# Patient Record
Sex: FEMALE | Race: BLACK OR AFRICAN AMERICAN | Employment: OTHER | URBAN - METROPOLITAN AREA
[De-identification: names, ages, dates, MRNs, and addresses within clinical notes are randomized per-mention and may not be internally consistent; named-entity substitution may affect disease eponyms.]

---

## 2017-01-14 ENCOUNTER — HOSPITAL ENCOUNTER (OUTPATIENT)
Dept: MAMMOGRAPHY | Age: 82
Discharge: HOME | End: 2017-01-14
Attending: FAMILY MEDICINE
Payer: MEDICARE

## 2017-01-14 DIAGNOSIS — Z12.31 VISIT FOR SCREENING MAMMOGRAM: ICD-10-CM

## 2017-01-14 PROCEDURE — 77067 SCR MAMMO BI INCL CAD: CPT

## 2017-03-02 PROBLEM — I63.9 CEREBROVASCULAR ACCIDENT (CVA) (HCC): Status: ACTIVE | Noted: 2017-03-02

## 2017-03-02 PROBLEM — I10 ESSENTIAL HYPERTENSION WITH GOAL BLOOD PRESSURE LESS THAN 130/85: Status: ACTIVE | Noted: 2017-03-02

## 2017-03-26 ENCOUNTER — APPOINTMENT (OUTPATIENT)
Dept: CT IMAGING | Age: 82
DRG: 064 | End: 2017-03-26
Attending: EMERGENCY MEDICINE
Payer: MEDICARE

## 2017-03-26 ENCOUNTER — HOSPITAL ENCOUNTER (EMERGENCY)
Age: 82
Discharge: ANOTHER HOSPITAL | DRG: 064 | End: 2017-03-26
Attending: EMERGENCY MEDICINE
Payer: MEDICARE

## 2017-03-26 ENCOUNTER — HOSPITAL ENCOUNTER (INPATIENT)
Age: 82
LOS: 2 days | Discharge: HOME HEALTH | DRG: 064 | End: 2017-03-28
Attending: INTERNAL MEDICINE | Admitting: INTERNAL MEDICINE
Payer: MEDICARE

## 2017-03-26 VITALS
HEIGHT: 69 IN | BODY MASS INDEX: 27.55 KG/M2 | HEART RATE: 76 BPM | TEMPERATURE: 98.2 F | DIASTOLIC BLOOD PRESSURE: 71 MMHG | OXYGEN SATURATION: 99 % | RESPIRATION RATE: 18 BRPM | SYSTOLIC BLOOD PRESSURE: 133 MMHG | WEIGHT: 186 LBS

## 2017-03-26 DIAGNOSIS — I63.9 CEREBROVASCULAR ACCIDENT (CVA), UNSPECIFIED MECHANISM (HCC): Primary | ICD-10-CM

## 2017-03-26 PROBLEM — R42 DIZZINESS: Status: ACTIVE | Noted: 2017-03-26

## 2017-03-26 PROBLEM — H53.9 VISION CHANGES: Status: ACTIVE | Noted: 2017-03-26

## 2017-03-26 LAB
ALBUMIN SERPL BCP-MCNC: 2.3 G/DL (ref 3.2–4.6)
ALBUMIN/GLOB SERPL: 0.5 {RATIO} (ref 1.2–3.5)
ALP SERPL-CCNC: 73 U/L (ref 50–136)
ALT SERPL-CCNC: 19 U/L (ref 12–65)
ANION GAP BLD CALC-SCNC: 10 MMOL/L (ref 7–16)
APTT PPP: 25.6 SEC (ref 25.3–32.9)
AST SERPL W P-5'-P-CCNC: 32 U/L (ref 15–37)
BASOPHILS # BLD AUTO: 0 K/UL (ref 0–0.2)
BASOPHILS # BLD: 0 % (ref 0–2)
BILIRUB SERPL-MCNC: 0.4 MG/DL (ref 0.2–1.1)
BUN SERPL-MCNC: 29 MG/DL (ref 8–23)
CALCIUM SERPL-MCNC: 8.8 MG/DL (ref 8.3–10.4)
CHLORIDE SERPL-SCNC: 101 MMOL/L (ref 98–107)
CO2 SERPL-SCNC: 28 MMOL/L (ref 21–32)
CREAT SERPL-MCNC: 10.17 MG/DL (ref 0.6–1)
DIFFERENTIAL METHOD BLD: ABNORMAL
EOSINOPHIL # BLD: 0.2 K/UL (ref 0–0.8)
EOSINOPHIL NFR BLD: 3 % (ref 0.5–7.8)
ERYTHROCYTE [DISTWIDTH] IN BLOOD BY AUTOMATED COUNT: 14.7 % (ref 11.9–14.6)
GLOBULIN SER CALC-MCNC: 4.2 G/DL (ref 2.3–3.5)
GLUCOSE SERPL-MCNC: 109 MG/DL (ref 65–100)
HCT VFR BLD AUTO: 27.1 % (ref 35.8–46.3)
HGB BLD-MCNC: 8.6 G/DL (ref 11.7–15.4)
IMM GRANULOCYTES # BLD: 0 K/UL (ref 0–0.5)
IMM GRANULOCYTES NFR BLD AUTO: 0.4 % (ref 0–5)
INR PPP: 1 (ref 0.9–1.2)
LYMPHOCYTES # BLD AUTO: 18 % (ref 13–44)
LYMPHOCYTES # BLD: 1.4 K/UL (ref 0.5–4.6)
MCH RBC QN AUTO: 30.7 PG (ref 26.1–32.9)
MCHC RBC AUTO-ENTMCNC: 31.7 G/DL (ref 31.4–35)
MCV RBC AUTO: 96.8 FL (ref 79.6–97.8)
MONOCYTES # BLD: 0.5 K/UL (ref 0.1–1.3)
MONOCYTES NFR BLD AUTO: 6 % (ref 4–12)
NEUTS SEG # BLD: 5.9 K/UL (ref 1.7–8.2)
NEUTS SEG NFR BLD AUTO: 73 % (ref 43–78)
PLATELET # BLD AUTO: 222 K/UL (ref 150–450)
PMV BLD AUTO: 11.2 FL (ref 10.8–14.1)
POTASSIUM SERPL-SCNC: 3.5 MMOL/L (ref 3.5–5.1)
PROT SERPL-MCNC: 6.5 G/DL (ref 6.3–8.2)
PROTHROMBIN TIME: 10.5 SEC (ref 9.6–12)
RBC # BLD AUTO: 2.8 M/UL (ref 4.05–5.25)
SODIUM SERPL-SCNC: 139 MMOL/L (ref 136–145)
WBC # BLD AUTO: 8 K/UL (ref 4.3–11.1)

## 2017-03-26 PROCEDURE — 65660000000 HC RM CCU STEPDOWN

## 2017-03-26 PROCEDURE — 74011250636 HC RX REV CODE- 250/636: Performed by: FAMILY MEDICINE

## 2017-03-26 PROCEDURE — 74011250637 HC RX REV CODE- 250/637: Performed by: FAMILY MEDICINE

## 2017-03-26 PROCEDURE — 65270000029 HC RM PRIVATE

## 2017-03-26 RX ORDER — DOCUSATE SODIUM 100 MG/1
100 CAPSULE, LIQUID FILLED ORAL DAILY
Status: DISCONTINUED | OUTPATIENT
Start: 2017-03-27 | End: 2017-03-28 | Stop reason: HOSPADM

## 2017-03-26 RX ORDER — FOLIC ACID 1 MG/1
1 TABLET ORAL 2 TIMES DAILY
Status: DISCONTINUED | OUTPATIENT
Start: 2017-03-27 | End: 2017-03-28 | Stop reason: HOSPADM

## 2017-03-26 RX ORDER — ROSUVASTATIN CALCIUM 20 MG/1
20 TABLET, COATED ORAL
Status: DISCONTINUED | OUTPATIENT
Start: 2017-03-26 | End: 2017-03-28 | Stop reason: HOSPADM

## 2017-03-26 RX ORDER — FOLIC ACID 1 MG/1
1 TABLET ORAL DAILY
Status: DISCONTINUED | OUTPATIENT
Start: 2017-03-27 | End: 2017-03-26 | Stop reason: DRUGHIGH

## 2017-03-26 RX ORDER — SEVELAMER HYDROCHLORIDE 400 MG/1
800 TABLET, FILM COATED ORAL 2 TIMES DAILY WITH MEALS
Status: DISCONTINUED | OUTPATIENT
Start: 2017-03-27 | End: 2017-03-28 | Stop reason: HOSPADM

## 2017-03-26 RX ORDER — CALCITRIOL 0.25 UG/1
0.25 CAPSULE ORAL
Status: DISCONTINUED | OUTPATIENT
Start: 2017-03-27 | End: 2017-03-26

## 2017-03-26 RX ORDER — ASPIRIN 81 MG/1
81 TABLET ORAL DAILY
Status: DISCONTINUED | OUTPATIENT
Start: 2017-03-27 | End: 2017-03-28 | Stop reason: HOSPADM

## 2017-03-26 RX ORDER — SODIUM CHLORIDE 0.9 % (FLUSH) 0.9 %
5-10 SYRINGE (ML) INJECTION EVERY 8 HOURS
Status: DISCONTINUED | OUTPATIENT
Start: 2017-03-26 | End: 2017-03-28 | Stop reason: HOSPADM

## 2017-03-26 RX ORDER — FAMOTIDINE 20 MG/1
20 TABLET, FILM COATED ORAL DAILY
Status: DISCONTINUED | OUTPATIENT
Start: 2017-03-27 | End: 2017-03-28 | Stop reason: HOSPADM

## 2017-03-26 RX ORDER — SODIUM CHLORIDE 0.9 % (FLUSH) 0.9 %
5-10 SYRINGE (ML) INJECTION EVERY 8 HOURS
Status: DISCONTINUED | OUTPATIENT
Start: 2017-03-26 | End: 2017-03-26 | Stop reason: HOSPADM

## 2017-03-26 RX ORDER — METOPROLOL TARTRATE 25 MG/1
12.5 TABLET, FILM COATED ORAL DAILY
Status: DISCONTINUED | OUTPATIENT
Start: 2017-03-27 | End: 2017-03-26

## 2017-03-26 RX ORDER — LANOLIN ALCOHOL/MO/W.PET/CERES
400 CREAM (GRAM) TOPICAL DAILY
Status: DISCONTINUED | OUTPATIENT
Start: 2017-03-27 | End: 2017-03-28 | Stop reason: HOSPADM

## 2017-03-26 RX ORDER — LEVOTHYROXINE SODIUM 150 UG/1
150 TABLET ORAL
Status: DISCONTINUED | OUTPATIENT
Start: 2017-03-27 | End: 2017-03-28 | Stop reason: HOSPADM

## 2017-03-26 RX ORDER — SODIUM CHLORIDE 0.9 % (FLUSH) 0.9 %
5-10 SYRINGE (ML) INJECTION AS NEEDED
Status: DISCONTINUED | OUTPATIENT
Start: 2017-03-26 | End: 2017-03-26 | Stop reason: HOSPADM

## 2017-03-26 RX ORDER — SODIUM CHLORIDE 9 MG/ML
100 INJECTION, SOLUTION INTRAVENOUS CONTINUOUS
Status: DISPENSED | OUTPATIENT
Start: 2017-03-26 | End: 2017-03-27

## 2017-03-26 RX ORDER — ACETAMINOPHEN 325 MG/1
650 TABLET ORAL
Status: DISCONTINUED | OUTPATIENT
Start: 2017-03-26 | End: 2017-03-28 | Stop reason: HOSPADM

## 2017-03-26 RX ORDER — TORSEMIDE 100 MG/1
100 TABLET ORAL DAILY
Status: DISCONTINUED | OUTPATIENT
Start: 2017-03-27 | End: 2017-03-26

## 2017-03-26 RX ORDER — AMLODIPINE BESYLATE 5 MG/1
5 TABLET ORAL DAILY
Status: DISCONTINUED | OUTPATIENT
Start: 2017-03-27 | End: 2017-03-28 | Stop reason: HOSPADM

## 2017-03-26 RX ORDER — HEPARIN SODIUM 5000 [USP'U]/ML
5000 INJECTION, SOLUTION INTRAVENOUS; SUBCUTANEOUS EVERY 8 HOURS
Status: DISCONTINUED | OUTPATIENT
Start: 2017-03-26 | End: 2017-03-28 | Stop reason: HOSPADM

## 2017-03-26 RX ORDER — RANOLAZINE 500 MG/1
500 TABLET, EXTENDED RELEASE ORAL 2 TIMES DAILY
Status: DISCONTINUED | OUTPATIENT
Start: 2017-03-26 | End: 2017-03-28 | Stop reason: HOSPADM

## 2017-03-26 RX ORDER — CYANOCOBALAMIN 1000 UG/ML
1000 INJECTION, SOLUTION INTRAMUSCULAR; SUBCUTANEOUS ONCE
Status: ACTIVE | OUTPATIENT
Start: 2017-03-27 | End: 2017-03-27

## 2017-03-26 RX ORDER — SODIUM CHLORIDE 0.9 % (FLUSH) 0.9 %
5-10 SYRINGE (ML) INJECTION AS NEEDED
Status: DISCONTINUED | OUTPATIENT
Start: 2017-03-26 | End: 2017-03-28 | Stop reason: HOSPADM

## 2017-03-26 RX ORDER — ONDANSETRON 2 MG/ML
4 INJECTION INTRAMUSCULAR; INTRAVENOUS
Status: DISCONTINUED | OUTPATIENT
Start: 2017-03-26 | End: 2017-03-28 | Stop reason: HOSPADM

## 2017-03-26 RX ADMIN — HEPARIN SODIUM 5000 UNITS: 5000 INJECTION, SOLUTION INTRAVENOUS; SUBCUTANEOUS at 23:07

## 2017-03-26 RX ADMIN — ROSUVASTATIN CALCIUM 20 MG: 20 TABLET ORAL at 23:07

## 2017-03-26 RX ADMIN — RANOLAZINE 500 MG: 500 TABLET, FILM COATED, EXTENDED RELEASE ORAL at 23:07

## 2017-03-26 NOTE — ED TRIAGE NOTES
Pt states she woke up this morning and has been having blurry vision since she woke up in both eyes. States she was dizzy this morning but has not felt dizzy in hours. Denies headache  At this time.

## 2017-03-26 NOTE — ED PROVIDER NOTES
HPI Comments: Patient is an 72-year-old female past medical history of dialysis and coronary artery disease currently taking T[place]agral[place] he states she woke up this morning at approximately 0830 with dizziness and diplopia. She states she had severe difficulty ambulating at this time. She went back to bed hoping symptoms would resolve and woke up at 10:30 and still had visual changes. She denies any significant headache. She states her dizziness has improved somewhat but is still present. She mentions she has been diagnosed with a TIA previously. No difficulty speaking. Good motor function throughout. Patient is a 80 y.o. female presenting with blurred vision. The history is provided by the patient. No  was used. Blurred Vision    Associated symptoms include blurred vision and dizziness. Pertinent negatives include no nausea, no vomiting, no fever and no pain.         Past Medical History:   Diagnosis Date    Anemia of chronic renal failure 4/28/2015    Anterior myocardial infarction [facility]) 5/21/2015    CAD (coronary artery disease)     Chest pain     Chronic kidney disease, stage III (moderate) 8/15/2014    on dialysis    CKD (chronic kidney disease) stage 4, GFR 15-29 ml/min ([facility]) 4/28/2015    Debility 5/5/2015    Depression 12/29/2015    Edema 12/29/2015    Endocrine disease     Hypothyroidism    GERD (gastroesophageal reflux disease)     Heart murmur 12/29/2015    HLD (hyperlipidemia) 12/29/2015    Hypertension     Hypothyroidism 4/28/2015    Ischemic cardiomyopathy 12/29/2015    Nausea     STEMI (ST elevation myocardial infarction) ([facility] [address] 4/28/2015    Unspecified sleep apnea     uses cpap machine    Unstable angina ([facility])        Past Surgical History:   Procedure Laterality Date    HX BACK SURGERY  1990    neck surgery cervical disc    HX BACK SURGERY      lower back    HX CATARACT REMOVAL Bilateral     HX CHOLECYSTECTOMY  19702    gall bladder     HX KNEE REPLACEMENT Right 2006    HX PTCA  4/28/2015    2.25 Xience stent to mid LAD for occluded artery, anterior MI, EF 25%. Moderate disease distal LAD and distal OM PCI CX and RCA 2004, then PCI RCA and LAD in 2009. Family History:   Problem Relation Age of Onset    Heart Disease Mother     Hypertension Mother     Cancer Mother      Lung    Stroke Father     Hypertension Father     Breast Cancer Neg Hx        Social History     Social History    Marital status:      Spouse name: N/A    Number of children: N/A    Years of education: N/A     Occupational History    Not on file. Social History Main Topics    Smoking status: Never Smoker    Smokeless tobacco: Not on file    Alcohol use No    Drug use: Not on file    Sexual activity: Not on file     Other Topics Concern    Not on file     Social History Narrative         ALLERGIES: Codeine    Review of Systems   Constitutional: Negative for fatigue and fever. HENT: Negative for sore throat. Eyes: Positive for blurred vision and visual disturbance. Negative for pain. Respiratory: Negative for cough, chest tightness and shortness of breath. Cardiovascular: Negative for leg swelling. Gastrointestinal: Negative for abdominal pain, nausea and vomiting. Genitourinary: Negative for dysuria. Musculoskeletal: Negative for back pain. Neurological: Positive for dizziness. Negative for syncope and headaches. Vitals:    03/26/17 1704   BP: 120/69   Pulse: 82   Resp: 16   Temp: 98 °F (36.7 °C)   SpO2: 98%   Weight: 84.4 kg (186 lb)   Height: 5' 9\" (1.753 m)            Physical Exam   Constitutional: She is oriented to person, place, and time. She appears well-developed and well-nourished. No distress. HENT:   Head: Normocephalic and atraumatic. Eyes: Conjunctivae and EOM are normal. Pupils are equal, round, and reactive to light. In the patient's left eye and right eye she has visual deficit in the left upper quadrant.   She has difficulty perceiving fingers in this quadrant. No pain with extraocular movements. No injection of conjunctiva. Neck: Normal range of motion. Neck supple. Cardiovascular: Normal rate, regular rhythm and normal heart sounds. Pulmonary/Chest: Effort normal and breath sounds normal. No respiratory distress. She has no wheezes. She has no rales. Abdominal: Soft. She exhibits no distension. There is no tenderness. There is no rebound. Musculoskeletal: Normal range of motion. She exhibits no edema or tenderness. Neurological: She is alert and oriented to person, place, and time. No cranial nerve deficit. Speaking clearly. Symmetric smile. Good strength bilateral upper and lower extremities. Slight difficulty with left hand finger to nose. Normal right hand finger to nose. Skin: Skin is warm and dry. No rash noted. She is not diaphoretic. Psychiatric: She has a normal mood and affect. Her behavior is normal.   Vitals reviewed. MDM  Number of Diagnoses or Management Options  Cerebrovascular accident (CVA), unspecified mechanism [facility]): new and requires workup  Diagnosis management comments: Patient with visual field deficits concerning for possible occipital stroke. Also concerning is her reports of dizziness and headache this morning. We'll admit to hospitalist for further evaluation and treatment.        Amount and/or Complexity of Data Reviewed  Clinical lab tests: ordered and reviewed (Results for orders placed or performed during the hospital encounter of 03/26/17  -CBC WITH AUTOMATED DIFF       Result                                            Value                         Ref Range                       WBC                                               8.0                           4.3 - 11.1 K/uL                 RBC                                               2.80 (L)                      4.05 - 5.25 M/uL                HGB                                               8.6 (L) 11.7 - 15.4 g/dL                HCT                                               27.1 (L)                      35.8 - 46.3 %                   MCV                                               96.8                          79.6 - 97.8 FL                  MCH                                               30.7                          26.1 - 32.9 PG                  MCHC                                              31.7                          31.4 - 35.0 g/dL                RDW                                               14.7 (H)                      11.9 - 14.6 %                   PLATELET                                          222                           150 - 450 K/uL                  MPV                                               11.2                          10.8 - 14.1 FL                  DF                                                AUTOMATED                                                     NEUTROPHILS                                       73                            43 - 78 %                       LYMPHOCYTES                                       18                            13 - 44 %                       MONOCYTES                                         6                             4.0 - 12.0 %                    EOSINOPHILS                                       3                             0.5 - 7.8 %                     BASOPHILS                                         0                             0.0 - 2.0 %                     IMMATURE GRANULOCYTES                             0.4                           0.0 - 5.0 %                     ABS. NEUTROPHILS                                  5.9                           1.7 - 8.2 K/UL                  ABS.  LYMPHOCYTES                                  1.4                           0.5 - 4.6 K/UL                  ABS. MONOCYTES                                    0.5                           0.1 - 1.3 K/UL ABS. EOSINOPHILS                                  0.2                           0.0 - 0.8 K/UL                  ABS. BASOPHILS                                    0.0                           0.0 - 0.2 K/UL                  ABS. IMM.  GRANS.                                  0.0                           0.0 - 0.5 K/UL             -METABOLIC PANEL, COMPREHENSIVE       Result                                            Value                         Ref Range                       Sodium                                            139                           136 - 145 mmol/L                Potassium                                         3.5                           3.5 - 5.1 mmol/L                Chloride                                          101                           98 - 107 mmol/L                 CO2                                               28                            21 - 32 mmol/L                  Anion gap                                         10                            7 - 16 mmol/L                   Glucose                                           109 (H)                       65 - 100 mg/dL                  BUN                                               29 (H)                        8 - 23 MG/DL                    Creatinine                                        10.17 (H)                     0.6 - 1.0 MG/DL                 GFR est AA                                        5 (L)                         >60 ml/min/1.73m2               GFR est non-AA                                    4 (L)                         >60 ml/min/1.73m2               Calcium                                           8.8                           8.3 - 10.4 MG/DL                Bilirubin, total                                  0.4                           0.2 - 1.1 MG/DL                 ALT (SGPT)                                        19                            12 - 65 U/L                     AST (SGOT)                                        32                            15 - 37 U/L                     Alk. phosphatase                                  73                            50 - 136 U/L                    Protein, total                                    6.5                           6.3 - 8.2 g/dL                  Albumin                                           2.3 (L)                       3.2 - 4.6 g/dL                  Globulin                                          4.2 (H)                       2.3 - 3.5 g/dL                  A-G Ratio                                         0.5 (L)                       1.2 - 3.5                  -PROTHROMBIN TIME + INR       Result                                            Value                         Ref Range                       Prothrombin time                                  10.5                          9.6 - 12.0 sec                  INR                                               1.0                           0.9 - 1.2                  -PTT       Result                                            Value                         Ref Range                       aPTT                                              25.6                          25.3 - 32.9 SEC            )  Tests in the radiology section of CPT®: ordered and reviewed (Ct Head Wo Cont    Result Date: 3/26/2017  HEAD CT without contrast  3/26/2017 5:58 PM CLINICAL INFORMATION: Dizziness, onset this morning with diplopia. Concern for CVA. COMPARISON: 2/10/2016 and older. PROCEDURE: Emergent noncontrast head CT was performed in the axial plane. Brain, bone, and soft tissue windows reviewed. Radiation dose reduction techniques were used for this study. Our CT scanners used one or all of the following: Automated exposure control, adjustment of the MA and/or kV according to patient size, iterative reconstruction. FINDINGS: No acute intracranial hemorrhage, mass, or mass effect.  No extra-axial fluid collections. No midline shift; the basilar cisterns are patent. Moderate volume loss is unchanged. The ventricles are stable in caliber and symmetric to the subarachnoid spaces. Chronic left parietal CVA is unchanged. Minimal periventricular patchy white matter low attenuation is nonspecific but likely related to chronic small vessel ischemic change. There are no specific signs to suggest acute large vessel territory ischemia. The gray-white differentiation is otherwise preserved. No aggressive calvarial process. Visualized portions of the paranasal sinuses and mastoid air cells are patent. IMPRESSIONS: 1. No convincing acute intracranial abnormality. Please note that MRI is more sensitive for detection of acute ischemia.  2. Little significant change in chronic left parietal CVA.      )  Review and summarize past medical records: yes  Discuss the patient with other providers: yes  Independent visualization of images, tracings, or specimens: yes    Risk of Complications, Morbidity, and/or Mortality  Presenting problems: high  Diagnostic procedures: moderate  Management options: moderate    Patient Progress  Patient progress: stable    ED Course       Procedures

## 2017-03-26 NOTE — IP AVS SNAPSHOT
Jose Cruz Evangelista 
 
 
 [address] [facility] [address] [facility] 
[phone] Patient: Violeta Vasquez MRN: [id number] You are allergic to the following Allergen Reactions Codeine Nausea and Vomiting Recent Documentation OB Status Smoking Status Postmenopausal Never Smoker Emergency Contacts Name Discharge Info Relation Home Work Mobile Chavez Mary  Child [2] [phone] Reather Leap  Child [2] [phone] About your hospitalization You were admitted on:  March 26, 2017 You last received care in the:  [facility] 6 MED SURG You were discharged on:  March 28, 2017 Unit phone number:  [phone] Why you were hospitalized Your primary diagnosis was:  Cva (Cerebral Vascular Accident) (Hcc) Your diagnoses also included:  Hypertension, Anemia Of Chronic Renal Failure, Esrd On Peritoneal Dialysis (Hcc), Vision Changes, Dizziness Providers Seen During Your Hospitalizations Provider Role Specialty Primary office phone Elsy Crockett MD Attending Provider Internal Medicine [phone] Your Primary Care Physician (PCP) Primary Care Physician Office Phone Office Fax [address] [facility], [place] [address] Follow-up Information Follow up With Details Comments Contact Info [address]   safety evaluation in the home  [address] [facility] [address] [facility] [address] 
[phone] Ellie Hernández MD On 4/6/2017 at 3:50 [address] [facility] 2 
[phone] Your Appointments Thursday April 06, 2017  9:00 AM EDT  
CARDIOLITE with GVLLE NUCLEAR STRESS [facility] Cardiology ([address] [facility]) [address] [place]  
[address] [facility] 81  
[phone] 1.  Please DO NOT take any medication the morning of your procedure unless instructed to do so by your physician. PLEASE BRING ALL OF YOUR MEDICINE WITH YOU IN THE ORIGINAL BOTTLES. 2. Please DO NOT EAT OR DRINK ANYTHING PAST MIDNIGHT the night before your test - NOT EVEN A SIP OF WATER!  3. AVOID ANY FOOD AND DRINKS WITH CAFFEINE FOR AT LEAST 24 HOURS BEFORE YOUR TEST, even if it states 'caffeine-free or decaffeinated' - THIS INCLUDES ALL CHOCOLATE! 4. Wear comfortable, warm, loose fitting clothes as well as comfortable shoes. No metal buttons or snaps on the chest area. Please bring a sweater as it is cool in the testing area. 5. You must be able to recline with your arms above your head for 15 minutes for two sets of pictures. If you are not able to do so, please inform our staff prior to your procedure. 6. If you need to cancel your appointment, please call our office at [phone] [place]) or  [phone] Zoraida Norwood) [facility] to your appointment. Should you fail to contact the office, you may be charged for material ordered for your procedure. 7. If you do not have insurance, you should immediately contact our patient  at [phone] in order to make arrangements for the payment of this service. If you are scheduled for a 2-Day Study, please be prepared to stay at least 2 1/2 hours each day. If your study is ordered for 1-Day, please be prepared to stay for up to 5 hours to complete the study. Bring reading material to help pass the time. Please do not bring any valuables. You must remain fasting until you are completely finished with your procedure. **PLEASE ARRIVE 15 MINUTES EARLY FOR YOUR APPOINTMENT.**  
  
    
 Thursday April 13, 2017 11:00 AM EDT Office Visit with Natan Colon MD  
[address] Cardiology ([address] [facility]) [address] [place]  
[address] [place] [address]  
[phone] Current Discharge Medication List  
  
CONTINUE these medications which have NOT CHANGED Dose & Instructions Dispensing Information Comments Morning Noon Evening Bedtime  
 amLODIPine 5 mg tablet Commonly known as:  [employer] Your next dose is:  3/29 Dose:  5 mg Take 5 mg by mouth daily. Refills:  0 Aranesp (Polysorbate) 40 mcg/mL injection Generic drug:  darbepoetin toya in polysorbat Dose:  40 mcg 40 mcg by SubCUTAneous route every fourteen (14) days. Indications: ANEMIA IN CHRONIC KIDNEY DISEASE Refills:  0  
     
   
   
   
  
 aspirin delayed-release 81 mg tablet Your next dose is:  3/29 Dose:  81 mg Take 1 Tab by mouth daily. Refills:  0  
     
   
   
   
  
 CRESTOR 20 mg tablet Generic drug:  rosuvastatin Your next dose is: Today at bedtime Dose:  20 mg Take 20 mg by mouth nightly. Refills:  0  
     
   
   
   
  
  
 docusate sodium 100 mg capsule Commonly known as:  Gemma Batters Your next dose is:  3/29 Dose:  100 mg Take 100 mg by mouth daily. Refills:  0  
     
   
   
   
  
 famotidine 40 mg tablet Commonly known as:  PEPCID Your next dose is:  3/29 Dose:  20 mg Take 20 mg by mouth daily. Refills:  0  
     
   
   
   
  
 folic acid 439 mcg tablet Your next dose is:  3/29 Dose:  800 mcg Take 800 mcg by mouth daily. Refills:  0  
     
   
   
   
  
 gentamicin 0.1 % topical cream  
Commonly known as:  GARAMYCIN Your next dose is:  As directed APPLY TO PD catheter exit site at daily dressing change Quantity:  15 g Refills:  0 KLOR-CON M10 10 mEq tablet Generic drug:  potassium chloride Your next dose is:  3/29 Take  by mouth. Refills:  0  
     
   
   
   
  
 levothyroxine 150 mcg tablet Commonly known as:  SYNTHROID Your next dose is:  3/29 Dose:  150 mcg Take 150 mcg by mouth Daily (before breakfast). Refills:  0 LINZESS 145 mcg Cap capsule Generic drug:  linaclotide Your next dose is:  3/29 Take  by mouth Daily (before breakfast). Refills:  0  
     
   
   
   
  
 magnesium oxide 400 mg tablet Commonly known as:  MAG-OX Your next dose is:  3/29 Dose:  400 mg Take 400 mg by mouth daily. Refills:  0  
     
   
   
   
  
 metoclopramide HCl 5 mg tablet Commonly known as:  REGLAN Your next dose is: Take today before supper Dose:  5 mg Take 5 mg by mouth two (2) times a day. Refills:  0  
     
   
   
  
   
  
 metoprolol tartrate 25 mg tablet Commonly known as:  LOPRESSOR Your next dose is:  3/29 Dose:  12.5 mg Take 12.5 mg by mouth daily. Take PRN for BP <120. Refills:  0  
     
   
   
   
  
 nitroglycerin 400 mcg/spray spray Commonly known as:  Thermon Danpaxton Dose:  1 Spray 1 Spray by SubLINGual route every five (5) minutes as needed for Chest Pain. Refills:  0  
     
   
   
   
  
 ondansetron 4 mg disintegrating tablet Commonly known as:  ZOFRAN ODT Your next dose is:  As needed Dose:  4 mg Take 4 mg by mouth three (3) times daily as needed. Refills:  0 PRENATAL MULTI PO Your next dose is:  3/29 Take  by mouth. Refills:  0  
     
   
   
   
  
 promethazine 25 mg tablet Commonly known as:  PHENERGAN Your next dose is:  As needed Dose:  25 mg Take 25 mg by mouth every eight (8) hours as needed for Nausea. Refills:  0  
     
   
   
   
  
 RANEXA 500 mg SR tablet Generic drug:  ranolazine ER Your next dose is: Take with supper Dose:  500 mg Take 500 mg by mouth two (2) times a day. Refills:  0  
     
   
   
  
   
  
 RENVELA 800 mg Tab tab Generic drug:  sevelamer carbonate Your next dose is: Take today with lunch and supper Dose:  800 mg Take 800 mg by mouth three (3) times daily (with meals). Refills:  0 SENSIPAR 90 mg Tab Generic drug:  cinacalcet Your next dose is:  3/29 Take  by mouth. Refills:  0  
     
   
   
   
  
 ticagrelor 90 mg tablet Commonly known as:  [employer] Your next dose is: Take with supper Dose:  90 mg Take 1 Tab by mouth two (2) times a day. Quantity:  60 Tab Refills:  11 Discharge Instructions DISCHARGE SUMMARY from Nurse The following personal items are in your possession at time of discharge: 
 
Dental Appliances: Lowers, Partials, Uppers Home Medications: None Jewelry: None Clothing: Pants, Shirt, Socks, Undergarments Other Valuables: Cell Phone PATIENT INSTRUCTIONS: 
 
After general anesthesia or intravenous sedation, for 24 hours or while taking prescription Narcotics: · Limit your activities · Do not drive and operate hazardous machinery · Do not make important personal or business decisions · Do  not drink alcoholic beverages · If you have not urinated within 8 hours after discharge, please contact your surgeon on call. Report the following to your surgeon: 
· Excessive pain, swelling, redness or odor of or around the surgical area · Temperature over 100.5 · Nausea and vomiting lasting longer than 4 hours or if unable to take medications · Any signs of decreased circulation or nerve impairment to extremity: change in color, persistent  numbness, tingling, coldness or increase pain · Any questions What to do at Home: 
Recommended activity: Activity as tolerated If you experience any of the following symptoms slurred speech, feeling dizzy, numbness/tingling in arms/legs, facial droop, or changes in mental status, please follow up with ER immediatley. *  Please give a list of your current medications to your Primary Care Provider.  
 
*  Please update this list whenever your medications are discontinued, doses are 
 changed, or new medications (including over-the-counter products) are added. *  Please carry medication information at all times in case of emergency situations. These are general instructions for a healthy lifestyle: No smoking/ No tobacco products/ Avoid exposure to second hand smoke Surgeon General's Warning:  Quitting smoking now greatly reduces serious risk to your health. Obesity, smoking, and sedentary lifestyle greatly increases your risk for illness A healthy diet, regular physical exercise & weight monitoring are important for maintaining a healthy lifestyle You may be retaining fluid if you have a history of heart failure or if you experience any of the following symptoms:  Weight gain of 3 pounds or more overnight or 5 pounds in a week, increased swelling in our hands or feet or shortness of breath while lying flat in bed. Please call your doctor as soon as you notice any of these symptoms; do not wait until your next office visit. Recognize signs and symptoms of STROKE: 
 
F-face looks uneven A-arms unable to move or move unevenly S-speech slurred or non-existent T-time-call 911 as soon as signs and symptoms begin-DO NOT go Back to bed or wait to see if you get better-TIME IS BRAIN. Warning Signs of HEART ATTACK Call 911 if you have these symptoms: 
? Chest discomfort. Most heart attacks involve discomfort in the center of the chest that lasts more than a few minutes, or that goes away and comes back. It can feel like uncomfortable pressure, squeezing, fullness, or pain. ? Discomfort in other areas of the upper body. Symptoms can include pain or discomfort in one or both arms, the back, neck, jaw, or stomach. ? Shortness of breath with or without chest discomfort. ? Other signs may include breaking out in a cold sweat, nausea, or lightheadedness. Don't wait more than five minutes to call [address]!  Fast action can save your life. Calling 911 is almost always the fastest way to get lifesaving treatment. Emergency Medical Services staff can begin treatment when they arrive  up to an hour sooner than if someone gets to the hospital by car. The discharge information has been reviewed with the patient. The patient verbalized understanding. Discharge medications reviewed with the patient and appropriate educational materials and side effects teaching were provided. Stroke: After Your Visit Your Care Instructions You have had a stroke. Risk factors for stroke include being overweight, smoking, and sedentary lifestyle. This means that the blood flow to a part of your brain was blocked for some time, which damages the nerve cells in that part of the brain. The part of your body controlled by that part of your brain may not function properly now. The brain is an amazing organ that can heal itself to some degree. The stroke you had damaged part of your brain, but other parts of your brain may take over in some way for the damaged areas. You have already started this process. Going home may be hard for you and your family. The more you can try to do for yourself, the better. Remember to take each day one at a time. Follow-up care is a key part of your treatment and safety. Be sure to make and go to all appointments, and call your doctor if you are having problems. Its also a good idea to know your test results and keep a list of the medicines you take. How can you care for yourself at home? Enter a stroke rehabilitation (rehab) program, if your doctor recommends it. Physical, speech, and occupational therapies can help you manage bathing, dressing, eating, and other basics of daily living. Eat a heart-healthy diet that is low in cholesterol, saturated fat, and salt. Eat lots of fresh fruits and vegetables and foods high in fiber. Increase your activities slowly. Take short rest breaks when you get tired. Gradually increase the amount you walk. Start out by walking a little more than you did the day before. Do not drive until your doctor says it is okay. It is normal to feel sad or depressed after a stroke. If the blues last, talk to your doctor. If you are having problems with urine leakage, go to the bathroom at regular times, including when you first wake up and at bedtime. Also, limit fluids after dinner. If you are constipated, drink plenty of fluids, enough so that your urine is light yellow or clear like water. If you have kidney, heart, or liver disease and have to limit fluids, talk with your doctor before you increase the amount of fluids you drink. Set up a regular time for using the toilet. If you continue to have constipation, your doctor may suggest using a bulking agent, such as Metamucil, or a stool softener, laxative, or enema. Medicines Take your medicines exactly as prescribed. Call your doctor if you think you are having a problem with your medicine. You may be taking several medicines. ACE (angiotensin-converting enzyme) inhibitors, angiotensin II receptor blockers (ARBs), beta-blockers, diuretics (water pills), and calcium channel blockers control your blood pressure. Statins help lower cholesterol. Your doctor may also prescribe medicines for depression, pain, sleep problems, anxiety, or agitation. If your doctor has given you medicine that prevents blood clots, such as warfarin (Coumadin), aspirin combined with extended-release dipyridamole (Aggrenox), clopidogrel (Plavix), or aspirin to prevent another stroke, you should: 
Tell your dentist, pharmacist, and other health professionals that you take these medicines. Watch for unusual bruising or bleeding, such as blood in your urine, red or black stools, or bleeding from your nose or gums. Get regular blood tests to check your clotting time if you are taking Coumadin. Wear medical alert jewelry that says you take blood thinners. You can buy this at most drugstores. Do not take any over-the-counter medicines or herbal products without talking to your doctor first. 
If you take birth control pills or hormone replacement therapy, talk to your doctor about whether they are right for you. For family members and caregivers Make the home safe. Set up a room so that your loved one does not have to climb stairs. Be sure the bathroom is on the same floor. Move throw rugs and furniture that could cause falls, and make sure that the lighting is good. Put grab bars and seats in tubs and showers. Find out what your loved one can do and what he or she needs help with. Try not to do things for your loved one that your loved one can do on his or her own. Help him or her learn and practice new skills. Visit and talk with your loved one often. Try doing activities together that you both enjoy, such as playing cards or board games. Keep in touch with your loved one's friends as much as you can, and encourage them to visit. Take care of yourself. Do not try to do everything yourself. Ask other family members to help. Eat well, get enough rest, and take time to do things that you enjoy. Keep up with your own doctor visits, and make sure to take your medicines regularly. Get out of the house as much as you can. Join a local support group. Find out if you qualify for home health care visits to help with rehab or for adult day care. When should you call for help? Call 911 anytime you think you may need emergency care. For example, call if: 
You have signs of another stroke. These may include: 
Sudden numbness, paralysis, or weakness in your face, arm, or leg, especially on only one side of your body. New problems with walking or balance. Sudden vision changes. Drooling or slurred speech. New problems speaking or understanding simple statements, or you feel confused. A sudden, severe headache that is different from past headaches. Call 911 even if these symptoms go away in a few minutes. You cough up blood. You vomit blood or what looks like coffee grounds. You pass maroon or very bloody stools. Call your doctor now or seek immediate medical care if: 
You have new bruises or blood spots under your skin. You have a nosebleed. Your gums bleed when you brush your teeth. You have blood in your urine. Your stools are black and tarlike or have streaks of blood. You have vaginal bleeding when you are not having your period, or heavy period bleeding. You have new symptoms that may be related to your stroke, such as falls or trouble swallowing. Watch closely for changes in your health, and be sure to contact your doctor if you have any problems. Where can you learn more? Go to [employer]be Enter Z971  in the search box to learn more about \"Stroke: After Your Visit\". © 3760-2493 Healthwise, Incorporated. Care instructions adapted under license by [employer] (which disclaims liability or warranty for this information). This care instruction is for use with your licensed healthcare professional. If you have questions about a medical condition or this instruction, always ask your healthcare professional. Verla Franklyn any warranty or liability for your use of this information. Discharge Orders None ACO Transitions of Care Introducing [facility] [employer] offers a voluntary care coordination program to provide high quality service and care to [facility] fee-for-service beneficiaries. Melinda Fuentes was designed to help you enhance your health and well-being through the following services: ? Transitions of Care  support for individuals who are transitioning from one care setting to another (example: Hospital to home). ? Chronic and Complex Care Coordination  support for individuals and caregivers of those with serious or chronic illnesses or with more than one chronic (ongoing) condition and those who take a number of different medications. If you meet specific medical criteria, a [facility] [address] may call you directly to coordinate your care with your primary care physician and your other care providers. For questions about the [facility] programs, please, contact your physicians office. For general questions or additional information about Accountable Care Organizations: 
Please visit www.medicare.gov/acos. html or call 1-800-MEDICARE (8-440.362.3880) TTY users should call 0-268.430.6516. [employer] Announcement We are excited to announce that we are making your provider's discharge notes available to you in [employer]. You will see these notes when they are completed and signed by the physician that discharged you from your recent hospital stay. If you have any questions or concerns about any information you see in [employer], please call the Health Information Department where you were seen or reach out to your Primary Care Provider for more information about your plan of care. Introducing hospitals & HEALTH SERVICES! [facility] introduces [employer] patient portal. Now you can access parts of your medical record, email your doctor's office, and request medication refills online. 1. In your internet browser, go to [email or web address] 2. Click on the First Time User? Click Here link in the Sign In box. You will see the New Member Sign Up page. 3. Enter your [employer] Access Code exactly as it appears below. You will not need to use this code after youve completed the sign-up process. If you do not sign up before the expiration date, you must request a new code. · [employer] Access Code: [email or web address] Expires: 5/31/2017  4:59 PM 
 
4. Enter the last four digits of your Social Security Number (xxxx) and Date of Birth (mm/dd/yyyy) as indicated and click Submit. You will be taken to the next sign-up page. 5. Create a [employer] ID. This will be your [employer] login ID and cannot be changed, so think of one that is secure and easy to remember. 6. Create a [employer] password. You can change your password at any time. 7. Enter your Password Reset Question and Answer. This can be used at a later time if you forget your password. 8. Enter your e-mail address. You will receive e-mail notification when new information is available in [address] 9. Click Sign Up. You can now view and download portions of your medical record. 10. Click the Download Summary menu link to download a portable copy of your medical information. If you have questions, please visit the Frequently Asked Questions section of the [employer] website. Remember, [employer] is NOT to be used for urgent needs. For medical emergencies, dial 911. Now available from your iPhone and Android! General Information Please provide this summary of care documentation to your next provider. Patient Signature:  ____________________________________________________________ Date:  ____________________________________________________________  
  
Sorin Bough Provider Signature:  ____________________________________________________________ Date:  ____________________________________________________________

## 2017-03-27 ENCOUNTER — APPOINTMENT (OUTPATIENT)
Dept: MRI IMAGING | Age: 82
DRG: 064 | End: 2017-03-27
Attending: FAMILY MEDICINE
Payer: MEDICARE

## 2017-03-27 ENCOUNTER — PATIENT OUTREACH (OUTPATIENT)
Dept: CASE MANAGEMENT | Age: 82
End: 2017-03-27

## 2017-03-27 LAB
ANION GAP BLD CALC-SCNC: 8 MMOL/L (ref 7–16)
BUN SERPL-MCNC: 35 MG/DL (ref 8–23)
CALCIUM SERPL-MCNC: 8 MG/DL (ref 8.3–10.4)
CHLORIDE SERPL-SCNC: 103 MMOL/L (ref 98–107)
CHOLEST SERPL-MCNC: 174 MG/DL
CO2 SERPL-SCNC: 29 MMOL/L (ref 21–32)
CREAT SERPL-MCNC: 11.9 MG/DL (ref 0.6–1)
ERYTHROCYTE [DISTWIDTH] IN BLOOD BY AUTOMATED COUNT: 14.8 % (ref 11.9–14.6)
EST. AVERAGE GLUCOSE BLD GHB EST-MCNC: ABNORMAL MG/DL
GLUCOSE SERPL-MCNC: 79 MG/DL (ref 65–100)
HBA1C MFR BLD: <3.5 % (ref 4.8–6)
HCT VFR BLD AUTO: 22.3 % (ref 35.8–46.3)
HDLC SERPL-MCNC: 87 MG/DL (ref 40–60)
HDLC SERPL: 2 {RATIO}
HGB BLD-MCNC: 7.1 G/DL (ref 11.7–15.4)
LDLC SERPL CALC-MCNC: 64.6 MG/DL
LIPID PROFILE,FLP: ABNORMAL
MAGNESIUM SERPL-MCNC: 1.7 MG/DL (ref 1.8–2.4)
MCH RBC QN AUTO: 30.5 PG (ref 26.1–32.9)
MCHC RBC AUTO-ENTMCNC: 31.8 G/DL (ref 31.4–35)
MCV RBC AUTO: 95.7 FL (ref 79.6–97.8)
PLATELET # BLD AUTO: 185 K/UL (ref 150–450)
PMV BLD AUTO: 10.2 FL (ref 10.8–14.1)
POTASSIUM SERPL-SCNC: 3.4 MMOL/L (ref 3.5–5.1)
RBC # BLD AUTO: 2.33 M/UL (ref 4.05–5.25)
SODIUM SERPL-SCNC: 140 MMOL/L (ref 136–145)
T4 FREE SERPL-MCNC: 0.9 NG/DL (ref 0.78–1.46)
TRIGL SERPL-MCNC: 112 MG/DL (ref 35–150)
TSH SERPL DL<=0.005 MIU/L-ACNC: 17.4 UIU/ML (ref 0.36–3.74)
VLDLC SERPL CALC-MCNC: 22.4 MG/DL (ref 6–23)
WBC # BLD AUTO: 6.5 K/UL (ref 4.3–11.1)

## 2017-03-27 PROCEDURE — 85027 COMPLETE CBC AUTOMATED: CPT | Performed by: FAMILY MEDICINE

## 2017-03-27 PROCEDURE — C8929 TTE W OR WO FOL WCON,DOPPLER: HCPCS

## 2017-03-27 PROCEDURE — 74011250636 HC RX REV CODE- 250/636: Performed by: INTERNAL MEDICINE

## 2017-03-27 PROCEDURE — 3E1M39Z IRRIGATION OF PERITONEAL CAVITY USING DIALYSATE, PERCUTANEOUS APPROACH: ICD-10-PCS | Performed by: INTERNAL MEDICINE

## 2017-03-27 PROCEDURE — 83036 HEMOGLOBIN GLYCOSYLATED A1C: CPT | Performed by: FAMILY MEDICINE

## 2017-03-27 PROCEDURE — 97162 PT EVAL MOD COMPLEX 30 MIN: CPT

## 2017-03-27 PROCEDURE — 92610 EVALUATE SWALLOWING FUNCTION: CPT

## 2017-03-27 PROCEDURE — 83735 ASSAY OF MAGNESIUM: CPT | Performed by: FAMILY MEDICINE

## 2017-03-27 PROCEDURE — 84443 ASSAY THYROID STIM HORMONE: CPT | Performed by: FAMILY MEDICINE

## 2017-03-27 PROCEDURE — 80048 BASIC METABOLIC PNL TOTAL CA: CPT | Performed by: FAMILY MEDICINE

## 2017-03-27 PROCEDURE — 74011250636 HC RX REV CODE- 250/636: Performed by: FAMILY MEDICINE

## 2017-03-27 PROCEDURE — 74011250637 HC RX REV CODE- 250/637: Performed by: INTERNAL MEDICINE

## 2017-03-27 PROCEDURE — 80061 LIPID PANEL: CPT | Performed by: FAMILY MEDICINE

## 2017-03-27 PROCEDURE — 84439 ASSAY OF FREE THYROXINE: CPT | Performed by: FAMILY MEDICINE

## 2017-03-27 PROCEDURE — 97165 OT EVAL LOW COMPLEX 30 MIN: CPT

## 2017-03-27 PROCEDURE — 65660000000 HC RM CCU STEPDOWN

## 2017-03-27 PROCEDURE — 36415 COLL VENOUS BLD VENIPUNCTURE: CPT | Performed by: FAMILY MEDICINE

## 2017-03-27 PROCEDURE — 84481 FREE ASSAY (FT-3): CPT | Performed by: FAMILY MEDICINE

## 2017-03-27 PROCEDURE — 74011000250 HC RX REV CODE- 250: Performed by: INTERNAL MEDICINE

## 2017-03-27 PROCEDURE — 97535 SELF CARE MNGMENT TRAINING: CPT

## 2017-03-27 PROCEDURE — 90945 DIALYSIS ONE EVALUATION: CPT

## 2017-03-27 PROCEDURE — 74011250637 HC RX REV CODE- 250/637: Performed by: FAMILY MEDICINE

## 2017-03-27 PROCEDURE — 70551 MRI BRAIN STEM W/O DYE: CPT

## 2017-03-27 RX ORDER — GENTAMICIN SULFATE 1 MG/G
CREAM TOPICAL
Status: DISCONTINUED | OUTPATIENT
Start: 2017-03-27 | End: 2017-03-28 | Stop reason: HOSPADM

## 2017-03-27 RX ADMIN — ERYTHROPOIETIN 20000 UNITS: 20000 INJECTION, SOLUTION INTRAVENOUS; SUBCUTANEOUS at 17:23

## 2017-03-27 RX ADMIN — ROSUVASTATIN CALCIUM 20 MG: 20 TABLET ORAL at 21:54

## 2017-03-27 RX ADMIN — FOLIC ACID 1 MG: 1 TABLET ORAL at 17:25

## 2017-03-27 RX ADMIN — DOCUSATE SODIUM 100 MG: 100 CAPSULE, LIQUID FILLED ORAL at 08:22

## 2017-03-27 RX ADMIN — RANOLAZINE 500 MG: 500 TABLET, FILM COATED, EXTENDED RELEASE ORAL at 08:22

## 2017-03-27 RX ADMIN — Medication 10 ML: at 21:54

## 2017-03-27 RX ADMIN — LEVOTHYROXINE SODIUM 150 MCG: 150 TABLET ORAL at 05:25

## 2017-03-27 RX ADMIN — HEPARIN SODIUM 5000 UNITS: 5000 INJECTION, SOLUTION INTRAVENOUS; SUBCUTANEOUS at 05:25

## 2017-03-27 RX ADMIN — HEPARIN SODIUM 5000 UNITS: 5000 INJECTION, SOLUTION INTRAVENOUS; SUBCUTANEOUS at 21:53

## 2017-03-27 RX ADMIN — PERFLUTREN 1 ML: 6.52 INJECTION, SUSPENSION INTRAVENOUS at 13:00

## 2017-03-27 RX ADMIN — RANOLAZINE 500 MG: 500 TABLET, FILM COATED, EXTENDED RELEASE ORAL at 17:25

## 2017-03-27 RX ADMIN — FAMOTIDINE 20 MG: 20 TABLET, FILM COATED ORAL at 08:21

## 2017-03-27 RX ADMIN — RENAGEL 800 MG: 400 TABLET ORAL at 17:25

## 2017-03-27 RX ADMIN — TICAGRELOR 90 MG: 90 TABLET ORAL at 08:21

## 2017-03-27 RX ADMIN — GENTAMICIN SULFATE: 1 CREAM TOPICAL at 13:58

## 2017-03-27 RX ADMIN — HEPARIN SODIUM 5000 UNITS: 5000 INJECTION, SOLUTION INTRAVENOUS; SUBCUTANEOUS at 13:04

## 2017-03-27 RX ADMIN — Medication 10 ML: at 13:04

## 2017-03-27 RX ADMIN — TICAGRELOR 90 MG: 90 TABLET ORAL at 17:25

## 2017-03-27 RX ADMIN — ASPIRIN 81 MG: 81 TABLET, COATED ORAL at 08:22

## 2017-03-27 NOTE — PROGRESS NOTES
SPEECH PATHOLOGY NOTE:    Orders received for bedside swallowing assessment. Attempted x2 this am.  Working with OT then PT. Will re-attempt later today.     Kulwinder Narayanan MS, SLP

## 2017-03-27 NOTE — PROGRESS NOTES
TRANSFER - IN REPORT:    Verbal report received from LARRY das  on [facility]  being received from [facility] ER  for routine progression of care      Report consisted of patients Situation, Background, Assessment and   Recommendations(SBAR). Information from the following report(s) SBAR, Kardex and Recent Results was reviewed with the receiving nurse. Opportunity for questions and clarification was provided. Assessment completed upon patients arrival to unit and care assumed. Will give report to primary RN.

## 2017-03-27 NOTE — PROGRESS NOTES
Patient transferred to hospital admission and is still admitted in hospital.  [place] call not completed. Lisa Qureshi, LPN/ Care Coordinator  [address], [address] / [place], [address]  [email or web address]. [facility] note will not be viewable in [address]

## 2017-03-27 NOTE — H&P
HOSPITALIST H&P/CONSULT  NAME:  Rebeca Gilliam   Age:  80 y.o.  :   10/26/1932   MRN:   [id number]  PCP: Rankin Gitelman, MD  Treatment Team: Attending Provider: Samir Cotter MD    Prior     CC: Reason for admission is: possible CVA     HPI:   Patient case was reviewed with the ER provider prior to seeing the patient. Patient is a 80 y.o. female who presents to the ER due to blurry vision and dizziness. She states the symptoms were there upon awakening this am.  She denies headache, fever/chills, n/v, and no other neuro deficits. She decided to lie back down for awhile. When she got up again, the dizziness had improved but blurry vision remained. She also felt \"off balance\" when walking. Denies actual weakness, just felt clumsy/un-coordinated. The dizziness eventually passed, but not the vision problem and she finally came to ER. ER w/u showed visual field deficits, but no other significant issues. We are asked to admit for possible CVA. She has a h/o CVA and TIA.       ROS:  All systems have been reviewed and are negative except as stated in HPI or elsewhere.       Past Medical History:   Diagnosis Date    Anemia of chronic renal failure 2015    Anterior myocardial infarction [facility]) 2015    CAD (coronary artery disease)     Chest pain     Chronic kidney disease, stage III (moderate) 8/15/2014    on dialysis    CKD (chronic kidney disease) stage 4, GFR 15-29 ml/min ([facility]) 2015    Debility 2015    Depression 2015    Edema 2015    Endocrine disease     Hypothyroidism    GERD (gastroesophageal reflux disease)     Heart murmur 2015    HLD (hyperlipidemia) 2015    Hypertension     Hypothyroidism 2015    Ischemic cardiomyopathy 2015    Nausea     S/P PTCA (percutaneous transluminal coronary angioplasty); LAD PTCA of  ISR 11/27/15 2016    STEMI (ST elevation myocardial infarction) ([facility] [address] 2015    Unspecified sleep apnea     uses cpap machine    Unstable angina (HCC)       Past Surgical History:   Procedure Laterality Date    HX BACK SURGERY      neck surgery cervical disc    HX BACK SURGERY      lower back    HX CATARACT REMOVAL Bilateral     HX CHOLECYSTECTOMY      gall bladder     HX KNEE REPLACEMENT Right 2006    HX PTCA  2015    2.25 Xience stent to mid LAD for occluded artery, anterior MI, EF 25%. Moderate disease distal LAD and distal OM PCI CX and RCA , then PCI RCA and LAD in . Social History   Substance Use Topics    Smoking status: Never Smoker    Smokeless tobacco: Not on file    Alcohol use No      Family History   Problem Relation Age of Onset    Heart Disease Mother     Hypertension Mother     Cancer Mother      Lung    Stroke Father     Hypertension Father     Breast Cancer Neg Hx        FH Reviewed and non-contributory to admitting diagnosis    Allergies   Allergen Reactions    Codeine Nausea and Vomiting      Cannot display prior to admission medications because the patient has not been admitted in this contact. Objective: There were no vitals taken for this visit. Temp (24hrs), Av °F (36.7 °C), Min:98 °F (36.7 °C), Max:98 °F (36.7 °C)        There is no height or weight on file to calculate BMI. Physical Exam:    General:    WD and WN , No apparent distress. Head:   Normocephalic, without obvious abnormality, atraumatic. Eyes:  PERRL; EOMI  ENT:  Hearing is normal.  Oropharynx is clear with tacky mucous membranes   Resp:    Clear to auscultation bilaterally. No Wheezing or Rhonchi. Resp are even and unlabored  Heart[de-identified]  Regular rate and rhythm,  no murmur, rub or gallop. No LE edema  Abdomen:   Soft, non-tender. Not distended. Bowel sounds normal.  hepato-splenomegaly-none  Musc/SK: Muscle strength and tone normal and appropriate for age and condition. No cyanosis.  No clubbing  Skin:     Texture, turgor normal. No significant rashes or lesions. Neurologic: CN III - XII are tested and intact; +upper visual field deficits  Reflexes unobtainable  Psych: Alert and oriented x 4;  Judgement and insight are normal     Data Review:   Recent Results (from the past 24 hour(s))   CBC WITH AUTOMATED DIFF    Collection Time: 03/26/17  5:16 PM   Result Value Ref Range    WBC 8.0 4.3 - 11.1 K/uL    RBC 2.80 (L) 4.05 - 5.25 M/uL    HGB 8.6 (L) 11.7 - 15.4 g/dL    HCT 27.1 (L) 35.8 - 46.3 %    MCV 96.8 79.6 - 97.8 FL    MCH 30.7 26.1 - 32.9 PG    MCHC 31.7 31.4 - 35.0 g/dL    RDW 14.7 (H) 11.9 - 14.6 %    PLATELET 308 644 - 106 K/uL    MPV 11.2 10.8 - 14.1 FL    DF AUTOMATED      NEUTROPHILS 73 43 - 78 %    LYMPHOCYTES 18 13 - 44 %    MONOCYTES 6 4.0 - 12.0 %    EOSINOPHILS 3 0.5 - 7.8 %    BASOPHILS 0 0.0 - 2.0 %    IMMATURE GRANULOCYTES 0.4 0.0 - 5.0 %    ABS. NEUTROPHILS 5.9 1.7 - 8.2 K/UL    ABS. LYMPHOCYTES 1.4 0.5 - 4.6 K/UL    ABS. MONOCYTES 0.5 0.1 - 1.3 K/UL    ABS. EOSINOPHILS 0.2 0.0 - 0.8 K/UL    ABS. BASOPHILS 0.0 0.0 - 0.2 K/UL    ABS. IMM. GRANS. 0.0 0.0 - 0.5 K/UL   METABOLIC PANEL, COMPREHENSIVE    Collection Time: 03/26/17  5:16 PM   Result Value Ref Range    Sodium 139 136 - 145 mmol/L    Potassium 3.5 3.5 - 5.1 mmol/L    Chloride 101 98 - 107 mmol/L    CO2 28 21 - 32 mmol/L    Anion gap 10 7 - 16 mmol/L    Glucose 109 (H) 65 - 100 mg/dL    BUN 29 (H) 8 - 23 MG/DL    Creatinine 10.17 (H) 0.6 - 1.0 MG/DL    GFR est AA 5 (L) >60 ml/min/1.73m2    GFR est non-AA 4 (L) >60 ml/min/1.73m2    Calcium 8.8 8.3 - 10.4 MG/DL    Bilirubin, total 0.4 0.2 - 1.1 MG/DL    ALT (SGPT) 19 12 - 65 U/L    AST (SGOT) 32 15 - 37 U/L    Alk.  phosphatase 73 50 - 136 U/L    Protein, total 6.5 6.3 - 8.2 g/dL    Albumin 2.3 (L) 3.2 - 4.6 g/dL    Globulin 4.2 (H) 2.3 - 3.5 g/dL    A-G Ratio 0.5 (L) 1.2 - 3.5     PROTHROMBIN TIME + INR    Collection Time: 03/26/17  5:16 PM   Result Value Ref Range    Prothrombin time 10.5 9.6 - 12.0 sec    INR 1.0 0.9 - 1.2     PTT    Collection Time: 03/26/17  5:16 PM   Result Value Ref Range    aPTT 25.6 25.3 - 32.9 SEC     CXR Results  (Last 48 hours)    None        CT Results  (Last 48 hours)               03/26/17 1758  CT HEAD WO CONT Final result    Narrative:  HEAD CT without contrast  3/26/2017 5:58 PM        CLINICAL INFORMATION: Dizziness, onset this morning with diplopia. Concern for   CVA. COMPARISON: 2/10/2016 and older. PROCEDURE: Emergent noncontrast head CT was performed in the axial plane. Brain,   bone, and soft tissue windows reviewed. Radiation dose reduction techniques were   used for this study. Our CT scanners used one or all of the following: Automated   exposure control, adjustment of the MA and/or kV according to patient size,   iterative reconstruction. FINDINGS: No acute intracranial hemorrhage, mass, or mass effect. No extra-axial   fluid collections. No midline shift; the basilar cisterns are patent. Moderate   volume loss is unchanged. The ventricles are stable in caliber and symmetric to   the subarachnoid spaces. Chronic left parietal CVA is unchanged. Minimal periventricular patchy white   matter low attenuation is nonspecific but likely related to chronic small vessel   ischemic change. There are no specific signs to suggest acute large vessel   territory ischemia. The gray-white differentiation is otherwise preserved. No aggressive calvarial process. Visualized portions of the paranasal sinuses   and mastoid air cells are patent. IMPRESSIONS:       1. No convincing acute intracranial abnormality. Please note that MRI is more   sensitive for detection of acute ischemia. 2. Little significant change in chronic left parietal CVA. Assessment and Plan:      Active Hospital Problems    Diagnosis Date Noted    Vision changes 03/26/2017    Dizziness 03/26/2017    CVA (cerebral vascular accident) Hx of TIA 02/22/2016    ESRD on peritoneal dialysis ([facility] [address] 02/01/2016    Hypertension 04/28/2015    Anemia of chronic renal failure 04/28/2015         · PLAN   · CVA protocol  · Consult nephrol for PD  · Cont appropriate home meds (see MAR)  · Control symptoms (pain, n/v, fever, etc)  · Monitor appropriate labs   · Plans discussed with patient and/or caregiver; questions answered.     Med records reviewed if applicable; findings:     Critical care time if applicable:      Signed By: Caridad Balderas MD     March 26, 2017

## 2017-03-27 NOTE — PROGRESS NOTES
[facility] [place] - INPATIENT  Face to Face Encounter    Patients Name: Huseyin Friend    YOB: 1932    Ordering Physician: Britni Patel    Primary Diagnosis: cva  cva  CVA (cerebral vascular accident) [facility])    Date of Face to Face:   3/27/2017                                  Face to Face Encounter findings are related to primary reason for home care:   yes. 1. I certify that the patient needs intermittent care as follows: physical therapy: strengthening, stretching/ROM and gait/stair training    2. I certify that this patient is homebound, that is: 1) patient requires the use of a walker device, special transportation, or assistance of another to leave the home; or 2) patient's condition makes leaving the home medically contraindicated; and 3) patient has a normal inability to leave the home and leaving the home requires considerable and taxing effort. Patient may leave the home for infrequent and short duration for medical reasons, and occasional absences for non-medical reasons. Homebound status is due to the following functional limitations: Patient with strength deficits limiting the performance of all ADL's without caregiver assistance or the use of an assistive device. 3. I certify that this patient is under my care and that I, or a nurse practitioner or  [id number], or clinical nurse specialist, or certified nurse midwife, working with me, had a Face-to-Face Encounter that meets the physician Face-to-Face Encounter requirements. The following are the clinical findings from the [address] [place] [address] encounter that support the need for skilled services and is a summary of the encounter:     See discharge summary/ Hospital chart      [facility]  3/27/2017      THE FOLLOWING TO BE COMPLETED BY THE COMMUNITY PHYSICIAN:    I concur with the findings described above from the [facility] encounter that this patient is homebound and in need of a skilled service.     Certifying Physician: _____________________________________      Printed Certifying Physician Name: _____________________________________    Date: _________________

## 2017-03-27 NOTE — DIALYSIS
Peritoneal dialysis initiated as ordered. Peritoneal catheter exit site cleaned with Chlorhexidine scrub and Gentamicin cream applied to PD cath exit site. Dressing changed, site has no s/s of redness, swelling, or exudate. Patient states no complaints at this time. Report given to primary RN.

## 2017-03-27 NOTE — CONSULTS
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 The symptoms were noted upon awakening this am. She denies headache, fever/chills, N/V, and no other neuro deficits. She decided to lie back down for awhile. When she got up again, the dizziness had improved but blurry vision remained. She also felt \"off balance\" when walking. Denies actual weakness, just felt clumsy/un-coordinated. The dizziness eventually passed, but not the vision problem and she finally came to ER. ER w/u showed visual field deficits, but no other significant issues. We are asked to admit for possible CVA. She has a h/o CVA and TIA. No fever or chills, no problems with PD drainage or discoloration of PD fluid. No increased thirst. She reports that the loss of vision on her left side returned since admission.      Past Medical History:   Diagnosis Date    Anemia of chronic renal failure 4/28/2015    Anterior myocardial infarction [facility]) 5/21/2015    CAD (coronary artery disease)     Chest pain     Chronic kidney disease, stage III (moderate) 8/15/2014    on dialysis    CKD (chronic kidney disease) stage 4, GFR 15-29 ml/min ([facility]) 4/28/2015    Debility 5/5/2015    Depression 12/29/2015    Edema 12/29/2015    Endocrine disease     Hypothyroidism    GERD (gastroesophageal reflux disease)     Heart murmur 12/29/2015    HLD (hyperlipidemia) 12/29/2015    Hypertension     Hypothyroidism 4/28/2015    Ischemic cardiomyopathy 12/29/2015    Nausea     S/P PTCA (percutaneous transluminal coronary angioplasty); LAD PTCA of  ISR 11/27/15 5/24/2016    STEMI (ST elevation myocardial infarction) ([facility] [address] 4/28/2015    Unspecified sleep apnea     uses cpap machine    Unstable angina ([facility] [address]       Past Surgical History:   Procedure Laterality Date    Brittany 1850 neck surgery cervical disc    HX BACK SURGERY      lower back    HX CATARACT REMOVAL Bilateral     HX CHOLECYSTECTOMY  19702    gall bladder     HX KNEE REPLACEMENT Right 2006    HX PTCA  4/28/2015    2.25 Xience stent to mid LAD for occluded artery, anterior MI, EF 25%. Moderate disease distal LAD and distal OM PCI CX and RCA 2004, then PCI RCA and LAD in 2009. Prior to Admission medications    Medication Sig Start Date End Date Taking? Authorizing Provider   nitroglycerin (NITROLINGUAL) 400 mcg/spray spray 1 Spray by SubLINGual route every five (5) minutes as needed for Chest Pain. Yes Historical Provider   linaclotide Maral Glimpse) 145 mcg cap capsule Take  by mouth Daily (before breakfast). Yes Historical Provider   cinacalcet (SENSIPAR) 90 mg tab Take  by mouth. Yes Historical Provider   folic acid 451 mcg tablet Take 800 mcg by mouth daily. Yes Historical Provider   magnesium oxide (MAG-OX) 400 mg tablet Take 400 mg by mouth daily. Yes Historical Provider   amLODIPine (NORVASC) 5 mg tablet Take 5 mg by mouth daily. Yes Historical Provider   potassium chloride (KLOR-CON M10) 10 mEq tablet Take  by mouth. Yes Historical Provider   PNV NO.122/IRON/FOLIC ACID (PRENATAL MULTI PO) Take  by mouth. Yes Historical Provider   metoprolol tartrate (LOPRESSOR) 25 mg tablet Take 12.5 mg by mouth daily. Take PRN for BP <120. 6/23/16  Yes Nadeen Acosta III, MD   ondansetron (ZOFRAN ODT) 4 mg disintegrating tablet Take 4 mg by mouth three (3) times daily as needed. Yes Historical Provider   metoclopramide HCl (REGLAN) 5 mg tablet Take 5 mg by mouth two (2) times a day. Yes Historical Provider   famotidine (PEPCID) 40 mg tablet Take 20 mg by mouth daily. Yes Historical Provider   docusate sodium (COLACE) 100 mg capsule Take 100 mg by mouth daily. Yes Historical Provider   ticagrelor (BRILINTA) 90 mg tablet Take 1 Tab by mouth two (2) times a day.  2/4/16  Yes MINDY Givens   promethazine (PHENERGAN) 25 mg tablet Take 25 mg by mouth every eight (8) hours as needed for Nausea. Yes Historical Provider   sevelamer carbonate (RENVELA) 800 mg tab tab Take 800 mg by mouth three (3) times daily (with meals). Yes Historical Provider   gentamicin (GARAMYCIN) 0.1 % topical cream APPLY TO PD catheter exit site at daily dressing change 5/12/15  Yes Ervin Whalen MD   aspirin delayed-release 81 mg tablet Take 1 Tab by mouth daily. 5/5/15  Yes Gisela Hollingsworth PA-C   darbepoetin toya in polysorbat (ARANESP, POLYSORBATE,) 40 mcg/mL injection 40 mcg by SubCUTAneous route every fourteen (14) days. Indications: ANEMIA IN CHRONIC KIDNEY DISEASE   Yes Historical Provider   rosuvastatin (CRESTOR) 20 mg tablet Take 20 mg by mouth nightly. Yes Historical Provider   ranolazine ER (RANEXA) 500 mg SR tablet Take 500 mg by mouth two (2) times a day. Yes Historical Provider   levothyroxine (SYNTHROID) 150 mcg tablet Take 150 mcg by mouth Daily (before breakfast). Yes Historical Provider     Allergies   Allergen Reactions    Codeine Nausea and Vomiting      Social History   Substance Use Topics    Smoking status: Never Smoker    Smokeless tobacco: Not on file    Alcohol use No      Family History   Problem Relation Age of Onset    Heart Disease Mother     Hypertension Mother     Cancer Mother      Lung    Stroke Father     Hypertension Father     Breast Cancer Neg Hx           Review of Systems    Constitutional: no fever,   Eyes: blurred vision, transient loss of left sided vision  Ears, nose, mouth, throat, and face:fair hearing,   Respiratory: no asthma,   Cardiovascular:no chest pain,   Gastrointestinal:no diarrhea,  Genitourinary: no dysuria,  Hematologic/lymphatic: no bleeding tendency,  Neurological: no seizures,  Behvioral/Psych: no psych hospitalization  Endocrine: no goiter,       Objective:     See nurses notes for vitals    General:  Alert, cooperative, no distress, appears stated age.    Head: Normocephalic, without obvious abnormality, atraumatic. Eyes:  Conjunctivae/corneas clear. PERRL, EOMs intact. Ears:  Normal external ear canals both ears. Nose: Nares normal. Septum midline. Mucosa normal. No drainage or sinus tenderness. Throat: Lips, mucosa, and tongue normal. Teeth and gums normal.   Neck: Supple, symmetrical, trachea midline, no adenopathy, thyroid: no enlargement/tenderness/nodules, no JVD. Back:   Symmetric, no curvature. ROM normal. No CVA tenderness. Lungs:   Clear to auscultation bilaterally. Chest wall:  No tenderness or deformity. Heart:  Regular rate and rhythm, S1, S2 normal, no murmur,  rub or gallop. Abdomen:   Soft, non-tender. Bowel sounds normal. No masses,  No organomegaly. No renal bruit. PD catheter exit site is perfect. Extremities: Extremities normal, atraumatic, no cyanosis or edema. Skin: Skin color, texture, turgor normal. No rashes or lesions. Lymph nodes: Cervical and supraclavicular nodes normal.   Neurologic: Grossly intact. Moves all extremities. No asterixis. Data Review:     Recent Results (from the past 24 hour(s))   CBC WITH AUTOMATED DIFF    Collection Time: 03/26/17  5:16 PM   Result Value Ref Range    WBC 8.0 4.3 - 11.1 K/uL    RBC 2.80 (L) 4.05 - 5.25 M/uL    HGB 8.6 (L) 11.7 - 15.4 g/dL    HCT 27.1 (L) 35.8 - 46.3 %    MCV 96.8 79.6 - 97.8 FL    MCH 30.7 26.1 - 32.9 PG    MCHC 31.7 31.4 - 35.0 g/dL    RDW 14.7 (H) 11.9 - 14.6 %    PLATELET 059 861 - 422 K/uL    MPV 11.2 10.8 - 14.1 FL    DF AUTOMATED      NEUTROPHILS 73 43 - 78 %    LYMPHOCYTES 18 13 - 44 %    MONOCYTES 6 4.0 - 12.0 %    EOSINOPHILS 3 0.5 - 7.8 %    BASOPHILS 0 0.0 - 2.0 %    IMMATURE GRANULOCYTES 0.4 0.0 - 5.0 %    ABS. NEUTROPHILS 5.9 1.7 - 8.2 K/UL    ABS. LYMPHOCYTES 1.4 0.5 - 4.6 K/UL    ABS. MONOCYTES 0.5 0.1 - 1.3 K/UL    ABS. EOSINOPHILS 0.2 0.0 - 0.8 K/UL    ABS. BASOPHILS 0.0 0.0 - 0.2 K/UL    ABS. IMM.  GRANS. 0.0 0.0 - 0.5 K/UL   METABOLIC PANEL, COMPREHENSIVE    Collection Time: 03/26/17  5:16 PM   Result Value Ref Range    Sodium 139 136 - 145 mmol/L    Potassium 3.5 3.5 - 5.1 mmol/L    Chloride 101 98 - 107 mmol/L    CO2 28 21 - 32 mmol/L    Anion gap 10 7 - 16 mmol/L    Glucose 109 (H) 65 - 100 mg/dL    BUN 29 (H) 8 - 23 MG/DL    Creatinine 10.17 (H) 0.6 - 1.0 MG/DL    GFR est AA 5 (L) >60 ml/min/1.73m2    GFR est non-AA 4 (L) >60 ml/min/1.73m2    Calcium 8.8 8.3 - 10.4 MG/DL    Bilirubin, total 0.4 0.2 - 1.1 MG/DL    ALT (SGPT) 19 12 - 65 U/L    AST (SGOT) 32 15 - 37 U/L    Alk. phosphatase 73 50 - 136 U/L    Protein, total 6.5 6.3 - 8.2 g/dL    Albumin 2.3 (L) 3.2 - 4.6 g/dL    Globulin 4.2 (H) 2.3 - 3.5 g/dL    A-G Ratio 0.5 (L) 1.2 - 3.5     PROTHROMBIN TIME + INR    Collection Time: 03/26/17  5:16 PM   Result Value Ref Range    Prothrombin time 10.5 9.6 - 12.0 sec    INR 1.0 0.9 - 1.2     PTT    Collection Time: 03/26/17  5:16 PM   Result Value Ref Range    aPTT 25.6 25.3 - 32.9 SEC         Principal Problem:    CVA (cerebral vascular accident) Hx of TIA (2/22/2016)    Active Problems:    Hypertension (4/28/2015)      Anemia of chronic renal failure (4/28/2015)      ESRD on peritoneal dialysis ([facility] [address] (2/1/2016)      Vision changes (3/26/2017)      Dizziness (3/26/2017)        Assessment:     1. Transient loss of vision/Blurred vision/unsteady gait -  - MRI scheduled tomorrow  - concur with current management per hospitalist service    2. Hypokalemia -  - replace KCl    3. Anemia -  - patient refuses transfusion on [employer] belief  - treat with WAYNE aggressively  - add H33 and Folic acid    4. HTN -  - continue current meds    5. ESRD -  - hold PD tonight    6.  Volume status -  - clinically near euvolemic      Plan:     As above    Favio Stephen M.D.

## 2017-03-27 NOTE — PROGRESS NOTES
Problem: Self Care Deficits Care Plan (Adult)  Goal: *Acute Goals and Plan of Care (Insert Text)  1. Patient will complete household distance functional mobility with modified independence and as needed adaptive equipment. MET 3/27/16  2. Patient will complete grooming tasks at sink with modified independence for extra time. MET 3/27/16     Timeframe: 1 visit            Comments:   . OCCUPATIONAL THERAPY: Initial Assessment, Daily Note, Discharge, Treatment Day: 1st and AM 3/27/2017  INPATIENT: Hospital Day: 2  [place] / Product Type: Medicare /      NAME/AGE/GENDER: Paras Frias is a 80 y.o. female             PRIMARY DIAGNOSIS:  cva  cva CVA (cerebral vascular accident) ([facility] CVA (cerebral vascular accident) ([facility]        ICD-10: Treatment Diagnosis:        · Generalized Muscle Weakness (M62.81)   Precautions/Allergies:         Codeine       ASSESSMENT:      Ms. Jessica Aguero presents to hospital with complaints of dizziness and blurred vision secondary to above CVA. Upon arrival of OT, she was seated EOB finishing up with her breakfast. She was agreeable to OT evaluation, AOx4, and reports no pain. Ms. Jessica Aguero reports independence to modified independence at baseline with ADLs and cooking, she reports that she has someone come clean for her. Today, her strength/AROM was [facility] in [address] [place] [address]. She completed bed mobility and functional transfers with modified independence requiring extra time and use of walker. She stood at sink and brushed her teeth, clean her denture implants, washed her face, and washed her hands, requiring extra time. She was able to don/doff B socks and complete sponge bath at EOB without assistance. Ms. Jessica Aguero appears to be functioning at baseline with ADLs based on today's performance and skilled acute OT services aren't indicated at this time. Will d/c pt from log.        This section established at most recent assessment   PROBLEM LIST (Impairments causing functional limitations): 1. none    INTERVENTIONS PLANNED: (Benefits and precautions of occupational therapy have been discussed with the patient.)  1. None      TREATMENT PLAN: Frequency/Duration: N/A  Rehabilitation Potential For Stated Goals:N/A      RECOMMENDED REHABILITATION/EQUIPMENT: (at time of discharge pending progress): N/A.                      OCCUPATIONAL PROFILE AND HISTORY:   History of Present Injury/Illness (Reason for Referral):  Per H&P: \"Patient case was reviewed with the ER provider prior to seeing the patient. Patient is a 80 y.o. female who presents to the ER due to blurry vision and dizziness. She states the symptoms were there upon awakening this am. She denies headache, fever/chills, n/v, and no other neuro deficits. She decided to lie back down for awhile. When she got up again, the dizziness had improved but blurry vision remained. She also felt \"off balance\" when walking. Denies actual weakness, just felt clumsy/un-coordinated. The dizziness eventually passed, but not the vision problem and she finally came to ER. ER w/u showed visual field deficits, but no other significant issues. We are asked to admit for possible CVA. She has a h/o CVA and TIA. \"  Past Medical History/Comorbidities:   [facility]  has a past medical history of Anemia of chronic renal failure (4/28/2015); Anterior myocardial infarction [facility]) (5/21/2015); CAD (coronary artery disease); Chest pain; Chronic kidney disease, stage III (moderate) (8/15/2014); CKD (chronic kidney disease) stage 4, GFR 15-29 ml/min (HCC) (4/28/2015); Debility (5/5/2015); Depression (12/29/2015); Edema (12/29/2015); Endocrine disease; GERD (gastroesophageal reflux disease); Heart murmur (12/29/2015); HLD (hyperlipidemia) (12/29/2015); Hypertension; Hypothyroidism (4/28/2015); Ischemic cardiomyopathy (12/29/2015);  Nausea; S/P PTCA (percutaneous transluminal coronary angioplasty); LAD PTCA of  ISR 11/27/15 (5/24/2016); STEMI (ST elevation myocardial infarction) ([facility] (4/28/2015); Unspecified sleep apnea; and Unstable angina ([facility] Ms. Geneva Villeda  has a past surgical history that includes ptca (4/28/2015); cholecystectomy (58736); knee replacement (Right, 2006); back surgery (1990); cataract removal (Bilateral); and back surgery. Social History/Living Environment:   Home Environment: Private residence  # Steps to Enter: 3  Wheelchair Ramp: Yes  One/Two Story Residence: One story  Living Alone: Yes  Support Systems: Family member(s)  Patient Expects to be Discharged to[de-identified] Private residence  Current DME Used/Available at Home: Oseas Cueto, [address] chair  Tub or Shower Type: Shower  Prior Level of Function/Work/Activity:  Lives alone in [facility]. Reports independence-modified independence at baseline with ADLs. Reports that she doesn't use walker/cane for functional mobility. Personal Factors:          Sex:  female        Age:  80 y.o. Number of Personal Factors/Comorbidities that affect the Plan of Care: Expanded review of therapy/medical records (1-2):  MODERATE COMPLEXITY   ASSESSMENT OF OCCUPATIONAL PERFORMANCE[de-identified]   Activities of Daily Living:           Basic ADLs (From Assessment) Complex ADLs (From Assessment)   Basic ADL  Feeding: Independent  Oral Facial Hygiene/Grooming: Modified Independent, Additional time  Bathing: Modified independent, Additional time, Adaptive equipment  Upper Body Dressing: Modified independent, Additional time  Lower Body Dressing: Modified independent, Additional time, Adaptive equipment  Toileting: Modified independent, Additional time, Adaptive equipment     Grooming/Bathing/Dressing Activities of Daily Living     Cognitive Retraining  Safety/Judgement: Awareness of environment                       Bed/Mat Mobility  Sit to Stand: Modified independent; Adaptive equipment          Most Recent Physical Functioning:   Gross Assessment:  AROM: Within functional limits (BUE)  Strength:  Within functional limits (BUE)  Sensation: Intact Posture:     Balance:  Sitting: Intact  Standing: Intact Bed Mobility:     Wheelchair Mobility:     Transfers:  Sit to Stand: Modified independent; Adaptive equipment  Stand to Sit: Modified independent; Adaptive equipment                    Patient Vitals for the past 6 hrs:       BP SpO2 Pulse   17 0803 131/67 100 % 75        Mental Status  Neurologic State: Alert  Orientation Level: Oriented X4  Cognition: Appropriate decision making, Appropriate for age attention/concentration, Appropriate safety awareness, Follows commands  Perception: Appears intact  Perseveration: No perseveration noted  Safety/Judgement: Awareness of environment                               Physical Skills Involved:  1. N/A Cognitive Skills Affected (resulting in the inability to perform in a timely and safe manner):  1. N/A Psychosocial Skills Affected:  1. N/A   Number of elements that affect the Plan of Care: 1-3:  LOW COMPLEXITY   CLINICAL DECISION MAKIN[address] [place] [id number] AM-PAC 6 Clicks   Basic Mobility Inpatient Short Form  How much help from another person does the patient currently need. .. Total A Lot A Little None   1. Putting on and taking off regular lower body clothing?   [ ] 1   [ ] 2   [ ] 3   [X] 4   2. Bathing (including washing, rinsing, drying)? [ ] 1   [ ] 2   [ ] 3   [X] 4   3. Toileting, which includes using toilet, bedpan or urinal?   [ ] 1   [ ] 2   [ ] 3   [X] 4   4. Putting on and taking off regular upper body clothing?   [ ] 1   [ ] 2   [ ] 3   [X] 4   5. Taking care of personal grooming such as brushing teeth? [ ] 1   [ ] 2   [ ] 3   [X] 4   6. Eating meals? [ ] 1   [ ] 2   [ ] 3   [X] 4   © , Trustees of [address] [place] [address], under license to [employer]. All rights reserved    Score:  Initial: 24 Most Recent: X (Date: -- )     Interpretation of Tool:  Represents activities that are increasingly more difficult (i.e. Bed mobility, Transfers, Gait).        Score 24 23 22-20 19-15 14-10 9-7 6       Modifier CH CI CJ CK CL CM CN         · Self Care:               - CURRENT STATUS:    CH - 0% impaired, limited or restricted               - GOAL STATUS:           CH - 0% impaired, limited or restricted               - D/C STATUS:                       CH - 0% impaired, limited or restricted  [place] / Product Type: Medicare /       Medical Necessity:     · N/A. Reason for Services/Other Comments:  · N/A. Use of outcome tool(s) and clinical judgement create a POC that gives a: LOW COMPLEXITY             TREATMENT:   (In addition to Assessment/Re-Assessment sessions the following treatments were rendered)      Pre-treatment Symptoms/Complaints:    Pain: Initial:   Pain Intensity 1: 0  Post Session:  same      Self Care: (15): Procedure(s) (per grid) utilized to improve and/or restore self-care/home management as related to grooming. Required no visual, verbal, manual and tactile cueing to facilitate activities of daily living skills. Assessment/Reassessment only, no treatment provided today     Braces/Orthotics/Lines/Etc:   ·  Room air  Treatment/Session Assessment:    · Response to Treatment:  Tolerated well. · Interdisciplinary Collaboration:  · Occupational Therapist  · Speech Therapist  · Registered Nurse  · After treatment position/precautions:  · Bed/Chair-wheels locked  · Bed in low position  · Call light within reach  · RN notified  · Compliance with Program/Exercises: compliant all of the time. · Recommendations/Intent for next treatment session: \"Next visit will focus on N/A\".   Total Treatment Duration:  OT Patient Time In/Time Out  Time In: 0910  Time Out: [address]

## 2017-03-27 NOTE — PROGRESS NOTES
Care Management Interventions  PCP Verified by CM: Yes  Transition of Care Consult (CM Consult): Discharge [address]: Yes  Physical Therapy Consult: Yes  Current Support Network: Own Home, Lives Alone  Confirm Follow Up Transport: Family  Plan discussed with Pt/Family/Caregiver: Yes  Freedom of Choice Offered: Yes  Discharge Location  Discharge Placement: Home with home health     Met with patient at the bedside. She is aleert and oriented times four. Lives alone. Is doing well per PT report. Discussed dc plan andpatient agreeable to [facility] services; PT only. She does not feel a need for Nursing services. She is asking for a Rolator and will investigate to see if insurance will cover one for her as she just got a walker through insurance in the past two years. Case management following. DC plan home with Javier0 Sally Mccall

## 2017-03-27 NOTE — PROGRESS NOTES
Problem: Mobility Impaired (Adult and Pediatric)  Goal: *Acute Goals and Plan of Care (Insert Text)  LTG:  (1.)Ms. Medardo Paz will move from supine to sit and sit to supine , scoot up and down and roll side to side in bed with INDEPENDENCE within 7 day(s). (2.)Ms. Medardo Paz will transfer from bed to chair and chair to bed with MODIFIED INDEPENDENCE using the least restrictive device within 7 day(s). (3.)Ms. Medardo Paz will ambulate with MODIFIED INDEPENDENCE for 250+ feet with the least restrictive device within 7 day(s). (4.)Ms. Medardo Paz will ascend and descend 3 stairs using 0 hand rail(s) with SUPERVISION to improve functional mobility and safety within 7 day(s). ________________________________________________________________________________________________       PHYSICAL THERAPY: INITIAL ASSESSMENT, AM 3/27/2017  INPATIENT: Hospital Day: 2  [place] / Product Type: Medicare /      NAME/AGE/GENDER: Tommy Bass is a 80 y.o. female             PRIMARY DIAGNOSIS: cva  cva  CVA (cerebral vascular accident) ([facility] CVA (cerebral vascular accident) ([facility] CVA (cerebral vascular accident) ([facility]        ICD-10: Treatment Diagnosis:       · Generalized Muscle Weakness (M62.81)  · History of falling (Z91.81)   Precaution/Allergies:  Codeine       ASSESSMENT:      Ms. Medardo Paz is a 80 y.o. female admitted to the hospital for the above who was sitting on EOB upon arrival.  Pt reports she is mostly independent with ambulation at baseline but occasionally uses a RW. She also states she is independent with ADLs and lives by herself. Pt still has complaints of L eye blurriness and states she sees \"shapes. \"  Ms. Medardo Paz presents to PT with [facility] AROM and generalized weakness in B hip flexors (4-/5). The pt reports intact sensation to light touch in dermatomes L3-S2 with generally decreased coordination bilaterally.   Ms. Medardo Paz was able to stand and ambulate around room with supervision and observed to have fair (+) dynamic standing balance. She also exhibits decreased gait speed and shortened step length bilaterally. Pt left sitting on EOB with needs within reach. Ms. Yassine Fernandez could benefit from skilled PT x 2-3 to ensure home safety independence with functional mobility. This section established at most recent assessment   PROBLEM LIST (Impairments causing functional limitations):  1. Decreased Strength  2. Decreased Balance  3. Decreased Activity Tolerance    INTERVENTIONS PLANNED: (Benefits and precautions of physical therapy have been discussed with the patient.)  1. Balance Exercise  2. Bed Mobility  3. Family Education  4. Gait Training  5. Neuromuscular Re-education/Strengthening  6. Therapeutic Activites  7. Therapeutic Exercise/Strengthening  8. Transfer Training  9. Group Therapy      TREATMENT PLAN: Frequency/Duration: 3 times a week for 2-3 x's  Rehabilitation Potential For Stated Goals: GOOD      RECOMMENDED REHABILITATION/EQUIPMENT: (at time of discharge pending progress): Discussed with Case Management. HISTORY:   History of Present Injury/Illness (Reason for Referral):  Per H&P:   HPI:   Patient case was reviewed with the ER provider prior to seeing the patient. Patient is a 80 y.o. female who presents to the ER due to blurry vision and dizziness. She states the symptoms were there upon awakening this am. She denies headache, fever/chills, n/v, and no other neuro deficits. She decided to lie back down for awhile. When she got up again, the dizziness had improved but blurry vision remained. She also felt \"off balance\" when walking. Denies actual weakness, just felt clumsy/un-coordinated. The dizziness eventually passed, but not the vision problem and she finally came to ER. ER w/u showed visual field deficits, but no other significant issues. We are asked to admit for possible CVA. She has a h/o CVA and TIA.       Past Medical History/Comorbidities:   Ms. Yassine Fernandez  has a past medical history of Anemia of chronic renal failure (4/28/2015); Anterior myocardial infarction [facility]) (5/21/2015); CAD (coronary artery disease); Chest pain; Chronic kidney disease, stage III (moderate) (8/15/2014); CKD (chronic kidney disease) stage 4, GFR 15-29 ml/min (HCC) (4/28/2015); Debility (5/5/2015); Depression (12/29/2015); Edema (12/29/2015); Endocrine disease; GERD (gastroesophageal reflux disease); Heart murmur (12/29/2015); HLD (hyperlipidemia) (12/29/2015); Hypertension; Hypothyroidism (4/28/2015); Ischemic cardiomyopathy (12/29/2015); Nausea; S/P PTCA (percutaneous transluminal coronary angioplasty); LAD PTCA of  ISR 11/27/15 (5/24/2016); STEMI (ST elevation myocardial infarction) ([facility] (4/28/2015); Unspecified sleep apnea; and Unstable angina ([facility] Ms. Mckayla Malik  has a past surgical history that includes ptca (4/28/2015); cholecystectomy (45354); knee replacement (Right, 2006); back surgery (1990); cataract removal (Bilateral); and back surgery. Social History/Living Environment:   Home Environment: Private residence  # Steps to Enter: 3  Rails to Enter: No  Wheelchair Ramp: Yes  One/Two Story Residence: One story  Living Alone: Yes  Support Systems: Child(faraz)  Patient Expects to be Discharged to[de-identified] Private residence  Current DME Used/Available at Home: Shower chair, Walker, rolling, Other (comment) (scooter)  Tub or Shower Type: Shower  Prior Level of Function/Work/Activity:  Pt reports she lives alone in a one story house with a ramp to enter through garage. Pt reports she is mostly independent with ADLs and ambulation with occasional use of RW. Pt states she plans on moving in with her nephew in [place].       Number of Personal Factors/Comorbidities that affect the Plan of Care:  CAD  CKD  Depression  Unstable agina 3+: HIGH COMPLEXITY   EXAMINATION:   Most Recent Physical Functioning:   Gross Assessment:  AROM: Within functional limits  Strength: Generally decreased, functional (B hip flexion = 4-/5)  Coordination: Generally decreased, functional  Sensation: Intact               Posture:     Balance:  Sitting: Intact  Standing: Impaired  Standing - Static: Good  Standing - Dynamic : Fair ((+)) Bed Mobility:     Wheelchair Mobility:     Transfers:  Sit to Stand: Supervision  Stand to Sit: Supervision  Gait:     Speed/Michelle: Slow  Step Length: Right shortened;Left shortened  Distance (ft): 16 Feet (ft)  Assistive Device:  (none)  Ambulation - Level of Assistance: Supervision       Body Structures Involved:  1. Nerves  2. Eyes and Ears  3. Muscles Body Functions Affected:  1. Sensory/Pain  2. Neuromusculoskeletal  3. Movement Related Activities and Participation Affected:  1. Mobility  2. Domestic Life  3. Community, Social and [place]   Number of elements that affect the Plan of Care: 4+: HIGH COMPLEXITY   CLINICAL PRESENTATION:   Presentation: Evolving clinical presentation with changing clinical characteristics: MODERATE COMPLEXITY   CLINICAL DECISION MAKIN [facility] Inpatient Short Form  How much difficulty does the patient currently have. .. Unable A Lot A Little None   1. Turning over in bed (including adjusting bedclothes, sheets and blankets)? [ ] 1   [ ] 2   [ ] 3   [X] 4   2. Sitting down on and standing up from a chair with arms ( e.g., wheelchair, bedside commode, etc.)   [ ] 1   [ ] 2   [ ] 3   [X] 4   3. Moving from lying on back to sitting on the side of the bed? [ ] 1   [ ] 2   [ ] 3   [X] 4   How much help from another person does the patient currently need. .. Total A Lot A Little None   4. Moving to and from a bed to a chair (including a wheelchair)? [ ] 1   [ ] 2   [ ] 3   [X] 4   5. Need to walk in hospital room? [ ] 1   [ ] 2   [X] 3   [ ] 4   6. Climbing 3-5 steps with a railing? [ ] 1   [ ] 2   [X] 3   [ ] 4   © , Trustees of [address] [place] [address] [address] [address], under license to "[employer].  All rights reserved    Score: Initial: 22 Most Recent: X (Date: -- )     Interpretation of Tool:  Represents activities that are increasingly more difficult (i.e. Bed mobility, Transfers, Gait). Score 24 23 22-20 19-15 14-10 9-7 6       Modifier CH CI CJ CK CL CM CN         · Mobility - Walking and Moving Around:               - CURRENT STATUS:    CJ - 20%-39% impaired, limited or restricted               - GOAL STATUS:           CI - 1%-19% impaired, limited or restricted               - D/C STATUS:                       ---------------To be determined---------------  [place] / Product Type: Medicare /       Medical Necessity:     · Patient demonstrates good rehab potential due to higher previous functional level. Reason for Services/Other Comments:  · Patient continues to require skilled intervention due to decreased balance and activity tolerance. Use of outcome tool(s) and clinical judgement create a POC that gives a: Questionable prediction of patient's progress: MODERATE COMPLEXITY                 TREATMENT:   (In addition to Assessment/Re-Assessment sessions the following treatments were rendered)   Pre-treatment Symptoms/Complaints:  Pt states she sees \"shapes\" in L eye  Pain: Initial:   Pain Intensity 1: 0  Post Session:  0      Assessment/Reassessment only, no treatment provided today     Braces/Orthotics/Lines/Etc:   ·  Room air  Treatment/Session Assessment:    · Response to Treatment:  Pt tolerated evaluation very well with no reports of increased pain or SOB. · Interdisciplinary Collaboration:  · Physical Therapist  · Registered Nurse  ·   · After treatment position/precautions:  · Bed in low position  · Call light within reach  · RN notified  · Sitting EOB  · Compliance with Program/Exercises: Will assess as treatment progresses. · Recommendations/Intent for next treatment session:   \"Next visit will focus on advancements to more challenging activities and reduction in assistance provided\".   Total Treatment Duration:        Jackson Soni, PT, DPT

## 2017-03-27 NOTE — PROGRESS NOTES
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 The symptoms were noted upon awakening this am. She denies headache, fever/chills, N/V, and no other neuro deficits. She decided to lie back down for awhile. When she got up again, the dizziness had improved but blurry vision remained. She also felt \"off balance\" when walking. Denies actual weakness, just felt clumsy/un-coordinated. The dizziness eventually passed, but not the vision problem and she finally came to ER. ER w/u showed visual field deficits, but no other significant issues. We are asked to admit for possible CVA. She has a h/o CVA and TIA. No fever or chills, no problems with PD drainage or discoloration of PD fluid. No increased thirst. She reports that the loss of vision on her left side returned since admission.      s- feels better , no headache , sees pattern through her lt eye in the temporal region     Past Medical History:   Diagnosis Date    Anemia of chronic renal failure 4/28/2015    Anterior myocardial infarction [facility]) 5/21/2015    CAD (coronary artery disease)     Chest pain     Chronic kidney disease, stage III (moderate) 8/15/2014    on dialysis    CKD (chronic kidney disease) stage 4, GFR 15-29 ml/min ([facility]) 4/28/2015    Debility 5/5/2015    Depression 12/29/2015    Edema 12/29/2015    Endocrine disease     Hypothyroidism    GERD (gastroesophageal reflux disease)     Heart murmur 12/29/2015    HLD (hyperlipidemia) 12/29/2015    Hypertension     Hypothyroidism 4/28/2015    Ischemic cardiomyopathy 12/29/2015    Nausea     S/P PTCA (percutaneous transluminal coronary angioplasty); LAD PTCA of  ISR 11/27/15 5/24/2016    STEMI (ST elevation myocardial infarction) ([facility] [address] 4/28/2015    Unspecified sleep apnea     uses cpap machine    Unstable angina [facility])       Past Surgical History:   Procedure Laterality Date    HX BACK SURGERY  1990    neck surgery cervical disc    HX BACK SURGERY      lower back    HX CATARACT REMOVAL Bilateral     HX CHOLECYSTECTOMY  19702    gall bladder     HX KNEE REPLACEMENT Right 2006    HX PTCA  4/28/2015    2.25 Xience stent to mid LAD for occluded artery, anterior MI, EF 25%. Moderate disease distal LAD and distal OM PCI CX and RCA 2004, then PCI RCA and LAD in 2009. Prior to Admission medications    Medication Sig Start Date End Date Taking? Authorizing Provider   nitroglycerin (NITROLINGUAL) 400 mcg/spray spray 1 Spray by SubLINGual route every five (5) minutes as needed for Chest Pain. Yes Historical Provider   linaclotide Lea Amen) 145 mcg cap capsule Take  by mouth Daily (before breakfast). Yes Historical Provider   cinacalcet (SENSIPAR) 90 mg tab Take  by mouth. Yes Historical Provider   folic acid 314 mcg tablet Take 800 mcg by mouth daily. Yes Historical Provider   magnesium oxide (MAG-OX) 400 mg tablet Take 400 mg by mouth daily. Yes Historical Provider   amLODIPine (NORVASC) 5 mg tablet Take 5 mg by mouth daily. Yes Historical Provider   potassium chloride (KLOR-CON M10) 10 mEq tablet Take  by mouth. Yes Historical Provider   PNV NO.122/IRON/FOLIC ACID (PRENATAL MULTI PO) Take  by mouth. Yes Historical Provider   metoprolol tartrate (LOPRESSOR) 25 mg tablet Take 12.5 mg by mouth daily. Take PRN for BP <120. 6/23/16  Yes Pastor Keita III, MD   ondansetron (ZOFRAN ODT) 4 mg disintegrating tablet Take 4 mg by mouth three (3) times daily as needed. Yes Historical Provider   metoclopramide HCl (REGLAN) 5 mg tablet Take 5 mg by mouth two (2) times a day. Yes Historical Provider   famotidine (PEPCID) 40 mg tablet Take 20 mg by mouth daily. Yes Historical Provider   docusate sodium (COLACE) 100 mg capsule Take 100 mg by mouth daily.    Yes Historical Provider   ticagrelor (BRILINTA) 90 mg tablet Take 1 Tab by mouth two (2) times a day. 2/4/16  Yes MINDY Chatman   promethazine (PHENERGAN) 25 mg tablet Take 25 mg by mouth every eight (8) hours as needed for Nausea. Yes Historical Provider   sevelamer carbonate (RENVELA) 800 mg tab tab Take 800 mg by mouth three (3) times daily (with meals). Yes Historical Provider   gentamicin (GARAMYCIN) 0.1 % topical cream APPLY TO PD catheter exit site at daily dressing change 5/12/15  Yes Fabiola Madera MD   aspirin delayed-release 81 mg tablet Take 1 Tab by mouth daily. 5/5/15  Yes Gisela Hollingsworth PA-C   darbepoetin toya in polysorbat (ARANESP, POLYSORBATE,) 40 mcg/mL injection 40 mcg by SubCUTAneous route every fourteen (14) days. Indications: ANEMIA IN CHRONIC KIDNEY DISEASE   Yes Historical Provider   rosuvastatin (CRESTOR) 20 mg tablet Take 20 mg by mouth nightly. Yes Historical Provider   ranolazine ER (RANEXA) 500 mg SR tablet Take 500 mg by mouth two (2) times a day. Yes Historical Provider   levothyroxine (SYNTHROID) 150 mcg tablet Take 150 mcg by mouth Daily (before breakfast).    Yes Historical Provider     Allergies   Allergen Reactions    Codeine Nausea and Vomiting      Social History   Substance Use Topics    Smoking status: Never Smoker    Smokeless tobacco: Not on file    Alcohol use No      Family History   Problem Relation Age of Onset    Heart Disease Mother     Hypertension Mother     Cancer Mother      Lung    Stroke Father     Hypertension Father     Breast Cancer Neg Hx           Review of Systems    Constitutional: no fever,   Eyes: blurred vision, transient loss of left sided vision  Ears, nose, mouth, throat, and face:fair hearing,   Respiratory: no asthma,   Cardiovascular:no chest pain,   Gastrointestinal:no diarrhea,  Genitourinary: no dysuria,  Hematologic/lymphatic: no bleeding tendency,  Neurological: no seizures,  Behvioral/Psych: no psych hospitalization  Endocrine: no goiter,       Objective:     See nurses notes for vitals    General:  Alert, cooperative, no distress, appears stated age. Head:  Normocephalic, without obvious abnormality, atraumatic. Eyes:  Conjunctivae/corneas clear. PERRL, EOMs intact. Ears:  Normal external ear canals both ears. Nose: Nares normal. Septum midline. Mucosa normal. No drainage or sinus tenderness. Throat: Lips, mucosa, and tongue normal. Teeth and gums normal.   Neck: Supple, symmetrical, trachea midline, no adenopathy, thyroid: no enlargement/tenderness/nodules, no JVD. Back:   Symmetric, no curvature. ROM normal. No CVA tenderness. Lungs:   Clear to auscultation bilaterally. Chest wall:  No tenderness or deformity. Heart:  Regular rate and rhythm, S1, S2 normal, no murmur,  rub or gallop. Abdomen:   Soft, non-tender. Bowel sounds normal. No masses,  No organomegaly. No renal bruit. PD catheter exit site is perfect. Extremities: Extremities normal, atraumatic, no cyanosis or edema. Skin: Skin color, texture, turgor normal. No rashes or lesions. Lymph nodes: Cervical and supraclavicular nodes normal.   Neurologic: Grossly intact. Moves all extremities. No asterixis. Data Review:     Recent Results (from the past 24 hour(s))   CBC WITH AUTOMATED DIFF    Collection Time: 03/26/17  5:16 PM   Result Value Ref Range    WBC 8.0 4.3 - 11.1 K/uL    RBC 2.80 (L) 4.05 - 5.25 M/uL    HGB 8.6 (L) 11.7 - 15.4 g/dL    HCT 27.1 (L) 35.8 - 46.3 %    MCV 96.8 79.6 - 97.8 FL    MCH 30.7 26.1 - 32.9 PG    MCHC 31.7 31.4 - 35.0 g/dL    RDW 14.7 (H) 11.9 - 14.6 %    PLATELET 683 094 - 846 K/uL    MPV 11.2 10.8 - 14.1 FL    DF AUTOMATED      NEUTROPHILS 73 43 - 78 %    LYMPHOCYTES 18 13 - 44 %    MONOCYTES 6 4.0 - 12.0 %    EOSINOPHILS 3 0.5 - 7.8 %    BASOPHILS 0 0.0 - 2.0 %    IMMATURE GRANULOCYTES 0.4 0.0 - 5.0 %    ABS. NEUTROPHILS 5.9 1.7 - 8.2 K/UL    ABS. LYMPHOCYTES 1.4 0.5 - 4.6 K/UL    ABS. MONOCYTES 0.5 0.1 - 1.3 K/UL    ABS.  EOSINOPHILS 0.2 0.0 - 0.8 K/UL ABS. BASOPHILS 0.0 0.0 - 0.2 K/UL    ABS. IMM. GRANS. 0.0 0.0 - 0.5 K/UL   METABOLIC PANEL, COMPREHENSIVE    Collection Time: 03/26/17  5:16 PM   Result Value Ref Range    Sodium 139 136 - 145 mmol/L    Potassium 3.5 3.5 - 5.1 mmol/L    Chloride 101 98 - 107 mmol/L    CO2 28 21 - 32 mmol/L    Anion gap 10 7 - 16 mmol/L    Glucose 109 (H) 65 - 100 mg/dL    BUN 29 (H) 8 - 23 MG/DL    Creatinine 10.17 (H) 0.6 - 1.0 MG/DL    GFR est AA 5 (L) >60 ml/min/1.73m2    GFR est non-AA 4 (L) >60 ml/min/1.73m2    Calcium 8.8 8.3 - 10.4 MG/DL    Bilirubin, total 0.4 0.2 - 1.1 MG/DL    ALT (SGPT) 19 12 - 65 U/L    AST (SGOT) 32 15 - 37 U/L    Alk.  phosphatase 73 50 - 136 U/L    Protein, total 6.5 6.3 - 8.2 g/dL    Albumin 2.3 (L) 3.2 - 4.6 g/dL    Globulin 4.2 (H) 2.3 - 3.5 g/dL    A-G Ratio 0.5 (L) 1.2 - 3.5     PROTHROMBIN TIME + INR    Collection Time: 03/26/17  5:16 PM   Result Value Ref Range    Prothrombin time 10.5 9.6 - 12.0 sec    INR 1.0 0.9 - 1.2     PTT    Collection Time: 03/26/17  5:16 PM   Result Value Ref Range    aPTT 25.6 25.3 - 32.9 SEC   CBC W/O DIFF    Collection Time: 03/27/17  6:37 AM   Result Value Ref Range    WBC 6.5 4.3 - 11.1 K/uL    RBC 2.33 (L) 4.05 - 5.25 M/uL    HGB 7.1 (L) 11.7 - 15.4 g/dL    HCT 22.3 (L) 35.8 - 46.3 %    MCV 95.7 79.6 - 97.8 FL    MCH 30.5 26.1 - 32.9 PG    MCHC 31.8 31.4 - 35.0 g/dL    RDW 14.8 (H) 11.9 - 14.6 %    PLATELET 334 540 - 011 K/uL    MPV 10.2 (L) 10.8 - 14.1 FL   LIPID PANEL    Collection Time: 03/27/17  6:37 AM   Result Value Ref Range    LIPID PROFILE          Cholesterol, total 174 <200 MG/DL    Triglyceride 112 35 - 150 MG/DL    HDL Cholesterol 87 (H) 40 - 60 MG/DL    LDL, calculated 64.6 <100 MG/DL    VLDL, calculated 22.4 6.0 - 23.0 MG/DL    CHOL/HDL Ratio 2.0     TSH 3RD GENERATION    Collection Time: 03/27/17  6:37 AM   Result Value Ref Range    TSH 17.400 (H) 0.358 - 3.740 uIU/mL   T4, FREE    Collection Time: 03/27/17  6:37 AM   Result Value Ref Range    T4, Free 0.9 0.78 - 4.02 NG/DL   METABOLIC PANEL, BASIC    Collection Time: 03/27/17  6:37 AM   Result Value Ref Range    Sodium 140 136 - 145 mmol/L    Potassium 3.4 (L) 3.5 - 5.1 mmol/L    Chloride 103 98 - 107 mmol/L    CO2 29 21 - 32 mmol/L    Anion gap 8 7 - 16 mmol/L    Glucose 79 65 - 100 mg/dL    BUN 35 (H) 8 - 23 MG/DL    Creatinine 11.90 (H) 0.6 - 1.0 MG/DL    GFR est AA 4 (L) >60 ml/min/1.73m2    GFR est non-AA 3 (L) >60 ml/min/1.73m2    Calcium 8.0 (L) 8.3 - 10.4 MG/DL   MAGNESIUM    Collection Time: 03/27/17  6:37 AM   Result Value Ref Range    Magnesium 1.7 (L) 1.8 - 2.4 mg/dL         Principal Problem:    CVA (cerebral vascular accident) Hx of TIA (2/22/2016)    Active Problems:    Hypertension (4/28/2015)      Anemia of chronic renal failure (4/28/2015)      ESRD on peritoneal dialysis ([facility] [address] (2/1/2016)      Vision changes (3/26/2017)      Dizziness (3/26/2017)        Assessment:     1. Transient loss of vision/Blurred vision/unsteady gait -  - MRI doen today   - concur with current management per hospitalist service    2. Hypokalemia -  - on replacement  KCl    3. Anemia -  - patient refuses transfusion on [employer] belief  - treat with WAYNE aggressively  - add R48 and Folic acid    4. HTN -  - continue current meds    5. ESRD -  - will do  PD tonight    6.  Volume status -  - clinically near euvolemic      Plan:     As above

## 2017-03-27 NOTE — PROGRESS NOTES
Speech language pathology: bedside swallow note: Initial Assessment and Discharge    NAME/AGE/GENDER: Nataliya Gomes is a 80 y.o. female  DATE: 3/27/2017  PRIMARY DIAGNOSIS: cva  cva       ICD-10: Treatment Diagnosis: dysphagia; unspecified R13.10  INTERDISCIPLINARY COLLABORATION: n/a  PRECAUTIONS/ALLERGIES: Codeine ASSESSMENT:Based on the objective data described below, Ms. Rebecca Martinez presents with a swallow within functional limits. Patient is fully oriented and recalls details related to care from interactions with MD's and other staff. She appears much younger than stated age in appearance and demeanor. Discussing current events she has been watching on the television and reports she is an avid reader. Oral motor exam is normal with no dysarthria. Cognition appears intact. MRI revealing small acute CVA in occipital lobe consistent with patient's complaints regarding visual deficits and seeing \"figures\". She reports increased visual deficits on the left side. Patient tolerated thin liquids via serial swallows, pureed, mixed, and solids with no signs/sx of aspiration. Mastication is within functional limits. Recommend continue current diet. No further ST indicated at this time. ?????? ? ? This section established at most recent assessment??????????  PROBLEM LIST (Impairments causing functional limitations):  1. Visual deficits  REHABILITATION POTENTIAL FOR STATED GOALS: n/a  PLAN OF CARE:   Patient will benefit from skilled intervention to address the following impairments.   RECOMMENDATIONS AND PLANNED INTERVENTIONS (Benefits and precautions of therapy have been discussed with the patient.):  · continue prescribed diet  MEDICATIONS:  · With liquid  COMPENSATORY STRATEGIES/MODIFICATIONS INCLUDING:  · None  OTHER RECOMMENDATIONS (including follow up treatment recommendations):   · n/a  RECOMMENDED DIET MODIFICATIONS DISCUSSED WITH:  · Nursing  · Patient  FREQUENCY/DURATION: Will not continue to follow patient to address above goals. RECOMMENDED REHABILITATION/EQUIPMENT: (at time of discharge pending progress):   None. SUBJECTIVE:   Very pleasant. History of Present Injury/Illness: Ms. Sergio Martinez  has a past medical history of Anemia of chronic renal failure (4/28/2015); Anterior myocardial infarction [facility]) (5/21/2015); CAD (coronary artery disease); Chest pain; Chronic kidney disease, stage III (moderate) (8/15/2014); CKD (chronic kidney disease) stage 4, GFR 15-29 ml/min (HCC) (4/28/2015); Debility (5/5/2015); Depression (12/29/2015); Edema (12/29/2015); Endocrine disease; GERD (gastroesophageal reflux disease); Heart murmur (12/29/2015); HLD (hyperlipidemia) (12/29/2015); Hypertension; Hypothyroidism (4/28/2015); Ischemic cardiomyopathy (12/29/2015); Nausea; S/P PTCA (percutaneous transluminal coronary angioplasty); LAD PTCA of  ISR 11/27/15 (5/24/2016); STEMI (ST elevation myocardial infarction) ([facility] [address] (4/28/2015); Unspecified sleep apnea; and Unstable angina ([facility] [address] She also  has a past surgical history that includes ptca (4/28/2015); cholecystectomy (34473); knee replacement (Right, 2006); back surgery (1990); cataract removal (Bilateral); and back surgery. Present Symptoms: visual deficits  Pain Intensity 1: 0  Current Medications:   Current Facility-Administered Medications on File Prior to Encounter   Medication Dose Route Frequency Provider Last Rate Last Dose    [DISCONTINUED] sodium chloride (NS) flush 5-10 mL  5-10 mL IntraVENous Q8H Magnolia Yarbrough MD       [employer] [DISCONTINUED] sodium chloride (NS) flush 5-10 mL  5-10 mL IntraVENous PRN Magnolia Yarbrough MD         Current Outpatient Prescriptions on File Prior to Encounter   Medication Sig Dispense Refill    nitroglycerin (NITROLINGUAL) 400 mcg/spray spray 1 Spray by SubLINGual route every five (5) minutes as needed for Chest Pain.  linaclotide (LINZESS) 145 mcg cap capsule Take  by mouth Daily (before breakfast).       cinacalcet (SENSIPAR) 90 mg tab Take by mouth.  folic acid 753 mcg tablet Take 800 mcg by mouth daily.  magnesium oxide (MAG-OX) 400 mg tablet Take 400 mg by mouth daily.  amLODIPine (NORVASC) 5 mg tablet Take 5 mg by mouth daily.  potassium chloride (KLOR-CON M10) 10 mEq tablet Take  by mouth.  PNV NO.122/IRON/FOLIC ACID (PRENATAL MULTI PO) Take  by mouth.  metoprolol tartrate (LOPRESSOR) 25 mg tablet Take 12.5 mg by mouth daily. Take PRN for BP <120.  ondansetron (ZOFRAN ODT) 4 mg disintegrating tablet Take 4 mg by mouth three (3) times daily as needed.  metoclopramide HCl (REGLAN) 5 mg tablet Take 5 mg by mouth two (2) times a day.  famotidine (PEPCID) 40 mg tablet Take 20 mg by mouth daily.  docusate sodium (COLACE) 100 mg capsule Take 100 mg by mouth daily.  ticagrelor (BRILINTA) 90 mg tablet Take 1 Tab by mouth two (2) times a day. 60 Tab 11    promethazine (PHENERGAN) 25 mg tablet Take 25 mg by mouth every eight (8) hours as needed for Nausea.  sevelamer carbonate (RENVELA) 800 mg tab tab Take 800 mg by mouth three (3) times daily (with meals).  gentamicin (GARAMYCIN) 0.1 % topical cream APPLY TO PD catheter exit site at daily dressing change 15 g 0    aspirin delayed-release 81 mg tablet Take 1 Tab by mouth daily.  darbepoetin toya in polysorbat (ARANESP, POLYSORBATE,) 40 mcg/mL injection 40 mcg by SubCUTAneous route every fourteen (14) days. Indications: ANEMIA IN CHRONIC KIDNEY DISEASE      rosuvastatin (CRESTOR) 20 mg tablet Take 20 mg by mouth nightly.  ranolazine ER (RANEXA) 500 mg SR tablet Take 500 mg by mouth two (2) times a day.  levothyroxine (SYNTHROID) 150 mcg tablet Take 150 mcg by mouth Daily (before breakfast).        Current Dietary Status:  Cardiac diet       History of reflux:  no  Social History/Home Situation: home alone  Home Environment: Private residence  # Steps to Enter: 3  Wheelchair Ramp: Yes  One/Two Story Residence: One story  Living Alone: Yes  Support Systems: Family member(s)  Patient Expects to be Discharged to[de-identified] Private residence  Current DME Used/Available at Home: Rossy Samuel, Zora[facility] chair  Tub or Shower Type: Shower  OBJECTIVE:   Respiratory Status:        CXR Results: n/a  MRI/CT Results:2 small areas of restricted diffusion in the right occipital lobe  consistent with small areas of ischemic infarction  Oral Motor Structure/Speech:  Oral-Motor Structure/Motor Speech  Labial: No impairment  Dentition: Intact  Oral Hygiene: adequate  Lingual: No impairment    Cognitive and Communication Status:  Neurologic State: Alert  Orientation Level: Oriented X4  Cognition: Appropriate for age attention/concentration; Appropriate decision making  Perception: Appears intact  Perseveration: No perseveration noted  Safety/Judgement: Awareness of environment    BEDSIDE SWALLOW EVALUATION  Oral Assessment:  Oral Assessment  Labial: No impairment  Dentition: Intact  Oral Hygiene: adequate  Lingual: No impairment  P.O. Trials:  Patient Position: upright in bed    The patient was given tsp to serial swallows by cup amounts of the following:   Consistency Presented: Thin liquid;Mixed consistency; Solid  How Presented: Self-fed/presented;Cup/gulp;Cup/sip    ORAL PHASE:  Bolus Acceptance: No impairment  Bolus Formation/Control: No impairment  Propulsion: No impairment     Oral Residue: None    PHARYNGEAL PHASE:  Initiation of Swallow: No impairment  Laryngeal Elevation: Functional  Aspiration Signs/Symptoms: None  Vocal Quality: No impairment           Pharyngeal Phase Characteristics: No impairment, issues, or problems     OTHER OBSERVATIONS:  Rate/bite size: WNL   Endurance:   WNL     Tool Used: Dysphagia Outcome and Severity Scale (MAYURI)    Score Comments   Normal Diet  [x] 7 With no strategies or extra time needed   Functional Swallow  [] 6 May have mild oral or pharyngeal delay       Mild Dysphagia    [] 5 Which may require one diet consistency restricted (those who demonstrate penetration which is entirely cleared on MBS would be included)   Mild-Moderate Dysphagia  [] 4 With 1-2 diet consistencies restricted       Moderate Dysphagia  [] 3 With 2 or more diet consistencies restricted       Moderately Severe Dysphagia  [] 2 With partial PO strategies (trials with ST only)       Severe Dysphagia  [] 1 With inability to tolerate any PO safely          Score:  Initial: 7 Most Recent: X (Date: -- )   Interpretation of Tool: The Dysphagia Outcome and Severity Scale (MAYURI) is a simple, easy-to-use, 7-point scale developed to systematically rate the functional severity of dysphagia based on objective assessment and make recommendations for diet level, independence level, and type of nutrition. Score 7 6 5 4 3 2 1   Modifier CH CI CJ CK CL CM CN   ?  Swallowing:     - CURRENT STATUS: CH - 0% impaired, limited or restricted    - GOAL STATUS:  CH - 0% impaired, limited or restricted    - D/C STATUS:  CH - 0% impaired, limited or restricted  [place] / [place] / Product Type: Medicare /     TREATMENT:    (In addition to Assessment/Re-Assessment sessions the following treatments were rendered)  Assessment/Reassessment only, no treatment provided today    __________________________________________________________________________________________________  Safety:   After treatment position/precautions:  · Up in chair  · RN notified    Total Treatment Duration:  Time In: 1120  Time Out: [address] MS, SLP

## 2017-03-27 NOTE — PROGRESS NOTES
Pt with uneventful shift. Up ad campbell to bathroom, voiced multiple times that she was ready to go home but wanted to know her test results first.  All needs met at this time.

## 2017-03-27 NOTE — PROGRESS NOTES
Care Management Interventions  PCP Verified by CM: Yes  Transition of Care Consult (CM Consult): [address]: Yes  Physical Therapy Consult: Yes  Current Support Network: Lives with Spouse  Plan discussed with Pt/Family/Caregiver: Yes  Freedom of Choice Offered: Yes  Discharge Location  Discharge Placement: Home with home health     Met with patient at the bedside. He is alert and oriented. He is hoping to dc to home tomorrow and is agreeable to [facility] services. Patient asking about a shower chair and will price this outwith a DME company. Patient reporting he is depressed and offerred to set him up with on going counseling and he will think about this. Patient asking about safety bars in the home and wonders if I could speak to his land lord. Will discuss again in the am when spouse is here. Plan: Home with Naomy Magana and PT. Add in SW if needed. Mike Cruz Console

## 2017-03-27 NOTE — PROGRESS NOTES
Progress Note    Patient: Silvana Gilliam MRN: [id number]  SSN: [id number]    YOB: 1932  Age: 80 y.o. Sex: female      Admit Date: 3/26/2017    LOS: 1 day     Subjective:     79 yo female presented with blurry vision and dizziness. She was admitted for CVA workup. Pt states vision still blurry but overall feels well. No CP, dyspnea, palpitations, N/V/D, or abd pain. Review of Systems:  Pertinent per HPI.     Medications:  Current Facility-Administered Medications   Medication Dose Route Frequency    [START ON 3/28/2017] peritoneal dialysis DEXTROSE 2.5% (2.5 mEq/L low calcium) solution 2,000 mL  2,000 mL IntraPERitoneal DAILY    amLODIPine (NORVASC) tablet 5 mg  5 mg Oral DAILY    aspirin delayed-release tablet 81 mg  81 mg Oral DAILY    docusate sodium (COLACE) capsule 100 mg  100 mg Oral DAILY    famotidine (PEPCID) tablet 20 mg  20 mg Oral DAILY    levothyroxine (SYNTHROID) tablet 150 mcg  150 mcg Oral ACB    linaclotide (LINZESS) capsule 145 mcg  145 mcg Oral DAILY    magnesium oxide (MAG-OX) tablet 400 mg  400 mg Oral DAILY    ranolazine ER (RANEXA) tablet 500 mg  500 mg Oral BID    rosuvastatin (CRESTOR) tablet 20 mg  20 mg Oral QHS    sevelamer (RENAGEL) tablet 800 mg  800 mg Oral BID WITH MEALS    ticagrelor (BRILINTA) tablet 90 mg  90 mg Oral BID    0.9% sodium chloride infusion  100 mL/hr IntraVENous CONTINUOUS    sodium chloride (NS) flush 5-10 mL  5-10 mL IntraVENous Q8H    sodium chloride (NS) flush 5-10 mL  5-10 mL IntraVENous PRN    ondansetron (ZOFRAN) injection 4 mg  4 mg IntraVENous Q6H PRN    acetaminophen (TYLENOL) tablet 650 mg  650 mg Oral Q4H PRN    heparin (porcine) injection 5,000 Units  5,000 Units SubCUTAneous L9I    folic acid (FOLVITE) tablet 1 mg  1 mg Oral BID    epoetin toya (EPOGEN;PROCRIT) injection 20,000 Units  20,000 Units SubCUTAneous Q MON, WED & FRI       Objective:     Vitals:    03/27/17 0032 03/27/17 0327 03/27/17 0803 03/27/17 1136   BP: 143/63 125/75 131/67 131/71   Pulse: (!) 59 72 75 71   Resp: 18 18 18 18   Temp: 97.4 °F (36.3 °C) 97.9 °F (36.6 °C) 97.9 °F (36.6 °C) 98.1 °F (36.7 °C)   SpO2: 97% 97% 100% 100%        Physical Exam:   General: awake, alert, no apparent distress  Lungs: Clear to auscultation bilaterally. Heart: Regular rate and rhythm. Abdomen: Soft, nontender, nondistended. Bowel sounds normal.  Extremities:  No LE edema. Neurologic: CN II-XII grossly intact bilaterally. No facial asymmetry. Psych: AOx3. Normal mood and affect. Lab/Data Review:  Recent Results (from the past 24 hour(s))   CBC WITH AUTOMATED DIFF    Collection Time: 03/26/17  5:16 PM   Result Value Ref Range    WBC 8.0 4.3 - 11.1 K/uL    RBC 2.80 (L) 4.05 - 5.25 M/uL    HGB 8.6 (L) 11.7 - 15.4 g/dL    HCT 27.1 (L) 35.8 - 46.3 %    MCV 96.8 79.6 - 97.8 FL    MCH 30.7 26.1 - 32.9 PG    MCHC 31.7 31.4 - 35.0 g/dL    RDW 14.7 (H) 11.9 - 14.6 %    PLATELET 117 232 - 469 K/uL    MPV 11.2 10.8 - 14.1 FL    DF AUTOMATED      NEUTROPHILS 73 43 - 78 %    LYMPHOCYTES 18 13 - 44 %    MONOCYTES 6 4.0 - 12.0 %    EOSINOPHILS 3 0.5 - 7.8 %    BASOPHILS 0 0.0 - 2.0 %    IMMATURE GRANULOCYTES 0.4 0.0 - 5.0 %    ABS. NEUTROPHILS 5.9 1.7 - 8.2 K/UL    ABS. LYMPHOCYTES 1.4 0.5 - 4.6 K/UL    ABS. MONOCYTES 0.5 0.1 - 1.3 K/UL    ABS. EOSINOPHILS 0.2 0.0 - 0.8 K/UL    ABS. BASOPHILS 0.0 0.0 - 0.2 K/UL    ABS. IMM.  GRANS. 0.0 0.0 - 0.5 K/UL   METABOLIC PANEL, COMPREHENSIVE    Collection Time: 03/26/17  5:16 PM   Result Value Ref Range    Sodium 139 136 - 145 mmol/L    Potassium 3.5 3.5 - 5.1 mmol/L    Chloride 101 98 - 107 mmol/L    CO2 28 21 - 32 mmol/L    Anion gap 10 7 - 16 mmol/L    Glucose 109 (H) 65 - 100 mg/dL    BUN 29 (H) 8 - 23 MG/DL    Creatinine 10.17 (H) 0.6 - 1.0 MG/DL    GFR est AA 5 (L) >60 ml/min/1.73m2    GFR est non-AA 4 (L) >60 ml/min/1.73m2    Calcium 8.8 8.3 - 10.4 MG/DL    Bilirubin, total 0.4 0.2 - 1.1 MG/DL    ALT (SGPT) 19 12 - 65 U/L    AST (SGOT) 32 15 - 37 U/L    Alk.  phosphatase 73 50 - 136 U/L    Protein, total 6.5 6.3 - 8.2 g/dL    Albumin 2.3 (L) 3.2 - 4.6 g/dL    Globulin 4.2 (H) 2.3 - 3.5 g/dL    A-G Ratio 0.5 (L) 1.2 - 3.5     PROTHROMBIN TIME + INR    Collection Time: 03/26/17  5:16 PM   Result Value Ref Range    Prothrombin time 10.5 9.6 - 12.0 sec    INR 1.0 0.9 - 1.2     PTT    Collection Time: 03/26/17  5:16 PM   Result Value Ref Range    aPTT 25.6 25.3 - 32.9 SEC   CBC W/O DIFF    Collection Time: 03/27/17  6:37 AM   Result Value Ref Range    WBC 6.5 4.3 - 11.1 K/uL    RBC 2.33 (L) 4.05 - 5.25 M/uL    HGB 7.1 (L) 11.7 - 15.4 g/dL    HCT 22.3 (L) 35.8 - 46.3 %    MCV 95.7 79.6 - 97.8 FL    MCH 30.5 26.1 - 32.9 PG    MCHC 31.8 31.4 - 35.0 g/dL    RDW 14.8 (H) 11.9 - 14.6 %    PLATELET 197 934 - 972 K/uL    MPV 10.2 (L) 10.8 - 14.1 FL   LIPID PANEL    Collection Time: 03/27/17  6:37 AM   Result Value Ref Range    LIPID PROFILE          Cholesterol, total 174 <200 MG/DL    Triglyceride 112 35 - 150 MG/DL    HDL Cholesterol 87 (H) 40 - 60 MG/DL    LDL, calculated 64.6 <100 MG/DL    VLDL, calculated 22.4 6.0 - 23.0 MG/DL    CHOL/HDL Ratio 2.0     TSH 3RD GENERATION    Collection Time: 03/27/17  6:37 AM   Result Value Ref Range    TSH 17.400 (H) 0.358 - 3.740 uIU/mL   T4, FREE    Collection Time: 03/27/17  6:37 AM   Result Value Ref Range    T4, Free 0.9 0.78 - 8.15 NG/DL   METABOLIC PANEL, BASIC    Collection Time: 03/27/17  6:37 AM   Result Value Ref Range    Sodium 140 136 - 145 mmol/L    Potassium 3.4 (L) 3.5 - 5.1 mmol/L    Chloride 103 98 - 107 mmol/L    CO2 29 21 - 32 mmol/L    Anion gap 8 7 - 16 mmol/L    Glucose 79 65 - 100 mg/dL    BUN 35 (H) 8 - 23 MG/DL    Creatinine 11.90 (H) 0.6 - 1.0 MG/DL    GFR est AA 4 (L) >60 ml/min/1.73m2    GFR est non-AA 3 (L) >60 ml/min/1.73m2    Calcium 8.0 (L) 8.3 - 10.4 MG/DL   MAGNESIUM    Collection Time: 03/27/17  6:37 AM   Result Value Ref Range    Magnesium 1.7 (L) 1.8 - 2.4 mg/dL     I have reviewed new clinical data. Assessment:     Principal Problem:    CVA (cerebral vascular accident) Hx of TIA (2/22/2016)    Active Problems:    Hypertension (4/28/2015)      Anemia of chronic renal failure (4/28/2015)      ESRD on peritoneal dialysis ([facility] [address] (2/1/2016)      Vision changes (3/26/2017)      Dizziness (3/26/2017)      Plan:     MRI shows 2 small areas in right occipital lobe c/w cva. Echo pending  Anemia likely from CKD and pt on erythropoietin. On ASA and statin  Continue current care    DVT prophylaxis: sq heparin  Disposition: can likely be discharged home tomorrow. Pt feels well enough to be discharged today, but echo still pending and I would like further monitoring on telemetry during this acute period of cva.     Signed By: Anna Garland DO     March 27, 2017

## 2017-03-27 NOTE — ED NOTES
TRANSFER - OUT REPORT:    Verbal report given to brayan roche(name) on Terry  being transferred to 4(unit) for routine progression of care       Report consisted of patients Situation, Background, Assessment and   Recommendations(SBAR). Information from the following report(s) ED Summary, Procedure Summary and Intake/Output was reviewed with the receiving nurse. Lines:   Peripheral IV 03/26/17 Right Hand (Active)   Site Assessment Clean, dry, & intact 3/26/2017  5:16 PM   Phlebitis Assessment 0 3/26/2017  5:16 PM   Infiltration Assessment 0 3/26/2017  5:16 PM   Dressing Status Clean, dry, & intact 3/26/2017  5:16 PM   Dressing Type Transparent 3/26/2017  5:16 PM        Opportunity for questions and clarification was provided.       Patient transported with:   [facility]

## 2017-03-28 ENCOUNTER — HOME HEALTH ADMISSION (OUTPATIENT)
Dept: HOME HEALTH SERVICES | Facility: HOME HEALTH | Age: 82
End: 2017-03-28
Payer: MEDICARE

## 2017-03-28 VITALS
TEMPERATURE: 97.8 F | RESPIRATION RATE: 18 BRPM | HEART RATE: 75 BPM | DIASTOLIC BLOOD PRESSURE: 86 MMHG | SYSTOLIC BLOOD PRESSURE: 145 MMHG | OXYGEN SATURATION: 98 %

## 2017-03-28 LAB — T3FREE SERPL-MCNC: 1.1 PG/ML (ref 2–4.4)

## 2017-03-28 PROCEDURE — 74011250637 HC RX REV CODE- 250/637: Performed by: INTERNAL MEDICINE

## 2017-03-28 PROCEDURE — 74011250636 HC RX REV CODE- 250/636: Performed by: FAMILY MEDICINE

## 2017-03-28 PROCEDURE — 97530 THERAPEUTIC ACTIVITIES: CPT

## 2017-03-28 PROCEDURE — 74011250637 HC RX REV CODE- 250/637: Performed by: FAMILY MEDICINE

## 2017-03-28 RX ADMIN — RENAGEL 800 MG: 400 TABLET ORAL at 11:08

## 2017-03-28 RX ADMIN — LEVOTHYROXINE SODIUM 150 MCG: 150 TABLET ORAL at 05:37

## 2017-03-28 RX ADMIN — TICAGRELOR 90 MG: 90 TABLET ORAL at 08:36

## 2017-03-28 RX ADMIN — DOCUSATE SODIUM 100 MG: 100 CAPSULE, LIQUID FILLED ORAL at 08:36

## 2017-03-28 RX ADMIN — FAMOTIDINE 20 MG: 20 TABLET, FILM COATED ORAL at 08:37

## 2017-03-28 RX ADMIN — RANOLAZINE 500 MG: 500 TABLET, FILM COATED, EXTENDED RELEASE ORAL at 11:08

## 2017-03-28 RX ADMIN — FOLIC ACID 1 MG: 1 TABLET ORAL at 08:37

## 2017-03-28 RX ADMIN — ASPIRIN 81 MG: 81 TABLET, COATED ORAL at 08:37

## 2017-03-28 RX ADMIN — Medication 400 MG: at 11:08

## 2017-03-28 RX ADMIN — AMLODIPINE BESYLATE 5 MG: 5 TABLET ORAL at 08:37

## 2017-03-28 RX ADMIN — HEPARIN SODIUM 5000 UNITS: 5000 INJECTION, SOLUTION INTRAVENOUS; SUBCUTANEOUS at 05:38

## 2017-03-28 NOTE — DISCHARGE SUMMARY
Hospitalist Discharge Summary     Patient ID:  Heena Patterson  [id number]  67 y.o.  10/26/1932  Admit date: 3/26/2017  8:42 PM  Discharge date and time: 3/28/2017  Attending: Amando Barry MD  PCP:  Charity Pepe MD  Treatment Team: Attending Provider: Amando Barry MD; Consulting Provider: Nasir Stevens MD; Utilization Review: Ronald Moreno; Care Manager: MOIZ Rosario    Principal Diagnosis CVA (cerebral vascular accident) [facility])   Principal Problem:    CVA (cerebral vascular accident) Hx of TIA (2/22/2016)    Active Problems:    Hypertension (4/28/2015)      Anemia of chronic renal failure (4/28/2015)      ESRD on peritoneal dialysis ([facility] [address] (2/1/2016)      Vision changes (3/26/2017)      Dizziness (3/26/2017)             Hospital Course:  Please refer to the admission H&P for details of presentation. In summary, the patient is a 80year old AAF with a PMH of CAD, CVA, and ESRD presented to the ER with blurry vision and dizziness was found to have a right occipital CVA. Her dizziness quickly resolved and her blurry vision improved, but was still present at discharge. No new medications were added due to the patient being on the correct antiplatelet medications. She was discharged home with home PT in stable condition. Significant Diagnostic Studies:       Labs: Results:       Chemistry Recent Labs      03/27/17   0637  03/26/17   1716   GLU  79  109*   NA  140  139   K  3.4*  3.5   CL  103  101   CO2  29  28   BUN  35*  29*   CREA  11.90*  10.17*   CA  8.0*  8.8   AGAP  8  10   AP   --   73   TP   --   6.5   ALB   --   2.3*   GLOB   --   4.2*   AGRAT   --   0.5*      CBC w/Diff Recent Labs      03/27/17   0637  03/26/17   1716   WBC  6.5  8.0   RBC  2.33*  2.80*   HGB  7.1*  8.6*   HCT  22.3*  27.1*   PLT  185  222   GRANS   --   73   LYMPH   --   18   EOS   --   3      Cardiac Enzymes No results for input(s): CPK, CKND1, ANNA in the last 72 hours.     No lab exists for component: CKRMB, TROIP   Coagulation Recent Labs      03/26/17   1716   PTP  10.5   INR  1.0   APTT  25.6       Lipid Panel Lab Results   Component Value Date/Time    Cholesterol, total 174 03/27/2017 06:37 AM    HDL Cholesterol 87 03/27/2017 06:37 AM    LDL, calculated 64.6 03/27/2017 06:37 AM    VLDL, calculated 22.4 03/27/2017 06:37 AM    Triglyceride 112 03/27/2017 06:37 AM    CHOL/HDL Ratio 2.0 03/27/2017 06:37 AM      BNP No results for input(s): BNPP in the last 72 hours. Liver Enzymes Recent Labs      03/26/17   1716   TP  6.5   ALB  2.3*   AP  73   SGOT  32      Thyroid Studies Lab Results   Component Value Date/Time    TSH 17.400 03/27/2017 06:37 AM          Imaging:    MRI Brain  2 small areas of restricted diffusion in the right occipital lobe  consistent with small areas of ischemic infarction    Echocardiogram  -  Left ventricle: Systolic function was normal. Ejection fraction was  estimated in the range of 55 % to 60 %. There were no regional wall motion  abnormalities. There was mild asymmetric hypertrophy of the septum. Doppler  parameters were consistent with mild diastolic dysfunction (grade 1). -  Atrial septum: Contrast injection was performed. There was no   right-to-left shunt, with provocative maneuvers to increase right atrial pressure. -  Aortic valve: There was mild to moderate regurgitation. -  Mitral valve: There was mild annular calcification. There was mild to  moderate regurgitation. -  Tricuspid valve: There was mild regurgitation. Discharge Exam:  Visit Vitals    /84 (BP 1 Location: Right arm, BP Patient Position: At rest)    Pulse 68    Temp 98.1 °F (36.7 °C)    Resp 18    SpO2 99%     General appearance: alert, cooperative, no distress, appears stated age  Lungs: clear to auscultation bilaterally  Heart: regular rate and rhythm, S1, S2 normal, no murmur, click, rub or gallop  Abdomen: soft, non-tender.  Bowel sounds normal. No masses,  no organomegaly  Extremities: no cyanosis or edema  Neurologic: Left sided visual defects, strength normal    [facility]  Discharge Condition: stable  Patient Instructions:   Current Discharge Medication List      CONTINUE these medications which have NOT CHANGED    Details   nitroglycerin (NITROLINGUAL) 400 mcg/spray spray 1 Spray by SubLINGual route every five (5) minutes as needed for Chest Pain.      linaclotide (LINZESS) 145 mcg cap capsule Take  by mouth Daily (before breakfast). cinacalcet (SENSIPAR) 90 mg tab Take  by mouth. folic acid 542 mcg tablet Take 800 mcg by mouth daily. magnesium oxide (MAG-OX) 400 mg tablet Take 400 mg by mouth daily. amLODIPine (NORVASC) 5 mg tablet Take 5 mg by mouth daily. potassium chloride (KLOR-CON M10) 10 mEq tablet Take  by mouth. PNV NO.122/IRON/FOLIC ACID (PRENATAL MULTI PO) Take  by mouth.      metoprolol tartrate (LOPRESSOR) 25 mg tablet Take 12.5 mg by mouth daily. Take PRN for BP <120. ondansetron (ZOFRAN ODT) 4 mg disintegrating tablet Take 4 mg by mouth three (3) times daily as needed. metoclopramide HCl (REGLAN) 5 mg tablet Take 5 mg by mouth two (2) times a day. famotidine (PEPCID) 40 mg tablet Take 20 mg by mouth daily. docusate sodium (COLACE) 100 mg capsule Take 100 mg by mouth daily. ticagrelor (BRILINTA) 90 mg tablet Take 1 Tab by mouth two (2) times a day. Qty: 60 Tab, Refills: 11      promethazine (PHENERGAN) 25 mg tablet Take 25 mg by mouth every eight (8) hours as needed for Nausea. sevelamer carbonate (RENVELA) 800 mg tab tab Take 800 mg by mouth three (3) times daily (with meals). gentamicin (GARAMYCIN) 0.1 % topical cream APPLY TO PD catheter exit site at daily dressing change  Qty: 15 g, Refills: 0      aspirin delayed-release 81 mg tablet Take 1 Tab by mouth daily.       darbepoetin toya in polysorbat (ARANESP, POLYSORBATE,) 40 mcg/mL injection 40 mcg by SubCUTAneous route every fourteen (14) days. Indications: ANEMIA IN CHRONIC KIDNEY DISEASE      rosuvastatin (CRESTOR) 20 mg tablet Take 20 mg by mouth nightly. ranolazine ER (RANEXA) 500 mg SR tablet Take 500 mg by mouth two (2) times a day. levothyroxine (SYNTHROID) 150 mcg tablet Take 150 mcg by mouth Daily (before breakfast).              Activity: PT/OT per Home Health  Diet: Cardiac Diet  Wound Care: None needed    Follow-up  ·   Dr. Alon Ojeda in one week  Time spent to discharge patient 24 minutes  Signed:  Diomedes Dc DO  3/28/2017  10:27 AM

## 2017-03-28 NOTE — PROGRESS NOTES
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 The symptoms were noted upon awakening this am. She denies headache, fever/chills, N/V, and no other neuro deficits. She decided to lie back down for awhile. When she got up again, the dizziness had improved but blurry vision remained. She also felt \"off balance\" when walking. Denies actual weakness, just felt clumsy/un-coordinated. The dizziness eventually passed, but not the vision problem and she finally came to ER. ER w/u showed visual field deficits, but no other significant issues. We are asked to admit for possible CVA. She has a h/o CVA and TIA. No fever or chills, no problems with PD drainage or discoloration of PD fluid. No increased thirst. She reports that the loss of vision on her left side returned since admission.      s- feels better , no headache , sees pattern through her lt eye in the temporal region     Past Medical History:   Diagnosis Date    Anemia of chronic renal failure 4/28/2015    Anterior myocardial infarction [facility]) 5/21/2015    CAD (coronary artery disease)     Chest pain     Chronic kidney disease, stage III (moderate) 8/15/2014    on dialysis    CKD (chronic kidney disease) stage 4, GFR 15-29 ml/min ([facility]) 4/28/2015    Debility 5/5/2015    Depression 12/29/2015    Edema 12/29/2015    Endocrine disease     Hypothyroidism    GERD (gastroesophageal reflux disease)     Heart murmur 12/29/2015    HLD (hyperlipidemia) 12/29/2015    Hypertension     Hypothyroidism 4/28/2015    Ischemic cardiomyopathy 12/29/2015    Nausea     S/P PTCA (percutaneous transluminal coronary angioplasty); LAD PTCA of  ISR 11/27/15 5/24/2016    STEMI (ST elevation myocardial infarction) ([facility] [address] 4/28/2015    Unspecified sleep apnea     uses cpap machine    Unstable angina [facility])       Past Surgical History:   Procedure Laterality Date    HX BACK SURGERY  1990    neck surgery cervical disc    HX BACK SURGERY      lower back    HX CATARACT REMOVAL Bilateral     HX CHOLECYSTECTOMY  19702    gall bladder     HX KNEE REPLACEMENT Right 2006    HX PTCA  4/28/2015    2.25 Xience stent to mid LAD for occluded artery, anterior MI, EF 25%. Moderate disease distal LAD and distal OM PCI CX and RCA 2004, then PCI RCA and LAD in 2009. Prior to Admission medications    Medication Sig Start Date End Date Taking? Authorizing Provider   nitroglycerin (NITROLINGUAL) 400 mcg/spray spray 1 Spray by SubLINGual route every five (5) minutes as needed for Chest Pain. Yes Historical Provider   linaclotide Lonn Reaper) 145 mcg cap capsule Take  by mouth Daily (before breakfast). Yes Historical Provider   cinacalcet (SENSIPAR) 90 mg tab Take  by mouth. Yes Historical Provider   folic acid 942 mcg tablet Take 800 mcg by mouth daily. Yes Historical Provider   magnesium oxide (MAG-OX) 400 mg tablet Take 400 mg by mouth daily. Yes Historical Provider   amLODIPine (NORVASC) 5 mg tablet Take 5 mg by mouth daily. Yes Historical Provider   potassium chloride (KLOR-CON M10) 10 mEq tablet Take  by mouth. Yes Historical Provider   PNV NO.122/IRON/FOLIC ACID (PRENATAL MULTI PO) Take  by mouth. Yes Historical Provider   metoprolol tartrate (LOPRESSOR) 25 mg tablet Take 12.5 mg by mouth daily. Take PRN for BP <120. 6/23/16  Yes Justice Nava III, MD   ondansetron (ZOFRAN ODT) 4 mg disintegrating tablet Take 4 mg by mouth three (3) times daily as needed. Yes Historical Provider   metoclopramide HCl (REGLAN) 5 mg tablet Take 5 mg by mouth two (2) times a day. Yes Historical Provider   famotidine (PEPCID) 40 mg tablet Take 20 mg by mouth daily. Yes Historical Provider   docusate sodium (COLACE) 100 mg capsule Take 100 mg by mouth daily.    Yes Historical Provider   ticagrelor (BRILINTA) 90 mg tablet Take 1 Tab by mouth two (2) times a day. 2/4/16  Yes MINDY Chatman   promethazine (PHENERGAN) 25 mg tablet Take 25 mg by mouth every eight (8) hours as needed for Nausea. Yes Historical Provider   sevelamer carbonate (RENVELA) 800 mg tab tab Take 800 mg by mouth three (3) times daily (with meals). Yes Historical Provider   gentamicin (GARAMYCIN) 0.1 % topical cream APPLY TO PD catheter exit site at daily dressing change 5/12/15  Yes Roshan Brady MD   aspirin delayed-release 81 mg tablet Take 1 Tab by mouth daily. 5/5/15  Yes Gisela Hollingsworth PA-C   darbepoetin toya in polysorbat (ARANESP, POLYSORBATE,) 40 mcg/mL injection 40 mcg by SubCUTAneous route every fourteen (14) days. Indications: ANEMIA IN CHRONIC KIDNEY DISEASE   Yes Historical Provider   rosuvastatin (CRESTOR) 20 mg tablet Take 20 mg by mouth nightly. Yes Historical Provider   ranolazine ER (RANEXA) 500 mg SR tablet Take 500 mg by mouth two (2) times a day. Yes Historical Provider   levothyroxine (SYNTHROID) 150 mcg tablet Take 150 mcg by mouth Daily (before breakfast).    Yes Historical Provider     Allergies   Allergen Reactions    Codeine Nausea and Vomiting      Social History   Substance Use Topics    Smoking status: Never Smoker    Smokeless tobacco: Not on file    Alcohol use No      Family History   Problem Relation Age of Onset    Heart Disease Mother     Hypertension Mother     Cancer Mother      Lung    Stroke Father     Hypertension Father     Breast Cancer Neg Hx           Review of Systems    Constitutional: no fever,   Eyes: blurred vision, transient loss of left sided vision  Ears, nose, mouth, throat, and face:fair hearing,   Respiratory: no asthma,   Cardiovascular:no chest pain,   Gastrointestinal:no diarrhea,  Genitourinary: no dysuria,  Hematologic/lymphatic: no bleeding tendency,  Neurological: no seizures,  Behvioral/Psych: no psych hospitalization  Endocrine: no goiter,       Objective:     See nurses notes for vitals    General:  Alert, cooperative, no distress, appears stated age. Head:  Normocephalic, without obvious abnormality, atraumatic. Eyes:  Conjunctivae/corneas clear. PERRL, EOMs intact. Ears:  Normal external ear canals both ears. Nose: Nares normal. Septum midline. Mucosa normal. No drainage or sinus tenderness. Throat: Lips, mucosa, and tongue normal. Teeth and gums normal.   Neck: Supple, symmetrical, trachea midline, no adenopathy, thyroid: no enlargement/tenderness/nodules, no JVD. Back:   Symmetric, no curvature. ROM normal. No CVA tenderness. Lungs:   Clear to auscultation bilaterally. Chest wall:  No tenderness or deformity. Heart:  Regular rate and rhythm, S1, S2 normal, no murmur,  rub or gallop. Abdomen:   Soft, non-tender. Bowel sounds normal. No masses,  No organomegaly. No renal bruit. PD catheter exit site is perfect. Extremities: Extremities normal, atraumatic, no cyanosis or edema. Skin: Skin color, texture, turgor normal. No rashes or lesions. Lymph nodes: Cervical and supraclavicular nodes normal.   Neurologic: Grossly intact. Moves all extremities. No asterixis. Data Review:     No results found for this or any previous visit (from the past 24 hour(s)). Principal Problem:    CVA (cerebral vascular accident) Hx of TIA (2/22/2016)    Active Problems:    Hypertension (4/28/2015)      Anemia of chronic renal failure (4/28/2015)      ESRD on peritoneal dialysis ([facility] [place] [address] (2/1/2016)      Vision changes (3/26/2017)      Dizziness (3/26/2017)        Assessment:     1. Transient loss of vision/Blurred vision/unsteady gait -  - MRI - Rt small occipital ischemic stroke   - concur with current management per hospitalist service    2. Hypokalemia -  - on replacement  KCl    3. Anemia -  - patient refuses transfusion on Religion belief  - treat with WAYNE aggressively  - add X47 and Folic acid    4. HTN -  - continue current meds    5.  ESRD -  - will do  PD tonight    6.  Volume status -  - clinically near euvolemic      Plan:     As above

## 2017-03-28 NOTE — PROGRESS NOTES
Pt refusing to take all scheduled meds at one time, only wants to take half. Says it makes her feel too nauseated, would like to take later on today.

## 2017-03-28 NOTE — DISCHARGE INSTRUCTIONS
DISCHARGE SUMMARY from Nurse    The following personal items are in your possession at time of discharge:    Dental Appliances: Lowers, Partials, Uppers        Home Medications: None  Jewelry: None  Clothing: Pants, Shirt, Socks, Undergarments  Other Valuables: Cell Phone             PATIENT INSTRUCTIONS:    After general anesthesia or intravenous sedation, for 24 hours or while taking prescription Narcotics:  · Limit your activities  · Do not drive and operate hazardous machinery  · Do not make important personal or business decisions  · Do  not drink alcoholic beverages  · If you have not urinated within 8 hours after discharge, please contact your surgeon on call. Report the following to your surgeon:  · Excessive pain, swelling, redness or odor of or around the surgical area  · Temperature over 100.5  · Nausea and vomiting lasting longer than 4 hours or if unable to take medications  · Any signs of decreased circulation or nerve impairment to extremity: change in color, persistent  numbness, tingling, coldness or increase pain  · Any questions        What to do at Home:  Recommended activity: Activity as tolerated    If you experience any of the following symptoms slurred speech, feeling dizzy, numbness/tingling in arms/legs, facial droop, or changes in mental status, please follow up with ER immediatley. *  Please give a list of your current medications to your Primary Care Provider. *  Please update this list whenever your medications are discontinued, doses are      changed, or new medications (including over-the-counter products) are added. *  Please carry medication information at all times in case of emergency situations. These are general instructions for a healthy lifestyle:    No smoking/ No tobacco products/ Avoid exposure to second hand smoke    Surgeon General's Warning:  Quitting smoking now greatly reduces serious risk to your health.     Obesity, smoking, and sedentary lifestyle greatly increases your risk for illness    A healthy diet, regular physical exercise & weight monitoring are important for maintaining a healthy lifestyle    You may be retaining fluid if you have a history of heart failure or if you experience any of the following symptoms:  Weight gain of 3 pounds or more overnight or 5 pounds in a week, increased swelling in our hands or feet or shortness of breath while lying flat in bed. Please call your doctor as soon as you notice any of these symptoms; do not wait until your next office visit. Recognize signs and symptoms of STROKE:    F-face looks uneven    A-arms unable to move or move unevenly    S-speech slurred or non-existent    T-time-call 911 as soon as signs and symptoms begin-DO NOT go       Back to bed or wait to see if you get better-TIME IS BRAIN. Warning Signs of HEART ATTACK     Call 911 if you have these symptoms:   Chest discomfort. Most heart attacks involve discomfort in the center of the chest that lasts more than a few minutes, or that goes away and comes back. It can feel like uncomfortable pressure, squeezing, fullness, or pain.  Discomfort in other areas of the upper body. Symptoms can include pain or discomfort in one or both arms, the back, neck, jaw, or stomach.  Shortness of breath with or without chest discomfort.  Other signs may include breaking out in a cold sweat, nausea, or lightheadedness. Don't wait more than five minutes to call 911 - MINUTES MATTER! Fast action can save your life. Calling 911 is almost always the fastest way to get lifesaving treatment. Emergency Medical Services staff can begin treatment when they arrive -- up to an hour sooner than if someone gets to the hospital by car. The discharge information has been reviewed with the patient. The patient verbalized understanding.     Discharge medications reviewed with the patient and appropriate educational materials and side effects teaching were provided. Stroke: After Your Visit     Your Care Instructions     You have had a stroke. Risk factors for stroke include being overweight, smoking, and sedentary lifestyle. This means that the blood flow to a part of your brain was blocked for some time, which damages the nerve cells in that part of the brain. The part of your body controlled by that part of your brain may not function properly now. The brain is an amazing organ that can heal itself to some degree. The stroke you had damaged part of your brain, but other parts of your brain may take over in some way for the damaged areas. You have already started this process. Going home may be hard for you and your family. The more you can try to do for yourself, the better. Remember to take each day one at a time. Follow-up care is a key part of your treatment and safety. Be sure to make and go to all appointments, and call your doctor if you are having problems. Its also a good idea to know your test results and keep a list of the medicines you take. How can you care for yourself at home? Enter a stroke rehabilitation (rehab) program, if your doctor recommends it. Physical, speech, and occupational therapies can help you manage bathing, dressing, eating, and other basics of daily living. Eat a heart-healthy diet that is low in cholesterol, saturated fat, and salt. Eat lots of fresh fruits and vegetables and foods high in fiber. Increase your activities slowly. Take short rest breaks when you get tired. Gradually increase the amount you walk. Start out by walking a little more than you did the day before. Do not drive until your doctor says it is okay. It is normal to feel sad or depressed after a stroke. If the blues last, talk to your doctor. If you are having problems with urine leakage, go to the bathroom at regular times, including when you first wake up and at bedtime. Also, limit fluids after dinner.   If you are constipated, drink plenty of fluids, enough so that your urine is light yellow or clear like water. If you have kidney, heart, or liver disease and have to limit fluids, talk with your doctor before you increase the amount of fluids you drink. Set up a regular time for using the toilet. If you continue to have constipation, your doctor may suggest using a bulking agent, such as Metamucil, or a stool softener, laxative, or enema. Medicines  Take your medicines exactly as prescribed. Call your doctor if you think you are having a problem with your medicine. You may be taking several medicines. ACE (angiotensin-converting enzyme) inhibitors, angiotensin II receptor blockers (ARBs), beta-blockers, diuretics (water pills), and calcium channel blockers control your blood pressure. Statins help lower cholesterol. Your doctor may also prescribe medicines for depression, pain, sleep problems, anxiety, or agitation. If your doctor has given you medicine that prevents blood clots, such as warfarin (Coumadin), aspirin combined with extended-release dipyridamole (Aggrenox), clopidogrel (Plavix), or aspirin to prevent another stroke, you should:  Tell your dentist, pharmacist, and other health professionals that you take these medicines. Watch for unusual bruising or bleeding, such as blood in your urine, red or black stools, or bleeding from your nose or gums. Get regular blood tests to check your clotting time if you are taking Coumadin. Wear medical alert jewelry that says you take blood thinners. You can buy this at most drugstores. Do not take any over-the-counter medicines or herbal products without talking to your doctor first.  If you take birth control pills or hormone replacement therapy, talk to your doctor about whether they are right for you. For family members and caregivers  Make the home safe. Set up a room so that your loved one does not have to climb stairs. Be sure the bathroom is on the same floor. Move throw rugs and furniture that could cause falls, and make sure that the lighting is good. Put grab bars and seats in tubs and showers. Find out what your loved one can do and what he or she needs help with. Try not to do things for your loved one that your loved one can do on his or her own. Help him or her learn and practice new skills. Visit and talk with your loved one often. Try doing activities together that you both enjoy, such as playing cards or board games. Keep in touch with your loved one's friends as much as you can, and encourage them to visit. Take care of yourself. Do not try to do everything yourself. Ask other family members to help. Eat well, get enough rest, and take time to do things that you enjoy. Keep up with your own doctor visits, and make sure to take your medicines regularly. Get out of the house as much as you can. Join a local support group. Find out if you qualify for home health care visits to help with rehab or for adult day care. When should you call for help? Call 911 anytime you think you may need emergency care. For example, call if:  You have signs of another stroke. These may include:  Sudden numbness, paralysis, or weakness in your face, arm, or leg, especially on only one side of your body. New problems with walking or balance. Sudden vision changes. Drooling or slurred speech. New problems speaking or understanding simple statements, or you feel confused. A sudden, severe headache that is different from past headaches. Call 911 even if these symptoms go away in a few minutes. You cough up blood. You vomit blood or what looks like coffee grounds. You pass maroon or very bloody stools. Call your doctor now or seek immediate medical care if:  You have new bruises or blood spots under your skin. You have a nosebleed. Your gums bleed when you brush your teeth. You have blood in your urine.   Your stools are black and tarlike or have streaks of blood.  You have vaginal bleeding when you are not having your period, or heavy period bleeding. You have new symptoms that may be related to your stroke, such as falls or trouble swallowing. Watch closely for changes in your health, and be sure to contact your doctor if you have any problems. Where can you learn more? Go to [employer] Information[employer]    Enter C294  in the search box to learn more about \"Stroke: After Your Visit\". © 5883-1534 [employer]wise, Incorporated. Care instructions adapted under license by Adele Bacon (which disclaims liability or warranty for this information). This care instruction is for use with your licensed healthcare professional. If you have questions about a medical condition or this instruction, always ask your healthcare professional. Gal Fitzpatrick any warranty or liability for your use of this information.

## 2017-03-28 NOTE — PROGRESS NOTES
Problem: Mobility Impaired (Adult and Pediatric)  Goal: *Acute Goals and Plan of Care (Insert Text)  LTG:  (1.)Ms. Devine will move from supine to sit and sit to supine , scoot up and down and roll side to side in bed with INDEPENDENCE within 7 day(s). (2.)Ms. Devine will transfer from bed to chair and chair to bed with MODIFIED INDEPENDENCE using the least restrictive device within 7 day(s). (3.)Ms. Devine will ambulate with MODIFIED INDEPENDENCE for 250+ feet with the least restrictive device within 7 day(s). (4.)Ms. Devine will ascend and descend 3 stairs using 0 hand rail(s) with SUPERVISION to improve functional mobility and safety within 7 day(s). ________________________________________________________________________________________________       PHYSICAL THERAPY: Daily Note, Treatment Day: 1st and AM 3/28/2017  INPATIENT: Hospital Day: 3  [place] / [place] / Product Type: Medicare /      NAME/AGE/GENDER: Gil Parra is a 80 y.o. female             PRIMARY DIAGNOSIS: cva  cva  CVA (cerebral vascular accident) ([facility] [address] CVA (cerebral vascular accident) ([facility] [address] CVA (cerebral vascular accident) ([facility] [address]        ICD-10: Treatment Diagnosis:       · Generalized Muscle Weakness (M62.81)  · History of falling (Z91.81)   Precaution/Allergies:  Codeine       ASSESSMENT:      Ms. Devine presents sitting up in chair on contact and very agreeable to therapy. Pt stood and was able to ambulate about 25' with rolling walker and supervision. Pt sat down in chair in preciado to rest.  Pt reports that her legs get tired after a short distance and she has to sit down. Stating this has been going on for a couple of months and she has chairs set out throughout her house that she uses to rest when ambulating at home. Pt rested and then ambulated back to her room. MD entering room as we approached.   Pt returned to chair and was left with MD.  Pt is making slow progress, but LE weakness limits her distance at this time. She exhibits decreased gait speed and shortened step length bilaterally. Will continue with POC. This section established at most recent assessment   PROBLEM LIST (Impairments causing functional limitations):  1. Decreased Strength  2. Decreased Balance  3. Decreased Activity Tolerance    INTERVENTIONS PLANNED: (Benefits and precautions of physical therapy have been discussed with the patient.)  1. Balance Exercise  2. Bed Mobility  3. Family Education  4. Gait Training  5. Neuromuscular Re-education/Strengthening  6. Therapeutic Activites  7. Therapeutic Exercise/Strengthening  8. Transfer Training  9. Group Therapy      TREATMENT PLAN: Frequency/Duration: 3 times a week for 2-3 x's  Rehabilitation Potential For Stated Goals: GOOD      RECOMMENDED REHABILITATION/EQUIPMENT: (at time of discharge pending progress): Discussed with Case Management. HISTORY:   History of Present Injury/Illness (Reason for Referral):  Per H&P:   HPI:   Patient case was reviewed with the ER provider prior to seeing the patient. Patient is a 80 y.o. female who presents to the ER due to blurry vision and dizziness. She states the symptoms were there upon awakening this am. She denies headache, fever/chills, n/v, and no other neuro deficits. She decided to lie back down for awhile. When she got up again, the dizziness had improved but blurry vision remained. She also felt \"off balance\" when walking. Denies actual weakness, just felt clumsy/un-coordinated. The dizziness eventually passed, but not the vision problem and she finally came to ER. ER w/u showed visual field deficits, but no other significant issues. We are asked to admit for possible CVA. She has a h/o CVA and TIA. Past Medical History/Comorbidities:   Ms. Kemp Litten  has a past medical history of Anemia of chronic renal failure (4/28/2015); Anterior myocardial infarction [facility]) (5/21/2015); CAD (coronary artery disease);  Chest pain; Chronic kidney disease, stage III (moderate) (8/15/2014); CKD (chronic kidney disease) stage 4, GFR 15-29 ml/min (HCC) (4/28/2015); Debility (5/5/2015); Depression (12/29/2015); Edema (12/29/2015); Endocrine disease; GERD (gastroesophageal reflux disease); Heart murmur (12/29/2015); HLD (hyperlipidemia) (12/29/2015); Hypertension; Hypothyroidism (4/28/2015); Ischemic cardiomyopathy (12/29/2015); Nausea; S/P PTCA (percutaneous transluminal coronary angioplasty); LAD PTCA of  ISR 11/27/15 (5/24/2016); STEMI (ST elevation myocardial infarction) ([facility] [address] (4/28/2015); Unspecified sleep apnea; and [facility] Ms. Cesar Kumar  has a past surgical history that includes ptca (4/28/2015); cholecystectomy (46297); knee replacement (Right, 2006); back surgery (1990); cataract removal (Bilateral); and back surgery. Social History/Living Environment:   Home Environment: Private residence  # Steps to Enter: 3  Rails to Enter: No  Wheelchair Ramp: Yes  One/Two Story Residence: One story  Living Alone: Yes  Support Systems: Child(faraz)  Patient Expects to be Discharged to[de-identified] Private residence  Current DME Used/Available at Home: Shower chair, Walker, rolling, Other (comment) (scooter)  Tub or Shower Type: Shower  Prior Level of Function/Work/Activity:  Pt reports she lives alone in a one story house with a ramp to enter through garage. Pt reports she is mostly independent with ADLs and ambulation with occasional use of RW. Pt states she plans on moving in with her nephew in [place].       Number of Personal Factors/Comorbidities that affect the Plan of Care:  CAD  CKD  Depression  Unstable agina 3+: HIGH COMPLEXITY   EXAMINATION:   Most Recent Physical Functioning:   Gross Assessment:                  Posture:     Balance:  Sitting: Intact  Standing - Static: Good  Standing - Dynamic : Fair (+) Bed Mobility:     Wheelchair Mobility:     Transfers:  Sit to Stand: Modified independent  Stand to Sit: Modified independent  Gait:     Speed/Michelle: Pace decreased (<100 feet/min)  Step Length: Left shortened;Right shortened  Distance (ft): 25 Feet (ft) (x 2)  Assistive Device: Walker, rolling  Ambulation - Level of Assistance: Supervision       Body Structures Involved:  1. Nerves  2. Eyes and Ears  3. Muscles Body Functions Affected:  1. Sensory/Pain  2. Neuromusculoskeletal  3. Movement Related Activities and Participation Affected:  1. Mobility  2. Domestic Life  3. Community, Social and [place] [facility]   Number of elements that affect the Plan of Care: 4+: HIGH COMPLEXITY   CLINICAL PRESENTATION:   Presentation: Evolving clinical presentation with changing clinical characteristics: MODERATE COMPLEXITY   CLINICAL DECISION MAKIN [facility] Inpatient Short Form  How much difficulty does the patient currently have. .. Unable A Lot A Little None   1. Turning over in bed (including adjusting bedclothes, sheets and blankets)? [ ] 1   [ ] 2   [ ] 3   [X] 4   2. Sitting down on and standing up from a chair with arms ( e.g., wheelchair, bedside commode, etc.)   [ ] 1   [ ] 2   [ ] 3   [X] 4   3. Moving from lying on back to sitting on the side of the bed? [ ] 1   [ ] 2   [ ] 3   [X] 4   How much help from another person does the patient currently need. .. Total A Lot A Little None   4. Moving to and from a bed to a chair (including a wheelchair)? [ ] 1   [ ] 2   [ ] 3   [X] 4   5. Need to walk in hospital room? [ ] 1   [ ] 2   [X] 3   [ ] 4   6. Climbing 3-5 steps with a railing? [ ] 1   [ ] 2   [X] 3   [ ] 4   © , Trustees of [address] [place] [address] [address] [address], under license to [employer]. All rights reserved    Score:  Initial: 22 Most Recent: X (Date: -- )     Interpretation of Tool:  Represents activities that are increasingly more difficult (i.e. Bed mobility, Transfers, Gait).        Score 24 23 22-20 19-15 14-10 9-7 6       Modifier CH CI CJ CK CL CM CN         · Mobility - Walking and Moving Around:               - CURRENT STATUS:    CJ - 20%-39% impaired, limited or restricted               - GOAL STATUS:           CI - 1%-19% impaired, limited or restricted               - D/C STATUS:                       ---------------To be determined---------------  [place] / Product Type: Medicare /       Medical Necessity:     · Patient demonstrates good rehab potential due to higher previous functional level. Reason for Services/Other Comments:  · Patient continues to require skilled intervention due to decreased balance and activity tolerance. Use of outcome tool(s) and clinical judgement create a POC that gives a: Questionable prediction of patient's progress: MODERATE COMPLEXITY                 TREATMENT:      Pre-treatment Symptoms/Complaints:  Pt reports her legs get tired quickly and she has to take seated rest breaks  Pain: Initial:      Post Session:  0      Therapeutic Activity: (    9 minutes): Therapeutic activities including Chair transfers and Ambulation on level ground to improve mobility, strength and balance. Required minimal   to promote dynamic balance in standing. Braces/Orthotics/Lines/Etc:   ·  Room air  Treatment/Session Assessment:    · Response to Treatment:  Pt tolerated treatment very well with no reports of increased pain or SOB. · Interdisciplinary Collaboration:  · Physical Therapy Assistant, Registered Nurse and Physician  · After treatment position/precautions:  · Up in chair, Bed/Chair-wheels locked, Call light within reach, RN notified and MD at bedside  · Compliance with Program/Exercises: Will assess as treatment progresses. · Recommendations/Intent for next treatment session: \"Next visit will focus on advancements to more challenging activities and reduction in assistance provided\".   Total Treatment Duration:  PT Patient Time In/Time Out  Time In: 1010  Time Out: [address] [place], PTA

## 2017-03-29 ENCOUNTER — PATIENT OUTREACH (OUTPATIENT)
Dept: CASE MANAGEMENT | Age: 82
End: 2017-03-29

## 2017-03-30 ENCOUNTER — PATIENT OUTREACH (OUTPATIENT)
Dept: CASE MANAGEMENT | Age: 82
End: 2017-03-30

## 2017-03-30 NOTE — PROGRESS NOTES
Care Coordinator placed called to [facility] to schedule hospital follow up appointment. Care Coordinator spoke with Vishnu Do who confirmed patient appointment on April 6, 2017 @ 3:50PM. Care Coordinator called patient to inform of appointment date and time, Mrs. Zac Murphy states that date and time would not work for her due to [place] [address] [facility] April 6, 2017 @ 9:00AM for [facility] returned called to [facility] and spoke with Vishnu Do once again about changing patient current appointment due to Cardiology appointment on same day. Vishnu Do stated that patient may request a new appointment may result in patient not be scheduled with her assigned PCP due to scheduling. Care Coordinator called and informed Mrs. Zac Murphy that new appointment may not be with scheduled with assigned PCP, patient states that she was disappointed to know she may not see her PCP and states she will call [facility] to schedule appointment due to fact that she was just released from hospital and follow up appointment is needed. Care Coordinator gave patient contact information and advised her to call if she requires any assistance. This note will not be viewable in [address]

## 2017-03-30 NOTE — PROGRESS NOTES
Transition of Care Discharge Follow-Up Questionnaire       Date/Time of Call:   March 29, 2017 1:22PM   What was the patient hospitalized for? Patient hospitalized CVA (cerebral vascular accident Hx of TIA. Does the patient understand his/her diagnosis and/or treatment and what happened during the hospitalization? Patient states understanding of diagnosis and treatment during hospitalization. Did the patient receive discharge instructions? Patient states discharge instructions explained and received before discharge to home. Review any discharge instructions (see notes in [facility]). Ask patient if they understand these. Do they have any questions? Discharge instructions reviewed with  patient per connect [facility], opportunity for questions and clarification provided. Patient states no questions at this time. Were home services ordered (nursing, PT, OT, ST, etc.)? Home Health services ordered (PT)       If so, has the first visit occurred? If not, why? (Assist with coordination of services if necessary.) First visit will occur tomorrow March 30, 2017         Was any DME ordered? No durable medical equipment ordered. If so, has it been received? If not, why?  (Assist with coordination of arranging DME orders if necessary. ) NA         Complete a review of all medications (new, continued and discontinued meds per the D/C instructions and [facility] Care Coordinator reviewed all medications with patient per [facility], current medications continued which have not changed. Were all new prescriptions filled? If not, why?  (Assist with obtainment of medications if necessary. ) NA         Does the patient understand the purpose and dosing instructions for all medications? (If patient has questions, provide explanation and education.) Patient states understanding of medication purposes and dosing instructions.    Does the patient have any problems in performing ADLs? (If patient is unable to perform ADLs - what is the limiting factor(s)? Do they have a support system that can assist? If no support system is present, discuss possible assistance that they may be able to obtain.) Patient states she can perform ADL's independently and use a Walker when ambulating. Does the patient have all follow-up appointments scheduled? Has transportation been arranged? [facility] follow-up should be within 7 days of discharge; all others should have PCP follow-up within 7 days of discharge; follow-ups with other specialists as appropriate or ordered.) Patient states she called to schedule a follow up appointment with Dr. Clint White (PCP) patient states she was told that PCP is booked and have no available appointments at this time. Patient states she informed office staff that she recently was discharged from hospital and was told again that no appointments were available at this time. Care Coordinator will call patient PCP to assist patient with obtaining a follow up appointment. Patient states she has no transportation needs         Any other questions or concerns expressed by the patient? Patient expresses no questions or concerns. Schedule next appointment with ANTHONY Pike or refer to RN Case Manager/  as appropriate. No further needs identified, patient instructed to call Care Coordinator if further questions or concerns arise.    BILL Call Completed By: Derek Villalobos LPN  Care Coordinator   [facility]

## 2017-04-04 ENCOUNTER — HOME CARE VISIT (OUTPATIENT)
Dept: SCHEDULING | Facility: HOME HEALTH | Age: 82
End: 2017-04-04
Payer: MEDICARE

## 2017-04-04 VITALS
SYSTOLIC BLOOD PRESSURE: 132 MMHG | DIASTOLIC BLOOD PRESSURE: 78 MMHG | OXYGEN SATURATION: 99 % | TEMPERATURE: 96.2 F | HEART RATE: 74 BPM | RESPIRATION RATE: 16 BRPM

## 2017-04-04 PROCEDURE — 3331090002 HH PPS REVENUE DEBIT

## 2017-04-04 PROCEDURE — 3331090001 HH PPS REVENUE CREDIT

## 2017-04-04 PROCEDURE — 400013 HH SOC

## 2017-04-04 PROCEDURE — G0151 HHCP-SERV OF PT,EA 15 MIN: HCPCS

## 2017-04-05 PROCEDURE — 3331090002 HH PPS REVENUE DEBIT

## 2017-04-05 PROCEDURE — 3331090001 HH PPS REVENUE CREDIT

## 2017-04-06 PROCEDURE — 3331090002 HH PPS REVENUE DEBIT

## 2017-04-06 PROCEDURE — 3331090001 HH PPS REVENUE CREDIT

## 2017-04-07 ENCOUNTER — HOME CARE VISIT (OUTPATIENT)
Dept: SCHEDULING | Facility: HOME HEALTH | Age: 82
End: 2017-04-07
Payer: MEDICARE

## 2017-04-07 PROCEDURE — G0157 HHC PT ASSISTANT EA 15: HCPCS

## 2017-04-07 PROCEDURE — 3331090002 HH PPS REVENUE DEBIT

## 2017-04-07 PROCEDURE — 3331090001 HH PPS REVENUE CREDIT

## 2017-04-08 PROCEDURE — 3331090001 HH PPS REVENUE CREDIT

## 2017-04-08 PROCEDURE — 3331090002 HH PPS REVENUE DEBIT

## 2017-04-09 VITALS — DIASTOLIC BLOOD PRESSURE: 80 MMHG | SYSTOLIC BLOOD PRESSURE: 128 MMHG | HEART RATE: 80 BPM | RESPIRATION RATE: 18 BRPM

## 2017-04-09 PROCEDURE — 3331090001 HH PPS REVENUE CREDIT

## 2017-04-09 PROCEDURE — 3331090002 HH PPS REVENUE DEBIT

## 2017-04-10 PROCEDURE — 3331090002 HH PPS REVENUE DEBIT

## 2017-04-10 PROCEDURE — 3331090001 HH PPS REVENUE CREDIT

## 2017-04-11 ENCOUNTER — HOME CARE VISIT (OUTPATIENT)
Dept: SCHEDULING | Facility: HOME HEALTH | Age: 82
End: 2017-04-11
Payer: MEDICARE

## 2017-04-11 PROCEDURE — G0157 HHC PT ASSISTANT EA 15: HCPCS

## 2017-04-11 PROCEDURE — 3331090002 HH PPS REVENUE DEBIT

## 2017-04-11 PROCEDURE — 3331090001 HH PPS REVENUE CREDIT

## 2017-04-12 ENCOUNTER — HOME CARE VISIT (OUTPATIENT)
Dept: SCHEDULING | Facility: HOME HEALTH | Age: 82
End: 2017-04-12
Payer: MEDICARE

## 2017-04-12 VITALS — OXYGEN SATURATION: 99 % | SYSTOLIC BLOOD PRESSURE: 120 MMHG | DIASTOLIC BLOOD PRESSURE: 70 MMHG | HEART RATE: 83 BPM

## 2017-04-12 VITALS — DIASTOLIC BLOOD PRESSURE: 88 MMHG | HEART RATE: 68 BPM | RESPIRATION RATE: 17 BRPM | SYSTOLIC BLOOD PRESSURE: 140 MMHG

## 2017-04-12 PROCEDURE — 3331090001 HH PPS REVENUE CREDIT

## 2017-04-12 PROCEDURE — 3331090002 HH PPS REVENUE DEBIT

## 2017-04-12 PROCEDURE — G0152 HHCP-SERV OF OT,EA 15 MIN: HCPCS

## 2017-04-13 ENCOUNTER — HOME CARE VISIT (OUTPATIENT)
Dept: HOME HEALTH SERVICES | Facility: HOME HEALTH | Age: 82
End: 2017-04-13
Payer: MEDICARE

## 2017-04-13 PROBLEM — Z86.73 H/O TIA (TRANSIENT ISCHEMIC ATTACK) AND STROKE: Status: ACTIVE | Noted: 2017-04-13

## 2017-04-13 PROBLEM — E78.2 MIXED HYPERLIPIDEMIA: Status: ACTIVE | Noted: 2017-04-13

## 2017-04-13 PROBLEM — Z98.61 S/P PTCA (PERCUTANEOUS TRANSLUMINAL CORONARY ANGIOPLASTY): Status: ACTIVE | Noted: 2017-04-13

## 2017-04-13 PROCEDURE — 3331090001 HH PPS REVENUE CREDIT

## 2017-04-13 PROCEDURE — 3331090002 HH PPS REVENUE DEBIT

## 2017-04-14 ENCOUNTER — HOME CARE VISIT (OUTPATIENT)
Dept: SCHEDULING | Facility: HOME HEALTH | Age: 82
End: 2017-04-14
Payer: MEDICARE

## 2017-04-14 PROCEDURE — 3331090002 HH PPS REVENUE DEBIT

## 2017-04-14 PROCEDURE — 3331090001 HH PPS REVENUE CREDIT

## 2017-04-15 PROCEDURE — 3331090001 HH PPS REVENUE CREDIT

## 2017-04-15 PROCEDURE — 3331090002 HH PPS REVENUE DEBIT

## 2017-04-16 PROCEDURE — 3331090002 HH PPS REVENUE DEBIT

## 2017-04-16 PROCEDURE — 3331090001 HH PPS REVENUE CREDIT

## 2017-04-17 ENCOUNTER — HOME CARE VISIT (OUTPATIENT)
Dept: HOME HEALTH SERVICES | Facility: HOME HEALTH | Age: 82
End: 2017-04-17
Payer: MEDICARE

## 2017-04-17 ENCOUNTER — HOME CARE VISIT (OUTPATIENT)
Dept: SCHEDULING | Facility: HOME HEALTH | Age: 82
End: 2017-04-17
Payer: MEDICARE

## 2017-04-17 VITALS — HEART RATE: 81 BPM | RESPIRATION RATE: 17 BRPM | DIASTOLIC BLOOD PRESSURE: 82 MMHG | SYSTOLIC BLOOD PRESSURE: 128 MMHG

## 2017-04-17 PROCEDURE — 3331090002 HH PPS REVENUE DEBIT

## 2017-04-17 PROCEDURE — 3331090001 HH PPS REVENUE CREDIT

## 2017-04-17 PROCEDURE — G0157 HHC PT ASSISTANT EA 15: HCPCS

## 2017-04-18 PROCEDURE — 3331090001 HH PPS REVENUE CREDIT

## 2017-04-18 PROCEDURE — 3331090002 HH PPS REVENUE DEBIT

## 2017-04-19 ENCOUNTER — HOME CARE VISIT (OUTPATIENT)
Dept: SCHEDULING | Facility: HOME HEALTH | Age: 82
End: 2017-04-19
Payer: MEDICARE

## 2017-04-19 VITALS
DIASTOLIC BLOOD PRESSURE: 84 MMHG | HEART RATE: 84 BPM | SYSTOLIC BLOOD PRESSURE: 130 MMHG | RESPIRATION RATE: 18 BRPM | TEMPERATURE: 97.6 F

## 2017-04-19 PROCEDURE — 3331090002 HH PPS REVENUE DEBIT

## 2017-04-19 PROCEDURE — G0158 HHC OT ASSISTANT EA 15: HCPCS

## 2017-04-19 PROCEDURE — 3331090001 HH PPS REVENUE CREDIT

## 2017-04-20 ENCOUNTER — HOME CARE VISIT (OUTPATIENT)
Dept: SCHEDULING | Facility: HOME HEALTH | Age: 82
End: 2017-04-20
Payer: MEDICARE

## 2017-04-20 VITALS
HEART RATE: 89 BPM | SYSTOLIC BLOOD PRESSURE: 136 MMHG | DIASTOLIC BLOOD PRESSURE: 82 MMHG | RESPIRATION RATE: 18 BRPM | TEMPERATURE: 96 F

## 2017-04-20 PROCEDURE — 3331090001 HH PPS REVENUE CREDIT

## 2017-04-20 PROCEDURE — G0158 HHC OT ASSISTANT EA 15: HCPCS

## 2017-04-20 PROCEDURE — 3331090002 HH PPS REVENUE DEBIT

## 2017-04-21 ENCOUNTER — HOME CARE VISIT (OUTPATIENT)
Dept: SCHEDULING | Facility: HOME HEALTH | Age: 82
End: 2017-04-21
Payer: MEDICARE

## 2017-04-21 VITALS — HEART RATE: 100 BPM | RESPIRATION RATE: 18 BRPM | SYSTOLIC BLOOD PRESSURE: 159 MMHG | DIASTOLIC BLOOD PRESSURE: 78 MMHG

## 2017-04-21 PROCEDURE — 3331090002 HH PPS REVENUE DEBIT

## 2017-04-21 PROCEDURE — G0157 HHC PT ASSISTANT EA 15: HCPCS

## 2017-04-21 PROCEDURE — 3331090001 HH PPS REVENUE CREDIT

## 2017-04-22 PROCEDURE — 3331090001 HH PPS REVENUE CREDIT

## 2017-04-22 PROCEDURE — 3331090002 HH PPS REVENUE DEBIT

## 2017-04-23 PROCEDURE — 3331090002 HH PPS REVENUE DEBIT

## 2017-04-23 PROCEDURE — 3331090001 HH PPS REVENUE CREDIT

## 2017-04-24 PROCEDURE — 3331090001 HH PPS REVENUE CREDIT

## 2017-04-24 PROCEDURE — 3331090002 HH PPS REVENUE DEBIT

## 2017-04-25 ENCOUNTER — HOME CARE VISIT (OUTPATIENT)
Dept: HOME HEALTH SERVICES | Facility: HOME HEALTH | Age: 82
End: 2017-04-25
Payer: MEDICARE

## 2017-04-25 ENCOUNTER — HOME CARE VISIT (OUTPATIENT)
Dept: SCHEDULING | Facility: HOME HEALTH | Age: 82
End: 2017-04-25
Payer: MEDICARE

## 2017-04-25 VITALS — DIASTOLIC BLOOD PRESSURE: 90 MMHG | OXYGEN SATURATION: 97 % | SYSTOLIC BLOOD PRESSURE: 140 MMHG | HEART RATE: 94 BPM

## 2017-04-25 PROCEDURE — 3331090001 HH PPS REVENUE CREDIT

## 2017-04-25 PROCEDURE — G0152 HHCP-SERV OF OT,EA 15 MIN: HCPCS

## 2017-04-25 PROCEDURE — 3331090002 HH PPS REVENUE DEBIT

## 2017-04-26 PROCEDURE — 3331090002 HH PPS REVENUE DEBIT

## 2017-04-26 PROCEDURE — 3331090001 HH PPS REVENUE CREDIT

## 2017-04-27 ENCOUNTER — HOME CARE VISIT (OUTPATIENT)
Dept: SCHEDULING | Facility: HOME HEALTH | Age: 82
End: 2017-04-27
Payer: MEDICARE

## 2017-04-27 VITALS
HEART RATE: 83 BPM | RESPIRATION RATE: 16 BRPM | DIASTOLIC BLOOD PRESSURE: 83 MMHG | SYSTOLIC BLOOD PRESSURE: 132 MMHG | OXYGEN SATURATION: 98 % | TEMPERATURE: 98 F

## 2017-04-27 PROCEDURE — G0151 HHCP-SERV OF PT,EA 15 MIN: HCPCS

## 2017-04-27 PROCEDURE — 3331090003 HH PPS REVENUE ADJ

## 2017-04-27 PROCEDURE — 3331090002 HH PPS REVENUE DEBIT

## 2017-04-27 PROCEDURE — 3331090001 HH PPS REVENUE CREDIT

## 2017-04-28 PROCEDURE — 3331090002 HH PPS REVENUE DEBIT

## 2017-04-28 PROCEDURE — 3331090001 HH PPS REVENUE CREDIT

## 2017-04-29 PROCEDURE — 3331090001 HH PPS REVENUE CREDIT

## 2017-04-29 PROCEDURE — 3331090002 HH PPS REVENUE DEBIT

## 2017-04-30 PROCEDURE — 3331090002 HH PPS REVENUE DEBIT

## 2017-04-30 PROCEDURE — 3331090001 HH PPS REVENUE CREDIT

## 2017-05-01 PROCEDURE — 3331090001 HH PPS REVENUE CREDIT

## 2017-05-01 PROCEDURE — 3331090002 HH PPS REVENUE DEBIT

## 2017-05-02 PROCEDURE — 3331090002 HH PPS REVENUE DEBIT

## 2017-05-02 PROCEDURE — 3331090001 HH PPS REVENUE CREDIT

## 2017-05-03 PROCEDURE — 3331090001 HH PPS REVENUE CREDIT

## 2017-05-03 PROCEDURE — 3331090002 HH PPS REVENUE DEBIT

## 2017-07-08 ENCOUNTER — HOSPITAL ENCOUNTER (OUTPATIENT)
Dept: INFUSION THERAPY | Age: 82
Discharge: HOME | End: 2017-07-08
Payer: MEDICARE

## 2017-07-08 VITALS
HEIGHT: 69 IN | BODY MASS INDEX: 29.77 KG/M2 | SYSTOLIC BLOOD PRESSURE: 144 MMHG | WEIGHT: 201 LBS | OXYGEN SATURATION: 99 % | DIASTOLIC BLOOD PRESSURE: 78 MMHG | TEMPERATURE: 98 F | RESPIRATION RATE: 18 BRPM | HEART RATE: 79 BPM

## 2017-07-08 PROCEDURE — 96365 THER/PROPH/DIAG IV INF INIT: CPT

## 2017-07-08 PROCEDURE — 74011250636 HC RX REV CODE- 250/636: Performed by: INTERNAL MEDICINE

## 2017-07-08 PROCEDURE — 96366 THER/PROPH/DIAG IV INF ADDON: CPT

## 2017-07-08 RX ORDER — SODIUM CHLORIDE 9 MG/ML
25 INJECTION, SOLUTION INTRAVENOUS ONCE
Status: COMPLETED | OUTPATIENT
Start: 2017-07-08 | End: 2017-07-08

## 2017-07-08 RX ORDER — SODIUM CHLORIDE 0.9 % (FLUSH) 0.9 %
5-10 SYRINGE (ML) INJECTION AS NEEDED
Status: DISCONTINUED | OUTPATIENT
Start: 2017-07-08 | End: 2017-07-12 | Stop reason: HOSPADM

## 2017-07-08 RX ADMIN — Medication 10 ML: at 13:20

## 2017-07-08 RX ADMIN — SODIUM CHLORIDE 25 ML/HR: 900 INJECTION, SOLUTION INTRAVENOUS at 13:20

## 2017-07-08 RX ADMIN — PAMIDRONATE DISODIUM 60 MG: 3 INJECTION, SOLUTION INTRAVENOUS at 13:22

## 2017-07-08 NOTE — PROGRESS NOTES
Problem: Knowledge Deficit  Goal: *Participate in the learning process  Outcome: Progressing Towards Goal  Aredia-review plan of care

## 2017-07-08 NOTE — PROGRESS NOTES
Arrived to the [facility]. Aredia completed. Patient tolerated well. Any issues or concerns during appointment: none. No further appointments  Discharged via wheelchair.

## 2017-09-09 ENCOUNTER — HOSPITAL ENCOUNTER (OUTPATIENT)
Dept: INFUSION THERAPY | Age: 82
Discharge: HOME | DRG: 391 | End: 2017-09-09
Payer: MEDICARE

## 2017-09-09 VITALS
TEMPERATURE: 98.4 F | RESPIRATION RATE: 18 BRPM | WEIGHT: 214 LBS | OXYGEN SATURATION: 100 % | SYSTOLIC BLOOD PRESSURE: 148 MMHG | HEART RATE: 72 BPM | DIASTOLIC BLOOD PRESSURE: 77 MMHG | BODY MASS INDEX: 31.6 KG/M2

## 2017-09-09 RX ORDER — DIPHENHYDRAMINE HCL 25 MG
25 CAPSULE ORAL
Status: DISCONTINUED | OUTPATIENT
Start: 2017-09-09 | End: 2017-09-13 | Stop reason: HOSPADM

## 2017-09-09 RX ORDER — ACETAMINOPHEN 325 MG/1
650 TABLET ORAL ONCE
Status: COMPLETED | OUTPATIENT
Start: 2017-09-09 | End: 2017-09-09

## 2017-09-09 RX ORDER — SODIUM CHLORIDE 9 MG/ML
250 INJECTION, SOLUTION INTRAVENOUS AS NEEDED
Status: DISCONTINUED | OUTPATIENT
Start: 2017-09-09 | End: 2017-09-13 | Stop reason: HOSPADM

## 2017-09-09 RX ADMIN — ACETAMINOPHEN 650 MG: 325 TABLET ORAL at 08:52

## 2017-09-09 RX ADMIN — DIPHENHYDRAMINE HYDROCHLORIDE 25 MG: 25 CAPSULE ORAL at 08:52

## 2017-09-09 RX ADMIN — SODIUM CHLORIDE 250 ML: 900 INJECTION, SOLUTION INTRAVENOUS at 08:54

## 2017-09-09 NOTE — PROGRESS NOTES
Problem: Knowledge Deficit  Goal: *Verbalizes understanding of procedures and medications  Outcome: Progressing Towards Goal  Patient here for blood transfusion today. She states that she has not received blood before. Reviewed the process and procedure with patient and she verbalizes understanding.

## 2017-09-09 NOTE — PROGRESS NOTES
Arrived to the [facility]. Assessment completed. Patient tolerated transfusion of one unit of blood well today. She received a premed of tylenol and benadryl, as ordered. Any issues or concerns during appointment: patient arrived today, stating that she has been having some \"chest pains for past couple of weeks\". She states that Dr. Charisse Grove is aware of this. Patient does not have another infusion appointment at this time. Discharged via [address], with son. Patient instructed to call her doctor's office immediately for any problems or concerns. She and son verbalizes understanding. Gave patient and son discharge instructions re:  Blood transfusion, and a copy of these were given to her. Patient denies any chest pains upon discharge.

## 2017-09-10 LAB
ABO + RH BLD: NORMAL
BLD PROD TYP BPU: NORMAL
BLOOD GROUP ANTIBODIES SERPL: NORMAL
BPU ID: NORMAL
CROSSMATCH RESULT,%XM: NORMAL
SPECIMEN EXP DATE BLD: NORMAL
STATUS OF UNIT,%ST: NORMAL
UNIT DIVISION, %UDIV: 0

## 2017-09-16 ENCOUNTER — HOSPITAL ENCOUNTER (OUTPATIENT)
Dept: INFUSION THERAPY | Age: 82
Discharge: HOME | DRG: 391 | End: 2017-09-16
Payer: MEDICARE

## 2017-09-16 VITALS
SYSTOLIC BLOOD PRESSURE: 130 MMHG | OXYGEN SATURATION: 100 % | DIASTOLIC BLOOD PRESSURE: 81 MMHG | RESPIRATION RATE: 18 BRPM | HEART RATE: 75 BPM | TEMPERATURE: 98.2 F

## 2017-09-16 RX ORDER — SODIUM CHLORIDE 9 MG/ML
250 INJECTION, SOLUTION INTRAVENOUS ONCE
Status: COMPLETED | OUTPATIENT
Start: 2017-09-16 | End: 2017-09-16

## 2017-09-16 RX ORDER — SODIUM CHLORIDE 0.9 % (FLUSH) 0.9 %
10 SYRINGE (ML) INJECTION AS NEEDED
Status: ACTIVE | OUTPATIENT
Start: 2017-09-16 | End: 2017-09-16

## 2017-09-16 RX ORDER — DIPHENHYDRAMINE HCL 25 MG
25 CAPSULE ORAL ONCE
Status: COMPLETED | OUTPATIENT
Start: 2017-09-16 | End: 2017-09-16

## 2017-09-16 RX ORDER — ACETAMINOPHEN 325 MG/1
650 TABLET ORAL ONCE
Status: COMPLETED | OUTPATIENT
Start: 2017-09-16 | End: 2017-09-16

## 2017-09-16 RX ADMIN — SODIUM CHLORIDE 250 ML: 900 INJECTION, SOLUTION INTRAVENOUS at 08:35

## 2017-09-16 RX ADMIN — ACETAMINOPHEN 650 MG: 325 TABLET ORAL at 08:40

## 2017-09-16 RX ADMIN — DIPHENHYDRAMINE HYDROCHLORIDE 25 MG: 25 CAPSULE ORAL at 08:34

## 2017-09-16 RX ADMIN — Medication 10 ML: at 07:45

## 2017-09-16 NOTE — PROGRESS NOTES
Pt arrived via wheelchair today at [phone], to receive one unit of blood. Pt tolerated without difficulty. Patient discharged via wheelchair accompanied by son. Instructed to notify physician of any problems, questions or concerns. Allowed opportunity for patient/family to ask questions. Verbalized understanding. Next appointment is Next week with Dr. Angela Knight.

## 2017-09-16 NOTE — PROGRESS NOTES
Problem: Knowledge Deficit  Goal: *Verbalizes understanding of procedures and medications  Outcome: Progressing Towards Goal  Verbalizes/demonstrates understanding of purpose/procedure/potential side effects of a blood transfusion.

## 2017-09-17 ENCOUNTER — HOSPITAL ENCOUNTER (INPATIENT)
Age: 82
LOS: 5 days | Discharge: HOSPICE | DRG: 391 | End: 2017-09-23
Attending: EMERGENCY MEDICINE | Admitting: HOSPITALIST
Payer: MEDICARE

## 2017-09-17 ENCOUNTER — APPOINTMENT (OUTPATIENT)
Dept: GENERAL RADIOLOGY | Age: 82
DRG: 391 | End: 2017-09-17
Attending: EMERGENCY MEDICINE
Payer: MEDICARE

## 2017-09-17 DIAGNOSIS — K65.2 SBP (SPONTANEOUS BACTERIAL PERITONITIS) (HCC): Primary | ICD-10-CM

## 2017-09-17 LAB
ABO + RH BLD: NORMAL
ALBUMIN SERPL-MCNC: 1.6 G/DL (ref 3.2–4.6)
ALBUMIN/GLOB SERPL: 0.3 {RATIO} (ref 1.2–3.5)
ALP SERPL-CCNC: 72 U/L (ref 50–136)
ALT SERPL-CCNC: 18 U/L (ref 12–65)
ANION GAP SERPL CALC-SCNC: 15 MMOL/L (ref 7–16)
APPEARANCE FLD: NORMAL
AST SERPL-CCNC: 43 U/L (ref 15–37)
BASOPHILS # BLD: 0 K/UL (ref 0–0.2)
BASOPHILS NFR BLD: 0 % (ref 0–2)
BILIRUB SERPL-MCNC: 0.3 MG/DL (ref 0.2–1.1)
BLD PROD TYP BPU: NORMAL
BLOOD GROUP ANTIBODIES SERPL: NORMAL
BPU ID: NORMAL
BUN SERPL-MCNC: 34 MG/DL (ref 8–23)
CALCIUM SERPL-MCNC: 9.1 MG/DL (ref 8.3–10.4)
CHLORIDE SERPL-SCNC: 98 MMOL/L (ref 98–107)
CO2 SERPL-SCNC: 25 MMOL/L (ref 21–32)
COLOR FLD: YELLOW
CREAT SERPL-MCNC: 9.68 MG/DL (ref 0.6–1)
CROSSMATCH RESULT,%XM: NORMAL
DIFFERENTIAL METHOD BLD: ABNORMAL
EOSINOPHIL # BLD: 0.1 K/UL (ref 0–0.8)
EOSINOPHIL NFR BLD: 1 % (ref 0.5–7.8)
ERYTHROCYTE [DISTWIDTH] IN BLOOD BY AUTOMATED COUNT: 16.3 % (ref 11.9–14.6)
GLOBULIN SER CALC-MCNC: 4.7 G/DL (ref 2.3–3.5)
GLUCOSE SERPL-MCNC: 88 MG/DL (ref 65–100)
HCT VFR BLD AUTO: 30.9 % (ref 35.8–46.3)
HGB BLD-MCNC: 10.5 G/DL (ref 11.7–15.4)
IMM GRANULOCYTES # BLD: 0 K/UL (ref 0–0.5)
IMM GRANULOCYTES NFR BLD: 0.3 % (ref 0–5)
LIPASE SERPL-CCNC: 139 U/L (ref 73–393)
LYMPHOCYTES # BLD: 1 K/UL (ref 0.5–4.6)
LYMPHOCYTES NFR BLD: 10 % (ref 13–44)
LYMPHOCYTES NFR FLD: 2 %
MCH RBC QN AUTO: 30.9 PG (ref 26.1–32.9)
MCHC RBC AUTO-ENTMCNC: 34 G/DL (ref 31.4–35)
MCV RBC AUTO: 90.9 FL (ref 79.6–97.8)
MONOCYTES # BLD: 0.5 K/UL (ref 0.1–1.3)
MONOCYTES NFR BLD: 4 % (ref 4–12)
MONOS+MACROS NFR FLD: 6 %
NEUTROPHILS NFR FLD: 92 %
NEUTS SEG # BLD: 9 K/UL (ref 1.7–8.2)
NEUTS SEG NFR BLD: 85 % (ref 43–78)
NUC CELL # FLD: 2647 /CU MM
PLATELET # BLD AUTO: 252 K/UL (ref 150–450)
PMV BLD AUTO: 10.4 FL (ref 10.8–14.1)
POTASSIUM SERPL-SCNC: 3.6 MMOL/L (ref 3.5–5.1)
PROT SERPL-MCNC: 6.3 G/DL (ref 6.3–8.2)
RBC # BLD AUTO: 3.4 M/UL (ref 4.05–5.25)
RBC # FLD: NORMAL /CU MM
SODIUM SERPL-SCNC: 138 MMOL/L (ref 136–145)
SPECIMEN EXP DATE BLD: NORMAL
SPECIMEN SOURCE FLD: NORMAL
STATUS OF UNIT,%ST: NORMAL
UNIT DIVISION, %UDIV: 0
WBC # BLD AUTO: 10.6 K/UL (ref 4.3–11.1)

## 2017-09-17 PROCEDURE — 96365 THER/PROPH/DIAG IV INF INIT: CPT | Performed by: EMERGENCY MEDICINE

## 2017-09-17 PROCEDURE — 85025 COMPLETE CBC W/AUTO DIFF WBC: CPT | Performed by: EMERGENCY MEDICINE

## 2017-09-17 PROCEDURE — 89050 BODY FLUID CELL COUNT: CPT | Performed by: EMERGENCY MEDICINE

## 2017-09-17 PROCEDURE — 99284 EMERGENCY DEPT VISIT MOD MDM: CPT | Performed by: EMERGENCY MEDICINE

## 2017-09-17 PROCEDURE — 74022 RADEX COMPL AQT ABD SERIES: CPT

## 2017-09-17 PROCEDURE — 80053 COMPREHEN METABOLIC PANEL: CPT | Performed by: EMERGENCY MEDICINE

## 2017-09-17 PROCEDURE — 87205 SMEAR GRAM STAIN: CPT | Performed by: EMERGENCY MEDICINE

## 2017-09-17 PROCEDURE — 83690 ASSAY OF LIPASE: CPT | Performed by: EMERGENCY MEDICINE

## 2017-09-17 PROCEDURE — 74011000250 HC RX REV CODE- 250: Performed by: EMERGENCY MEDICINE

## 2017-09-17 PROCEDURE — 74011250637 HC RX REV CODE- 250/637: Performed by: EMERGENCY MEDICINE

## 2017-09-17 RX ORDER — ONDANSETRON 4 MG/1
4 TABLET, ORALLY DISINTEGRATING ORAL
Status: COMPLETED | OUTPATIENT
Start: 2017-09-17 | End: 2017-09-17

## 2017-09-17 RX ORDER — ONDANSETRON 2 MG/ML
4 INJECTION INTRAMUSCULAR; INTRAVENOUS
Status: DISCONTINUED | OUTPATIENT
Start: 2017-09-17 | End: 2017-09-17

## 2017-09-17 RX ORDER — LIDOCAINE HYDROCHLORIDE 20 MG/ML
15 SOLUTION OROPHARYNGEAL
Status: COMPLETED | OUTPATIENT
Start: 2017-09-17 | End: 2017-09-17

## 2017-09-17 RX ADMIN — LIDOCAINE HYDROCHLORIDE 15 ML: 20 SOLUTION ORAL; TOPICAL at 22:04

## 2017-09-17 RX ADMIN — ONDANSETRON 4 MG: 4 TABLET, ORALLY DISINTEGRATING ORAL at 22:04

## 2017-09-17 RX ADMIN — Medication 30 ML: at 22:04

## 2017-09-17 NOTE — IP AVS SNAPSHOT
[facility] 
 
 
 [address] [facility] [address] [facility] 
[phone] Patient: Flory Mcmullen MRN: [id number] DWZ:[birth year] Current Discharge Medication List  
  
START taking these medications Dose & Instructions Dispensing Information Comments Morning Noon Evening Bedtime  
 pantoprazole 40 mg tablet Commonly known as:  PROTONIX Your last dose was: Your next dose is:    
   
   
 Dose:  40 mg Take 1 Tab by mouth Daily (before breakfast). Quantity:  30 Tab Refills:  0  
     
   
   
   
  
 sucralfate 100 mg/mL suspension Commonly known as:  Ted Waller Your last dose was: Your next dose is:    
   
   
 Dose:  1 g Take 10 mL by mouth Before breakfast, lunch, dinner and at bedtime. Indications: PREVENTION OF STRESS ULCER Quantity:  414 mL Refills:  0 CONTINUE these medications which have CHANGED Dose & Instructions Dispensing Information Comments Morning Noon Evening Bedtime  
 amLODIPine 10 mg tablet Commonly known as:  Richie Hoskins What changed:   
- medication strength 
- how much to take Your last dose was: Your next dose is:    
   
   
 Dose:  10 mg Take 1 Tab by mouth daily. Quantity:  30 Tab Refills:  0 CONTINUE these medications which have NOT CHANGED Dose & Instructions Dispensing Information Comments Morning Noon Evening Bedtime Aranesp (Polysorbate) 40 mcg/mL injection Generic drug:  darbepoetin toya in polysorbat Your last dose was: Your next dose is:    
   
   
 Dose:  40 mcg 40 mcg by SubCUTAneous route every fourteen (14) days. Indications: ANEMIA IN CHRONIC KIDNEY DISEASE Refills:  0  
     
   
   
   
  
 aspirin delayed-release 81 mg tablet Your last dose was: Your next dose is:    
   
   
 Dose:  81 mg Take 1 Tab by mouth daily. Refills:  0 calcitRIOL 0.25 mcg capsule Commonly known as:  ROCALTROL Your last dose was: Your next dose is:    
   
   
 Dose:  0.25 mcg Take 0.25 mcg by mouth daily. Refills:  0  
     
   
   
   
  
 CRESTOR 20 mg tablet Generic drug:  rosuvastatin Your last dose was: Your next dose is:    
   
   
 Dose:  20 mg Take 20 mg by mouth nightly. Refills:  0 DEMADEX 100 mg tablet Generic drug:  torsemide Your last dose was: Your next dose is: Take  by mouth daily. Refills:  0  
     
   
   
   
  
 folic acid 712 mcg tablet Your last dose was: Your next dose is:    
   
   
 Dose:  800 mcg Take 800 mcg by mouth daily. Refills:  0  
     
   
   
   
  
 gentamicin 0.1 % topical cream  
Commonly known as:  GARAMYCIN Your last dose was: Your next dose is:    
   
   
 APPLY TO PD catheter exit site at daily dressing change Quantity:  15 g Refills:  0 KLOR-CON M10 10 mEq tablet Generic drug:  potassium chloride Your last dose was: Your next dose is:    
   
   
 Dose:  10 mEq Take 10 mEq by mouth two (2) times a day. Refills:  0  
     
   
   
   
  
 levothyroxine 150 mcg tablet Commonly known as:  SYNTHROID Your last dose was: Your next dose is:    
   
   
 Dose:  150 mcg Take 150 mcg by mouth Daily (before breakfast). Refills:  0 LINZESS 145 mcg Cap capsule Generic drug:  linaclotide Your last dose was: Your next dose is: Take  by mouth Daily (before breakfast). Refills:  0  
     
   
   
   
  
 magnesium oxide 400 mg tablet Commonly known as:  MAG-OX Your last dose was: Your next dose is:    
   
   
 Dose:  400 mg Take 400 mg by mouth daily. Refills:  0  
     
   
   
   
  
 metoclopramide HCl 5 mg tablet Commonly known as:  REGLAN  
   
 Your last dose was: Your next dose is:    
   
   
 Dose:  5 mg Take 5 mg by mouth two (2) times a day. Refills:  0  
     
   
   
   
  
 metoprolol tartrate 25 mg tablet Commonly known as:  LOPRESSOR Your last dose was: Your next dose is:    
   
   
 Dose:  12.5 mg Take 12.5 mg by mouth daily. Take PRN for BP <120. Refills:  0  
     
   
   
   
  
 nitroglycerin 400 mcg/spray spray Commonly known as:  Burnard Plain Your last dose was: Your next dose is:    
   
   
 Dose:  1 Spray 1 Spray by SubLINGual route every five (5) minutes as needed for Chest Pain. Refills:  0  
     
   
   
   
  
 ondansetron 4 mg disintegrating tablet Commonly known as:  ZOFRAN ODT Your last dose was: Your next dose is:    
   
   
 Dose:  4 mg Take 4 mg by mouth three (3) times daily as needed. Refills:  0 PRENATAL MULTI PO Your last dose was: Your next dose is: Take  by mouth. Refills:  0  
     
   
   
   
  
 promethazine 25 mg tablet Commonly known as:  PHENERGAN Your last dose was: Your next dose is:    
   
   
 Dose:  25 mg Take 25 mg by mouth every eight (8) hours as needed for Nausea. Refills:  0  
     
   
   
   
  
 RANEXA 500 mg SR tablet Generic drug:  ranolazine ER Your last dose was: Your next dose is:    
   
   
 Dose:  500 mg Take 500 mg by mouth two (2) times a day. Refills:  0  
     
   
   
   
  
 RENVELA 800 mg Tab tab Generic drug:  sevelamer carbonate Your last dose was: Your next dose is:    
   
   
 Dose:  800 mg Take 800 mg by mouth three (3) times daily (with meals). Refills:  0 SENSIPAR 90 mg Tab Generic drug:  cinacalcet Your last dose was: Your next dose is: Take  by mouth. Refills:  0  
     
   
   
   
  
 ticagrelor 90 mg tablet Commonly known as:  [employer] Your last dose was: Your next dose is:    
   
   
 Dose:  90 mg Take 1 Tab by mouth two (2) times a day. Quantity:  60 Tab Refills:  11 STOP taking these medications   
 clopidogrel 75 mg Tab Commonly known as:  PLAVIX  
   
  
 famotidine 40 mg tablet Commonly known as:  PEPCID Where to Get Your Medications Information on where to get these meds will be given to you by the nurse or doctor. ! Ask your nurse or doctor about these medications  
  amLODIPine 10 mg tablet  
 pantoprazole 40 mg tablet  
 sucralfate 100 mg/mL suspension

## 2017-09-17 NOTE — IP AVS SNAPSHOT
David Big 
 
 
 [address] [facility] [address] [facility] 
[phone] Patient: Dev Gutierrez MRN: [id number] DSB:24/31/2023 You are allergic to the following Allergen Reactions Codeine Nausea and Vomiting Immunizations Administered for This Admission Name Date  
 TB Skin Test (PPD) Intradermal  Deferred () Recent Documentation Height Weight BMI OB Status Smoking Status 1.778 m 86.2 kg 27.26 kg/m2 Postmenopausal Never Smoker Emergency Contacts Name Discharge Info Relation Home Work Mobile [address] [facility] [facility] CAREGIVER [3] Son [22] [phone] Jon Kuhn DISCHARGE CAREGIVER [3] Son [22] [phone] About your hospitalization You were admitted on:  September 18, 2017 You last received care in the:  [facility] 6 MED SURG You were discharged on:  September 23, 2017 Unit phone number:  [phone] Why you were hospitalized Your primary diagnosis was:  Abdominal Pain Your diagnoses also included:  Hld (Hyperlipidemia), Esrd On Peritoneal Dialysis (Hcc), Hypothyroidism, Hypertension Providers Seen During Your Hospitalizations Provider Role Specialty Primary office phone Collin Daugherty MD Attending Provider Emergency Medicine [phone] Vickey Bunch MD Attending Provider Internal Medicine [phone] Your Primary Care Physician (PCP) Primary Care Physician Office Phone Office Fax [address] [facility] [place] [address] Follow-up Information Follow up With Details Comments Contact Info Solange Navarrete, [address] [facility] 
[phone] Current Discharge Medication List  
  
START taking these medications Dose & Instructions Dispensing Information Comments Morning Noon Evening Bedtime  
 pantoprazole 40 mg tablet Commonly known as:  PROTONIX Your last dose was: Your next dose is:    
   
   
 Dose:  40 mg Take 1 Tab by mouth Daily (before breakfast). Quantity:  30 Tab Refills:  0  
     
   
   
   
  
 sucralfate 100 mg/mL suspension Commonly known as:  Tamsen Sharla Your last dose was: Your next dose is:    
   
   
 Dose:  1 g Take 10 mL by mouth Before breakfast, lunch, dinner and at bedtime. Indications: PREVENTION OF STRESS ULCER Quantity:  414 mL Refills:  0 CONTINUE these medications which have CHANGED Dose & Instructions Dispensing Information Comments Morning Noon Evening Bedtime  
 amLODIPine 10 mg tablet Commonly known as:  Cadnie Higgins What changed:   
- medication strength 
- how much to take Your last dose was: Your next dose is:    
   
   
 Dose:  10 mg Take 1 Tab by mouth daily. Quantity:  30 Tab Refills:  0 CONTINUE these medications which have NOT CHANGED Dose & Instructions Dispensing Information Comments Morning Noon Evening Bedtime Aranesp (Polysorbate) 40 mcg/mL injection Generic drug:  darbepoetin toya in polysorbat Your last dose was: Your next dose is:    
   
   
 Dose:  40 mcg 40 mcg by SubCUTAneous route every fourteen (14) days. Indications: ANEMIA IN CHRONIC KIDNEY DISEASE Refills:  0  
     
   
   
   
  
 aspirin delayed-release 81 mg tablet Your last dose was: Your next dose is:    
   
   
 Dose:  81 mg Take 1 Tab by mouth daily. Refills:  0  
     
   
   
   
  
 calcitRIOL 0.25 mcg capsule Commonly known as:  ROCALTROL Your last dose was: Your next dose is:    
   
   
 Dose:  0.25 mcg Take 0.25 mcg by mouth daily. Refills:  0  
     
   
   
   
  
 CRESTOR 20 mg tablet Generic drug:  rosuvastatin Your last dose was: Your next dose is:    
   
   
 Dose:  20 mg Take 20 mg by mouth nightly. Refills:  0 DEMADEX 100 mg tablet Generic drug:  torsemide Your last dose was: Your next dose is: Take  by mouth daily. Refills:  0  
     
   
   
   
  
 folic acid 566 mcg tablet Your last dose was: Your next dose is:    
   
   
 Dose:  800 mcg Take 800 mcg by mouth daily. Refills:  0  
     
   
   
   
  
 gentamicin 0.1 % topical cream  
Commonly known as:  GARAMYCIN Your last dose was: Your next dose is:    
   
   
 APPLY TO PD catheter exit site at daily dressing change Quantity:  15 g Refills:  0 KLOR-CON M10 10 mEq tablet Generic drug:  potassium chloride Your last dose was: Your next dose is:    
   
   
 Dose:  10 mEq Take 10 mEq by mouth two (2) times a day. Refills:  0  
     
   
   
   
  
 levothyroxine 150 mcg tablet Commonly known as:  SYNTHROID Your last dose was: Your next dose is:    
   
   
 Dose:  150 mcg Take 150 mcg by mouth Daily (before breakfast). Refills:  0 LINZESS 145 mcg Cap capsule Generic drug:  linaclotide Your last dose was: Your next dose is: Take  by mouth Daily (before breakfast). Refills:  0  
     
   
   
   
  
 magnesium oxide 400 mg tablet Commonly known as:  MAG-OX Your last dose was: Your next dose is:    
   
   
 Dose:  400 mg Take 400 mg by mouth daily. Refills:  0  
     
   
   
   
  
 metoclopramide HCl 5 mg tablet Commonly known as:  REGLAN Your last dose was: Your next dose is:    
   
   
 Dose:  5 mg Take 5 mg by mouth two (2) times a day. Refills:  0  
     
   
   
   
  
 metoprolol tartrate 25 mg tablet Commonly known as:  LOPRESSOR Your last dose was: Your next dose is:    
   
   
 Dose:  12.5 mg Take 12.5 mg by mouth daily. Take PRN for BP <120. Refills:  0 nitroglycerin 400 mcg/spray spray Commonly known as:  Shani Cordova Your last dose was: Your next dose is:    
   
   
 Dose:  1 Spray 1 Spray by SubLINGual route every five (5) minutes as needed for Chest Pain. Refills:  0  
     
   
   
   
  
 ondansetron 4 mg disintegrating tablet Commonly known as:  ZOFRAN ODT Your last dose was: Your next dose is:    
   
   
 Dose:  4 mg Take 4 mg by mouth three (3) times daily as needed. Refills:  0 PRENATAL MULTI PO Your last dose was: Your next dose is: Take  by mouth. Refills:  0  
     
   
   
   
  
 promethazine 25 mg tablet Commonly known as:  PHENERGAN Your last dose was: Your next dose is:    
   
   
 Dose:  25 mg Take 25 mg by mouth every eight (8) hours as needed for Nausea. Refills:  0  
     
   
   
   
  
 RANEXA 500 mg SR tablet Generic drug:  ranolazine ER Your last dose was: Your next dose is:    
   
   
 Dose:  500 mg Take 500 mg by mouth two (2) times a day. Refills:  0  
     
   
   
   
  
 RENVELA 800 mg Tab tab Generic drug:  sevelamer carbonate Your last dose was: Your next dose is:    
   
   
 Dose:  800 mg Take 800 mg by mouth three (3) times daily (with meals). Refills:  0 SENSIPAR 90 mg Tab Generic drug:  cinacalcet Your last dose was: Your next dose is: Take  by mouth. Refills:  0  
     
   
   
   
  
 ticagrelor 90 mg tablet Commonly known as:  Pradeep-Kimberly [employer] Your last dose was: Your next dose is:    
   
   
 Dose:  90 mg Take 1 Tab by mouth two (2) times a day. Quantity:  60 Tab Refills:  11 STOP taking these medications   
 clopidogrel 75 mg Tab Commonly known as:  PLAVIX  
   
  
 famotidine 40 mg tablet Commonly known as:  PEPCID  
   
  
  
  
 Where to Get Your Medications Information on where to get these meds will be given to you by the nurse or doctor. ! Ask your nurse or doctor about these medications  
  amLODIPine 10 mg tablet  
 pantoprazole 40 mg tablet  
 sucralfate 100 mg/mL suspension Discharge Instructions DISCHARGE SUMMARY from Nurse The following personal items are in your possession at time of discharge: 
 
  
Visual Aid: None Other Valuables: At bedside PATIENT INSTRUCTIONS: 
 
 
F-face looks uneven A-arms unable to move or move unevenly S-speech slurred or non-existent T-time-call 911 as soon as signs and symptoms begin-DO NOT go Back to bed or wait to see if you get better-TIME IS BRAIN. Warning Signs of HEART ATTACK Call 911 if you have these symptoms: 
? Chest discomfort. Most heart attacks involve discomfort in the center of the chest that lasts more than a few minutes, or that goes away and comes back. It can feel like uncomfortable pressure, squeezing, fullness, or pain. ? Discomfort in other areas of the upper body. Symptoms can include pain or discomfort in one or both arms, the back, neck, jaw, or stomach. ? Shortness of breath with or without chest discomfort. ? Other signs may include breaking out in a cold sweat, nausea, or lightheadedness. Don't wait more than five minutes to call [address]! Fast action can save your life. Calling 911 is almost always the fastest way to get lifesaving treatment. Emergency Medical Services staff can begin treatment when they arrive  up to an hour sooner than if someone gets to the hospital by car. The discharge information has been reviewed with the . The patient verbalized understanding.  
 
Discharge medications reviewed with the patient and appropriate educational materials and side effects teaching were provided. Patient is to call nephrology to be seen in follow up for a week after discharge. Also, patient needs to call cardiology for a follow-up appt for 1-2 weeks after discharge. Discharge Orders None Similarity [employer] Announcement We are excited to announce that we are making your provider's discharge notes available to you in [employer]. You will see these notes when they are completed and signed by the physician that discharged you from your recent hospital stay. If you have any questions or concerns about any information you see in [employer], please call the Health Information Department where you were seen or reach out to your Primary Care Provider for more information about your plan of care. Introducing [facility]! Willadean Saint introduces [employer] patient portal. Now you can access parts of your medical record, email your doctor's office, and request medication refills online. 1. In your internet browser, go to [email or web address] 2. Click on the First Time User? Click Here link in the Sign In box. You will see the New Member Sign Up page. 3. Enter your [employer] Access Code exactly as it appears below. You will not need to use this code after youve completed the sign-up process. If you do not sign up before the expiration date, you must request a new code. · [employer] Access Code: [email or web address] Expires: 10/3/2017 10:29 AM 
 
4. Enter the last four digits of your Social Security Number (xxxx) and Date of Birth (mm/dd/yyyy) as indicated and click Submit. You will be taken to the next sign-up page. 5. Create a [employer] ID. This will be your [employer] login ID and cannot be changed, so think of one that is secure and easy to remember. 6. Create a [employer] password. You can change your password at any time. 7. Enter your Password Reset Question and Answer.  This can be used at a later time if you forget your password. 8. Enter your e-mail address. You will receive e-mail notification when new information is available in [address] 9. Click Sign Up. You can now view and download portions of your medical record. 10. Click the Download Summary menu link to download a portable copy of your medical information. If you have questions, please visit the Frequently Asked Questions section of the [employer] website. Remember, [employer] is NOT to be used for urgent needs. For medical emergencies, dial 911. Now available from your iPhone and Android! General Information Please provide this summary of care documentation to your next provider. Patient Signature:  ____________________________________________________________ Date:  ____________________________________________________________  
  
Larri Hazard Provider Signature:  ____________________________________________________________ Date:  ____________________________________________________________

## 2017-09-17 NOTE — ED TRIAGE NOTES
Pt c/o upper abd pain. States started today. States some nausea. Denies fevers, vomiting, diarrhea. States 6 bowel movements today but denies blood in stool. States receives peritoneal dialysis.

## 2017-09-18 PROBLEM — R10.9 ABDOMINAL PAIN: Status: ACTIVE | Noted: 2017-09-18

## 2017-09-18 LAB — TSH SERPL DL<=0.005 MIU/L-ACNC: 39.7 UIU/ML (ref 0.36–3.74)

## 2017-09-18 PROCEDURE — 74011250636 HC RX REV CODE- 250/636: Performed by: INTERNAL MEDICINE

## 2017-09-18 PROCEDURE — 74011000258 HC RX REV CODE- 258: Performed by: HOSPITALIST

## 2017-09-18 PROCEDURE — 74011250637 HC RX REV CODE- 250/637: Performed by: HOSPITALIST

## 2017-09-18 PROCEDURE — 36415 COLL VENOUS BLD VENIPUNCTURE: CPT | Performed by: NURSE PRACTITIONER

## 2017-09-18 PROCEDURE — 3E1M39Z IRRIGATION OF PERITONEAL CAVITY USING DIALYSATE, PERCUTANEOUS APPROACH: ICD-10-PCS | Performed by: INTERNAL MEDICINE

## 2017-09-18 PROCEDURE — 74011250636 HC RX REV CODE- 250/636: Performed by: HOSPITALIST

## 2017-09-18 PROCEDURE — 84443 ASSAY THYROID STIM HORMONE: CPT | Performed by: NURSE PRACTITIONER

## 2017-09-18 PROCEDURE — 74011000258 HC RX REV CODE- 258: Performed by: EMERGENCY MEDICINE

## 2017-09-18 PROCEDURE — 74011250637 HC RX REV CODE- 250/637: Performed by: INTERNAL MEDICINE

## 2017-09-18 PROCEDURE — 65270000029 HC RM PRIVATE

## 2017-09-18 PROCEDURE — 90945 DIALYSIS ONE EVALUATION: CPT

## 2017-09-18 PROCEDURE — 74011250636 HC RX REV CODE- 250/636: Performed by: EMERGENCY MEDICINE

## 2017-09-18 RX ORDER — HEPARIN SODIUM 5000 [USP'U]/ML
5000 INJECTION, SOLUTION INTRAVENOUS; SUBCUTANEOUS EVERY 8 HOURS
Status: DISCONTINUED | OUTPATIENT
Start: 2017-09-18 | End: 2017-09-18

## 2017-09-18 RX ORDER — ROSUVASTATIN CALCIUM 20 MG/1
20 TABLET, COATED ORAL
Status: DISCONTINUED | OUTPATIENT
Start: 2017-09-18 | End: 2017-09-23 | Stop reason: HOSPADM

## 2017-09-18 RX ORDER — HYDROCODONE BITARTRATE AND ACETAMINOPHEN 5; 325 MG/1; MG/1
1 TABLET ORAL
Status: DISCONTINUED | OUTPATIENT
Start: 2017-09-18 | End: 2017-09-23 | Stop reason: HOSPADM

## 2017-09-18 RX ORDER — CINACALCET 90 MG/1
90 TABLET, FILM COATED ORAL DAILY
Status: DISCONTINUED | OUTPATIENT
Start: 2017-09-18 | End: 2017-09-20

## 2017-09-18 RX ORDER — LEVOTHYROXINE SODIUM 150 UG/1
150 TABLET ORAL
Status: DISCONTINUED | OUTPATIENT
Start: 2017-09-18 | End: 2017-09-18

## 2017-09-18 RX ORDER — ACETAMINOPHEN 325 MG/1
650 TABLET ORAL
Status: DISCONTINUED | OUTPATIENT
Start: 2017-09-18 | End: 2017-09-23 | Stop reason: HOSPADM

## 2017-09-18 RX ORDER — CALCITRIOL 0.25 UG/1
0.25 CAPSULE ORAL DAILY
Status: DISCONTINUED | OUTPATIENT
Start: 2017-09-18 | End: 2017-09-23 | Stop reason: HOSPADM

## 2017-09-18 RX ORDER — SEVELAMER HYDROCHLORIDE 400 MG/1
800 TABLET, FILM COATED ORAL
Status: DISCONTINUED | OUTPATIENT
Start: 2017-09-18 | End: 2017-09-23 | Stop reason: HOSPADM

## 2017-09-18 RX ORDER — HEPARIN SODIUM 5000 [USP'U]/ML
5000 INJECTION, SOLUTION INTRAVENOUS; SUBCUTANEOUS EVERY 12 HOURS
Status: DISCONTINUED | OUTPATIENT
Start: 2017-09-18 | End: 2017-09-19

## 2017-09-18 RX ORDER — SODIUM CHLORIDE 0.9 % (FLUSH) 0.9 %
5-10 SYRINGE (ML) INJECTION EVERY 8 HOURS
Status: DISCONTINUED | OUTPATIENT
Start: 2017-09-18 | End: 2017-09-23 | Stop reason: HOSPADM

## 2017-09-18 RX ORDER — AMLODIPINE BESYLATE 5 MG/1
5 TABLET ORAL DAILY
Status: DISCONTINUED | OUTPATIENT
Start: 2017-09-18 | End: 2017-09-20

## 2017-09-18 RX ORDER — METOPROLOL TARTRATE 25 MG/1
12.5 TABLET, FILM COATED ORAL DAILY
Status: DISCONTINUED | OUTPATIENT
Start: 2017-09-18 | End: 2017-09-23 | Stop reason: HOSPADM

## 2017-09-18 RX ORDER — CLOPIDOGREL BISULFATE 75 MG/1
75 TABLET ORAL DAILY
Status: DISCONTINUED | OUTPATIENT
Start: 2017-09-18 | End: 2017-09-19

## 2017-09-18 RX ORDER — RANOLAZINE 500 MG/1
500 TABLET, EXTENDED RELEASE ORAL 2 TIMES DAILY
Status: DISCONTINUED | OUTPATIENT
Start: 2017-09-18 | End: 2017-09-23 | Stop reason: HOSPADM

## 2017-09-18 RX ORDER — MORPHINE SULFATE 2 MG/ML
2 INJECTION, SOLUTION INTRAMUSCULAR; INTRAVENOUS
Status: DISCONTINUED | OUTPATIENT
Start: 2017-09-18 | End: 2017-09-23 | Stop reason: HOSPADM

## 2017-09-18 RX ORDER — NITROGLYCERIN 400 UG/1
1 SPRAY ORAL
Status: DISCONTINUED | OUTPATIENT
Start: 2017-09-18 | End: 2017-09-18 | Stop reason: SDUPTHER

## 2017-09-18 RX ORDER — POTASSIUM CHLORIDE 750 MG/1
10 TABLET, EXTENDED RELEASE ORAL 2 TIMES DAILY
Status: DISCONTINUED | OUTPATIENT
Start: 2017-09-18 | End: 2017-09-18

## 2017-09-18 RX ORDER — NITROGLYCERIN 0.4 MG/1
0.4 TABLET SUBLINGUAL AS NEEDED
Status: DISCONTINUED | OUTPATIENT
Start: 2017-09-18 | End: 2017-09-23 | Stop reason: HOSPADM

## 2017-09-18 RX ORDER — SODIUM CHLORIDE 0.9 % (FLUSH) 0.9 %
5-10 SYRINGE (ML) INJECTION AS NEEDED
Status: DISCONTINUED | OUTPATIENT
Start: 2017-09-18 | End: 2017-09-23 | Stop reason: HOSPADM

## 2017-09-18 RX ORDER — GENTAMICIN SULFATE 1 MG/G
CREAM TOPICAL
Status: DISCONTINUED | OUTPATIENT
Start: 2017-09-18 | End: 2017-09-23 | Stop reason: HOSPADM

## 2017-09-18 RX ORDER — TORSEMIDE 100 MG/1
100 TABLET ORAL DAILY
Status: DISCONTINUED | OUTPATIENT
Start: 2017-09-18 | End: 2017-09-23 | Stop reason: HOSPADM

## 2017-09-18 RX ORDER — VANCOMYCIN HYDROCHLORIDE
1250 ONCE
Status: COMPLETED | OUTPATIENT
Start: 2017-09-18 | End: 2017-09-18

## 2017-09-18 RX ORDER — PROMETHAZINE HYDROCHLORIDE 25 MG/1
25 TABLET ORAL
Status: DISCONTINUED | OUTPATIENT
Start: 2017-09-18 | End: 2017-09-23 | Stop reason: HOSPADM

## 2017-09-18 RX ORDER — ONDANSETRON 4 MG/1
4 TABLET, ORALLY DISINTEGRATING ORAL
Status: DISCONTINUED | OUTPATIENT
Start: 2017-09-18 | End: 2017-09-23 | Stop reason: HOSPADM

## 2017-09-18 RX ORDER — DOCUSATE SODIUM 100 MG/1
100 CAPSULE, LIQUID FILLED ORAL DAILY
Status: DISCONTINUED | OUTPATIENT
Start: 2017-09-18 | End: 2017-09-23 | Stop reason: HOSPADM

## 2017-09-18 RX ORDER — METOCLOPRAMIDE 10 MG/1
5 TABLET ORAL
Status: DISCONTINUED | OUTPATIENT
Start: 2017-09-18 | End: 2017-09-23 | Stop reason: HOSPADM

## 2017-09-18 RX ORDER — POTASSIUM CHLORIDE 20 MEQ/1
20 TABLET, EXTENDED RELEASE ORAL 2 TIMES DAILY
Status: DISCONTINUED | OUTPATIENT
Start: 2017-09-18 | End: 2017-09-23 | Stop reason: HOSPADM

## 2017-09-18 RX ORDER — ASPIRIN 81 MG/1
81 TABLET ORAL DAILY
Status: DISCONTINUED | OUTPATIENT
Start: 2017-09-18 | End: 2017-09-19

## 2017-09-18 RX ORDER — LANOLIN ALCOHOL/MO/W.PET/CERES
400 CREAM (GRAM) TOPICAL DAILY
Status: DISCONTINUED | OUTPATIENT
Start: 2017-09-18 | End: 2017-09-23 | Stop reason: HOSPADM

## 2017-09-18 RX ORDER — VANCOMYCIN HYDROCHLORIDE
1250
Status: DISCONTINUED | OUTPATIENT
Start: 2017-09-18 | End: 2017-09-21

## 2017-09-18 RX ORDER — FAMOTIDINE 20 MG/1
20 TABLET, FILM COATED ORAL DAILY
Status: DISCONTINUED | OUTPATIENT
Start: 2017-09-18 | End: 2017-09-19

## 2017-09-18 RX ADMIN — FAMOTIDINE 20 MG: 20 TABLET ORAL at 09:12

## 2017-09-18 RX ADMIN — HEPARIN SODIUM 5000 UNITS: 5000 INJECTION, SOLUTION INTRAVENOUS; SUBCUTANEOUS at 05:06

## 2017-09-18 RX ADMIN — LEVOTHYROXINE SODIUM 150 MCG: 150 TABLET ORAL at 05:56

## 2017-09-18 RX ADMIN — Medication 400 MG: at 09:14

## 2017-09-18 RX ADMIN — Medication 10 ML: at 21:40

## 2017-09-18 RX ADMIN — ASPIRIN 81 MG: 81 TABLET, COATED ORAL at 09:12

## 2017-09-18 RX ADMIN — VANCOMYCIN HYDROCHLORIDE 1250 MG: 10 INJECTION, POWDER, LYOPHILIZED, FOR SOLUTION INTRAVENOUS at 05:06

## 2017-09-18 RX ADMIN — ERYTHROPOIETIN 10000 UNITS: 10000 INJECTION, SOLUTION INTRAVENOUS; SUBCUTANEOUS at 13:22

## 2017-09-18 RX ADMIN — ROSUVASTATIN CALCIUM 20 MG: 20 TABLET, FILM COATED ORAL at 21:11

## 2017-09-18 RX ADMIN — TORSEMIDE 100 MG: 100 TABLET ORAL at 09:13

## 2017-09-18 RX ADMIN — HEPARIN SODIUM 5000 UNITS: 5000 INJECTION, SOLUTION INTRAVENOUS; SUBCUTANEOUS at 17:37

## 2017-09-18 RX ADMIN — CLOPIDOGREL BISULFATE 75 MG: 75 TABLET ORAL at 09:15

## 2017-09-18 RX ADMIN — TICAGRELOR 90 MG: 90 TABLET ORAL at 09:15

## 2017-09-18 RX ADMIN — CALCITRIOL 0.25 MCG: 0.25 CAPSULE, LIQUID FILLED ORAL at 09:12

## 2017-09-18 RX ADMIN — METOCLOPRAMIDE HYDROCHLORIDE 5 MG: 10 TABLET ORAL at 05:56

## 2017-09-18 RX ADMIN — POTASSIUM CHLORIDE 20 MEQ: 20 TABLET, EXTENDED RELEASE ORAL at 17:37

## 2017-09-18 RX ADMIN — METOCLOPRAMIDE HYDROCHLORIDE 5 MG: 10 TABLET ORAL at 21:12

## 2017-09-18 RX ADMIN — RANOLAZINE 500 MG: 500 TABLET, FILM COATED, EXTENDED RELEASE ORAL at 17:37

## 2017-09-18 RX ADMIN — Medication 10 ML: at 05:14

## 2017-09-18 RX ADMIN — CEFTRIAXONE SODIUM 1 G: 1 INJECTION, POWDER, FOR SOLUTION INTRAMUSCULAR; INTRAVENOUS at 22:50

## 2017-09-18 RX ADMIN — TICAGRELOR 90 MG: 90 TABLET ORAL at 21:13

## 2017-09-18 RX ADMIN — POTASSIUM CHLORIDE 10 MEQ: 750 TABLET, EXTENDED RELEASE ORAL at 09:14

## 2017-09-18 RX ADMIN — ROSUVASTATIN CALCIUM 20 MG: 20 TABLET, FILM COATED ORAL at 05:06

## 2017-09-18 RX ADMIN — CEFTRIAXONE SODIUM 2 G: 2 INJECTION, POWDER, FOR SOLUTION INTRAMUSCULAR; INTRAVENOUS at 01:52

## 2017-09-18 RX ADMIN — RANOLAZINE 500 MG: 500 TABLET, FILM COATED, EXTENDED RELEASE ORAL at 09:12

## 2017-09-18 NOTE — DIALYSIS
Sample drawn from PD catheter using aseptic technique as well as masks for all persons in room. New betadine cap intact. Sample brought to lab with labels and requisition forms. Primary RN notified.

## 2017-09-18 NOTE — PROGRESS NOTES
TRANSFER - IN REPORT:    Verbal report received from ED on [facility]  being received from ED for routine progression of care      Report consisted of patients Situation, Background, Assessment and   Recommendations(SBAR). Information from the following report(s) SBAR, Kardex and ED Summary was reviewed with the receiving nurse. Opportunity for questions and clarification was provided. Assessment completed upon patients arrival to unit and care assumed.

## 2017-09-18 NOTE — PHYSICIAN ADVISORY
Letter of Determination: Inpatient Status Appropriate    This patient was originally hospitalized as Inpatient on 9/18/2017 for peritonitis. This patient is appropriate for Inpatient Admission in accordance with CMS regulation Section 43 .3. Specifically, patient's stay is expected to be more than Two Midnights and was medically necessary. The patient's stay was medically necessary based on medical history of ESRD with a creatinine of 9.68 mg/dl on peritoneal dialysis. The patient had labs significant for a peritoneal fluid cell count with 2647 white blood cells, and < 10,000 red blood cells, and a peritoneal fluid differential with 92% neutrophils. Complete Blood Count was significant for a white blood cell count of 10,600 with a left shift of 85% neutrophils, and an absolute neutrophil count of 9,000 (normal 1,700 to 8,200). The concern of the physician was for acute peritonitis. Consistent with CMS guidelines, patient meets for inpatient status. It is our recommendation that this patient's hospitalization status should be INPATIENT status.      The final decision regarding the patient's hospitalization status depends on the attending physician's judgement.     Milka Moore MD, NAREN,   Physician Brandon Wilder.

## 2017-09-18 NOTE — DIALYSIS
Peritoneal dialysis begun as ordered. PD catheter dressing changed per protocol. The site is clean and dry, without s/s of infection. The patient is alert and denies complaints. Dialysis nursed available as needed.

## 2017-09-18 NOTE — PROGRESS NOTES
Pharmacokinetic Consult to Pharmacist    Obie Rae is a 80 y.o. female being treated for possible peritonitis with vancomycin and rocephin. Height: 5' 10\" (177.8 cm)  Weight: 86.2 kg (190 lb)  Lab Results   Component Value Date/Time    BUN 34 09/17/2017 07:50 PM    Creatinine 9.68 09/17/2017 07:50 PM    WBC 10.6 09/17/2017 07:50 PM      Estimated Creatinine Clearance: 5.2 mL/min (based on Cr of 9.68). CULTURES:  9/17 Peritoneal fluid:  pending    Day 1 of vancomycin. Goal trough is 15-20. Vancomycin dose initiated at 1250mg x1. Will plan to redose vanco around dialysis days and/or random levels when less than 20. Will continue to follow patient.       Thank you,  Claudell Peon, PharmD  Clinical Pharmacist  [phone]

## 2017-09-18 NOTE — CONSULTS
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 She states the pain has since resolved. No fevers or chills. No nausea, vomiting or diarrhea. She states that she is essentially anuric and occasionally makes minimal urine. She denies any other acute complaints She has a h/o CVA and TIA. No fever or chills, no problems with PD drainage or discoloration of PD fluid. No increased thirst. She wishes regular diet and supplement. Past Medical History:   Diagnosis Date    Anemia of chronic renal failure 4/28/2015    Anterior myocardial infarction [facility]) 5/21/2015    CAD (coronary artery disease)     Chest pain     Chronic kidney disease, stage III (moderate) 8/15/2014    on dialysis    CKD (chronic kidney disease) stage 4, GFR 15-29 ml/min ([facility]) 4/28/2015    Debility 5/5/2015    Depression 12/29/2015    Edema 12/29/2015    Endocrine disease     Hypothyroidism    GERD (gastroesophageal reflux disease)     Heart murmur 12/29/2015    HLD (hyperlipidemia) 12/29/2015    Hypertension     Hypothyroidism 4/28/2015    Ischemic cardiomyopathy 12/29/2015    Nausea     S/P PTCA (percutaneous transluminal coronary angioplasty); LAD PTCA of  ISR 11/27/15 5/24/2016    STEMI (ST elevation myocardial infarction) ([facility] [address] 4/28/2015    Unspecified sleep apnea     uses cpap machine    Unstable angina ([facility] [address]       Past Surgical History:   Procedure Laterality Date    HX BACK SURGERY  1990    neck surgery cervical disc    HX BACK SURGERY      lower back    HX CATARACT REMOVAL Bilateral     HX CHOLECYSTECTOMY  19702    gall bladder     HX KNEE REPLACEMENT Right 2006    HX PTCA  4/28/2015    2.25 Xience stent to mid LAD for occluded artery, anterior MI, EF 25%.  Moderate disease distal LAD and distal OM PCI CX and RCA 2004, then PCI RCA and LAD in 2009. Prior to Admission medications    Medication Sig Start Date End Date Taking? Authorizing Provider   calcitRIOL (ROCALTROL) 0.25 mcg capsule Take 0.25 mcg by mouth daily. Yes Historical Provider   clopidogrel (PLAVIX) 75 mg tab Take 1 Tab by mouth daily. 4/13/17  Yes Leny Betancourt MD   nitroglycerin (NITROLINGUAL) 400 mcg/spray spray 1 Spray by SubLINGual route every five (5) minutes as needed for Chest Pain. Yes Historical Provider   linaclotide Zula Jasmina) 145 mcg cap capsule Take  by mouth Daily (before breakfast). Yes Historical Provider   folic acid 670 mcg tablet Take 800 mcg by mouth daily. Yes Historical Provider   magnesium oxide (MAG-OX) 400 mg tablet Take 400 mg by mouth daily. Yes Historical Provider   amLODIPine (NORVASC) 5 mg tablet Take 5 mg by mouth daily. Yes Historical Provider   potassium chloride (KLOR-CON M10) 10 mEq tablet Take 10 mEq by mouth two (2) times a day. Yes Historical Provider   PNV NO.122/IRON/FOLIC ACID (PRENATAL MULTI PO) Take  by mouth. Yes Historical Provider   metoprolol tartrate (LOPRESSOR) 25 mg tablet Take 12.5 mg by mouth daily. Take PRN for BP <120. 6/23/16  Yes Lisette Villanueva III, MD   ondansetron (ZOFRAN ODT) 4 mg disintegrating tablet Take 4 mg by mouth three (3) times daily as needed. Yes Historical Provider   metoclopramide HCl (REGLAN) 5 mg tablet Take 5 mg by mouth two (2) times a day. Yes Historical Provider   famotidine (PEPCID) 40 mg tablet Take 20 mg by mouth daily. Yes Historical Provider   ticagrelor (BRILINTA) 90 mg tablet Take 1 Tab by mouth two (2) times a day. 2/4/16  Yes MINDY Chatman   promethazine (PHENERGAN) 25 mg tablet Take 25 mg by mouth every eight (8) hours as needed for Nausea.    Yes Historical Provider   gentamicin (GARAMYCIN) 0.1 % topical cream APPLY TO PD catheter exit site at daily dressing change 5/12/15  Yes Reginold Hashimoto, MD   aspirin delayed-release 81 mg tablet Take 1 Tab by mouth daily. 5/5/15  Yes Gisela Hollingsworth PA-C   darbepoetin toya in polysorbat (ARANESP, POLYSORBATE,) 40 mcg/mL injection 40 mcg by SubCUTAneous route every fourteen (14) days. Indications: ANEMIA IN CHRONIC KIDNEY DISEASE   Yes Historical Provider   rosuvastatin (CRESTOR) 20 mg tablet Take 20 mg by mouth nightly. Yes Historical Provider   ranolazine ER (RANEXA) 500 mg SR tablet Take 500 mg by mouth two (2) times a day. Yes Historical Provider   levothyroxine (SYNTHROID) 150 mcg tablet Take 150 mcg by mouth Daily (before breakfast). Yes Historical Provider   torsemide (DEMADEX) 100 mg tablet Take  by mouth daily. Historical Provider   cinacalcet (SENSIPAR) 90 mg tab Take  by mouth. Historical Provider   sevelamer carbonate (RENVELA) 800 mg tab tab Take 800 mg by mouth three (3) times daily (with meals).     Historical Provider     Allergies   Allergen Reactions    Codeine Nausea and Vomiting      Social History   Substance Use Topics    Smoking status: Never Smoker    Smokeless tobacco: Never Used    Alcohol use No      Family History   Problem Relation Age of Onset    Heart Disease Mother     Hypertension Mother     Cancer Mother      Lung    Stroke Father     Hypertension Father     Breast Cancer Neg Hx           Review of Systems    Constitutional: no fever,   Eyes: fair vision,   Ears, nose, mouth, throat, and face:fair hearing,    Respiratory: no asthma,    Cardiovascular:no chest pain,   Gastrointestinal:no diarrhea,   Genitourinary: no dysuria    Hematologic/lymphatic: no bleeding tendency,    Neurological: no seizures,    Behvioral/Psych: no psych hospitalization    Endocrine: no goiter,       Objective:       Visit Vitals    /69    Pulse 74    Temp 98.2 °F (36.8 °C)    Resp 19    Ht 5' 10\" (1.778 m)    Wt 86.2 kg (190 lb)    SpO2 98%    BMI 27.26 kg/m2       09/18 0701 - 09/18 1900  In: 120 [P.O.:120]  Out: -          General:  Alert, cooperative, no distress, appears stated age. Head:  Normocephalic, without obvious abnormality, atraumatic. Eyes:  Conjunctivae/corneas clear. EOMs intact. Ears:  Normal external ear canals both ears. Nose: Nares normal. Septum midline. Mucosa normal. No drainage or sinus tenderness. Throat: Lips, mucosa, and tongue normal. Teeth and gums normal.   Neck: Supple, symmetrical, trachea midline, no adenopathy, thyroid: no enlargement/tenderness/nodules, no JVD. Back:   Symmetric, no curvature. ROM normal. No CVA tenderness. Lungs:   Clear to auscultation bilaterally. Chest wall:  No tenderness or deformity. Heart:  Regular rate and rhythm, S1, S2 normal, no murmur,  rub or gallop. Abdomen:   Soft, mild tenderness in epigastrium, no rebound. Bowel sounds normal. No masses,  No organomegaly. No renal bruit. PD catheter exit site is perfect. Extremities: Extremities normal, atraumatic, no cyanosis or edema. Skin: Skin color, texture, turgor normal. No rashes or lesions. Lymph nodes: Cervical and supraclavicular nodes normal.   Neurologic: Grossly intact. Moves all extremities. No asterixis. Data Review:     Recent Results (from the past 24 hour(s))   METABOLIC PANEL, COMPREHENSIVE    Collection Time: 09/17/17  7:50 PM   Result Value Ref Range    Sodium 138 136 - 145 mmol/L    Potassium 3.6 3.5 - 5.1 mmol/L    Chloride 98 98 - 107 mmol/L    CO2 25 21 - 32 mmol/L    Anion gap 15 7 - 16 mmol/L    Glucose 88 65 - 100 mg/dL    BUN 34 (H) 8 - 23 MG/DL    Creatinine 9.68 (H) 0.6 - 1.0 MG/DL    GFR est AA 5 (L) >60 ml/min/1.73m2    GFR est non-AA 4 (L) >60 ml/min/1.73m2    Calcium 9.1 8.3 - 10.4 MG/DL    Bilirubin, total 0.3 0.2 - 1.1 MG/DL    ALT (SGPT) 18 12 - 65 U/L    AST (SGOT) 43 (H) 15 - 37 U/L    Alk.  phosphatase 72 50 - 136 U/L    Protein, total 6.3 6.3 - 8.2 g/dL    Albumin 1.6 (L) 3.2 - 4.6 g/dL    Globulin 4.7 (H) 2.3 - 3.5 g/dL    A-G Ratio 0.3 (L) 1.2 - 3.5     CBC WITH AUTOMATED DIFF Collection Time: 09/17/17  7:50 PM   Result Value Ref Range    WBC 10.6 4.3 - 11.1 K/uL    RBC 3.40 (L) 4.05 - 5.25 M/uL    HGB 10.5 (L) 11.7 - 15.4 g/dL    HCT 30.9 (L) 35.8 - 46.3 %    MCV 90.9 79.6 - 97.8 FL    MCH 30.9 26.1 - 32.9 PG    MCHC 34.0 31.4 - 35.0 g/dL    RDW 16.3 (H) 11.9 - 14.6 %    PLATELET 722 984 - 874 K/uL    MPV 10.4 (L) 10.8 - 14.1 FL    DF AUTOMATED      NEUTROPHILS 85 (H) 43 - 78 %    LYMPHOCYTES 10 (L) 13 - 44 %    MONOCYTES 4 4.0 - 12.0 %    EOSINOPHILS 1 0.5 - 7.8 %    BASOPHILS 0 0.0 - 2.0 %    IMMATURE GRANULOCYTES 0.3 0.0 - 5.0 %    ABS. NEUTROPHILS 9.0 (H) 1.7 - 8.2 K/UL    ABS. LYMPHOCYTES 1.0 0.5 - 4.6 K/UL    ABS. MONOCYTES 0.5 0.1 - 1.3 K/UL    ABS. EOSINOPHILS 0.1 0.0 - 0.8 K/UL    ABS. BASOPHILS 0.0 0.0 - 0.2 K/UL    ABS. IMM. GRANS. 0.0 0.0 - 0.5 K/UL   LIPASE    Collection Time: 09/17/17  7:50 PM   Result Value Ref Range    Lipase 139 73 - 393 U/L   CELL COUNT, BODY FLUID    Collection Time: 09/17/17  9:59 PM   Result Value Ref Range    BODY FLUID TYPE PERITONEAL FLUID      FLUID COLOR YELLOW      FLUID APPEARANCE HAZY      FLUID RBC COUNT <92592 /cu mm    FLUID WBC COUNT 2647 /cu mm    FLD NEUTROPHILS 92 %    FLD LYMPHS 2 %    FLD MONO/MACROPHAGE 6 %   CULTURE, BODY FLUID W GRAM STAIN    Collection Time: 09/17/17  9:59 PM   Result Value Ref Range    Special Requests: NO SPECIAL REQUESTS      GRAM STAIN PENDING     Culture result:        NO GROWTH AFTER SHORT PERIOD OF INCUBATION. FURTHER RESULTS TO FOLLOW AFTER OVERNIGHT INCUBATION. Principal Problem:    Abdominal pain (9/18/2017)    Active Problems:    Hypertension (4/28/2015)      Hypothyroidism (4/28/2015)      HLD (hyperlipidemia) (12/29/2015)      ESRD on peritoneal dialysis ([facility] [address] (2/1/2016)        Assessment:     1.  Abdominal pain  -  - clinically not peritonitis, not clear why she has markedly increased RBC in PD fluid, the WBC count of greater than 2000  WBC may be due to blood in peritoneum, but need to be cautious and treat as if she had peritonitios  - concur with current management     2. Hypokalemia -  - replace KCl     3. Anemia -  - treat with WAYNE aggressively  - add D83 and Folic acid     4. HTN -  - continue current meds     5. ESRD -  - PD ordfered per chronic orders     6. Volume status -  - clinically near euvolemic     7.  Hypothyroidism -  - replace thyroid hormone per chronic home meds      Plan:       Kathrine Kumar M.D.

## 2017-09-18 NOTE — PROGRESS NOTES
Peritoneal Dialysis Rounding Note    Subjective:     Pierre Berman is a 80 y.o.  who presents with Abdominal pain. The patient is dialyzing utilizing the following method:cycler PD  Artificial Kidney     hours     blood flow rate     dialysate rate     ultrafiltration     Heparin is not used during the dialysis treatment. Complaints Does not like the food.      Current Facility-Administered Medications   Medication Dose Route Frequency    amLODIPine (NORVASC) tablet 5 mg  5 mg Oral DAILY    aspirin delayed-release tablet 81 mg  81 mg Oral DAILY    calcitRIOL (ROCALTROL) capsule 0.25 mcg  0.25 mcg Oral DAILY    cinacalcet tab 90 mg  90 mg Oral DAILY    clopidogrel (PLAVIX) tablet 75 mg  75 mg Oral DAILY    docusate sodium (COLACE) capsule 100 mg  100 mg Oral DAILY    famotidine (PEPCID) tablet 20 mg  20 mg Oral DAILY    gentamicin (GARAMYCIN) 0.1 % cream   Topical DIALYSIS PRN    levothyroxine (SYNTHROID) tablet 150 mcg  150 mcg Oral ACB    magnesium oxide (MAG-OX) tablet 400 mg  400 mg Oral DAILY    metoclopramide HCl (REGLAN) tablet 5 mg  5 mg Oral ACB/HS    metoprolol tartrate (LOPRESSOR) tablet 12.5 mg  12.5 mg Oral DAILY    ondansetron (ZOFRAN ODT) tablet 4 mg  4 mg Oral Q6H PRN    promethazine (PHENERGAN) tablet 25 mg  25 mg Oral Q6H PRN    ranolazine ER (RANEXA) tablet 500 mg  500 mg Oral BID    rosuvastatin (CRESTOR) tablet 20 mg  20 mg Oral QHS    sevelamer (RENAGEL) tablet 800 mg  800 mg Oral TID WITH MEALS    torsemide (DEMADEX) tablet 100 mg  100 mg Oral DAILY    ticagrelor (BRILINTA) tablet 90 mg  90 mg Oral Q12H    sodium chloride (NS) flush 5-10 mL  5-10 mL IntraVENous Q8H    sodium chloride (NS) flush 5-10 mL  5-10 mL IntraVENous PRN    acetaminophen (TYLENOL) tablet 650 mg  650 mg Oral Q4H PRN    HYDROcodone-acetaminophen (NORCO) 5-325 mg per tablet 1 Tab  1 Tab Oral Q4H PRN    morphine injection 2 mg  2 mg IntraVENous Q3H PRN    vancomycin (VANCOCIN) 1250 mg in  ml infusion  1,250 mg IntraVENous Rx Dosing/Monitoring    cefTRIAXone (ROCEPHIN) 1 g in 0.9% sodium chloride (MBP/ADV) 50 mL  1 g IntraVENous Q24H    nitroglycerin (NITROSTAT) tablet 0.4 mg  0.4 mg SubLINGual PRN    epoetin toya (EPOGEN;PROCRIT) injection 10,000 Units  10,000 Units SubCUTAneous Q7D    heparin (porcine) injection 5,000 Units  5,000 Units SubCUTAneous Q12H    potassium chloride (K-DUR, KLOR-CON) SR tablet 20 mEq  20 mEq Oral BID    [START ON 9/19/2017] multivitamin w ZN (STRESSTABS W ZINC) tablet  1 Tab Oral DAILY    [START ON 9/19/2017] Vancomycin Random Level Reminder   Other ONCE       09/18 0701 - 09/18 1900  In: 360 [P.O.:360]  Out: -        Recent Results (from the past 24 hour(s))   METABOLIC PANEL, COMPREHENSIVE    Collection Time: 09/17/17  7:50 PM   Result Value Ref Range    Sodium 138 136 - 145 mmol/L    Potassium 3.6 3.5 - 5.1 mmol/L    Chloride 98 98 - 107 mmol/L    CO2 25 21 - 32 mmol/L    Anion gap 15 7 - 16 mmol/L    Glucose 88 65 - 100 mg/dL    BUN 34 (H) 8 - 23 MG/DL    Creatinine 9.68 (H) 0.6 - 1.0 MG/DL    GFR est AA 5 (L) >60 ml/min/1.73m2    GFR est non-AA 4 (L) >60 ml/min/1.73m2    Calcium 9.1 8.3 - 10.4 MG/DL    Bilirubin, total 0.3 0.2 - 1.1 MG/DL    ALT (SGPT) 18 12 - 65 U/L    AST (SGOT) 43 (H) 15 - 37 U/L    Alk.  phosphatase 72 50 - 136 U/L    Protein, total 6.3 6.3 - 8.2 g/dL    Albumin 1.6 (L) 3.2 - 4.6 g/dL    Globulin 4.7 (H) 2.3 - 3.5 g/dL    A-G Ratio 0.3 (L) 1.2 - 3.5     CBC WITH AUTOMATED DIFF    Collection Time: 09/17/17  7:50 PM   Result Value Ref Range    WBC 10.6 4.3 - 11.1 K/uL    RBC 3.40 (L) 4.05 - 5.25 M/uL    HGB 10.5 (L) 11.7 - 15.4 g/dL    HCT 30.9 (L) 35.8 - 46.3 %    MCV 90.9 79.6 - 97.8 FL    MCH 30.9 26.1 - 32.9 PG    MCHC 34.0 31.4 - 35.0 g/dL    RDW 16.3 (H) 11.9 - 14.6 %    PLATELET 857 685 - 385 K/uL    MPV 10.4 (L) 10.8 - 14.1 FL    DF AUTOMATED      NEUTROPHILS 85 (H) 43 - 78 %    LYMPHOCYTES 10 (L) 13 - 44 % MONOCYTES 4 4.0 - 12.0 %    EOSINOPHILS 1 0.5 - 7.8 %    BASOPHILS 0 0.0 - 2.0 %    IMMATURE GRANULOCYTES 0.3 0.0 - 5.0 %    ABS. NEUTROPHILS 9.0 (H) 1.7 - 8.2 K/UL    ABS. LYMPHOCYTES 1.0 0.5 - 4.6 K/UL    ABS. MONOCYTES 0.5 0.1 - 1.3 K/UL    ABS. EOSINOPHILS 0.1 0.0 - 0.8 K/UL    ABS. BASOPHILS 0.0 0.0 - 0.2 K/UL    ABS. IMM. GRANS. 0.0 0.0 - 0.5 K/UL   LIPASE    Collection Time: 17  7:50 PM   Result Value Ref Range    Lipase 139 73 - 393 U/L   CELL COUNT, BODY FLUID    Collection Time: 17  9:59 PM   Result Value Ref Range    BODY FLUID TYPE PERITONEAL FLUID      FLUID COLOR YELLOW      FLUID APPEARANCE HAZY      FLUID RBC COUNT <58505 /cu mm    FLUID WBC COUNT 2647 /cu mm    FLD NEUTROPHILS 92 %    FLD LYMPHS 2 %    FLD MONO/MACROPHAGE 6 %   CULTURE, BODY FLUID W GRAM STAIN    Collection Time: 17  9:59 PM   Result Value Ref Range    Special Requests: NO SPECIAL REQUESTS      GRAM STAIN 5 TO 10 WBCS/OIF     GRAM STAIN NO DEFINITE ORGANISM SEEN      Culture result:        NO GROWTH AFTER SHORT PERIOD OF INCUBATION. FURTHER RESULTS TO FOLLOW AFTER OVERNIGHT INCUBATION. TSH 3RD GENERATION    Collection Time: 17  3:38 PM   Result Value Ref Range    TSH 39.700 (H) 0.358 - 3.740 uIU/mL         Review of Systems  Pertinent items are noted in HPI. .    Objective:     Blood pressure 138/79, pulse 68, temperature 98.2 °F (36.8 °C), resp. rate 19, height 5' 10\" (1.778 m), weight 86.2 kg (190 lb), SpO2 98 %. Temp (24hrs), Av.3 °F (36.8 °C), Min:98 °F (36.7 °C), Max:99.1 °F (37.3 °C)      Physical Exam:   see HPI      Assessment:     End Stage Renal Disease:  Patient is tolerating dialysis treatment well. .  Additionally the patient has experienced normal dialysis treatment during dialysis. Dry weight   same. PD fluid is clear.   Anemia:    Recent Labs      17   1950   HCT  30.9*   HGB  10.5*       Renal Metabolic Bone Disease:    Recent Labs      17   CA  9.1   ALB  1.6* Treatment:  PO4 binders, Cinacaleat, Vitamin D  Hypertension: controlled    Access: adequate PD catheter - exit site is perfect    Principal Problem:    Abdominal pain (9/18/2017)    Active Problems:    Hypertension (4/28/2015)      Hypothyroidism (4/28/2015)      HLD (hyperlipidemia) (12/29/2015)      [facility] (2/1/2016)           Plan:     Continue scheduled dialysis

## 2017-09-18 NOTE — PROGRESS NOTES
Progress Note    2017  Admit Date: 2017  7:52 PM   NAME: Sage Flores   :  10/26/1932   MRN:  [id number]   Attending: Marcelina Jones MD  PCP:  Amina Lantigua MD  Treatment Team: Attending Provider: Marcelina Jones MD; Consulting Provider: Bonnee Romberg, MD; Utilization Review: Evelyn Robin RN    Full Code   SUBJECTIVE:   Pt admitted after MN. Ms. Henry Becerra is a 81 yo female who presented with c/o acute upper abd pain more specifically right upper quadrant, describes pain as \"sore\". Denies n/v/d, constipation, fever, chills. She is on PD at home, last dialysis 17. No leukocytosis. Creatinine 9.68 on admission. Peritoneal fluid hazy, yellow with <708647 RBC, WBC 2647, 92 neutrophils. Peritoneal fluid sent for culture. Nephrology consulted. Pt tolerated clear liquid diet. Started on Vanco and Rocephin on admission. Pt currently denies abd pain, n/v/d. Reports makes minimal urine.       Past Medical History:   Diagnosis Date    Anemia of chronic renal failure 2015    Anterior myocardial infarction [facility]) 2015    CAD (coronary artery disease)     Chest pain     Chronic kidney disease, stage III (moderate) 8/15/2014    on dialysis    CKD (chronic kidney disease) stage 4, GFR 15-29 ml/min ([facility]) 2015    Debility 2015    Depression 2015    Edema 2015    Endocrine disease     Hypothyroidism    GERD (gastroesophageal reflux disease)     Heart murmur 2015    HLD (hyperlipidemia) 2015    Hypertension     Hypothyroidism 2015    Ischemic cardiomyopathy 2015    Nausea     S/P PTCA (percutaneous transluminal coronary angioplasty); LAD PTCA of  ISR 11/27/15 2016    STEMI (ST elevation myocardial infarction) ([facility] [address] 2015    Unspecified sleep apnea     uses cpap machine    Unstable angina ([facility] [address]      Recent Results (from the past 24 hour(s))   METABOLIC PANEL, COMPREHENSIVE    Collection Time: 17  7:50 PM   Result Value Ref Range    Sodium 138 136 - 145 mmol/L    Potassium 3.6 3.5 - 5.1 mmol/L    Chloride 98 98 - 107 mmol/L    CO2 25 21 - 32 mmol/L    Anion gap 15 7 - 16 mmol/L    Glucose 88 65 - 100 mg/dL    BUN 34 (H) 8 - 23 MG/DL    Creatinine 9.68 (H) 0.6 - 1.0 MG/DL    GFR est AA 5 (L) >60 ml/min/1.73m2    GFR est non-AA 4 (L) >60 ml/min/1.73m2    Calcium 9.1 8.3 - 10.4 MG/DL    Bilirubin, total 0.3 0.2 - 1.1 MG/DL    ALT (SGPT) 18 12 - 65 U/L    AST (SGOT) 43 (H) 15 - 37 U/L    Alk. phosphatase 72 50 - 136 U/L    Protein, total 6.3 6.3 - 8.2 g/dL    Albumin 1.6 (L) 3.2 - 4.6 g/dL    Globulin 4.7 (H) 2.3 - 3.5 g/dL    A-G Ratio 0.3 (L) 1.2 - 3.5     CBC WITH AUTOMATED DIFF    Collection Time: 09/17/17  7:50 PM   Result Value Ref Range    WBC 10.6 4.3 - 11.1 K/uL    RBC 3.40 (L) 4.05 - 5.25 M/uL    HGB 10.5 (L) 11.7 - 15.4 g/dL    HCT 30.9 (L) 35.8 - 46.3 %    MCV 90.9 79.6 - 97.8 FL    MCH 30.9 26.1 - 32.9 PG    MCHC 34.0 31.4 - 35.0 g/dL    RDW 16.3 (H) 11.9 - 14.6 %    PLATELET 104 553 - 556 K/uL    MPV 10.4 (L) 10.8 - 14.1 FL    DF AUTOMATED      NEUTROPHILS 85 (H) 43 - 78 %    LYMPHOCYTES 10 (L) 13 - 44 %    MONOCYTES 4 4.0 - 12.0 %    EOSINOPHILS 1 0.5 - 7.8 %    BASOPHILS 0 0.0 - 2.0 %    IMMATURE GRANULOCYTES 0.3 0.0 - 5.0 %    ABS. NEUTROPHILS 9.0 (H) 1.7 - 8.2 K/UL    ABS. LYMPHOCYTES 1.0 0.5 - 4.6 K/UL    ABS. MONOCYTES 0.5 0.1 - 1.3 K/UL    ABS. EOSINOPHILS 0.1 0.0 - 0.8 K/UL    ABS. BASOPHILS 0.0 0.0 - 0.2 K/UL    ABS. IMM.  GRANS. 0.0 0.0 - 0.5 K/UL   LIPASE    Collection Time: 09/17/17  7:50 PM   Result Value Ref Range    Lipase 139 73 - 393 U/L   CELL COUNT, BODY FLUID    Collection Time: 09/17/17  9:59 PM   Result Value Ref Range    BODY FLUID TYPE PERITONEAL FLUID      FLUID COLOR YELLOW      FLUID APPEARANCE HAZY      FLUID RBC COUNT <84377 /cu mm    FLUID WBC COUNT 2647 /cu mm    FLD NEUTROPHILS 92 %    FLD LYMPHS 2 %    FLD MONO/MACROPHAGE 6 %   CULTURE, BODY FLUID W GRAM STAIN Collection Time: 09/17/17  9:59 PM   Result Value Ref Range    Special Requests: NO SPECIAL REQUESTS      GRAM STAIN PENDING     Culture result:        NO GROWTH AFTER SHORT PERIOD OF INCUBATION. FURTHER RESULTS TO FOLLOW AFTER OVERNIGHT INCUBATION.      Allergies   Allergen Reactions    Codeine Nausea and Vomiting     Current Facility-Administered Medications   Medication Dose Route Frequency Provider Last Rate Last Dose    amLODIPine (NORVASC) tablet 5 mg  5 mg Oral DAILY Elgin Klinefelter, MD        aspirin delayed-release tablet 81 mg  81 mg Oral DAILY Elgin Klinefelter, MD   81 mg at 09/18/17 0912    calcitRIOL (ROCALTROL) capsule 0.25 mcg  0.25 mcg Oral DAILY Elgin Klinefelter, MD   0.25 mcg at 09/18/17 0912    cinacalcet tab 90 mg  90 mg Oral DAILY Elgin Klinefelter, MD        clopidogrel (PLAVIX) tablet 75 mg  75 mg Oral DAILY Elgin Klinefelter, MD   75 mg at 09/18/17 0915    docusate sodium (COLACE) capsule 100 mg  100 mg Oral DAILY Elgin Klinefelter, MD        famotidine (PEPCID) tablet 20 mg  20 mg Oral DAILY Elgin Klinefelter, MD   20 mg at 09/18/17 0912    gentamicin (GARAMYCIN) 0.1 % cream   Topical DIALYSIS PRN Elgin Klinefelter, MD        levothyroxine (SYNTHROID) tablet 150 mcg  150 mcg Oral ACB Elgin Klinefelter, MD   150 mcg at 09/18/17 0556    magnesium oxide (MAG-OX) tablet 400 mg  400 mg Oral DAILY Elgin Klinefelter, MD   400 mg at 09/18/17 0914    metoclopramide HCl (REGLAN) tablet 5 mg  5 mg Oral ACB/HS Elgin Klinefelter, MD   5 mg at 09/18/17 0556    metoprolol tartrate (LOPRESSOR) tablet 12.5 mg  12.5 mg Oral DAILY Elgin Klinefelter, MD        ondansetron (ZOFRAN ODT) tablet 4 mg  4 mg Oral Q6H PRN Elgin Klinefelter, MD        potassium chloride (KLOR-CON) tablet 10 mEq  10 mEq Oral BID Elgin Klinefelter, MD   10 mEq at 09/18/17 0914    promethazine (PHENERGAN) tablet 25 mg  25 mg Oral Q6H PRN Elgin Klinefelter, MD        ranolazine ER (RANEXA) tablet 500 mg  500 mg Oral BID Jt MD Sergio   500 mg at 17 0912    rosuvastatin (CRESTOR) tablet 20 mg  20 mg Oral QHS Rosemarie Hewitt MD   20 mg at 17 0506    sevelamer (RENAGEL) tablet 800 mg  800 mg Oral TID WITH MEALS Rosemarie Hewitt MD        torsemide (DEMADEX) tablet 100 mg  100 mg Oral DAILY Rosemarie Hewitt MD   100 mg at 17 0913    ticagrelor (BRILINTA) tablet 90 mg  90 mg Oral Q12H Rosemarie Hewitt MD   90 mg at 17 0915    sodium chloride (NS) flush 5-10 mL  5-10 mL IntraVENous Q8H Rosemarie Hewitt MD   10 mL at 17 0514    sodium chloride (NS) flush 5-10 mL  5-10 mL IntraVENous PRN Rosemarie Hewitt MD        acetaminophen (TYLENOL) tablet 650 mg  650 mg Oral Q4H PRN Rosemarie Hewitt MD        HYDROcodone-acetaminophen (NORCO) 5-325 mg per tablet 1 Tab  1 Tab Oral Q4H PRN Rosemarie Hewitt MD        morphine injection 2 mg  2 mg IntraVENous Q3H PRN Rosemarie Hewitt MD        heparin (porcine) injection 5,000 Units  5,000 Units SubCUTAneous Q8H Rosemarie Hewitt MD   5,000 Units at 17 0506    vancomycin (VANCOCIN) 1250 mg in  ml infusion  1,250 mg IntraVENous Rx Dosing/Monitoring Rosemarie Hewitt MD        cefTRIAXone (ROCEPHIN) 1 g in 0.9% sodium chloride (MBP/ADV) 50 mL  1 g IntraVENous Q24H Rosemarie Hewitt MD        nitroglycerin (NITROSTAT) tablet 0.4 mg  0.4 mg SubLINGual PRN Rosemarie Hewitt MD           Review of Systems negative with exception of pertinent positives noted above  PHYSICAL EXAM     Visit Vitals    /69    Pulse 74    Temp 98.2 °F (36.8 °C)    Resp 19    Ht 5' 10\" (1.778 m)    Wt 86.2 kg (190 lb)    SpO2 98%    BMI 27.26 kg/m2      Temp (24hrs), Av.5 °F (36.9 °C), Min:98.1 °F (36.7 °C), Max:99.1 °F (37.3 °C)    Oxygen Therapy  O2 Sat (%): 98 % (17 0718)  O2 Device: Room air (17)    Intake/Output Summary (Last 24 hours) at 17 1008  Last data filed at 17 0848   Gross per 24 hour   Intake              120 ml   Output                0 ml   Net              120 ml      General: No acute distress    Lungs: CTA bilaterally. Resp even and nonlabored  Heart:  S1S2 present without murmurs rubs gallops. RRR. 1+ bilateral LE edema. Abdomen: Soft, tender on palpation RUQ. Dressing on PD cath dry/intact without surround erythema, drainage. BS present  Extremities: Moves ext spontaneously  Neurologic:  No focal deficits. A/O X4. Results summary of Diagnostic Studies/Procedures copied from within [facility] EMR:      [address] Problems    Diagnosis Date Noted    Abdominal pain 09/18/2017    ESRD on peritoneal dialysis ([facility] [address] 02/01/2016    HLD (hyperlipidemia) 12/29/2015    Hypothyroidism 04/28/2015    Hypertension 04/28/2015     Plan:    ESRD on PD: Consulted Nephrology    HTN: chronic, controlled. Continue current regimen. Monitor    Abd pain: treating as peritonitis. Continue Vanc and Rocephin. Awaiting peritoneal fluid culture.       Hypothyroidism:  Check TSH    Notes, labs, VS, diagnostic testing reviewed  Time spent with pt 30 min    DVT Prophylaxis: heparin sq  Plan of Care Discussed with: Supervising MD Dr. Arleen Almanzar, care team, pt   Francine Lyle, CHATO

## 2017-09-18 NOTE — PROGRESS NOTES
Dual skin assessment with [facility], patients skin is dry and flaky all over, she has a incision on the mid lower abdomen due to peritoneal dialysis. Scar on right knee from previous knee replacement.

## 2017-09-18 NOTE — ED NOTES
Attempted IV access x1 without success patient reports she does not want to be stuck for IV access again--MD is notified and orders for medication route changed per MD order

## 2017-09-18 NOTE — H&P
HOSPITALIST H&P/CONSULT           NAME:  Ellis Gomez   Age:  80 y.o.  :   10/26/1932   MRN:   [id number]  PCP: Cinthya Gunter MD  Consulting MD:  Treatment Team: Primary Nurse: Vielka Griffiths RN  HPI:   3year-old female with a history of hypertension and ESRD on peritoneal dialysis presents to the ER with acute upper abdominal pain that started earlier today, localized to that area, described as just hurting. She states the pain has since resolved. No fevers or chills. No nausea, vomiting or diarrhea. She states that she is essentially anuric and occasionally makes minimal urine.   She denies any other acute complaints    Complete ROS done and is as stated in HPI or otherwise negative    Past Medical History:   Diagnosis Date    Anemia of chronic renal failure 2015    Anterior myocardial infarction [facility]) 2015    CAD (coronary artery disease)     Chest pain     Chronic kidney disease, stage III (moderate) 8/15/2014    on dialysis    CKD (chronic kidney disease) stage 4, GFR 15-29 ml/min ([address] 2015    Debility 2015    Depression 2015    Edema 2015    Endocrine disease     Hypothyroidism    GERD (gastroesophageal reflux disease)     Heart murmur 2015    HLD (hyperlipidemia) 2015    Hypertension     Hypothyroidism 2015    Ischemic cardiomyopathy 2015    Nausea     S/P PTCA (percutaneous transluminal coronary angioplasty); LAD PTCA of  ISR 11/27/15 2016    STEMI (ST elevation myocardial infarction) ([address] 2015    Unspecified sleep apnea     uses cpap machine    Unstable angina ([address]       Past Surgical History:   Procedure Laterality Date    HX BACK SURGERY      neck surgery cervical disc    HX BACK SURGERY lower back    HX CATARACT REMOVAL Bilateral     HX CHOLECYSTECTOMY  [id number]    gall bladder     HX KNEE REPLACEMENT Right 2006    HX PTCA  2015    2.25 Xience stent to mid LAD for occluded artery, anterior MI, EF 25%. Moderate disease distal LAD and distal OM PCI CX and RCA , then PCI RCA and LAD in . Prior to Admission Medications   Prescriptions Last Dose Informant Patient Reported? Taking? PNV NO.122/IRON/FOLIC ACID (PRENATAL MULTI PO)   Yes No   Sig: Take  by mouth. amLODIPine (NORVASC) 5 mg tablet   Yes No   Sig: Take 5 mg by mouth daily. aspirin delayed-release 81 mg tablet   Yes No   Sig: Take 1 Tab by mouth daily. calcitRIOL (ROCALTROL) 0.25 mcg capsule   Yes No   Sig: Take 0.25 mcg by mouth daily. cinacalcet (SENSIPAR) 90 mg tab   Yes No   Sig: Take  by mouth. clopidogrel (PLAVIX) 75 mg tab   No No   Sig: Take 1 Tab by mouth daily. darbepoetin toya in polysorbat (ARANESP, POLYSORBATE,) 40 mcg/mL injection   Yes No   Si mcg by SubCUTAneous route every fourteen (14) days. Indications: ANEMIA IN CHRONIC KIDNEY DISEASE   docusate sodium (COLACE) 100 mg capsule   Yes No   Sig: Take 100 mg by mouth daily. famotidine (PEPCID) 40 mg tablet   Yes No   Sig: Take 20 mg by mouth daily. folic acid 328 mcg tablet   Yes No   Sig: Take 800 mcg by mouth daily. gentamicin (GARAMYCIN) 0.1 % topical cream   No No   Sig: APPLY TO PD catheter exit site at daily dressing change   levothyroxine (SYNTHROID) 150 mcg tablet   Yes No   Sig: Take 150 mcg by mouth Daily (before breakfast). linaclotide (LINZESS) 145 mcg cap capsule   Yes No   Sig: Take  by mouth Daily (before breakfast). magnesium oxide (MAG-OX) 400 mg tablet   Yes No   Sig: Take 400 mg by mouth daily. metoclopramide HCl (REGLAN) 5 mg tablet   Yes No   Sig: Take 5 mg by mouth two (2) times a day. metoprolol tartrate (LOPRESSOR) 25 mg tablet   Yes No   Sig: Take 12.5 mg by mouth daily. Take PRN for BP <120. nitroglycerin (NITROLINGUAL) 400 mcg/spray spray   Yes No   Si Spray by SubLINGual route every five (5) minutes as needed for Chest Pain. ondansetron (ZOFRAN ODT) 4 mg disintegrating tablet   Yes No   Sig: Take 4 mg by mouth three (3) times daily as needed. potassium chloride (KLOR-CON M10) 10 mEq tablet   Yes No   Sig: Take 10 mEq by mouth two (2) times a day. promethazine (PHENERGAN) 25 mg tablet   Yes No   Sig: Take 25 mg by mouth every eight (8) hours as needed for Nausea. ranolazine ER (RANEXA) 500 mg SR tablet   Yes No   Sig: Take 500 mg by mouth two (2) times a day. rosuvastatin (CRESTOR) 20 mg tablet   Yes No   Sig: Take 20 mg by mouth nightly. sevelamer carbonate (RENVELA) 800 mg tab tab   Yes No   Sig: Take 800 mg by mouth three (3) times daily (with meals). ticagrelor (BRILINTA) 90 mg tablet   No No   Sig: Take 1 Tab by mouth two (2) times a day. torsemide (DEMADEX) 100 mg tablet   Yes No   Sig: Take  by mouth daily. Facility-Administered Medications: None     Allergies   Allergen Reactions    Codeine Nausea and Vomiting        Social History   Substance Use Topics    Smoking status: Never Smoker    Smokeless tobacco: Never Used    Alcohol use No        Family History   Problem Relation Age of Onset    Heart Disease Mother     Hypertension Mother     Cancer Mother      Lung    Stroke Father     Hypertension Father     Breast Cancer Neg Hx         Objective:     Visit Vitals    /85 (BP 1 Location: Right arm, BP Patient Position: At rest)    Pulse 84    Temp 98.4 °F (36.9 °C)    Resp 18    Ht 5' 10\" (1.778 m)    Wt 86.2 kg (190 lb)    SpO2 96%    BMI 27.26 kg/m2      Temp (24hrs), Av.4 °F (36.9 °C), Min:98.4 °F (36.9 °C), Max:98.4 °F (36.9 °C)    Oxygen Therapy  O2 Sat (%): 96 % (17)  O2 Device: Room air (17)    Physical Exam:  General:    Alert, cooperative, no distress, appears stated age.      Head:   Normocephalic, without obvious abnormality, atraumatic. Eyes:   Anicteric sclera, clear conjunctiva, EOMI  Mouth:   Moist oral mucosa  Nose:  Nares normal. No drainage or sinus tenderness. Lungs:   Rales at the bases bilaterally. No Wheezing or Rhonchi. Heart:   Regular rate and rhythm,  no murmur, rub or gallop. Abdomen:   Soft, tender in the upper abdomen with voluntary guarding. Round, cleaned a dressing over dialysis catheter  Bowel sounds normal.   Extremities: No cyanosis. No edema. No clubbing  Skin:     Texture, turgor normal. No rashes or lesions. Not Jaundiced  Neurologic: Alert and oriented x 3, no focal deficits   Psych:  Normal mood and affect, coherent    Data Review:   Recent Results (from the past 24 hour(s))   METABOLIC PANEL, COMPREHENSIVE    Collection Time: 09/17/17  7:50 PM   Result Value Ref Range    Sodium 138 136 - 145 mmol/L    Potassium 3.6 3.5 - 5.1 mmol/L    Chloride 98 98 - 107 mmol/L    CO2 25 21 - 32 mmol/L    Anion gap 15 7 - 16 mmol/L    Glucose 88 65 - 100 mg/dL    BUN 34 (H) 8 - 23 MG/DL    Creatinine 9.68 (H) 0.6 - 1.0 MG/DL    GFR est AA 5 (L) >60 ml/min/1.73m2    GFR est non-AA 4 (L) >60 ml/min/1.73m2    Calcium 9.1 8.3 - 10.4 MG/DL    Bilirubin, total 0.3 0.2 - 1.1 MG/DL    ALT (SGPT) 18 12 - 65 U/L    AST (SGOT) 43 (H) 15 - 37 U/L    Alk.  phosphatase 72 50 - 136 U/L    Protein, total 6.3 6.3 - 8.2 g/dL    Albumin 1.6 (L) 3.2 - 4.6 g/dL    Globulin 4.7 (H) 2.3 - 3.5 g/dL    A-G Ratio 0.3 (L) 1.2 - 3.5     CBC WITH AUTOMATED DIFF    Collection Time: 09/17/17  7:50 PM   Result Value Ref Range    WBC 10.6 4.3 - 11.1 K/uL    RBC 3.40 (L) 4.05 - 5.25 M/uL    HGB 10.5 (L) 11.7 - 15.4 g/dL    HCT 30.9 (L) 35.8 - 46.3 %    MCV 90.9 79.6 - 97.8 FL    MCH 30.9 26.1 - 32.9 PG    MCHC 34.0 31.4 - 35.0 g/dL    RDW 16.3 (H) 11.9 - 14.6 %    PLATELET 401 048 - 224 K/uL    MPV 10.4 (L) 10.8 - 14.1 FL    DF AUTOMATED      NEUTROPHILS 85 (H) 43 - 78 %    LYMPHOCYTES 10 (L) 13 - 44 %    MONOCYTES 4 4.0 - 12.0 % EOSINOPHILS 1 0.5 - 7.8 %    BASOPHILS 0 0.0 - 2.0 %    IMMATURE GRANULOCYTES 0.3 0.0 - 5.0 %    ABS. NEUTROPHILS 9.0 (H) 1.7 - 8.2 K/UL    ABS. LYMPHOCYTES 1.0 0.5 - 4.6 K/UL    ABS. MONOCYTES 0.5 0.1 - 1.3 K/UL    ABS. EOSINOPHILS 0.1 0.0 - 0.8 K/UL    ABS. BASOPHILS 0.0 0.0 - 0.2 K/UL    ABS. IMM. GRANS. 0.0 0.0 - 0.5 K/UL   LIPASE    Collection Time: 09/17/17  7:50 PM   Result Value Ref Range    Lipase 139 73 - 393 U/L   CELL COUNT, BODY FLUID    Collection Time: 09/17/17  9:59 PM   Result Value Ref Range    BODY FLUID TYPE PERITONEAL FLUID      FLUID COLOR YELLOW      FLUID APPEARANCE HAZY      FLUID RBC COUNT <73170 /cu mm    FLUID WBC COUNT 2647 /cu mm    FLD NEUTROPHILS 92 %    FLD LYMPHS 2 %    FLD MONO/MACROPHAGE 6 %       Imaging /Procedures /Studies     Xr Abd Acute W 1 V Chest    Result Date: 9/17/2017  IMPRESSION:  1. No bowel obstruction or free air. 2. Bibasilar lung infiltrates. 3. Cardiomegaly. Assessment and Plan:      Active Hospital Problems    Diagnosis Date Noted    Abdominal pain 09/18/2017    ESRD on peritoneal dialysis ([facility] [address] 02/01/2016    HLD (hyperlipidemia) 12/29/2015    Hypothyroidism 04/28/2015    Hypertension 04/28/2015       PLAN  Admit as inpatient given concerns about peritonitis  Continue ceftriaxone and add vancomycin pharmacy to dose   fluid culture already sent  Nephrology consult for continuation of peritoneal dialysis  Continue routine home medications  Fluid culture already sent       Code Status: full code  DVT prophylaxis: heparin  Anticipated discharge: 2 days pending diagnostic evaluation    Signed By: Loretta Maldonado MD     September 18, 2017

## 2017-09-18 NOTE — ED PROVIDER NOTES
HPI Comments: Patient is on peritoneal dialysis nightly. For the past 2 nights is some different formula for increased fluid retention. This has helped earlier today developed some upper abdominal pain which has continued all day. Denies nausea vomiting diarrhea or constipation. No dysuria. No prior similar episodes. Patient is a 80 y.o. female presenting with abdominal pain. The history is provided by the patient. No  was used. Abdominal Pain    This is a new problem. The current episode started 6 to 12 hours ago. The problem occurs constantly. The problem has not changed since onset. The pain is associated with an unknown factor. The pain is located in the LUQ, RUQ and epigastric region. The quality of the pain is aching and sharp. The pain is moderate. Pertinent negatives include no fever, no diarrhea, no melena, no nausea, no vomiting, no constipation, no dysuria, no hematuria, no headaches, no chest pain and no back pain. The pain is worsened by palpation. The pain is relieved by nothing. The patient's surgical history includes cholecystectomy.        Past Medical History:   Diagnosis Date    Anemia of chronic renal failure 4/28/2015    Anterior myocardial infarction [facility]) 5/21/2015    CAD (coronary artery disease)     Chest pain     Chronic kidney disease, stage III (moderate) 8/15/2014    on dialysis    CKD (chronic kidney disease) stage 4, GFR 15-29 ml/min ([facility]) 4/28/2015    Debility 5/5/2015    Depression 12/29/2015    Edema 12/29/2015    Endocrine disease     Hypothyroidism    GERD (gastroesophageal reflux disease)     Heart murmur 12/29/2015    HLD (hyperlipidemia) 12/29/2015    Hypertension     Hypothyroidism 4/28/2015    Ischemic cardiomyopathy 12/29/2015    Nausea     S/P PTCA (percutaneous transluminal coronary angioplasty); LAD PTCA of  ISR 11/27/15 5/24/2016    STEMI (ST elevation myocardial infarction) ([facility] 4/28/2015    Unspecified sleep apnea uses cpap machine    Unstable angina ([facility]        Past Surgical History:   Procedure Laterality Date    HX BACK SURGERY  1990    neck surgery cervical disc    HX BACK SURGERY      lower back    HX CATARACT REMOVAL Bilateral     HX CHOLECYSTECTOMY  19702    gall bladder     HX KNEE REPLACEMENT Right 2006    HX PTCA  4/28/2015    2.25 Xience stent to mid LAD for occluded artery, anterior MI, EF 25%. Moderate disease distal LAD and distal OM PCI CX and RCA 2004, then PCI RCA and LAD in 2009. Family History:   Problem Relation Age of Onset    Heart Disease Mother     Hypertension Mother     Cancer Mother      Lung    Stroke Father     Hypertension Father     Breast Cancer Neg Hx        Social History     Social History    Marital status:      Spouse name: N/A    Number of children: N/A    Years of education: N/A     Occupational History    Not on file. Social History Main Topics    Smoking status: Never Smoker    Smokeless tobacco: Never Used    Alcohol use No    Drug use: Not on file    Sexual activity: Not on file     Other Topics Concern    Not on file     Social History Narrative         ALLERGIES: Codeine    Review of Systems   Constitutional: Negative for chills and fever. HENT: Negative for rhinorrhea and sore throat. Eyes: Negative for pain and redness. Respiratory: Negative for chest tightness, shortness of breath and wheezing. Cardiovascular: Negative for chest pain and leg swelling. Gastrointestinal: Positive for abdominal pain. Negative for constipation, diarrhea, melena, nausea and vomiting. Genitourinary: Negative for dysuria and hematuria. Musculoskeletal: Negative for back pain, gait problem, neck pain and neck stiffness. Skin: Negative for color change and rash. Neurological: Negative for weakness, numbness and headaches.        Vitals:    09/17/17 1942   BP: 150/85   Pulse: 84   Resp: 18   Temp: 98.4 °F (36.9 °C)   SpO2: 96%   Weight: 86.2 kg (190 lb)   Height: 5' 10\" (1.778 m)            Physical Exam   Constitutional: She is oriented to person, place, and time. She appears well-developed and well-nourished. No distress. HENT:   Head: Normocephalic and atraumatic. Neck: Normal range of motion. Neck supple. Cardiovascular: Normal rate and regular rhythm. No murmur heard. Pulmonary/Chest: Effort normal and breath sounds normal. She has no wheezes. Abdominal: Soft. Bowel sounds are normal. There is tenderness (epigastric area). Musculoskeletal: Normal range of motion. She exhibits no edema. Neurological: She is alert and oriented to person, place, and time. Skin: Skin is warm and dry. Nursing note and vitals reviewed. MDM  Number of Diagnoses or Management Options  Diagnosis management comments: SBP will admit to hospital.        Amount and/or Complexity of Data Reviewed  Clinical lab tests: ordered and reviewed  Tests in the radiology section of CPT®: ordered and reviewed  Tests in the medicine section of CPT®: ordered and reviewed    Patient Progress  Patient progress: stable    ED Course       Procedures      Results Include:    Recent Results (from the past 24 hour(s))   METABOLIC PANEL, COMPREHENSIVE    Collection Time: 09/17/17  7:50 PM   Result Value Ref Range    Sodium 138 136 - 145 mmol/L    Potassium 3.6 3.5 - 5.1 mmol/L    Chloride 98 98 - 107 mmol/L    CO2 25 21 - 32 mmol/L    Anion gap 15 7 - 16 mmol/L    Glucose 88 65 - 100 mg/dL    BUN 34 (H) 8 - 23 MG/DL    Creatinine 9.68 (H) 0.6 - 1.0 MG/DL    GFR est AA 5 (L) >60 ml/min/1.73m2    GFR est non-AA 4 (L) >60 ml/min/1.73m2    Calcium 9.1 8.3 - 10.4 MG/DL    Bilirubin, total 0.3 0.2 - 1.1 MG/DL    ALT (SGPT) 18 12 - 65 U/L    AST (SGOT) 43 (H) 15 - 37 U/L    Alk.  phosphatase 72 50 - 136 U/L    Protein, total 6.3 6.3 - 8.2 g/dL    Albumin 1.6 (L) 3.2 - 4.6 g/dL    Globulin 4.7 (H) 2.3 - 3.5 g/dL    A-G Ratio 0.3 (L) 1.2 - 3.5     CBC WITH AUTOMATED DIFF    Collection Time: 09/17/17  7:50 PM   Result Value Ref Range    WBC 10.6 4.3 - 11.1 K/uL    RBC 3.40 (L) 4.05 - 5.25 M/uL    HGB 10.5 (L) 11.7 - 15.4 g/dL    HCT 30.9 (L) 35.8 - 46.3 %    MCV 90.9 79.6 - 97.8 FL    MCH 30.9 26.1 - 32.9 PG    MCHC 34.0 31.4 - 35.0 g/dL    RDW 16.3 (H) 11.9 - 14.6 %    PLATELET 172 053 - 633 K/uL    MPV 10.4 (L) 10.8 - 14.1 FL    DF AUTOMATED      NEUTROPHILS 85 (H) 43 - 78 %    LYMPHOCYTES 10 (L) 13 - 44 %    MONOCYTES 4 4.0 - 12.0 %    EOSINOPHILS 1 0.5 - 7.8 %    BASOPHILS 0 0.0 - 2.0 %    IMMATURE GRANULOCYTES 0.3 0.0 - 5.0 %    ABS. NEUTROPHILS 9.0 (H) 1.7 - 8.2 K/UL    ABS. LYMPHOCYTES 1.0 0.5 - 4.6 K/UL    ABS. MONOCYTES 0.5 0.1 - 1.3 K/UL    ABS. EOSINOPHILS 0.1 0.0 - 0.8 K/UL    ABS. BASOPHILS 0.0 0.0 - 0.2 K/UL    ABS. IMM. GRANS. 0.0 0.0 - 0.5 K/UL   LIPASE    Collection Time: 09/17/17  7:50 PM   Result Value Ref Range    Lipase 139 73 - 393 U/L   CELL COUNT, BODY FLUID    Collection Time: 09/17/17  9:59 PM   Result Value Ref Range    BODY FLUID TYPE PERITONEAL FLUID      FLUID COLOR YELLOW      FLUID APPEARANCE HAZY      FLUID RBC COUNT <19313 /cu mm    FLUID WBC COUNT 2647 /cu mm    FLD NEUTROPHILS 92 %    FLD LYMPHS 2 %    FLD MONO/MACROPHAGE 6 %       XR ABD ACUTE W 1 V CHEST (Final result) Result time: 09/17/17 21:53:13     Final result by Sohan Osullivan MD (09/17/17 21:53:13)     Impression:     IMPRESSION:    1. No bowel obstruction or free air. 2. Bibasilar lung infiltrates. 3. Cardiomegaly.       Narrative:     Abdominal Series    CLINICAL INDICATION:  Acute upper abdominal pain and nausea moderate    COMPARISON: Chest 8/24/2016 radiograph, also left ribs 2/22/2016    TECHNIQUE: A frontal upright view of the chest and supine and upright views of  the abdomen were obtained. FINDINGS: The lungs demonstrates shallow volume leading to crowding. The cardiac  silhouette is enlarged. Mild patchy and linear opacities are noted in both  bases.  No evidence of pneumothorax, pulmonary edema or definite pleural  effusion. Coronary artery stent material again noted. There is tubing projecting over the left mid to lower abdomen that could  represent external artifact or dialysis catheter. Flat and upright views of the abdomen show no evidence of obstruction. No  evidence of free air. Surgical clip in the left midabdomen. Mild multilevel  degenerative changes of the spine.

## 2017-09-19 ENCOUNTER — APPOINTMENT (OUTPATIENT)
Dept: CT IMAGING | Age: 82
DRG: 391 | End: 2017-09-19
Attending: INTERNAL MEDICINE
Payer: MEDICARE

## 2017-09-19 LAB
ANION GAP SERPL CALC-SCNC: 9 MMOL/L (ref 7–16)
BASOPHILS # BLD: 0 K/UL (ref 0–0.2)
BASOPHILS NFR BLD: 0 % (ref 0–2)
BUN SERPL-MCNC: 37 MG/DL (ref 8–23)
CALCIUM SERPL-MCNC: 8.4 MG/DL (ref 8.3–10.4)
CHLORIDE SERPL-SCNC: 102 MMOL/L (ref 98–107)
CO2 SERPL-SCNC: 28 MMOL/L (ref 21–32)
CREAT SERPL-MCNC: 9.77 MG/DL (ref 0.6–1)
DIFFERENTIAL METHOD BLD: ABNORMAL
EOSINOPHIL # BLD: 0.2 K/UL (ref 0–0.8)
EOSINOPHIL NFR BLD: 3 % (ref 0.5–7.8)
ERYTHROCYTE [DISTWIDTH] IN BLOOD BY AUTOMATED COUNT: 15.9 % (ref 11.9–14.6)
GLUCOSE SERPL-MCNC: 80 MG/DL (ref 65–100)
HCT VFR BLD AUTO: 27.8 % (ref 35.8–46.3)
HGB BLD-MCNC: 9.1 G/DL (ref 11.7–15.4)
IMM GRANULOCYTES # BLD: 0 K/UL (ref 0–0.5)
IMM GRANULOCYTES NFR BLD: 0.1 % (ref 0–5)
LYMPHOCYTES # BLD: 0.8 K/UL (ref 0.5–4.6)
LYMPHOCYTES NFR BLD: 12 % (ref 13–44)
MCH RBC QN AUTO: 30.4 PG (ref 26.1–32.9)
MCHC RBC AUTO-ENTMCNC: 32.7 G/DL (ref 31.4–35)
MCV RBC AUTO: 93 FL (ref 79.6–97.8)
MONOCYTES # BLD: 0.4 K/UL (ref 0.1–1.3)
MONOCYTES NFR BLD: 5 % (ref 4–12)
NEUTS SEG # BLD: 5.5 K/UL (ref 1.7–8.2)
NEUTS SEG NFR BLD: 80 % (ref 43–78)
PLATELET # BLD AUTO: 200 K/UL (ref 150–450)
PMV BLD AUTO: 9.7 FL (ref 10.8–14.1)
POTASSIUM SERPL-SCNC: 3.3 MMOL/L (ref 3.5–5.1)
RBC # BLD AUTO: 2.99 M/UL (ref 4.05–5.25)
SODIUM SERPL-SCNC: 139 MMOL/L (ref 136–145)
T4 FREE SERPL-MCNC: 0.6 NG/DL (ref 0.78–1.46)
VANCOMYCIN SERPL-MCNC: 10.8 UG/ML
WBC # BLD AUTO: 6.9 K/UL (ref 4.3–11.1)

## 2017-09-19 PROCEDURE — 36415 COLL VENOUS BLD VENIPUNCTURE: CPT | Performed by: HOSPITALIST

## 2017-09-19 PROCEDURE — 74011000258 HC RX REV CODE- 258: Performed by: HOSPITALIST

## 2017-09-19 PROCEDURE — 85025 COMPLETE CBC W/AUTO DIFF WBC: CPT | Performed by: HOSPITALIST

## 2017-09-19 PROCEDURE — 90945 DIALYSIS ONE EVALUATION: CPT

## 2017-09-19 PROCEDURE — 65270000029 HC RM PRIVATE

## 2017-09-19 PROCEDURE — 74011250637 HC RX REV CODE- 250/637: Performed by: INTERNAL MEDICINE

## 2017-09-19 PROCEDURE — 74011250637 HC RX REV CODE- 250/637: Performed by: HOSPITALIST

## 2017-09-19 PROCEDURE — 80202 ASSAY OF VANCOMYCIN: CPT | Performed by: HOSPITALIST

## 2017-09-19 PROCEDURE — 74177 CT ABD & PELVIS W/CONTRAST: CPT

## 2017-09-19 PROCEDURE — 74011250637 HC RX REV CODE- 250/637: Performed by: NURSE PRACTITIONER

## 2017-09-19 PROCEDURE — 74011250636 HC RX REV CODE- 250/636: Performed by: HOSPITALIST

## 2017-09-19 PROCEDURE — 74011250636 HC RX REV CODE- 250/636: Performed by: INTERNAL MEDICINE

## 2017-09-19 PROCEDURE — 74011636320 HC RX REV CODE- 636/320: Performed by: HOSPITALIST

## 2017-09-19 PROCEDURE — 80048 BASIC METABOLIC PNL TOTAL CA: CPT | Performed by: HOSPITALIST

## 2017-09-19 PROCEDURE — 84439 ASSAY OF FREE THYROXINE: CPT | Performed by: HOSPITALIST

## 2017-09-19 RX ORDER — PANTOPRAZOLE SODIUM 40 MG/1
40 TABLET, DELAYED RELEASE ORAL
Status: DISCONTINUED | OUTPATIENT
Start: 2017-09-20 | End: 2017-09-23 | Stop reason: HOSPADM

## 2017-09-19 RX ORDER — SODIUM CHLORIDE 0.9 % (FLUSH) 0.9 %
10 SYRINGE (ML) INJECTION
Status: COMPLETED | OUTPATIENT
Start: 2017-09-19 | End: 2017-09-19

## 2017-09-19 RX ORDER — VANCOMYCIN HYDROCHLORIDE
1250 ONCE
Status: COMPLETED | OUTPATIENT
Start: 2017-09-19 | End: 2017-09-19

## 2017-09-19 RX ADMIN — Medication 10 ML: at 11:00

## 2017-09-19 RX ADMIN — POTASSIUM CHLORIDE 20 MEQ: 20 TABLET, EXTENDED RELEASE ORAL at 17:39

## 2017-09-19 RX ADMIN — POTASSIUM CHLORIDE 20 MEQ: 20 TABLET, EXTENDED RELEASE ORAL at 09:23

## 2017-09-19 RX ADMIN — LEVOTHYROXINE SODIUM 175 MCG: 125 TABLET ORAL at 05:01

## 2017-09-19 RX ADMIN — Medication 10 ML: at 05:01

## 2017-09-19 RX ADMIN — TICAGRELOR 90 MG: 90 TABLET ORAL at 09:23

## 2017-09-19 RX ADMIN — ASPIRIN 81 MG: 81 TABLET, COATED ORAL at 09:24

## 2017-09-19 RX ADMIN — Medication 10 ML: at 21:50

## 2017-09-19 RX ADMIN — Medication 400 MG: at 09:26

## 2017-09-19 RX ADMIN — ZINC 1 TABLET: TAB ORAL at 09:24

## 2017-09-19 RX ADMIN — METOCLOPRAMIDE HYDROCHLORIDE 5 MG: 10 TABLET ORAL at 06:29

## 2017-09-19 RX ADMIN — ROSUVASTATIN CALCIUM 20 MG: 20 TABLET, FILM COATED ORAL at 21:49

## 2017-09-19 RX ADMIN — RANOLAZINE 500 MG: 500 TABLET, FILM COATED, EXTENDED RELEASE ORAL at 09:22

## 2017-09-19 RX ADMIN — TORSEMIDE 100 MG: 100 TABLET ORAL at 09:22

## 2017-09-19 RX ADMIN — IOPAMIDOL 100 ML: 755 INJECTION, SOLUTION INTRAVENOUS at 11:30

## 2017-09-19 RX ADMIN — RANOLAZINE 500 MG: 500 TABLET, FILM COATED, EXTENDED RELEASE ORAL at 17:39

## 2017-09-19 RX ADMIN — METOCLOPRAMIDE HYDROCHLORIDE 5 MG: 10 TABLET ORAL at 21:49

## 2017-09-19 RX ADMIN — HEPARIN SODIUM 5000 UNITS: 5000 INJECTION, SOLUTION INTRAVENOUS; SUBCUTANEOUS at 05:02

## 2017-09-19 RX ADMIN — SODIUM CHLORIDE 100 ML: 900 INJECTION, SOLUTION INTRAVENOUS at 14:39

## 2017-09-19 RX ADMIN — Medication 10 ML: at 13:00

## 2017-09-19 RX ADMIN — VANCOMYCIN HYDROCHLORIDE 1250 MG: 10 INJECTION, POWDER, LYOPHILIZED, FOR SOLUTION INTRAVENOUS at 11:29

## 2017-09-19 RX ADMIN — FAMOTIDINE 20 MG: 20 TABLET ORAL at 09:24

## 2017-09-19 RX ADMIN — DIATRIZOATE MEGLUMINE AND DIATRIZOATE SODIUM 15 ML: 600; 100 SOLUTION ORAL; RECTAL at 11:32

## 2017-09-19 RX ADMIN — CEFTRIAXONE SODIUM 1 G: 1 INJECTION, POWDER, FOR SOLUTION INTRAMUSCULAR; INTRAVENOUS at 23:53

## 2017-09-19 RX ADMIN — CLOPIDOGREL BISULFATE 75 MG: 75 TABLET ORAL at 09:26

## 2017-09-19 NOTE — DIALYSIS
Patient disconnected from cycler using aseptic technique as well as masks for all persons in room. Betadine cap applied and intact. Total UF of 552 cc. Effluent yellow, clear, fibrin noted.

## 2017-09-19 NOTE — PROGRESS NOTES
Pharmacokinetic Consult to Pharmacist    Lisa Posey is a 80 y.o. female being treated for possible peritonitis with vancomycin and rocephin. Height: 5' 10\" (177.8 cm)  Weight: 86.2 kg (190 lb)  Lab Results   Component Value Date/Time    BUN 37 09/19/2017 05:44 AM    Creatinine 9.77 09/19/2017 05:44 AM    WBC 6.9 09/19/2017 05:44 AM      Estimated Creatinine Clearance: 5.1 mL/min (based on Cr of 9.77). CULTURES:  9/17 Peritoneal fluid:  NGX1D, pending    Lab Results   Component Value Date/Time    Vancomycin, random 10.8 09/19/2017 05:44 AM     Day 2 of vancomycin. Goal trough is 15-20. Plan is to re-dose when random level is < 20.  Vancomycin dose initiated at 1250mg x1. Will plan to redose vanco around dialysis days and/or random levels when less than 20. PD planned again for tonight. Will check a random level in am.  Will continue to follow patient.       Thank you,  Jason Teixeira, PharmD, BCPS  Clinical Pharmacist  [phone]

## 2017-09-19 NOTE — PROGRESS NOTES
Problem: Nutrition Deficit  Goal: *Optimize nutritional status  Nutrition  Reason for assessment: Referral received from nursing admission for EN/TPN prior to admission   Assessment:   Diet order(s): Regular with Ensure High Protein TID  Food/Nutrition Patient History:  Patient presents with one acute nutrition risk factor identified by malnutrition screening tool upon admission for poor appetite. The patient denies any tube feeding or IV nutrition prior to admission. The patient does report use of Boost daily at home and prefers this over Ensure products. The patient is noted to a h/o ESRD on PD, CAD and CVA. The patient currently reports a poor appetite, abdominal distention and early satiety. The patient denies any issues with nausea, vomiting, constipation or diarrhea. The patient reports she dislikes the food and the ensure high protein. Encouraged the patient to choose items from the always available menu for better acceptance of meals. Anthropometrics:Height: 5' 10\" (177.8 cm),  Weight: 86.2 kg (190 lb), Weight Source: Patient stated, Body mass index is 27.26 kg/(m^2). BMI class of normal weight for age >71. Macronutrient needs:  EER:  3982-9937 kcal /day (17-22 kcal/kg pt stated BW)  EPR:   grams protein/day (1.3-1.5 grams/kg IBW) (increased needs for PD)  Intake/Comparative Standards: Average intake for past 2 recorded meal(s): 60%. This potentially meets ~95% of kcal and ~65% of protein needs     Nutrition Diagnosis: Inadequate protein energy intake related to increased needs as evidenced by patient currently on PD and unable to meet estimated protein needs. Inadequate oral intake related to poor appetite and abdominal distention as evidenced by patient with early satiety currently unable to meet estimated needs as noted above. Intervention:  Meals and snacks: Continue current diet. Nutrition Supplement Therapy: Change to Boost TID (vanilla or chocolate)   Nutrition Discharge Plan:  Too soon to be determined at this time. Would encourage patient to continue Boost supplements as she already does at home. Amelia [address], [address] [place] [address],  [phone]

## 2017-09-19 NOTE — PROGRESS NOTES
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 She states the pain has since resolved. No fevers or chills. No nausea, vomiting or diarrhea. She states that she is essentially anuric and occasionally makes minimal urine. She denies any other acute complaints She has a h/o CVA and TIA. No fever or chills, no problems with PD drainage or discoloration of PD fluid. No increased thirst. She wishes regular diet and supplement.      9/19/17 - c/o much abdominal pain and abdominal distention which is different compared to her usual PD associated distention - she is [employer]hova's witness, has marked drop in Hb overnight and is quite cincerned - also had abnormal PD cell count despite clear PD fluid - discussed plans to get CT and GI consult - hold Plavix and Brilinta in the face of dropping Hb and possible need for procedures, not sure why she is on both    Past Medical History:   Diagnosis Date    Anemia of chronic renal failure 4/28/2015    Anterior myocardial infarction ([address] 5/21/2015    CAD (coronary artery disease)     Chest pain     Chronic kidney disease, stage III (moderate) 8/15/2014    on dialysis    CKD (chronic kidney disease) stage 4, GFR 15-29 ml/min ([address] 4/28/2015    Debility 5/5/2015    Depression 12/29/2015    Edema 12/29/2015    Endocrine disease     Hypothyroidism    GERD (gastroesophageal reflux disease)     Heart murmur 12/29/2015    HLD (hyperlipidemia) 12/29/2015    Hypertension     Hypothyroidism 4/28/2015    Ischemic cardiomyopathy 12/29/2015    Nausea     S/P PTCA (percutaneous transluminal coronary angioplasty); LAD PTCA of  ISR 11/27/15 5/24/2016    STEMI (ST elevation myocardial infarction) ([address] 4/28/2015    Unspecified sleep apnea     uses cpap machine    Unstable angina ([facility])       Past Surgical History:   Procedure Laterality Date    HX BACK SURGERY  1990    neck surgery cervical disc    HX BACK SURGERY      lower back    HX CATARACT REMOVAL Bilateral     HX CHOLECYSTECTOMY  19702    gall bladder     HX KNEE REPLACEMENT Right 2006    HX PTCA  4/28/2015    2.25 Xience stent to mid LAD for occluded artery, anterior MI, EF 25%. Moderate disease distal LAD and distal OM PCI CX and RCA 2004, then PCI RCA and LAD in 2009. Prior to Admission medications    Medication Sig Start Date End Date Taking? Authorizing Provider   calcitRIOL (ROCALTROL) 0.25 mcg capsule Take 0.25 mcg by mouth daily. Yes Historical Provider   clopidogrel (PLAVIX) 75 mg tab Take 1 Tab by mouth daily. 4/13/17  Yes Hansel Florez MD   nitroglycerin (NITROLINGUAL) 400 mcg/spray spray 1 Spray by SubLINGual route every five (5) minutes as needed for Chest Pain. Yes Historical Provider   linaclotide Ferol Mackintosh) 145 mcg cap capsule Take  by mouth Daily (before breakfast). Yes Historical Provider   folic acid 588 mcg tablet Take 800 mcg by mouth daily. Yes Historical Provider   magnesium oxide (MAG-OX) 400 mg tablet Take 400 mg by mouth daily. Yes Historical Provider   amLODIPine (NORVASC) 5 mg tablet Take 5 mg by mouth daily. Yes Historical Provider   potassium chloride (KLOR-CON M10) 10 mEq tablet Take 10 mEq by mouth two (2) times a day. Yes Historical Provider   PNV NO.122/IRON/FOLIC ACID (PRENATAL MULTI PO) Take  by mouth. Yes Historical Provider   metoprolol tartrate (LOPRESSOR) 25 mg tablet Take 12.5 mg by mouth daily. Take PRN for BP <120. 6/23/16  Yes Duyen Ellis III, MD   ondansetron (ZOFRAN ODT) 4 mg disintegrating tablet Take 4 mg by mouth three (3) times daily as needed. Yes Historical Provider   metoclopramide HCl (REGLAN) 5 mg tablet Take 5 mg by mouth two (2) times a day. Yes Historical Provider   famotidine (PEPCID) 40 mg tablet Take 20 mg by mouth daily. Yes Historical Provider   ticagrelor (BRILINTA) 90 mg tablet Take 1 Tab by mouth two (2) times a day. 2/4/16  Yes MINDY Chatman   promethazine (PHENERGAN) 25 mg tablet Take 25 mg by mouth every eight (8) hours as needed for Nausea. Yes Historical Provider   gentamicin (GARAMYCIN) 0.1 % topical cream APPLY TO PD catheter exit site at daily dressing change 5/12/15  Yes Gregg Mendosa MD   aspirin delayed-release 81 mg tablet Take 1 Tab by mouth daily. 5/5/15  Yes Gisela Hollingsworth PA-C   darbepoetin toya in polysorbat (ARANESP, POLYSORBATE,) 40 mcg/mL injection 40 mcg by SubCUTAneous route every fourteen (14) days. Indications: ANEMIA IN CHRONIC KIDNEY DISEASE   Yes Historical Provider   rosuvastatin (CRESTOR) 20 mg tablet Take 20 mg by mouth nightly. Yes Historical Provider   ranolazine ER (RANEXA) 500 mg SR tablet Take 500 mg by mouth two (2) times a day. Yes Historical Provider   levothyroxine (SYNTHROID) 150 mcg tablet Take 150 mcg by mouth Daily (before breakfast). Yes Historical Provider   torsemide (DEMADEX) 100 mg tablet Take  by mouth daily. Historical Provider   cinacalcet (SENSIPAR) 90 mg tab Take  by mouth. Historical Provider   sevelamer carbonate (RENVELA) 800 mg tab tab Take 800 mg by mouth three (3) times daily (with meals).     Historical Provider     Allergies   Allergen Reactions    Codeine Nausea and Vomiting      Social History   Substance Use Topics    Smoking status: Never Smoker    Smokeless tobacco: Never Used    Alcohol use No      Family History   Problem Relation Age of Onset    Heart Disease Mother     Hypertension Mother     Cancer Mother      Lung    Stroke Father     Hypertension Father     Breast Cancer Neg Hx           Review of Systems    Constitutional: no fever,   Eyes: fair vision,   Ears, nose, mouth, throat, and face:fair hearing,    Respiratory: no asthma,    Cardiovascular:no chest pain,   Gastrointestinal:no diarrhea,   Genitourinary: no dysuria    Hematologic/lymphatic: no bleeding tendency,    Neurological: no seizures,    Behvioral/Psych: no psych hospitalization    Endocrine: no goiter,       Objective:       Visit Vitals    /84 (BP 1 Location: Right arm, BP Patient Position: At rest)    Pulse 75    Temp 98.2 °F (36.8 °C)    Resp 18    Ht 5' 10\" (1.778 m)    Wt 86.2 kg (190 lb)    SpO2 98%    BMI 27.26 kg/m2       09/19 0701 - 09/19 1900  In: 240 [P.O.:240]  Out: 552   09/17 1901 - 09/19 0700  In: 360 [P.O.:360]  Out: -       General:  Alert, cooperative, no distress, appears stated age. Head:  Normocephalic, without obvious abnormality, atraumatic. Eyes:  Conjunctivae/corneas clear. EOMs intact. Throat: Lips, mucosa, and tongue normal. Teeth and gums normal.   Neck: Supple, symmetrical, trachea midline, no adenopathy,  no JVD. Lungs:   Clear to auscultation bilaterally. Heart:  Regular rate and rhythm, S1, S2 normal, no murmur,  rub or gallop. Abdomen:   Soft, mild tenderness in epigastrium, no rebound. mild abdominal distention is present. No masses,  No organomegaly. No renal bruit. PD catheter exit site is perfect. Extremities: Extremities normal, atraumatic, no cyanosis or edema. Skin: Skin color, texture, turgor normal. No rashes or lesions. Lymph nodes: Cervical and supraclavicular nodes normal.   Neurologic: Grossly intact. Moves all extremities. No asterixis.          Data Review:     Recent Results (from the past 24 hour(s))   TSH 3RD GENERATION    Collection Time: 09/18/17  3:38 PM   Result Value Ref Range    TSH 39.700 (H) 0.358 - 5.061 uIU/mL   METABOLIC PANEL, BASIC    Collection Time: 09/19/17  5:44 AM   Result Value Ref Range    Sodium 139 136 - 145 mmol/L    Potassium 3.3 (L) 3.5 - 5.1 mmol/L    Chloride 102 98 - 107 mmol/L    CO2 28 21 - 32 mmol/L    Anion gap 9 7 - 16 mmol/L    Glucose 80 65 - 100 mg/dL    BUN 37 (H) 8 - 23 MG/DL    Creatinine 9.77 (H) 0.6 - 1.0 MG/DL    GFR est AA 5 (L) >60 ml/min/1.73m2    GFR est non-AA 4 (L) >60 ml/min/1.73m2    Calcium 8.4 8.3 - 10.4 MG/DL   CBC WITH AUTOMATED DIFF    Collection Time: 09/19/17  5:44 AM   Result Value Ref Range    WBC 6.9 4.3 - 11.1 K/uL    RBC 2.99 (L) 4.05 - 5.25 M/uL    HGB 9.1 (L) 11.7 - 15.4 g/dL    HCT 27.8 (L) 35.8 - 46.3 %    MCV 93.0 79.6 - 97.8 FL    MCH 30.4 26.1 - 32.9 PG    MCHC 32.7 31.4 - 35.0 g/dL    RDW 15.9 (H) 11.9 - 14.6 %    PLATELET 951 535 - 098 K/uL    MPV 9.7 (L) 10.8 - 14.1 FL    DF AUTOMATED      NEUTROPHILS 80 (H) 43 - 78 %    LYMPHOCYTES 12 (L) 13 - 44 %    MONOCYTES 5 4.0 - 12.0 %    EOSINOPHILS 3 0.5 - 7.8 %    BASOPHILS 0 0.0 - 2.0 %    IMMATURE GRANULOCYTES 0.1 0.0 - 5.0 %    ABS. NEUTROPHILS 5.5 1.7 - 8.2 K/UL    ABS. LYMPHOCYTES 0.8 0.5 - 4.6 K/UL    ABS. MONOCYTES 0.4 0.1 - 1.3 K/UL    ABS. EOSINOPHILS 0.2 0.0 - 0.8 K/UL    ABS. BASOPHILS 0.0 0.0 - 0.2 K/UL    ABS. IMM. GRANS. 0.0 0.0 - 0.5 K/UL   T4, FREE    Collection Time: 09/19/17  5:44 AM   Result Value Ref Range    T4, Free 0.6 (L) 0.78 - 1.46 NG/DL   VANCOMYCIN, RANDOM    Collection Time: 09/19/17  5:44 AM   Result Value Ref Range    Vancomycin, random 10.8 UG/ML           Principal Problem:    Abdominal pain (9/18/2017)    Active Problems:    Hypertension (4/28/2015)      Hypothyroidism (4/28/2015)      HLD (hyperlipidemia) (12/29/2015)      ES[facility] (2/1/2016)        Assessment:     1. Abdominal pain  -  - clinically not peritonitis, not clear why she has markedly increased RBC in PD fluid, the WBC count of greater than 2000  WBC may be due to blood in peritoneum, but need to be cautious and treat as if she had peritonitios  - obtain CT abdomen and get GI consult     2. Hypokalemia -  - replace KCl     3. Anemia -  - treat with WAYNE aggressively  - add O35 and Folic acid  - marked drop in Hb overnight,  Obtain GI consult     4. HTN -  - continue current meds     5.  ESRD -  - PD ordfered per chronic orders     6. Volume status -  - clinically near euvolemic     7. Hypothyroidism -  - replace thyroid hormone per chronic home meds    8.  Anticoagulation -  - hold Plavix as she has drop in Hb and is on subQ heparin 5000 units q 12 hours   - hold Brilinta  - she may need to undergo invasive procedures    Plan:       Kathrine Kumar M.D.

## 2017-09-19 NOTE — PROGRESS NOTES
Progress Note    2017  Admit Date: 2017  7:52 PM   NAME: Sage Flores   :  10/26/1932   MRN:  [id number]   Attending: Marcelina Jones MD  PCP:  Amina Lantigua MD  Treatment Team: Attending Provider: Marcelina Jones MD; Consulting Provider: Bonnee Romberg, MD; Consulting Provider: Tu Saldana MD    Full Code   SUBJECTIVE:      Ms. Henry Becerra is a 81 yo female who presented with c/o acute upper abd pain more specifically right upper quadrant, describes pain as \"sore\". Denies n/v/d, constipation, fever, chills. She is on PD at home, last dialysis 17. No leukocytosis. Creatinine 9.68 on admission. Peritoneal fluid hazy, yellow with <[id number] RBC, WBC 2647, 92 neutrophils. Peritoneal fluid sent for culture. Nephrology consulted. Started on Vanco and Rocephin on admission. Today pt c/o abd distention. Hgb dropped overnight. Plavix and Brilinta held. GI consulted for normocytic anemia by . CT abd pelvis showing Diffuse gastric wall thickening suggesting gastritis, Mild peritoneal edema and ascites, concerning for peritonitis. Denies n/v/d.         Past Medical History:   Diagnosis Date    Anemia of chronic renal failure 2015    Anterior myocardial infarction [facility]) 2015    CAD (coronary artery disease)     Chest pain     Chronic kidney disease, stage III (moderate) 8/15/2014    on dialysis    CKD (chronic kidney disease) stage 4, GFR 15-29 ml/min ([facility]) 2015    Debility 2015    Depression 2015    Edema 2015    Endocrine disease     Hypothyroidism    GERD (gastroesophageal reflux disease)     Heart murmur 2015    HLD (hyperlipidemia) 2015    Hypertension     Hypothyroidism 2015    Ischemic cardiomyopathy 2015    Nausea     S/P PTCA (percutaneous transluminal coronary angioplasty); LAD PTCA of  ISR 11/27/15 2016    STEMI (ST elevation myocardial infarction) ([facility] [address] 2015    Unspecified sleep apnea uses cpap machine    Unstable angina (HCC)      Recent Results (from the past 24 hour(s))   METABOLIC PANEL, BASIC    Collection Time: 09/19/17  5:44 AM   Result Value Ref Range    Sodium 139 136 - 145 mmol/L    Potassium 3.3 (L) 3.5 - 5.1 mmol/L    Chloride 102 98 - 107 mmol/L    CO2 28 21 - 32 mmol/L    Anion gap 9 7 - 16 mmol/L    Glucose 80 65 - 100 mg/dL    BUN 37 (H) 8 - 23 MG/DL    Creatinine 9.77 (H) 0.6 - 1.0 MG/DL    GFR est AA 5 (L) >60 ml/min/1.73m2    GFR est non-AA 4 (L) >60 ml/min/1.73m2    Calcium 8.4 8.3 - 10.4 MG/DL   CBC WITH AUTOMATED DIFF    Collection Time: 09/19/17  5:44 AM   Result Value Ref Range    WBC 6.9 4.3 - 11.1 K/uL    RBC 2.99 (L) 4.05 - 5.25 M/uL    HGB 9.1 (L) 11.7 - 15.4 g/dL    HCT 27.8 (L) 35.8 - 46.3 %    MCV 93.0 79.6 - 97.8 FL    MCH 30.4 26.1 - 32.9 PG    MCHC 32.7 31.4 - 35.0 g/dL    RDW 15.9 (H) 11.9 - 14.6 %    PLATELET 561 781 - 724 K/uL    MPV 9.7 (L) 10.8 - 14.1 FL    DF AUTOMATED      NEUTROPHILS 80 (H) 43 - 78 %    LYMPHOCYTES 12 (L) 13 - 44 %    MONOCYTES 5 4.0 - 12.0 %    EOSINOPHILS 3 0.5 - 7.8 %    BASOPHILS 0 0.0 - 2.0 %    IMMATURE GRANULOCYTES 0.1 0.0 - 5.0 %    ABS. NEUTROPHILS 5.5 1.7 - 8.2 K/UL    ABS. LYMPHOCYTES 0.8 0.5 - 4.6 K/UL    ABS. MONOCYTES 0.4 0.1 - 1.3 K/UL    ABS. EOSINOPHILS 0.2 0.0 - 0.8 K/UL    ABS. BASOPHILS 0.0 0.0 - 0.2 K/UL    ABS. IMM.  GRANS. 0.0 0.0 - 0.5 K/UL   T4, FREE    Collection Time: 09/19/17  5:44 AM   Result Value Ref Range    T4, Free 0.6 (L) 0.78 - 1.46 NG/DL   VANCOMYCIN, RANDOM    Collection Time: 09/19/17  5:44 AM   Result Value Ref Range    Vancomycin, random 10.8 UG/ML     Allergies   Allergen Reactions    Codeine Nausea and Vomiting     Current Facility-Administered Medications   Medication Dose Route Frequency Provider Last Rate Last Dose    [START ON 9/20/2017] epoetin toya (EPOGEN;PROCRIT) injection 20,000 Units  20,000 Units SubCUTAneous Q MON, WED & FRI Sawyer Huff MD        sodium chloride 0.9 % bolus infusion 100 mL  100 mL IntraVENous ONCE Rosemarie Hewitt MD        amLODIPine (NORVASC) tablet 5 mg  5 mg Oral DAILY Rosemarie Hewitt MD        aspirin delayed-release tablet 81 mg  81 mg Oral DAILY Rosemarie Hewitt MD   81 mg at 09/19/17 8022    calcitRIOL (ROCALTROL) capsule 0.25 mcg  0.25 mcg Oral DAILY Rosemarie Hewitt MD   0.25 mcg at 09/18/17 0912    cinacalcet tab 90 mg  90 mg Oral DAILY Rosemarie Hewitt MD        docusate sodium (COLACE) capsule 100 mg  100 mg Oral DAILY Rosemarie Hewitt MD        famotidine (PEPCID) tablet 20 mg  20 mg Oral DAILY Rosemarie Hewitt MD   20 mg at 09/19/17 0924    gentamicin (GARAMYCIN) 0.1 % cream   Topical DIALYSIS PRN Rosemarie Hewitt MD        magnesium oxide (MAG-OX) tablet 400 mg  400 mg Oral DAILY Rosemarie Hewitt MD   400 mg at 09/19/17 0926    metoclopramide HCl (REGLAN) tablet 5 mg  5 mg Oral ACB/HS Rosemarie Hewitt MD   5 mg at 09/19/17 3967    metoprolol tartrate (LOPRESSOR) tablet 12.5 mg  12.5 mg Oral DAILY Rosemarie Hewitt MD        ondansetron (ZOFRAN ODT) tablet 4 mg  4 mg Oral Q6H PRN Rosemarie Hewitt MD        promethazine (PHENERGAN) tablet 25 mg  25 mg Oral Q6H PRN Rosemarie Hewitt MD        ranolazine ER (RANEXA) tablet 500 mg  500 mg Oral BID Rosemarie Hewitt MD   500 mg at 09/19/17 8638    rosuvastatin (CRESTOR) tablet 20 mg  20 mg Oral QHS Rosemarie Hewitt MD   20 mg at 09/18/17 2111    sevelamer (RENAGEL) tablet 800 mg  800 mg Oral TID WITH MEALS Rosemarie Hewitt MD        torsemide (DEMADEX) tablet 100 mg  100 mg Oral DAILY Rosemarie Hewitt MD   100 mg at 09/19/17 0922    sodium chloride (NS) flush 5-10 mL  5-10 mL IntraVENous Q8H Rosemarie Hewitt MD   10 mL at 09/19/17 1300    sodium chloride (NS) flush 5-10 mL  5-10 mL IntraVENous PRN Rosemarie Hewitt MD        acetaminophen (TYLENOL) tablet 650 mg  650 mg Oral Q4H PRN Rosemarie Hewitt MD        HYDROcodone-acetaminophen (NORCO) 5-325 mg per tablet 1 Tab  1 Tab Oral Q4H PRN Loretta Maldonado MD        morphine injection 2 mg  2 mg IntraVENous Q3H PRN Loretta Maldonado MD        vancomycin (VANCOCIN) 1250 mg in  ml infusion  1,250 mg IntraVENous Rx Dosing/Monitoring Loretta Maldonado MD        cefTRIAXone (ROCEPHIN) 1 g in 0.9% sodium chloride (MBP/ADV) 50 mL  1 g IntraVENous Q24H Loretta Maldonado  mL/hr at 17 2250 1 g at 17 2250    nitroglycerin (NITROSTAT) tablet 0.4 mg  0.4 mg SubLINGual PRN Loretta Maldonado MD        heparin (porcine) injection 5,000 Units  5,000 Units SubCUTAneous Q12H Sarah Morrison MD   5,000 Units at 17 0502    potassium chloride (K-DUR, KLOR-CON) SR tablet 20 mEq  20 mEq Oral BID Sarah Morrison MD   20 mEq at 17 3811    multivitamin w ZN (STRESSTABS W ZINC) tablet  1 Tab Oral DAILY Sarah Morrison MD   1 Tab at 17 2646    levothyroxine (SYNTHROID) tablet 175 mcg  175 mcg Oral ACB Justice Mcfarlane NP   175 mcg at 17 0501       Review of Systems negative with exception of pertinent positives noted above  PHYSICAL EXAM     Visit Vitals    /88 (BP 1 Location: Right arm, BP Patient Position: At rest)    Pulse 74    Temp 98.3 °F (36.8 °C)    Resp 18    Ht 5' 10\" (1.778 m)    Wt 86.2 kg (190 lb)    SpO2 98%    BMI 27.26 kg/m2      Temp (24hrs), Av.1 °F (36.7 °C), Min:97.8 °F (36.6 °C), Max:98.8 °F (37.1 °C)    Oxygen Therapy  O2 Sat (%): 98 % (17 1358)  O2 Device: Room air (17 1942)    Intake/Output Summary (Last 24 hours) at 17 1543  Last data filed at 17 1306   Gross per 24 hour   Intake              240 ml   Output              552 ml   Net             -312 ml        General:                 No acute distress    Lungs:                    CTA bilaterally. Resp even and nonlabored  Heart:                      S1S2 present without murmurs rubs gallops. RRR. 1+ bilateral LE edema. Abdomen:              Soft, tender on palpation RUQ. Dressing on PD cath dry/intact without surround erythema, drainage. BS present  Extremities:           Moves ext spontaneously  Neurologic:            No focal deficits. A/O X4. Results summary of Diagnostic Studies/Procedures copied from within [facility] EMR:    CT of the Abdomen and Pelvis     INDICATION:  Abdominal pain and distention     Multiple axial images were obtained through the abdomen and pelvis after  intravenous injection of 100mL of Isovue 370. Radiation dose reduction  techniques were used for this study: All CT scans performed at this facility  use one or all of the following: Automated exposure control, adjustment of the  mA and/or kVp according to patient's size, iterative reconstruction.     FINDINGS:  Abdomen CT: There is a tiny right pleural effusion. There is cardiomegaly and  extensive coronary artery calcification. There are no lesions in the liver or  spleen. The adrenal glands and pancreas appear normal.  There is normal  enhancement of the kidneys. There is no hydronephrosis. There is no adenopathy.     There is marked diffuse thickening of the wall the stomach suggesting gastritis. There are mild diffuse mesenteric edema and a small amount of free fluid. There is no significant bowel distention. Dialysis catheter is coiled anterior  to the left lobe of the liver.     Pelvis CT: There is a moderate amount of free fluid in the pelvis. There is no  significant adenopathy or mass. There are no bony lesions.     IMPRESSION  IMPRESSION:   1. Diffuse gastric wall thickening suggesting gastritis.   2.  Mild peritoneal edema and ascites, concerning for peritonitis        ASSESSMENT      Active Hospital Problems    Diagnosis Date Noted    Abdominal pain 09/18/2017    ESRD on peritoneal dialysis ([facility] [address] 02/01/2016    HLD (hyperlipidemia) 12/29/2015    Hypothyroidism 04/28/2015    Hypertension 04/28/2015     Plan:    ESRD on PD:  Nephrology following      HTN: chronic, controlled. Continue current regimen. Monitor     Peritonitis: Continue Vanc and Rocephin. Follow peritoneal fluid culture, NGTD.       Hypothyroidism:  increase synthroid    Anemia: GI following, likely of chronic disease. Continue epogen, of note pt is a [employer]. Holding plavix and brilinta        Spoke to Dr. Alexia Purvis myself, continue Vanc and Rocephin, follow peritoneal fluid culture. He is concerned for some type of bleeding with drop in hgb. Follow GI recommendations.        Notes, labs, VS, diagnostic testing reviewed  Time spent with pt 30 min     DVT Prophylaxis: SCDs  Plan of Care Discussed with: Supervising MD Dr. Carlos Jiang, care team, pt   Justa Joiner, CHATO

## 2017-09-19 NOTE — CONSULTS
[facility] Consult Note       Primary GI Physician: Dr Tamra Jain    Referring Physician:  Dr Amie Argueta Date:  9/19/2017    Admit Date:  9/17/2017    Chief Complaint:  Anemia    Subjective:     History of Present Illness:  Patient is a 80 y.o. female with PMH of CKD on PD, CVA, TIA, anemia (chronic disease / NASIR), GERD, HTN, thyroid ds and CAD (s/o STEMI in 2015) admitted with upper abd pain with concerns for peritonitis, who is seen in consultation at the request of Dr. Ana Ngo for normocytic anemia w hgb 9.1 and MCV of 93.0. Both BUN/CR both elevated in this PD patient at 37/9.77 but consistent with prior labs over the past year. She is on Plavix and Brilinta now on hold. She is a [employer]. She had abnormal PD cell count despite clear PD fluid with CT ordered, results pending. On vancomycin and Rocephin. 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 See below. Pt denies any overt or active GI bleeding; no hematemesis, CG emesis, hematochezia, or melena. EGD 10/2/14 by Dr Karina Eubanks with gastritis and multiple gastric polyps. Bx with hyperplastic polyps with intestinal metaplasia. No H pylori organisms seen. Recall egd 1 year  EGD 6/5/14 by Dr Xochilt Casanova with with normal esophagus and polyps in the antrum and arising from the lesser curvature of the stomach. 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 Consider push enteroscopy but yield is low. Recommendation was aggressive Epogen and iron transfusion as needed. Colonoscopy 12/14/11 Dr Yossi Bynum was normal with no additional screening rec d/t age. Her main complaint involves new onset of epigastric pain that occurred 3 days ago. \"Just sore,\" worse during PD resulting in a \"tight\" upper abdomen, pain remains after PD, & alleviated by nothing.  Pain is not associated with meals or BMs. KUB 9/17 negative for obstruction 9/17. A/P CT scan pending. Mildly elevated AST at 43, otherwise LFTs normal on 9/17. Lipase & WBC are normal. Pt denies any N/V. No GERD. EGD with Dr. Alma Blas on 7/31/17 showed esophagitis, gastritis, & HP gastric polyp. Gastric bxs neg for H pylori. She reports increase loose green, grainy stools. No F/C or abdominal pain. A colonoscopy was scheduled with Dr. Alma Blas on Oct 2016, but was cancelled due to stable Hgb, no overt bleeding, & deemed higher risk due to recent cardiac intervention at that time.          PMH:  Past Medical History:   Diagnosis Date    Anemia of chronic renal failure 4/28/2015    Anterior myocardial infarction [facility]) 5/21/2015    CAD (coronary artery disease)     Chest pain     Chronic kidney disease, stage III (moderate) 8/15/2014    on dialysis    CKD (chronic kidney disease) stage 4, GFR 15-29 ml/min ([facility] [address] 4/28/2015    Debility 5/5/2015    Depression 12/29/2015    Edema 12/29/2015    Endocrine disease     Hypothyroidism    GERD (gastroesophageal reflux disease)     Heart murmur 12/29/2015    HLD (hyperlipidemia) 12/29/2015    Hypertension     Hypothyroidism 4/28/2015    Ischemic cardiomyopathy 12/29/2015    Nausea     S/P PTCA (percutaneous transluminal coronary angioplasty); LAD PTCA of  ISR 11/27/15 5/24/2016    STEMI (ST elevation myocardial infarction) ([facility] [address] 4/28/2015    Unspecified sleep apnea     uses cpap machine    Unstable angina (HCC)        PSH:  Past Surgical History:   Procedure Laterality Date    HX BACK SURGERY  1990    neck surgery cervical disc    HX BACK SURGERY      lower back    HX CATARACT REMOVAL Bilateral     HX CHOLECYSTECTOMY  19702    gall bladder     HX KNEE REPLACEMENT Right 2006    HX PTCA  4/28/2015    2.25 Xience stent to mid LAD for occluded artery, anterior MI, EF 25%. Moderate disease distal LAD and distal OM PCI CX and RCA 2004, then PCI RCA and LAD in 2009. Allergies: Allergies   Allergen Reactions    Codeine Nausea and Vomiting       Home Medications:  Prior to Admission medications    Medication Sig Start Date End Date Taking? Authorizing Provider   calcitRIOL (ROCALTROL) 0.25 mcg capsule Take 0.25 mcg by mouth daily. Yes Historical Provider   clopidogrel (PLAVIX) 75 mg tab Take 1 Tab by mouth daily. 4/13/17  Yes Meena Crystal MD   nitroglycerin (NITROLINGUAL) 400 mcg/spray spray 1 Spray by SubLINGual route every five (5) minutes as needed for Chest Pain. Yes Historical Provider   linaclotide Ki Shanks) 145 mcg cap capsule Take  by mouth Daily (before breakfast). Yes Historical Provider   folic acid 137 mcg tablet Take 800 mcg by mouth daily. Yes Historical Provider   magnesium oxide (MAG-OX) 400 mg tablet Take 400 mg by mouth daily. Yes Historical Provider   amLODIPine (NORVASC) 5 mg tablet Take 5 mg by mouth daily. Yes Historical Provider   potassium chloride (KLOR-CON M10) 10 mEq tablet Take 10 mEq by mouth two (2) times a day. Yes Historical Provider   PNV NO.122/IRON/FOLIC ACID (PRENATAL MULTI PO) Take  by mouth. Yes Historical Provider   metoprolol tartrate (LOPRESSOR) 25 mg tablet Take 12.5 mg by mouth daily. Take PRN for BP <120. 6/23/16  Yes Marylou Godoy III, MD   ondansetron (ZOFRAN ODT) 4 mg disintegrating tablet Take 4 mg by mouth three (3) times daily as needed. Yes Historical Provider   metoclopramide HCl (REGLAN) 5 mg tablet Take 5 mg by mouth two (2) times a day. Yes Historical Provider   famotidine (PEPCID) 40 mg tablet Take 20 mg by mouth daily. Yes Historical Provider   ticagrelor (BRILINTA) 90 mg tablet Take 1 Tab by mouth two (2) times a day. 2/4/16  Yes MINDY Chatman   promethazine (PHENERGAN) 25 mg tablet Take 25 mg by mouth every eight (8) hours as needed for Nausea.    Yes Historical Provider   gentamicin (GARAMYCIN) 0.1 % topical cream APPLY TO PD catheter exit site at daily dressing change 5/12/15  Yes Itzel Barragan MD   aspirin delayed-release 81 mg tablet Take 1 Tab by mouth daily. 5/5/15  Yes Gisela Hollingsworth PA-C   darbepoetin toya in polysorbat (ARANESP, POLYSORBATE,) 40 mcg/mL injection 40 mcg by SubCUTAneous route every fourteen (14) days. Indications: ANEMIA IN CHRONIC KIDNEY DISEASE   Yes Historical Provider   rosuvastatin (CRESTOR) 20 mg tablet Take 20 mg by mouth nightly. Yes Historical Provider   ranolazine ER (RANEXA) 500 mg SR tablet Take 500 mg by mouth two (2) times a day. Yes Historical Provider   levothyroxine (SYNTHROID) 150 mcg tablet Take 150 mcg by mouth Daily (before breakfast). Yes Historical Provider   torsemide (DEMADEX) 100 mg tablet Take  by mouth daily. Historical Provider   cinacalcet (SENSIPAR) 90 mg tab Take  by mouth. Historical Provider   sevelamer carbonate (RENVELA) 800 mg tab tab Take 800 mg by mouth three (3) times daily (with meals).     Historical Provider       Hospital Medications:  Current Facility-Administered Medications   Medication Dose Route Frequency    [START ON 9/20/2017] epoetin toya (EPOGEN;PROCRIT) injection 20,000 Units  20,000 Units SubCUTAneous Q MON, WED & FRI    sodium chloride 0.9 % bolus infusion 100 mL  100 mL IntraVENous ONCE    saline peripheral flush soln 10 mL  10 mL InterCATHeter RAD ONCE    diatrizoate meglumine-d.sodium (MD-GASTROVIEW,GASTROGRAFIN) 66-10 % contrast solution 15 mL  15 mL Oral RAD ONCE    iopamidol (ISOVUE-370) 76 % injection 100 mL  100 mL IntraVENous RAD ONCE    amLODIPine (NORVASC) tablet 5 mg  5 mg Oral DAILY    aspirin delayed-release tablet 81 mg  81 mg Oral DAILY    calcitRIOL (ROCALTROL) capsule 0.25 mcg  0.25 mcg Oral DAILY    cinacalcet tab 90 mg  90 mg Oral DAILY    docusate sodium (COLACE) capsule 100 mg  100 mg Oral DAILY    famotidine (PEPCID) tablet 20 mg  20 mg Oral DAILY    gentamicin (GARAMYCIN) 0.1 % cream   Topical DIALYSIS PRN    magnesium oxide (MAG-OX) tablet 400 mg 400 mg Oral DAILY    metoclopramide HCl (REGLAN) tablet 5 mg  5 mg Oral ACB/HS    metoprolol tartrate (LOPRESSOR) tablet 12.5 mg  12.5 mg Oral DAILY    ondansetron (ZOFRAN ODT) tablet 4 mg  4 mg Oral Q6H PRN    promethazine (PHENERGAN) tablet 25 mg  25 mg Oral Q6H PRN    ranolazine ER (RANEXA) tablet 500 mg  500 mg Oral BID    rosuvastatin (CRESTOR) tablet 20 mg  20 mg Oral QHS    sevelamer (RENAGEL) tablet 800 mg  800 mg Oral TID WITH MEALS    torsemide (DEMADEX) tablet 100 mg  100 mg Oral DAILY    sodium chloride (NS) flush 5-10 mL  5-10 mL IntraVENous Q8H    sodium chloride (NS) flush 5-10 mL  5-10 mL IntraVENous PRN    acetaminophen (TYLENOL) tablet 650 mg  650 mg Oral Q4H PRN    HYDROcodone-acetaminophen (NORCO) 5-325 mg per tablet 1 Tab  1 Tab Oral Q4H PRN    morphine injection 2 mg  2 mg IntraVENous Q3H PRN    vancomycin (VANCOCIN) 1250 mg in  ml infusion  1,250 mg IntraVENous Rx Dosing/Monitoring    cefTRIAXone (ROCEPHIN) 1 g in 0.9% sodium chloride (MBP/ADV) 50 mL  1 g IntraVENous Q24H    nitroglycerin (NITROSTAT) tablet 0.4 mg  0.4 mg SubLINGual PRN    heparin (porcine) injection 5,000 Units  5,000 Units SubCUTAneous Q12H    potassium chloride (K-DUR, KLOR-CON) SR tablet 20 mEq  20 mEq Oral BID    multivitamin w ZN (STRESSTABS W ZINC) tablet  1 Tab Oral DAILY    levothyroxine (SYNTHROID) tablet 175 mcg  175 mcg Oral ACB       Social History:  Social History   Substance Use Topics    Smoking status: Never Smoker    Smokeless tobacco: Never Used    Alcohol use No         Family History: Neg for colon CA or polyps. Family History   Problem Relation Age of Onset    Heart Disease Mother     Hypertension Mother     Cancer Mother      Lung    Stroke Father     Hypertension Father     Breast Cancer Neg Hx        Review of Systems:  A detailed 10 system ROS is obtained, with pertinent positives as listed above. All others are negative.     Diet:  regular    Objective: Physical Exam:  Vitals:  Visit Vitals    /84 (BP 1 Location: Right arm, BP Patient Position: At rest)    Pulse 75    Temp 98.2 °F (36.8 °C)    Resp 18    Ht 5' 10\" (1.778 m)    Wt 86.2 kg (190 lb)    SpO2 98%    BMI 27.26 kg/m2     Gen:  Pt is alert, cooperative, no acute distress  Skin:  Extremities and face reveal no rashes. HEENT: Sclerae anicteric. Extra-occular muscles are intact. No oral ulcers. No abnormal pigmentation of the lips. The neck is supple. Cardiovascular: Regular rate and rhythm. GRADE II/VI SYSTOLIC MURMUR NOTED. Respiratory:  Comfortable breathing with no accessory muscle use. Clear breath sounds anteriorly with no wheezes, rales, or rhonchi. GI:  Abdomen MILDLY DISTENDED & TTP EPIGASTRIC with some GUARDING, but no REBOUND TDNERNESS. Normal active bowel sounds. No enlargement of the liver or spleen. No masses palpable. Rectal:  Deferred  Musculoskeletal:  No pitting edema of the lower legs. Neurological:  Gross memory appears intact. Patient is alert and oriented. Psychiatric:  Mood appears appropriate with judgement intact. Lymphatic:  No cervical or supraclavicular adenopathy.     Laboratory:    Recent Labs      09/19/17   0544  09/17/17   1950   WBC  6.9  10.6   HGB  9.1*  10.5*   HCT  27.8*  30.9*   PLT  200  252   MCV  93.0  90.9   NA  139  138   K  3.3*  3.6   CL  102  98   CO2  28  25   BUN  37*  34*   CREA  9.77*  9.68*   CA  8.4  9.1   GLU  80  88   AP   --   72   SGOT   --   43*   ALT   --   18   TBILI   --   0.3   ALB   --   1.6*   TP   --   6.3   LPSE   --   139          Assessment:     Principal Problem:  Abdominal pain (9/18/2017)    Active Problems:  Hypertension (4/28/2015)  Hypothyroidism (4/28/2015)  HLD (hyperlipidemia) (12/29/2015)  ESRD on peritoneal dialysis [facility]) (2/1/2016)    80 y.o. female with PMH of CKD on PD, CVA, TIA, anemia (chronic disease / NASIR), GERD, HTN, thyroid ds and CAD (s/o STEMI in 2015) admitted with upper abd pain with concerns for peritonitis, who is seen in consultation at the request of Dr. Shabbir Augustin for normocytic anemia w hgb 9.1 and MCV of 93.0 -- above baseline (Hgb previously in the 6s). No overt or active bleeding. 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 Underwent recent EGD with Dr. Alma Blas for nausea with findings of esophagitis, HP gastric polyp, & gastritis. Normal colonoscopy in 2011. Anemia likely related to anemia of chronic disease. Her main complaint is upper abdominal pain that is worse during and remains after PD.  abdominal films were negative. A/P CT scan pending. Concern for possible peritonitis. LFTs & lipase were normal.     Plan:     No plans for endoscopy for anemia considering recent EGD by Dr. Alma Blas as above, relatively stable Hgb, & no evidence of active GI bleeding. Monitor HH. (Patient is Jehov[employer] Witness). Agree with EPO. FU A/P CT scan results. Continue abx. Further recs pending pt's clinical course. Gilson Alvarez PA-C    Patient is seen and examined in collaboration with Dr. Ángel Singh. Assessment and plan as per Dr. Sonido Solorzano. ATTENDING NOTE:  I have seen the patient and agree with the above assessment and plan. Suspect anemia of chronic disease. Follow up CT today to evaluate abdominal pain.     Ángel Singh MD

## 2017-09-20 LAB
ALBUMIN SERPL-MCNC: 1.8 G/DL (ref 3.2–4.6)
ALBUMIN/GLOB SERPL: 0.5 {RATIO} (ref 1.2–3.5)
ALP SERPL-CCNC: 88 U/L (ref 50–136)
ALT SERPL-CCNC: 17 U/L (ref 12–65)
ANION GAP SERPL CALC-SCNC: 11 MMOL/L (ref 7–16)
AST SERPL-CCNC: 27 U/L (ref 15–37)
BILIRUB SERPL-MCNC: 0.3 MG/DL (ref 0.2–1.1)
BUN SERPL-MCNC: 33 MG/DL (ref 8–23)
CALCIUM SERPL-MCNC: 8.7 MG/DL (ref 8.3–10.4)
CHLORIDE SERPL-SCNC: 101 MMOL/L (ref 98–107)
CO2 SERPL-SCNC: 26 MMOL/L (ref 21–32)
CREAT SERPL-MCNC: 9.46 MG/DL (ref 0.6–1)
GLOBULIN SER CALC-MCNC: 3.8 G/DL (ref 2.3–3.5)
GLUCOSE SERPL-MCNC: 102 MG/DL (ref 65–100)
POTASSIUM SERPL-SCNC: 3.1 MMOL/L (ref 3.5–5.1)
PROT SERPL-MCNC: 5.6 G/DL (ref 6.3–8.2)
SODIUM SERPL-SCNC: 138 MMOL/L (ref 136–145)
VANCOMYCIN SERPL-MCNC: 20.9 UG/ML

## 2017-09-20 PROCEDURE — 74011250637 HC RX REV CODE- 250/637: Performed by: INTERNAL MEDICINE

## 2017-09-20 PROCEDURE — 65270000029 HC RM PRIVATE

## 2017-09-20 PROCEDURE — 80202 ASSAY OF VANCOMYCIN: CPT | Performed by: HOSPITALIST

## 2017-09-20 PROCEDURE — 90945 DIALYSIS ONE EVALUATION: CPT

## 2017-09-20 PROCEDURE — 74011250637 HC RX REV CODE- 250/637: Performed by: NURSE PRACTITIONER

## 2017-09-20 PROCEDURE — 74011250637 HC RX REV CODE- 250/637: Performed by: HOSPITALIST

## 2017-09-20 PROCEDURE — 74011250636 HC RX REV CODE- 250/636: Performed by: INTERNAL MEDICINE

## 2017-09-20 PROCEDURE — 36415 COLL VENOUS BLD VENIPUNCTURE: CPT | Performed by: HOSPITALIST

## 2017-09-20 PROCEDURE — 80053 COMPREHEN METABOLIC PANEL: CPT | Performed by: HOSPITALIST

## 2017-09-20 PROCEDURE — 74011250637 HC RX REV CODE- 250/637: Performed by: PHYSICIAN ASSISTANT

## 2017-09-20 RX ORDER — SUCRALFATE 1 G/10ML
1 SUSPENSION ORAL
Status: DISCONTINUED | OUTPATIENT
Start: 2017-09-20 | End: 2017-09-23 | Stop reason: HOSPADM

## 2017-09-20 RX ORDER — VANCOMYCIN HYDROCHLORIDE
1250 ONCE
Status: DISCONTINUED | OUTPATIENT
Start: 2017-09-20 | End: 2017-09-20

## 2017-09-20 RX ORDER — AMLODIPINE BESYLATE 10 MG/1
10 TABLET ORAL DAILY
Status: DISCONTINUED | OUTPATIENT
Start: 2017-09-21 | End: 2017-09-23 | Stop reason: HOSPADM

## 2017-09-20 RX ORDER — CYANOCOBALAMIN 1000 UG/ML
1000 INJECTION, SOLUTION INTRAMUSCULAR; SUBCUTANEOUS ONCE
Status: COMPLETED | OUTPATIENT
Start: 2017-09-20 | End: 2017-09-20

## 2017-09-20 RX ORDER — HYDRALAZINE HYDROCHLORIDE 20 MG/ML
20 INJECTION INTRAMUSCULAR; INTRAVENOUS
Status: DISCONTINUED | OUTPATIENT
Start: 2017-09-20 | End: 2017-09-23 | Stop reason: HOSPADM

## 2017-09-20 RX ADMIN — LEVOTHYROXINE SODIUM 175 MCG: 125 TABLET ORAL at 06:06

## 2017-09-20 RX ADMIN — GENTAMICIN SULFATE: 1 CREAM TOPICAL at 15:03

## 2017-09-20 RX ADMIN — SUCRALFATE 1 G: 1 SUSPENSION ORAL at 12:32

## 2017-09-20 RX ADMIN — ZINC 1 TABLET: TAB ORAL at 08:59

## 2017-09-20 RX ADMIN — Medication 10 ML: at 23:27

## 2017-09-20 RX ADMIN — METOPROLOL TARTRATE 12.5 MG: 25 TABLET ORAL at 08:05

## 2017-09-20 RX ADMIN — POTASSIUM CHLORIDE 20 MEQ: 20 TABLET, EXTENDED RELEASE ORAL at 08:59

## 2017-09-20 RX ADMIN — ROSUVASTATIN CALCIUM 20 MG: 20 TABLET, FILM COATED ORAL at 22:22

## 2017-09-20 RX ADMIN — DOCUSATE SODIUM 100 MG: 100 CAPSULE, LIQUID FILLED ORAL at 08:04

## 2017-09-20 RX ADMIN — Medication 10 ML: at 14:00

## 2017-09-20 RX ADMIN — METOCLOPRAMIDE HYDROCHLORIDE 5 MG: 10 TABLET ORAL at 23:27

## 2017-09-20 RX ADMIN — Medication 400 MG: at 08:58

## 2017-09-20 RX ADMIN — HYDRALAZINE HYDROCHLORIDE 20 MG: 20 INJECTION INTRAMUSCULAR; INTRAVENOUS at 15:59

## 2017-09-20 RX ADMIN — TORSEMIDE 100 MG: 100 TABLET ORAL at 08:06

## 2017-09-20 RX ADMIN — AMLODIPINE BESYLATE 5 MG: 5 TABLET ORAL at 08:04

## 2017-09-20 RX ADMIN — CYANOCOBALAMIN 1000 MCG: 1000 INJECTION, SOLUTION INTRAMUSCULAR at 12:32

## 2017-09-20 RX ADMIN — Medication 10 ML: at 06:08

## 2017-09-20 RX ADMIN — RANOLAZINE 500 MG: 500 TABLET, FILM COATED, EXTENDED RELEASE ORAL at 09:54

## 2017-09-20 RX ADMIN — ERYTHROPOIETIN 20000 UNITS: 20000 INJECTION, SOLUTION INTRAVENOUS; SUBCUTANEOUS at 17:34

## 2017-09-20 RX ADMIN — ONDANSETRON 4 MG: 4 TABLET, ORALLY DISINTEGRATING ORAL at 18:00

## 2017-09-20 RX ADMIN — METOCLOPRAMIDE HYDROCHLORIDE 5 MG: 10 TABLET ORAL at 09:54

## 2017-09-20 RX ADMIN — PANTOPRAZOLE SODIUM 40 MG: 40 TABLET, DELAYED RELEASE ORAL at 06:52

## 2017-09-20 NOTE — PROGRESS NOTES
Pt blood pressure was elevated to 165/93 and had been trending high for over 24 hours. Notified attending physician. Administered PRN anti-hypertensive. After approx. 1 hour pt's BP came down to 148/82.

## 2017-09-20 NOTE — PROGRESS NOTES
GI DAILY PROGRESS NOTE    Admit Date:  9/17/2017    Today's Date:  9/20/2017    CC:  Upper abdominal pain    Subjective:     Patient had an awful night with upper abdominal swelling which is better this morning. No N/V. No F/C. Denies any BMs for 3 days.      Medications:   Current Facility-Administered Medications   Medication Dose Route Frequency    epoetin toya (EPOGEN;PROCRIT) injection 20,000 Units  20,000 Units SubCUTAneous Q MON, WED & FRI    pantoprazole (PROTONIX) tablet 40 mg  40 mg Oral ACB    amLODIPine (NORVASC) tablet 5 mg  5 mg Oral DAILY    calcitRIOL (ROCALTROL) capsule 0.25 mcg  0.25 mcg Oral DAILY    cinacalcet tab 90 mg  90 mg Oral DAILY    docusate sodium (COLACE) capsule 100 mg  100 mg Oral DAILY    gentamicin (GARAMYCIN) 0.1 % cream   Topical DIALYSIS PRN    magnesium oxide (MAG-OX) tablet 400 mg  400 mg Oral DAILY    metoclopramide HCl (REGLAN) tablet 5 mg  5 mg Oral ACB/HS    metoprolol tartrate (LOPRESSOR) tablet 12.5 mg  12.5 mg Oral DAILY    ondansetron (ZOFRAN ODT) tablet 4 mg  4 mg Oral Q6H PRN    promethazine (PHENERGAN) tablet 25 mg  25 mg Oral Q6H PRN    ranolazine ER (RANEXA) tablet 500 mg  500 mg Oral BID    rosuvastatin (CRESTOR) tablet 20 mg  20 mg Oral QHS    sevelamer (RENAGEL) tablet 800 mg  800 mg Oral TID WITH MEALS    torsemide (DEMADEX) tablet 100 mg  100 mg Oral DAILY    sodium chloride (NS) flush 5-10 mL  5-10 mL IntraVENous Q8H    sodium chloride (NS) flush 5-10 mL  5-10 mL IntraVENous PRN    acetaminophen (TYLENOL) tablet 650 mg  650 mg Oral Q4H PRN    HYDROcodone-acetaminophen (NORCO) 5-325 mg per tablet 1 Tab  1 Tab Oral Q4H PRN    morphine injection 2 mg  2 mg IntraVENous Q3H PRN    vancomycin (VANCOCIN) 1250 mg in  ml infusion  1,250 mg IntraVENous Rx Dosing/Monitoring    cefTRIAXone (ROCEPHIN) 1 g in 0.9% sodium chloride (MBP/ADV) 50 mL  1 g IntraVENous Q24H    nitroglycerin (NITROSTAT) tablet 0.4 mg  0.4 mg SubLINGual PRN    potassium chloride (K-DUR, KLOR-CON) SR tablet 20 mEq  20 mEq Oral BID    multivitamin w ZN (STRESSTABS W ZINC) tablet  1 Tab Oral DAILY    levothyroxine (SYNTHROID) tablet 175 mcg  175 mcg Oral ACB       Review of Systems:  ROS was obtained, with pertinent positives as listed above. No chest pain or SOB. Diet:  Regulat    Objective:   Vitals:  Visit Vitals    BP (!) 167/95 (BP 1 Location: Right arm, BP Patient Position: At rest)    Pulse 77    Temp 98 °F (36.7 °C)    Resp 18    Ht 5' 10\" (1.778 m)    Wt 86.2 kg (190 lb)    SpO2 97%    BMI 27.26 kg/m2     Intake/Output:     09/18 1901 - 09/20 0700  In: 480 [P.O.:480]  Out: 552   Exam:  Gen:  Pt is alert, cooperative, no acute distress  HEENT: Sclerae anicteric. Extra-occular muscles are intact. No oral ulcers. No abnormal pigmentation of the lips. The neck is supple. Cardiovascular: Regular rate and rhythm. GRADE II/VI SYSTOLIC MURMUR NOTED. Respiratory:   Clear breath sounds anteriorly with no wheezes, rales, or rhonchi. GI:  Abdomen MILDLY DISTENDED & TTP EPIGASTRIC with some GUARDING, but no REBOUND TDNERNESS. Normal active bowel sounds. No enlargement of the liver or spleen. No masses palpable. Musculoskeletal:  No pitting edema of the lower legs. Neurological:  Patient is alert and oriented.       Data Review (Labs):    Recent Labs      09/19/17   0544  09/17/17   1950   WBC  6.9  10.6   HGB  9.1*  10.5*   HCT  27.8*  30.9*   PLT  200  252   MCV  93.0  90.9   NA  139  138   K  3.3*  3.6   CL  102  98   CO2  28  25   BUN  37*  34*   CREA  9.77*  9.68*   CA  8.4  9.1   GLU  80  88   AP   --   72   SGOT   --   43*   ALT   --   18   TBILI   --   0.3   ALB   --   1.6*   TP   --   6.3   LPSE   --   139       Assessment:     Principal Problem:  Abdominal pain (9/18/2017)    Active Problems:  Hypertension (4/28/2015)  Hypothyroidism (4/28/2015)  HLD (hyperlipidemia) (12/29/2015)  ESRD on peritoneal dialysis [facility]) (2/1/2016)    80 y.o. female with PMH of CKD on PD, CVA, TIA, anemia (chronic disease / NASIR), GERD, HTN, thyroid ds and CAD (s/o STEMI in 2015) admitted with upper abd pain with concerns for peritonitis, who is seen in consultation at the request of Dr. Antonia Awad for normocytic anemia w hgb 9.1 and MCV of 93.0 -- above baseline (Hgb previously in the 6s). No overt or active bleeding. 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 Underwent recent EGD 7/31/17 with Dr. Jassi Dent for nausea with findings of esophagitis, HP gastric polyp, & gastritis. Normal colonoscopy in 2011. Anemia likely related to anemia of chronic disease. Her main complaint is upper abdominal pain that is worse during and remains after PD.  abdominal films were negative. LFTs & lipase were normal. A/P CT scan showed diffuse gastric wall thickening suggesting gastritis & mild peritoneal edema and ascites, concerning for peritonitis. Abdominal pain likely r/t the latter +/- gastritis. Plan:     FU CBC today to check Hgb trend. O/w no overt bleeding. No plans for EGD unless pain persists despite PPI/Carafate/Abx. On Vanc & Rocephin for peritonitis. Continue supportive care. Amy Rosales PA-C    ATTENDING NOTE:  I agree with the above assessment and plan. Agree with current strategy of care. Will add Carafate for dyspepsia. Cortes Amaro MD       ADDENDUM: Pt is [address] witness, but has had 2 recent blood transfusions.

## 2017-09-20 NOTE — PROGRESS NOTES
Hourly rounds completed, all needs were met.  Patient refused lab this morning and stated she wanted to talk to the doctor first.

## 2017-09-20 NOTE — PROGRESS NOTES
PT note: Patient declined therapy evaluation this afternoon, stating she had just been hooked up to her peritoneal dialysis machine and has been having difficulty with this. Requests therapist to check back tomorrow. Will re-attempt 9/21/17.     Hugo Smith DPT

## 2017-09-20 NOTE — PROGRESS NOTES
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 She states the pain has since resolved. No fevers or chills. No nausea, vomiting or diarrhea. She states that she is essentially anuric and occasionally makes minimal urine. She denies any other acute complaints She has a h/o CVA and TIA. No fever or chills, no problems with PD drainage or discoloration of PD fluid. No increased thirst. She wishes regular diet and supplement.      9/19/17 - c/o much abdominal pain and abdominal distention which is different compared to her usual PD associated distention - she is [employer] witness, has marked drop in Hb overnight and is quite cincerned - also had abnormal PD cell count despite clear PD fluid - discussed plans to get CT and GI consult - hold Plavix and Brilinta in the face of dropping Hb and possible need for procedures, not sure why she is on both  9/20/17 - CT scan and report of GI consultation reviewed with patient, no endoscopy needed at this time - discussed low free T4 and adjustment in thyroid hormone replacement - she had only [id number] mcg about one week ago - no overt bleeding at this time - continue medical therapy for PUD and discontinue Sensipar with is a GI irritant    Past Medical History:   Diagnosis Date    Anemia of chronic renal failure 4/28/2015    Anterior myocardial infarction ([address] 5/21/2015    CAD (coronary artery disease)     Chest pain     Chronic kidney disease, stage III (moderate) 8/15/2014    on dialysis    CKD (chronic kidney disease) stage 4, GFR 15-29 ml/min ([address] 4/28/2015    Debility 5/5/2015    Depression 12/29/2015    Edema 12/29/2015    Endocrine disease     Hypothyroidism    GERD (gastroesophageal reflux disease)     Heart murmur 12/29/2015    HLD (hyperlipidemia) 12/29/2015    Hypertension     Hypothyroidism 4/28/2015    Ischemic cardiomyopathy 12/29/2015    Nausea     S/P PTCA (percutaneous transluminal coronary angioplasty); LAD PTCA of  ISR 11/27/15 5/24/2016    STEMI (ST elevation myocardial infarction) [address] [address] 4/28/2015    Unspecified sleep apnea     uses cpap machine    Unstable angina ([facility] [address]       Past Surgical History:   Procedure Laterality Date    HX BACK SURGERY  1990    neck surgery cervical disc    HX BACK SURGERY      lower back    HX CATARACT REMOVAL Bilateral     HX CHOLECYSTECTOMY  19702    gall bladder     HX KNEE REPLACEMENT Right 2006    HX PTCA  4/28/2015    2.25 Xience stent to mid LAD for occluded artery, anterior MI, EF 25%. Moderate disease distal LAD and distal OM PCI CX and RCA 2004, then PCI RCA and LAD in 2009. Prior to Admission medications    Medication Sig Start Date End Date Taking? Authorizing Provider   calcitRIOL (ROCALTROL) 0.25 mcg capsule Take 0.25 mcg by mouth daily. Yes Historical Provider   clopidogrel (PLAVIX) 75 mg tab Take 1 Tab by mouth daily. 4/13/17  Yes Mariah Lehman MD   nitroglycerin (NITROLINGUAL) 400 mcg/spray spray 1 Spray by SubLINGual route every five (5) minutes as needed for Chest Pain. Yes Historical Provider   linaclotide Vedia José) 145 mcg cap capsule Take  by mouth Daily (before breakfast). Yes Historical Provider   folic acid 469 mcg tablet Take 800 mcg by mouth daily. Yes Historical Provider   magnesium oxide (MAG-OX) 400 mg tablet Take 400 mg by mouth daily. Yes Historical Provider   amLODIPine (NORVASC) 5 mg tablet Take 5 mg by mouth daily. Yes Historical Provider   potassium chloride (KLOR-CON M10) 10 mEq tablet Take 10 mEq by mouth two (2) times a day. Yes Historical Provider   PNV NO.122/IRON/FOLIC ACID (PRENATAL MULTI PO) Take  by mouth. Yes Historical Provider   metoprolol tartrate (LOPRESSOR) 25 mg tablet Take 12.5 mg by mouth daily.  Take PRN for BP <120. 6/23/16  Yes Samara Serrato III, MD   ondansetron (ZOFRAN ODT) 4 mg disintegrating tablet Take 4 mg by mouth three (3) times daily as needed. Yes Historical Provider   metoclopramide HCl (REGLAN) 5 mg tablet Take 5 mg by mouth two (2) times a day. Yes Historical Provider   famotidine (PEPCID) 40 mg tablet Take 20 mg by mouth daily. Yes Historical Provider   ticagrelor (BRILINTA) 90 mg tablet Take 1 Tab by mouth two (2) times a day. 2/4/16  Yes MINDY Chatman   promethazine (PHENERGAN) 25 mg tablet Take 25 mg by mouth every eight (8) hours as needed for Nausea. Yes Historical Provider   gentamicin (GARAMYCIN) 0.1 % topical cream APPLY TO PD catheter exit site at daily dressing change 5/12/15  Yes Mari Mujica MD   aspirin delayed-release 81 mg tablet Take 1 Tab by mouth daily. 5/5/15  Yes Gisela Hollingsworth PA-C   darbepoetin toya in polysorbat (ARANESP, POLYSORBATE,) 40 mcg/mL injection 40 mcg by SubCUTAneous route every fourteen (14) days. Indications: ANEMIA IN CHRONIC KIDNEY DISEASE   Yes Historical Provider   rosuvastatin (CRESTOR) 20 mg tablet Take 20 mg by mouth nightly. Yes Historical Provider   ranolazine ER (RANEXA) 500 mg SR tablet Take 500 mg by mouth two (2) times a day. Yes Historical Provider   levothyroxine (SYNTHROID) 150 mcg tablet Take 150 mcg by mouth Daily (before breakfast). Yes Historical Provider   torsemide (DEMADEX) 100 mg tablet Take  by mouth daily. Historical Provider   cinacalcet (SENSIPAR) 90 mg tab Take  by mouth. Historical Provider   sevelamer carbonate (RENVELA) 800 mg tab tab Take 800 mg by mouth three (3) times daily (with meals).     Historical Provider     Allergies   Allergen Reactions    Codeine Nausea and Vomiting      Social History   Substance Use Topics    Smoking status: Never Smoker    Smokeless tobacco: Never Used    Alcohol use No      Family History   Problem Relation Age of Onset    Heart Disease Mother     Hypertension Mother     Cancer Mother      Lung    Stroke Father     Hypertension Father     Breast Cancer Neg Hx           Review of Systems    Constitutional: no fever,   Eyes: fair vision,   Ears, nose, mouth, throat, and face:fair hearing,    Respiratory: no asthma,    Cardiovascular:no chest pain,   Gastrointestinal:no diarrhea,   Genitourinary: no dysuria    Hematologic/lymphatic: no bleeding tendency,    Neurological: no seizures,    Behvioral/Psych: no psych hospitalization    Endocrine: no goiter,       Objective:       Visit Vitals    BP (!) 167/95 (BP 1 Location: Right arm, BP Patient Position: At rest)    Pulse 77    Temp 98 °F (36.7 °C)    Resp 18    Ht 5' 10\" (1.778 m)    Wt 86.2 kg (190 lb)    SpO2 97%    BMI 27.26 kg/m2       09/20 0701 - 09/20 1900  In: 240 [P.O.:240]  Out: -   09/18 1901 - 09/20 0700  In: 480 [P.O.:480]  Out: 552       General:  Alert, cooperative, no distress, appears stated age. Head:  Normocephalic, without obvious abnormality, atraumatic. Eyes:  Conjunctivae/corneas clear. EOMs intact. Throat: Lips, mucosa, and tongue normal. Teeth and gums normal.   Neck: Supple, symmetrical, trachea midline, no adenopathy,  no JVD. Lungs:   Clear to auscultation bilaterally. Heart:  Regular rate and rhythm, S1, S2 normal, no murmur,  rub or gallop. Abdomen:   Soft, mild tenderness in epigastrium, no rebound. mild abdominal distention is present. No masses,  No organomegaly. No renal bruit. PD catheter exit site is perfect. Extremities: Extremities normal, atraumatic, no cyanosis or edema. Skin: Skin color, texture, turgor normal. No rashes or lesions. Lymph nodes: Cervical and supraclavicular nodes normal.   Neurologic: Grossly intact. Moves all extremities. No asterixis. Data Review:     CT Abdomen and pelvis -  There is a tiny right pleural effusion. There is cardiomegaly and  extensive coronary artery calcification. There are no lesions in the liver or  spleen. The adrenal glands and pancreas appear normal.  There is normal  enhancement of the kidneys. There is no hydronephrosis. There is no adenopathy.  Mariam Lie is marked diffuse thickening of the wall the stomach suggesting gastritis. There are mild diffuse mesenteric edema and a small amount of free fluid. There is no significant bowel distention. Dialysis catheter is coiled anterior  to the left lobe of the liver.   Pelvis CT: There is a moderate amount of free fluid in the pelvis. There is no  significant adenopathy or mass. There are no bony lesions. IMPRESSION:   1. Diffuse gastric wall thickening suggesting gastritis. 2.  Mild peritoneal edema and ascites, concerning for peritonitis. Principal Problem:    Abdominal pain (9/18/2017)    Active Problems:    Hypertension (4/28/2015)      Hypothyroidism (4/28/2015)      HLD (hyperlipidemia) (12/29/2015)      ESRD on peritoneal dialysis ([facility] [address] (2/1/2016)        Assessment:     1. Abdominal pain  -  - clinically not peritonitis, not clear why she has markedly increased RBC in PD fluid, the WBC count of greater than 2000  WBC may be due to blood in peritoneum, but need to be cautious and treat as if she had peritonitios  - CT abdomen done and concur with recommendations by GI service     2. Hypokalemia -  - replace KCl     3. Anemia -  - treat with WAYNE aggressively  - add N31 and Folic acid  - increasedEPO     4. HTN -  - continue current meds     5. ESRD -  - PD ordered per chronic orders     6. Volume status -  - clinically near euvolemic     7. Hypothyroidism -  - replace thyroid hormone at increased dose of 175 mcg    8.  Anticoagulation -  - hold Plavix as she has drop in Hb and is on subQ heparin 5000 units q 12 hours   - hold Brilinta      Plan:       Yesy Sood M.D.

## 2017-09-20 NOTE — PROGRESS NOTES
Hospitalist Progress Note    2017  Admit Date: 2017  7:52 PM   NAME: [facility]   :  10/26/1932   MRN:  [id number]   Attending: Fareed Lazo MD  PCP:  Gela Collazo MD    SUBJECTIVE:     Ms. Elmer Guzman is a 79 yo female who presented with c/o acute upper abd pain  She is on PD at home, last dialysis 17. Peritoneal fluid hazy, yellow with <289233 RBC, WBC 2647, 92 neutrophils. Peritoneal fluid sent for culture.  Nephrology consulted. Started on Vanco and Rocephin on admission. Plavix and Brilinta held d/t drop in hgb. GI consulted for normocytic anemia by . CT abd pelvis showing Diffuse gastric wall thickening suggesting gastritis, Mild peritoneal edema and ascites, concerning for peritonitis.  - pt reporting worsening abdominal \"swelling\" this AM. No nausea/vomiting. Still with discomfort in upper abd.        Review of Systems negative with exception of pertinent positives noted above  PHYSICAL EXAM     Visit Vitals    BP (!) 176/96 (BP Patient Position: At rest;Sitting)    Pulse 76    Temp 97.9 °F (36.6 °C)    Resp 18    Ht 5' 10\" (1.778 m)    Wt 86.2 kg (190 lb)    SpO2 99%    BMI 27.26 kg/m2      Temp (24hrs), Av °F (36.7 °C), Min:97.7 °F (36.5 °C), Max:98.2 °F (36.8 °C)    Oxygen Therapy  O2 Sat (%): 99 % (17 1520)  O2 Device: Room air (17 1942)    Intake/Output Summary (Last 24 hours) at 17 1658  Last data filed at 17 1349   Gross per 24 hour   Intake              720 ml   Output              717 ml   Net                3 ml      General: No acute distress    Lungs:  CTA Bilaterally.    Heart:  Regular rate and rhythm,  No murmur, rub, or gallop  Abdomen: Soft, Non distended, minimally TTP in epigastrum Positive bowel sounds  Extremities: No cyanosis, clubbing or edema  Neurologic:  No focal deficits    ASSESSMENT      Active Hospital Problems    Diagnosis Date Noted    Abdominal pain 2017    ESRD on peritoneal dialysis ([facility] [address] 02/01/2016    HLD (hyperlipidemia) 12/29/2015    Hypothyroidism 04/28/2015    Hypertension 04/28/2015     A/P  - Abdominal pain- likely multifactorial. Treating empirically for peritonitis based upon appearace of PD fluid - cx ngtd. Also with evidence of gastritis by CT scan. EGD on 7/31 with Dr Lalito Paige notable for esophagitis. GI following. Cont PPI with addition of carafate today  - Anemia - no evidence of active bleeding. EPO dosing increased per nephro. Due to drop in h/h, plavix and brillanta held. Repeat H/h in Am and consider resuming at least one anti-platelet given hx of CAD/PCI's. - ESRD - on PD as per nephrology  - Hypothyroidism - TSH elevated but pt just recently resumed her synthroid. Will need repeat tsh in 4 weeks after discharge  - HTN - suboptimal control. Increase norvasc with prn hydralazine.      DVT Prophylaxis: hep sq     dispo - anticipate home in next 1-2 days pending improvement in abdominal pain and h/h trend    Signed By: Familia Hart DO     September 20, 2017

## 2017-09-20 NOTE — DIALYSIS
Peritoneal dialysis begun as ordered. PD catheter dressing changed per protocol. The site is clean and dry, without s/s of infection. First fill cycle infusing without problems. The patient is alert and denies complaints. Dialysis nurse available as needed.

## 2017-09-20 NOTE — DIALYSIS
Disconnected PD cycler from patient. Betadine cap intact. Patient tolerated well. UF amount -717ml. Effluent: yellow, clear with fibrin.

## 2017-09-20 NOTE — PROGRESS NOTES
All pt rounds have been completed throughout shift. All pt needs are met at this time. Will continue to monitor pt and give report to oncoming night shift nurse.

## 2017-09-21 LAB
ANION GAP SERPL CALC-SCNC: 10 MMOL/L (ref 7–16)
BUN SERPL-MCNC: 29 MG/DL (ref 8–23)
CALCIUM SERPL-MCNC: 9.1 MG/DL (ref 8.3–10.4)
CHLORIDE SERPL-SCNC: 101 MMOL/L (ref 98–107)
CO2 SERPL-SCNC: 27 MMOL/L (ref 21–32)
CREAT SERPL-MCNC: 8.85 MG/DL (ref 0.6–1)
ERYTHROCYTE [DISTWIDTH] IN BLOOD BY AUTOMATED COUNT: 15.8 % (ref 11.9–14.6)
GLUCOSE SERPL-MCNC: 93 MG/DL (ref 65–100)
HCT VFR BLD AUTO: 28.9 % (ref 35.8–46.3)
HGB BLD-MCNC: 9.4 G/DL (ref 11.7–15.4)
MCH RBC QN AUTO: 30 PG (ref 26.1–32.9)
MCHC RBC AUTO-ENTMCNC: 32.5 G/DL (ref 31.4–35)
MCV RBC AUTO: 92.3 FL (ref 79.6–97.8)
PLATELET # BLD AUTO: 222 K/UL (ref 150–450)
PMV BLD AUTO: 9.8 FL (ref 10.8–14.1)
POTASSIUM SERPL-SCNC: 2.9 MMOL/L (ref 3.5–5.1)
RBC # BLD AUTO: 3.13 M/UL (ref 4.05–5.25)
SODIUM SERPL-SCNC: 138 MMOL/L (ref 136–145)
WBC # BLD AUTO: 6.8 K/UL (ref 4.3–11.1)

## 2017-09-21 PROCEDURE — 74011250636 HC RX REV CODE- 250/636: Performed by: INTERNAL MEDICINE

## 2017-09-21 PROCEDURE — 74011250637 HC RX REV CODE- 250/637: Performed by: INTERNAL MEDICINE

## 2017-09-21 PROCEDURE — 74011000258 HC RX REV CODE- 258: Performed by: HOSPITALIST

## 2017-09-21 PROCEDURE — 74011250637 HC RX REV CODE- 250/637: Performed by: HOSPITALIST

## 2017-09-21 PROCEDURE — 74011250636 HC RX REV CODE- 250/636: Performed by: HOSPITALIST

## 2017-09-21 PROCEDURE — 80048 BASIC METABOLIC PNL TOTAL CA: CPT | Performed by: INTERNAL MEDICINE

## 2017-09-21 PROCEDURE — 90945 DIALYSIS ONE EVALUATION: CPT

## 2017-09-21 PROCEDURE — 74011250637 HC RX REV CODE- 250/637: Performed by: PHYSICIAN ASSISTANT

## 2017-09-21 PROCEDURE — 74011250637 HC RX REV CODE- 250/637: Performed by: NURSE PRACTITIONER

## 2017-09-21 PROCEDURE — 85027 COMPLETE CBC AUTOMATED: CPT | Performed by: INTERNAL MEDICINE

## 2017-09-21 PROCEDURE — 65270000029 HC RM PRIVATE

## 2017-09-21 PROCEDURE — 36415 COLL VENOUS BLD VENIPUNCTURE: CPT | Performed by: INTERNAL MEDICINE

## 2017-09-21 RX ORDER — POTASSIUM CHLORIDE 20 MEQ/1
40 TABLET, EXTENDED RELEASE ORAL
Status: COMPLETED | OUTPATIENT
Start: 2017-09-21 | End: 2017-09-21

## 2017-09-21 RX ORDER — POTASSIUM CHLORIDE 14.9 MG/ML
20 INJECTION INTRAVENOUS
Status: COMPLETED | OUTPATIENT
Start: 2017-09-21 | End: 2017-09-21

## 2017-09-21 RX ADMIN — TORSEMIDE 100 MG: 100 TABLET ORAL at 08:51

## 2017-09-21 RX ADMIN — POTASSIUM CHLORIDE 20 MEQ: 14.9 INJECTION, SOLUTION INTRAVENOUS at 11:00

## 2017-09-21 RX ADMIN — METOCLOPRAMIDE HYDROCHLORIDE 5 MG: 10 TABLET ORAL at 06:32

## 2017-09-21 RX ADMIN — Medication 10 ML: at 06:29

## 2017-09-21 RX ADMIN — POTASSIUM CHLORIDE 20 MEQ: 14.9 INJECTION, SOLUTION INTRAVENOUS at 09:41

## 2017-09-21 RX ADMIN — Medication 400 MG: at 08:51

## 2017-09-21 RX ADMIN — SUCRALFATE 1 G: 1 SUSPENSION ORAL at 22:28

## 2017-09-21 RX ADMIN — METOCLOPRAMIDE HYDROCHLORIDE 5 MG: 10 TABLET ORAL at 22:28

## 2017-09-21 RX ADMIN — POTASSIUM CHLORIDE 40 MEQ: 20 TABLET, EXTENDED RELEASE ORAL at 06:27

## 2017-09-21 RX ADMIN — Medication 10 ML: at 14:00

## 2017-09-21 RX ADMIN — POTASSIUM CHLORIDE 20 MEQ: 20 TABLET, EXTENDED RELEASE ORAL at 08:51

## 2017-09-21 RX ADMIN — SUCRALFATE 1 G: 1 SUSPENSION ORAL at 06:33

## 2017-09-21 RX ADMIN — ZINC 1 TABLET: TAB ORAL at 08:51

## 2017-09-21 RX ADMIN — METOPROLOL TARTRATE 12.5 MG: 25 TABLET ORAL at 08:51

## 2017-09-21 RX ADMIN — CEFTRIAXONE SODIUM 1 G: 1 INJECTION, POWDER, FOR SOLUTION INTRAMUSCULAR; INTRAVENOUS at 06:03

## 2017-09-21 RX ADMIN — POTASSIUM CHLORIDE 20 MEQ: 20 TABLET, EXTENDED RELEASE ORAL at 19:32

## 2017-09-21 RX ADMIN — VANCOMYCIN HYDROCHLORIDE: 1 INJECTION, POWDER, LYOPHILIZED, FOR SOLUTION INTRAVENOUS at 16:11

## 2017-09-21 RX ADMIN — AMLODIPINE BESYLATE 10 MG: 10 TABLET ORAL at 08:51

## 2017-09-21 RX ADMIN — Medication 10 ML: at 22:30

## 2017-09-21 RX ADMIN — DOCUSATE SODIUM 100 MG: 100 CAPSULE, LIQUID FILLED ORAL at 08:51

## 2017-09-21 RX ADMIN — ROSUVASTATIN CALCIUM 20 MG: 20 TABLET, FILM COATED ORAL at 22:28

## 2017-09-21 RX ADMIN — LEVOTHYROXINE SODIUM 175 MCG: 125 TABLET ORAL at 06:30

## 2017-09-21 NOTE — PROGRESS NOTES
GI DAILY PROGRESS NOTE    Admit Date:  9/17/2017    Today's Date:  9/21/2017    CC:  Upper abdominal pain    Subjective:     Patient abdominal pain is better, just \"sore. \" Can't tell if Carafate helped. No N/V. No F/C. Tolerates reg diet.      Medications:   Current Facility-Administered Medications   Medication Dose Route Frequency    potassium chloride 20 mEq in 100 ml IVPB  20 mEq IntraVENous Q2H    Vancomycin Random Level Reminder   Other ONCE    sucralfate (CARAFATE) 100 mg/mL oral suspension 1 g  1 g Oral AC&HS    amLODIPine (NORVASC) tablet 10 mg  10 mg Oral DAILY    hydrALAZINE (APRESOLINE) 20 mg/mL injection 20 mg  20 mg IntraVENous Q6H PRN    epoetin toya (EPOGEN;PROCRIT) injection 20,000 Units  20,000 Units SubCUTAneous Q MON, WED & FRI    pantoprazole (PROTONIX) tablet 40 mg  40 mg Oral ACB    calcitRIOL (ROCALTROL) capsule 0.25 mcg  0.25 mcg Oral DAILY    docusate sodium (COLACE) capsule 100 mg  100 mg Oral DAILY    gentamicin (GARAMYCIN) 0.1 % cream   Topical DIALYSIS PRN    magnesium oxide (MAG-OX) tablet 400 mg  400 mg Oral DAILY    metoclopramide HCl (REGLAN) tablet 5 mg  5 mg Oral ACB/HS    metoprolol tartrate (LOPRESSOR) tablet 12.5 mg  12.5 mg Oral DAILY    ondansetron (ZOFRAN ODT) tablet 4 mg  4 mg Oral Q6H PRN    promethazine (PHENERGAN) tablet 25 mg  25 mg Oral Q6H PRN    ranolazine ER (RANEXA) tablet 500 mg  500 mg Oral BID    rosuvastatin (CRESTOR) tablet 20 mg  20 mg Oral QHS    sevelamer (RENAGEL) tablet 800 mg  800 mg Oral TID WITH MEALS    torsemide (DEMADEX) tablet 100 mg  100 mg Oral DAILY    sodium chloride (NS) flush 5-10 mL  5-10 mL IntraVENous Q8H    sodium chloride (NS) flush 5-10 mL  5-10 mL IntraVENous PRN    acetaminophen (TYLENOL) tablet 650 mg  650 mg Oral Q4H PRN    HYDROcodone-acetaminophen (NORCO) 5-325 mg per tablet 1 Tab  1 Tab Oral Q4H PRN    morphine injection 2 mg  2 mg IntraVENous Q3H PRN    vancomycin (VANCOCIN) 1250 mg in  ml infusion  1,250 mg IntraVENous Rx Dosing/Monitoring    cefTRIAXone (ROCEPHIN) 1 g in 0.9% sodium chloride (MBP/ADV) 50 mL  1 g IntraVENous Q24H    nitroglycerin (NITROSTAT) tablet 0.4 mg  0.4 mg SubLINGual PRN    potassium chloride (K-DUR, KLOR-CON) SR tablet 20 mEq  20 mEq Oral BID    multivitamin w ZN (STRESSTABS W ZINC) tablet  1 Tab Oral DAILY    levothyroxine (SYNTHROID) tablet 175 mcg  175 mcg Oral ACB       Review of Systems:  ROS was obtained, with pertinent positives as listed above. No chest pain or SOB. Diet:  Regular    Objective:   Vitals:  Visit Vitals    /67 (BP 1 Location: Left arm, BP Patient Position: Lying right side)    Pulse 78    Temp 97.7 °F (36.5 °C)    Resp 20    Ht 5' 10\" (1.778 m)    Wt 86.2 kg (190 lb)    SpO2 96%    BMI 27.26 kg/m2     Intake/Output:     09/19 1901 - 09/21 0700  In: 600 [P.O.:600]  Out: 717   Exam:  Gen:  Pt is alert, cooperative, no acute distress  HEENT: Sclerae anicteric. Extra-occular muscles are intact. No oral ulcers. No abnormal pigmentation of the lips. The neck is supple. Cardiovascular: Regular rate and rhythm. GRADE II/VI SYSTOLIC MURMUR NOTED. Respiratory:   Clear breath sounds anteriorly with no wheezes, rales, or rhonchi. GI:  Abdomen MILDLY DISTENDED & TTP EPIGASTRIC without G/R. Normal active bowel sounds. No enlargement of the liver or spleen. No masses palpable. Musculoskeletal:  No pitting edema of the lower legs. Neurological:  Patient is alert and oriented.       Data Review (Labs):    Recent Labs      09/21/17   0445  09/20/17   1643  09/19/17   0544   WBC  6.8   --   6.9   HGB  9.4*   --   9.1*   HCT  28.9*   --   27.8*   PLT  222   --   200   MCV  92.3   --   93.0   NA  138  138  139   K  2.9*  3.1*  3.3*   CL  101  101  102   CO2  27  26  28   BUN  29*  33*  37*   CREA  8.85*  9.46*  9.77*   CA  9.1  8.7  8.4   GLU  93  102*  80   AP   --   88   --    SGOT   --   27   --    ALT   --   17   --    TBILI   --   0.3 --    ALB   --   1.8*   --    TP   --   5.6*   --        Assessment:     Principal Problem:  Abdominal pain (9/18/2017)    Active Problems:  Hypertension (4/28/2015)  Hypothyroidism (4/28/2015)  HLD (hyperlipidemia) (12/29/2015)  ESRD on peritoneal dialysis [facility]) (2/1/2016)    80 y.o. female with PMH of CKD on PD, CVA, TIA, anemia (chronic disease / NASIR), GERD, HTN, thyroid ds and CAD (s/o STEMI in 2015) admitted with upper abd pain with concerns for peritonitis, who is seen in consultation at the request of Dr. Anibal Saucedo for normocytic anemia w hgb 9.1 and MCV of 93.0 -- above baseline (Hgb previously in the 6s). No overt or active bleeding. 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 Underwent recent EGD 7/31/17 with Dr. Saima Stewart for nausea with findings of esophagitis, HP gastric polyp, & gastritis. Normal colonoscopy in 2011. Anemia likely related to anemia of chronic disease. Her main complaint is upper abdominal pain that is worse during and remains after PD.  abdominal films were negative. LFTs & lipase were normal. A/P CT scan showed diffuse gastric wall thickening suggesting gastritis & mild peritoneal edema and ascites, concerning for peritonitis. Abdominal pain likely r/t the latter +/- gastritis. Of note, Pt is Oriental orthodox, but has had 2 recent blood transfusions. Plan:     Hgb stable. No overt or active bleeding. Offered EGD for further eval of epig pn, pt declined since she just had a recent upper endoscopy with Dr. Saima Stewart. Agree, likely low yield. She tolerates reg diet without increase in abdominal pain. Furthermore, she denies any N/V. On Vanc & Rocephin for peritonitis. Continue supportive care. Will sign-off & see back in the office in 1-2 mos. Pls call if her clinical status changes or if we can be of further assistance. Ney Dominguez PA-C    ATTENDING NOTE:  I agree with the above assessment and plan.  We will sign off, but do not hesitate to contact us for any additional assistance. We will arrange for a GI follow-up office visit in 1-2 months. Patient will be contacted by our office.     Tu Saldana MD

## 2017-09-21 NOTE — PROGRESS NOTES
Hourly rounds were completed on pt during shift. All pt needs met at this time. Will continue to monitor situation and give report to night shift nurse.

## 2017-09-21 NOTE — PROGRESS NOTES
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 She states the pain has since resolved. No fevers or chills. No nausea, vomiting or diarrhea. She states that she is essentially anuric and occasionally makes minimal urine. She denies any other acute complaints She has a h/o CVA and TIA. No fever or chills, no problems with PD drainage or discoloration of PD fluid. No increased thirst. She wishes regular diet and supplement. 9/19/17 - c/o much abdominal pain and abdominal distention which is different compared to her usual PD associated distention - she is [employer]'s witness, has marked drop in Hb overnight and is quite cincerned - also had abnormal PD cell count despite clear PD fluid - discussed plans to get CT and GI consult - hold Plavix and Brilinta in the face of dropping Hb and possible need for procedures, not sure why she is on both  9/20/17 - CT scan and report of GI consultation reviewed with patient, no endoscopy needed at this time - discussed low free T4 and adjustment in thyroid hormone replacement - she had only [id number] mcg about one week ago - no overt bleeding at this time - continue medical therapy for PUD and discontinue Sensipar with is a GI irritant  9/21 - seen in room , still complains of abdominal pain . No chest pain . Son in room .  Explained POC     Past Medical History:   Diagnosis Date    Anemia of chronic renal failure 4/28/2015    Anterior myocardial infarction [facility]) 5/21/2015    CAD (coronary artery disease)     Chest pain     Chronic kidney disease, stage III (moderate) 8/15/2014    on dialysis    CKD (chronic kidney disease) stage 4, GFR 15-29 ml/min ([facility]) 4/28/2015    Debility 5/5/2015    Depression 12/29/2015    Edema 12/29/2015    Endocrine disease Hypothyroidism    GERD (gastroesophageal reflux disease)     Heart murmur 12/29/2015    HLD (hyperlipidemia) 12/29/2015    Hypertension     Hypothyroidism 4/28/2015    Ischemic cardiomyopathy 12/29/2015    Nausea     S/P PTCA (percutaneous transluminal coronary angioplasty); LAD PTCA of  ISR 11/27/15 5/24/2016    STEMI (ST elevation myocardial infarction) ([facility] [address] 4/28/2015    Unspecified sleep apnea     uses cpap machine    Unstable angina ([facility] [address]       Past Surgical History:   Procedure Laterality Date    HX BACK SURGERY  1990    neck surgery cervical disc    HX BACK SURGERY      lower back    HX CATARACT REMOVAL Bilateral     HX CHOLECYSTECTOMY  19702    gall bladder     HX KNEE REPLACEMENT Right 2006    HX PTCA  4/28/2015    2.25 Xience stent to mid LAD for occluded artery, anterior MI, EF 25%. Moderate disease distal LAD and distal OM PCI CX and RCA 2004, then PCI RCA and LAD in 2009. Prior to Admission medications    Medication Sig Start Date End Date Taking? Authorizing Provider   calcitRIOL (ROCALTROL) 0.25 mcg capsule Take 0.25 mcg by mouth daily. Yes Historical Provider   clopidogrel (PLAVIX) 75 mg tab Take 1 Tab by mouth daily. 4/13/17  Yes Nell Noel MD   nitroglycerin (NITROLINGUAL) 400 mcg/spray spray 1 Spray by SubLINGual route every five (5) minutes as needed for Chest Pain. Yes Historical Provider   linaclotide Rosia Dam) 145 mcg cap capsule Take  by mouth Daily (before breakfast). Yes Historical Provider   folic acid 791 mcg tablet Take 800 mcg by mouth daily. Yes Historical Provider   magnesium oxide (MAG-OX) 400 mg tablet Take 400 mg by mouth daily. Yes Historical Provider   amLODIPine (NORVASC) 5 mg tablet Take 5 mg by mouth daily. Yes Historical Provider   potassium chloride (KLOR-CON M10) 10 mEq tablet Take 10 mEq by mouth two (2) times a day. Yes Historical Provider   PNV NO.122/IRON/FOLIC ACID (PRENATAL MULTI PO) Take  by mouth.    Yes Historical Provider   metoprolol tartrate (LOPRESSOR) 25 mg tablet Take 12.5 mg by mouth daily. Take PRN for BP <120. 6/23/16  Yes Georgina Mcgee III, MD   ondansetron (ZOFRAN ODT) 4 mg disintegrating tablet Take 4 mg by mouth three (3) times daily as needed. Yes Historical Provider   metoclopramide HCl (REGLAN) 5 mg tablet Take 5 mg by mouth two (2) times a day. Yes Historical Provider   famotidine (PEPCID) 40 mg tablet Take 20 mg by mouth daily. Yes Historical Provider   ticagrelor (BRILINTA) 90 mg tablet Take 1 Tab by mouth two (2) times a day. 2/4/16  Yes MINDY Chatman   promethazine (PHENERGAN) 25 mg tablet Take 25 mg by mouth every eight (8) hours as needed for Nausea. Yes Historical Provider   gentamicin (GARAMYCIN) 0.1 % topical cream APPLY TO PD catheter exit site at daily dressing change 5/12/15  Yes Leah Oliver MD   aspirin delayed-release 81 mg tablet Take 1 Tab by mouth daily. 5/5/15  Yes Gisela Hollingsworth PA-C   darbepoetin toya in polysorbat (ARANESP, POLYSORBATE,) 40 mcg/mL injection 40 mcg by SubCUTAneous route every fourteen (14) days. Indications: ANEMIA IN CHRONIC KIDNEY DISEASE   Yes Historical Provider   rosuvastatin (CRESTOR) 20 mg tablet Take 20 mg by mouth nightly. Yes Historical Provider   ranolazine ER (RANEXA) 500 mg SR tablet Take 500 mg by mouth two (2) times a day. Yes Historical Provider   levothyroxine (SYNTHROID) 150 mcg tablet Take 150 mcg by mouth Daily (before breakfast). Yes Historical Provider   torsemide (DEMADEX) 100 mg tablet Take  by mouth daily. Historical Provider   cinacalcet (SENSIPAR) 90 mg tab Take  by mouth. Historical Provider   sevelamer carbonate (RENVELA) 800 mg tab tab Take 800 mg by mouth three (3) times daily (with meals).     Historical Provider     Allergies   Allergen Reactions    Codeine Nausea and Vomiting      Social History   Substance Use Topics    Smoking status: Never Smoker    Smokeless tobacco: Never Used    Alcohol use No      Family History   Problem Relation Age of Onset    Heart Disease Mother     Hypertension Mother     Cancer Mother      Lung    Stroke Father     Hypertension Father     Breast Cancer Neg Hx           Review of Systems    Constitutional: no fever,   Eyes: fair vision,   Ears, nose, mouth, throat, and face:fair hearing,    Respiratory: no asthma,    Cardiovascular:no chest pain,   Gastrointestinal:no diarrhea,   Genitourinary: no dysuria    Hematologic/lymphatic: no bleeding tendency,    Neurological: no seizures,    Behvioral/Psych: no psych hospitalization    Endocrine: no goiter,       Objective:       Visit Vitals    /71    Pulse 72    Temp 97.8 °F (36.6 °C)    Resp 15    Ht 5' 10\" (1.778 m)    Wt 86.2 kg (190 lb)    SpO2 98%    BMI 27.26 kg/m2          09/19 1901 - 09/21 0700  In: 600 [P.O.:600]  Out: 717       General:  Alert, cooperative, no distress, appears stated age. Head:  Normocephalic, without obvious abnormality, atraumatic. Eyes:  Conjunctivae/corneas clear. EOMs intact. Throat: Lips, mucosa, and tongue normal. Teeth and gums normal.   Neck: Supple, symmetrical, trachea midline, no adenopathy,  no JVD. Lungs:   Clear to auscultation bilaterally. Heart:  Regular rate and rhythm, S1, S2 normal, no murmur,  rub or gallop. Abdomen:   Soft, mild tenderness in epigastrium, no rebound. mild abdominal distention is present. No masses,  No organomegaly. No renal bruit. PD catheter exit site is perfect. Extremities: Extremities normal, atraumatic, no cyanosis or edema. Skin: Skin color, texture, turgor normal. No rashes or lesions. Lymph nodes: Cervical and supraclavicular nodes normal.   Neurologic: Grossly intact. Moves all extremities. No asterixis. Data Review:     CT Abdomen and pelvis -  There is a tiny right pleural effusion. There is cardiomegaly and  extensive coronary artery calcification. There are no lesions in the liver or  spleen.   The adrenal glands and pancreas appear normal.  There is normal  enhancement of the kidneys. There is no hydronephrosis. There is no adenopathy.  Vincent Gens is marked diffuse thickening of the wall the stomach suggesting gastritis. There are mild diffuse mesenteric edema and a small amount of free fluid. There is no significant bowel distention. Dialysis catheter is coiled anterior  to the left lobe of the liver.   Pelvis CT: There is a moderate amount of free fluid in the pelvis. There is no  significant adenopathy or mass. There are no bony lesions. IMPRESSION:   1. Diffuse gastric wall thickening suggesting gastritis. 2.  Mild peritoneal edema and ascites, concerning for peritonitis. Principal Problem:    Abdominal pain (9/18/2017)    Active Problems:    Hypertension (4/28/2015)      Hypothyroidism (4/28/2015)      HLD (hyperlipidemia) (12/29/2015)      ESRD on peritoneal dialysis ([facility] [address] (2/1/2016)        Assessment:     1. Abdominal pain  -  - clinically not peritonitis, not clear why she has markedly increased RBC in PD fluid, the WBC count of greater than 2000  WBC may be due to blood in peritoneum, but need to be cautious and treat as if she had peritonitios  - CT abdomen done and concur with recommendations by GI service  - OKAy to do IP vancomycin . Will await final culture results      2. Hypokalemia -  - replace KCl PO      3. Anemia -  - treat with WAYNE aggressively  - add M38 and Folic acid  - increasedEPO     4. HTN -  - continue current meds     5. ESRD -  - PD ordered per chronic orders     6. Volume status -  - clinically near euvolemic     7. Hypothyroidism -  - replace thyroid hormone at increased dose of 175 mcg    8.  Anticoagulation -  - hold Plavix as she has drop in Hb and is on subQ heparin 5000 units q 12 hours   - hold Brilinta      Plan:       Kathrine Kumar M.D.

## 2017-09-21 NOTE — PROGRESS NOTES
Pharmacokinetic Consult to Pharmacist    Eliana Meckel is a 80 y.o. female being treated for possible peritonitis with vancomycin dosed intraperitoneally. Height: 5' 10\" (177.8 cm)  Weight: 86.2 kg (190 lb)  Lab Results   Component Value Date/Time    BUN 29 09/21/2017 04:45 AM    Creatinine 8.85 09/21/2017 04:45 AM    WBC 6.8 09/21/2017 04:45 AM      Estimated Creatinine Clearance: 5.6 mL/min (based on Cr of 8.85). CULTURES:  9/17 Peritoneal fluid  Scant GPC, pending      Lab Results   Component Value Date/Time    Vancomycin, random 20.9 09/20/2017 04:43 PM       Day 4 of vancomycin. Goal trough is 15-20, but dosing per random levels due to peritoneal dialysis. Random level drawn yesterday afternoon resulted just above goal at 20.9, so no vancomycin given yesterday. Switching today to giving vancomycin intra-peritoneally. Will add 2500 mg to the 5 liter 1.5% PD fluid and patient will get 2400 ml fill in the next to last dwell which will contain 1200 mg of vancomycin (roughly 15 mg/kg). Will plan to check a random level tomorrow afternoon to assess serum vancomycin levels. Will continue to follow patient.       Thank you,  Yadiel Michael, PharmD  Clinical Pharmacist  [phone]

## 2017-09-21 NOTE — PROGRESS NOTES
Hospitalist Progress Note    2017  Admit Date: 2017  7:52 PM   NAME: Freida Ortega   :  10/26/1932   MRN:  [id number]   Attending: Georges Khalil MD  PCP:  Pastor Avalos MD    SUBJECTIVE:     Ms. Catracho Harmon is a 79 yo female who presented with c/o acute upper abd pain  She is on PD at home, last dialysis 17. Peritoneal fluid hazy, yellow with <[id number] RBC, WBC 2647, 92 neutrophils. Peritoneal fluid sent for culture.  Nephrology consulted. Started on Vanco and Rocephin on admission. Plavix and Brilinta held d/t drop in hgb. GI consulted for normocytic anemia by . CT abd pelvis showing Diffuse gastric wall thickening suggesting gastritis, Mild peritoneal edema and ascites, concerning for peritonitis.  - reports epigastric pain slightly improved, abdominal distention much improved. Sitting in bedside chair. Upset over difficulty with IV access. Review of Systems negative with exception of pertinent positives noted above  PHYSICAL EXAM     Visit Vitals    /71    Pulse 72    Temp 97.8 °F (36.6 °C)    Resp 15    Ht 5' 10\" (1.778 m)    Wt 86.2 kg (190 lb)    SpO2 98%    BMI 27.26 kg/m2      Temp (24hrs), Av °F (36.7 °C), Min:97.7 °F (36.5 °C), Max:98.4 °F (36.9 °C)    Oxygen Therapy  O2 Sat (%): 98 % (17 1153)  O2 Device: Nasal cannula (17 0200)  O2 Flow Rate (L/min): 2 l/min (17 0200)    Intake/Output Summary (Last 24 hours) at 17 1555  Last data filed at 17 1835   Gross per 24 hour   Intake              120 ml   Output                0 ml   Net              120 ml      General: No acute distress    Lungs:  CTA Bilaterally.    Heart:  Regular rate and rhythm,  No murmur, rub, or gallop  Abdomen: Soft, Non distended, minimally TTP in epigastrum Positive bowel sounds  Extremities: No cyanosis, clubbing or edema  Neurologic:  No focal deficits    ASSESSMENT      Active Hospital Problems    Diagnosis Date Noted    Abdominal pain 09/18/2017    ESRD on peritoneal dialysis ([facility] [address] 02/01/2016    HLD (hyperlipidemia) 12/29/2015    Hypothyroidism 04/28/2015    Hypertension 04/28/2015     A/P  - Abdominal pain- likely multifactorial. Treating empirically for peritonitis based upon appearace of PD fluid - cx GPC. Atbx changed to IP. Also with evidence of gastritis by CT scan. EGD on 7/31 with Dr Kalli Moreno notable for esophagitis. GI following. Cont PPI with addition of carafate   - Anemia - no evidence of active bleeding. EPO dosing increased per nephro. Due to drop in h/h, plavix and brillanta held. Today h/h stable. - ESRD - on PD as per nephrology  - Hypothyroidism - TSH elevated but pt just recently resumed her synthroid. Will need repeat tsh in 4 weeks after discharge  - HTN - suboptimal control. Increase norvasc with prn hydralazine. DVT Prophylaxis: hep sq     dispo -follow PD fluid cx's. Likely home soon.  Needs to work with PT    Signed By: Jay Salomon DO     September 21, 2017

## 2017-09-22 LAB
ANION GAP SERPL CALC-SCNC: 11 MMOL/L (ref 7–16)
BUN SERPL-MCNC: 26 MG/DL (ref 8–23)
CALCIUM SERPL-MCNC: 9 MG/DL (ref 8.3–10.4)
CHLORIDE SERPL-SCNC: 103 MMOL/L (ref 98–107)
CO2 SERPL-SCNC: 24 MMOL/L (ref 21–32)
CREAT SERPL-MCNC: 8.8 MG/DL (ref 0.6–1)
ERYTHROCYTE [DISTWIDTH] IN BLOOD BY AUTOMATED COUNT: 15.8 % (ref 11.9–14.6)
GLUCOSE SERPL-MCNC: 86 MG/DL (ref 65–100)
HCT VFR BLD AUTO: 28.6 % (ref 35.8–46.3)
HGB BLD-MCNC: 9.2 G/DL (ref 11.7–15.4)
MCH RBC QN AUTO: 29.7 PG (ref 26.1–32.9)
MCHC RBC AUTO-ENTMCNC: 32.2 G/DL (ref 31.4–35)
MCV RBC AUTO: 92.3 FL (ref 79.6–97.8)
PLATELET # BLD AUTO: 224 K/UL (ref 150–450)
PMV BLD AUTO: 9.6 FL (ref 10.8–14.1)
POTASSIUM SERPL-SCNC: 3.5 MMOL/L (ref 3.5–5.1)
RBC # BLD AUTO: 3.1 M/UL (ref 4.05–5.25)
SODIUM SERPL-SCNC: 138 MMOL/L (ref 136–145)
WBC # BLD AUTO: 6.8 K/UL (ref 4.3–11.1)

## 2017-09-22 PROCEDURE — 74011250636 HC RX REV CODE- 250/636: Performed by: INTERNAL MEDICINE

## 2017-09-22 PROCEDURE — 90945 DIALYSIS ONE EVALUATION: CPT

## 2017-09-22 PROCEDURE — 80048 BASIC METABOLIC PNL TOTAL CA: CPT | Performed by: INTERNAL MEDICINE

## 2017-09-22 PROCEDURE — 85027 COMPLETE CBC AUTOMATED: CPT | Performed by: INTERNAL MEDICINE

## 2017-09-22 PROCEDURE — 74011250637 HC RX REV CODE- 250/637: Performed by: PHYSICIAN ASSISTANT

## 2017-09-22 PROCEDURE — 36415 COLL VENOUS BLD VENIPUNCTURE: CPT | Performed by: INTERNAL MEDICINE

## 2017-09-22 PROCEDURE — 74011250637 HC RX REV CODE- 250/637: Performed by: NURSE PRACTITIONER

## 2017-09-22 PROCEDURE — 74011250637 HC RX REV CODE- 250/637: Performed by: INTERNAL MEDICINE

## 2017-09-22 PROCEDURE — 97161 PT EVAL LOW COMPLEX 20 MIN: CPT

## 2017-09-22 PROCEDURE — 74011250637 HC RX REV CODE- 250/637: Performed by: HOSPITALIST

## 2017-09-22 PROCEDURE — 65270000029 HC RM PRIVATE

## 2017-09-22 RX ORDER — ASPIRIN 81 MG/1
81 TABLET ORAL DAILY
Status: DISCONTINUED | OUTPATIENT
Start: 2017-09-22 | End: 2017-09-23 | Stop reason: HOSPADM

## 2017-09-22 RX ADMIN — AMLODIPINE BESYLATE 10 MG: 10 TABLET ORAL at 09:15

## 2017-09-22 RX ADMIN — ERYTHROPOIETIN 20000 UNITS: 20000 INJECTION, SOLUTION INTRAVENOUS; SUBCUTANEOUS at 17:13

## 2017-09-22 RX ADMIN — METOCLOPRAMIDE HYDROCHLORIDE 5 MG: 10 TABLET ORAL at 23:22

## 2017-09-22 RX ADMIN — METOCLOPRAMIDE HYDROCHLORIDE 5 MG: 10 TABLET ORAL at 06:22

## 2017-09-22 RX ADMIN — Medication 10 ML: at 23:21

## 2017-09-22 RX ADMIN — TORSEMIDE 100 MG: 100 TABLET ORAL at 09:16

## 2017-09-22 RX ADMIN — POTASSIUM CHLORIDE 20 MEQ: 20 TABLET, EXTENDED RELEASE ORAL at 17:18

## 2017-09-22 RX ADMIN — DOCUSATE SODIUM 100 MG: 100 CAPSULE, LIQUID FILLED ORAL at 09:15

## 2017-09-22 RX ADMIN — Medication 400 MG: at 09:15

## 2017-09-22 RX ADMIN — ROSUVASTATIN CALCIUM 20 MG: 20 TABLET, FILM COATED ORAL at 23:22

## 2017-09-22 RX ADMIN — RANOLAZINE 500 MG: 500 TABLET, FILM COATED, EXTENDED RELEASE ORAL at 17:18

## 2017-09-22 RX ADMIN — SUCRALFATE 1 G: 1 SUSPENSION ORAL at 06:20

## 2017-09-22 RX ADMIN — POTASSIUM CHLORIDE 20 MEQ: 20 TABLET, EXTENDED RELEASE ORAL at 09:15

## 2017-09-22 RX ADMIN — Medication 10 ML: at 06:23

## 2017-09-22 RX ADMIN — ZINC 1 TABLET: TAB ORAL at 09:15

## 2017-09-22 RX ADMIN — LEVOTHYROXINE SODIUM 175 MCG: 125 TABLET ORAL at 06:00

## 2017-09-22 RX ADMIN — GENTAMICIN SULFATE: 1 CREAM TOPICAL at 14:30

## 2017-09-22 RX ADMIN — SUCRALFATE 1 G: 1 SUSPENSION ORAL at 17:10

## 2017-09-22 RX ADMIN — METOPROLOL TARTRATE 12.5 MG: 25 TABLET ORAL at 09:15

## 2017-09-22 RX ADMIN — SUCRALFATE 1 G: 1 SUSPENSION ORAL at 23:22

## 2017-09-22 RX ADMIN — SUCRALFATE 1 G: 1 SUSPENSION ORAL at 12:54

## 2017-09-22 RX ADMIN — ASPIRIN 81 MG: 81 TABLET, COATED ORAL at 12:54

## 2017-09-22 RX ADMIN — RANOLAZINE 500 MG: 500 TABLET, FILM COATED, EXTENDED RELEASE ORAL at 09:15

## 2017-09-22 RX ADMIN — PANTOPRAZOLE SODIUM 40 MG: 40 TABLET, DELAYED RELEASE ORAL at 06:22

## 2017-09-22 NOTE — PROGRESS NOTES
Patient reported a green BM this am.  She had already flushed before reporting. Her HGB is down from 9.4 to 9.2 this am.  Notified oncoming nurse for follow up. Hourly rounds performed throughout the shift. All needs met at this time.

## 2017-09-22 NOTE — PROGRESS NOTES
Problem: Mobility Impaired (Adult and Pediatric)  Goal: *Acute Goals and Plan of Care (Insert Text)  No goals Ms. Jeri Lopez is independent and not interested in any PT.       PHYSICAL THERAPY: INITIAL ASSESSMENT, DISCHARGE 9/22/2017  INPATIENT: Hospital Day: 6  [place] / Product Type: Medicare /      NAME/AGE/GENDER: Toshia Quiroz is a 80 y.o. female             PRIMARY DIAGNOSIS: Abdominal pain Abdominal pain Abdominal pain        ICD-10: Treatment Diagnosis:       · Generalized Muscle Weakness (M62.81)  · Difficulty in walking, Not elsewhere classified (R26.2)   Precaution/Allergies:  Codeine       ASSESSMENT:      Ms. Jeri Lopez presents not feeling that well however she states she is getting around fine. She lives by herself and has a walker if she needs it. Ms. Jeri Lopez states she is going home this afternoon and will be fine. She was able to ambulate unassisted. At this time will discontinue PT services. This section established at most recent assessment   PROBLEM LIST (Impairments causing functional limitations): 1. none    INTERVENTIONS PLANNED: (Benefits and precautions of physical therapy have been discussed with the patient.)  1. none      TREATMENT PLAN: Frequency/Duration: none  Rehabilitation Potential For Stated Goals: none      RECOMMENDED REHABILITATION/EQUIPMENT: (at time of discharge pending progress): Due to the probability of continued deficits (see above) this patient will not likely need continued skilled physical therapy after discharge. 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 She states the pain has since resolved. No fevers or chills. No nausea, vomiting or diarrhea.   She states that she is essentially anuric and occasionally makes minimal urine. She denies any other acute complaints She has a h/o CVA and TIA. No fever or chills, no problems with PD drainage or discoloration of PD fluid. No increased thirst. She wishes regular diet and supplement. 9/19/17 - c/o much abdominal pain and abdominal distention which is different compared to her usual PD associated distention - she is Jehova's witness, has marked drop in Hb overnight and is quite cincerned - also had abnormal PD cell count despite clear PD fluid - discussed plans to get CT and GI consult - hold Plavix and Brilinta in the face of dropping Hb and possible need for procedures, not sure why she is on both  9/20/17 - CT scan and report of GI consultation reviewed with patient, no endoscopy needed at this time - discussed low free T4 and adjustment in thyroid hormone replacement - she had only [id number] mcg about one week ago - no overt bleeding at this time - continue medical therapy for PUD and discontinue Sensipar with is a GI irritant  9/21 - seen in room , still complains of abdominal pain . No chest pain . Son in room . Explained POC      Past Medical History/Comorbidities:   Ms. Al Hicks  has a past medical history of Anemia of chronic renal failure (4/28/2015); Anterior myocardial infarction [facility]) (5/21/2015); CAD (coronary artery disease); Chest pain; Chronic kidney disease, stage III (moderate) (8/15/2014); CKD (chronic kidney disease) stage 4, GFR 15-29 ml/min (HCC) (4/28/2015); Debility (5/5/2015); Depression (12/29/2015); Edema (12/29/2015); Endocrine disease; GERD (gastroesophageal reflux disease); Heart murmur (12/29/2015); HLD (hyperlipidemia) (12/29/2015); Hypertension; Hypothyroidism (4/28/2015); Ischemic cardiomyopathy (12/29/2015); Nausea; S/P PTCA (percutaneous transluminal coronary angioplasty); LAD PTCA of  ISR 11/27/15 (5/24/2016); STEMI (ST elevation myocardial infarction) ([facility] [address] (4/28/2015);  Unspecified sleep apnea; and Unstable angina ([facility] [address] Ms. Chava Singh  has a past surgical history that includes ptca (2015); cholecystectomy (65363); knee replacement (Right, ); back surgery (); cataract removal (Bilateral); and back surgery. Social History/Living Environment:   Home Environment: Private residence  # Steps to Enter: 2  Wheelchair Ramp: Yes  One/Two Story Residence: One story  Living Alone: No  Support Systems: Family member(s), Child(faraz)  Patient Expects to be Discharged to[de-identified] Private residence  Current DME Used/Available at Home: arpita Bryant, [address] [employer] [facility] chair  Tub or Shower Type: Shower  Prior Level of Function/Work/Activity:  Independent lives by herself  age,    Number of Personal Factors/Comorbidities that affect the Plan of Care: 1-2: MODERATE COMPLEXITY   EXAMINATION:   Most Recent Physical Functioning:   Gross Assessment:  AROM: Within functional limits  Strength: Within functional limits               Posture:  Posture (WDL): Within defined limits  Balance:  Sitting: Intact  Standing: Impaired  Standing - Static: Good  Standing - Dynamic : Good Bed Mobility:     Wheelchair Mobility:     Transfers:  Sit to Stand: Independent  Stand to Sit: Independent  Gait:     Base of Support: Widened  Speed/Michelle: Slow  Step Length: Right shortened;Left shortened  Distance (ft): 40 Feet (ft)       Body Structures Involved:  1. None Body Functions Affected:  1. None Activities and Participation Affected:  1. None   Number of elements that affect the Plan of Care: 1-2: LOW COMPLEXITY   CLINICAL PRESENTATION:   Presentation: Stable and uncomplicated: LOW COMPLEXITY   CLINICAL DECISION MAKIN [facility] [address] [id number] AM-PAC 6 Clicks   Basic Mobility Inpatient Short Form  How much difficulty does the patient currently have. .. Unable A Lot A Little None   1. Turning over in bed (including adjusting bedclothes, sheets and blankets)? [ ] 1   [ ] 2   [ ] 3   [X] 4   2.   Sitting down on and standing up from a chair with arms ( e.g., wheelchair, bedside commode, etc.)   [ ] 1   [ ] 2   [ ] 3   [X] 4   3. Moving from lying on back to sitting on the side of the bed? [ ] 1   [ ] 2   [ ] 3   [X] 4   How much help from another person does the patient currently need. .. Total A Lot A Little None   4. Moving to and from a bed to a chair (including a wheelchair)? [ ] 1   [ ] 2   [ ] 3   [X] 4   5. Need to walk in hospital room? [ ] 1   [ ] 2   [X] 3   [ ] 4   6. Climbing 3-5 steps with a railing? [ ] 1   [ ] 2   [X] 3   [ ] 4   © 2007, Trustees of [address] [place] [address] [address] [address], under license to [employer]. All rights reserved    Score:  Initial: 22 Most Recent: X (Date: -- )     Interpretation of Tool:  Represents activities that are increasingly more difficult (i.e. Bed mobility, Transfers, Gait). Score 24 23 22-20 19-15 14-10 9-7 6       Modifier CH CI CJ CK CL CM CN         · Mobility - Walking and Moving Around:               - CURRENT STATUS:    CJ - 20%-39% impaired, limited or restricted               - GOAL STATUS:           CJ - 20%-39% impaired, limited or restricted               - D/C STATUS:                       CJ - 20%-39% impaired, limited or restricted  [place] / [place] / Product Type: Medicare /       Medical Necessity:     · none. Reason for Services/Other Comments:  · NA. Use of outcome tool(s) and clinical judgement create a POC that gives a: Clear prediction of patient's progress: LOW COMPLEXITY                 TREATMENT:   (In addition to Assessment/Re-Assessment sessions the following treatments were rendered)   Pre-treatment Symptoms/Complaints:  Not feeling great but eager to go home.   Pain: Initial:   Pain Intensity 1: 0  Post Session:  same      Assessment/Reassessment only, no treatment provided today     Braces/Orthotics/Lines/Etc:   · O2 Device: Room air  Treatment/Session Assessment:    · Response to Treatment:  good  · Interdisciplinary Collaboration:  · Registered Nurse  · After treatment position/precautions:  · Up in chair  · Call light within reach  · RN notified  · Compliance with Program/Exercises: Will assess as treatment progresses.   · Recommendations/Intent for next treatment session:  None, DC PT  Total Treatment Duration:  PT Patient Time In/Time Out  Time In: 0930  Time Out: [address], PT

## 2017-09-22 NOTE — PROGRESS NOTES
Hospitalist Progress Note    2017  Admit Date: 2017  7:52 PM   NAME: Vincent Bates   :  10/26/1932   MRN:  [id number]   Attending: Beltran Hutchison MD  PCP:  Michael Laguna MD    SUBJECTIVE:     Ms. Colt Abdalla is a 79 yo female who presented with c/o acute upper abd pain  She is on PD at home, last dialysis 17. Peritoneal fluid hazy, yellow with <626350 RBC, WBC 2647, 92 neutrophils. Peritoneal fluid sent for culture.  Nephrology consulted. Started on Vanco and Rocephin on admission. Plavix and Brilinta held d/t drop in hgb. GI consulted for normocytic anemia by . CT abd pelvis showing Diffuse gastric wall thickening suggesting gastritis, Mild peritoneal edema and ascites, concerning for peritonitis.  - reports epigastric pain slightly improved, abdominal distention much improved. Sitting in bedside chair. Upset over difficulty with IV access.  Pain improved. Tolerating regular diet. Afebrile. Review of Systems negative with exception of pertinent positives noted above  PHYSICAL EXAM     Visit Vitals    /84 (BP 1 Location: Right arm, BP Patient Position: Sitting)    Pulse 79    Temp 97.7 °F (36.5 °C)    Resp 20    Ht 5' 10\" (1.778 m)    Wt 86.2 kg (190 lb)    SpO2 97%    BMI 27.26 kg/m2      Temp (24hrs), Av.1 °F (36.7 °C), Min:97.7 °F (36.5 °C), Max:98.5 °F (36.9 °C)    Oxygen Therapy  O2 Sat (%): 97 % (17 1040)  O2 Device: Room air (17 0200)  O2 Flow Rate (L/min): 2 l/min (17 0200)    Intake/Output Summary (Last 24 hours) at 17 1352  Last data filed at 17 0845   Gross per 24 hour   Intake              120 ml   Output             1333 ml   Net            -1213 ml      General: No acute distress    Lungs:  CTA Bilaterally.    Heart:  Regular rate and rhythm,  No murmur, rub, or gallop  Abdomen: Soft, Non distended, minimally TTP in epigastrum Positive bowel sounds  Extremities: No cyanosis, clubbing or edema  Neurologic:  No focal deficits    ASSESSMENT      Active Hospital Problems    Diagnosis Date Noted    Abdominal pain 09/18/2017    ESRD on peritoneal dialysis ([facility] [address] 02/01/2016    HLD (hyperlipidemia) 12/29/2015    Hypothyroidism 04/28/2015    Hypertension 04/28/2015     A/P  - Abdominal pain- likely multifactorial. Treating empirically for peritonitis based upon appearace of PD fluid. Cultures insignificant with scant growth staph. Nephro agreeable to dc atbx and monitor. Also with evidence of gastritis by CT scan. EGD on 7/31 with Dr Kota Velez notable for esophagitis. GI following. Cont PPI with addition of carafate     - Anemia - no evidence of active bleeding. EPO dosing increased per nephro. - CAD -s/p PCIx 2 greater than one year ago. Pharmacy confirmed pt was taking plavix and brilinta (believe asa as well). Will resume asa but hold on DAPT. Will request close cardiology f/u. - ESRD - on PD as per nephrology  - Hypothyroidism - TSH elevated but pt just recently resumed her synthroid. Will need repeat tsh in 4 weeks after discharge  - HTN - fair control. norvasc increased yesterday. Monitor    DVT Prophylaxis: hep sq     dispo -monitor off atbx.  Home tomorrow if afebrile and h/h stable    Signed By: Fide Shaikh DO     September 22, 2017

## 2017-09-22 NOTE — PROGRESS NOTES
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 She states the pain has since resolved. No fevers or chills. No nausea, vomiting or diarrhea. She states that she is essentially anuric and occasionally makes minimal urine. She denies any other acute complaints She has a h/o CVA and TIA. No fever or chills, no problems with PD drainage or discoloration of PD fluid. No increased thirst. She wishes regular diet and supplement. 9/19/17 - c/o much abdominal pain and abdominal distention which is different compared to her usual PD associated distention - she is Jehova's witness, has marked drop in Hb overnight and is quite cincerned - also had abnormal PD cell count despite clear PD fluid - discussed plans to get CT and GI consult - hold Plavix and Brilinta in the face of dropping Hb and possible need for procedures, not sure why she is on both  9/20/17 - CT scan and report of GI consultation reviewed with patient, no endoscopy needed at this time - discussed low free T4 and adjustment in thyroid hormone replacement - she had only [id number] mcg about one week ago - no overt bleeding at this time - continue medical therapy for PUD and discontinue Sensipar with is a GI irritant  9/21 - seen in room , still complains of abdominal pain . No chest pain . Son in room .  Explained POC     Past Medical History:   Diagnosis Date    Anemia of chronic renal failure 4/28/2015    Anterior myocardial infarction [facility]) 5/21/2015    CAD (coronary artery disease)     Chest pain     Chronic kidney disease, stage III (moderate) 8/15/2014    on dialysis    CKD (chronic kidney disease) stage 4, GFR 15-29 ml/min ([facility]) 4/28/2015    Debility 5/5/2015    Depression 12/29/2015    Edema 12/29/2015    Endocrine disease Hypothyroidism    GERD (gastroesophageal reflux disease)     Heart murmur 12/29/2015    HLD (hyperlipidemia) 12/29/2015    Hypertension     Hypothyroidism 4/28/2015    Ischemic cardiomyopathy 12/29/2015    Nausea     S/P PTCA (percutaneous transluminal coronary angioplasty); LAD PTCA of  ISR 11/27/15 5/24/2016    STEMI (ST elevation myocardial infarction) ([facility] [address] 4/28/2015    Unspecified sleep apnea     uses cpap machine    Unstable angina ([facility] [address]       Past Surgical History:   Procedure Laterality Date    HX BACK SURGERY  1990    neck surgery cervical disc    HX BACK SURGERY      lower back    HX CATARACT REMOVAL Bilateral     HX CHOLECYSTECTOMY  19702    gall bladder     HX KNEE REPLACEMENT Right 2006    HX PTCA  4/28/2015    2.25 Xience stent to mid LAD for occluded artery, anterior MI, EF 25%. Moderate disease distal LAD and distal OM PCI CX and RCA 2004, then PCI RCA and LAD in 2009. Prior to Admission medications    Medication Sig Start Date End Date Taking? Authorizing Provider   calcitRIOL (ROCALTROL) 0.25 mcg capsule Take 0.25 mcg by mouth daily. Yes Historical Provider   clopidogrel (PLAVIX) 75 mg tab Take 1 Tab by mouth daily. 4/13/17  Yes Esthela Garcia MD   nitroglycerin (NITROLINGUAL) 400 mcg/spray spray 1 Spray by SubLINGual route every five (5) minutes as needed for Chest Pain. Yes Historical Provider   linaclotide [place]) 145 mcg cap capsule Take  by mouth Daily (before breakfast). Yes Historical Provider   folic acid 588 mcg tablet Take 800 mcg by mouth daily. Yes Historical Provider   magnesium oxide (MAG-OX) 400 mg tablet Take 400 mg by mouth daily. Yes Historical Provider   amLODIPine (NORVASC) 5 mg tablet Take 5 mg by mouth daily. Yes Historical Provider   potassium chloride (KLOR-CON M10) 10 mEq tablet Take 10 mEq by mouth two (2) times a day. Yes Historical Provider   PNV NO.122/IRON/FOLIC ACID (PRENATAL MULTI PO) Take  by mouth.    Yes Historical Provider   metoprolol tartrate (LOPRESSOR) 25 mg tablet Take 12.5 mg by mouth daily. Take PRN for BP <120. 6/23/16  Yes Neetu Martinez III, MD   ondansetron (ZOFRAN ODT) 4 mg disintegrating tablet Take 4 mg by mouth three (3) times daily as needed. Yes Historical Provider   metoclopramide HCl (REGLAN) 5 mg tablet Take 5 mg by mouth two (2) times a day. Yes Historical Provider   famotidine (PEPCID) 40 mg tablet Take 20 mg by mouth daily. Yes Historical Provider   ticagrelor (BRILINTA) 90 mg tablet Take 1 Tab by mouth two (2) times a day. 2/4/16  Yes MINDY Chatman   promethazine (PHENERGAN) 25 mg tablet Take 25 mg by mouth every eight (8) hours as needed for Nausea. Yes Historical Provider   gentamicin (GARAMYCIN) 0.1 % topical cream APPLY TO PD catheter exit site at daily dressing change 5/12/15  Yes Sultana Wallace MD   aspirin delayed-release 81 mg tablet Take 1 Tab by mouth daily. 5/5/15  Yes Gisela Hollingsworth PA-C   darbepoetin toya in polysorbat (ARANESP, POLYSORBATE,) 40 mcg/mL injection 40 mcg by SubCUTAneous route every fourteen (14) days. Indications: ANEMIA IN CHRONIC KIDNEY DISEASE   Yes Historical Provider   rosuvastatin (CRESTOR) 20 mg tablet Take 20 mg by mouth nightly. Yes Historical Provider   ranolazine ER (RANEXA) 500 mg SR tablet Take 500 mg by mouth two (2) times a day. Yes Historical Provider   levothyroxine (SYNTHROID) 150 mcg tablet Take 150 mcg by mouth Daily (before breakfast). Yes Historical Provider   torsemide (DEMADEX) 100 mg tablet Take  by mouth daily. Historical Provider   cinacalcet (SENSIPAR) 90 mg tab Take  by mouth. Historical Provider   sevelamer carbonate (RENVELA) 800 mg tab tab Take 800 mg by mouth three (3) times daily (with meals).     Historical Provider     Allergies   Allergen Reactions    Codeine Nausea and Vomiting      Social History   Substance Use Topics    Smoking status: Never Smoker    Smokeless tobacco: Never Used    Alcohol use No      Family History   Problem Relation Age of Onset    Heart Disease Mother     Hypertension Mother     Cancer Mother      Lung    Stroke Father     Hypertension Father     Breast Cancer Neg Hx           Review of Systems    Constitutional: no fever,   Eyes: fair vision,   Ears, nose, mouth, throat, and face:fair hearing,    Respiratory: no asthma,    Cardiovascular:no chest pain,   Gastrointestinal:no diarrhea,   Genitourinary: no dysuria    Hematologic/lymphatic: no bleeding tendency,    Neurological: no seizures,    Behvioral/Psych: no psych hospitalization    Endocrine: no goiter,       Objective:       Visit Vitals    /86 (BP 1 Location: Right arm, BP Patient Position: At rest)    Pulse 80    Temp 98.1 °F (36.7 °C)    Resp 20    Ht 5' 10\" (1.778 m)    Wt 86.2 kg (190 lb)    SpO2 99%    BMI 27.26 kg/m2       09/22 0701 - 09/22 1900  In: -   Out: 1333   09/20 1901 - 09/22 0700  In: 480 [P.O.:480]  Out: -       General:  Alert, cooperative, no distress, appears stated age. Head:  Normocephalic, without obvious abnormality, atraumatic. Eyes:  Conjunctivae/corneas clear. EOMs intact. Throat: Lips, mucosa, and tongue normal. Teeth and gums normal.   Neck: Supple, symmetrical, trachea midline, no adenopathy,  no JVD. Lungs:   Clear to auscultation bilaterally. Heart:  Regular rate and rhythm, S1, S2 normal, no murmur,  rub or gallop. Abdomen:   Soft, mild tenderness in epigastrium, no rebound. mild abdominal distention is present. No masses,  No organomegaly. No renal bruit. PD catheter exit site is perfect. Extremities: Extremities normal, atraumatic, no cyanosis or edema. Skin: Skin color, texture, turgor normal. No rashes or lesions. Lymph nodes: Cervical and supraclavicular nodes normal.   Neurologic: Grossly intact. Moves all extremities. No asterixis. Data Review:     CT Abdomen and pelvis -  There is a tiny right pleural effusion.   There is cardiomegaly and  extensive coronary artery calcification. There are no lesions in the liver or  spleen. The adrenal glands and pancreas appear normal.  There is normal  enhancement of the kidneys. There is no hydronephrosis. There is no adenopathy.  Haig Molt is marked diffuse thickening of the wall the stomach suggesting gastritis. There are mild diffuse mesenteric edema and a small amount of free fluid. There is no significant bowel distention. Dialysis catheter is coiled anterior  to the left lobe of the liver.   Pelvis CT: There is a moderate amount of free fluid in the pelvis. There is no  significant adenopathy or mass. There are no bony lesions. IMPRESSION:   1. Diffuse gastric wall thickening suggesting gastritis. 2.  Mild peritoneal edema and ascites, concerning for peritonitis. Principal Problem:    Abdominal pain (9/18/2017)    Active Problems:    Hypertension (4/28/2015)      Hypothyroidism (4/28/2015)      HLD (hyperlipidemia) (12/29/2015)      ESRD on peritoneal dialysis ([facility] [address] (2/1/2016)        Assessment:     1. Abdominal pain  -  - clinically not peritonitis, not clear why she has markedly increased RBC in PD fluid, the WBC count of greater than 2000  WBC may be due to blood in peritoneum, but need to be cautious and treat as if she had peritonitios  - CT abdomen done and concur with recommendations by GI service  - no clear indication for Abx , will hold as cultures are so far not significant      2. Hypokalemia -  - replace KCl PO      3. Anemia -  - treat with WAYNE aggressively  - add H34 and Folic acid  - increasedEPO     4. HTN -  - continue current meds     5. ESRD -  - PD ordered per chronic orders     6. Volume status -  - clinically near euvolemic     7. Hypothyroidism -  - replace thyroid hormone at increased dose of 175 mcg    8.  Anticoagulation -  - hold Plavix as she has drop in Hb and is on subQ heparin 5000 units q 12 hours   - okay to restart Brilinta      Plan:

## 2017-09-22 NOTE — DIALYSIS
Peritoneal dialysis begun as ordered. PD catheter dressing changed per protocol. The site is clean and dry, without s/s of infection. The patient is alert and denies complaints. Report given to primary nurse.

## 2017-09-22 NOTE — DIALYSIS
Disconnected PD cycler from patient. Betadine cap intact. Patient tolerated well. UF amount -1333mls. Effluent: yellow, clear.

## 2017-09-22 NOTE — PROGRESS NOTES
Patient reported chronic LUQ pain that intensifies upon exertion. She states that she has had it for several months. Currently the pain is 3/10.

## 2017-09-23 VITALS
BODY MASS INDEX: 27.2 KG/M2 | HEART RATE: 89 BPM | WEIGHT: 190 LBS | HEIGHT: 70 IN | OXYGEN SATURATION: 98 % | DIASTOLIC BLOOD PRESSURE: 84 MMHG | SYSTOLIC BLOOD PRESSURE: 128 MMHG | RESPIRATION RATE: 18 BRPM | TEMPERATURE: 97.9 F

## 2017-09-23 LAB
ANION GAP SERPL CALC-SCNC: 11 MMOL/L (ref 7–16)
BACTERIA SPEC CULT: NORMAL
BUN SERPL-MCNC: 24 MG/DL (ref 8–23)
CALCIUM SERPL-MCNC: 8.9 MG/DL (ref 8.3–10.4)
CHLORIDE SERPL-SCNC: 104 MMOL/L (ref 98–107)
CO2 SERPL-SCNC: 24 MMOL/L (ref 21–32)
CREAT SERPL-MCNC: 8.62 MG/DL (ref 0.6–1)
ERYTHROCYTE [DISTWIDTH] IN BLOOD BY AUTOMATED COUNT: 15.6 % (ref 11.9–14.6)
GLUCOSE SERPL-MCNC: 85 MG/DL (ref 65–100)
GRAM STN SPEC: NORMAL
GRAM STN SPEC: NORMAL
HCT VFR BLD AUTO: 28.9 % (ref 35.8–46.3)
HGB BLD-MCNC: 9.2 G/DL (ref 11.7–15.4)
MCH RBC QN AUTO: 30.2 PG (ref 26.1–32.9)
MCHC RBC AUTO-ENTMCNC: 31.8 G/DL (ref 31.4–35)
MCV RBC AUTO: 94.8 FL (ref 79.6–97.8)
PLATELET # BLD AUTO: 235 K/UL (ref 150–450)
PMV BLD AUTO: 10 FL (ref 10.8–14.1)
POTASSIUM SERPL-SCNC: 3.5 MMOL/L (ref 3.5–5.1)
RBC # BLD AUTO: 3.05 M/UL (ref 4.05–5.25)
SERVICE CMNT-IMP: NORMAL
SODIUM SERPL-SCNC: 139 MMOL/L (ref 136–145)
WBC # BLD AUTO: 7 K/UL (ref 4.3–11.1)

## 2017-09-23 PROCEDURE — 74011250637 HC RX REV CODE- 250/637: Performed by: HOSPITALIST

## 2017-09-23 PROCEDURE — 36415 COLL VENOUS BLD VENIPUNCTURE: CPT | Performed by: INTERNAL MEDICINE

## 2017-09-23 PROCEDURE — 80048 BASIC METABOLIC PNL TOTAL CA: CPT | Performed by: INTERNAL MEDICINE

## 2017-09-23 PROCEDURE — 74011250637 HC RX REV CODE- 250/637: Performed by: NURSE PRACTITIONER

## 2017-09-23 PROCEDURE — 74011250637 HC RX REV CODE- 250/637: Performed by: PHYSICIAN ASSISTANT

## 2017-09-23 PROCEDURE — 74011250637 HC RX REV CODE- 250/637: Performed by: INTERNAL MEDICINE

## 2017-09-23 PROCEDURE — 85027 COMPLETE CBC AUTOMATED: CPT | Performed by: INTERNAL MEDICINE

## 2017-09-23 RX ORDER — SUCRALFATE 1 G/10ML
1 SUSPENSION ORAL
Qty: 414 ML | Refills: 0 | Status: SHIPPED | OUTPATIENT
Start: 2017-09-23

## 2017-09-23 RX ORDER — AMLODIPINE BESYLATE 10 MG/1
10 TABLET ORAL DAILY
Qty: 30 TAB | Refills: 0 | Status: ON HOLD | OUTPATIENT
Start: 2017-09-23 | End: 2017-11-27

## 2017-09-23 RX ORDER — PANTOPRAZOLE SODIUM 40 MG/1
40 TABLET, DELAYED RELEASE ORAL
Qty: 30 TAB | Refills: 0 | Status: ON HOLD | OUTPATIENT
Start: 2017-09-23 | End: 2017-11-27

## 2017-09-23 RX ADMIN — Medication 400 MG: at 08:53

## 2017-09-23 RX ADMIN — ZINC 1 TABLET: TAB ORAL at 09:00

## 2017-09-23 RX ADMIN — RANOLAZINE 500 MG: 500 TABLET, FILM COATED, EXTENDED RELEASE ORAL at 08:59

## 2017-09-23 RX ADMIN — DOCUSATE SODIUM 100 MG: 100 CAPSULE, LIQUID FILLED ORAL at 08:53

## 2017-09-23 RX ADMIN — POTASSIUM CHLORIDE 20 MEQ: 20 TABLET, EXTENDED RELEASE ORAL at 08:52

## 2017-09-23 RX ADMIN — METOCLOPRAMIDE HYDROCHLORIDE 5 MG: 10 TABLET ORAL at 07:13

## 2017-09-23 RX ADMIN — LEVOTHYROXINE SODIUM 175 MCG: 125 TABLET ORAL at 07:12

## 2017-09-23 RX ADMIN — Medication 10 ML: at 07:18

## 2017-09-23 RX ADMIN — ASPIRIN 81 MG: 81 TABLET, COATED ORAL at 08:53

## 2017-09-23 RX ADMIN — PANTOPRAZOLE SODIUM 40 MG: 40 TABLET, DELAYED RELEASE ORAL at 07:30

## 2017-09-23 RX ADMIN — SUCRALFATE 1 G: 1 SUSPENSION ORAL at 07:09

## 2017-09-23 RX ADMIN — AMLODIPINE BESYLATE 10 MG: 10 TABLET ORAL at 08:53

## 2017-09-23 NOTE — DIALYSIS
Disconnected PD cycler from patient. Betadine cap intact. Patient tolerated well. UF amount -1199mls. Effluent: yellow, clear.

## 2017-09-23 NOTE — DISCHARGE SUMMARY
Hospitalist Discharge Summary     Patient ID:  Jarrod Ruiz  [id number]  24 y.o.  10/26/1932  Admit date: 9/17/2017  7:52 PM  Discharge date and time: 9/23/2017  Attending: Beti Hicks MD  PCP:  Christina Chandler MD  Treatment Team: Attending Provider: Beti Hicks MD; Consulting Provider: Yen Sultana MD    Principal Diagnosis Abdominal pain   Principal Problem:    Abdominal pain (9/18/2017)    Active Problems:    Hypertension (4/28/2015)      Hypothyroidism (4/28/2015)      HLD (hyperlipidemia) (12/29/2015)      ESRD on peritoneal dialysis [facility]) (2/1/2016)             Hospital Course:  Please refer to the admission H&P for details of presentation. In summary, the patient is Ms. Matheus Zaman is a 79 yo female who presented with c/o acute upper abd pain  She is on PD at home, last dialysis 9/16/17. Peritoneal fluid hazy, yellow with <319187 RBC, WBC 2647, 92 neutrophils. Peritoneal fluid sent for culture.  Nephrology consulted. Started on Vanco and Rocephin on admission. Asa, Plavix and Brilinta held d/t drop in hgb.  GI consulted for normocytic anemia by . CT abd pelvis showing Diffuse gastric wall thickening suggesting gastritis, Mild peritoneal edema and ascites, concerning for peritonitis. EGD in July 2017 by Dr Chavez Jacinto notable for esophagitis. GI/nephrology following. GI felt this was likely incompletely treated gastritis. Carafate added to PPI. Peritoneal cultures were insignificant and antibiotics discontinued day prior to discharge. She remains afebrile with improvement in abdominal discomfort. Hgb stable. Will resume asa,brilinta but instructed to discontinue plavix. Recommend repeat CBC with next nephrology appt and repeat TSH in next 4-5 weeks. She is independently ambulatory and anxious to discharge home.      Significant Diagnostic Studies:   CT of the Abdomen and Pelvis     INDICATION:  Abdominal pain and distention     Multiple axial images were obtained through the abdomen and pelvis after  intravenous injection of 100mL of Isovue 370. Radiation dose reduction  techniques were used for this study: All CT scans performed at this facility  use one or all of the following: Automated exposure control, adjustment of the  mA and/or kVp according to patient's size, iterative reconstruction.     FINDINGS:  Abdomen CT: There is a tiny right pleural effusion. There is cardiomegaly and  extensive coronary artery calcification. There are no lesions in the liver or  spleen. The adrenal glands and pancreas appear normal.  There is normal  enhancement of the kidneys. There is no hydronephrosis. There is no adenopathy.     There is marked diffuse thickening of the wall the stomach suggesting gastritis. There are mild diffuse mesenteric edema and a small amount of free fluid. There is no significant bowel distention. Dialysis catheter is coiled anterior  to the left lobe of the liver.     Pelvis CT: There is a moderate amount of free fluid in the pelvis. There is no  significant adenopathy or mass. There are no bony lesions.     IMPRESSION  IMPRESSION:   1. Diffuse gastric wall thickening suggesting gastritis. 2.  Mild peritoneal edema and ascites, concerning for peritonitis.     Final result (Exam End: 9/17/2017  9:47 PM) Open        Study Result      Abdominal Series     CLINICAL INDICATION:  Acute upper abdominal pain and nausea moderate     COMPARISON: Chest 8/24/2016 radiograph, also left ribs 2/22/2016     TECHNIQUE: A frontal upright view of the chest and supine and upright views of  the abdomen were obtained.     FINDINGS: The lungs demonstrates shallow volume leading to crowding. The cardiac  silhouette is enlarged. Mild patchy and linear opacities are noted in both  bases. No evidence of pneumothorax, pulmonary edema or definite pleural  effusion.  Coronary artery stent material again noted.      There is tubing projecting over the left mid to lower abdomen that could  represent external artifact or dialysis catheter.     Flat and upright views of the abdomen show no evidence of obstruction. No  evidence of free air. Surgical clip in the left midabdomen. Mild multilevel  degenerative changes of the spine.        IMPRESSION  IMPRESSION:    1. No bowel obstruction or free air. 2. Bibasilar lung infiltrates. 3. Cardiomegaly. 9/23/2017  9:07 AM - [facility]       Component Results      Component Value Flag Ref Range Units Status     Special Requests: NO SPECIAL REQUESTS     Final     GRAM STAIN 5 TO 10 WBCS/OIF    Final     GRAM STAIN      Final     NO DEFINITE ORGANISM SEEN     Culture result:      Final     SCANT   NORMAL SKIN ABHAY ISOLATED          Labs: Results:       Chemistry Recent Labs      09/23/17 0427 09/22/17   0530  09/21/17   0445  09/20/17   1643   GLU  85  86  93  102*   NA  139  138  138  138   K  3.5  3.5  2.9*  3.1*   CL  104  103  101  101   CO2  24  24  27  26   BUN  24*  26*  29*  33*   CREA  8.62*  8.80*  8.85*  9.46*   CA  8.9  9.0  9.1  8.7   AGAP  11  11  10  11   AP   --    --    --   88   TP   --    --    --   5.6*   ALB   --    --    --   1.8*   GLOB   --    --    --   3.8*   AGRAT   --    --    --   0.5*      CBC w/Diff Recent Labs      09/23/17 0427 09/22/17 0530 09/21/17   0445   WBC  7.0  6.8  6.8   RBC  3.05*  3.10*  3.13*   HGB  9.2*  9.2*  9.4*   HCT  28.9*  28.6*  28.9*   PLT  235  224  222      Cardiac Enzymes No results for input(s): CPK, CKND1, ANNA in the last 72 hours. No lab exists for component: CKRMB, TROIP   Coagulation No results for input(s): PTP, INR, APTT in the last 72 hours.     No lab exists for component: INREXT    Lipid Panel Lab Results   Component Value Date/Time    Cholesterol, total 174 03/27/2017 06:37 AM    HDL Cholesterol 87 03/27/2017 06:37 AM    LDL, calculated 64.6 03/27/2017 06:37 AM    VLDL, calculated 22.4 03/27/2017 06:37 AM    Triglyceride 112 03/27/2017 06:37 AM    CHOL/HDL Ratio 2.0 03/27/2017 06:37 AM      BNP No results for input(s): BNPP in the last 72 hours. Liver Enzymes Recent Labs      09/20/17   1643   TP  5.6*   ALB  1.8*   AP  88   SGOT  27      Thyroid Studies Lab Results   Component Value Date/Time    TSH 39.700 09/18/2017 03:38 PM            Discharge Exam:  Visit Vitals    /82    Pulse 86    Temp 98 °F (36.7 °C)    Resp 17    Ht 5' 10\" (1.778 m)    Wt 86.2 kg (190 lb)    SpO2 98%    BMI 27.26 kg/m2     General appearance: alert, cooperative, no distress, appears stated age  Lungs: clear to auscultation bilaterally  Heart: regular rate and rhythm, S1, S2 normal, no murmur, click, rub or gallop  Abdomen: soft, non-tender. Bowel sounds normal. No masses,  no organomegaly  Extremities: no cyanosis or edema  Neurologic: Grossly normal    Disposition:home w/ [facility]  Discharge Condition: stable  Patient Instructions:   Current Discharge Medication List      START taking these medications    Details   pantoprazole (PROTONIX) 40 mg tablet Take 1 Tab by mouth Daily (before breakfast). Qty: 30 Tab, Refills: 0      sucralfate (CARAFATE) 100 mg/mL suspension Take 10 mL by mouth Before breakfast, lunch, dinner and at bedtime. Indications: PREVENTION OF STRESS ULCER  Qty: 414 mL, Refills: 0         CONTINUE these medications which have CHANGED    Details   amLODIPine (NORVASC) 10 mg tablet Take 1 Tab by mouth daily. Qty: 30 Tab, Refills: 0         CONTINUE these medications which have NOT CHANGED    Details   calcitRIOL (ROCALTROL) 0.25 mcg capsule Take 0.25 mcg by mouth daily. nitroglycerin (NITROLINGUAL) 400 mcg/spray spray 1 Spray by SubLINGual route every five (5) minutes as needed for Chest Pain.      linaclotide (LINZESS) 145 mcg cap capsule Take  by mouth Daily (before breakfast). folic acid 808 mcg tablet Take 800 mcg by mouth daily. magnesium oxide (MAG-OX) 400 mg tablet Take 400 mg by mouth daily.       potassium chloride (KLOR-CON M10) 10 mEq tablet Take 10 mEq by mouth two (2) times a day. PNV NO.122/IRON/FOLIC ACID (PRENATAL MULTI PO) Take  by mouth.      metoprolol tartrate (LOPRESSOR) 25 mg tablet Take 12.5 mg by mouth daily. Take PRN for BP <120. ondansetron (ZOFRAN ODT) 4 mg disintegrating tablet Take 4 mg by mouth three (3) times daily as needed. metoclopramide HCl (REGLAN) 5 mg tablet Take 5 mg by mouth two (2) times a day. ticagrelor (BRILINTA) 90 mg tablet Take 1 Tab by mouth two (2) times a day. Qty: 60 Tab, Refills: 11      promethazine (PHENERGAN) 25 mg tablet Take 25 mg by mouth every eight (8) hours as needed for Nausea. gentamicin (GARAMYCIN) 0.1 % topical cream APPLY TO PD catheter exit site at daily dressing change  Qty: 15 g, Refills: 0      aspirin delayed-release 81 mg tablet Take 1 Tab by mouth daily. darbepoetin toya in polysorbat (ARANESP, POLYSORBATE,) 40 mcg/mL injection 40 mcg by SubCUTAneous route every fourteen (14) days. Indications: ANEMIA IN CHRONIC KIDNEY DISEASE      rosuvastatin (CRESTOR) 20 mg tablet Take 20 mg by mouth nightly. ranolazine ER (RANEXA) 500 mg SR tablet Take 500 mg by mouth two (2) times a day. levothyroxine (SYNTHROID) 150 mcg tablet Take 150 mcg by mouth Daily (before breakfast). torsemide (DEMADEX) 100 mg tablet Take  by mouth daily. cinacalcet (SENSIPAR) 90 mg tab Take  by mouth.      sevelamer carbonate (RENVELA) 800 mg tab tab Take 800 mg by mouth three (3) times daily (with meals).          STOP taking these medications       clopidogrel (PLAVIX) 75 mg tab Comments:   Reason for Stopping:         famotidine (PEPCID) 40 mg tablet Comments:   Reason for Stopping:         docusate sodium (COLACE) 100 mg capsule Comments:   Reason for Stopping:               Activity: as tolerated  Diet: cardiac      Follow-up  · 1 week nephrology, 1-2 weeks cardiology    Time spent to discharge patient 35 minutes  Signed:  Ricardo Rivero DO  9/23/2017  10:34 AM

## 2017-09-23 NOTE — PROGRESS NOTES
Discharge instructions reviewed including meds and follow up appts with cardiology and nephrology. 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 She reports same and intended compliance. Adult son at bedside. She was taken to main exit via [facility] accompanied by staff x1, NAD, no complaints.

## 2017-09-23 NOTE — PROGRESS NOTES
Patient had a bowel movement in the evening, but forgot to notify the nurse to assess it. She stated that it was a \"dark\" stool, but not green or bloody looking. A collection hat was placed on the toilet for future use. Otherwise a quiet night. Hourly rounds performed throughout the shift. All needs met at this time.

## 2017-09-24 ENCOUNTER — PATIENT OUTREACH (OUTPATIENT)
Dept: CASE MANAGEMENT | Age: 82
End: 2017-09-24

## 2017-09-24 NOTE — PROGRESS NOTES
Pt was D/C home yesterday. She could not recall name of current [facility] provider and this was not located in the review the chart notes. Follow up today with pt at home. Liliam DURAN is her home health provider. Called/faxed resume care information to [facility]. Received call back from [facility] that pt is actually with [facility]. Per Trev Armstrong on call for [facility] they were aware that pt was here and services were not revoked. Provided her with contact for Lacho Moreno for follow up Monday 9/25 if hospice need additional information from [facility] to support pt care. Care Management Interventions  PCP Verified by CM:  Cale Serrato MD)  Transition of Care Consult (CM Consult): Home Hospice (resume [facility] with call back that pt is actually with [facility].)  [address]: No  [facility]: No  Reason Outside [place]: Patient already serviced by other home care/hospice agency  Physical Therapy Consult: Yes  Current Support Network: Own Home, Lives Alone, [address] [facility] Follow Up Transport: Family  Plan discussed with Pt/Family/Caregiver: Yes  Freedom of Choice Offered: Yes  Discharge Location  Discharge Placement: Home with hospice (HOme with resumed [facility] on Sat 9/23/17.   Recieved call back that pt is actually acitve with [facility] not [facility].)

## 2017-09-24 NOTE — PROGRESS NOTES
Initial outreach attempt to patient was unsuccessful. Second outreach attempt will be made within 24 hours. This note will not be viewable in [address]

## 2017-09-25 NOTE — PROGRESS NOTES
Second outreach attempt to patient was unsuccessful. Third outreach attempt will be made within 5 business days. No PCP on file. This note will not be viewable in [address]

## 2017-09-30 ENCOUNTER — PATIENT OUTREACH (OUTPATIENT)
Dept: CASE MANAGEMENT | Age: 82
End: 2017-09-30

## 2017-11-18 ENCOUNTER — APPOINTMENT (OUTPATIENT)
Dept: GENERAL RADIOLOGY | Age: 82
DRG: 246 | End: 2017-11-18
Attending: STUDENT IN AN ORGANIZED HEALTH CARE EDUCATION/TRAINING PROGRAM
Payer: MEDICARE

## 2017-11-18 ENCOUNTER — HOSPITAL ENCOUNTER (INPATIENT)
Age: 82
LOS: 11 days | Discharge: NURSING, REHAB OR LONG-TERM CARE | DRG: 246 | End: 2017-11-29
Attending: STUDENT IN AN ORGANIZED HEALTH CARE EDUCATION/TRAINING PROGRAM | Admitting: INTERNAL MEDICINE
Payer: MEDICARE

## 2017-11-18 DIAGNOSIS — K21.9 GASTROESOPHAGEAL REFLUX DISEASE WITHOUT ESOPHAGITIS: ICD-10-CM

## 2017-11-18 DIAGNOSIS — E03.2 HYPOTHYROIDISM DUE TO MEDICATION: Chronic | ICD-10-CM

## 2017-11-18 DIAGNOSIS — N18.6 ESRD (END STAGE RENAL DISEASE) (HCC): ICD-10-CM

## 2017-11-18 DIAGNOSIS — E78.2 MIXED HYPERLIPIDEMIA: ICD-10-CM

## 2017-11-18 DIAGNOSIS — I21.4 NSTEMI (NON-ST ELEVATED MYOCARDIAL INFARCTION) (HCC): Primary | ICD-10-CM

## 2017-11-18 DIAGNOSIS — R53.81 DEBILITY: ICD-10-CM

## 2017-11-18 DIAGNOSIS — R10.9 ABDOMINAL PAIN, UNSPECIFIED ABDOMINAL LOCATION: ICD-10-CM

## 2017-11-18 DIAGNOSIS — I25.10 CORONARY ARTERY DISEASE INVOLVING NATIVE CORONARY ARTERY OF NATIVE HEART WITHOUT ANGINA PECTORIS: ICD-10-CM

## 2017-11-18 DIAGNOSIS — I10 ESSENTIAL HYPERTENSION: Chronic | ICD-10-CM

## 2017-11-18 DIAGNOSIS — R42 DIZZINESS: ICD-10-CM

## 2017-11-18 DIAGNOSIS — Z53.1 REFUSAL OF BLOOD TRANSFUSIONS AS PATIENT IS JEHOVAH'S WITNESS: Chronic | ICD-10-CM

## 2017-11-18 DIAGNOSIS — R77.8 ELEVATED TROPONIN: ICD-10-CM

## 2017-11-18 DIAGNOSIS — I20.0 UNSTABLE ANGINA (HCC): ICD-10-CM

## 2017-11-18 PROBLEM — N18.9 CKD (CHRONIC KIDNEY DISEASE): Status: ACTIVE | Noted: 2017-11-18

## 2017-11-18 PROBLEM — R07.9 CHEST PAIN: Status: ACTIVE | Noted: 2017-11-18

## 2017-11-18 PROBLEM — D64.9 CHRONIC ANEMIA: Status: ACTIVE | Noted: 2017-11-18

## 2017-11-18 LAB
ALBUMIN SERPL-MCNC: 2.4 G/DL (ref 3.2–4.6)
ALBUMIN/GLOB SERPL: 0.6 {RATIO} (ref 1.2–3.5)
ALP SERPL-CCNC: 87 U/L (ref 50–136)
ALT SERPL-CCNC: 18 U/L (ref 12–65)
ANION GAP SERPL CALC-SCNC: 9 MMOL/L (ref 7–16)
AST SERPL-CCNC: 29 U/L (ref 15–37)
ATRIAL RATE: 91 BPM
BASOPHILS # BLD: 0 K/UL (ref 0–0.2)
BASOPHILS NFR BLD: 0 % (ref 0–2)
BILIRUB SERPL-MCNC: 0.4 MG/DL (ref 0.2–1.1)
BUN SERPL-MCNC: 29 MG/DL (ref 8–23)
CALCIUM SERPL-MCNC: 10 MG/DL (ref 8.3–10.4)
CALCULATED P AXIS, ECG09: 70 DEGREES
CALCULATED R AXIS, ECG10: -26 DEGREES
CALCULATED T AXIS, ECG11: 84 DEGREES
CHLORIDE SERPL-SCNC: 101 MMOL/L (ref 98–107)
CO2 SERPL-SCNC: 29 MMOL/L (ref 21–32)
CREAT SERPL-MCNC: 9.05 MG/DL (ref 0.6–1)
DIAGNOSIS, 93000: NORMAL
DIFFERENTIAL METHOD BLD: ABNORMAL
EOSINOPHIL # BLD: 0.2 K/UL (ref 0–0.8)
EOSINOPHIL NFR BLD: 2 % (ref 0.5–7.8)
ERYTHROCYTE [DISTWIDTH] IN BLOOD BY AUTOMATED COUNT: 15.4 % (ref 11.9–14.6)
GLOBULIN SER CALC-MCNC: 4.2 G/DL (ref 2.3–3.5)
GLUCOSE SERPL-MCNC: 85 MG/DL (ref 65–100)
HCT VFR BLD AUTO: 28.5 % (ref 35.8–46.3)
HGB BLD-MCNC: 8.9 G/DL (ref 11.7–15.4)
IMM GRANULOCYTES # BLD: 0 K/UL (ref 0–0.5)
IMM GRANULOCYTES NFR BLD AUTO: 1 % (ref 0–5)
LYMPHOCYTES # BLD: 1.8 K/UL (ref 0.5–4.6)
LYMPHOCYTES NFR BLD: 20 % (ref 13–44)
MCH RBC QN AUTO: 30 PG (ref 26.1–32.9)
MCHC RBC AUTO-ENTMCNC: 31.2 G/DL (ref 31.4–35)
MCV RBC AUTO: 96 FL (ref 79.6–97.8)
MONOCYTES # BLD: 0.5 K/UL (ref 0.1–1.3)
MONOCYTES NFR BLD: 6 % (ref 4–12)
NEUTS SEG # BLD: 6.4 K/UL (ref 1.7–8.2)
NEUTS SEG NFR BLD: 71 % (ref 43–78)
P-R INTERVAL, ECG05: 162 MS
PLATELET # BLD AUTO: 212 K/UL (ref 150–450)
PMV BLD AUTO: 10.3 FL (ref 10.8–14.1)
POTASSIUM SERPL-SCNC: 3.6 MMOL/L (ref 3.5–5.1)
PROT SERPL-MCNC: 6.6 G/DL (ref 6.3–8.2)
Q-T INTERVAL, ECG07: 404 MS
QRS DURATION, ECG06: 96 MS
QTC CALCULATION (BEZET), ECG08: 496 MS
RBC # BLD AUTO: 2.97 M/UL (ref 4.05–5.25)
SODIUM SERPL-SCNC: 139 MMOL/L (ref 136–145)
TROPONIN I SERPL-MCNC: 0.68 NG/ML (ref 0.02–0.05)
TROPONIN I SERPL-MCNC: 0.79 NG/ML (ref 0.02–0.05)
VENTRICULAR RATE, ECG03: 91 BPM
WBC # BLD AUTO: 8.9 K/UL (ref 4.3–11.1)

## 2017-11-18 PROCEDURE — 36415 COLL VENOUS BLD VENIPUNCTURE: CPT | Performed by: PHYSICIAN ASSISTANT

## 2017-11-18 PROCEDURE — 71020 XR CHEST PA LAT: CPT

## 2017-11-18 PROCEDURE — 65660000000 HC RM CCU STEPDOWN

## 2017-11-18 PROCEDURE — 3E1M39Z IRRIGATION OF PERITONEAL CAVITY USING DIALYSATE, PERCUTANEOUS APPROACH: ICD-10-PCS | Performed by: INTERNAL MEDICINE

## 2017-11-18 PROCEDURE — 94760 N-INVAS EAR/PLS OXIMETRY 1: CPT

## 2017-11-18 PROCEDURE — 74011250636 HC RX REV CODE- 250/636: Performed by: INTERNAL MEDICINE

## 2017-11-18 PROCEDURE — 84484 ASSAY OF TROPONIN QUANT: CPT | Performed by: STUDENT IN AN ORGANIZED HEALTH CARE EDUCATION/TRAINING PROGRAM

## 2017-11-18 PROCEDURE — 74011250637 HC RX REV CODE- 250/637

## 2017-11-18 PROCEDURE — 74011250636 HC RX REV CODE- 250/636: Performed by: PHYSICIAN ASSISTANT

## 2017-11-18 PROCEDURE — 99285 EMERGENCY DEPT VISIT HI MDM: CPT | Performed by: STUDENT IN AN ORGANIZED HEALTH CARE EDUCATION/TRAINING PROGRAM

## 2017-11-18 PROCEDURE — 90945 DIALYSIS ONE EVALUATION: CPT

## 2017-11-18 PROCEDURE — 74011250637 HC RX REV CODE- 250/637: Performed by: STUDENT IN AN ORGANIZED HEALTH CARE EDUCATION/TRAINING PROGRAM

## 2017-11-18 PROCEDURE — 93005 ELECTROCARDIOGRAM TRACING: CPT | Performed by: INTERNAL MEDICINE

## 2017-11-18 PROCEDURE — 85025 COMPLETE CBC W/AUTO DIFF WBC: CPT | Performed by: STUDENT IN AN ORGANIZED HEALTH CARE EDUCATION/TRAINING PROGRAM

## 2017-11-18 PROCEDURE — 80053 COMPREHEN METABOLIC PANEL: CPT | Performed by: STUDENT IN AN ORGANIZED HEALTH CARE EDUCATION/TRAINING PROGRAM

## 2017-11-18 PROCEDURE — 74011250637 HC RX REV CODE- 250/637: Performed by: PHYSICIAN ASSISTANT

## 2017-11-18 PROCEDURE — 74011250637 HC RX REV CODE- 250/637: Performed by: INTERNAL MEDICINE

## 2017-11-18 PROCEDURE — 93005 ELECTROCARDIOGRAM TRACING: CPT | Performed by: STUDENT IN AN ORGANIZED HEALTH CARE EDUCATION/TRAINING PROGRAM

## 2017-11-18 RX ORDER — PANTOPRAZOLE SODIUM 40 MG/1
40 TABLET, DELAYED RELEASE ORAL
Status: DISCONTINUED | OUTPATIENT
Start: 2017-11-19 | End: 2017-11-18

## 2017-11-18 RX ORDER — RANOLAZINE 500 MG/1
1000 TABLET, EXTENDED RELEASE ORAL 2 TIMES DAILY
Status: DISCONTINUED | OUTPATIENT
Start: 2017-11-18 | End: 2017-11-29 | Stop reason: HOSPADM

## 2017-11-18 RX ORDER — SODIUM CHLORIDE 0.9 % (FLUSH) 0.9 %
5-10 SYRINGE (ML) INJECTION AS NEEDED
Status: DISCONTINUED | OUTPATIENT
Start: 2017-11-18 | End: 2017-11-24 | Stop reason: SDUPTHER

## 2017-11-18 RX ORDER — AMLODIPINE BESYLATE 10 MG/1
10 TABLET ORAL DAILY
Status: DISCONTINUED | OUTPATIENT
Start: 2017-11-19 | End: 2017-11-23

## 2017-11-18 RX ORDER — ONDANSETRON 4 MG/1
4 TABLET, ORALLY DISINTEGRATING ORAL
Status: DISCONTINUED | OUTPATIENT
Start: 2017-11-18 | End: 2017-11-29 | Stop reason: HOSPADM

## 2017-11-18 RX ORDER — ACETAMINOPHEN 325 MG/1
650 TABLET ORAL
Status: DISCONTINUED | OUTPATIENT
Start: 2017-11-18 | End: 2017-11-29 | Stop reason: HOSPADM

## 2017-11-18 RX ORDER — SEVELAMER HYDROCHLORIDE 400 MG/1
800 TABLET, FILM COATED ORAL
Status: DISCONTINUED | OUTPATIENT
Start: 2017-11-18 | End: 2017-11-29 | Stop reason: HOSPADM

## 2017-11-18 RX ORDER — LEVOTHYROXINE SODIUM 150 UG/1
150 TABLET ORAL
Status: DISCONTINUED | OUTPATIENT
Start: 2017-11-19 | End: 2017-11-29 | Stop reason: HOSPADM

## 2017-11-18 RX ORDER — MORPHINE SULFATE 10 MG/ML
2 INJECTION, SOLUTION INTRAMUSCULAR; INTRAVENOUS
Status: DISCONTINUED | OUTPATIENT
Start: 2017-11-18 | End: 2017-11-29 | Stop reason: HOSPADM

## 2017-11-18 RX ORDER — CYANOCOBALAMIN 1000 UG/ML
1000 INJECTION, SOLUTION INTRAMUSCULAR; SUBCUTANEOUS
Status: DISCONTINUED | OUTPATIENT
Start: 2017-11-18 | End: 2017-11-29 | Stop reason: HOSPADM

## 2017-11-18 RX ORDER — GUAIFENESIN 100 MG/5ML
81 LIQUID (ML) ORAL DAILY
Status: DISCONTINUED | OUTPATIENT
Start: 2017-11-19 | End: 2017-11-18 | Stop reason: SDUPTHER

## 2017-11-18 RX ORDER — GUAIFENESIN 100 MG/5ML
324 LIQUID (ML) ORAL
Status: COMPLETED | OUTPATIENT
Start: 2017-11-18 | End: 2017-11-18

## 2017-11-18 RX ORDER — POTASSIUM CHLORIDE 750 MG/1
10 TABLET, EXTENDED RELEASE ORAL 2 TIMES DAILY
Status: DISCONTINUED | OUTPATIENT
Start: 2017-11-18 | End: 2017-11-18

## 2017-11-18 RX ORDER — GENTAMICIN SULFATE 1 MG/G
CREAM TOPICAL DAILY PRN
Status: DISCONTINUED | OUTPATIENT
Start: 2017-11-18 | End: 2017-11-20 | Stop reason: SDUPTHER

## 2017-11-18 RX ORDER — ROSUVASTATIN CALCIUM 20 MG/1
20 TABLET, COATED ORAL
Status: DISCONTINUED | OUTPATIENT
Start: 2017-11-18 | End: 2017-11-29 | Stop reason: HOSPADM

## 2017-11-18 RX ORDER — HYDROCODONE BITARTRATE AND ACETAMINOPHEN 7.5; 325 MG/1; MG/1
1 TABLET ORAL
Status: DISCONTINUED | OUTPATIENT
Start: 2017-11-18 | End: 2017-11-29 | Stop reason: HOSPADM

## 2017-11-18 RX ORDER — HEPARIN SODIUM 5000 [USP'U]/100ML
12-25 INJECTION, SOLUTION INTRAVENOUS
Status: DISCONTINUED | OUTPATIENT
Start: 2017-11-18 | End: 2017-11-21

## 2017-11-18 RX ORDER — PANTOPRAZOLE SODIUM 40 MG/1
40 TABLET, DELAYED RELEASE ORAL
Status: DISCONTINUED | OUTPATIENT
Start: 2017-11-18 | End: 2017-11-29 | Stop reason: HOSPADM

## 2017-11-18 RX ORDER — MEGESTROL ACETATE 40 MG/ML
200 SUSPENSION ORAL DAILY
COMMUNITY
End: 2017-12-28

## 2017-11-18 RX ORDER — POTASSIUM CHLORIDE 20 MEQ/1
20 TABLET, EXTENDED RELEASE ORAL 2 TIMES DAILY
COMMUNITY
End: 2017-12-28

## 2017-11-18 RX ORDER — NITROGLYCERIN 0.4 MG/1
0.4 TABLET SUBLINGUAL AS NEEDED
Status: DISCONTINUED | OUTPATIENT
Start: 2017-11-18 | End: 2017-11-29 | Stop reason: HOSPADM

## 2017-11-18 RX ORDER — CALCITRIOL 0.25 UG/1
0.25 CAPSULE ORAL DAILY
Status: DISCONTINUED | OUTPATIENT
Start: 2017-11-19 | End: 2017-11-29 | Stop reason: HOSPADM

## 2017-11-18 RX ORDER — FOLIC ACID 1 MG/1
1 TABLET ORAL DAILY
COMMUNITY

## 2017-11-18 RX ORDER — TRAZODONE HYDROCHLORIDE 50 MG/1
TABLET ORAL
COMMUNITY
End: 2017-12-28

## 2017-11-18 RX ORDER — CINACALCET 90 MG/1
90 TABLET, FILM COATED ORAL
Status: DISCONTINUED | OUTPATIENT
Start: 2017-11-18 | End: 2017-11-18

## 2017-11-18 RX ORDER — TORSEMIDE 100 MG/1
100 TABLET ORAL DAILY
Status: DISCONTINUED | OUTPATIENT
Start: 2017-11-19 | End: 2017-11-22

## 2017-11-18 RX ORDER — METOPROLOL TARTRATE 25 MG/1
12.5 TABLET, FILM COATED ORAL DAILY
Status: DISCONTINUED | OUTPATIENT
Start: 2017-11-19 | End: 2017-11-29 | Stop reason: HOSPADM

## 2017-11-18 RX ORDER — ASPIRIN 81 MG/1
81 TABLET ORAL DAILY
Status: DISCONTINUED | OUTPATIENT
Start: 2017-11-19 | End: 2017-11-29 | Stop reason: HOSPADM

## 2017-11-18 RX ORDER — HEPARIN SODIUM 5000 [USP'U]/ML
4000 INJECTION, SOLUTION INTRAVENOUS; SUBCUTANEOUS ONCE
Status: COMPLETED | OUTPATIENT
Start: 2017-11-18 | End: 2017-11-18

## 2017-11-18 RX ORDER — SUCRALFATE 1 G/10ML
1 SUSPENSION ORAL
Status: DISCONTINUED | OUTPATIENT
Start: 2017-11-18 | End: 2017-11-29 | Stop reason: HOSPADM

## 2017-11-18 RX ORDER — POTASSIUM CHLORIDE 20 MEQ/1
20 TABLET, EXTENDED RELEASE ORAL 2 TIMES DAILY
Status: DISCONTINUED | OUTPATIENT
Start: 2017-11-18 | End: 2017-11-23

## 2017-11-18 RX ORDER — NITROGLYCERIN 0.4 MG/1
TABLET SUBLINGUAL
Status: COMPLETED
Start: 2017-11-18 | End: 2017-11-18

## 2017-11-18 RX ORDER — METOCLOPRAMIDE 10 MG/1
5 TABLET ORAL 2 TIMES DAILY
Status: DISCONTINUED | OUTPATIENT
Start: 2017-11-18 | End: 2017-11-29 | Stop reason: HOSPADM

## 2017-11-18 RX ORDER — LANOLIN ALCOHOL/MO/W.PET/CERES
400 CREAM (GRAM) TOPICAL DAILY
Status: DISCONTINUED | OUTPATIENT
Start: 2017-11-19 | End: 2017-11-29 | Stop reason: HOSPADM

## 2017-11-18 RX ORDER — PROMETHAZINE HYDROCHLORIDE 25 MG/1
25 TABLET ORAL
Status: DISCONTINUED | OUTPATIENT
Start: 2017-11-18 | End: 2017-11-29 | Stop reason: HOSPADM

## 2017-11-18 RX ADMIN — NITROGLYCERIN 0.4 MG: 0.4 TABLET SUBLINGUAL at 17:11

## 2017-11-18 RX ADMIN — HEPARIN SODIUM 4000 UNITS: 5000 INJECTION, SOLUTION INTRAVENOUS; SUBCUTANEOUS at 18:26

## 2017-11-18 RX ADMIN — NITROGLYCERIN 1 INCH: 20 OINTMENT TOPICAL at 19:05

## 2017-11-18 RX ADMIN — ERYTHROPOIETIN 20000 UNITS: 20000 INJECTION, SOLUTION INTRAVENOUS; SUBCUTANEOUS at 23:03

## 2017-11-18 RX ADMIN — NITROGLYCERIN 0.4 MG: 0.4 TABLET SUBLINGUAL at 15:25

## 2017-11-18 RX ADMIN — TICAGRELOR 90 MG: 90 TABLET ORAL at 23:12

## 2017-11-18 RX ADMIN — NITROGLYCERIN 1 INCH: 20 OINTMENT TOPICAL at 15:26

## 2017-11-18 RX ADMIN — SODIUM CHLORIDE 250 MG: 900 INJECTION, SOLUTION INTRAVENOUS at 23:04

## 2017-11-18 RX ADMIN — RANOLAZINE 1000 MG: 500 TABLET, FILM COATED, EXTENDED RELEASE ORAL at 18:26

## 2017-11-18 RX ADMIN — NITROGLYCERIN 0.4 MG: 0.4 TABLET SUBLINGUAL at 19:04

## 2017-11-18 RX ADMIN — ASPIRIN 81 MG 324 MG: 81 TABLET ORAL at 14:13

## 2017-11-18 RX ADMIN — POTASSIUM CHLORIDE 20 MEQ: 20 TABLET, EXTENDED RELEASE ORAL at 18:25

## 2017-11-18 RX ADMIN — HEPARIN SODIUM AND DEXTROSE 12 UNITS/KG/HR: 5000; 5 INJECTION INTRAVENOUS at 18:25

## 2017-11-18 RX ADMIN — METOCLOPRAMIDE HYDROCHLORIDE 5 MG: 10 TABLET ORAL at 23:03

## 2017-11-18 RX ADMIN — GENTAMICIN SULFATE: 1 CREAM TOPICAL at 18:58

## 2017-11-18 RX ADMIN — PANTOPRAZOLE SODIUM 40 MG: 40 TABLET, DELAYED RELEASE ORAL at 18:25

## 2017-11-18 RX ADMIN — ROSUVASTATIN CALCIUM 20 MG: 20 TABLET, FILM COATED ORAL at 23:12

## 2017-11-18 RX ADMIN — SUCRALFATE 1 G: 1 SUSPENSION ORAL at 23:12

## 2017-11-18 NOTE — DIALYSIS
Peritoneal dialysis initiated as ordered. Consent obtained. Extension set attached for compatibility. Peritoneal catheter exit site cleaned with chlorhexidine scrub and gentamicin cream applied to PD cath exit site. Dressing changed, site has no s/s of redness, swelling, or exudate. Patient states no complaints at this time. Report given to primary RN.

## 2017-11-18 NOTE — CONSULTS
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 She has a history of GI bleeding and had anemia-induced unstable angina in the past. 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 She states the pain has since resolved with NTG paste.  No fevers or chills. No nausea, vomiting or diarrhea.  She states that she is essentially anuric and occasionally makes minimal urine.  She has a h/o CVA and TIA. No fever or chills, no problems with PD drainage or discoloration of PD fluid. No increased thirst. She wishes regular diet and supplement. She denies any other acute complaints  Her edema has improved in the past couple of days with intensified dialysis.     Past Medical History:   Diagnosis Date    Anemia of chronic renal failure 4/28/2015    Anterior myocardial infarction [facility]) 5/21/2015    CAD (coronary artery disease)     Chest pain     Chronic kidney disease, stage III (moderate) 8/15/2014    on dialysis    CKD (chronic kidney disease) stage 4, GFR 15-29 ml/min ([facility]) 4/28/2015    Debility 5/5/2015    Depression 12/29/2015    Edema 12/29/2015    Endocrine disease     Hypothyroidism    GERD (gastroesophageal reflux disease)     Heart murmur 12/29/2015    HLD (hyperlipidemia) 12/29/2015    Hypertension     Hypothyroidism 4/28/2015    Ischemic cardiomyopathy 12/29/2015    Nausea     S/P PTCA (percutaneous transluminal coronary angioplasty); LAD PTCA of  ISR 11/27/15 5/24/2016    STEMI (ST elevation myocardial infarction) ([facility] [address] 4/28/2015    Unspecified sleep apnea     uses cpap machine    Unstable angina ([address]       Past Surgical History:   Procedure Laterality Date    Brittany 1850 neck surgery cervical disc    HX BACK SURGERY      lower back    HX CATARACT REMOVAL Bilateral     HX CHOLECYSTECTOMY  19702    gall bladder     HX KNEE REPLACEMENT Right 2006    HX PTCA  4/28/2015    2.25 Xience stent to mid LAD for occluded artery, anterior MI, EF 25%. Moderate disease distal LAD and distal OM PCI CX and RCA 2004, then PCI RCA and LAD in 2009. Prior to Admission medications    Medication Sig Start Date End Date Taking? Authorizing Provider   amLODIPine (NORVASC) 10 mg tablet Take 1 Tab by mouth daily. 9/23/17   Zena Ryan DO   pantoprazole (PROTONIX) 40 mg tablet Take 1 Tab by mouth Daily (before breakfast). 9/23/17   Zena Ryan DO   sucralfate (CARAFATE) 100 mg/mL suspension Take 10 mL by mouth Before breakfast, lunch, dinner and at bedtime. Indications: PREVENTION OF STRESS ULCER 9/23/17   Zean Ryan DO   torsemide [facility]) 100 mg tablet Take  by mouth daily. Historical Provider   calcitRIOL (ROCALTROL) 0.25 mcg capsule Take 0.25 mcg by mouth daily. Historical Provider   nitroglycerin (NITROLINGUAL) 400 mcg/spray spray 1 Spray by SubLINGual route every five (5) minutes as needed for Chest Pain. Historical Provider   linaclotide [facility]) 145 mcg cap capsule Take  by mouth Daily (before breakfast). Historical Provider   cinacalcet (SENSIPAR) 90 mg tab Take  by mouth. Historical Provider   folic acid 868 mcg tablet Take 800 mcg by mouth daily. Historical Provider   magnesium oxide (MAG-OX) 400 mg tablet Take 400 mg by mouth daily. Historical Provider   potassium chloride (KLOR-CON M10) 10 mEq tablet Take 10 mEq by mouth two (2) times a day. Historical Provider   PNV NO.122/IRON/FOLIC ACID (PRENATAL MULTI PO) Take  by mouth. Historical Provider   metoprolol tartrate (LOPRESSOR) 25 mg tablet Take 12.5 mg by mouth daily.  Take PRN for BP <120. 6/23/16   Ifrah Watters III, MD   ondansetron (ZOFRAN ODT) 4 mg disintegrating tablet Take 4 mg by mouth three (3) times daily as needed. Historical Provider   metoclopramide HCl (REGLAN) 5 mg tablet Take 5 mg by mouth two (2) times a day. Historical Provider   ticagrelor (BRILINTA) 90 mg tablet Take 1 Tab by mouth two (2) times a day. 2/4/16   MINDY José   promethazine (PHENERGAN) 25 mg tablet Take 25 mg by mouth every eight (8) hours as needed for Nausea. Historical Provider   sevelamer carbonate (RENVELA) 800 mg tab tab Take 800 mg by mouth three (3) times daily (with meals). Historical Provider   gentamicin (GARAMYCIN) 0.1 % topical cream APPLY TO PD catheter exit site at daily dressing change 5/12/15   Elton Leach MD   aspirin delayed-release 81 mg tablet Take 1 Tab by mouth daily. 5/5/15   Gisela Hollingsworth PA-C   darbepoetin toya in polysorbat (ARANESP, POLYSORBATE,) 40 mcg/mL injection 40 mcg by SubCUTAneous route every fourteen (14) days. Indications: ANEMIA IN CHRONIC KIDNEY DISEASE    Historical Provider   rosuvastatin (CRESTOR) 20 mg tablet Take 20 mg by mouth nightly. Historical Provider   ranolazine ER (RANEXA) 500 mg SR tablet Take 500 mg by mouth two (2) times a day. Historical Provider   levothyroxine (SYNTHROID) 150 mcg tablet Take 150 mcg by mouth Daily (before breakfast).     Historical Provider     Allergies   Allergen Reactions    Codeine Nausea and Vomiting      Social History   Substance Use Topics    Smoking status: Never Smoker    Smokeless tobacco: Never Used    Alcohol use No      Family History   Problem Relation Age of Onset    Heart Disease Mother     Hypertension Mother     Cancer Mother      Lung    Stroke Father     Hypertension Father     Breast Cancer Neg Hx           Review of Systems    Constitutional: no fever,   Eyes: fair vision,    Ears, nose, mouth, throat, and face:fair hearing,   Respiratory: no asthma,  Chronic home O2 is being used  Cardiovascular:no palpitation, positive for chest pain,   Gastrointestinal:no diarrhea, Genitourinary: no dysuria,   Hematologic/lymphatic: no bleeding tendency,   Neurological: no seizures   Behvioral/Psych: no psych hospitalization   Endocrine: no goiter,       Objective:       Visit Vitals    /81    Pulse 85    Temp 97.5 °F (36.4 °C)    Resp 17    Ht 5' 10\" (1.778 m)    Wt 86.2 kg (190 lb)    SpO2 97%    BMI 27.26 kg/m2       General:  Alert, cooperative, mild distress, appears stated age. Head:  Normocephalic, without obvious abnormality, atraumatic. Eyes:  Conjunctivae/corneas clear. EOMs intact. Ears:  Normal external ear canals both ears. Nose: Nares normal. Septum midline. Mucosa normal. No drainage or sinus tenderness. Throat: Lips, mucosa, and tongue normal. Teeth and gums normal.   Neck: Supple, symmetrical, trachea midline, no adenopathy, thyroid: no enlargement/tenderness/nodules, no JVD. Back:   Symmetric, no curvature. ROM normal. No CVA tenderness. Lungs:   Clear to auscultation bilaterally. Chest wall:  No tenderness or deformity. Heart:  Regular rate and rhythm, S1, S2 normal, 2/6 systolic  murmur, no rub or gallop. Abdomen:   Soft, non-tender. Bowel sounds normal. No masses,  No organomegaly. No renal bruit. PD catheter in place without exudate   Extremities: Extremities normal, atraumatic, no cyanosis. 1-2+ edema. Skin: Skin color, texture, turgor normal. No rashes or lesions. Lymph nodes: Cervical and supraclavicular nodes normal.   Neurologic: Grossly intact. No asterixis.          Data Review:     Recent Results (from the past 24 hour(s))   EKG, 12 LEAD, INITIAL    Collection Time: 11/18/17 12:44 PM   Result Value Ref Range    Ventricular Rate 91 BPM    Atrial Rate 91 BPM    P-R Interval 162 ms    QRS Duration 96 ms    Q-T Interval 404 ms    QTC Calculation (Bezet) 496 ms    Calculated P Axis 70 degrees    Calculated R Axis -26 degrees    Calculated T Axis 84 degrees    Diagnosis       Normal sinus rhythm  Possible Left atrial enlargement  Left ventricular hypertrophy with repolarization abnormality  Possible Lateral infarct , age undetermined  Abnormal ECG  When compared with ECG of 24-AUG-2016 17:39,  Minimal criteria for Septal infarct are no longer Present  No significant change was found     CBC WITH AUTOMATED DIFF    Collection Time: 11/18/17 12:45 PM   Result Value Ref Range    WBC 8.9 4.3 - 11.1 K/uL    RBC 2.97 (L) 4.05 - 5.25 M/uL    HGB 8.9 (L) 11.7 - 15.4 g/dL    HCT 28.5 (L) 35.8 - 46.3 %    MCV 96.0 79.6 - 97.8 FL    MCH 30.0 26.1 - 32.9 PG    MCHC 31.2 (L) 31.4 - 35.0 g/dL    RDW 15.4 (H) 11.9 - 14.6 %    PLATELET 867 774 - 959 K/uL    MPV 10.3 (L) 10.8 - 14.1 FL    DF AUTOMATED      NEUTROPHILS 71 43 - 78 %    LYMPHOCYTES 20 13 - 44 %    MONOCYTES 6 4.0 - 12.0 %    EOSINOPHILS 2 0.5 - 7.8 %    BASOPHILS 0 0.0 - 2.0 %    IMMATURE GRANULOCYTES 1 0.0 - 5.0 %    ABS. NEUTROPHILS 6.4 1.7 - 8.2 K/UL    ABS. LYMPHOCYTES 1.8 0.5 - 4.6 K/UL    ABS. MONOCYTES 0.5 0.1 - 1.3 K/UL    ABS. EOSINOPHILS 0.2 0.0 - 0.8 K/UL    ABS. BASOPHILS 0.0 0.0 - 0.2 K/UL    ABS. IMM. GRANS. 0.0 0.0 - 0.5 K/UL   METABOLIC PANEL, COMPREHENSIVE    Collection Time: 11/18/17 12:45 PM   Result Value Ref Range    Sodium 139 136 - 145 mmol/L    Potassium 3.6 3.5 - 5.1 mmol/L    Chloride 101 98 - 107 mmol/L    CO2 29 21 - 32 mmol/L    Anion gap 9 7 - 16 mmol/L    Glucose 85 65 - 100 mg/dL    BUN 29 (H) 8 - 23 MG/DL    Creatinine 9.05 (H) 0.6 - 1.0 MG/DL    GFR est AA 5 (L) >60 ml/min/1.73m2    GFR est non-AA 4 (L) >60 ml/min/1.73m2    Calcium 10.0 8.3 - 10.4 MG/DL    Bilirubin, total 0.4 0.2 - 1.1 MG/DL    ALT (SGPT) 18 12 - 65 U/L    AST (SGOT) 29 15 - 37 U/L    Alk.  phosphatase 87 50 - 136 U/L    Protein, total 6.6 6.3 - 8.2 g/dL    Albumin 2.4 (L) 3.2 - 4.6 g/dL    Globulin 4.2 (H) 2.3 - 3.5 g/dL    A-G Ratio 0.6 (L) 1.2 - 3.5     TROPONIN I    Collection Time: 11/18/17 12:45 PM   Result Value Ref Range    Troponin-I, Qt. 0.68 (HH) 0.02 - 0.05 NG/ML CXR viewed by me - no major infiltrate or fluid excess, CM with left possible minor effusion is present      Active Problems:    CAD (coronary artery disease) (11/27/2015)      Unstable angina (HCC) (2/1/2016)      Elevated troponin (11/18/2017)      Chronic anemia (11/18/2017)      ESRD (end stage renal disease) ([facility] [address] (11/18/2017)        Assessment:     1. CAD x NSTEMI, Unstable angina -  - d/w Dr. Kacie Hopson, medical management for now as she responded to NTG already followed by elective cardiac cath    2, ESRD -  - continue PD, see orders    3. Fluid excess -  - mild and improving     4. Anemia -  - intensive anemia therapy in [employer], see orders    5.  History of GI bleed -  - monitor clinically and serial Hb      Plan:     As above - discussed with Dr. Kacie Hopson and dialysis nurse on call    Aspen Abraham M.D.

## 2017-11-18 NOTE — PROGRESS NOTES
Skin assessed upon arrival to unit. Sacrum intact. Heels and feet flaky but intact. PD dialysis site to abdomen with dressing intact.

## 2017-11-18 NOTE — ED NOTES
Patient medicated with 1 sublingual nitro at this time per request of cardiology. Patient reports some relief from pain. States 4 on scale of 1-10. Cardiac monitoring and cycling vitals in place. Will continue to monitor. Family at bedside.

## 2017-11-18 NOTE — PROGRESS NOTES
Visit with patient and family prior to transfer to floor. Patient is known to . Her clarisa is Jehovah Witness. Patient was calm and thankful for checking on her.     Zonia To, staff Arabella larios 03, [address] [facility]  /   [email or web address]

## 2017-11-18 NOTE — ED NOTES
TRANSFER - OUT REPORT:    Verbal report given to Chalo Garcia RN (name) on Uyen Ryder  being transferred to [id number] (unit) for routine progression of care       Report consisted of patients Situation, Background, Assessment and   Recommendations(SBAR). Information from the following report(s) SBAR, ED Summary, [facility] and Recent Results was reviewed with the receiving nurse. Lines:   Peripheral IV 11/18/17 Left Hand (Active)   Site Assessment Clean, dry, & intact 11/18/2017  2:44 PM   Phlebitis Assessment 0 11/18/2017  2:44 PM   Infiltration Assessment 0 11/18/2017  2:44 PM   Dressing Status Clean, dry, & intact 11/18/2017  2:44 PM   Dressing Type Transparent 11/18/2017  2:44 PM        Opportunity for questions and clarification was provided.       Patient transported with:   Registered Nurse

## 2017-11-18 NOTE — ED NOTES
Binh, from patient's hospice care, can be reached at [phone]. She will be in touch regarding patient's care throughout admission. Hospice diagnosis is CVA.

## 2017-11-18 NOTE — H&P
[facility] CARDIOLOGY History &Physical                 Primary Cardiologist: Dr Lloyd Baltazar    Primary Care Physician: Dr Mary Ellen Paniagua Physician: Dr Bhavik Ge    Nephrology: Dr Carisa Gudino:     Patient is a 80 y.o. female who presents with chest pain. She has a h/o CAD s/p PCI to LAD and RCA 2-2016 w echo 3-2017 showing EF 55-60% with grade 1 DD, mild-mod AR/MR. Nuclear stress 4-2017 w fixed anterior defect likely breast attenuation. Sh was recently discharged from [address] [place] [address] 9-23 after admission for anemia and possible peritonitis w ascites. She at that time said she was taking asa, plavix and brilinta, the plavix was stopped. She has ESRD on PD, chronic anemia secondary to CKD and h/o AVM and h/o CVA 3-2017. She is a Muslim but has received blood transfusion in the past but today insists she will not use any more transfusions. Her GI is wanting to do another EGD and stopped ASA on Thursday and is to contact us about holding Brilinta/plavix. She presented to the ER with chest pain which had occurred on and off yesterday and continues to come and go this morning. Pain is substernal, squeezing, 8/10, without radiation with occasional mild nausea and SOB, no diaphoresis. Pain is worse with exertion, better with rest and nitro. She again says she is taking ASA, plavix and brilinta (which are all on her med list). No cough, palpitations, dizziness, syncope or LE edema. WBC 8.9, hgb 8.9, , K 3.6, BUN 29, cr 9.05, troponin . 68, EKG NSR w rate 91 w NSST/T wave changes, /87. She continues to have episodic severe SSCP which improves with nitro.      Past Medical History:   Diagnosis Date    Anemia of chronic renal failure 4/28/2015    Anterior myocardial infarction [facility]) 5/21/2015    CAD (coronary artery disease)     Chest pain     Chronic kidney disease, stage III (moderate) 8/15/2014    on dialysis    CKD (chronic kidney disease) stage 4, GFR 15-29 ml/min ([address] 4/28/2015    Debility 5/5/2015    Depression 12/29/2015    Edema 12/29/2015    Endocrine disease     Hypothyroidism    GERD (gastroesophageal reflux disease)     Heart murmur 12/29/2015    HLD (hyperlipidemia) 12/29/2015    Hypertension     Hypothyroidism 4/28/2015    Ischemic cardiomyopathy 12/29/2015    Nausea     S/P PTCA (percutaneous transluminal coronary angioplasty); LAD PTCA of  ISR 11/27/15 5/24/2016    STEMI (ST elevation myocardial infarction) ([facility] [address] 4/28/2015    Unspecified sleep apnea     uses cpap machine    Unstable angina ([facility] [address]       Past Surgical History:   Procedure Laterality Date    HX BACK SURGERY  1990    neck surgery cervical disc    HX BACK SURGERY      lower back    HX CATARACT REMOVAL Bilateral     HX CHOLECYSTECTOMY  19702    gall bladder     HX KNEE REPLACEMENT Right 2006    HX PTCA  4/28/2015    2.25 Xience stent to mid LAD for occluded artery, anterior MI, EF 25%. Moderate disease distal LAD and distal OM PCI CX and RCA 2004, then PCI RCA and LAD in 2009.       Allergies   Allergen Reactions    Codeine Nausea and Vomiting     Social History   Substance Use Topics    Smoking status: Never Smoker    Smokeless tobacco: Never Used    Alcohol use No      FH:   Family History   Problem Relation Age of Onset    Heart Disease Mother     Hypertension Mother     Cancer Mother      Lung    Stroke Father     Hypertension Father     Breast Cancer Neg Hx         Review of Systems  General: no weight change, + weakness, no fever or chills  Skin: no rashes, lumps, or other skin changes  HEENT: no headache, dizziness, lightheadedness, vision changes, hearing changes, tinnitus, vertigo, sinus pressure/pain, bleeding gums, sore throat, or hoarseness  Neck: no swollen glands, goiter, pain or stiffness  Respiratory: no cough, sputum, hemoptysis, + mild dyspnea, no wheezing  Cardiovascular: + as per HPI  Gastrointestinal: + reflux, + constipation,nausea, no diarrhea, liver problems, GI bleeding  Urinary: + ESRD on PD  Peripheral Vascular: no claudication, leg cramps, prior DVTs, swelling of calves, legs, or feet, color change, or swelling with redness or tenderness  Musculoskeletal: no muscle or joint pain/stiffness, joint swelling, erythema of joints, or back pain  Psychiatric: no depression or excessive stress  Neurological: no sensory or motor loss, seizures, syncope, tremors, numbness, tingling, no changes in mood, attention, or speech, no changes in orientation, memory, insight, or judgment. Hematologic: + chronic anemia  Endocrine: + thyroid problems on synthroid, no heat or cold intolerance, excessive sweating, polyuria, polydipsia, no diabetes.         Objective:       Visit Vitals    /87    Pulse 92    Temp 97.5 °F (36.4 °C)    Resp 17    Ht 5' 10\" (1.778 m)    Wt 86.2 kg (190 lb)    SpO2 95%    BMI 27.26 kg/m2               Physical Exam:  General: Well Developed, Well Nourished, No Acute Distress  HEENT: pupils equal and round, no abnormalities noted  Neck: supple, no JVD, no carotid bruits  Heart: S1S2 with RRR with 2/6 murmur   Lungs: Clear throughout auscultation bilaterally without adventitious sounds  Abd: soft, nontender, PD cath in place   Ext: warm, trace edema  Skin: warm and dry  Psychiatric: Normal mood and affect  Neurologic: Alert and oriented X 3      ECG: NSR w rate 91 w NSST/T wave changes    Data Review:      Recent Results (from the past 24 hour(s))   EKG, 12 LEAD, INITIAL    Collection Time: 11/18/17 12:44 PM   Result Value Ref Range    Ventricular Rate 91 BPM    Atrial Rate 91 BPM    P-R Interval 162 ms    QRS Duration 96 ms    Q-T Interval 404 ms    QTC Calculation (Bezet) 496 ms    Calculated P Axis 70 degrees    Calculated R Axis -26 degrees    Calculated T Axis 84 degrees    Diagnosis       Normal sinus rhythm  Possible Left atrial enlargement  Left ventricular hypertrophy with repolarization abnormality  Possible Lateral infarct , age undetermined  Abnormal ECG  When compared with ECG of 24-AUG-2016 17:39,  Minimal criteria for Septal infarct are no longer Present  No significant change was found     CBC WITH AUTOMATED DIFF    Collection Time: 11/18/17 12:45 PM   Result Value Ref Range    WBC 8.9 4.3 - 11.1 K/uL    RBC 2.97 (L) 4.05 - 5.25 M/uL    HGB 8.9 (L) 11.7 - 15.4 g/dL    HCT 28.5 (L) 35.8 - 46.3 %    MCV 96.0 79.6 - 97.8 FL    MCH 30.0 26.1 - 32.9 PG    MCHC 31.2 (L) 31.4 - 35.0 g/dL    RDW 15.4 (H) 11.9 - 14.6 %    PLATELET 364 246 - 642 K/uL    MPV 10.3 (L) 10.8 - 14.1 FL    DF AUTOMATED      NEUTROPHILS 71 43 - 78 %    LYMPHOCYTES 20 13 - 44 %    MONOCYTES 6 4.0 - 12.0 %    EOSINOPHILS 2 0.5 - 7.8 %    BASOPHILS 0 0.0 - 2.0 %    IMMATURE GRANULOCYTES 1 0.0 - 5.0 %    ABS. NEUTROPHILS 6.4 1.7 - 8.2 K/UL    ABS. LYMPHOCYTES 1.8 0.5 - 4.6 K/UL    ABS. MONOCYTES 0.5 0.1 - 1.3 K/UL    ABS. EOSINOPHILS 0.2 0.0 - 0.8 K/UL    ABS. BASOPHILS 0.0 0.0 - 0.2 K/UL    ABS. IMM. GRANS. 0.0 0.0 - 0.5 K/UL   METABOLIC PANEL, COMPREHENSIVE    Collection Time: 11/18/17 12:45 PM   Result Value Ref Range    Sodium 139 136 - 145 mmol/L    Potassium 3.6 3.5 - 5.1 mmol/L    Chloride 101 98 - 107 mmol/L    CO2 29 21 - 32 mmol/L    Anion gap 9 7 - 16 mmol/L    Glucose 85 65 - 100 mg/dL    BUN 29 (H) 8 - 23 MG/DL    Creatinine 9.05 (H) 0.6 - 1.0 MG/DL    GFR est AA 5 (L) >60 ml/min/1.73m2    GFR est non-AA 4 (L) >60 ml/min/1.73m2    Calcium 10.0 8.3 - 10.4 MG/DL    Bilirubin, total 0.4 0.2 - 1.1 MG/DL    ALT (SGPT) 18 12 - 65 U/L    AST (SGOT) 29 15 - 37 U/L    Alk. phosphatase 87 50 - 136 U/L    Protein, total 6.6 6.3 - 8.2 g/dL    Albumin 2.4 (L) 3.2 - 4.6 g/dL    Globulin 4.2 (H) 2.3 - 3.5 g/dL    A-G Ratio 0.6 (L) 1.2 - 3.5     TROPONIN I    Collection Time: 11/18/17 12:45 PM   Result Value Ref Range    Troponin-I, Qt. 0.68 (HH) 0.02 - 0.05 NG/ML       CXR: Mild bibasilar atelectasis/infiltrate.      Assessment/Plan:   Unstable angina- GI recently stopped ASA, still on Brilinta (unclear whether also taking Plavix) but patient refuses blood transfusion bc of Orthodox beliefs (Gwendalyn Dross witness)- does not want to take antiplatelets or anticoagulation. Angina despite ASA, brilinta, statin, BB, ranexa- will increase ranexa, cont ASA, heparin, brilinta, add nitro paste, trend troponin. Hypertension- Norvasc, lopressor,     CAD- s/p PCI to LAD and RCA 2-2016- tx as above. Elevated troponin - As above. ESRD - peritoneal dialysis, demadex- consult nephrology. Chronic anemia (11/18/2017)- Epogen, iron, PPI, pending EGD. H/o AVMs. No signs of acute bleeding. Refuses blood transfusion. Monitor hgb.      Meño Valdez PA-C  11/18/2017  2:53 PM

## 2017-11-18 NOTE — ED TRIAGE NOTES
Pt c/o chest pain and shortness of breath starting yesterday. Pt states no N/V/D. Pt states she took two sprays of nitro on way to ER which she states helped \"a little\". Pt states no ASA today. Pt wears 2L O2 PRN at home which she is currently wearing. Pt is alert and oriented x 4. Respirations are even and unlabored. PT appears in no acute distress at this time.

## 2017-11-18 NOTE — ED PROVIDER NOTES
HPI Comments: A 11year-old female patient presents with reports of substernal chest pain that started last night while at home. Patient states she stood from a seated position and began to ambulate about her home when her symptoms started. She describes droll aching, pressure-like sensation at the substernal region, nonradiating in nature. She reports resolution of pain with one spray of nitroglycerin shortly after onset. She states her pain returns sporadically over the evening hours  And was present again this morning. Patient has since taken 3 nitroglycerin sprays of her own medication with complete resolution of her discomfort. She denies any shortness of breath, nausea, vomiting, diaphoresis. Patient denies any recent fever, chills. She reports a history of hypertension, previous cardiac catheterization with stent placement and current peritoneal dialysis secondary to chronic renal failure. No change in the appearance of her peritoneal fluid. Patient is a 80 y.o. female presenting with chest pain. The history is provided by the patient. Chest Pain    This is a new problem. The current episode started yesterday. The problem has been resolved. The pain is associated with normal activity and walking. The pain is present in the substernal region. The pain is moderate. The quality of the pain is described as pressure-like. The pain does not radiate. Pertinent negatives include no abdominal pain, no back pain, no claudication, no cough, no diaphoresis, no dizziness, no exertional chest pressure, no fever, no headaches, no hemoptysis, no irregular heartbeat, no leg pain, no lower extremity edema, no malaise/fatigue, no nausea, no near-syncope, no numbness, no orthopnea, no palpitations, no PND, no shortness of breath, no sputum production, no vomiting and no weakness. She has tried nitroglycerin for the symptoms. The treatment provided significant relief.  Risk factors include hypertension and cardiac disease. Her past medical history is significant for HTN. Procedural history includes cardiac catheterization and cardiac stents. Past Medical History:   Diagnosis Date    Anemia of chronic renal failure 4/28/2015    Anterior myocardial infarction [facility]) 5/21/2015    CAD (coronary artery disease)     Chest pain     Chronic kidney disease, stage III (moderate) 8/15/2014    on dialysis    CKD (chronic kidney disease) stage 4, GFR 15-29 ml/min ([facility]) 4/28/2015    Debility 5/5/2015    Depression 12/29/2015    Edema 12/29/2015    Endocrine disease     Hypothyroidism    GERD (gastroesophageal reflux disease)     Heart murmur 12/29/2015    HLD (hyperlipidemia) 12/29/2015    Hypertension     Hypothyroidism 4/28/2015    Ischemic cardiomyopathy 12/29/2015    Nausea     S/P PTCA (percutaneous transluminal coronary angioplasty); LAD PTCA of  ISR 11/27/15 5/24/2016    STEMI (ST elevation myocardial infarction) ([facility] [address] 4/28/2015    Unspecified sleep apnea     uses cpap machine    Unstable angina ([facility] [address]        Past Surgical History:   Procedure Laterality Date    HX BACK SURGERY  1990    neck surgery cervical disc    HX BACK SURGERY      lower back    HX CATARACT REMOVAL Bilateral     HX CHOLECYSTECTOMY  19702    gall bladder     HX KNEE REPLACEMENT Right 2006    HX PTCA  4/28/2015    2.25 Xience stent to mid LAD for occluded artery, anterior MI, EF 25%. Moderate disease distal LAD and distal OM PCI CX and RCA 2004, then PCI RCA and LAD in 2009. Family History:   Problem Relation Age of Onset    Heart Disease Mother     Hypertension Mother     Cancer Mother      Lung    Stroke Father     Hypertension Father     Breast Cancer Neg Hx        Social History     Social History    Marital status:      Spouse name: N/A    Number of children: N/A    Years of education: N/A     Occupational History    Not on file.      Social History Main Topics    Smoking status: Never Smoker    Smokeless tobacco: Never Used    Alcohol use No    Drug use: Not on file    Sexual activity: Not on file     Other Topics Concern    Not on file     Social History Narrative         ALLERGIES: Codeine    Review of Systems   Constitutional: Negative for chills, diaphoresis, fever and malaise/fatigue. HENT: Negative for congestion, sneezing and sore throat. Eyes: Negative for visual disturbance. Respiratory: Negative for cough, hemoptysis, sputum production, chest tightness, shortness of breath and wheezing. Cardiovascular: Positive for chest pain. Negative for palpitations, orthopnea, claudication, leg swelling, PND and near-syncope. Gastrointestinal: Negative for abdominal pain, blood in stool, diarrhea, nausea and vomiting. Endocrine: Negative for polyuria. Genitourinary: Negative for difficulty urinating, dysuria, flank pain, hematuria and urgency. Musculoskeletal: Negative for back pain, myalgias, neck pain and neck stiffness. Skin: Negative for color change and rash. Neurological: Negative for dizziness, syncope, speech difficulty, weakness, light-headedness, numbness and headaches. Psychiatric/Behavioral: Negative for behavioral problems. All other systems reviewed and are negative. Vitals:    11/18/17 1245 11/18/17 1354   BP: 180/84 154/77   Pulse: 94 88   Resp: 19    Temp: 97.5 °F (36.4 °C)    SpO2: 99% 99%   Weight: 86.2 kg (190 lb)    Height: 5' 10\" (1.778 m)             Physical Exam   Constitutional: She is oriented to person, place, and time. She appears well-developed and well-nourished. No distress. Alert and oriented to person, place and time. No acute distress. Speaks in clear, fluent sentences. HENT:   Head: Normocephalic and atraumatic. Nose: Nose normal.   Eyes: Conjunctivae and EOM are normal. Pupils are equal, round, and reactive to light. Neck: Normal range of motion. Neck supple. No JVD present. No tracheal deviation present.    Cardiovascular: Normal rate, regular rhythm, S1 normal, S2 normal, normal heart sounds and intact distal pulses. Exam reveals no gallop, no distant heart sounds and no friction rub. No murmur heard. Pulmonary/Chest: Effort normal and breath sounds normal. No accessory muscle usage or stridor. No tachypnea and no bradypnea. No respiratory distress. She has no decreased breath sounds. She has no wheezes. She has no rhonchi. She has no rales. She exhibits no tenderness. clear to auscultation throughout   Abdominal: Soft. Normal appearance. She exhibits no distension and no mass. There is no hepatosplenomegaly, splenomegaly or hepatomegaly. There is no tenderness. There is no rigidity, no rebound, no guarding, no CVA tenderness, no tenderness at McBurney's point and negative Huggins's sign. Soft and flat. There is no reproducible abdominal pain on exam. Peritoneal Catheter is in place and appears normal on exam.   Musculoskeletal: Normal range of motion. She exhibits no edema, tenderness or deformity. No significant edema. Neurological: She is alert and oriented to person, place, and time. No cranial nerve deficit. Skin: Skin is warm and dry. No rash noted. She is not diaphoretic. Psychiatric: She has a normal mood and affect. Her behavior is normal.   Nursing note and vitals reviewed. MDM  Number of Diagnoses or Management Options  NSTEMI (non-ST elevated myocardial infarction) [facility]): new and requires workup  Diagnosis management comments: EKG obtained on arrival shows a normal sinus rhythm without evidence of acute ischemia. Normal sinus rhythm with a rate of 91 beats a minute. Chest x-ray appears grossly unchanged from previous. Slight pulmonary vascular congestion present. Denies shortness of breath, fever or cough. Patient's troponin is elevated at 0.68. Patient is a dialysis patient with stable elevations in BUN/creatinine.   She states she dialyzes at night and completed her treatment last evening. She is pain-free currently. I discussed patient's case with the on-call cardiologist who agrees to evaluate patient at this time.        Amount and/or Complexity of Data Reviewed  Clinical lab tests: ordered and reviewed  Tests in the radiology section of CPT®: ordered and reviewed  Tests in the medicine section of CPT®: ordered and reviewed  Review and summarize past medical records: yes  Discuss the patient with other providers: yes  Independent visualization of images, tracings, or specimens: yes    Risk of Complications, Morbidity, and/or Mortality  Presenting problems: moderate  Diagnostic procedures: low  Management options: moderate    Patient Progress  Patient progress: stable    ED Course       Procedures

## 2017-11-19 LAB
ANION GAP SERPL CALC-SCNC: 10 MMOL/L (ref 7–16)
APTT PPP: 100.7 SEC (ref 23.5–31.7)
APTT PPP: 57.6 SEC (ref 23.5–31.7)
APTT PPP: 74.2 SEC (ref 23.5–31.7)
APTT PPP: >110 SEC (ref 23.5–31.7)
ATRIAL RATE: 89 BPM
BASOPHILS # BLD: 0 K/UL (ref 0–0.2)
BASOPHILS NFR BLD: 0 % (ref 0–2)
BUN SERPL-MCNC: 31 MG/DL (ref 8–23)
CALCIUM SERPL-MCNC: 9.7 MG/DL (ref 8.3–10.4)
CALCULATED P AXIS, ECG09: 34 DEGREES
CALCULATED R AXIS, ECG10: -33 DEGREES
CALCULATED T AXIS, ECG11: 73 DEGREES
CHLORIDE SERPL-SCNC: 102 MMOL/L (ref 98–107)
CO2 SERPL-SCNC: 26 MMOL/L (ref 21–32)
CREAT SERPL-MCNC: 8.86 MG/DL (ref 0.6–1)
DIAGNOSIS, 93000: NORMAL
DIFFERENTIAL METHOD BLD: ABNORMAL
EOSINOPHIL # BLD: 0.2 K/UL (ref 0–0.8)
EOSINOPHIL NFR BLD: 3 % (ref 0.5–7.8)
ERYTHROCYTE [DISTWIDTH] IN BLOOD BY AUTOMATED COUNT: 15.4 % (ref 11.9–14.6)
GLUCOSE SERPL-MCNC: 121 MG/DL (ref 65–100)
HCT VFR BLD AUTO: 26.1 % (ref 35.8–46.3)
HGB BLD-MCNC: 8.5 G/DL (ref 11.7–15.4)
IMM GRANULOCYTES # BLD: 0 K/UL (ref 0–0.5)
IMM GRANULOCYTES NFR BLD AUTO: 0 % (ref 0–5)
LYMPHOCYTES # BLD: 1.3 K/UL (ref 0.5–4.6)
LYMPHOCYTES NFR BLD: 17 % (ref 13–44)
MCH RBC QN AUTO: 30.5 PG (ref 26.1–32.9)
MCHC RBC AUTO-ENTMCNC: 32.6 G/DL (ref 31.4–35)
MCV RBC AUTO: 93.5 FL (ref 79.6–97.8)
MONOCYTES # BLD: 0.5 K/UL (ref 0.1–1.3)
MONOCYTES NFR BLD: 7 % (ref 4–12)
NEUTS SEG # BLD: 5.6 K/UL (ref 1.7–8.2)
NEUTS SEG NFR BLD: 73 % (ref 43–78)
P-R INTERVAL, ECG05: 158 MS
PLATELET # BLD AUTO: 177 K/UL (ref 150–450)
PMV BLD AUTO: 10.1 FL (ref 10.8–14.1)
POTASSIUM SERPL-SCNC: 3.2 MMOL/L (ref 3.5–5.1)
Q-T INTERVAL, ECG07: 424 MS
QRS DURATION, ECG06: 106 MS
QTC CALCULATION (BEZET), ECG08: 515 MS
RBC # BLD AUTO: 2.79 M/UL (ref 4.05–5.25)
SODIUM SERPL-SCNC: 138 MMOL/L (ref 136–145)
TROPONIN I SERPL-MCNC: 3.99 NG/ML (ref 0.02–0.05)
TROPONIN I SERPL-MCNC: 9.18 NG/ML (ref 0.02–0.05)
VENTRICULAR RATE, ECG03: 89 BPM
WBC # BLD AUTO: 7.6 K/UL (ref 4.3–11.1)

## 2017-11-19 PROCEDURE — 85730 THROMBOPLASTIN TIME PARTIAL: CPT | Performed by: INTERNAL MEDICINE

## 2017-11-19 PROCEDURE — 94760 N-INVAS EAR/PLS OXIMETRY 1: CPT

## 2017-11-19 PROCEDURE — 36415 COLL VENOUS BLD VENIPUNCTURE: CPT | Performed by: PHYSICIAN ASSISTANT

## 2017-11-19 PROCEDURE — 84484 ASSAY OF TROPONIN QUANT: CPT | Performed by: PHYSICIAN ASSISTANT

## 2017-11-19 PROCEDURE — 74011250637 HC RX REV CODE- 250/637: Performed by: PHYSICIAN ASSISTANT

## 2017-11-19 PROCEDURE — 74011250637 HC RX REV CODE- 250/637: Performed by: INTERNAL MEDICINE

## 2017-11-19 PROCEDURE — 93005 ELECTROCARDIOGRAM TRACING: CPT | Performed by: INTERNAL MEDICINE

## 2017-11-19 PROCEDURE — 65660000000 HC RM CCU STEPDOWN

## 2017-11-19 PROCEDURE — 85025 COMPLETE CBC W/AUTO DIFF WBC: CPT | Performed by: PHYSICIAN ASSISTANT

## 2017-11-19 PROCEDURE — 80048 BASIC METABOLIC PNL TOTAL CA: CPT | Performed by: PHYSICIAN ASSISTANT

## 2017-11-19 PROCEDURE — 74011250636 HC RX REV CODE- 250/636: Performed by: INTERNAL MEDICINE

## 2017-11-19 PROCEDURE — 74011000258 HC RX REV CODE- 258: Performed by: INTERNAL MEDICINE

## 2017-11-19 RX ORDER — SODIUM CHLORIDE 9 MG/ML
25 INJECTION, SOLUTION INTRAVENOUS CONTINUOUS
Status: DISCONTINUED | OUTPATIENT
Start: 2017-11-20 | End: 2017-11-21

## 2017-11-19 RX ADMIN — TICAGRELOR 90 MG: 90 TABLET ORAL at 09:24

## 2017-11-19 RX ADMIN — CALCITRIOL 0.25 MCG: 0.25 CAPSULE, LIQUID FILLED ORAL at 09:23

## 2017-11-19 RX ADMIN — SUCRALFATE 1 G: 1 SUSPENSION ORAL at 21:12

## 2017-11-19 RX ADMIN — SUCRALFATE 1 G: 1 SUSPENSION ORAL at 17:39

## 2017-11-19 RX ADMIN — ROSUVASTATIN CALCIUM 20 MG: 20 TABLET, FILM COATED ORAL at 21:12

## 2017-11-19 RX ADMIN — LEVOTHYROXINE SODIUM 150 MCG: 150 TABLET ORAL at 09:24

## 2017-11-19 RX ADMIN — TICAGRELOR 90 MG: 90 TABLET ORAL at 17:34

## 2017-11-19 RX ADMIN — PANTOPRAZOLE SODIUM 40 MG: 40 TABLET, DELAYED RELEASE ORAL at 09:22

## 2017-11-19 RX ADMIN — POTASSIUM CHLORIDE 20 MEQ: 20 TABLET, EXTENDED RELEASE ORAL at 09:24

## 2017-11-19 RX ADMIN — AMLODIPINE BESYLATE 10 MG: 10 TABLET ORAL at 09:24

## 2017-11-19 RX ADMIN — RANOLAZINE 1000 MG: 500 TABLET, FILM COATED, EXTENDED RELEASE ORAL at 09:25

## 2017-11-19 RX ADMIN — SUCRALFATE 1 G: 1 SUSPENSION ORAL at 11:49

## 2017-11-19 RX ADMIN — Medication 400 MG: at 09:24

## 2017-11-19 RX ADMIN — METOCLOPRAMIDE HYDROCHLORIDE 5 MG: 10 TABLET ORAL at 17:35

## 2017-11-19 RX ADMIN — PANTOPRAZOLE SODIUM 40 MG: 40 TABLET, DELAYED RELEASE ORAL at 17:34

## 2017-11-19 RX ADMIN — NITROGLYCERIN 0.4 MG: 0.4 TABLET SUBLINGUAL at 23:13

## 2017-11-19 RX ADMIN — TORSEMIDE 100 MG: 100 TABLET ORAL at 09:25

## 2017-11-19 RX ADMIN — SODIUM CHLORIDE 125 MG: 900 INJECTION, SOLUTION INTRAVENOUS at 09:30

## 2017-11-19 RX ADMIN — RANOLAZINE 1000 MG: 500 TABLET, FILM COATED, EXTENDED RELEASE ORAL at 17:34

## 2017-11-19 RX ADMIN — SUCRALFATE 1 G: 1 SUSPENSION ORAL at 09:21

## 2017-11-19 RX ADMIN — ASPIRIN 81 MG: 81 TABLET, COATED ORAL at 09:24

## 2017-11-19 RX ADMIN — METOCLOPRAMIDE HYDROCHLORIDE 5 MG: 10 TABLET ORAL at 09:25

## 2017-11-19 RX ADMIN — NITROGLYCERIN 1 INCH: 20 OINTMENT TOPICAL at 09:26

## 2017-11-19 RX ADMIN — METOPROLOL TARTRATE 12.5 MG: 25 TABLET ORAL at 09:22

## 2017-11-19 RX ADMIN — ZINC 1 TABLET: TAB ORAL at 09:25

## 2017-11-19 RX ADMIN — NITROGLYCERIN 1 INCH: 20 OINTMENT TOPICAL at 17:38

## 2017-11-19 RX ADMIN — POTASSIUM CHLORIDE 20 MEQ: 20 TABLET, EXTENDED RELEASE ORAL at 17:35

## 2017-11-19 NOTE — PROGRESS NOTES
Bedside and Verbal shift change report given to [facility] (oncoming nurse) by Krystian Reese RN (offgoing nurse). Report included the following information SBAR, Kardex, Accordion and Recent Results. Heparin drip verified.

## 2017-11-19 NOTE — PROGRESS NOTES
Problem: Falls - Risk of  Goal: *Absence of Falls  Document Harrison Fall Risk and appropriate interventions in the flowsheet. Outcome: Progressing Towards Goal  Fall Risk Interventions:  Mobility Interventions: Patient to call before getting OOB    Medication Interventions: Patient to call before getting OOB, Teach patient to arise slowly    Elimination Interventions: Call light in reach    Fall precautions in place. Red socks on. Instructed to only get OOB with assistance. Voiced understanding. Call light in reach.

## 2017-11-19 NOTE — PROGRESS NOTES
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 She has a history of GI bleeding and had anemia-induced unstable angina in the past. 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 She states the pain has since resolved with NTG paste.  No fevers or chills. No nausea, vomiting or diarrhea.  She states that she is essentially anuric and occasionally makes minimal urine.  She has a h/o CVA and TIA. No fever or chills, no problems with PD drainage or discoloration of PD fluid. No increased thirst. She wishes regular diet and supplement. She denies any other acute complaints  Her edema has improved in the past couple of days with intensified dialysis.   11/19/17 - she denies chest pain - discussed rise in Troponin and coordination of cardiac cath with dialysis plans - will not do PD tonight as goals of therapy have been accomplished and she can benefit from potassium equilibration prior to cath - she is in good spirits and has good confidence    Past Medical History:   Diagnosis Date    Anemia of chronic renal failure 4/28/2015    Anterior myocardial infarction [facility]) 5/21/2015    CAD (coronary artery disease)     Chest pain     Chronic kidney disease, stage III (moderate) 8/15/2014    on dialysis    CKD (chronic kidney disease) stage 4, GFR 15-29 ml/min ([address] 4/28/2015    Debility 5/5/2015    Depression 12/29/2015    Edema 12/29/2015    Endocrine disease     Hypothyroidism    GERD (gastroesophageal reflux disease)     Heart murmur 12/29/2015    HLD (hyperlipidemia) 12/29/2015    Hypertension     Hypothyroidism 4/28/2015    Ischemic cardiomyopathy 12/29/2015    Nausea     S/P PTCA (percutaneous transluminal coronary angioplasty); LAD PTCA of  ISR 11/27/15 5/24/2016    STEMI (ST elevation myocardial infarction) ([facility] [address] 4/28/2015    Unspecified sleep apnea     uses cpap machine    Unstable angina ([facility] [address]       Past Surgical History:   Procedure Laterality Date    HX BACK SURGERY  1990    neck surgery cervical disc    HX BACK SURGERY      lower back    HX CATARACT REMOVAL Bilateral     HX CHOLECYSTECTOMY  19702    gall bladder     HX KNEE REPLACEMENT Right 2006    HX PTCA  4/28/2015    2.25 Xience stent to mid LAD for occluded artery, anterior MI, EF 25%. Moderate disease distal LAD and distal OM PCI CX and RCA 2004, then PCI RCA and LAD in 2009. Prior to Admission medications    Medication Sig Start Date End Date Taking? Authorizing Provider   folic acid (FOLVITE) 1 mg tablet Take 1 mg by mouth daily. Yes Historical Provider   potassium chloride (K-DUR, KLOR-CON) 20 mEq tablet Take 20 mEq by mouth two (2) times a day. Yes Historical Provider   traZODone (DESYREL) 50 mg tablet Take  by mouth nightly. Yes Historical Provider   megestrol (MEGACE) 400 mg/10 mL (40 mg/mL) suspension Take 200 mg by mouth daily. Yes Historical Provider   amLODIPine (NORVASC) 10 mg tablet Take 1 Tab by mouth daily. 9/23/17   Tyshawn Graces,    pantoprazole (PROTONIX) 40 mg tablet Take 1 Tab by mouth Daily (before breakfast). 9/23/17   Tyshawn Garces, DO   sucralfate (CARAFATE) 100 mg/mL suspension Take 10 mL by mouth Before breakfast, lunch, dinner and at bedtime. Indications: PREVENTION OF STRESS ULCER 9/23/17   Tyshawn Garces DO   torsemide [facility]) 100 mg tablet Take  by mouth daily. Historical Provider   nitroglycerin (NITROLINGUAL) 400 mcg/spray spray 1 Spray by SubLINGual route every five (5) minutes as needed for Chest Pain. Historical Provider   linaclotide Nhi Murphy) 145 mcg cap capsule Take  by mouth Daily (before breakfast).     Historical Provider   magnesium oxide (MAG-OX) 400 mg tablet Take 400 mg by mouth daily. Historical Provider   PNV NO.122/IRON/FOLIC ACID (PRENATAL MULTI PO) Take  by mouth. Historical Provider   metoprolol tartrate (LOPRESSOR) 25 mg tablet Take 12.5 mg by mouth daily. Take PRN for BP <120. 6/23/16   Kaylan Rosa III, MD   ondansetron (ZOFRAN ODT) 4 mg disintegrating tablet Take 4 mg by mouth three (3) times daily as needed. Historical Provider   metoclopramide HCl (REGLAN) 5 mg tablet Take 5 mg by mouth two (2) times a day. Historical Provider   ticagrelor (BRILINTA) 90 mg tablet Take 1 Tab by mouth two (2) times a day. 2/4/16   MINDY Thomas   promethazine (PHENERGAN) 25 mg tablet Take 25 mg by mouth every eight (8) hours as needed for Nausea. Historical Provider   sevelamer carbonate (RENVELA) 800 mg tab tab Take 800 mg by mouth three (3) times daily (with meals). Historical Provider   gentamicin (GARAMYCIN) 0.1 % topical cream APPLY TO PD catheter exit site at daily dressing change 5/12/15   Deana Pino MD   aspirin delayed-release 81 mg tablet Take 1 Tab by mouth daily. 5/5/15   Gisela Hollingsworth PA-C   darbepoetin toya in polysorbat (ARANESP, POLYSORBATE,) 40 mcg/mL injection 40 mcg by SubCUTAneous route every fourteen (14) days. Indications: ANEMIA IN CHRONIC KIDNEY DISEASE    Historical Provider   rosuvastatin (CRESTOR) 20 mg tablet Take 20 mg by mouth nightly. Historical Provider   ranolazine ER (RANEXA) 500 mg SR tablet Take 500 mg by mouth two (2) times a day. Historical Provider   levothyroxine (SYNTHROID) 150 mcg tablet Take 150 mcg by mouth Daily (before breakfast).     Historical Provider     Allergies   Allergen Reactions    Codeine Nausea and Vomiting      Social History   Substance Use Topics    Smoking status: Never Smoker    Smokeless tobacco: Never Used    Alcohol use No      Family History   Problem Relation Age of Onset    Heart Disease Mother     Hypertension Mother     Cancer Mother Lung    Stroke Father     Hypertension Father     Breast Cancer Neg Hx           Review of Systems    Constitutional: no fever,   Eyes: fair vision,    Ears, nose, mouth, throat, and face:fair hearing,   Respiratory: no asthma,  Chronic home O2 is being used  Cardiovascular:no palpitation, positive for chest pain,   Gastrointestinal:no diarrhea,    Genitourinary: no dysuria,   Hematologic/lymphatic: no bleeding tendency,   Neurological: no seizures   Behvioral/Psych: no psych hospitalization   Endocrine: no goiter,       Objective:       Visit Vitals    BP 99/57 (BP 1 Location: Right arm)    Pulse 75    Temp 98.5 °F (36.9 °C)    Resp 18    Ht 5' 10\" (1.778 m)    Wt 85.7 kg (189 lb)    SpO2 97%    BMI 27.12 kg/m2       General:  Alert, cooperative, mild distress, appears stated age. Head:  Normocephalic, without obvious abnormality, atraumatic. Eyes:  Conjunctivae/corneas clear. EOMs intact. Throat: Lips, mucosa, and tongue normal. Teeth and gums normal.   Neck: Supple, symmetrical, trachea midline, no adenopathy,  no JVD. Lungs:   Clear to auscultation bilaterally. Heart:  Regular rate and rhythm, S1, S2 normal, 2/6 systolic  murmur, no rub or gallop. Abdomen:   Soft, non-tender. No masses,  No organomegaly. . PD catheter in place without exudate   Extremities: Extremities normal, atraumatic, no cyanosis. 1+ edema. Skin: Skin color, texture, turgor normal. No rashes or lesions. Lymph nodes: Cervical and supraclavicular nodes normal.   Neurologic: Grossly intact. No asterixis. Data Review:       Results for Alphonso Olson (MRN [id number]) as of 11/19/2017 14:22   Ref.  Range 11/18/2017 17:58 11/18/2017 19:19 11/19/2017 00:06 11/19/2017 06:30   WBC Latest Ref Range: 4.3 - 11.1 K/uL   7.6    RBC Latest Ref Range: 4.05 - 5.25 M/uL   2.79 (L)    HGB Latest Ref Range: 11.7 - 15.4 g/dL   8.5 (L)    HCT Latest Ref Range: 35.8 - 46.3 %   26.1 (L)    MCV Latest Ref Range: 79.6 - 97.8 FL   93.5    MCH Latest Ref Range: 26.1 - 32.9 PG   30.5    MCHC Latest Ref Range: 31.4 - 35.0 g/dL   32.6    RDW Latest Ref Range: 11.9 - 14.6 %   15.4 (H)    PLATELET Latest Ref Range: 150 - 450 K/uL   177    MPV Latest Ref Range: 10.8 - 14.1 FL   10.1 (L)    NEUTROPHILS Latest Ref Range: 43 - 78 %   73    LYMPHOCYTES Latest Ref Range: 13 - 44 %   17    MONOCYTES Latest Ref Range: 4.0 - 12.0 %   7    EOSINOPHILS Latest Ref Range: 0.5 - 7.8 %   3    BASOPHILS Latest Ref Range: 0.0 - 2.0 %   0    IMMATURE GRANULOCYTES Latest Ref Range: 0.0 - 5.0 %   0    DF Latest Units:     AUTOMATED    ABS. NEUTROPHILS Latest Ref Range: 1.7 - 8.2 K/UL   5.6    ABS. IMM. GRANS. Latest Ref Range: 0.0 - 0.5 K/UL   0.0    ABS. LYMPHOCYTES Latest Ref Range: 0.5 - 4.6 K/UL   1.3    ABS. MONOCYTES Latest Ref Range: 0.1 - 1.3 K/UL   0.5    ABS. EOSINOPHILS Latest Ref Range: 0.0 - 0.8 K/UL   0.2    ABS. BASOPHILS Latest Ref Range: 0.0 - 0.2 K/UL   0.0    aPTT Latest Ref Range: 23.5 - 31.7 SEC   100.7 (HH) >110.0 (HH)   Sodium Latest Ref Range: 136 - 145 mmol/L    138   Potassium Latest Ref Range: 3.5 - 5.1 mmol/L    3.2 (L)   Chloride Latest Ref Range: 98 - 107 mmol/L    102   CO2 Latest Ref Range: 21 - 32 mmol/L    26   Anion gap Latest Ref Range: 7 - 16 mmol/L    10   Glucose Latest Ref Range: 65 - 100 mg/dL    121 (H)   BUN Latest Ref Range: 8 - 23 MG/DL    31 (H)   Creatinine Latest Ref Range: 0.6 - 1.0 MG/DL    8.86 (H)   Calcium Latest Ref Range: 8.3 - 10.4 MG/DL    9.7   GFR est non-AA Latest Ref Range: >60 ml/min/1.73m2    5 (L)   GFR est AA Latest Ref Range: >60 ml/min/1.73m2    5 (L)   Troponin-I, Qt.  Latest Ref Range: 0.02 - 0.05 NG/ML 0.79 (HH)  3.99 (HH) 9.18 (HH)   EKG, 12 LEAD, INITIAL Unknown  Rpt       Recent Results (from the past 24 hour(s))   TROPONIN I    Collection Time: 11/18/17  5:58 PM   Result Value Ref Range    Troponin-I, Qt. 0.79 (HH) 0.02 - 0.05 NG/ML   EKG, 12 LEAD, INITIAL    Collection Time: 11/18/17  7:19 PM   Result Value Ref Range    Ventricular Rate 89 BPM    Atrial Rate 89 BPM    P-R Interval 158 ms    QRS Duration 106 ms    Q-T Interval 424 ms    QTC Calculation (Bezet) 515 ms    Calculated P Axis 34 degrees    Calculated R Axis -33 degrees    Calculated T Axis 73 degrees    Diagnosis       Normal sinus rhythm with sinus arrhythmia  Possible Left atrial enlargement  Left anterior fascicular block  Left ventricular hypertrophy  Possible Lateral infarct (cited on or before 28-APR-2015)  Prolonged QT  Abnormal ECG  When compared with ECG of 18-NOV-2017 12:44,  Questionable change in initial forces of Lateral leads  T wave inversion more evident in Anterior leads  Nonspecific T wave abnormality has replaced inverted T waves in Lateral leads  Confirmed by MARICRUZ GARY (), Mariel Fuentes ([id number]) on 11/19/2017 8:28:10 AM     TROPONIN I    Collection Time: 11/19/17 12:06 AM   Result Value Ref Range    Troponin-I, Qt. 3.99 (HH) 0.02 - 0.05 NG/ML   CBC WITH AUTOMATED DIFF    Collection Time: 11/19/17 12:06 AM   Result Value Ref Range    WBC 7.6 4.3 - 11.1 K/uL    RBC 2.79 (L) 4.05 - 5.25 M/uL    HGB 8.5 (L) 11.7 - 15.4 g/dL    HCT 26.1 (L) 35.8 - 46.3 %    MCV 93.5 79.6 - 97.8 FL    MCH 30.5 26.1 - 32.9 PG    MCHC 32.6 31.4 - 35.0 g/dL    RDW 15.4 (H) 11.9 - 14.6 %    PLATELET 279 380 - 480 K/uL    MPV 10.1 (L) 10.8 - 14.1 FL    DF AUTOMATED      NEUTROPHILS 73 43 - 78 %    LYMPHOCYTES 17 13 - 44 %    MONOCYTES 7 4.0 - 12.0 %    EOSINOPHILS 3 0.5 - 7.8 %    BASOPHILS 0 0.0 - 2.0 %    IMMATURE GRANULOCYTES 0 0.0 - 5.0 %    ABS. NEUTROPHILS 5.6 1.7 - 8.2 K/UL    ABS. LYMPHOCYTES 1.3 0.5 - 4.6 K/UL    ABS. MONOCYTES 0.5 0.1 - 1.3 K/UL    ABS. EOSINOPHILS 0.2 0.0 - 0.8 K/UL    ABS. BASOPHILS 0.0 0.0 - 0.2 K/UL    ABS. IMM.  GRANS. 0.0 0.0 - 0.5 K/UL   PTT    Collection Time: 11/19/17 12:06 AM   Result Value Ref Range    aPTT 100.7 (HH) 23.5 - 31.7 SEC   TROPONIN I    Collection Time: 11/19/17  6:30 AM   Result Value Ref Range Troponin-I, Qt. 9.18 (HH) 0.02 - 8.02 NG/ML   METABOLIC PANEL, BASIC    Collection Time: 11/19/17  6:30 AM   Result Value Ref Range    Sodium 138 136 - 145 mmol/L    Potassium 3.2 (L) 3.5 - 5.1 mmol/L    Chloride 102 98 - 107 mmol/L    CO2 26 21 - 32 mmol/L    Anion gap 10 7 - 16 mmol/L    Glucose 121 (H) 65 - 100 mg/dL    BUN 31 (H) 8 - 23 MG/DL    Creatinine 8.86 (H) 0.6 - 1.0 MG/DL    GFR est AA 5 (L) >60 ml/min/1.73m2    GFR est non-AA 5 (L) >60 ml/min/1.73m2    Calcium 9.7 8.3 - 10.4 MG/DL   PTT    Collection Time: 11/19/17  6:30 AM   Result Value Ref Range    aPTT >110.0 (HH) 23.5 - 31.7 SEC       CXR viewed by me - no major infiltrate or fluid excess, CM with left possible minor effusion is present      Active Problems:    CAD (coronary artery disease) (11/27/2015)      Unstable angina (HCC) (2/1/2016)      Elevated troponin (11/18/2017)      Chronic anemia (11/18/2017)      ESRD (end stage renal disease) ([email or web address] (11/18/2017)        Assessment:     1. CAD x NSTEMI, Unstable angina -  - d/w Dr. Stephanie Landers, medical management for now as she responded to NTG already followed by elective cardiac cath    2, ESRD -  - continue PD, see orders    3. Fluid excess -  - mild and improving     4. Anemia -  - intensive anemia therapy in [employer]  - on WAYNE and IV iron and B12    5. History of GI bleed -  - monitor clinically and serial Hb  - she had a drop in Hb to 8.4       6.  Hypokalemia =  = on PO potassium  - plan for addition of KCl when PD restarts tomorrow      Plan:     As above -    Shana Brooks M.D.

## 2017-11-19 NOTE — PROGRESS NOTES
[facility] CARDIOLOGY PROGRESS NOTE      11/19/2017 9:01 AM    Admit Date: 11/18/2017    Admit Diagnosis: Unstable angina (HCC)      Subjective:   No chest pain or dyspnea. no pain overnight   troponin to 9        Objective:      Vitals:    11/18/17 2100 11/19/17 0046 11/19/17 0059 11/19/17 0453   BP:  122/77  108/65   Pulse:  87  75   Resp:  18  18   Temp:  97.5 °F (36.4 °C)  97 °F (36.1 °C)   SpO2: 100% 99% 99% 97%   Weight:    85.7 kg (189 lb)   Height:           Physical Exam:  Neck-   CV- rr+r  Lungs- clear  Ext-  Skin- warm and dry        Data Review:   Recent Labs      11/19/17   0630  11/19/17   0006  11/18/17   1245   NA  138   --   139   K  3.2*   --   3.6   BUN  31*   --   29*   CREA  8.86*   --   9.05*   GLU  121*   --   85   WBC   --   7.6  8.9   HGB   --   8.5*  8.9*   HCT   --   26.1*  28.5*   PLT   --   177  212       Assessment and Plan: Active Problems:    CAD (coronary artery disease) (11/27/2015)      Unstable angina ([facility] [address] (2/1/2016)      Elevated troponin (11/18/2017 to 9    NSTEMI      will need repeat cath   risks and options understood        good radial pulses.          Chronic anemia (11/18/2017)  iron and epo per Dr Cydney Watt       ESRD (end stage renal disease) ([facility] [address] (11/18/2017)  stable on peritoneal dialysis    appreciate Dr Karina Koo MD

## 2017-11-19 NOTE — PROGRESS NOTES
Verbal and bedside report received from [place] [id number] [facility].

## 2017-11-20 LAB
ACT BLD: 389 SECS (ref 70–128)
ANION GAP SERPL CALC-SCNC: 12 MMOL/L (ref 7–16)
APTT PPP: 51.3 SEC (ref 23.5–31.7)
ATRIAL RATE: 68 BPM
ATRIAL RATE: 82 BPM
BUN SERPL-MCNC: 34 MG/DL (ref 8–23)
CALCIUM SERPL-MCNC: 9.1 MG/DL (ref 8.3–10.4)
CALCULATED P AXIS, ECG09: 60 DEGREES
CALCULATED P AXIS, ECG09: 62 DEGREES
CALCULATED R AXIS, ECG10: -25 DEGREES
CALCULATED R AXIS, ECG10: -27 DEGREES
CALCULATED T AXIS, ECG11: 100 DEGREES
CALCULATED T AXIS, ECG11: 25 DEGREES
CHLORIDE SERPL-SCNC: 100 MMOL/L (ref 98–107)
CO2 SERPL-SCNC: 24 MMOL/L (ref 21–32)
CREAT SERPL-MCNC: 9.63 MG/DL (ref 0.6–1)
DIAGNOSIS, 93000: NORMAL
DIAGNOSIS, 93000: NORMAL
GLUCOSE SERPL-MCNC: 91 MG/DL (ref 65–100)
P-R INTERVAL, ECG05: 162 MS
P-R INTERVAL, ECG05: 182 MS
POTASSIUM SERPL-SCNC: 4 MMOL/L (ref 3.5–5.1)
Q-T INTERVAL, ECG07: 428 MS
Q-T INTERVAL, ECG07: 454 MS
QRS DURATION, ECG06: 100 MS
QRS DURATION, ECG06: 84 MS
QTC CALCULATION (BEZET), ECG08: 482 MS
QTC CALCULATION (BEZET), ECG08: 500 MS
SODIUM SERPL-SCNC: 136 MMOL/L (ref 136–145)
VENTRICULAR RATE, ECG03: 68 BPM
VENTRICULAR RATE, ECG03: 82 BPM

## 2017-11-20 PROCEDURE — 74011250636 HC RX REV CODE- 250/636: Performed by: INTERNAL MEDICINE

## 2017-11-20 PROCEDURE — B2111ZZ FLUOROSCOPY OF MULTIPLE CORONARY ARTERIES USING LOW OSMOLAR CONTRAST: ICD-10-PCS | Performed by: INTERNAL MEDICINE

## 2017-11-20 PROCEDURE — 99152 MOD SED SAME PHYS/QHP 5/>YRS: CPT

## 2017-11-20 PROCEDURE — C1874 STENT, COATED/COV W/DEL SYS: HCPCS

## 2017-11-20 PROCEDURE — 93458 L HRT ARTERY/VENTRICLE ANGIO: CPT

## 2017-11-20 PROCEDURE — 74011250636 HC RX REV CODE- 250/636

## 2017-11-20 PROCEDURE — 4A023N7 MEASUREMENT OF CARDIAC SAMPLING AND PRESSURE, LEFT HEART, PERCUTANEOUS APPROACH: ICD-10-PCS | Performed by: INTERNAL MEDICINE

## 2017-11-20 PROCEDURE — C8929 TTE W OR WO FOL WCON,DOPPLER: HCPCS

## 2017-11-20 PROCEDURE — C1725 CATH, TRANSLUMIN NON-LASER: HCPCS

## 2017-11-20 PROCEDURE — C1769 GUIDE WIRE: HCPCS

## 2017-11-20 PROCEDURE — C1894 INTRO/SHEATH, NON-LASER: HCPCS

## 2017-11-20 PROCEDURE — 74011000250 HC RX REV CODE- 250: Performed by: INTERNAL MEDICINE

## 2017-11-20 PROCEDURE — 74011250636 HC RX REV CODE- 250/636: Performed by: PHYSICIAN ASSISTANT

## 2017-11-20 PROCEDURE — 027236Z DILATION OF CORONARY ARTERY, THREE ARTERIES WITH THREE DRUG-ELUTING INTRALUMINAL DEVICES, PERCUTANEOUS APPROACH: ICD-10-PCS | Performed by: INTERNAL MEDICINE

## 2017-11-20 PROCEDURE — 92928 PRQ TCAT PLMT NTRAC ST 1 LES: CPT

## 2017-11-20 PROCEDURE — 77030012468 HC VLV BLEEDBK CNTRL ABBT -B

## 2017-11-20 PROCEDURE — 74011250637 HC RX REV CODE- 250/637: Performed by: INTERNAL MEDICINE

## 2017-11-20 PROCEDURE — B2151ZZ FLUOROSCOPY OF LEFT HEART USING LOW OSMOLAR CONTRAST: ICD-10-PCS | Performed by: INTERNAL MEDICINE

## 2017-11-20 PROCEDURE — 74011000258 HC RX REV CODE- 258: Performed by: INTERNAL MEDICINE

## 2017-11-20 PROCEDURE — 90945 DIALYSIS ONE EVALUATION: CPT

## 2017-11-20 PROCEDURE — 99153 MOD SED SAME PHYS/QHP EA: CPT

## 2017-11-20 PROCEDURE — C1760 CLOSURE DEV, VASC: HCPCS

## 2017-11-20 PROCEDURE — 77030004558 HC CATH ANGI DX SUPR TORQ CARD -A

## 2017-11-20 PROCEDURE — C1887 CATHETER, GUIDING: HCPCS

## 2017-11-20 PROCEDURE — 74011636320 HC RX REV CODE- 636/320: Performed by: INTERNAL MEDICINE

## 2017-11-20 PROCEDURE — 85730 THROMBOPLASTIN TIME PARTIAL: CPT | Performed by: INTERNAL MEDICINE

## 2017-11-20 PROCEDURE — 85347 COAGULATION TIME ACTIVATED: CPT

## 2017-11-20 PROCEDURE — 74011250637 HC RX REV CODE- 250/637: Performed by: PHYSICIAN ASSISTANT

## 2017-11-20 PROCEDURE — 77030004559 HC CATH ANGI DX SUPT CARD -B

## 2017-11-20 PROCEDURE — 77030013687 HC GD NDL BARD -B

## 2017-11-20 PROCEDURE — 36415 COLL VENOUS BLD VENIPUNCTURE: CPT | Performed by: PHYSICIAN ASSISTANT

## 2017-11-20 PROCEDURE — 65660000000 HC RM CCU STEPDOWN

## 2017-11-20 PROCEDURE — 80048 BASIC METABOLIC PNL TOTAL CA: CPT | Performed by: PHYSICIAN ASSISTANT

## 2017-11-20 PROCEDURE — 93005 ELECTROCARDIOGRAM TRACING: CPT | Performed by: INTERNAL MEDICINE

## 2017-11-20 PROCEDURE — 76937 US GUIDE VASCULAR ACCESS: CPT

## 2017-11-20 RX ORDER — LORAZEPAM 1 MG/1
1 TABLET ORAL
Status: DISCONTINUED | OUTPATIENT
Start: 2017-11-20 | End: 2017-11-29 | Stop reason: HOSPADM

## 2017-11-20 RX ORDER — HEPARIN SODIUM 5000 [USP'U]/ML
35 INJECTION, SOLUTION INTRAVENOUS; SUBCUTANEOUS ONCE
Status: COMPLETED | OUTPATIENT
Start: 2017-11-20 | End: 2017-11-20

## 2017-11-20 RX ORDER — ACETAMINOPHEN 325 MG/1
650 TABLET ORAL
Status: DISCONTINUED | OUTPATIENT
Start: 2017-11-20 | End: 2017-11-20 | Stop reason: SDUPTHER

## 2017-11-20 RX ORDER — FENTANYL CITRATE 50 UG/ML
25-50 INJECTION, SOLUTION INTRAMUSCULAR; INTRAVENOUS
Status: DISCONTINUED | OUTPATIENT
Start: 2017-11-20 | End: 2017-11-21 | Stop reason: HOSPADM

## 2017-11-20 RX ORDER — GENTAMICIN SULFATE 1 MG/G
CREAM TOPICAL DAILY PRN
Status: DISCONTINUED | OUTPATIENT
Start: 2017-11-20 | End: 2017-11-29 | Stop reason: HOSPADM

## 2017-11-20 RX ORDER — SODIUM CHLORIDE 0.9 % (FLUSH) 0.9 %
5-10 SYRINGE (ML) INJECTION EVERY 8 HOURS
Status: DISCONTINUED | OUTPATIENT
Start: 2017-11-20 | End: 2017-11-29 | Stop reason: HOSPADM

## 2017-11-20 RX ORDER — SODIUM CHLORIDE 9 MG/ML
75 INJECTION, SOLUTION INTRAVENOUS CONTINUOUS
Status: DISCONTINUED | OUTPATIENT
Start: 2017-11-20 | End: 2017-11-21

## 2017-11-20 RX ORDER — MORPHINE SULFATE 10 MG/ML
2 INJECTION, SOLUTION INTRAMUSCULAR; INTRAVENOUS
Status: DISCONTINUED | OUTPATIENT
Start: 2017-11-20 | End: 2017-11-20 | Stop reason: SDUPTHER

## 2017-11-20 RX ORDER — MIDAZOLAM HYDROCHLORIDE 1 MG/ML
.5-2 INJECTION, SOLUTION INTRAMUSCULAR; INTRAVENOUS
Status: DISCONTINUED | OUTPATIENT
Start: 2017-11-20 | End: 2017-11-21 | Stop reason: HOSPADM

## 2017-11-20 RX ORDER — LIDOCAINE HYDROCHLORIDE 20 MG/ML
60 INJECTION, SOLUTION INFILTRATION; PERINEURAL ONCE
Status: COMPLETED | OUTPATIENT
Start: 2017-11-20 | End: 2017-11-20

## 2017-11-20 RX ORDER — SODIUM CHLORIDE 0.9 % (FLUSH) 0.9 %
5-10 SYRINGE (ML) INJECTION AS NEEDED
Status: DISCONTINUED | OUTPATIENT
Start: 2017-11-20 | End: 2017-11-29 | Stop reason: HOSPADM

## 2017-11-20 RX ORDER — HYDROCODONE BITARTRATE AND ACETAMINOPHEN 5; 325 MG/1; MG/1
1 TABLET ORAL
Status: DISCONTINUED | OUTPATIENT
Start: 2017-11-20 | End: 2017-11-29 | Stop reason: HOSPADM

## 2017-11-20 RX ORDER — HEPARIN SODIUM 200 [USP'U]/100ML
3 INJECTION, SOLUTION INTRAVENOUS CONTINUOUS
Status: DISCONTINUED | OUTPATIENT
Start: 2017-11-20 | End: 2017-11-21 | Stop reason: HOSPADM

## 2017-11-20 RX ADMIN — TICAGRELOR 90 MG: 90 TABLET ORAL at 11:36

## 2017-11-20 RX ADMIN — ZINC 1 TABLET: TAB ORAL at 08:29

## 2017-11-20 RX ADMIN — FENTANYL CITRATE 25 MCG: 50 INJECTION, SOLUTION INTRAMUSCULAR; INTRAVENOUS at 11:17

## 2017-11-20 RX ADMIN — METOCLOPRAMIDE HYDROCHLORIDE 5 MG: 10 TABLET ORAL at 19:06

## 2017-11-20 RX ADMIN — BIVALIRUDIN 0.25 MG/KG/HR: 250 INJECTION, POWDER, LYOPHILIZED, FOR SOLUTION INTRAVENOUS at 11:02

## 2017-11-20 RX ADMIN — HEPARIN SODIUM 3000 UNITS: 5000 INJECTION, SOLUTION INTRAVENOUS; SUBCUTANEOUS at 06:17

## 2017-11-20 RX ADMIN — Medication 400 MG: at 08:30

## 2017-11-20 RX ADMIN — METOCLOPRAMIDE HYDROCHLORIDE 5 MG: 10 TABLET ORAL at 08:21

## 2017-11-20 RX ADMIN — HEPARIN SODIUM AND DEXTROSE 7 UNITS/KG/HR: 5000; 5 INJECTION INTRAVENOUS at 00:42

## 2017-11-20 RX ADMIN — HEPARIN SODIUM 3 ML/HR: 200 INJECTION, SOLUTION INTRAVENOUS at 10:45

## 2017-11-20 RX ADMIN — PANTOPRAZOLE SODIUM 40 MG: 40 TABLET, DELAYED RELEASE ORAL at 19:06

## 2017-11-20 RX ADMIN — TORSEMIDE 100 MG: 100 TABLET ORAL at 08:29

## 2017-11-20 RX ADMIN — POTASSIUM CHLORIDE 20 MEQ: 20 TABLET, EXTENDED RELEASE ORAL at 08:29

## 2017-11-20 RX ADMIN — LEVOTHYROXINE SODIUM 150 MCG: 150 TABLET ORAL at 08:30

## 2017-11-20 RX ADMIN — Medication 10 ML: at 14:56

## 2017-11-20 RX ADMIN — SODIUM CHLORIDE 25 ML/HR: 900 INJECTION, SOLUTION INTRAVENOUS at 05:47

## 2017-11-20 RX ADMIN — SUCRALFATE 1 G: 1 SUSPENSION ORAL at 19:05

## 2017-11-20 RX ADMIN — ONDANSETRON 4 MG: 4 TABLET, ORALLY DISINTEGRATING ORAL at 08:45

## 2017-11-20 RX ADMIN — NITROGLYCERIN 1 INCH: 20 OINTMENT TOPICAL at 08:30

## 2017-11-20 RX ADMIN — AMLODIPINE BESYLATE 10 MG: 10 TABLET ORAL at 08:29

## 2017-11-20 RX ADMIN — MIDAZOLAM HYDROCHLORIDE 1 MG: 1 INJECTION, SOLUTION INTRAMUSCULAR; INTRAVENOUS at 11:16

## 2017-11-20 RX ADMIN — TICAGRELOR 90 MG: 90 TABLET ORAL at 08:30

## 2017-11-20 RX ADMIN — PERFLUTREN 1 ML: 6.52 INJECTION, SUSPENSION INTRAVENOUS at 13:00

## 2017-11-20 RX ADMIN — CALCITRIOL 0.25 MCG: 0.25 CAPSULE, LIQUID FILLED ORAL at 08:30

## 2017-11-20 RX ADMIN — IOPAMIDOL 180 ML: 755 INJECTION, SOLUTION INTRAVENOUS at 11:36

## 2017-11-20 RX ADMIN — ASPIRIN 81 MG: 81 TABLET, COATED ORAL at 08:29

## 2017-11-20 RX ADMIN — METOPROLOL TARTRATE 12.5 MG: 25 TABLET ORAL at 08:29

## 2017-11-20 RX ADMIN — PANTOPRAZOLE SODIUM 40 MG: 40 TABLET, DELAYED RELEASE ORAL at 08:30

## 2017-11-20 RX ADMIN — FENTANYL CITRATE 50 MCG: 50 INJECTION, SOLUTION INTRAMUSCULAR; INTRAVENOUS at 10:45

## 2017-11-20 RX ADMIN — RANOLAZINE 1000 MG: 500 TABLET, FILM COATED, EXTENDED RELEASE ORAL at 19:05

## 2017-11-20 RX ADMIN — MIDAZOLAM HYDROCHLORIDE 2 MG: 1 INJECTION, SOLUTION INTRAMUSCULAR; INTRAVENOUS at 10:45

## 2017-11-20 RX ADMIN — SODIUM CHLORIDE 125 MG: 900 INJECTION, SOLUTION INTRAVENOUS at 14:56

## 2017-11-20 RX ADMIN — LIDOCAINE HYDROCHLORIDE 60 MG: 20 INJECTION, SOLUTION INFILTRATION; PERINEURAL at 10:45

## 2017-11-20 RX ADMIN — SUCRALFATE 1 G: 1 SUSPENSION ORAL at 08:30

## 2017-11-20 RX ADMIN — ERYTHROPOIETIN 20000 UNITS: 20000 INJECTION, SOLUTION INTRAVENOUS; SUBCUTANEOUS at 19:05

## 2017-11-20 RX ADMIN — POTASSIUM CHLORIDE 20 MEQ: 20 TABLET, EXTENDED RELEASE ORAL at 19:06

## 2017-11-20 RX ADMIN — RANOLAZINE 1000 MG: 500 TABLET, FILM COATED, EXTENDED RELEASE ORAL at 08:21

## 2017-11-20 NOTE — PROGRESS NOTES
TRANSFER - IN REPORT:    Verbal report received from [address] RN(name) on [address]  being received from cath lab(unit) for routine progression of care      Report consisted of patients Situation, Background, Assessment and   Recommendations(SBAR). Information from the following report(s) Procedure Summary was reviewed with the receiving nurse. Opportunity for questions and clarification was provided. Assessment completed upon patients arrival to unit and care assumed.

## 2017-11-20 NOTE — PROGRESS NOTES
Bedside and Verbal shift change report given to self (oncoming nurse) by Stalin Sun RN (offgoing nurse). Report included the following information SBAR, Kardex, MAR and Recent Results.

## 2017-11-20 NOTE — PROGRESS NOTES
TRANSFER - OUT REPORT:    Verbal report given to bhavin(cpru) and nina(3rd)(name) on Guido Dugan  being transferred to cpru then 322(unit) for routine progression of care       Report consisted of patients Situation, Background, Assessment and   Recommendations(SBAR). Information from the following report(s) SBAR was reviewed with the receiving nurse. Opportunity for questions and clarification was provided. Procedure: [facility]   Finding Summary: stent x 4 (2LAD) (2RCA)(cath/pci/pacer settings)  Location: Rgroin    Closure Device: Mynx closure device(yes/no/description)  Post Site Assessment: No bleeding/no hematoma/dry and intact       Intra Procedure Meds:    Versed: 3mg  Fentanyl: 75mcg  Angiomax: 15ml bolus, 5ml/hr infusion  Angiomax Stop Time: 1133    Antiplatelet: 05BC Brilinta             Peripheral IV 11/18/17 Left Hand (Active)   Site Assessment Clean, dry, & intact 11/20/2017  7:37 AM   Phlebitis Assessment 0 11/20/2017  7:37 AM   Infiltration Assessment 0 11/20/2017  7:37 AM   Dressing Status Clean, dry, & intact 11/20/2017  7:37 AM   Dressing Type Tape;Transparent 11/20/2017  4:47 AM   Hub Color/Line Status Infusing 11/20/2017  7:37 AM   Alcohol Cap Used No 11/20/2017  4:47 AM       Peripheral IV 11/18/17 Right Arm (Active)   Site Assessment Clean, dry, & intact 11/20/2017  7:37 AM   Phlebitis Assessment 0 11/20/2017  7:37 AM   Infiltration Assessment 0 11/20/2017  7:37 AM   Dressing Status Clean, dry, & intact 11/20/2017  7:37 AM   Dressing Type Tape;Transparent 11/20/2017  4:47 AM   Hub Color/Line Status Infusing 11/20/2017  7:37 AM   Alcohol Cap Used No 11/20/2017  4:47 AM        Post-Procedure Site Assessment (1)  Wound Type: Catheter entry/exit  Location: Groin  Orientation : Right  Hemostasis : Manual compresssion  Closure Device: Mynx  Site Assessment: No bleeding, No hematoma, Dry/intact                       is allergic to codeine.     Past Medical History:   Diagnosis Date    Anemia of chronic renal failure 4/28/2015    Anterior myocardial infarction [facility]) 5/21/2015    CAD (coronary artery disease)     Chest pain     Chronic kidney disease, stage III (moderate) 8/15/2014    on dialysis    CKD (chronic kidney disease) stage 4, GFR 15-29 ml/min ([facility]) 4/28/2015    Debility 5/5/2015    Depression 12/29/2015    Edema 12/29/2015    Endocrine disease     Hypothyroidism    GERD (gastroesophageal reflux disease)     Heart murmur 12/29/2015    HLD (hyperlipidemia) 12/29/2015    Hypertension     Hypothyroidism 4/28/2015    Ischemic cardiomyopathy 12/29/2015    Nausea     S/P PTCA (percutaneous transluminal coronary angioplasty); LAD PTCA of  ISR 11/27/15 5/24/2016    STEMI (ST elevation myocardial infarction) ([facility] [address] 4/28/2015    Unspecified sleep apnea     uses cpap machine    Unstable angina ([facility])      Visit Vitals    /68 (BP 1 Location: Left arm, BP Patient Position: Supine)    Pulse 68    Temp 97.8 °F (36.6 °C)    Resp 16    Ht 5' 10\" (1.778 m)    Wt 98.1 kg (216 lb 3.2 oz)    SpO2 100%    BMI 31.02 kg/m2

## 2017-11-20 NOTE — PROGRESS NOTES
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 She has a history of GI bleeding and had anemia-induced unstable angina in the past. 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 She states the pain has since resolved with NTG paste.  No fevers or chills. No nausea, vomiting or diarrhea.  She states that she is essentially anuric and occasionally makes minimal urine.  She has a h/o CVA and TIA. No fever or chills, no problems with PD drainage or discoloration of PD fluid. No increased thirst. She wishes regular diet and supplement. She denies any other acute complaints  Her edema has improved in the past couple of days with intensified dialysis.     11/20/17 - awaiting cardiac cath     Past Medical History:   Diagnosis Date    Anemia of chronic renal failure 4/28/2015    Anterior myocardial infarction [facility]) 5/21/2015    CAD (coronary artery disease)     Chest pain     Chronic kidney disease, stage III (moderate) 8/15/2014    on dialysis    CKD (chronic kidney disease) stage 4, GFR 15-29 ml/min ([facility]) 4/28/2015    Debility 5/5/2015    Depression 12/29/2015    Edema 12/29/2015    Endocrine disease     Hypothyroidism    GERD (gastroesophageal reflux disease)     Heart murmur 12/29/2015    HLD (hyperlipidemia) 12/29/2015    Hypertension     Hypothyroidism 4/28/2015    Ischemic cardiomyopathy 12/29/2015    Nausea     S/P PTCA (percutaneous transluminal coronary angioplasty); LAD PTCA of  ISR 11/27/15 5/24/2016    STEMI (ST elevation myocardial infarction) ([facility] [place] [address] 4/28/2015    Unspecified sleep apnea     uses cpap machine    Unstable angina [facility])       Past Surgical History:   Procedure Laterality Date    HX BACK SURGERY  1990    neck surgery cervical disc    HX BACK SURGERY      lower back    HX CATARACT REMOVAL Bilateral     HX CHOLECYSTECTOMY  19702    gall bladder     HX KNEE REPLACEMENT Right 2006    HX PTCA  4/28/2015    2.25 Xience stent to mid LAD for occluded artery, anterior MI, EF 25%. Moderate disease distal LAD and distal OM PCI CX and RCA 2004, then PCI RCA and LAD in 2009. Prior to Admission medications    Medication Sig Start Date End Date Taking? Authorizing Provider   folic acid (FOLVITE) 1 mg tablet Take 1 mg by mouth daily. Yes Historical Provider   potassium chloride (K-DUR, KLOR-CON) 20 mEq tablet Take 20 mEq by mouth two (2) times a day. Yes Historical Provider   traZODone (DESYREL) 50 mg tablet Take  by mouth nightly. Yes Historical Provider   megestrol (MEGACE) 400 mg/10 mL (40 mg/mL) suspension Take 200 mg by mouth daily. Yes Historical Provider   amLODIPine (NORVASC) 10 mg tablet Take 1 Tab by mouth daily. 9/23/17   Hattie Rodriguez DO   pantoprazole (PROTONIX) 40 mg tablet Take 1 Tab by mouth Daily (before breakfast). 9/23/17   Hattie Rodriguez DO   sucralfate (CARAFATE) 100 mg/mL suspension Take 10 mL by mouth Before breakfast, lunch, dinner and at bedtime. Indications: PREVENTION OF STRESS ULCER 9/23/17   Hattie Rodriguez DO   torsemide [facility]) 100 mg tablet Take  by mouth daily. Historical Provider   nitroglycerin (NITROLINGUAL) 400 mcg/spray spray 1 Spray by SubLINGual route every five (5) minutes as needed for Chest Pain. Historical Provider   linaclotide Larance Carwin) 145 mcg cap capsule Take  by mouth Daily (before breakfast). Historical Provider   magnesium oxide (MAG-OX) 400 mg tablet Take 400 mg by mouth daily. Historical Provider   PNV NO.122/IRON/FOLIC ACID (PRENATAL MULTI PO) Take  by mouth. Historical Provider   metoprolol tartrate (LOPRESSOR) 25 mg tablet Take 12.5 mg by mouth daily.  Take PRN for BP <120. 6/23/16 Valdo Black MD   ondansetron (ZOFRAN ODT) 4 mg disintegrating tablet Take 4 mg by mouth three (3) times daily as needed. Historical Provider   metoclopramide HCl (REGLAN) 5 mg tablet Take 5 mg by mouth two (2) times a day. Historical Provider   ticagrelor (BRILINTA) 90 mg tablet Take 1 Tab by mouth two (2) times a day. 2/4/16   MINDY Carrillo   promethazine (PHENERGAN) 25 mg tablet Take 25 mg by mouth every eight (8) hours as needed for Nausea. Historical Provider   sevelamer carbonate (RENVELA) 800 mg tab tab Take 800 mg by mouth three (3) times daily (with meals). Historical Provider   gentamicin (GARAMYCIN) 0.1 % topical cream APPLY TO PD catheter exit site at daily dressing change 5/12/15   Jules Anthony MD   aspirin delayed-release 81 mg tablet Take 1 Tab by mouth daily. 5/5/15   Gisela Hollingsworth PA-C   darbepoetin toya in polysorbat (ARANESP, POLYSORBATE,) 40 mcg/mL injection 40 mcg by SubCUTAneous route every fourteen (14) days. Indications: ANEMIA IN CHRONIC KIDNEY DISEASE    Historical Provider   rosuvastatin (CRESTOR) 20 mg tablet Take 20 mg by mouth nightly. Historical Provider   ranolazine ER (RANEXA) 500 mg SR tablet Take 500 mg by mouth two (2) times a day. Historical Provider   levothyroxine (SYNTHROID) 150 mcg tablet Take 150 mcg by mouth Daily (before breakfast).     Historical Provider     Allergies   Allergen Reactions    Codeine Nausea and Vomiting      Social History   Substance Use Topics    Smoking status: Never Smoker    Smokeless tobacco: Never Used    Alcohol use No      Family History   Problem Relation Age of Onset    Heart Disease Mother     Hypertension Mother     Cancer Mother      Lung    Stroke Father     Hypertension Father     Breast Cancer Neg Hx           Review of Systems    Constitutional: no fever,   Eyes: fair vision,    Ears, nose, mouth, throat, and face:fair hearing,   Respiratory: no asthma,  Chronic home O2 is being used  Cardiovascular:no palpitation, positive for chest pain,   Gastrointestinal:no diarrhea,    Genitourinary: no dysuria,   Hematologic/lymphatic: no bleeding tendency,   Neurological: no seizures   Behvioral/Psych: no psych hospitalization   Endocrine: no goiter,       Objective:       Visit Vitals    /76    Pulse 80    Temp 97.8 °F (36.6 °C)    Resp 21    Ht 5' 10\" (1.778 m)    Wt 98.1 kg (216 lb 3.2 oz)  Comment: pt did not yet receive dialysis; RN Melo Memory aware    SpO2 98%    BMI 31.02 kg/m2       General:  Alert, cooperative, mild distress, appears stated age. Head:  Normocephalic, without obvious abnormality, atraumatic. Eyes:  Conjunctivae/corneas clear. EOMs intact. Throat: Lips, mucosa, and tongue normal. Teeth and gums normal.   Neck: Supple, symmetrical, trachea midline, no adenopathy,  no JVD. Lungs:   Clear to auscultation bilaterally. Heart:  Regular rate and rhythm, S1, S2 normal, 2/6 systolic  murmur, no rub or gallop. Abdomen:   Soft, non-tender. No masses,  No organomegaly. . PD catheter in place without exudate   Extremities: Extremities normal, atraumatic, no cyanosis. 1+ edema. Skin: Skin color, texture, turgor normal. No rashes or lesions. Lymph nodes: Cervical and supraclavicular nodes normal.   Neurologic: Grossly intact. No asterixis. Data Review:       Results for Delia Kaye (MRN [id number]) as of 11/19/2017 14:22   Ref.  Range 11/18/2017 17:58 11/18/2017 19:19 11/19/2017 00:06 11/19/2017 06:30   WBC Latest Ref Range: 4.3 - 11.1 K/uL   7.6    RBC Latest Ref Range: 4.05 - 5.25 M/uL   2.79 (L)    HGB Latest Ref Range: 11.7 - 15.4 g/dL   8.5 (L)    HCT Latest Ref Range: 35.8 - 46.3 %   26.1 (L)    MCV Latest Ref Range: 79.6 - 97.8 FL   93.5    MCH Latest Ref Range: 26.1 - 32.9 PG   30.5    MCHC Latest Ref Range: 31.4 - 35.0 g/dL   32.6    RDW Latest Ref Range: 11.9 - 14.6 %   15.4 (H)    PLATELET Latest Ref Range: 150 - 450 K/uL   177    MPV Latest Ref Range: 10.8 - 14.1 FL   10.1 (L)    NEUTROPHILS Latest Ref Range: 43 - 78 %   73    LYMPHOCYTES Latest Ref Range: 13 - 44 %   17    MONOCYTES Latest Ref Range: 4.0 - 12.0 %   7    EOSINOPHILS Latest Ref Range: 0.5 - 7.8 %   3    BASOPHILS Latest Ref Range: 0.0 - 2.0 %   0    IMMATURE GRANULOCYTES Latest Ref Range: 0.0 - 5.0 %   0    DF Latest Units:     AUTOMATED    ABS. NEUTROPHILS Latest Ref Range: 1.7 - 8.2 K/UL   5.6    ABS. IMM. GRANS. Latest Ref Range: 0.0 - 0.5 K/UL   0.0    ABS. LYMPHOCYTES Latest Ref Range: 0.5 - 4.6 K/UL   1.3    ABS. MONOCYTES Latest Ref Range: 0.1 - 1.3 K/UL   0.5    ABS. EOSINOPHILS Latest Ref Range: 0.0 - 0.8 K/UL   0.2    ABS. BASOPHILS Latest Ref Range: 0.0 - 0.2 K/UL   0.0    aPTT Latest Ref Range: 23.5 - 31.7 SEC   100.7 (HH) >110.0 (HH)   Sodium Latest Ref Range: 136 - 145 mmol/L    138   Potassium Latest Ref Range: 3.5 - 5.1 mmol/L    3.2 (L)   Chloride Latest Ref Range: 98 - 107 mmol/L    102   CO2 Latest Ref Range: 21 - 32 mmol/L    26   Anion gap Latest Ref Range: 7 - 16 mmol/L    10   Glucose Latest Ref Range: 65 - 100 mg/dL    121 (H)   BUN Latest Ref Range: 8 - 23 MG/DL    31 (H)   Creatinine Latest Ref Range: 0.6 - 1.0 MG/DL    8.86 (H)   Calcium Latest Ref Range: 8.3 - 10.4 MG/DL    9.7   GFR est non-AA Latest Ref Range: >60 ml/min/1.73m2    5 (L)   GFR est AA Latest Ref Range: >60 ml/min/1.73m2    5 (L)   Troponin-I, Qt.  Latest Ref Range: 0.02 - 0.05 NG/ML 0.79 (HH)  3.99 (HH) 9.18 (HH)   EKG, 12 LEAD, INITIAL Unknown  Rpt       Recent Results (from the past 24 hour(s))   PTT    Collection Time: 11/19/17  3:30 PM   Result Value Ref Range    aPTT 74.2 (H) 23.5 - 31.7 SEC   PTT    Collection Time: 11/19/17 10:52 PM   Result Value Ref Range    aPTT 57.6 (H) 23.5 - 31.7 SEC   EKG, 12 LEAD, SUBSEQUENT    Collection Time: 11/19/17 11:26 PM   Result Value Ref Range    Ventricular Rate 82 BPM    Atrial Rate 82 BPM    P-R Interval 162 ms    QRS Duration 100 ms    Q-T Interval 428 ms    QTC Calculation (Bezet) 500 ms    Calculated P Axis 62 degrees    Calculated R Axis -25 degrees    Calculated T Axis 100 degrees    Diagnosis       Normal sinus rhythm  Possible Lateral infarct (cited on or before 28-APR-2015)  ST & T wave abnormality, consider anterior ischemia  Abnormal ECG  When compared with ECG of 18-NOV-2017 19:19,  Questionable change in initial forces of Lateral leads  T wave inversion more evident in Anterior leads  Confirmed by CARLOS GARY (), SIOMARA BROTHERS ([id number]) on 11/20/2017 4:53:51 AM     METABOLIC PANEL, BASIC    Collection Time: 11/20/17  5:01 AM   Result Value Ref Range    Sodium 136 136 - 145 mmol/L    Potassium 4.0 3.5 - 5.1 mmol/L    Chloride 100 98 - 107 mmol/L    CO2 24 21 - 32 mmol/L    Anion gap 12 7 - 16 mmol/L    Glucose 91 65 - 100 mg/dL    BUN 34 (H) 8 - 23 MG/DL    Creatinine 9.63 (H) 0.6 - 1.0 MG/DL    GFR est AA 5 (L) >60 ml/min/1.73m2    GFR est non-AA 4 (L) >60 ml/min/1.73m2    Calcium 9.1 8.3 - 10.4 MG/DL   PTT    Collection Time: 11/20/17  5:01 AM   Result Value Ref Range    aPTT 51.3 (H) 23.5 - 31.7 SEC       CXR viewed by me - no major infiltrate or fluid excess, CM with left possible minor effusion is present      Active Problems:    CAD (coronary artery disease) (11/27/2015)      Unstable angina (HCC) (2/1/2016)      Elevated troponin (11/18/2017)      Chronic anemia (11/18/2017)      ESRD (end stage renal disease) ([facility] [address] (11/18/2017)        Assessment:     1. CAD x NSTEMI, Unstable angina -  - d/w Dr. Marie Rahman, medical management for now as she responded to NTG already followed by elective cardiac cath    2, ESRD -  - continue PD, see orders    3. Fluid excess -  - mild and improving     4. Anemia -  - intensive anemia therapy in [employer]  - on WAYNE and IV iron and B12    5. History of GI bleed -  - monitor clinically and serial Hb  - she had a drop in Hb to 8.4       6.  Hypokalemia   - on PO potassium  - plan for addition of KCl when PD restarts tomorrow      Plan:     As above -

## 2017-11-20 NOTE — PROGRESS NOTES
TRANSFER - IN REPORT:    Verbal report received from Rupertφόροsunshine ALONSOοσειhoodώkylieοsunshine Pineda RN on [facility] being received from [facility] for routine progression of care      Report consisted of patients Situation, Background, Assessment and Recommendations(SBAR). Information from the following report(s) Kardex and Procedure Summary was reviewed with the receiving nurse. Opportunity for questions and clarification was provided. Assessment completed upon patients arrival to unit and care assumed.

## 2017-11-20 NOTE — PROCEDURES
Brief Cardiac Procedure Note    Patient: Marisela Cotter MRN: [id number]  SSN: [id number]    YOB: 1932  Age: 80 y.o. Sex: female      Date of Procedure: 11/20/2017     Pre-procedure Diagnosis: NSTEMI    Post-procedure Diagnosis: Coronary Artery Disease    Procedure: Left Heart Catheterization with Percutaneous Coronary Intervention    Brief Description of Procedure: See note    Performed By: Ilda Calix MD     Assistants: None    Anesthesia: Moderate Sedation    Estimated Blood Loss: Less than 10 mL      Specimens: None    Implants: None    Findings:   LV:  EDP 16mmHg  LM:  NML  LAD:  99% prox and mid  LCx:  40-50% mid, 60% OM1, patent stent in OM2  RCA:  80% prox, 90% mid - both at stent edge    PCI mLAD 99-0%   2.25x20 NC Euphora   2.25x22 Resolute   2.5x20 NC Euphora   3.5x15 NC Euphora (mid ISR focal)    PCI pLAD 99-0%   2.25x20 NC Euphora   3.0x22 Resolute   3.0x15 NC Euphora    PCI mRCA 90-0%   3.0x18 Resolute   3.25x15 NC Euphora    PCI pRCA 80-0%   3.5x15 Resolute   3.5x15 BC Euphora    Complications: None    Recommendations: Continue medical therapy.     Signed By: Ilda Calix MD     November 20, 2017

## 2017-11-20 NOTE — PROGRESS NOTES
Bedside and verbal report received from Norbert Andre RN   Heparin gtt verified at bedside.  Pt states no needs at this time

## 2017-11-20 NOTE — PROGRESS NOTES
Problem: Falls - Risk of  Goal: *Absence of Falls  Document Harrison Fall Risk and appropriate interventions in the flowsheet. Outcome: Progressing Towards Goal  Fall Risk Interventions:  Pt progressing towards goal. No falls since admission. Bed low and locked. Call light within reach. Side rails x 2. Gripper socks applied. Personal belongings within reach. Pt verbalizes understanding to call for assistance.        Mobility Interventions: Patient to call before getting OOB         Medication Interventions: Bed/chair exit alarm    Elimination Interventions: Call light in reach

## 2017-11-20 NOTE — PROGRESS NOTES
[facility] CARDIOLOGY PROGRESS NOTE           11/20/2017 12:45 PM    Admit Date: 11/18/2017      Subjective:   NSTEMI and recurrent CP. Troponin is > 9. ROS:  Cardiovascular:  As noted above    Objective:      Vitals:    11/20/17 0924 11/20/17 1137 11/20/17 1152 11/20/17 1206   BP: 132/76 116/68 100/57 105/58   Pulse: 80 68 70 70   Resp: 21 16 16    Temp: 97.8 °F (36.6 °C)      SpO2: 98% 100% 92% 94%   Weight:       Height:           Physical Exam:  General-No Acute Distress  Neck- supple, no JVD  CV- regular rate and rhythm no MRG  Lung- clear bilaterally  Abd- soft, nontender, nondistended  Ext- no edema bilaterally. Skin- warm and dry    Data Review:   Recent Labs      11/20/17   0501  11/19/17   0630  11/19/17   0006   11/18/17   1245   NA  136  138   --    --   139   K  4.0  3.2*   --    --   3.6   BUN  34*  31*   --    --   29*   CREA  9.63*  8.86*   --    --   9.05*   GLU  91  121*   --    --   85   WBC   --    --   7.6   --   8.9   HGB   --    --   8.5*   --   8.9*   HCT   --    --   26.1*   --   28.5*   PLT   --    --   177   --   212   TROIQ   --   9.18*  3.99*   < >  0.68*    < > = values in this interval not displayed. Assessment/Plan:     Active Problems:    CAD (coronary artery disease) (11/27/2015)    Urgent LHC due to recurrent CP and elevated troponin. LHC with complex CAD and acute thrombotic lesion in pLAD and subtotal occlusion in mLAD. The RCA has severe proximal and mid RCA disease. She has additional stenting of LAD with Resolute in mid/distal and proximal vessel. These all touched the previously stented mid vessel. Recommend life-long DAPT      Chronic anemia (11/18/2017)    Religion and chronically anemic.        ESRD (end stage renal disease) ([facility] [address] (11/18/2017)    On PD      HTN - This is controlled and monitor on therapy      Dyslipidemia - On crestor          Gabriela Kalata, MD  11/20/2017 12:45 PM

## 2017-11-20 NOTE — DIALYSIS
Peritoneal dialysis initiated as ordered. Peritoneal catheter site cleaned with Chlorhexidine scrub and applied Gentamicin cream to PD catheter exit site. Dressing changed, site has no s/s of redness, swelling, or exudate. Patient states no complaints at this time. Report given to primary RN.

## 2017-11-20 NOTE — PROGRESS NOTES
Bedside and Verbal shift change report given to Milagro Haskins RN (oncoming nurse) by self (offgoing nurse). Report included the following information SBAR, Kardex, MAR and Recent Results.

## 2017-11-20 NOTE — PROGRESS NOTES
Bedside and Verbal shift change report given to self (oncoming nurse) by All Trejo RN (offgoing nurse). Report included the following information SBAR, Kardex, MAR and Recent Results.

## 2017-11-20 NOTE — PROCEDURES
[address]       Name:  Nina Kayser   MR#:  [id number]   :  10/26/1932   Account #:  [de-identified]   Date of Adm:  2017       DATE OF PROCEDURE: 2017    PRIMARY CARDIOLOGIST: Yadiel Bolton MD    PRIMARY CARE PHYSICIAN: Nfaisa Juarez MD    BRIEF HISTORY: The patient is a very pleasant 80-year-old female   on peritoneal dialysis. She has a complex history of extensive   atherosclerotic coronary disease, extensive PCI and stenting and   repeat stenting of the LAD and right coronary artery due to   progressive coronary disease and in-stent restenosis. She   presents now with a non-ST elevation myocardial infarction for   cardiac catheterization. PROCEDURE: After informed consent, she was prepped and draped in   the usual sterile fashion. The right groin was injected with   lidocaine. The right femoral artery was accessed under   ultrasound. A 6-Jordanian sheath was advanced without   complications. A 6-Jordanian JL4 was utilized for left coronary   injection. A 6-Jordanian AL1 was utilized for right coronary   injection. A 6-Jordanian angled pigtail for left ventricular   pressure measurements. Isovue contrast utilized. CONSCIOUS SEDATION: Start time 10:35 a.m. End time 11:41 a.m. MEDICATIONS: Versed 3 mg, 75 mcg of fentanyl. MONITORING REGISTERED NURSE: Angel Croft    FINDINGS:   1. Left ventricle: Left ventriculogram not obtained. Left end-  diastolic pressure is measured at 19 mmHg. There is no aortic   valve gradient. 2. Left Main: Left main is heavily calcified, bifurcates in the   LAD and circumflex systems and appears angiographically normal.   3. Left anterior descending coronary: This is a heavily   calcified and stented vessel, there is a ruptured plaque with   thrombus present in the proximal LAD. There is 95% in-stent   restenosis within the mid LAD and 99% stenosis distal to the   stents in the mid to distal transition.  The distal LAD is   heavily calcified. It courses to the apex with JOSÉ LUIS 2 flow. 4. Left circumflex coronary: It is a moderate-sized vessel that   is heavily calcified, 30% to 40% proximal stenosis, 50-60% small   first obtuse marginal stenosis. There is a stent in the second   obtuse marginal which remains widely patent. 5. Right coronary: This is a heavily calcified vessel. There is an   80% napkin ring at the proximal stent edge proximally and a 90%   napkin ring at the distal stent edge. There is a diffuse 20% to   30% PDA stenosis and posterolateral small remains patent. There   are collaterals to the septals in the LAD. PERCUTANEOUS CORONARY INTERVENTION:   LESION #1: Mid LAD. Prestenosis 99%, poststenosis 0%. PreTIMI   flow 2, post-JOSÉ LUIS flow 3. DETAILS: The patient was anticoagulated with Angiomax. A 6-  [employer] XB 3.5 guide was utilized. A Whisper wire was advanced   distally. The distal lesion was predilated with a 2.25 x 20 NC   Euphora balloon. This was dilated on 2 separate occasions due to   the extensive calcification and difficulty delivering the stent. Eventually, a GuideLiner was necessary. A 2.25 x 22 Resolute   drug-eluting stent was positioned in an overlapping fashion   distally and deployed at 14 atmospheres and postdilated with a   2.25, 2.5 and 3.0 noncompliant balloon in the mid and proximal   portions. They showed an excellent angiographic result and we   proceeded to the secondary mid vessel lesion. Approximately 20 mm   proximally within the mid, there is a focal recurrent in-stent   restenosis at the area of previous rotational atherectomy and   balloon angioplasty. This was treated with a 3.0 and a 3.5   noncompliant Euphora balloon with excellent angiographic result   and the procedure was then focused on the proximal LAD. LESION #2: Proximal LAD. Prestenosis 99%, poststenosis 0%. PreTIMI flow 2, post-JOSÉ LUIS flow 3. DETAILS: The patient anticoagulated with Angiomax.  An XB 3.0 guide with the Whisper wire was utilized. This was predilated   with 2.25 x 20 NC Euphora balloon and subsequently stented with   a 3.0 x 22 Resolute drug-eluting stent extending from the very   proximal LAD in an overlapping fashion with the previously   placed stents. This was postdilated to high pressures with a 3.0   and a 3.5 noncompliant balloon. These show an excellent   angiographic result. The wire was removed and orthogonal views   were obtained. LESION #3: Mid right coronary artery. Prestenosis 90%,   poststenosis 0%. DETAILS: The patient anticoagulated with Angiomax. A 6-Frisian   AL1 guide was utilized. A [place] wire was advanced distally. The lesion was predilated 3.25 x 15 NC Euphora balloon and   subsequently stented with a 3.0 x 18 Resolute drug-eluting   stent, postdilated to high pressures with a 3.25 noncompliant   balloon. This yielded an excellent angiographic result. We   focused on the proximal lesion. The proximal right had moderate diffuse in-stent restenosis and   then a focal proximal edge de Heraclio stenosis on the order of 80%. This was directly stented with a 3.5 x 15 Resolute drug-eluting   stent, postdilated to a high pressure of 3.5 x 15 NC Euphora   balloon. This yielded an excellent angiographic result. The wire   was removed and orthogonal views obtained. The patient was reloaded with Brilinta in the lab. CONCLUSION:   1. Successful percutaneous coronary intervention and stenting of   acutely thrombotic mid and proximal left anterior descending   with Resolute drug-eluting stents as described above. Successful   balloon angioplasty for focal in-stent restenosis in the mid   left anterior descending as described above. 2. Successful percutaneous coronary intervention and stenting of   the proximal mid right coronary artery with Resolute drug-  eluting stent as described above.     NOTE: Given the patient's complex coronary artery disease and   extensive stenting, I would repeat recommend lifelong dual   antiplatelet therapy to avoid recurrent thrombotic event. Thank you for allowing us to participate in the care of this   patient. If questions or concerns, please feel free to contact   me.         Sofie Jean MD      MAG / CD   D:  11/20/2017   11:53   T:  11/20/2017   12:33   Job #:  [id number]

## 2017-11-21 ENCOUNTER — APPOINTMENT (OUTPATIENT)
Dept: CT IMAGING | Age: 82
DRG: 246 | End: 2017-11-21
Attending: INTERNAL MEDICINE
Payer: MEDICARE

## 2017-11-21 ENCOUNTER — APPOINTMENT (OUTPATIENT)
Dept: GENERAL RADIOLOGY | Age: 82
DRG: 246 | End: 2017-11-21
Attending: INTERNAL MEDICINE
Payer: MEDICARE

## 2017-11-21 LAB
ANION GAP SERPL CALC-SCNC: 11 MMOL/L (ref 7–16)
ATRIAL RATE: 73 BPM
BASOPHILS # BLD: 0 K/UL (ref 0–0.2)
BASOPHILS NFR BLD: 0 % (ref 0–2)
BUN SERPL-MCNC: 39 MG/DL (ref 8–23)
CALCIUM SERPL-MCNC: 8.5 MG/DL (ref 8.3–10.4)
CALCULATED P AXIS, ECG09: 54 DEGREES
CALCULATED R AXIS, ECG10: -24 DEGREES
CALCULATED T AXIS, ECG11: 88 DEGREES
CHLORIDE SERPL-SCNC: 99 MMOL/L (ref 98–107)
CO2 SERPL-SCNC: 24 MMOL/L (ref 21–32)
CREAT SERPL-MCNC: 9.84 MG/DL (ref 0.6–1)
DIAGNOSIS, 93000: NORMAL
DIFFERENTIAL METHOD BLD: ABNORMAL
EOSINOPHIL # BLD: 0.1 K/UL (ref 0–0.8)
EOSINOPHIL NFR BLD: 1 % (ref 0.5–7.8)
ERYTHROCYTE [DISTWIDTH] IN BLOOD BY AUTOMATED COUNT: 15.6 % (ref 11.9–14.6)
FERRITIN SERPL-MCNC: 2293 NG/ML (ref 8–388)
FOLATE SERPL-MCNC: 17.4 NG/ML (ref 3.1–17.5)
GLUCOSE SERPL-MCNC: 119 MG/DL (ref 65–100)
HCT VFR BLD AUTO: 23 % (ref 35.8–46.3)
HGB BLD-MCNC: 7.4 G/DL (ref 11.7–15.4)
IMM GRANULOCYTES # BLD: 0.1 K/UL (ref 0–0.5)
IMM GRANULOCYTES NFR BLD AUTO: 1 % (ref 0–5)
IRON SATN MFR SERPL: 108 %
IRON SERPL-MCNC: 131 UG/DL (ref 35–150)
LYMPHOCYTES # BLD: 1.2 K/UL (ref 0.5–4.6)
LYMPHOCYTES NFR BLD: 13 % (ref 13–44)
MCH RBC QN AUTO: 30.2 PG (ref 26.1–32.9)
MCHC RBC AUTO-ENTMCNC: 32.2 G/DL (ref 31.4–35)
MCV RBC AUTO: 93.9 FL (ref 79.6–97.8)
MONOCYTES # BLD: 0.8 K/UL (ref 0.1–1.3)
MONOCYTES NFR BLD: 8 % (ref 4–12)
NEUTS SEG # BLD: 7.1 K/UL (ref 1.7–8.2)
NEUTS SEG NFR BLD: 77 % (ref 43–78)
P-R INTERVAL, ECG05: 158 MS
PLATELET # BLD AUTO: 160 K/UL (ref 150–450)
PMV BLD AUTO: 10.1 FL (ref 10.8–14.1)
POTASSIUM SERPL-SCNC: 4.3 MMOL/L (ref 3.5–5.1)
Q-T INTERVAL, ECG07: 420 MS
QRS DURATION, ECG06: 98 MS
QTC CALCULATION (BEZET), ECG08: 462 MS
RBC # BLD AUTO: 2.45 M/UL (ref 4.05–5.25)
SODIUM SERPL-SCNC: 134 MMOL/L (ref 136–145)
TIBC SERPL-MCNC: 121 UG/DL (ref 250–450)
VENTRICULAR RATE, ECG03: 73 BPM
VIT B12 SERPL-MCNC: 1650 PG/ML (ref 193–986)
WBC # BLD AUTO: 9.3 K/UL (ref 4.3–11.1)

## 2017-11-21 PROCEDURE — 74011250637 HC RX REV CODE- 250/637: Performed by: INTERNAL MEDICINE

## 2017-11-21 PROCEDURE — 36415 COLL VENOUS BLD VENIPUNCTURE: CPT | Performed by: PHYSICIAN ASSISTANT

## 2017-11-21 PROCEDURE — 82607 VITAMIN B-12: CPT | Performed by: NURSE PRACTITIONER

## 2017-11-21 PROCEDURE — A4722 DIALYS SOL FLD VOL > 1999CC: HCPCS | Performed by: INTERNAL MEDICINE

## 2017-11-21 PROCEDURE — 94760 N-INVAS EAR/PLS OXIMETRY 1: CPT

## 2017-11-21 PROCEDURE — 93005 ELECTROCARDIOGRAM TRACING: CPT | Performed by: INTERNAL MEDICINE

## 2017-11-21 PROCEDURE — 74011250637 HC RX REV CODE- 250/637: Performed by: PHYSICIAN ASSISTANT

## 2017-11-21 PROCEDURE — 65660000000 HC RM CCU STEPDOWN

## 2017-11-21 PROCEDURE — 74011250636 HC RX REV CODE- 250/636: Performed by: INTERNAL MEDICINE

## 2017-11-21 PROCEDURE — 85025 COMPLETE CBC W/AUTO DIFF WBC: CPT | Performed by: PHYSICIAN ASSISTANT

## 2017-11-21 PROCEDURE — 82728 ASSAY OF FERRITIN: CPT | Performed by: NURSE PRACTITIONER

## 2017-11-21 PROCEDURE — 74176 CT ABD & PELVIS W/O CONTRAST: CPT

## 2017-11-21 PROCEDURE — 83540 ASSAY OF IRON: CPT | Performed by: NURSE PRACTITIONER

## 2017-11-21 PROCEDURE — 80048 BASIC METABOLIC PNL TOTAL CA: CPT | Performed by: INTERNAL MEDICINE

## 2017-11-21 PROCEDURE — 90945 DIALYSIS ONE EVALUATION: CPT

## 2017-11-21 PROCEDURE — 82746 ASSAY OF FOLIC ACID SERUM: CPT | Performed by: NURSE PRACTITIONER

## 2017-11-21 PROCEDURE — 77010033678 HC OXYGEN DAILY

## 2017-11-21 PROCEDURE — 74011000258 HC RX REV CODE- 258: Performed by: INTERNAL MEDICINE

## 2017-11-21 RX ORDER — POLYETHYLENE GLYCOL 3350 17 G/17G
17 POWDER, FOR SOLUTION ORAL DAILY
Status: DISCONTINUED | OUTPATIENT
Start: 2017-11-21 | End: 2017-11-24

## 2017-11-21 RX ADMIN — PANTOPRAZOLE SODIUM 40 MG: 40 TABLET, DELAYED RELEASE ORAL at 08:20

## 2017-11-21 RX ADMIN — SODIUM CHLORIDE 125 MG: 900 INJECTION, SOLUTION INTRAVENOUS at 10:31

## 2017-11-21 RX ADMIN — PANTOPRAZOLE SODIUM 40 MG: 40 TABLET, DELAYED RELEASE ORAL at 17:12

## 2017-11-21 RX ADMIN — TICAGRELOR 90 MG: 90 TABLET ORAL at 10:37

## 2017-11-21 RX ADMIN — METOCLOPRAMIDE HYDROCHLORIDE 5 MG: 10 TABLET ORAL at 08:20

## 2017-11-21 RX ADMIN — GENTAMICIN SULFATE: 1 CREAM TOPICAL at 17:56

## 2017-11-21 RX ADMIN — RANOLAZINE 1000 MG: 500 TABLET, FILM COATED, EXTENDED RELEASE ORAL at 08:21

## 2017-11-21 RX ADMIN — Medication 5 ML: at 22:24

## 2017-11-21 RX ADMIN — TORSEMIDE 100 MG: 100 TABLET ORAL at 08:20

## 2017-11-21 RX ADMIN — SUCRALFATE 1 G: 1 SUSPENSION ORAL at 17:11

## 2017-11-21 RX ADMIN — Medication 10 ML: at 00:22

## 2017-11-21 RX ADMIN — DEXTROSE MONOHYDRATE, SODIUM CHLORIDE, SODIUM LACTATE, CALCIUM CHLORIDE, MAGNESIUM CHLORIDE: 1.5; 538; 448; 18.4; 5.08 SOLUTION INTRAPERITONEAL at 04:11

## 2017-11-21 RX ADMIN — LEVOTHYROXINE SODIUM 150 MCG: 150 TABLET ORAL at 08:21

## 2017-11-21 RX ADMIN — SUCRALFATE 1 G: 1 SUSPENSION ORAL at 10:37

## 2017-11-21 RX ADMIN — GENTAMICIN SULFATE: 1 CREAM TOPICAL at 04:11

## 2017-11-21 RX ADMIN — Medication 400 MG: at 08:21

## 2017-11-21 RX ADMIN — SUCRALFATE 1 G: 1 SUSPENSION ORAL at 08:22

## 2017-11-21 RX ADMIN — TICAGRELOR 90 MG: 90 TABLET ORAL at 22:23

## 2017-11-21 RX ADMIN — ZINC 1 TABLET: TAB ORAL at 08:20

## 2017-11-21 RX ADMIN — CALCITRIOL 0.25 MCG: 0.25 CAPSULE, LIQUID FILLED ORAL at 08:21

## 2017-11-21 RX ADMIN — METOPROLOL TARTRATE 12.5 MG: 25 TABLET ORAL at 08:22

## 2017-11-21 RX ADMIN — SUCRALFATE 1 G: 1 SUSPENSION ORAL at 00:18

## 2017-11-21 RX ADMIN — SUCRALFATE 1 G: 1 SUSPENSION ORAL at 22:23

## 2017-11-21 RX ADMIN — ROSUVASTATIN CALCIUM 20 MG: 20 TABLET, FILM COATED ORAL at 00:18

## 2017-11-21 RX ADMIN — Medication 5 ML: at 06:00

## 2017-11-21 RX ADMIN — ROSUVASTATIN CALCIUM 20 MG: 20 TABLET, FILM COATED ORAL at 22:23

## 2017-11-21 RX ADMIN — RANOLAZINE 1000 MG: 500 TABLET, FILM COATED, EXTENDED RELEASE ORAL at 17:12

## 2017-11-21 RX ADMIN — ASPIRIN 81 MG: 81 TABLET, COATED ORAL at 08:21

## 2017-11-21 RX ADMIN — METOCLOPRAMIDE HYDROCHLORIDE 5 MG: 10 TABLET ORAL at 17:12

## 2017-11-21 RX ADMIN — TICAGRELOR 90 MG: 90 TABLET ORAL at 00:18

## 2017-11-21 NOTE — DIALYSIS
Disconnected PD cycler from patient. Betadine cap intact. Patient tolerated well. UF amount -31mls. Effluent: yellow and clear.

## 2017-11-21 NOTE — DIALYSIS
Responding to call from primary RN about cycler not being able to progress from drain 2 out of 7. Clamps checked and patient repositioned without effect. Catheter aspirated and flushed with 60 cc syringe and was very sluggish on aspiration. Stayed at bedside watching cycler through entire 3rd cycle and encountered an alarm during drain again. Report given to primary RN. Monitoring and troubleshooting cycler. [id number]  Still encountering alarm in drain phase. Called Dr. Moni Menchaca and received orders: 5000 units heparin manual fill PD. KUB ,miralax 17 gm am, and lactulose 30 ml PO now.

## 2017-11-21 NOTE — PROGRESS NOTES
Cardiac Rehab:  Spoke with patient regarding referral to cardiac rehab. Patient meets admission criteria based on PCIx4(date11/20/17). Discussed lifestyle modifications to promote cardiac wellness. Patient indicated that she does not think she could participate in CR at this point citing dyspnea and need for dialysis.   Encouraged patient to discuss activity progression with her cardiologist.

## 2017-11-21 NOTE — PROGRESS NOTES
Bedside and Verbal shift change report given to self (oncoming nurse) by Jovany Uribe RN (offgoing nurse). Report included the following information SBAR, Kardex, MAR and Recent Results.

## 2017-11-21 NOTE — PROGRESS NOTES
Care Management Interventions  PCP Verified by CM: Yes (October 2017)  Transition of Care Consult (CM Consult): Discharge Planning  Current Support Network: Lives Alone  Confirm Follow Up Transport: Family  Plan discussed with Pt/Family/Caregiver: Yes  Freedom of Choice Offered: Yes  Met with patient for d/c planning. Patient alert and oriented x 3, independent of ADL's and lives alone. She has walker and O2 at home(unsure of provider). She has 2 sons that lives nearby that can assist as needed. Patient was on [facility] service prior to admission and this was revoked on Monday. Will need to resume [facility] at discharge fax [phone]. Need order for resuming hospice, H/P, D/C summary and prescriptions. Current d/c plan is home with hospice. Addendum Patient is Peritoneal Dialysis and states she has machine at home to do her PD.

## 2017-11-21 NOTE — PROGRESS NOTES
Pt's CT abd results with right abdominal hematoma. Spoke with Dr. Alex Barrett and received orders to place hot pack on site for pain. No other orders received. Will continue to monitor pt.

## 2017-11-21 NOTE — PROGRESS NOTES
11/21/2017 8:55 AM    Admit Date: 11/18/2017    Admit Diagnosis: Unstable angina (HCC)      Subjective:   No cp or sob- weak      Objective:      Visit Vitals    BP 94/52    Pulse 73    Temp 97.3 °F (36.3 °C)    Resp 17    Ht 5' 10\" (1.778 m)    Wt 106.8 kg (235 lb 6.4 oz)    SpO2 97%    BMI 33.78 kg/m2       Physical Exam:  Inessa Jackelyn, Well Nourished, No Acute Distress, Alert & Oriented x 3, appropriate mood. Neck- supple, no JVD  CV- regular rate and rhythm no MRG  Lung- clear bilaterally  Abd- soft, nontender, nondistended  Ext- no edema bilaterally. Skin- warm and dry        Data Review:   Recent Labs      11/21/17   0708  11/21/17   0038   NA  134*   --    K  4.3   --    BUN  39*   --    CREA  9.84*   --    WBC   --   9.3   HGB   --   7.4*   HCT   --   23.0*   PLT   --   160       Assessment/Plan:     Active Problems:    CAD (coronary artery disease) (11/27/2015)Improved with current therapy.  Will continue medications        Unstable angina ([facility] [address] (2/1/2016)      Elevated troponin (11/18/2017)      Chronic anemia (11/18/2017)- worse- Worship and does not want transfusion- will ask heme to see regarding iron infusion or options      ESRD (end stage renal disease) ([facility] [address] (11/18/2017)- pd per renal

## 2017-11-21 NOTE — PROGRESS NOTES
Peritoneal dialysis initiated as ordered. Peritoneal catheter exit site cleaned with Chlorhexidine scrub and Gentamicin cream applied. Dressing changed, site has no s/s of redness, swelling, or exudate. Patient states no complaints at this time. Report given to primary RN.

## 2017-11-21 NOTE — DIALYSIS
PD treatment resumed post manual flush and drain. Patient positioned on left side during manual drain and this helped the efficacy of the drain. Patient states she will try her best to stay on left side for the rest of tx. Tx resumed at fill 5/7. Report given to primary RN.

## 2017-11-21 NOTE — PROGRESS NOTES
Problem: Falls - Risk of  Goal: *Absence of Falls  Document Harrison Fall Risk and appropriate interventions in the flowsheet. Outcome: Progressing Towards Goal  Fall Risk Interventions:    Pt progressing towards goal. No falls since admission. Bed low and locked. Call light within reach. Side rails x 2. Gripper socks applied. Personal belongings within reach. Pt verbalizes understanding to call for assistance.      Mobility Interventions: Bed/chair exit alarm         Medication Interventions: Bed/chair exit alarm    Elimination Interventions: Call light in reach

## 2017-11-21 NOTE — DIALYSIS
PD treatment paused and manual flush and drain with 1.5% PD solution with 5000 units of Heparin per MD. Heparin will go into peritoneal cavity and not absorb into blood and should not affect already low hgb, per MD.

## 2017-11-21 NOTE — CONSULTS
[facility]        Inpatient Hematology / Oncology Consult Note    Reason for Consult:  Unstable angina [facility])  Referring Physician:  Cleio Painter MD    History of Present Illness:  Ms. Geneva Hollis is a 80 y.o. female admitted on 11/18/2017. We have been asked to see her for recommendations regarding anemia in this [facility] patient. She has a pmh CAD, CKD on peritoneal dialysis, anemia secondary to CKD. He was admitted on 11/18 with complaints of chest pain. Found to have NSTEMI and underwent a PCI with stent placement by cardiology. Her hgb on admission was close to her baseline of 9-10 at 8.9. However, was down to 7.4 on 11/21. She had no obvious signs of bleeding and was sent for a CT A/P which showed right abdominal wall hematoma. She is currently on aspirin and brilinta as dual antiplatelet therapy given her recent stent placement. She was started on ferrlecit 125mg daily on Sun 11/19 - no baseline iron studies were done prior to starting. She also has been placed on weekly vitamin b12 and continues procrit 20,000 units MWF. Review of Systems:  Constitutional Denies fever, chills, weight loss, appetite changes, fatigue, night sweats. HEENT Denies trauma, blurry vision, hearing loss, ear pain, nosebleeds, sore throat, neck pain    Skin Denies lesions or rashes. Lungs Denies dyspnea, cough, sputum production or hemoptysis. Cardiovascular Denies chest pain, palpitations, or lower extremity edema. Gastrointestinal Denies nausea, vomiting, changes in bowel habits, bloody or black stools, abdominal pain.  Denies dysuria, frequency or hesitancy of urination. Neuro Denies headaches, visual changes or ataxia. Denies dizziness, tingling, tremors, sensory change, speech change, focal weakness      Hematology Denies easy bruising or bleeding, denies gingival bleeding or epistaxis. Endo Denies heat/cold intolerance, denies diabetes or thyroid abnormalities.    MSK Denies back pain, arthralgias, myalgias or frequent falls. Psychiatric/Behavioral Denies depression and substance abuse. The patient is not nervous/anxious. Allergies   Allergen Reactions    Codeine Nausea and Vomiting     Past Medical History:   Diagnosis Date    Anemia of chronic renal failure 4/28/2015    Anterior myocardial infarction [facility]) 5/21/2015    CAD (coronary artery disease)     Chest pain     Chronic kidney disease, stage III (moderate) 8/15/2014    on dialysis    CKD (chronic kidney disease) stage 4, GFR 15-29 ml/min ([facility] [address] 4/28/2015    Debility 5/5/2015    Depression 12/29/2015    Edema 12/29/2015    Endocrine disease     Hypothyroidism    GERD (gastroesophageal reflux disease)     Heart murmur 12/29/2015    HLD (hyperlipidemia) 12/29/2015    Hypertension     Hypothyroidism 4/28/2015    Ischemic cardiomyopathy 12/29/2015    Nausea     S/P PTCA (percutaneous transluminal coronary angioplasty); LAD PTCA of  ISR 11/27/15 5/24/2016    STEMI (ST elevation myocardial infarction) ([facility] [address] 4/28/2015    Unspecified sleep apnea     uses cpap machine    Unstable angina ([facility] [address]      Past Surgical History:   Procedure Laterality Date    HX BACK SURGERY  1990    neck surgery cervical disc    HX BACK SURGERY      lower back    HX CATARACT REMOVAL Bilateral     HX CHOLECYSTECTOMY  19702    gall bladder     HX KNEE REPLACEMENT Right 2006    HX PTCA  4/28/2015    2.25 Xience stent to mid LAD for occluded artery, anterior MI, EF 25%. Moderate disease distal LAD and distal OM PCI CX and RCA 2004, then PCI RCA and LAD in 2009.      Family History   Problem Relation Age of Onset    Heart Disease Mother     Hypertension Mother     Cancer Mother      Lung    Stroke Father     Hypertension Father     Breast Cancer Neg Hx      Social History     Social History    Marital status:      Spouse name: N/A    Number of children: N/A    Years of education: N/A     Occupational History    Not on file.      Social History Main Topics    Smoking status: Never Smoker    Smokeless tobacco: Never Used    Alcohol use No    Drug use: Not on file    Sexual activity: Not on file     Other Topics Concern    Not on file     Social History Narrative     Current Facility-Administered Medications   Medication Dose Route Frequency Provider Last Rate Last Dose    polyethylene glycol (MIRALAX) packet 17 g  17 g Oral DAILY Henna Velez MD        lactulose (CHRONULAC) solution 30 g  30 g Oral ONCE Henna Velez MD        sodium chloride (NS) flush 5-10 mL  5-10 mL IntraVENous Q8H Manuel Morales MD   5 mL at 11/21/17 0600    sodium chloride (NS) flush 5-10 mL  5-10 mL IntraVENous PRN Manuel Morales MD        LORazepam (ATIVAN) tablet 1 mg  1 mg Oral Q6H PRN Manuel Morales MD        HYDROcodone-acetaminophen (NORCO) 5-325 mg per tablet 1 Tab  1 Tab Oral Q4H PRN Manuel Morales MD        potassium chloride 10 mEq in peritoneal dialysis DEXTROSE 2.5% (2.5 mEq/L low calcium) 5,000 mL   IntraPERitoneal DIALYSIS PRN Cherrie Chavarria MD        gentamicin (GARAMYCIN) 0.1 % cream   Topical DAILY PRN MINDY Downing        nitroglycerin (NITROSTAT) tablet 0.4 mg  0.4 mg SubLINGual PRN MINDY Downing   0.4 mg at 11/19/17 2313    amLODIPine (NORVASC) tablet 10 mg  10 mg Oral DAILY MINDY Downing   10 mg at 11/20/17 2825    aspirin delayed-release tablet 81 mg  81 mg Oral DAILY MINDY Downing   81 mg at 11/21/17 4688    calcitRIOL (ROCALTROL) capsule 0.25 mcg  0.25 mcg Oral DAILY MINDY Downing   0.25 mcg at 11/21/17 0325    levothyroxine (SYNTHROID) tablet 150 mcg  150 mcg Oral ACB MINDY Downing   150 mcg at 11/21/17 3703    linaclotide (LINZESS) capsule 145 mcg (Patient Supplied)  145 mcg Oral DAILY MINDY Downing   Stopped at 11/21/17 0900    magnesium oxide (MAG-OX) tablet 400 mg  400 mg Oral DAILY MINDY Downing   400 mg at 11/21/17 1075  metoclopramide HCl (REGLAN) tablet 5 mg  5 mg Oral BID MINDY Sy   5 mg at 11/21/17 0820    metoprolol tartrate (LOPRESSOR) tablet 12.5 mg  12.5 mg Oral DAILY MINDY Sy   12.5 mg at 11/21/17 8768    ondansetron (ZOFRAN ODT) tablet 4 mg  4 mg Oral TID PRN MINDY Sy   4 mg at 11/20/17 0845    promethazine (PHENERGAN) tablet 25 mg  25 mg Oral Q6H PRN MINDY Sy        ranolazine ER (RANEXA) tablet 1,000 mg  1,000 mg Oral BID MINDY Sy   1,000 mg at 11/21/17 1171    rosuvastatin (CRESTOR) tablet 20 mg  20 mg Oral QHS MINDY Sy   20 mg at 11/21/17 0018    sevelamer (RENAGEL) tablet 800 mg  800 mg Oral TID WITH MEALS MINDY Sy        sucralfate (CARAFATE) 100 mg/mL oral suspension 1 g  1 g Oral AC&HS MINDY Sy   1 g at 11/21/17 1037    torsemide (DEMADEX) tablet 100 mg  100 mg Oral DAILY MINDY Sy   100 mg at 11/21/17 0820    sodium chloride (NS) flush 5-10 mL  5-10 mL IntraVENous PRN MINDY Sy        morphine injection 2 mg  2 mg IntraVENous Q4H PRN MINDY Sy        acetaminophen (TYLENOL) tablet 650 mg  650 mg Oral Q4H PRN MINDY Sy        HYDROcodone-acetaminophen (NORCO) 7.5-325 mg per tablet 1 Tab  1 Tab Oral Q4H PRN MINDY Sy        ticagrelor [facility]) tablet 90 mg  90 mg Oral BID MINDY Sy   90 mg at 11/21/17 1037    epoetin toya (EPOGEN;PROCRIT) injection 20,000 Units  20,000 Units SubCUTAneous Q MON, WED & FRI Meredith Cooley MD   20,000 Units at 11/20/17 1905    cyanocobalamin (VITAMIN B12) injection 1,000 mcg  1,000 mcg IntraMUSCular Q7D Meredith Cooley MD        ferric gluconate (FERRLECIT) 125 mg in 0.9% sodium chloride 100 mL ivpb  125 mg IntraVENous DAILY Meredith Cooley MD   125 mg at 11/21/17 1031    pantoprazole (PROTONIX) tablet 40 mg  40 mg Oral ACB&D Meredith Cooley MD   40 mg at 11/21/17 0820    multivitamin w ZN ([address]) tablet  1 Tab Oral DAILY Henna Velez MD   1 Tab at 17 0820    potassium chloride (K-DUR, KLOR-CON) SR tablet 20 mEq  20 mEq Oral BID Henna Velez MD   Stopped at 17 0900       OBJECTIVE:  Patient Vitals for the past 8 hrs:   BP Temp Pulse Resp SpO2   17 1310 95/63 98 °F (36.7 °C) 66 18 100 %   17 1031 94/58 - - - -   17 1011 - - - - 100 %   17 0909 108/68 97.8 °F (36.6 °C) (!) 55 20 100 %     Temp (24hrs), Av.7 °F (36.5 °C), Min:97.3 °F (36.3 °C), Max:98 °F (36.7 °C)     0701 -  1900  In: 250 [P.O.:250]  Out: -     Physical Exam:  Constitutional: Elderly female in no acute distress, sitting comfortably in the hospital bed. HEENT: Normocephalic and atraumatic. Oropharynx is clear, mucous membranes are moist.  Neck supple   Lymph node   Deferred   Skin Warm and dry. No bruising and no rash noted. No erythema. No pallor. Respiratory Lungs are clear to auscultation bilaterally without wheezes, rales or rhonchi, normal air exchange without accessory muscle use. CVS Normal rate, regular rhythm and normal S1 and S2. No murmurs, gallops, or rubs. Abdomen Soft, nontender and nondistended, normoactive bowel sounds. No palpable mass. No hepatosplenomegaly. Neuro Grossly nonfocal with no obvious sensory or motor deficits. MSK Normal range of motion in general.  No edema and no tenderness. Psych Appropriate mood and affect.         Labs:    Recent Results (from the past 24 hour(s))   CBC WITH AUTOMATED DIFF    Collection Time: 17 12:38 AM   Result Value Ref Range    WBC 9.3 4.3 - 11.1 K/uL    RBC 2.45 (L) 4.05 - 5.25 M/uL    HGB 7.4 (L) 11.7 - 15.4 g/dL    HCT 23.0 (L) 35.8 - 46.3 %    MCV 93.9 79.6 - 97.8 FL    MCH 30.2 26.1 - 32.9 PG    MCHC 32.2 31.4 - 35.0 g/dL    RDW 15.6 (H) 11.9 - 14.6 %    PLATELET 214 850 - 729 K/uL    MPV 10.1 (L) 10.8 - 14.1 FL    DF AUTOMATED      NEUTROPHILS 77 43 - 78 %    LYMPHOCYTES 13 13 - 44 %    MONOCYTES 8 4.0 - 12.0 %    EOSINOPHILS 1 0.5 - 7.8 %    BASOPHILS 0 0.0 - 2.0 %    IMMATURE GRANULOCYTES 1 0.0 - 5.0 %    ABS. NEUTROPHILS 7.1 1.7 - 8.2 K/UL    ABS. LYMPHOCYTES 1.2 0.5 - 4.6 K/UL    ABS. MONOCYTES 0.8 0.1 - 1.3 K/UL    ABS. EOSINOPHILS 0.1 0.0 - 0.8 K/UL    ABS. BASOPHILS 0.0 0.0 - 0.2 K/UL    ABS. IMM.  GRANS. 0.1 0.0 - 0.5 K/UL   EKG, 12 LEAD, INITIAL    Collection Time: 11/21/17  6:33 AM   Result Value Ref Range    Ventricular Rate 73 BPM    Atrial Rate 73 BPM    P-R Interval 158 ms    QRS Duration 98 ms    Q-T Interval 420 ms    QTC Calculation (Bezet) 462 ms    Calculated P Axis 54 degrees    Calculated R Axis -24 degrees    Calculated T Axis 88 degrees    Diagnosis       Normal sinus rhythm Leftward axis  Minimal voltage criteria for LVH, may be normal variant  Cannot rule out Anterior infarct , age undetermined  Abnormal ECG  When compared with ECG of 20-NOV-2017 12:38,  Minimal criteria for Anterior infarct are now Present  Criteria for Inferior infarct are no longer Present  Confirmed by MARICRUZ GARY (), Effie Cunningham ([id number]) on 11/21/2017 5:08:52 AM     METABOLIC PANEL, BASIC    Collection Time: 11/21/17  7:08 AM   Result Value Ref Range    Sodium 134 (L) 136 - 145 mmol/L    Potassium 4.3 3.5 - 5.1 mmol/L    Chloride 99 98 - 107 mmol/L    CO2 24 21 - 32 mmol/L    Anion gap 11 7 - 16 mmol/L    Glucose 119 (H) 65 - 100 mg/dL    BUN 39 (H) 8 - 23 MG/DL    Creatinine 9.84 (H) 0.6 - 1.0 MG/DL    GFR est AA 5 (L) >60 ml/min/1.73m2    GFR est non-AA 4 (L) >60 ml/min/1.73m2    Calcium 8.5 8.3 - 10.4 MG/DL   TRANSFERRIN SATURATION    Collection Time: 11/21/17  7:08 AM   Result Value Ref Range    Iron 131 35 - 150 ug/dL    TIBC 121 (L) 250 - 450 ug/dL    Transferrin Saturation 108 >20 %   FERRITIN    Collection Time: 11/21/17  7:08 AM   Result Value Ref Range    Ferritin 2293 (H) 8 - 388 NG/ML   VITAMIN B12    Collection Time: 11/21/17  7:08 AM   Result Value Ref Range    Vitamin B12 1650 (H) 193 - 986 pg/mL   FOLATE Collection Time: 11/21/17  7:08 AM   Result Value Ref Range    Folate 17.4 3.1 - 17.5 ng/mL       Imaging:  CT ABD PELV WO CONT [[id number]] Collected: 11/21/17 1306      Order Status: Completed Updated: 11/21/17 1319     Narrative:       CT  ABDOMEN AND PELVIS WITHOUT CONTRAST 11/21/2017    HISTORY:  Dropping hemoglobin. Abdominal pain and tenderness. Rule out  retroperitoneal trauma. Patient on peritoneal dialysis. TECHNIQUE:  Helical scans through the abdomen and pelvis without contrast.   Radiation dose reduction techniques were used for this study:  All CT scans  performed at this facility use one or all of the following: Automated exposure  control, adjustment of the mA and/or kVp according to patient's size, iterative  reconstruction. COMPARISON: 9/19/2017      CT ABDOMEN:  Peritoneal fluid in the perihepatic space, mild paracolic gutters,  extending down into the pelvis. Peritoneal dialysis catheter noted. There is not  any evidence of retroperitoneal hematoma identified. Strandy fluid density does  extend into the extraperitoneal space in the lower abdomen and pelvis. There is  radiodensity within the renal collecting systems, possibly previously  administered IV contrast. There is peripancreatic fluid and stranding, cannot  exclude acute pancreatitis. No biliary dilatation. Spleen is not enlarged. Small  bowel normal caliber. CT PELVIS:   Moderate to large volume pelvic fluid. There is diffuse asymmetric enlargement of the right rectus and oblique  abdominal muscles. There are are of higher attenuation the contralateral side  and findings are consistent with an abdominal wall hematoma.       Impression:       IMPRESSION:    1. Evidence of right abdominal wall hematoma. 2. No CT evidence to suggest retroperitoneal hematoma.   3. Extensive fluid in the abdomen and pelvis, presumed related to peritoneal  dialysis.             CT ABD PELV WO CONT [[id number]]      Order Status: Canceled      XR ABD (KUB) [[id number]]      Order Status: No result      XR CHEST PA LAT [[id number]] Collected: 11/18/17 1434     Order Status: Completed Updated: 11/18/17 1437     Narrative:       TWO-VIEW CHEST:    CLINICAL HISTORY: CHEST pain today. COMPARISON:  September 17, 2017. FINDINGS: PA and lateral chest images demonstrate no confluent pneumonic  infiltrate or significant pleural fluid collection.  Mild bibasilar  atelectasis/infiltrate.  The heart is mildly enlarged without evidence of  congestive heart failure or pneumothorax.  The bony thorax appears intact on  these views.       Impression:       IMPRESSION:  MILD BIBASILAR ATELECTASIS/INFILTRATE.          ASSESSMENT:  Problem List  Date Reviewed: 3/2/2017          Codes Class Noted    Elevated troponin ICD-10-CM: R74.8  ICD-9-CM: 790.6  11/18/2017        Chest pain ICD-10-CM: R07.9  ICD-9-CM: 786.50  11/18/2017        CKD (chronic kidney disease) ICD-10-CM: N18.9  ICD-9-CM: 585.9  11/18/2017        Chronic anemia ICD-10-CM: D64.9  ICD-9-CM: 285.9  11/18/2017        [facility] disease) [facility] ICD-10-CM: N18.6  ICD-9-CM: 585.6  11/18/2017        Abdominal pain ICD-10-CM: R10.9  ICD-9-CM: 789.00  9/18/2017        H/O TIA (transient ischemic attack) and stroke ICD-10-CM: Z86.73  ICD-9-CM: V12.54  4/13/2017        S/P PTCA (percutaneous transluminal coronary angioplasty) ICD-10-CM: Z98.61  ICD-9-CM: V45.82  4/13/2017        Mixed hyperlipidemia ICD-10-CM: E78.2  ICD-9-CM: 272.2  4/13/2017        Vision changes ICD-10-CM: H53.9  ICD-9-CM: 368.9  3/26/2017        Dizziness ICD-10-CM: R42  ICD-9-CM: 780.4  3/26/2017        Refusal of blood transfusions as patient is [employer] (Chronic) ICD-10-CM: Z53.1  ICD-9-CM: V62.6  8/25/2016        GERD (gastroesophageal reflux disease) ICD-10-CM: K21.9  ICD-9-CM: 530.81  8/8/2016        Unspecified sleep apnea ICD-10-CM: G47.30  ICD-9-CM: 780.57  8/8/2016    Overview Signed 8/8/2016 11:09 AM by Sharyle Maidens Ann     uses cpap machine             CVA (cerebral vascular accident) Hx of TIA ICD-10-CM: I63.9  ICD-9-CM: 434.91  2/22/2016        Unstable angina (HCC) ICD-10-CM: I20.0  ICD-9-CM: 411.1  2/1/2016        ESRD on peritoneal dialysis [facility]) (Chronic) ICD-10-CM: N18.6, Z99.2  ICD-9-CM: 585.6, V45.11  2/1/2016        Ischemic cardiomyopathy ICD-10-CM: I25.5  ICD-9-CM: 414.8  12/29/2015        HLD (hyperlipidemia) ICD-10-CM: E78.5  ICD-9-CM: 272.4  12/29/2015        Depression ICD-10-CM: F32.9  ICD-9-CM: 796  12/29/2015        CAD (coronary artery disease) ICD-10-CM: I25.10  ICD-9-CM: 414.00  11/27/2015        Debility ICD-10-CM: R53.81  ICD-9-CM: 799.3  5/5/2015        Hypertension (Chronic) ICD-10-CM: I10  ICD-9-CM: 401.9  4/28/2015        Anemia of chronic renal failure (Chronic) ICD-10-CM: N18.9, D63.1  ICD-9-CM: 285.21, 585.9  4/28/2015        Hypothyroidism (Chronic) ICD-10-CM: E03.9  ICD-9-CM: 244.9  4/28/2015                RECOMMENDATIONS:  Anemia of chronic disease, renal insufficency  - Vara Query witness and will not accept blood transfusions. Hgb improved today at 7.8. Agree with continued procrit. No need for further iron replacement as iron stores WNL. Ok to continue B12. Suspect abdominal wall hematoma should resolve spontaneously. NSTEMI  - S/p PCI and stent placement. Management per cardiology    CKD  - Peritoneal dialysis. Nephrology following    Lab studies and imaging studies (CT abd/pelvis) were personally reviewed. Thank you for allowing us to participate in the care of Ms. Eric Pickard. We will sign off and arrange follow up in our office in about 2 weeks after discharge with Dr. Mohini Morgan.            Gabbi Zaman NP   [facility]  [address] [address]  Office : [phone]  Fax : [phone]     Attending Addendum:  Briefly, Mrs Eric Pickard is a pleasant 81yo woman admitted on 11/18 with chest pain/NSTEMI s/p PCI with stent placement by cardiology. Her PMHx is significant for CAD, CKD on peritoneal dialysis, and anemia due to CKD. We were asked for recommendations regarding anemia in [address]. Admission Hb was 8.9 (baseline 9-10). Hb was noted to be down to 7.4 on 11/21. No bleeding was found clinically and CT AP revealed right abdominal wall hematoma. Due to recent stent placement, she's on ASA and ticagrelol. Of note, she was started on sodium ferric gluconate at 125mg daily on 11/19. She's also on vitamin B12 and procrit 20,000 units MWF. We do not have baseline iron studies. She has anemia of chronic disease exacerbated by acute blood loss (hematoma). Hgb at 7.8 today. Agree with Procrit. She will follow up with hematology after discharge.                    [facility]  [address] [place] [address]  Office : [phone]  Fax : [phone]

## 2017-11-21 NOTE — PROGRESS NOTES
Bedside and Verbal shift change report given to Agusto Summers RN (oncoming nurse) by self Dell Jacobs ). Report included the following information SBAR, Kardex, MAR and Recent Results.

## 2017-11-21 NOTE — PROGRESS NOTES
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 She has a history of GI bleeding and had anemia-induced unstable angina in the past. 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 She states the pain has since resolved with NTG paste.  No fevers or chills. No nausea, vomiting or diarrhea.  She states that she is essentially anuric and occasionally makes minimal urine.  She has a h/o CVA and TIA. No fever or chills, no problems with PD drainage or discoloration of PD fluid. No increased thirst. She wishes regular diet and supplement. She denies any other acute complaints  Her edema has improved in the past couple of days with intensified dialysis.    s-s/p cardiac cath .  Had trouble with PD last night - improved with heparin flush, complains of abdominal pain and tenderness      Past Medical History:   Diagnosis Date    Anemia of chronic renal failure 4/28/2015    Anterior myocardial infarction [facility]) 5/21/2015    CAD (coronary artery disease)     Chest pain     Chronic kidney disease, stage III (moderate) 8/15/2014    on dialysis    CKD (chronic kidney disease) stage 4, GFR 15-29 ml/min ([facility] [address] 4/28/2015    Debility 5/5/2015    Depression 12/29/2015    Edema 12/29/2015    Endocrine disease     Hypothyroidism    GERD (gastroesophageal reflux disease)     Heart murmur 12/29/2015    HLD (hyperlipidemia) 12/29/2015    Hypertension     Hypothyroidism 4/28/2015    Ischemic cardiomyopathy 12/29/2015    Nausea     S/P PTCA (percutaneous transluminal coronary angioplasty); LAD PTCA of  ISR 11/27/15 5/24/2016    STEMI (ST elevation myocardial infarction) ([facility] [address] 4/28/2015    Unspecified sleep apnea     uses cpap machine    Unstable angina (HCC)       Past Surgical History:   Procedure Laterality Date    HX BACK SURGERY  1990    neck surgery cervical disc    HX BACK SURGERY      lower back    HX CATARACT REMOVAL Bilateral     HX CHOLECYSTECTOMY  22320    gall bladder     HX KNEE REPLACEMENT Right 2006    HX PTCA  4/28/2015    2.25 Xience stent to mid LAD for occluded artery, anterior MI, EF 25%. Moderate disease distal LAD and distal OM PCI CX and RCA 2004, then PCI RCA and LAD in 2009. Prior to Admission medications    Medication Sig Start Date End Date Taking? Authorizing Provider   folic acid (FOLVITE) 1 mg tablet Take 1 mg by mouth daily. Yes Historical Provider   potassium chloride (K-DUR, KLOR-CON) 20 mEq tablet Take 20 mEq by mouth two (2) times a day. Yes Historical Provider   traZODone (DESYREL) 50 mg tablet Take  by mouth nightly. Yes Historical Provider   megestrol (MEGACE) 400 mg/10 mL (40 mg/mL) suspension Take 200 mg by mouth daily. Yes Historical Provider   amLODIPine (NORVASC) 10 mg tablet Take 1 Tab by mouth daily. 9/23/17   Deri Bumps, DO   pantoprazole (PROTONIX) 40 mg tablet Take 1 Tab by mouth Daily (before breakfast). 9/23/17   Deri Bumps, DO   sucralfate (CARAFATE) 100 mg/mL suspension Take 10 mL by mouth Before breakfast, lunch, dinner and at bedtime. Indications: PREVENTION OF STRESS ULCER 9/23/17   Deri Bumps, DO   torsemide [facility]) 100 mg tablet Take  by mouth daily. Historical Provider   nitroglycerin (NITROLINGUAL) 400 mcg/spray spray 1 Spray by SubLINGual route every five (5) minutes as needed for Chest Pain. Historical Provider   linaclotide Brooksie Lies) 145 mcg cap capsule Take  by mouth Daily (before breakfast). Historical Provider   magnesium oxide (MAG-OX) 400 mg tablet Take 400 mg by mouth daily. Historical Provider   PNV NO.122/IRON/FOLIC ACID (PRENATAL MULTI PO) Take  by mouth.     Historical Provider   metoprolol tartrate (LOPRESSOR) 25 mg tablet Take 12.5 mg by mouth daily. Take PRN for BP <120. 6/23/16   Ted Ho III, MD   ondansetron (ZOFRAN ODT) 4 mg disintegrating tablet Take 4 mg by mouth three (3) times daily as needed. Historical Provider   metoclopramide HCl (REGLAN) 5 mg tablet Take 5 mg by mouth two (2) times a day. Historical Provider   ticagrelor (BRILINTA) 90 mg tablet Take 1 Tab by mouth two (2) times a day. 2/4/16   MINDY Goins   promethazine (PHENERGAN) 25 mg tablet Take 25 mg by mouth every eight (8) hours as needed for Nausea. Historical Provider   sevelamer carbonate (RENVELA) 800 mg tab tab Take 800 mg by mouth three (3) times daily (with meals). Historical Provider   gentamicin (GARAMYCIN) 0.1 % topical cream APPLY TO PD catheter exit site at daily dressing change 5/12/15   Earlene Bundy MD   aspirin delayed-release 81 mg tablet Take 1 Tab by mouth daily. 5/5/15   Gisela Hollingsworth PA-C   darbepoetin toya in polysorbat (ARANESP, POLYSORBATE,) 40 mcg/mL injection 40 mcg by SubCUTAneous route every fourteen (14) days. Indications: ANEMIA IN CHRONIC KIDNEY DISEASE    Historical Provider   rosuvastatin (CRESTOR) 20 mg tablet Take 20 mg by mouth nightly. Historical Provider   ranolazine ER (RANEXA) 500 mg SR tablet Take 500 mg by mouth two (2) times a day. Historical Provider   levothyroxine (SYNTHROID) 150 mcg tablet Take 150 mcg by mouth Daily (before breakfast).     Historical Provider     Allergies   Allergen Reactions    Codeine Nausea and Vomiting      Social History   Substance Use Topics    Smoking status: Never Smoker    Smokeless tobacco: Never Used    Alcohol use No      Family History   Problem Relation Age of Onset    Heart Disease Mother     Hypertension Mother     Cancer Mother      Lung    Stroke Father     Hypertension Father     Breast Cancer Neg Hx           Review of Systems    Constitutional: no fever,   Eyes: fair vision,    Ears, nose, mouth, throat, and face:fair hearing,   Respiratory: no asthma,  Chronic home O2 is being used  Cardiovascular:no palpitation, positive for chest pain,   Gastrointestinal:no diarrhea,    Genitourinary: no dysuria,   Hematologic/lymphatic: no bleeding tendency,   Neurological: no seizures   Behvioral/Psych: no psych hospitalization   Endocrine: no goiter,       Objective:       Visit Vitals    BP 94/58    Pulse (!) 55    Temp 97.8 °F (36.6 °C)    Resp 20    Ht 5' 10\" (1.778 m)    Wt 106.8 kg (235 lb 6.4 oz)    SpO2 100%    BMI 33.78 kg/m2       General:  Alert, cooperative, mild distress, appears stated age. Head:  Normocephalic, without obvious abnormality, atraumatic. Eyes:  Conjunctivae/corneas clear. EOMs intact. Throat: Lips, mucosa, and tongue normal. Teeth and gums normal.   Neck: Supple, symmetrical, trachea midline, no adenopathy,  no JVD. Lungs:   Clear to auscultation bilaterally. Heart:  Regular rate and rhythm, S1, S2 normal, 2/6 systolic  murmur, no rub or gallop. Abdomen:   Soft, non-tender. No masses,  No organomegaly. . PD catheter in place without exudate   Extremities: Extremities normal, atraumatic, no cyanosis. 1+ edema. Skin: Skin color, texture, turgor normal. No rashes or lesions. Lymph nodes: Cervical and supraclavicular nodes normal.   Neurologic: Grossly intact. No asterixis. Data Review:       Results for Rowan Tariq (MRN [id number]) as of 11/19/2017 14:22   Ref.  Range 11/18/2017 17:58 11/18/2017 19:19 11/19/2017 00:06 11/19/2017 06:30   WBC Latest Ref Range: 4.3 - 11.1 K/uL   7.6    RBC Latest Ref Range: 4.05 - 5.25 M/uL   2.79 (L)    HGB Latest Ref Range: 11.7 - 15.4 g/dL   8.5 (L)    HCT Latest Ref Range: 35.8 - 46.3 %   26.1 (L)    MCV Latest Ref Range: 79.6 - 97.8 FL   93.5    MCH Latest Ref Range: 26.1 - 32.9 PG   30.5    MCHC Latest Ref Range: 31.4 - 35.0 g/dL   32.6    RDW Latest Ref Range: 11.9 - 14.6 %   15.4 (H)    PLATELET Latest Ref Range: 150 - 450 K/uL   177    MPV Latest Ref Range: 10.8 - 14.1 FL   10.1 (L)    NEUTROPHILS Latest Ref Range: 43 - 78 %   73    LYMPHOCYTES Latest Ref Range: 13 - 44 %   17    MONOCYTES Latest Ref Range: 4.0 - 12.0 %   7    EOSINOPHILS Latest Ref Range: 0.5 - 7.8 %   3    BASOPHILS Latest Ref Range: 0.0 - 2.0 %   0    IMMATURE GRANULOCYTES Latest Ref Range: 0.0 - 5.0 %   0    DF Latest Units:     AUTOMATED    ABS. NEUTROPHILS Latest Ref Range: 1.7 - 8.2 K/UL   5.6    ABS. IMM. GRANS. Latest Ref Range: 0.0 - 0.5 K/UL   0.0    ABS. LYMPHOCYTES Latest Ref Range: 0.5 - 4.6 K/UL   1.3    ABS. MONOCYTES Latest Ref Range: 0.1 - 1.3 K/UL   0.5    ABS. EOSINOPHILS Latest Ref Range: 0.0 - 0.8 K/UL   0.2    ABS. BASOPHILS Latest Ref Range: 0.0 - 0.2 K/UL   0.0    aPTT Latest Ref Range: 23.5 - 31.7 SEC   100.7 (HH) >110.0 (HH)   Sodium Latest Ref Range: 136 - 145 mmol/L    138   Potassium Latest Ref Range: 3.5 - 5.1 mmol/L    3.2 (L)   Chloride Latest Ref Range: 98 - 107 mmol/L    102   CO2 Latest Ref Range: 21 - 32 mmol/L    26   Anion gap Latest Ref Range: 7 - 16 mmol/L    10   Glucose Latest Ref Range: 65 - 100 mg/dL    121 (H)   BUN Latest Ref Range: 8 - 23 MG/DL    31 (H)   Creatinine Latest Ref Range: 0.6 - 1.0 MG/DL    8.86 (H)   Calcium Latest Ref Range: 8.3 - 10.4 MG/DL    9.7   GFR est non-AA Latest Ref Range: >60 ml/min/1.73m2    5 (L)   GFR est AA Latest Ref Range: >60 ml/min/1.73m2    5 (L)   Troponin-I, Qt.  Latest Ref Range: 0.02 - 0.05 NG/ML 0.79 (HH)  3.99 (HH) 9.18 (HH)   EKG, 12 LEAD, INITIAL Unknown  Rpt       Recent Results (from the past 24 hour(s))   EKG, 12 LEAD, INITIAL    Collection Time: 11/20/17 12:38 PM   Result Value Ref Range    Ventricular Rate 68 BPM    Atrial Rate 68 BPM    P-R Interval 182 ms    QRS Duration 84 ms    Q-T Interval 454 ms    QTC Calculation (Bezet) 482 ms    Calculated P Axis 60 degrees    Calculated R Axis -27 degrees    Calculated T Axis 25 degrees    Diagnosis       Normal sinus rhythm  T wave abnormality, consider anterior ischemia  Abnormal ECG  No previous ECGs available  Confirmed by ST BILLIE HERNANDES MD (), ANTONELLA NORRIS ([id number]) on 11/20/2017 8:35:49 PM     CBC WITH AUTOMATED DIFF    Collection Time: 11/21/17 12:38 AM   Result Value Ref Range    WBC 9.3 4.3 - 11.1 K/uL    RBC 2.45 (L) 4.05 - 5.25 M/uL    HGB 7.4 (L) 11.7 - 15.4 g/dL    HCT 23.0 (L) 35.8 - 46.3 %    MCV 93.9 79.6 - 97.8 FL    MCH 30.2 26.1 - 32.9 PG    MCHC 32.2 31.4 - 35.0 g/dL    RDW 15.6 (H) 11.9 - 14.6 %    PLATELET 861 775 - 875 K/uL    MPV 10.1 (L) 10.8 - 14.1 FL    DF AUTOMATED      NEUTROPHILS 77 43 - 78 %    LYMPHOCYTES 13 13 - 44 %    MONOCYTES 8 4.0 - 12.0 %    EOSINOPHILS 1 0.5 - 7.8 %    BASOPHILS 0 0.0 - 2.0 %    IMMATURE GRANULOCYTES 1 0.0 - 5.0 %    ABS. NEUTROPHILS 7.1 1.7 - 8.2 K/UL    ABS. LYMPHOCYTES 1.2 0.5 - 4.6 K/UL    ABS. MONOCYTES 0.8 0.1 - 1.3 K/UL    ABS. EOSINOPHILS 0.1 0.0 - 0.8 K/UL    ABS. BASOPHILS 0.0 0.0 - 0.2 K/UL    ABS. IMM.  GRANS. 0.1 0.0 - 0.5 K/UL   EKG, 12 LEAD, INITIAL    Collection Time: 11/21/17  6:33 AM   Result Value Ref Range    Ventricular Rate 73 BPM    Atrial Rate 73 BPM    P-R Interval 158 ms    QRS Duration 98 ms    Q-T Interval 420 ms    QTC Calculation (Bezet) 462 ms    Calculated P Axis 54 degrees    Calculated R Axis -24 degrees    Calculated T Axis 88 degrees    Diagnosis       Normal sinus rhythm Leftward axis  Minimal voltage criteria for LVH, may be normal variant  Cannot rule out Anterior infarct , age undetermined  Abnormal ECG  When compared with ECG of 20-NOV-2017 12:38,  Minimal criteria for Anterior infarct are now Present  Criteria for Inferior infarct are no longer Present  Confirmed by MARICRUZ GARY (), Stepan Do ([id number]) on 11/21/2017 7:72:17 AM     METABOLIC PANEL, BASIC    Collection Time: 11/21/17  7:08 AM   Result Value Ref Range    Sodium 134 (L) 136 - 145 mmol/L    Potassium 4.3 3.5 - 5.1 mmol/L    Chloride 99 98 - 107 mmol/L    CO2 24 21 - 32 mmol/L Anion gap 11 7 - 16 mmol/L    Glucose 119 (H) 65 - 100 mg/dL    BUN 39 (H) 8 - 23 MG/DL    Creatinine 9.84 (H) 0.6 - 1.0 MG/DL    GFR est AA 5 (L) >60 ml/min/1.73m2    GFR est non-AA 4 (L) >60 ml/min/1.73m2    Calcium 8.5 8.3 - 10.4 MG/DL   TRANSFERRIN SATURATION    Collection Time: 11/21/17  7:08 AM   Result Value Ref Range    Iron 131 35 - 150 ug/dL    TIBC 121 (L) 250 - 450 ug/dL    Transferrin Saturation 108 >20 %   FERRITIN    Collection Time: 11/21/17  7:08 AM   Result Value Ref Range    Ferritin 2293 (H) 8 - 388 NG/ML   VITAMIN B12    Collection Time: 11/21/17  7:08 AM   Result Value Ref Range    Vitamin B12 1650 (H) 193 - 986 pg/mL   FOLATE    Collection Time: 11/21/17  7:08 AM   Result Value Ref Range    Folate 17.4 3.1 - 17.5 ng/mL       CXR viewed by me - no major infiltrate or fluid excess, CM with left possible minor effusion is present      Active Problems:    CAD (coronary artery disease) (11/27/2015)      Unstable angina (HCC) (2/1/2016)      Elevated troponin (11/18/2017)      Chronic anemia (11/18/2017)      ESRD (end stage renal disease) ([facility] [address] (11/18/2017)        Assessment:     1. CAD x NSTEMI, Unstable angina -  - d/w Dr. Jose Case, medical management for now as she responded to NTG already followed by elective cardiac cath  - [facility] with complex CAD and acute thrombotic lesion in pLAD and subtotal occlusion in mLAD. The RCA has severe proximal and mid RCA disease. She has additional stenting of LAD with Resolute in mid/distal and proximal vessel. These all touched the previously stented mid vessel. Recommend life-long DAPT    2, ESRD -  - continue PD, see orders  - Had problems with PD last night , resolved with heparin flushes     3. Fluid excess -  - mild and improving     4. Anemia -  - intensive anemia therapy in [employer]  - on WAYNE and IV iron and B12    5. History of GI bleed -  - monitor clinically and serial Hb  - she had a drop in Hb to 8.4       6. Hypokalemia   - resolved     7. Abdominal pain and tenderness with drop in hb , on DAPT   Will do ct abd and pelvis without contrast to r/o retroperitoneal hematoma       Plan:     As above -

## 2017-11-22 LAB
ANION GAP SERPL CALC-SCNC: 14 MMOL/L (ref 7–16)
ATRIAL RATE: 78 BPM
BASOPHILS # BLD: 0 K/UL (ref 0–0.2)
BASOPHILS NFR BLD: 0 % (ref 0–2)
BUN SERPL-MCNC: 36 MG/DL (ref 8–23)
CALCIUM SERPL-MCNC: 9.6 MG/DL (ref 8.3–10.4)
CALCULATED P AXIS, ECG09: 51 DEGREES
CALCULATED R AXIS, ECG10: -26 DEGREES
CALCULATED T AXIS, ECG11: 81 DEGREES
CHLORIDE SERPL-SCNC: 98 MMOL/L (ref 98–107)
CO2 SERPL-SCNC: 23 MMOL/L (ref 21–32)
CREAT SERPL-MCNC: 9.54 MG/DL (ref 0.6–1)
DIAGNOSIS, 93000: NORMAL
DIFFERENTIAL METHOD BLD: ABNORMAL
EOSINOPHIL # BLD: 0.1 K/UL (ref 0–0.8)
EOSINOPHIL NFR BLD: 1 % (ref 0.5–7.8)
ERYTHROCYTE [DISTWIDTH] IN BLOOD BY AUTOMATED COUNT: 16 % (ref 11.9–14.6)
GLUCOSE SERPL-MCNC: 129 MG/DL (ref 65–100)
HCT VFR BLD AUTO: 24.6 % (ref 35.8–46.3)
HGB BLD-MCNC: 7.8 G/DL (ref 11.7–15.4)
IMM GRANULOCYTES # BLD: 0.1 K/UL (ref 0–0.5)
IMM GRANULOCYTES NFR BLD AUTO: 1 % (ref 0–5)
LYMPHOCYTES # BLD: 1.4 K/UL (ref 0.5–4.6)
LYMPHOCYTES NFR BLD: 10 % (ref 13–44)
MCH RBC QN AUTO: 30.7 PG (ref 26.1–32.9)
MCHC RBC AUTO-ENTMCNC: 31.7 G/DL (ref 31.4–35)
MCV RBC AUTO: 96.9 FL (ref 79.6–97.8)
MONOCYTES # BLD: 1.3 K/UL (ref 0.1–1.3)
MONOCYTES NFR BLD: 10 % (ref 4–12)
NEUTS SEG # BLD: 11 K/UL (ref 1.7–8.2)
NEUTS SEG NFR BLD: 78 % (ref 43–78)
P-R INTERVAL, ECG05: 172 MS
PLATELET # BLD AUTO: 186 K/UL (ref 150–450)
PMV BLD AUTO: 10.4 FL (ref 10.8–14.1)
POTASSIUM SERPL-SCNC: 4.5 MMOL/L (ref 3.5–5.1)
Q-T INTERVAL, ECG07: 422 MS
QRS DURATION, ECG06: 104 MS
QTC CALCULATION (BEZET), ECG08: 481 MS
RBC # BLD AUTO: 2.54 M/UL (ref 4.05–5.25)
SODIUM SERPL-SCNC: 135 MMOL/L (ref 136–145)
VENTRICULAR RATE, ECG03: 78 BPM
WBC # BLD AUTO: 13.9 K/UL (ref 4.3–11.1)

## 2017-11-22 PROCEDURE — 90945 DIALYSIS ONE EVALUATION: CPT

## 2017-11-22 PROCEDURE — 93005 ELECTROCARDIOGRAM TRACING: CPT | Performed by: INTERNAL MEDICINE

## 2017-11-22 PROCEDURE — 74011250637 HC RX REV CODE- 250/637: Performed by: PHYSICIAN ASSISTANT

## 2017-11-22 PROCEDURE — 65660000000 HC RM CCU STEPDOWN

## 2017-11-22 PROCEDURE — 74011000258 HC RX REV CODE- 258: Performed by: INTERNAL MEDICINE

## 2017-11-22 PROCEDURE — 97161 PT EVAL LOW COMPLEX 20 MIN: CPT

## 2017-11-22 PROCEDURE — 74011250637 HC RX REV CODE- 250/637: Performed by: INTERNAL MEDICINE

## 2017-11-22 PROCEDURE — 85025 COMPLETE CBC W/AUTO DIFF WBC: CPT | Performed by: NURSE PRACTITIONER

## 2017-11-22 PROCEDURE — 74011000302 HC RX REV CODE- 302: Performed by: INTERNAL MEDICINE

## 2017-11-22 PROCEDURE — 80048 BASIC METABOLIC PNL TOTAL CA: CPT | Performed by: INTERNAL MEDICINE

## 2017-11-22 PROCEDURE — 36415 COLL VENOUS BLD VENIPUNCTURE: CPT | Performed by: INTERNAL MEDICINE

## 2017-11-22 PROCEDURE — 99221 1ST HOSP IP/OBS SF/LOW 40: CPT | Performed by: INTERNAL MEDICINE

## 2017-11-22 PROCEDURE — 86580 TB INTRADERMAL TEST: CPT | Performed by: INTERNAL MEDICINE

## 2017-11-22 PROCEDURE — 74011250636 HC RX REV CODE- 250/636: Performed by: INTERNAL MEDICINE

## 2017-11-22 RX ORDER — TORSEMIDE 100 MG/1
100 TABLET ORAL 2 TIMES DAILY
Status: DISCONTINUED | OUTPATIENT
Start: 2017-11-23 | End: 2017-11-29 | Stop reason: HOSPADM

## 2017-11-22 RX ORDER — SODIUM CHLORIDE 9 MG/ML
250 INJECTION, SOLUTION INTRAVENOUS AS NEEDED
Status: DISCONTINUED | OUTPATIENT
Start: 2017-11-22 | End: 2017-11-24 | Stop reason: SDUPTHER

## 2017-11-22 RX ORDER — AMOXICILLIN 250 MG
1 CAPSULE ORAL DAILY
Status: DISCONTINUED | OUTPATIENT
Start: 2017-11-23 | End: 2017-11-29 | Stop reason: HOSPADM

## 2017-11-22 RX ADMIN — ERYTHROPOIETIN 20000 UNITS: 20000 INJECTION, SOLUTION INTRAVENOUS; SUBCUTANEOUS at 16:49

## 2017-11-22 RX ADMIN — AMLODIPINE BESYLATE 10 MG: 10 TABLET ORAL at 08:41

## 2017-11-22 RX ADMIN — Medication 10 ML: at 22:30

## 2017-11-22 RX ADMIN — RANOLAZINE 1000 MG: 500 TABLET, FILM COATED, EXTENDED RELEASE ORAL at 08:40

## 2017-11-22 RX ADMIN — PANTOPRAZOLE SODIUM 40 MG: 40 TABLET, DELAYED RELEASE ORAL at 16:50

## 2017-11-22 RX ADMIN — SUCRALFATE 1 G: 1 SUSPENSION ORAL at 21:44

## 2017-11-22 RX ADMIN — METOPROLOL TARTRATE 12.5 MG: 25 TABLET ORAL at 08:41

## 2017-11-22 RX ADMIN — ROSUVASTATIN CALCIUM 20 MG: 20 TABLET, FILM COATED ORAL at 21:45

## 2017-11-22 RX ADMIN — METOCLOPRAMIDE HYDROCHLORIDE 5 MG: 10 TABLET ORAL at 08:40

## 2017-11-22 RX ADMIN — GENTAMICIN SULFATE: 1 CREAM TOPICAL at 18:32

## 2017-11-22 RX ADMIN — PROMETHAZINE HYDROCHLORIDE 25 MG: 25 TABLET ORAL at 09:25

## 2017-11-22 RX ADMIN — SODIUM CHLORIDE 200 MG: 900 INJECTION, SOLUTION INTRAVENOUS at 21:45

## 2017-11-22 RX ADMIN — TORSEMIDE 100 MG: 100 TABLET ORAL at 08:40

## 2017-11-22 RX ADMIN — POTASSIUM CHLORIDE 20 MEQ: 20 TABLET, EXTENDED RELEASE ORAL at 08:41

## 2017-11-22 RX ADMIN — TICAGRELOR 90 MG: 90 TABLET ORAL at 22:30

## 2017-11-22 RX ADMIN — ZINC 1 TABLET: TAB ORAL at 08:40

## 2017-11-22 RX ADMIN — SUCRALFATE 1 G: 1 SUSPENSION ORAL at 16:50

## 2017-11-22 RX ADMIN — ASPIRIN 81 MG: 81 TABLET, COATED ORAL at 08:41

## 2017-11-22 RX ADMIN — PANTOPRAZOLE SODIUM 40 MG: 40 TABLET, DELAYED RELEASE ORAL at 08:41

## 2017-11-22 RX ADMIN — CALCITRIOL 0.25 MCG: 0.25 CAPSULE, LIQUID FILLED ORAL at 08:41

## 2017-11-22 RX ADMIN — Medication 10 ML: at 05:23

## 2017-11-22 RX ADMIN — LORAZEPAM 1 MG: 1 TABLET ORAL at 01:38

## 2017-11-22 RX ADMIN — TICAGRELOR 90 MG: 90 TABLET ORAL at 11:04

## 2017-11-22 RX ADMIN — SUCRALFATE 1 G: 1 SUSPENSION ORAL at 08:41

## 2017-11-22 RX ADMIN — LEVOTHYROXINE SODIUM 150 MCG: 150 TABLET ORAL at 08:41

## 2017-11-22 RX ADMIN — Medication 10 ML: at 11:05

## 2017-11-22 RX ADMIN — POLYETHYLENE GLYCOL 3350 17 G: 17 POWDER, FOR SOLUTION ORAL at 08:40

## 2017-11-22 RX ADMIN — Medication 400 MG: at 08:41

## 2017-11-22 RX ADMIN — SODIUM CHLORIDE 20 MG/HR: 900 INJECTION, SOLUTION INTRAVENOUS at 22:30

## 2017-11-22 NOTE — PROGRESS NOTES
Problem: Cath Lab Procedures: Post-Cath Day 1  Goal: Activity/Safety  Outcome: Resolved/Met Date Met: 11/21/17  Bed low and locked, gripper socks on, bed alarm set, call light in reach    Problem: Cath Lab Procedures: Discharge Outcomes  Goal: *Stable cardiac rhythm  Outcome: Resolved/Met Date Met: 11/21/17  NSR  Goal: *Hemodynamically stable  Outcome: Resolved/Met Date Met: 11/21/17  VSS

## 2017-11-22 NOTE — PROGRESS NOTES
Peritoneal dialysis initiated as ordered. Peritoneal catheter axit site cleaned with Chlorhexidine scrub and Gentamycin cream applied to PD catheter exit site. Dressing changed, site has no s/s of redness, swelling, or exudate. Patient states no complaints at this time. Report given to primary RN.

## 2017-11-22 NOTE — PROGRESS NOTES
Pt refusing larger pills due to nausea which is improved but not gone after po phenergan    Refused PPD due to \"going home tomorrow with hospice\"    Sitting up in chair eating dinner with call bell in lap

## 2017-11-22 NOTE — PROGRESS NOTES
Problem: Mobility Impaired (Adult and Pediatric)  Goal: *Acute Goals and Plan of Care (Insert Text)  LTG:  (1.)Ms. Luanne Rivera will move from supine to sit and sit to supine, scoot up and down and roll side to side INDEPENDENTLY with bed flat within 7 day(s). (2.)Ms. Luanne Rivera will transfer from bed to chair and chair to bed with MODIFIED INDEPENDENCE using the least restrictive device within 7 day(s). (3.)Ms. Luanne Rivera will ambulate with SUPERVISION for 100+ feet with the least restrictive device within 7 day(s). (4.)Ms. Luanne Rivera will perform seated exercises per HEP to improve strength and mobility within 7 days. ________________________________________________________________________________________________    PHYSICAL THERAPY: Initial Assessment, PM 11/22/2017  INPATIENT: Hospital Day: 5  [place] / [place] / Product Type: Medicare /      NAME/AGE/GENDER: Kayleen Wheeler is a 80 y.o. female   PRIMARY DIAGNOSIS: Unstable angina (HCC) <principal problem not specified> <principal problem not specified>        ICD-10: Treatment Diagnosis:   · Generalized Muscle Weakness (M62.81)  · Difficulty in walking, Not elsewhere classified (R26.2)  · Other abnormalities of gait and mobility (R26.89)   Precaution/Allergies:  Codeine      ASSESSMENT:     Ms. Luanne Rivera is an 80year old female admitted from home with NSTEMI. She lives at home and reports mod I at baseline with household ambulation/ADLs using rolling walker. Son has been staying with her recently. Presents sitting up in bed and initially appears drowsy but is more alert with mobility. Pt needs min-mod assist to complete transfer to sitting which seems due to weakness and R groin pain/soreness. LE assessment shows AROM grossly WFL with generalized weakness noted. Sensation intact and equal to B LEs. Seated balance fair+ to good. Pt requires min-mod assist with sit-stand transfers and taking steps from bed to chair.  Has posterior trunk lean and needs constant assist. Able to scoot and reposition in sitting. O2 sats 95% on room air after activity. Positioned with LEs elevated, needs in reach. Paz Cunningham appears to be functioning below baseline with strength and mobility. She is needing assistance with mobility and transfers which is new for her. Will benefit from continued therapy during acute care stay to work on strengthening, transfers, gait, and activity tolerance and improve safety/independence with mobility. Son will be staying with her at discharge, however pt may need rehab at discharge pending progress with therapy. This section established at most recent assessment   PROBLEM LIST (Impairments causing functional limitations):  1. Decreased Strength  2. Decreased ADL/Functional Activities  3. Decreased Transfer Abilities  4. Decreased Ambulation Ability/Technique  5. Decreased Balance  6. Increased Pain  7. Decreased Activity Tolerance   INTERVENTIONS PLANNED: (Benefits and precautions of physical therapy have been discussed with the patient.)  1. Balance Exercise  2. Bed Mobility  3. Gait Training  4. Home Exercise Program (HEP)  5. Therapeutic Activites  6. Therapeutic Exercise/Strengthening  7. Transfer Training  8. Group Therapy     TREATMENT PLAN: Frequency/Duration: 3 times a week for duration of hospital stay  Rehabilitation Potential For Stated Goals: Good     RECOMMENDED REHABILITATION/EQUIPMENT: (at time of discharge pending progress): Due to the probability of continued deficits (see above) this patient will likely need continued skilled physical therapy after discharge. Equipment:    to be determined pending progress              HISTORY:   History of Present Injury/Illness (Reason for Referral):  Per H&P, \"Patient is a 80 y.o. female who presents with chest pain. She has a h/o CAD s/p PCI to LAD and RCA 2-2016 w echo 3-2017 showing EF 55-60% with grade 1 DD, mild-mod AR/MR.   Nuclear stress 4-2017 w fixed anterior defect likely breast attenuation. Sh was recently discharged from [address] [place] [address] 9-23 after admission for anemia and possible peritonitis w ascites. She at that time said she was taking asa, plavix and brilinta, the plavix was stopped. She has ESRD on PD, chronic anemia secondary to CKD and h/o AVM and h/o CVA 3-2017. She is a [employer] but has received blood transfusion in the past but today insists she will not use any more transfusions. Her GI is wanting to do another EGD and stopped ASA on Thursday and is to contact us about holding Brilinta/plavix. She presented to the ER with chest pain which had occurred on and off yesterday and continues to come and go this morning. Pain is substernal, squeezing, 8/10, without radiation with occasional mild nausea and SOB, no diaphoresis. Pain is worse with exertion, better with rest and nitro. She again says she is taking ASA, plavix and brilinta (which are all on her med list). No cough, palpitations, dizziness, syncope or LE edema. WBC 8.9, hgb 8.9, , K 3.6, BUN 29, cr 9.05, troponin . 68, EKG NSR w rate 91 w NSST/T wave changes, /87. She continues to have episodic severe SSCP which improves with nitro\"    Past Medical History/Comorbidities:   Ms. Judd Crocker  has a past medical history of Anemia of chronic renal failure (4/28/2015); Anterior myocardial infarction [facility]) (5/21/2015); CAD (coronary artery disease); Chest pain; Chronic kidney disease, stage III (moderate) (8/15/2014); CKD (chronic kidney disease) stage 4, GFR 15-29 ml/min (HCC) (4/28/2015); Debility (5/5/2015); Depression (12/29/2015); Edema (12/29/2015); Endocrine disease; GERD (gastroesophageal reflux disease); Heart murmur (12/29/2015); HLD (hyperlipidemia) (12/29/2015); Hypertension; Hypothyroidism (4/28/2015); Ischemic cardiomyopathy (12/29/2015);  Nausea; S/P PTCA (percutaneous transluminal coronary angioplasty); LAD PTCA of  ISR 11/27/15 (5/24/2016); STEMI (ST elevation myocardial infarction) ([facility] (4/28/2015); Unspecified sleep apnea; and Unstable angina ([facility] Ms. Flower Morrell  has a past surgical history that includes ptca (4/28/2015); cholecystectomy (50619); knee replacement (Right, 2006); back surgery (1990); cataract removal (Bilateral); and back surgery. Social History/Living Environment:   Home Environment: Private residence  # Steps to Enter: 0  Wheelchair Ramp: Yes  One/Two Story Residence: One story  Living Alone: Yes  Support Systems: Child(faraz), Family member(s)  Patient Expects to be Discharged to[de-identified] Unknown  Current DME Used/Available at Home: Walker, rolling, Shower chair  Tub or Shower Type: Shower  Prior Level of Function/Work/Activity:  Lives alone in single story home with ramp to enter. Pt is typically mod I, ambulatory around house with rolling walker. Reports at baseline she is independent with ADLs, bathroom/toileting, and fixing simple meals. Pt reports son has been staying with her recently and will be with her until she is doing better. Number of Personal Factors/Comorbidities that affect the Plan of Care: 1-2: MODERATE COMPLEXITY   EXAMINATION:   Most Recent Physical Functioning:   Gross Assessment:  AROM: Within functional limits  Strength: Generally decreased, functional  Coordination: Generally decreased, functional  Sensation: Intact               Posture:  Posture (WDL): Exceptions to WDL  Posture Assessment: Forward head, Rounded shoulders  Balance:  Sitting: Impaired  Sitting - Static: Good (unsupported)  Sitting - Dynamic: Fair (occasional)  Standing: Impaired  Standing - Static: Fair  Standing - Dynamic : Fair Bed Mobility:  Rolling: Contact guard assistance  Supine to Sit: Minimum assistance  Scooting: Contact guard assistance  Wheelchair Mobility:     Transfers:  Sit to Stand: Minimum assistance  Stand to Sit: Minimum assistance  Bed to Chair: Minimum assistance; Moderate assistance  Gait:     Base of Support: Widened;Center of gravity altered  Speed/Michelle: Pace decreased (<100 feet/min)  Step Length: Left shortened;Right shortened  Gait Abnormalities: Trunk sway increased  Distance (ft): 4 Feet (ft)  Assistive Device: Other (comment) (handheld assist)  Ambulation - Level of Assistance: Minimal assistance; Moderate assistance  Interventions: Verbal cues; Visual/Demos; Safety awareness training; Tactile cues      Body Structures Involved:  1. Heart  2. Muscles Body Functions Affected:  1. Sensory/Pain  2. Cardio  3. Movement Related Activities and Participation Affected:  1. General Tasks and Demands  2. Mobility  3. Self Care  4. Domestic Life  5. Community, Social and [place]   Number of elements that affect the Plan of Care: 4+: HIGH COMPLEXITY   CLINICAL PRESENTATION:   Presentation: Stable and uncomplicated: LOW COMPLEXITY   CLINICAL DECISION MAKIN [facility] Inpatient Short Form  How much difficulty does the patient currently have. .. Unable A Lot A Little None   1. Turning over in bed (including adjusting bedclothes, sheets and blankets)? [] 1   [] 2   [x] 3   [] 4   2. Sitting down on and standing up from a chair with arms ( e.g., wheelchair, bedside commode, etc.)   [] 1   [] 2   [x] 3   [] 4   3. Moving from lying on back to sitting on the side of the bed? [] 1   [] 2   [x] 3   [] 4   How much help from another person does the patient currently need. .. Total A Lot A Little None   4. Moving to and from a bed to a chair (including a wheelchair)? [] 1   [] 2   [x] 3   [] 4   5. Need to walk in hospital room? [] 1   [x] 2   [] 3   [] 4   6. Climbing 3-5 steps with a railing? [x] 1   [] 2   [] 3   [] 4   © , Trustees of [address] [place] [address] [address] [id number], under license to [employer]. All rights reserved      Score:  Initial: 15 Most Recent: X (Date: -- )    Interpretation of Tool:  Represents activities that are increasingly more difficult (i.e. Bed mobility, Transfers, Gait).    Score 24 23 22-20 19-15 14-10 9-7 6     Modifier CH CI CJ CK CL CM CN      ? Mobility - Walking and Moving Around:     - CURRENT STATUS: CK - 40%-59% impaired, limited or restricted    - GOAL STATUS: CI - 1%-19% impaired, limited or restricted    - D/C STATUS:  ---------------To be determined---------------  [place] / [place] / Product Type: Medicare /      Medical Necessity:     · Patient demonstrates fair rehab potential due to higher previous functional level. Reason for Services/Other Comments:  · Patient continues to demonstrate capacity to improve strength, mobility, balance, transfers, activity tolerance which will increase independence, decrease amount of assistance required from caregiver and increase safety. Use of outcome tool(s) and clinical judgement create a POC that gives a: Clear prediction of patient's progress: LOW COMPLEXITY            TREATMENT:   (In addition to Assessment/Re-Assessment sessions the following treatments were rendered)   Pre-treatment Symptoms/Complaints:  \"thank you\"  Pain: Initial:   Pain Intensity 1: 3  Pain Location 1: Groin  Pain Orientation 1: Right  Post Session:  0/10     Assessment/Reassessment only, no treatment provided today    Braces/Orthotics/Lines/Etc:   · O2 Device: Room air  Treatment/Session Assessment:    · Response to Treatment:  Pt performs transfer to chair with min-mod assist. Weak. Reports soreness in right groin with mobility. · Interdisciplinary Collaboration:   o Physical Therapist  o Registered Nurse  · After treatment position/precautions:   o Up in chair  o Bed/Chair-wheels locked  o Bed in low position  o Call light within reach  o RN notified   · Compliance with Program/Exercises: Will assess as treatment progresses. · Recommendations/Intent for next treatment session: \"Next visit will focus on advancements to more challenging activities and reduction in assistance provided\".   Total Treatment Duration:  PT Patient Time In/Time Out  Time In: 1511  Time Out: Timothy 1827, DPT

## 2017-11-22 NOTE — DIALYSIS
PD ended with multiple alarms and extended drain times for a total of 19 hrs, 25 mins of dialysis time. The patient was encouraged to reposition herself during her treatment to facilitate better drainage. She stated there was less pain  in her abdomen. UF amount was -526. PD catheter disconnected and sterile cap attached. Dialysis nurse available as needed.

## 2017-11-22 NOTE — PROGRESS NOTES
Verbal and bedside shift change report received from [facility], [facility]

## 2017-11-22 NOTE — PROGRESS NOTES
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 She has a history of GI bleeding and had anemia-induced unstable angina in the past. 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 She states the pain has since resolved with NTG paste.  No fevers or chills. No nausea, vomiting or diarrhea.  She states that she is essentially anuric and occasionally makes minimal urine.  She has a h/o CVA and TIA. No fever or chills, no problems with PD drainage or discoloration of PD fluid. No increased thirst. She wishes regular diet and supplement. She denies any other acute complaints  Her edema has improved in the past couple of days with intensified dialysis.    s-s/p cardiac cath .  Had trouble with PD last night - improved with heparin addition to PD fluid - did not have a BM today - plans to go home tomorrow -     Past Medical History:   Diagnosis Date    Anemia of chronic renal failure 4/28/2015    Anterior myocardial infarction [facility]) 5/21/2015    CAD (coronary artery disease)     Chest pain     Chronic kidney disease, stage III (moderate) 8/15/2014    on dialysis    CKD (chronic kidney disease) stage 4, GFR 15-29 ml/min ([address] 4/28/2015    Debility 5/5/2015    Depression 12/29/2015    Edema 12/29/2015    Endocrine disease     Hypothyroidism    GERD (gastroesophageal reflux disease)     Heart murmur 12/29/2015    HLD (hyperlipidemia) 12/29/2015    Hypertension     Hypothyroidism 4/28/2015    Ischemic cardiomyopathy 12/29/2015    Nausea     S/P PTCA (percutaneous transluminal coronary angioplasty); LAD PTCA of  ISR 11/27/15 5/24/2016    STEMI (ST elevation myocardial infarction) ([facility] [address] 4/28/2015  Unspecified sleep apnea     uses cpap machine    Unstable angina (HCC)       Past Surgical History:   Procedure Laterality Date    HX BACK SURGERY  1990    neck surgery cervical disc    HX BACK SURGERY      lower back    HX CATARACT REMOVAL Bilateral     HX CHOLECYSTECTOMY  19702    gall bladder     HX KNEE REPLACEMENT Right 2006    HX PTCA  4/28/2015    2.25 Xience stent to mid LAD for occluded artery, anterior MI, EF 25%. Moderate disease distal LAD and distal OM PCI CX and RCA 2004, then PCI RCA and LAD in 2009. Prior to Admission medications    Medication Sig Start Date End Date Taking? Authorizing Provider   folic acid (FOLVITE) 1 mg tablet Take 1 mg by mouth daily. Yes Historical Provider   potassium chloride (K-DUR, KLOR-CON) 20 mEq tablet Take 20 mEq by mouth two (2) times a day. Yes Historical Provider   traZODone (DESYREL) 50 mg tablet Take  by mouth nightly. Yes Historical Provider   megestrol (MEGACE) 400 mg/10 mL (40 mg/mL) suspension Take 200 mg by mouth daily. Yes Historical Provider   amLODIPine (NORVASC) 10 mg tablet Take 1 Tab by mouth daily. 9/23/17   Zena Ryan DO   pantoprazole (PROTONIX) 40 mg tablet Take 1 Tab by mouth Daily (before breakfast). 9/23/17   Zena Ryan DO   sucralfate (CARAFATE) 100 mg/mL suspension Take 10 mL by mouth Before breakfast, lunch, dinner and at bedtime. Indications: PREVENTION OF STRESS ULCER 9/23/17   Zena Ryan DO   torsemide [facility]) 100 mg tablet Take  by mouth daily. Historical Provider   nitroglycerin (NITROLINGUAL) 400 mcg/spray spray 1 Spray by SubLINGual route every five (5) minutes as needed for Chest Pain. Historical Provider   linaclotide [facility]) 145 mcg cap capsule Take  by mouth Daily (before breakfast). Historical Provider   magnesium oxide (MAG-OX) 400 mg tablet Take 400 mg by mouth daily. Historical Provider   PNV NO.122/IRON/FOLIC ACID (PRENATAL MULTI PO) Take  by mouth.     Historical Provider   metoprolol tartrate (LOPRESSOR) 25 mg tablet Take 12.5 mg by mouth daily. Take PRN for BP <120. 6/23/16   Pooja Kelley III, MD   ondansetron (ZOFRAN ODT) 4 mg disintegrating tablet Take 4 mg by mouth three (3) times daily as needed. Historical Provider   metoclopramide HCl (REGLAN) 5 mg tablet Take 5 mg by mouth two (2) times a day. Historical Provider   ticagrelor (BRILINTA) 90 mg tablet Take 1 Tab by mouth two (2) times a day. 2/4/16   MINDY Adams   promethazine (PHENERGAN) 25 mg tablet Take 25 mg by mouth every eight (8) hours as needed for Nausea. Historical Provider   sevelamer carbonate (RENVELA) 800 mg tab tab Take 800 mg by mouth three (3) times daily (with meals). Historical Provider   gentamicin (GARAMYCIN) 0.1 % topical cream APPLY TO PD catheter exit site at daily dressing change 5/12/15   Opal Jefferson MD   aspirin delayed-release 81 mg tablet Take 1 Tab by mouth daily. 5/5/15   Gisela Hollingsworth PA-C   darbepoetin toya in polysorbat (ARANESP, POLYSORBATE,) 40 mcg/mL injection 40 mcg by SubCUTAneous route every fourteen (14) days. Indications: ANEMIA IN CHRONIC KIDNEY DISEASE    Historical Provider   rosuvastatin (CRESTOR) 20 mg tablet Take 20 mg by mouth nightly. Historical Provider   ranolazine ER (RANEXA) 500 mg SR tablet Take 500 mg by mouth two (2) times a day. Historical Provider   levothyroxine (SYNTHROID) 150 mcg tablet Take 150 mcg by mouth Daily (before breakfast).     Historical Provider     Allergies   Allergen Reactions    Codeine Nausea and Vomiting      Social History   Substance Use Topics    Smoking status: Never Smoker    Smokeless tobacco: Never Used    Alcohol use No      Family History   Problem Relation Age of Onset    Heart Disease Mother     Hypertension Mother     Cancer Mother      Lung    Stroke Father     Hypertension Father     Breast Cancer Neg Hx           Review of Systems    Constitutional: no fever,   Eyes: fair vision,    Ears, nose, mouth, throat, and face:fair hearing,   Respiratory: no asthma,  Chronic home O2 is being used  Cardiovascular:no palpitation, positive for chest pain,   Gastrointestinal:no diarrhea,    Genitourinary: no dysuria,   Hematologic/lymphatic: no bleeding tendency,   Neurological: no seizures   Behvioral/Psych: no psych hospitalization   Endocrine: no goiter,       Objective:       Visit Vitals    /66 (BP 1 Location: Left arm, BP Patient Position: At rest)    Pulse 83    Temp 96.5 °F (35.8 °C)    Resp 18    Ht 5' 10\" (1.778 m)    Wt 87.3 kg (192 lb 8 oz)    SpO2 95%    BMI 27.62 kg/m2       General:  Alert, cooperative, mild distress, appears stated age. Head:  Normocephalic, without obvious abnormality, atraumatic. Eyes:  Conjunctivae/corneas clear. EOMs intact. Throat: Lips, mucosa, and tongue normal. Teeth and gums normal.   Neck: Supple, symmetrical, trachea midline, no adenopathy,  no JVD. Lungs:   Clear to auscultation bilaterally. Heart:  Regular rate and rhythm, S1, S2 normal, 2/6 systolic  murmur, no rub or gallop. Abdomen:   Soft, non-tender. No masses,  No organomegaly. . PD catheter in place without exudate   Extremities: Extremities normal, atraumatic, no cyanosis. trace edema. Skin: Skin color, texture, turgor normal. No rashes or lesions. Lymph nodes: Cervical and supraclavicular nodes normal.   Neurologic: Grossly intact. No asterixis. Data Review:     Results for Radha Shaw (MRN [id number]) as of 11/22/2017 16:39   Ref.  Range 9/23/2017 04:27 11/18/2017 12:45 11/19/2017 00:06 11/21/2017 00:38 11/22/2017 05:36   WBC Latest Ref Range: 4.3 - 11.1 K/uL 7.0 8.9 7.6 9.3 13.9 (H)   RBC Latest Ref Range: 4.05 - 5.25 M/uL 3.05 (L) 2.97 (L) 2.79 (L) 2.45 (L) 2.54 (L)   HGB Latest Ref Range: 11.7 - 15.4 g/dL 9.2 (L) 8.9 (L) 8.5 (L) 7.4 (L) 7.8 (L)   HCT Latest Ref Range: 35.8 - 46.3 % 28.9 (L) 28.5 (L) 26.1 (L) 23.0 (L) 24.6 (L)   MCV Latest Ref Range: 79.6 - 97.8 FL 94.8 96.0 93.5 93.9 96.9   MCH Latest Ref Range: 26.1 - 32.9 PG 30.2 30.0 30.5 30.2 30.7   MCHC Latest Ref Range: 31.4 - 35.0 g/dL 31.8 31.2 (L) 32.6 32.2 31.7   RDW Latest Ref Range: 11.9 - 14.6 % 15.6 (H) 15.4 (H) 15.4 (H) 15.6 (H) 16.0 (H)   PLATELET Latest Ref Range: 150 - 450 K/uL 235 212 177 160 186   MPV Latest Ref Range: 10.8 - 14.1 FL 10.0 (L) 10.3 (L) 10.1 (L) 10.1 (L) 10.4 (L)     Results for Alyx Urias (MRN [id number]) as of 11/22/2017 16:39   Ref.  Range 11/19/2017 06:30 11/20/2017 05:01 11/21/2017 07:08 11/22/2017 05:36   Sodium Latest Ref Range: 136 - 145 mmol/L 138 136 134 (L) 135 (L)   Potassium Latest Ref Range: 3.5 - 5.1 mmol/L 3.2 (L) 4.0 4.3 4.5   Chloride Latest Ref Range: 98 - 107 mmol/L 102 100 99 98   CO2 Latest Ref Range: 21 - 32 mmol/L 26 24 24 23   Anion gap Latest Ref Range: 7 - 16 mmol/L 10 12 11 14   Glucose Latest Ref Range: 65 - 100 mg/dL 121 (H) 91 119 (H) 129 (H)   BUN Latest Ref Range: 8 - 23 MG/DL 31 (H) 34 (H) 39 (H) 36 (H)   Creatinine Latest Ref Range: 0.6 - 1.0 MG/DL 8.86 (H) 9.63 (H) 9.84 (H) 9.54 (H)   Calcium Latest Ref Range: 8.3 - 10.4 MG/DL 9.7 9.1 8.5 9.6   GFR est non-AA Latest Ref Range: >60 ml/min/1.73m2 5 (L) 4 (L) 4 (L) 4 (L)   GFR est AA Latest Ref Range: >60 ml/min/1.73m2 5 (L) 5 (L) 5 (L) 5 (L)       Recent Results (from the past 24 hour(s))   METABOLIC PANEL, BASIC    Collection Time: 11/22/17  5:36 AM   Result Value Ref Range    Sodium 135 (L) 136 - 145 mmol/L    Potassium 4.5 3.5 - 5.1 mmol/L    Chloride 98 98 - 107 mmol/L    CO2 23 21 - 32 mmol/L    Anion gap 14 7 - 16 mmol/L    Glucose 129 (H) 65 - 100 mg/dL    BUN 36 (H) 8 - 23 MG/DL    Creatinine 9.54 (H) 0.6 - 1.0 MG/DL    GFR est AA 5 (L) >60 ml/min/1.73m2    GFR est non-AA 4 (L) >60 ml/min/1.73m2    Calcium 9.6 8.3 - 10.4 MG/DL   CBC WITH AUTOMATED DIFF    Collection Time: 11/22/17  5:36 AM   Result Value Ref Range    WBC 13.9 (H) 4.3 - 11.1 K/uL    RBC 2.54 (L) 4.05 - 5.25 M/uL    HGB 7.8 (L) 11.7 - 15.4 g/dL    HCT 24.6 (L) 35.8 - 46.3 %    MCV 96.9 79.6 - 97.8 FL    MCH 30.7 26.1 - 32.9 PG    MCHC 31.7 31.4 - 35.0 g/dL    RDW 16.0 (H) 11.9 - 14.6 %    PLATELET 884 083 - 350 K/uL    MPV 10.4 (L) 10.8 - 14.1 FL    DF AUTOMATED      NEUTROPHILS 78 43 - 78 %    LYMPHOCYTES 10 (L) 13 - 44 %    MONOCYTES 10 4.0 - 12.0 %    EOSINOPHILS 1 0.5 - 7.8 %    BASOPHILS 0 0.0 - 2.0 %    IMMATURE GRANULOCYTES 1 0.0 - 5.0 %    ABS. NEUTROPHILS 11.0 (H) 1.7 - 8.2 K/UL    ABS. LYMPHOCYTES 1.4 0.5 - 4.6 K/UL    ABS. MONOCYTES 1.3 0.1 - 1.3 K/UL    ABS. EOSINOPHILS 0.1 0.0 - 0.8 K/UL    ABS. BASOPHILS 0.0 0.0 - 0.2 K/UL    ABS. IMM. GRANS. 0.1 0.0 - 0.5 K/UL   EKG, 12 LEAD, INITIAL    Collection Time: 11/22/17  6:02 AM   Result Value Ref Range    Ventricular Rate 78 BPM    Atrial Rate 78 BPM    P-R Interval 172 ms    QRS Duration 104 ms    Q-T Interval 422 ms    QTC Calculation (Bezet) 481 ms    Calculated P Axis 51 degrees    Calculated R Axis -26 degrees    Calculated T Axis 81 degrees    Diagnosis       Normal sinus rhythm  Moderate voltage criteria for LVH, may be normal variant  Nonspecific T wave abnormality  Prolonged QT  Abnormal ECG  When compared with ECG of 21-NOV-2017 06:33,  No significant change was found  Confirmed by Yoandy Menard MD (), RUCHI BIRD ([id number]) on 11/22/2017 6:56:02 AM         CXR viewed by me - no major infiltrate or fluid excess, CM with left possible minor effusion is present        CT abdomen/pelvis  CT ABDOMEN:  Peritoneal fluid in the perihepatic space, mild paracolic gutters,  extending down into the pelvis. Peritoneal dialysis catheter noted. There is not  any evidence of retroperitoneal hematoma identified. Strandy fluid density does  extend into the extraperitoneal space in the lower abdomen and pelvis.  There is  radiodensity within the renal collecting systems, possibly previously  administered IV contrast. There is peripancreatic fluid and stranding, cannot  exclude acute pancreatitis. No biliary dilatation. Spleen is not enlarged. Small  bowel normal caliber.   CT PELVIS:   Moderate to large volume pelvic fluid.   There is diffuse asymmetric enlargement of the right rectus and oblique  abdominal muscles. There are are of higher attenuation the contralateral side  and findings are consistent with an abdominal wall hematoma. IMPRESSION:    1. Evidence of right abdominal wall hematoma. 2. No CT evidence to suggest retroperitoneal hematoma. 3. Extensive fluid in the abdomen and pelvis, presumed related to peritoneal  dialysis. Active Problems:    CAD (coronary artery disease) (11/27/2015)      Unstable angina ([address] (2/1/2016)      Elevated troponin (11/18/2017)      Chronic anemia (11/18/2017)      ESRD (end stage renal disease) ([facility] [address] (11/18/2017)        Assessment:     1. CAD x NSTEMI, Unstable angina -  - [facility] with complex CAD and acute thrombotic lesion in pLAD and subtotal occlusion in mLAD. The RCA has severe proximal and mid RCA disease. She has additional stenting of LAD with Resolute in mid/distal and proximal vessel. These all touched the previously stented mid vessel. Recommend life-long DAPT    2, ESRD -  - continue PD, see orders  - Had problems with drainage of PD fluid    - responded to IP heparin     3. Fluid excess -  - mild and improving     4. Anemia -  - intensive anemia therapy in [employer]  - on WAYNE and IV iron and B12    5. History of GI bleed -  - monitor clinically and serial Hb  - she had a drop in Hb to 7 range     6. Hypokalemia   - resolved     7.  Abdominal pain and tenderness with drop in hb , on DAPT   No evidence of retroperitoneal hematoma       Plan:     As above -

## 2017-11-22 NOTE — DIALYSIS
PD dwell cycle stopped and put in stat drain d/t patient's complaints of abdominal pain and distention. Stat drain started and noted to be draining slowly. Dialysis nurse will check on patient when drain has completed.

## 2017-11-22 NOTE — PROGRESS NOTES
Pt reports continued nausea and would like dwell time to stop and drain as abd is tight and distended and causing more nausea.  Dialysis RN paged and will come to bedside

## 2017-11-22 NOTE — PROGRESS NOTES
Bedside and Verbal shift change report given to Milka Rivas RN (oncoming nurse) by self Aniya Romero nurse). Report included the following information SBAR, Kardex, MAR and Recent Results.

## 2017-11-22 NOTE — PROGRESS NOTES
[facility] CARDIOLOGY PROGRESS NOTE           11/22/2017 8:34 AM    Admit Date: 11/18/2017      Subjective:   No CP and feels markedly better post PCI. Some fatigue and worsening anemia. CT with no RP bleed but has abdominal wall hematoma. Labs pending this AM.  Hematology to assist with anemia in [employer] patient    ROS:  Cardiovascular:  As noted above    Objective:      Vitals:    11/21/17 2056 11/21/17 2118 11/22/17 0035 11/22/17 0441   BP:  134/74 138/82 143/83   Pulse:  84 (!) 54 80   Resp:  17 17 22   Temp:  98 °F (36.7 °C) 97.6 °F (36.4 °C) 97.6 °F (36.4 °C)   SpO2: 100% 100% 97% 99%   Weight:   87.3 kg (192 lb 8 oz) 87.3 kg (192 lb 8 oz)   Height:           Physical Exam:  General-No Acute Distress  Neck- supple, no JVD  CV- regular rate and rhythm no MRG  Lung- clear bilaterally  Abd- soft, nontender, nondistended  Ext- no edema bilaterally. Skin- warm and dry    Data Review:   Recent Labs      11/22/17   0536  11/21/17   0708  11/21/17   0038   NA  135*  134*   --    K  4.5  4.3   --    BUN  36*  39*   --    CREA  9.54*  9.84*   --    GLU  129*  119*   --    WBC   --    --   9.3   HGB   --    --   7.4*   HCT   --    --   23.0*   PLT   --    --   160       Assessment/Plan:     Active Problems:    CAD (coronary artery disease) (11/27/2015)    S/P complex PCI and stable on ASA and Brilinta      Unstable angina (HCC) (2/1/2016)    As above      Elevated troponin (11/18/2017)    As above      Chronic anemia (11/18/2017)    This is worse and awaiting labs and hematology assistance.         ESRD (end stage renal disease) ([facility] [address] (11/18/2017)    On PD          Carlton Roland MD  11/22/2017 8:34 AM

## 2017-11-23 LAB
ANION GAP SERPL CALC-SCNC: 10 MMOL/L (ref 7–16)
APTT PPP: 41.1 SEC (ref 23.5–31.7)
BASOPHILS # BLD: 0 K/UL (ref 0–0.2)
BASOPHILS NFR BLD: 0 % (ref 0–2)
BUN SERPL-MCNC: 37 MG/DL (ref 8–23)
CALCIUM SERPL-MCNC: 8.6 MG/DL (ref 8.3–10.4)
CHLORIDE SERPL-SCNC: 99 MMOL/L (ref 98–107)
CO2 SERPL-SCNC: 26 MMOL/L (ref 21–32)
CREAT SERPL-MCNC: 9.29 MG/DL (ref 0.6–1)
DIFFERENTIAL METHOD BLD: ABNORMAL
EOSINOPHIL # BLD: 0.3 K/UL (ref 0–0.8)
EOSINOPHIL NFR BLD: 3 % (ref 0.5–7.8)
ERYTHROCYTE [DISTWIDTH] IN BLOOD BY AUTOMATED COUNT: 16.4 % (ref 11.9–14.6)
GLUCOSE SERPL-MCNC: 120 MG/DL (ref 65–100)
HCT VFR BLD AUTO: 20.7 % (ref 35.8–46.3)
HGB BLD-MCNC: 6.6 G/DL (ref 11.7–15.4)
IMM GRANULOCYTES # BLD: 0.1 K/UL (ref 0–0.5)
IMM GRANULOCYTES NFR BLD AUTO: 1 % (ref 0–5)
LYMPHOCYTES # BLD: 1.1 K/UL (ref 0.5–4.6)
LYMPHOCYTES NFR BLD: 11 % (ref 13–44)
MCH RBC QN AUTO: 31 PG (ref 26.1–32.9)
MCHC RBC AUTO-ENTMCNC: 31.9 G/DL (ref 31.4–35)
MCV RBC AUTO: 97.2 FL (ref 79.6–97.8)
MM INDURATION POC: NORMAL MM (ref 0–5)
MONOCYTES # BLD: 0.6 K/UL (ref 0.1–1.3)
MONOCYTES NFR BLD: 6 % (ref 4–12)
NEUTS SEG # BLD: 8.4 K/UL (ref 1.7–8.2)
NEUTS SEG NFR BLD: 79 % (ref 43–78)
PLATELET # BLD AUTO: 179 K/UL (ref 150–450)
PMV BLD AUTO: 9.8 FL (ref 10.8–14.1)
POTASSIUM SERPL-SCNC: 4.4 MMOL/L (ref 3.5–5.1)
PPD POC: NEGATIVE NEGATIVE
RBC # BLD AUTO: 2.13 M/UL (ref 4.05–5.25)
SODIUM SERPL-SCNC: 135 MMOL/L (ref 136–145)
WBC # BLD AUTO: 10.5 K/UL (ref 4.3–11.1)

## 2017-11-23 PROCEDURE — 86923 COMPATIBILITY TEST ELECTRIC: CPT | Performed by: INTERNAL MEDICINE

## 2017-11-23 PROCEDURE — 74011000258 HC RX REV CODE- 258: Performed by: INTERNAL MEDICINE

## 2017-11-23 PROCEDURE — P9016 RBC LEUKOCYTES REDUCED: HCPCS | Performed by: INTERNAL MEDICINE

## 2017-11-23 PROCEDURE — 93005 ELECTROCARDIOGRAM TRACING: CPT | Performed by: INTERNAL MEDICINE

## 2017-11-23 PROCEDURE — 77030019605

## 2017-11-23 PROCEDURE — 36592 COLLECT BLOOD FROM PICC: CPT

## 2017-11-23 PROCEDURE — 85025 COMPLETE CBC W/AUTO DIFF WBC: CPT | Performed by: INTERNAL MEDICINE

## 2017-11-23 PROCEDURE — 90945 DIALYSIS ONE EVALUATION: CPT

## 2017-11-23 PROCEDURE — C1752 CATH,HEMODIALYSIS,SHORT-TERM: HCPCS

## 2017-11-23 PROCEDURE — C1894 INTRO/SHEATH, NON-LASER: HCPCS

## 2017-11-23 PROCEDURE — 74011250636 HC RX REV CODE- 250/636: Performed by: INTERNAL MEDICINE

## 2017-11-23 PROCEDURE — 02H633Z INSERTION OF INFUSION DEVICE INTO RIGHT ATRIUM, PERCUTANEOUS APPROACH: ICD-10-PCS | Performed by: INTERNAL MEDICINE

## 2017-11-23 PROCEDURE — 36415 COLL VENOUS BLD VENIPUNCTURE: CPT | Performed by: INTERNAL MEDICINE

## 2017-11-23 PROCEDURE — 36556 INSERT NON-TUNNEL CV CATH: CPT

## 2017-11-23 PROCEDURE — 85730 THROMBOPLASTIN TIME PARTIAL: CPT | Performed by: INTERNAL MEDICINE

## 2017-11-23 PROCEDURE — 65620000000 HC RM CCU GENERAL

## 2017-11-23 PROCEDURE — 77030018719 HC DRSG PTCH ANTIMIC J&J -A

## 2017-11-23 PROCEDURE — 74011250637 HC RX REV CODE- 250/637: Performed by: INTERNAL MEDICINE

## 2017-11-23 PROCEDURE — 74011250637 HC RX REV CODE- 250/637: Performed by: PHYSICIAN ASSISTANT

## 2017-11-23 PROCEDURE — 77030013131 HC IV BLD ST ICUM -A

## 2017-11-23 PROCEDURE — 86900 BLOOD TYPING SEROLOGIC ABO: CPT | Performed by: INTERNAL MEDICINE

## 2017-11-23 PROCEDURE — 80048 BASIC METABOLIC PNL TOTAL CA: CPT | Performed by: INTERNAL MEDICINE

## 2017-11-23 PROCEDURE — 30233N1 TRANSFUSION OF NONAUTOLOGOUS RED BLOOD CELLS INTO PERIPHERAL VEIN, PERCUTANEOUS APPROACH: ICD-10-PCS | Performed by: INTERNAL MEDICINE

## 2017-11-23 PROCEDURE — 36430 TRANSFUSION BLD/BLD COMPNT: CPT

## 2017-11-23 RX ORDER — AMLODIPINE BESYLATE 5 MG/1
5 TABLET ORAL DAILY
Status: DISCONTINUED | OUTPATIENT
Start: 2017-11-24 | End: 2017-11-29 | Stop reason: HOSPADM

## 2017-11-23 RX ORDER — HEPARIN SODIUM 1000 [USP'U]/ML
5000 INJECTION, SOLUTION INTRAVENOUS; SUBCUTANEOUS
Status: DISCONTINUED | OUTPATIENT
Start: 2017-11-23 | End: 2017-11-24 | Stop reason: ALTCHOICE

## 2017-11-23 RX ADMIN — STANDARDIZED SENNA CONCENTRATE AND DOCUSATE SODIUM 1 TABLET: 8.6; 5 TABLET, FILM COATED ORAL at 08:45

## 2017-11-23 RX ADMIN — NITROGLYCERIN 0.4 MG: 0.4 TABLET SUBLINGUAL at 13:24

## 2017-11-23 RX ADMIN — Medication 10 ML: at 05:46

## 2017-11-23 RX ADMIN — LEVOTHYROXINE SODIUM 150 MCG: 150 TABLET ORAL at 08:45

## 2017-11-23 RX ADMIN — SUCRALFATE 1 G: 1 SUSPENSION ORAL at 12:01

## 2017-11-23 RX ADMIN — ZINC 1 TABLET: TAB ORAL at 08:45

## 2017-11-23 RX ADMIN — METOCLOPRAMIDE HYDROCHLORIDE 5 MG: 10 TABLET ORAL at 08:47

## 2017-11-23 RX ADMIN — TICAGRELOR 90 MG: 90 TABLET ORAL at 23:37

## 2017-11-23 RX ADMIN — ROSUVASTATIN CALCIUM 20 MG: 20 TABLET, FILM COATED ORAL at 23:37

## 2017-11-23 RX ADMIN — TORSEMIDE 100 MG: 100 TABLET ORAL at 18:15

## 2017-11-23 RX ADMIN — SUCRALFATE 1 G: 1 SUSPENSION ORAL at 23:37

## 2017-11-23 RX ADMIN — METOCLOPRAMIDE HYDROCHLORIDE 5 MG: 10 TABLET ORAL at 18:15

## 2017-11-23 RX ADMIN — PANTOPRAZOLE SODIUM 40 MG: 40 TABLET, DELAYED RELEASE ORAL at 16:32

## 2017-11-23 RX ADMIN — RANOLAZINE 1000 MG: 500 TABLET, FILM COATED, EXTENDED RELEASE ORAL at 08:45

## 2017-11-23 RX ADMIN — TICAGRELOR 90 MG: 90 TABLET ORAL at 12:01

## 2017-11-23 RX ADMIN — SUCRALFATE 1 G: 1 SUSPENSION ORAL at 08:44

## 2017-11-23 RX ADMIN — Medication 400 MG: at 08:46

## 2017-11-23 RX ADMIN — TORSEMIDE 100 MG: 100 TABLET ORAL at 08:46

## 2017-11-23 RX ADMIN — HEPARIN SODIUM 2000 UNITS: 1000 INJECTION, SOLUTION INTRAVENOUS; SUBCUTANEOUS at 20:46

## 2017-11-23 RX ADMIN — SUCRALFATE 1 G: 1 SUSPENSION ORAL at 16:32

## 2017-11-23 RX ADMIN — CALCITRIOL 0.25 MCG: 0.25 CAPSULE, LIQUID FILLED ORAL at 08:46

## 2017-11-23 RX ADMIN — PANTOPRAZOLE SODIUM 40 MG: 40 TABLET, DELAYED RELEASE ORAL at 08:47

## 2017-11-23 RX ADMIN — RANOLAZINE 1000 MG: 500 TABLET, FILM COATED, EXTENDED RELEASE ORAL at 18:15

## 2017-11-23 RX ADMIN — RENAGEL 800 MG: 400 TABLET ORAL at 16:32

## 2017-11-23 RX ADMIN — ASPIRIN 81 MG: 81 TABLET, COATED ORAL at 08:48

## 2017-11-23 RX ADMIN — Medication 5 ML: at 13:26

## 2017-11-23 RX ADMIN — Medication 10 ML: at 23:42

## 2017-11-23 RX ADMIN — SODIUM CHLORIDE 20 MG/HR: 900 INJECTION, SOLUTION INTRAVENOUS at 04:31

## 2017-11-23 RX ADMIN — POTASSIUM CHLORIDE 20 MEQ: 20 TABLET, EXTENDED RELEASE ORAL at 08:45

## 2017-11-23 NOTE — PROGRESS NOTES
Bedside and Verbal shift change report given to Rito Arevalo [id number] (oncoming nurse) by Mani Medel (offgoing nurse). Report included the following information SBAR, Kardex, ED Summary, Procedure Summary, Intake/Output, MAR, Recent Results and Cardiac Rhythm NSR.     Blood verified and rate increased

## 2017-11-23 NOTE — PROGRESS NOTES
Patient arrived to ICU, transferred to bed, and placed on monitor. VSS. NAD. Dual skin assessment: Peritoneal dialysis catheter to L abdomen, dry flaky skin bilat feet, sacrum intact and allevyn placed.

## 2017-11-23 NOTE — PROGRESS NOTES
TRANSFER - OUT REPORT:    Verbal report given to LARRY Fay on Terry  being transferred to CCU for routine progression of care       Report consisted of patients Situation, Background, Assessment and Recommendations(SBAR). Information from the following report(s) SBAR, Kardex, [facility] and Recent Results was reviewed with the receiving nurse. Opportunity for questions and clarification was provided.       Dr. Salina Bonilla notified of transfer to CCU per Dr. Cydney Watt orders

## 2017-11-23 NOTE — PROGRESS NOTES
TRANSFER - IN REPORT:    Verbal report received from [facility] (name) on Ronan Divers  being received from  [id number] (unit) for routine progression of care      Report consisted of patients Situation, Background, Assessment and   Recommendations(SBAR). Information from the following report(s) SBAR, Kardex, ED Summary, Procedure Summary, Intake/Output, MAR, Recent Results and Cardiac Rhythm NSR was reviewed with the receiving nurse. Opportunity for questions and clarification was provided. Assessment completed upon patients arrival to unit and care assumed.

## 2017-11-23 NOTE — PROGRESS NOTES
LEAPFROG PROTOCOL NOTE    Gene Chase  11/23/2017    The patient is currently in the critical care setting managed by Dr. Nathanael Monsivais  The patient's chart is reviewed and the patient is discussed with the staff. Patient is currently hemodynamically stable. Patient has no needs identified for Intensivist management in the critical care setting at this time. Please notify us if can be of assistance. No charge billed to the patient. Thank you.     Berta Lan MD

## 2017-11-23 NOTE — DIALYSIS
On CRRT/SED at this time using left femoral catheter. 4K+, 3.0 Ca++ bath used for this treatment. Heparin bolus of 1000 units and mini-dose of 500 units/hr given. UF set at 200 ml/hr, , and . Vital signs prior to treatment as follows: /77, HR 87, pt alert and oriented. Report given to Chapincito Lopez, primary nurse. Dialysis nurse to remain on call if needed.     Load 1000 units. Mini-infusion 500 units/hr initial     Check PTT every 3 hours from start of CRRT/SLED     For PTT  < 50: Bolus with 2000 units and increase rate by 200 units/hr  50-59:  Increase rate by 100 units/hr  60-80: No change  81-95: Decrease rate by 100 units/hr  >95: Hold for 1 hour then decrease by 200 units/hr

## 2017-11-23 NOTE — PROGRESS NOTES
Dr. Leonidas Wu at bedside for Hemodialysis Catheter placement. Education provided and consent signed.

## 2017-11-23 NOTE — PROCEDURES
Dialysis Catheter Placement Note  11/23/2017      Indication: Needed for access for dialysis    Procedure Summary: Temporary dialysis was inserted  without complications. Location: left femoral vein    Procedure Details: After applying local anesthesia the vessel was cannulated under sonographic guidance with a micropuncture kit without difficulty. Images were stored in the chart. Then the 24 cm x 15.5F three lumen Medcomp dialysis catheter was inserted over the wire using Seldinger technique. The catheter ports flushed well with saline and the catheter was sutured in place using 2-0 silk. Sterile caps were placed and a sterile pressure dressing was applied.     Complications: None    Estimated blood loss: less than 10 mL    Lefty Evangelista M.D.

## 2017-11-23 NOTE — PROGRESS NOTES
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 She has a history of GI bleeding and had anemia-induced unstable angina in the past. 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 She states the pain has since resolved with NTG paste.  No fevers or chills. No nausea, vomiting or diarrhea.  She states that she is essentially anuric and occasionally makes minimal urine.  She has a h/o CVA and TIA. No fever or chills, no problems with PD drainage or discoloration of PD fluid. No increased thirst. She wishes regular diet and supplement. She denies any other acute complaints  Her edema has improved in the past couple of days with intensified dialysis.     s- patient with worsening dyspnea and abdominal distention - PD stopped and placed in drain mode this am resolted in impropvemen in symptoms after draining iover 2 liters of fluid - she has worsening dyspnea and reluctantly agreed to transiently switch to CRRT for volume and biochemical control and transfusion - s/p cardiac cath on anti-platelet therapy with dropping Hb -   - did not have a BM today -     Past Medical History:   Diagnosis Date    Anemia of chronic renal failure 4/28/2015    Anterior myocardial infarction [facility]) 5/21/2015    CAD (coronary artery disease)     Chest pain     Chronic kidney disease, stage III (moderate) 8/15/2014    on dialysis    CKD (chronic kidney disease) stage 4, GFR 15-29 ml/min ([address] 4/28/2015    Debility 5/5/2015    Depression 12/29/2015    Edema 12/29/2015    Endocrine disease     Hypothyroidism    GERD (gastroesophageal reflux disease)     Heart murmur 12/29/2015    HLD (hyperlipidemia) 12/29/2015    Hypertension     Hypothyroidism 4/28/2015    Ischemic cardiomyopathy 12/29/2015    Nausea     S/P PTCA (percutaneous transluminal coronary angioplasty); LAD PTCA of  ISR 11/27/15 5/24/2016    STEMI (ST elevation myocardial infarction) ([facility] [address] 4/28/2015    Unspecified sleep apnea     uses cpap machine    Unstable angina ([facility] [address]       Past Surgical History:   Procedure Laterality Date    HX BACK SURGERY  1990    neck surgery cervical disc    HX BACK SURGERY      lower back    HX CATARACT REMOVAL Bilateral     HX CHOLECYSTECTOMY  19702    gall bladder     HX KNEE REPLACEMENT Right 2006    HX PTCA  4/28/2015    2.25 Xience stent to mid LAD for occluded artery, anterior MI, EF 25%. Moderate disease distal LAD and distal OM PCI CX and RCA 2004, then PCI RCA and LAD in 2009. Prior to Admission medications    Medication Sig Start Date End Date Taking? Authorizing Provider   folic acid (FOLVITE) 1 mg tablet Take 1 mg by mouth daily. Yes Historical Provider   potassium chloride (K-DUR, KLOR-CON) 20 mEq tablet Take 20 mEq by mouth two (2) times a day. Yes Historical Provider   traZODone (DESYREL) 50 mg tablet Take  by mouth nightly. Yes Historical Provider   megestrol (MEGACE) 400 mg/10 mL (40 mg/mL) suspension Take 200 mg by mouth daily. Yes Historical Provider   amLODIPine (NORVASC) 10 mg tablet Take 1 Tab by mouth daily. 9/23/17   Deri Bumps, DO   pantoprazole (PROTONIX) 40 mg tablet Take 1 Tab by mouth Daily (before breakfast). 9/23/17   Deri Bumps, DO   sucralfate (CARAFATE) 100 mg/mL suspension Take 10 mL by mouth Before breakfast, lunch, dinner and at bedtime. Indications: PREVENTION OF STRESS ULCER 9/23/17   Deri Bumps, DO   torsemide [facility]) 100 mg tablet Take  by mouth daily. Historical Provider   nitroglycerin (NITROLINGUAL) 400 mcg/spray spray 1 Spray by SubLINGual route every five (5) minutes as needed for Chest Pain.     Historical Provider   linaclotide Ashley Velasco) 145 mcg cap capsule Take  by mouth Daily (before breakfast). Historical Provider   magnesium oxide (MAG-OX) 400 mg tablet Take 400 mg by mouth daily. Historical Provider   PNV NO.122/IRON/FOLIC ACID (PRENATAL MULTI PO) Take  by mouth. Historical Provider   metoprolol tartrate (LOPRESSOR) 25 mg tablet Take 12.5 mg by mouth daily. Take PRN for BP <120. 6/23/16   Arline Garland III, MD   ondansetron (ZOFRAN ODT) 4 mg disintegrating tablet Take 4 mg by mouth three (3) times daily as needed. Historical Provider   metoclopramide HCl (REGLAN) 5 mg tablet Take 5 mg by mouth two (2) times a day. Historical Provider   ticagrelor (BRILINTA) 90 mg tablet Take 1 Tab by mouth two (2) times a day. 2/4/16   MINDY Davalos   promethazine (PHENERGAN) 25 mg tablet Take 25 mg by mouth every eight (8) hours as needed for Nausea. Historical Provider   sevelamer carbonate (RENVELA) 800 mg tab tab Take 800 mg by mouth three (3) times daily (with meals). Historical Provider   gentamicin (GARAMYCIN) 0.1 % topical cream APPLY TO PD catheter exit site at daily dressing change 5/12/15   Ruben Pratt MD   aspirin delayed-release 81 mg tablet Take 1 Tab by mouth daily. 5/5/15   Gisela Hollingsworth PA-C   darbepoetin toya in polysorbat (ARANESP, POLYSORBATE,) 40 mcg/mL injection 40 mcg by SubCUTAneous route every fourteen (14) days. Indications: ANEMIA IN CHRONIC KIDNEY DISEASE    Historical Provider   rosuvastatin (CRESTOR) 20 mg tablet Take 20 mg by mouth nightly. Historical Provider   ranolazine ER (RANEXA) 500 mg SR tablet Take 500 mg by mouth two (2) times a day. Historical Provider   levothyroxine (SYNTHROID) 150 mcg tablet Take 150 mcg by mouth Daily (before breakfast).     Historical Provider     Allergies   Allergen Reactions    Codeine Nausea and Vomiting      Social History   Substance Use Topics    Smoking status: Never Smoker    Smokeless tobacco: Never Used    Alcohol use No      Family History Problem Relation Age of Onset    Heart Disease Mother     Hypertension Mother     Cancer Mother      Lung    Stroke Father     Hypertension Father     Breast Cancer Neg Hx           Review of Systems    Constitutional: no fever, weak  Eyes: fair vision,    Ears, nose, mouth, throat, and face:fair hearing,   Respiratory: no asthma,  Chronic home O2 is being used - worsening dyspnea  Cardiovascular:no palpitation, no  chest pain,   Gastrointestinal:no diarrhea,    Genitourinary: no dysuria,   Hematologic/lymphatic: no bleeding tendency,   Neurological: no seizures   Behvioral/Psych: no psych hospitalization   Endocrine: no goiter,       Objective:       Visit Vitals    /67    Pulse 80    Temp 97.6 °F (36.4 °C)    Resp 28    Ht 5' 10\" (1.778 m)    Wt 105.5 kg (232 lb 9.6 oz)  Comment: on dialysis    SpO2 100%    BMI 33.37 kg/m2       General:  Alert, cooperative, mild distress, appears stated age. Head:  Normocephalic, without obvious abnormality, atraumatic. Eyes:  Conjunctivae/corneas clear. EOMs intact. Throat: Lips, mucosa, and tongue normal. Teeth and gums normal.   Neck: Supple, symmetrical, trachea midline, no adenopathy,  no JVD. Lungs:   Clear to auscultation bilaterally. Heart:  Regular rate and rhythm, S1, S2 normal, 2/6 systolic  murmur, no rub or gallop. Abdomen:   Soft, non-tender. No masses,  No organomegaly. . PD catheter in place without exudate   Extremities: Extremities normal, atraumatic, no cyanosis. 2-3+edema. Skin: Skin color, texture, turgor normal. No rashes or lesions. Lymph nodes: Cervical and supraclavicular nodes normal.   Neurologic: Grossly intact. No asterixis. Data Review:     Results for Geoff Whiteside (MRN [id number]) as of 11/22/2017 16:39   Ref.  Range 9/23/2017 04:27 11/18/2017 12:45 11/19/2017 00:06 11/21/2017 00:38 11/22/2017 05:36   WBC Latest Ref Range: 4.3 - 11.1 K/uL 7.0 8.9 7.6 9.3 13.9 (H)   RBC Latest Ref Range: 4.05 - 5.25 M/uL 3.05 (L) 2.97 (L) 2.79 (L) 2.45 (L) 2.54 (L)   HGB Latest Ref Range: 11.7 - 15.4 g/dL 9.2 (L) 8.9 (L) 8.5 (L) 7.4 (L) 7.8 (L)   HCT Latest Ref Range: 35.8 - 46.3 % 28.9 (L) 28.5 (L) 26.1 (L) 23.0 (L) 24.6 (L)   MCV Latest Ref Range: 79.6 - 97.8 FL 94.8 96.0 93.5 93.9 96.9   MCH Latest Ref Range: 26.1 - 32.9 PG 30.2 30.0 30.5 30.2 30.7   MCHC Latest Ref Range: 31.4 - 35.0 g/dL 31.8 31.2 (L) 32.6 32.2 31.7   RDW Latest Ref Range: 11.9 - 14.6 % 15.6 (H) 15.4 (H) 15.4 (H) 15.6 (H) 16.0 (H)   PLATELET Latest Ref Range: 150 - 450 K/uL 235 212 177 160 186   MPV Latest Ref Range: 10.8 - 14.1 FL 10.0 (L) 10.3 (L) 10.1 (L) 10.1 (L) 10.4 (L)     Results for Jared Bundy (MRN [id number]) as of 11/22/2017 16:39   Ref.  Range 11/19/2017 06:30 11/20/2017 05:01 11/21/2017 07:08 11/22/2017 05:36   Sodium Latest Ref Range: 136 - 145 mmol/L 138 136 134 (L) 135 (L)   Potassium Latest Ref Range: 3.5 - 5.1 mmol/L 3.2 (L) 4.0 4.3 4.5   Chloride Latest Ref Range: 98 - 107 mmol/L 102 100 99 98   CO2 Latest Ref Range: 21 - 32 mmol/L 26 24 24 23   Anion gap Latest Ref Range: 7 - 16 mmol/L 10 12 11 14   Glucose Latest Ref Range: 65 - 100 mg/dL 121 (H) 91 119 (H) 129 (H)   BUN Latest Ref Range: 8 - 23 MG/DL 31 (H) 34 (H) 39 (H) 36 (H)   Creatinine Latest Ref Range: 0.6 - 1.0 MG/DL 8.86 (H) 9.63 (H) 9.84 (H) 9.54 (H)   Calcium Latest Ref Range: 8.3 - 10.4 MG/DL 9.7 9.1 8.5 9.6   GFR est non-AA Latest Ref Range: >60 ml/min/1.73m2 5 (L) 4 (L) 4 (L) 4 (L)   GFR est AA Latest Ref Range: >60 ml/min/1.73m2 5 (L) 5 (L) 5 (L) 5 (L)       Recent Results (from the past 24 hour(s))   TYPE + CROSSMATCH    Collection Time: 11/23/17  4:41 AM   Result Value Ref Range    Crossmatch Expiration 11/26/2017     ABO/Rh(D) A POSITIVE     Antibody screen NEG     Unit number [id number]     Blood component type RC LR AS5     Unit division 00     Status of unit ALLOCATED     Crossmatch result Compatible     Unit number [id number]     Blood component type RC LR AS5     Unit division 00     Status of unit ALLOCATED     Crossmatch result Compatible    CBC WITH AUTOMATED DIFF    Collection Time: 11/23/17  4:41 AM   Result Value Ref Range    WBC 10.5 4.3 - 11.1 K/uL    RBC 2.13 (L) 4.05 - 5.25 M/uL    HGB 6.6 (LL) 11.7 - 15.4 g/dL    HCT 20.7 (LL) 35.8 - 46.3 %    MCV 97.2 79.6 - 97.8 FL    MCH 31.0 26.1 - 32.9 PG    MCHC 31.9 31.4 - 35.0 g/dL    RDW 16.4 (H) 11.9 - 14.6 %    PLATELET 119 605 - 921 K/uL    MPV 9.8 (L) 10.8 - 14.1 FL    DF AUTOMATED      NEUTROPHILS 79 (H) 43 - 78 %    LYMPHOCYTES 11 (L) 13 - 44 %    MONOCYTES 6 4.0 - 12.0 %    EOSINOPHILS 3 0.5 - 7.8 %    BASOPHILS 0 0.0 - 2.0 %    IMMATURE GRANULOCYTES 1 0.0 - 5.0 %    ABS. NEUTROPHILS 8.4 (H) 1.7 - 8.2 K/UL    ABS. LYMPHOCYTES 1.1 0.5 - 4.6 K/UL    ABS. MONOCYTES 0.6 0.1 - 1.3 K/UL    ABS. EOSINOPHILS 0.3 0.0 - 0.8 K/UL    ABS. BASOPHILS 0.0 0.0 - 0.2 K/UL    ABS. IMM.  GRANS. 0.1 0.0 - 0.5 K/UL   METABOLIC PANEL, BASIC    Collection Time: 11/23/17  4:41 AM   Result Value Ref Range    Sodium 135 (L) 136 - 145 mmol/L    Potassium 4.4 3.5 - 5.1 mmol/L    Chloride 99 98 - 107 mmol/L    CO2 26 21 - 32 mmol/L    Anion gap 10 7 - 16 mmol/L    Glucose 120 (H) 65 - 100 mg/dL    BUN 37 (H) 8 - 23 MG/DL    Creatinine 9.29 (H) 0.6 - 1.0 MG/DL    GFR est AA 5 (L) >60 ml/min/1.73m2    GFR est non-AA 4 (L) >60 ml/min/1.73m2    Calcium 8.6 8.3 - 10.4 MG/DL   EKG, 12 LEAD, INITIAL    Collection Time: 11/23/17  6:49 AM   Result Value Ref Range    Ventricular Rate 78 BPM    Atrial Rate 78 BPM    P-R Interval 174 ms    QRS Duration 106 ms    Q-T Interval 418 ms    QTC Calculation (Bezet) 476 ms    Calculated P Axis 53 degrees    Calculated R Axis -25 degrees    Calculated T Axis 95 degrees    Diagnosis       Normal sinus rhythm  Septal infarct , age undetermined  Possible Lateral infarct , age undetermined  Abnormal ECG  When compared with ECG of 22-NOV-2017 06:02,  No significant change was found         CXR viewed by me - no major infiltrate or fluid excess, CM with left possible minor effusion is present        CT abdomen/pelvis  CT ABDOMEN:  Peritoneal fluid in the perihepatic space, mild paracolic gutters,  extending down into the pelvis. Peritoneal dialysis catheter noted. There is not  any evidence of retroperitoneal hematoma identified. Strandy fluid density does  extend into the extraperitoneal space in the lower abdomen and pelvis. There is  radiodensity within the renal collecting systems, possibly previously  administered IV contrast. There is peripancreatic fluid and stranding, cannot  exclude acute pancreatitis. No biliary dilatation. Spleen is not enlarged. Small  bowel normal caliber.   CT PELVIS:   Moderate to large volume pelvic fluid.   There is diffuse asymmetric enlargement of the right rectus and oblique  abdominal muscles. There are are of higher attenuation the contralateral side  and findings are consistent with an abdominal wall hematoma. IMPRESSION:    1. Evidence of right abdominal wall hematoma. 2. No CT evidence to suggest retroperitoneal hematoma. 3. Extensive fluid in the abdomen and pelvis, presumed related to peritoneal  dialysis. Active Problems:    CAD (coronary artery disease) (11/27/2015)      Unstable angina ([address] (2/1/2016)      Elevated troponin (11/18/2017)      Chronic anemia (11/18/2017)      ESRD (end stage renal disease) ([facility] [address] (11/18/2017)        Assessment:     1. CAD x NSTEMI, Unstable angina -  - [facility] with complex CAD and acute thrombotic lesion in pLAD and subtotal occlusion in mLAD. The RCA has severe proximal and mid RCA disease. She has additional stenting of LAD with Resolute in mid/distal and proximal vessel. These all touched the previously stented mid vessel. Recommend life-long DAPT    2, ESRD -  - hold PD due to problems with drainage of PD fluid    - transferred to CCU for CRRT    3. Fluid excess -  - worsening     4.  Anemia -  - intensive anemia therapy in [employer]  - on WAYNE and IV iron and B12  - for transfusion on CRRT    5. History of GI bleed -  - monitor clinically and serial Hb  - she had a drop in Hb to 7 range   - plan GI consult for in am    6. Hypokalemia   - resolved     7.  Abdominal pain and tenderness with drop in hb , on DAPT   No evidence of retroperitoneal hematoma       Plan:     As above - critically ill - greater than 60 minutes including discussion with family, but not including procedures - all questions answered

## 2017-11-23 NOTE — DIALYSIS
Per Dr. Benjamin Smiley PD treatment discontinued. Pt disconnected from cycler and manual drain performed: 2000ml drained. Betadine cap placed.

## 2017-11-23 NOTE — PROGRESS NOTES
Patient requesting to be made Do Not Intubate. Documentation provided on ICU consent, Dr. Jo Ann Patel updated.

## 2017-11-23 NOTE — PROGRESS NOTES
Bedside and Verbal shift change report received from [facility] (offgoing nurse). Report included the following information SBAR, Kardex, MAR and Recent Results. Lasix gtt rate verified per MAR.

## 2017-11-23 NOTE — PROGRESS NOTES
Assessment complete. Chart reviewed. Patient A&Ox4. Lung sounds with crackles upper and lower bilat on 2L NC. NSR. BP stable. Peripheral vascular WDL. Bowel sounds active, abdomen distended, tender RLQ. Oliguric, baseline. Moves all extremities with some weakness. Peritoneal dialysis catheter to L abdomen, dressing c/d/i. Sacrum intact and allevyn in place. VSS. NAD. Will continue to monitor.

## 2017-11-23 NOTE — PROGRESS NOTES
[facility] CARDIOLOGY PROGRESS NOTE           11/23/2017 8:34 AM    Admit Date: 11/18/2017      Subjective:   No CP and feels markedly better post PCI. Some fatigue and worsening anemia. CT with no RP bleed but has abdominal wall hematoma. Labs pending this AM.  Hematology to assist with anemia in [employer] patient    ROS:  Cardiovascular:  As noted above    Objective:      Vitals:    11/22/17 2118 11/23/17 0113 11/23/17 0532 11/23/17 0551   BP: 124/66 110/61 129/69    Pulse: 79 82 78    Resp: 18 16 17    Temp: 97.2 °F (36.2 °C) 97.9 °F (36.6 °C) 98.7 °F (37.1 °C)    SpO2: 96% 100% 94%    Weight:    105.5 kg (232 lb 9.6 oz)   Height:           Physical Exam:  General-No Acute Distress  Neck- supple, no JVD  CV- regular rate and rhythm no MRG  Lung- clear bilaterally  Abd- soft, nontender, nondistended  Ext- no edema bilaterally. Skin- warm and dry    Data Review:   Recent Labs      11/23/17   0441  11/22/17   0536   NA  135*  135*   K  4.4  4.5   BUN  37*  36*   CREA  9.29*  9.54*   GLU  120*  129*   WBC  10.5  13.9*   HGB  6.6*  7.8*   HCT  20.7*  24.6*   PLT  179  186       Assessment/Plan:     Active Problems:    CAD (coronary artery disease) (11/27/2015)    S/P complex PCI and stable on ASA and Brilinta      Unstable angina (HCC) (2/1/2016)    As above      Elevated troponin (11/18/2017)    As above      Chronic anemia (11/18/2017)    This is worse and awaiting labs and hematology assistance.         ESRD (end stage renal disease) ([facility] [address] (11/18/2017)    On PD          Ruba Shafer MD  11/23/2017 8:34 AM

## 2017-11-23 NOTE — PROGRESS NOTES
Critical H/H received from Teodora Romero in the lab at [phone]. Hemoglobin 6.6, Hematocrit 20.7. Will notify MD for further orders.

## 2017-11-24 LAB
ANION GAP SERPL CALC-SCNC: 7 MMOL/L (ref 7–16)
APTT PPP: 48.2 SEC (ref 23.5–31.7)
APTT PPP: 58.8 SEC (ref 23.5–31.7)
APTT PPP: 71.4 SEC (ref 23.5–31.7)
ATRIAL RATE: 78 BPM
ATRIAL RATE: 82 BPM
BUN SERPL-MCNC: 7 MG/DL (ref 8–23)
CALCIUM SERPL-MCNC: 8.7 MG/DL (ref 8.3–10.4)
CALCULATED P AXIS, ECG09: 53 DEGREES
CALCULATED P AXIS, ECG09: 61 DEGREES
CALCULATED R AXIS, ECG10: -23 DEGREES
CALCULATED R AXIS, ECG10: -25 DEGREES
CALCULATED T AXIS, ECG11: 86 DEGREES
CALCULATED T AXIS, ECG11: 95 DEGREES
CHLORIDE SERPL-SCNC: 98 MMOL/L (ref 98–107)
CO2 SERPL-SCNC: 32 MMOL/L (ref 21–32)
CREAT SERPL-MCNC: 2.5 MG/DL (ref 0.6–1)
DIAGNOSIS, 93000: NORMAL
DIAGNOSIS, 93000: NORMAL
ERYTHROCYTE [DISTWIDTH] IN BLOOD BY AUTOMATED COUNT: 17.5 % (ref 11.9–14.6)
GLUCOSE SERPL-MCNC: 78 MG/DL (ref 65–100)
HCT VFR BLD AUTO: 25.4 % (ref 35.8–46.3)
HGB BLD-MCNC: 8.3 G/DL (ref 11.7–15.4)
MCH RBC QN AUTO: 30.2 PG (ref 26.1–32.9)
MCHC RBC AUTO-ENTMCNC: 32.7 G/DL (ref 31.4–35)
MCV RBC AUTO: 92.4 FL (ref 79.6–97.8)
P-R INTERVAL, ECG05: 168 MS
P-R INTERVAL, ECG05: 174 MS
PLATELET # BLD AUTO: 164 K/UL (ref 150–450)
PMV BLD AUTO: 9.6 FL (ref 10.8–14.1)
POTASSIUM SERPL-SCNC: 4.4 MMOL/L (ref 3.5–5.1)
Q-T INTERVAL, ECG07: 418 MS
Q-T INTERVAL, ECG07: 434 MS
QRS DURATION, ECG06: 102 MS
QRS DURATION, ECG06: 106 MS
QTC CALCULATION (BEZET), ECG08: 476 MS
QTC CALCULATION (BEZET), ECG08: 507 MS
RBC # BLD AUTO: 2.75 M/UL (ref 4.05–5.25)
SODIUM SERPL-SCNC: 137 MMOL/L (ref 136–145)
VENTRICULAR RATE, ECG03: 78 BPM
VENTRICULAR RATE, ECG03: 82 BPM
WBC # BLD AUTO: 11.9 K/UL (ref 4.3–11.1)

## 2017-11-24 PROCEDURE — 77030013131 HC IV BLD ST ICUM -A

## 2017-11-24 PROCEDURE — 80048 BASIC METABOLIC PNL TOTAL CA: CPT | Performed by: INTERNAL MEDICINE

## 2017-11-24 PROCEDURE — 74011250636 HC RX REV CODE- 250/636: Performed by: INTERNAL MEDICINE

## 2017-11-24 PROCEDURE — 74011250637 HC RX REV CODE- 250/637: Performed by: PHYSICIAN ASSISTANT

## 2017-11-24 PROCEDURE — 90945 DIALYSIS ONE EVALUATION: CPT

## 2017-11-24 PROCEDURE — 74011250637 HC RX REV CODE- 250/637: Performed by: INTERNAL MEDICINE

## 2017-11-24 PROCEDURE — 36592 COLLECT BLOOD FROM PICC: CPT

## 2017-11-24 PROCEDURE — 85027 COMPLETE CBC AUTOMATED: CPT | Performed by: INTERNAL MEDICINE

## 2017-11-24 PROCEDURE — 85730 THROMBOPLASTIN TIME PARTIAL: CPT | Performed by: INTERNAL MEDICINE

## 2017-11-24 PROCEDURE — P9016 RBC LEUKOCYTES REDUCED: HCPCS | Performed by: INTERNAL MEDICINE

## 2017-11-24 PROCEDURE — 65660000000 HC RM CCU STEPDOWN

## 2017-11-24 PROCEDURE — 93005 ELECTROCARDIOGRAM TRACING: CPT | Performed by: INTERNAL MEDICINE

## 2017-11-24 PROCEDURE — 36430 TRANSFUSION BLD/BLD COMPNT: CPT

## 2017-11-24 RX ORDER — POLYETHYLENE GLYCOL 3350 17 G/17G
17 POWDER, FOR SOLUTION ORAL
Status: DISCONTINUED | OUTPATIENT
Start: 2017-11-24 | End: 2017-11-29 | Stop reason: HOSPADM

## 2017-11-24 RX ORDER — SODIUM CHLORIDE 9 MG/ML
250 INJECTION, SOLUTION INTRAVENOUS AS NEEDED
Status: DISCONTINUED | OUTPATIENT
Start: 2017-11-24 | End: 2017-11-24 | Stop reason: ALTCHOICE

## 2017-11-24 RX ADMIN — Medication 10 ML: at 06:40

## 2017-11-24 RX ADMIN — LEVOTHYROXINE SODIUM 150 MCG: 150 TABLET ORAL at 07:48

## 2017-11-24 RX ADMIN — RANOLAZINE 1000 MG: 500 TABLET, FILM COATED, EXTENDED RELEASE ORAL at 10:35

## 2017-11-24 RX ADMIN — RENAGEL 800 MG: 400 TABLET ORAL at 17:32

## 2017-11-24 RX ADMIN — METOCLOPRAMIDE HYDROCHLORIDE 5 MG: 10 TABLET ORAL at 10:35

## 2017-11-24 RX ADMIN — TORSEMIDE 100 MG: 100 TABLET ORAL at 17:32

## 2017-11-24 RX ADMIN — METOCLOPRAMIDE HYDROCHLORIDE 5 MG: 10 TABLET ORAL at 17:33

## 2017-11-24 RX ADMIN — SUCRALFATE 1 G: 1 SUSPENSION ORAL at 12:26

## 2017-11-24 RX ADMIN — PANTOPRAZOLE SODIUM 40 MG: 40 TABLET, DELAYED RELEASE ORAL at 17:33

## 2017-11-24 RX ADMIN — Medication 400 MG: at 10:35

## 2017-11-24 RX ADMIN — RENAGEL 800 MG: 400 TABLET ORAL at 07:48

## 2017-11-24 RX ADMIN — SUCRALFATE 1 G: 1 SUSPENSION ORAL at 17:33

## 2017-11-24 RX ADMIN — CALCITRIOL 0.25 MCG: 0.25 CAPSULE, LIQUID FILLED ORAL at 10:35

## 2017-11-24 RX ADMIN — ERYTHROPOIETIN 20000 UNITS: 20000 INJECTION, SOLUTION INTRAVENOUS; SUBCUTANEOUS at 17:38

## 2017-11-24 RX ADMIN — GENTAMICIN SULFATE: 1 CREAM TOPICAL at 18:54

## 2017-11-24 RX ADMIN — METOPROLOL TARTRATE 12.5 MG: 25 TABLET ORAL at 10:35

## 2017-11-24 RX ADMIN — ASPIRIN 81 MG: 81 TABLET, COATED ORAL at 10:35

## 2017-11-24 RX ADMIN — Medication 10 ML: at 15:00

## 2017-11-24 RX ADMIN — AMLODIPINE BESYLATE 5 MG: 5 TABLET ORAL at 10:36

## 2017-11-24 RX ADMIN — RENAGEL 800 MG: 400 TABLET ORAL at 12:27

## 2017-11-24 RX ADMIN — SUCRALFATE 1 G: 1 SUSPENSION ORAL at 07:48

## 2017-11-24 RX ADMIN — TICAGRELOR 90 MG: 90 TABLET ORAL at 10:35

## 2017-11-24 RX ADMIN — RANOLAZINE 1000 MG: 500 TABLET, FILM COATED, EXTENDED RELEASE ORAL at 17:32

## 2017-11-24 RX ADMIN — ZINC 1 TABLET: TAB ORAL at 10:35

## 2017-11-24 RX ADMIN — PANTOPRAZOLE SODIUM 40 MG: 40 TABLET, DELAYED RELEASE ORAL at 07:48

## 2017-11-24 RX ADMIN — TORSEMIDE 100 MG: 100 TABLET ORAL at 10:35

## 2017-11-24 NOTE — PROGRESS NOTES
Therapist is discontinuing physical therapy at this time due to decline in medical status/transfer to CCU. Ms. Lexie Quant physical therapy goals were not met. Please reorder PT when our services are again appropriate. Thank you.   Karen Ely DPT  11/24/2017

## 2017-11-24 NOTE — PROGRESS NOTES
Water circuit lock out alarm going off and dialyzer machine with \"no water\" alarm. Muting alarm and [employer] notified of no water in unit circuit. Engineering paged.

## 2017-11-24 NOTE — PROGRESS NOTES
Report received from [facility]. Care assumed. Pt currently on CRRT w/UF at 200 ml/hr, heparin infusion at 900 units/hr (titrating per PTT protocol)--dual verification w/ offgoing RN. Pt A&Ox4. Follows commands. Lung sounds w/expiratory wheezes. O2 Sat 98% on RA-- wears oxygen prn at home. NSR on monitor. BP stable. Denies any complaints at this time. Call light in reach.

## 2017-11-24 NOTE — PROGRESS NOTES
[facility] CARDIOLOGY PROGRESS NOTE           11/24/2017 8:34 AM    Admit Date: 11/18/2017      Subjective:   No CP and feels markedly better post PCI. Some fatigue and worsening anemia. CT with no RP bleed but has abdominal wall hematoma. Got CRRT initiated and transfused 2 units PRBC. Doing better  ROS:  Cardiovascular:  As noted above    Objective:      Vitals:    11/24/17 0531 11/24/17 0546 11/24/17 0639 11/24/17 0641   BP: 118/66 121/65  124/65   Pulse: 80 79  80   Resp: 20 19     Temp:       SpO2: 98% 99%     Weight:   110.3 kg (243 lb 2.7 oz)    Height:           Physical Exam:  General-No Acute Distress  Neck- supple, no JVD  CV- regular rate and rhythm no MRG  Lung- clear bilaterally  Abd- soft, nontender, nondistended  Ext- no edema bilaterally. Skin- warm and dry    Data Review:   Recent Labs      11/24/17   0317  11/23/17   0441   NA  137  135*   K  4.4  4.4   BUN  7*  37*   CREA  2.50*  9.29*   GLU  78  120*   WBC  11.9*  10.5   HGB  8.3*  6.6*   HCT  25.4*  20.7*   PLT  164  179       Assessment/Plan:     Active Problems:    CAD (coronary artery disease) (11/27/2015)    S/P complex PCI and stable on ASA and Brilinta      Unstable angina (HCC) (2/1/2016)    As above      Elevated troponin (11/18/2017)    As above      Chronic anemia (11/18/2017)    This is worse and awaiting labs and hematology assistance.         ESRD (end stage renal disease) ([facility] [address] (11/18/2017)    On PD          Darlene Nichols MD  11/24/2017 8:34 AM

## 2017-11-24 NOTE — PROGRESS NOTES
TRANSFER - OUT REPORT:    Verbal report given to Gisela NORRIS RN on Terry  being transferred to [facility] for routine progression of care       Report consisted of patients Situation, Background, Assessment and   Recommendations(SBAR). Information from the following report(s) SBAR, Kardex, ED Summary, Procedure Summary, Intake/Output, MAR, Recent Results and Cardiac Rhythm NSR was reviewed with the receiving nurse. Lines:   Peripheral IV 11/24/17 Right Antecubital (Active)   Site Assessment Clean, dry, & intact 11/24/2017  4:40 PM   Phlebitis Assessment 0 11/24/2017  4:40 PM   Infiltration Assessment 0 11/24/2017  4:40 PM   Dressing Status Clean, dry, & intact 11/24/2017  4:40 PM   Dressing Type Tape;Transparent 11/24/2017  4:40 PM   Hub Color/Line Status Blue;Capped;Flushed;Patent 11/24/2017  4:40 PM   Alcohol Cap Used No 11/24/2017  4:40 PM        Opportunity for questions and clarification was provided. Patient transported with:   Monitor  O2 @ 2 liters     Revisited conversation about pt's wishes not to be intubated and this is still the case. Paged and discussed w/Dr. Magdalena Bruno again (MD had this conversation with pt earlier regarding DNI status). Orders received. Pt changed to PARTIAL CODE/DNI status.

## 2017-11-24 NOTE — PROGRESS NOTES
Pt doing well this afternoon. Feeling better and \"not as weak. \"  A&Ox4. Strength and movement in BUE appears equal.  Lung sounds clear. O2 Sat 99% on 2L NC.  HR/BP stable. Pt wanting to get up oob since no longer on CRRT. Pt assisted by RN x1 up to recliner. Weak but steady on feet. Tolerated well. Positioned w/legs elevated. Did become a little dyspneic w/exertion but recovered well at rest.  States \"this feels so much better. \"  Denies complaints or pain. Call light in reach. Continuing to monitor.

## 2017-11-24 NOTE — PROGRESS NOTES
Pt c/o Right arm weakness and numbness. New sensation per pt. Pt currently running CRRT w/ ml/hr, Heparin infusion at 900 units/hr. Called and updated Dr. Remigio Lo. Per MD, will stop heparin infusion at this time, monitor neuro status q1h x6 hrs and give additional 1 un PRBCs (for Hgb 8.6 this am). Pt updated on plan of care and in agreement.

## 2017-11-24 NOTE — CONSULTS
[facility] Consult Note       Primary GI Physician: Dr Magalys Vasquez    Referring Physician:  Dr Taiwo Bright Date:  11/24/2017    Admit Date:  11/18/2017    Chief Complaint:  Anemia    Subjective:     History of Present Illness:  Patient is a 80 y.o. female with PMH including but not limited to CKD on PD at home, CAD and GERD, who is seen in consultation at the request of Dr. Noah Good for anemia. The patient sees Dr Magalys Vasquez and workup for anemia has included colonoscopy in the past year. Patient is not sure of results. She was scheduled to have EGD but was admitted to the hospital for NSTEMI and underwent PCI on 11/20. She has chronic constipation for which she takes Linzess. She reports some off and on discomfort in her right abd but denies abd pain. She is on Brilinta and ASA s/p PCI. She denies recent BRRB or tarry stools but did have black stools a few weeks ago. Has abd wall hematoma.       PMH:  Past Medical History:   Diagnosis Date    Anemia of chronic renal failure 4/28/2015    Anterior myocardial infarction [facility]) 5/21/2015    CAD (coronary artery disease)     Chest pain     Chronic kidney disease, stage III (moderate) 8/15/2014    on dialysis    CKD (chronic kidney disease) stage 4, GFR 15-29 ml/min ([facility]) 4/28/2015    Debility 5/5/2015    Depression 12/29/2015    Edema 12/29/2015    Endocrine disease     Hypothyroidism    GERD (gastroesophageal reflux disease)     Heart murmur 12/29/2015    HLD (hyperlipidemia) 12/29/2015    Hypertension     Hypothyroidism 4/28/2015    Ischemic cardiomyopathy 12/29/2015    Nausea     S/P PTCA (percutaneous transluminal coronary angioplasty); LAD PTCA of  ISR 11/27/15 5/24/2016    STEMI (ST elevation myocardial infarction) ([facility] [address] 4/28/2015    Unspecified sleep apnea     uses cpap machine    Unstable angina (HCC)        PSH:  Past Surgical History:   Procedure Laterality Date    HX BACK SURGERY  1990    neck surgery cervical disc    HX BACK SURGERY      lower back    HX CATARACT REMOVAL Bilateral     HX CHOLECYSTECTOMY  [id number]    gall bladder     HX KNEE REPLACEMENT Right 2006    HX PTCA  4/28/2015    2.25 Xience stent to mid LAD for occluded artery, anterior MI, EF 25%. Moderate disease distal LAD and distal OM PCI CX and RCA 2004, then PCI RCA and LAD in 2009. Allergies: Allergies   Allergen Reactions    Codeine Nausea and Vomiting       Home Medications:  Prior to Admission medications    Medication Sig Start Date End Date Taking? Authorizing Provider   folic acid (FOLVITE) 1 mg tablet Take 1 mg by mouth daily. Yes Historical Provider   potassium chloride (K-DUR, KLOR-CON) 20 mEq tablet Take 20 mEq by mouth two (2) times a day. Yes Historical Provider   traZODone (DESYREL) 50 mg tablet Take  by mouth nightly. Yes Historical Provider   megestrol (MEGACE) 400 mg/10 mL (40 mg/mL) suspension Take 200 mg by mouth daily. Yes Historical Provider   amLODIPine (NORVASC) 10 mg tablet Take 1 Tab by mouth daily. 9/23/17   Sonia Jama, DO   pantoprazole (PROTONIX) 40 mg tablet Take 1 Tab by mouth Daily (before breakfast). 9/23/17   Sonia Jama, DO   sucralfate (CARAFATE) 100 mg/mL suspension Take 10 mL by mouth Before breakfast, lunch, dinner and at bedtime. Indications: PREVENTION OF STRESS ULCER 9/23/17   Sonia Jama, DO   torsemide [facility]) 100 mg tablet Take  by mouth daily. Historical Provider   nitroglycerin (NITROLINGUAL) 400 mcg/spray spray 1 Spray by SubLINGual route every five (5) minutes as needed for Chest Pain. Historical Provider   linaclotide [facility]) 145 mcg cap capsule Take  by mouth Daily (before breakfast). Historical Provider   magnesium oxide (MAG-OX) 400 mg tablet Take 400 mg by mouth daily. Historical Provider   PNV NO.122/IRON/FOLIC ACID (PRENATAL MULTI PO) Take  by mouth. Historical Provider   metoprolol tartrate (LOPRESSOR) 25 mg tablet Take 12.5 mg by mouth daily.  Take PRN for BP <120. 6/23/16   Renetta Velásquez III, MD   ondansetron (ZOFRAN ODT) 4 mg disintegrating tablet Take 4 mg by mouth three (3) times daily as needed. Historical Provider   metoclopramide HCl (REGLAN) 5 mg tablet Take 5 mg by mouth two (2) times a day. Historical Provider   ticagrelor (BRILINTA) 90 mg tablet Take 1 Tab by mouth two (2) times a day. 2/4/16   MINDY Jeffrey   promethazine (PHENERGAN) 25 mg tablet Take 25 mg by mouth every eight (8) hours as needed for Nausea. Historical Provider   sevelamer carbonate (RENVELA) 800 mg tab tab Take 800 mg by mouth three (3) times daily (with meals). Historical Provider   gentamicin (GARAMYCIN) 0.1 % topical cream APPLY TO PD catheter exit site at daily dressing change 5/12/15   Wilder Carreon MD   aspirin delayed-release 81 mg tablet Take 1 Tab by mouth daily. 5/5/15   Gisela Hollingsworth PA-C   darbepoetin toya in polysorbat (ARANESP, POLYSORBATE,) 40 mcg/mL injection 40 mcg by SubCUTAneous route every fourteen (14) days. Indications: ANEMIA IN CHRONIC KIDNEY DISEASE    Historical Provider   rosuvastatin (CRESTOR) 20 mg tablet Take 20 mg by mouth nightly. Historical Provider   ranolazine ER (RANEXA) 500 mg SR tablet Take 500 mg by mouth two (2) times a day. Historical Provider   levothyroxine (SYNTHROID) 150 mcg tablet Take 150 mcg by mouth Daily (before breakfast).     Historical Provider       Hospital Medications:  Current Facility-Administered Medications   Medication Dose Route Frequency    0.9% sodium chloride infusion 250 mL  250 mL IntraVENous PRN    amLODIPine (NORVASC) tablet 5 mg  5 mg Oral DAILY    heparin (porcine) 1,000 unit/mL injection 5,000 Units  5,000 Units Hemodialysis DIALYSIS PRN    senna-docusate (PERICOLACE) 8.6-50 mg per tablet 1 Tab  1 Tab Oral DAILY    torsemide (DEMADEX) tablet 100 mg  100 mg Oral BID    polyethylene glycol (MIRALAX) packet 17 g  17 g Oral DAILY    sodium chloride (NS) flush 5-10 mL  5-10 mL IntraVENous Q8H    sodium chloride (NS) flush 5-10 mL  5-10 mL IntraVENous PRN    LORazepam (ATIVAN) tablet 1 mg  1 mg Oral Q6H PRN    HYDROcodone-acetaminophen (NORCO) 5-325 mg per tablet 1 Tab  1 Tab Oral Q4H PRN    potassium chloride 10 mEq in peritoneal dialysis DEXTROSE 2.5% (2.5 mEq/L low calcium) 5,000 mL   IntraPERitoneal DIALYSIS PRN    gentamicin (GARAMYCIN) 0.1 % cream   Topical DAILY PRN    nitroglycerin (NITROSTAT) tablet 0.4 mg  0.4 mg SubLINGual PRN    aspirin delayed-release tablet 81 mg  81 mg Oral DAILY    calcitRIOL (ROCALTROL) capsule 0.25 mcg  0.25 mcg Oral DAILY    levothyroxine (SYNTHROID) tablet 150 mcg  150 mcg Oral ACB    linaclotide (LINZESS) capsule 145 mcg (Patient Supplied)  145 mcg Oral DAILY    magnesium oxide (MAG-OX) tablet 400 mg  400 mg Oral DAILY    metoclopramide HCl (REGLAN) tablet 5 mg  5 mg Oral BID    metoprolol tartrate (LOPRESSOR) tablet 12.5 mg  12.5 mg Oral DAILY    ondansetron (ZOFRAN ODT) tablet 4 mg  4 mg Oral TID PRN    promethazine (PHENERGAN) tablet 25 mg  25 mg Oral Q6H PRN    ranolazine ER (RANEXA) tablet 1,000 mg  1,000 mg Oral BID    rosuvastatin (CRESTOR) tablet 20 mg  20 mg Oral QHS    sevelamer (RENAGEL) tablet 800 mg  800 mg Oral TID WITH MEALS    sucralfate (CARAFATE) 100 mg/mL oral suspension 1 g  1 g Oral AC&HS    morphine injection 2 mg  2 mg IntraVENous Q4H PRN    acetaminophen (TYLENOL) tablet 650 mg  650 mg Oral Q4H PRN    HYDROcodone-acetaminophen (NORCO) 7.5-325 mg per tablet 1 Tab  1 Tab Oral Q4H PRN    ticagrelor (BRILINTA) tablet 90 mg  90 mg Oral BID    epoetin toya (EPOGEN;PROCRIT) injection 20,000 Units  20,000 Units SubCUTAneous Q MON, WED & FRI    cyanocobalamin (VITAMIN B12) injection 1,000 mcg  1,000 mcg IntraMUSCular Q7D    pantoprazole (PROTONIX) tablet 40 mg  40 mg Oral ACB&D    multivitamin w ZN (STRESSTABS W ZINC) tablet  1 Tab Oral DAILY       Social History:  Social History   Substance Use Topics    Smoking status: Never Smoker    Smokeless tobacco: Never Used    Alcohol use No       Pt denies any history of drug use, blood transfusions, or tattoos. Family History:  Family History   Problem Relation Age of Onset    Heart Disease Mother     Hypertension Mother     Cancer Mother      Lung    Stroke Father     Hypertension Father     Breast Cancer Neg Hx        Review of Systems:  A detailed 10 system ROS is obtained, with pertinent positives as listed above. All others are negative. Diet:      Objective:     Physical Exam:  Vitals:  Visit Vitals    /69    Pulse 83    Temp 98.4 °F (36.9 °C)    Resp 18    Ht 5' 10\" (1.778 m)    Wt 110.3 kg (243 lb 2.7 oz)    SpO2 99%    BMI 34.89 kg/m2     Gen:  Pt is alert, cooperative, no acute distress  Skin:  Extremities and face reveal no rashes. HEENT: Sclerae anicteric. Extra-occular muscles are intact. No oral ulcers. No abnormal pigmentation of the lips. The neck is supple. Cardiovascular: Regular rate and rhythm. No murmurs, gallops, or rubs. Respiratory:  Comfortable breathing with no accessory muscle use. Clear breath sounds anteriorly with no wheezes, rales, or rhonchi. GI:  Abdomen nondistended, soft, and nontender. Normal active bowel sounds. No enlargement of the liver or spleen. No masses palpable. Rectal:  Deferred  Musculoskeletal:  No pitting edema of the lower legs. Neurological:  Gross memory appears intact. Patient is alert and oriented. Psychiatric:  Mood appears appropriate with judgement intact. Lymphatic:  No cervical or supraclavicular adenopathy.     Laboratory:    Recent Labs      11/24/17   0317  11/23/17 2007 11/23/17   0441  11/22/17   0536   WBC  11.9*   --   10.5  13.9*   HGB  8.3*   --   6.6*  7.8*   HCT  25.4*   --   20.7*  24.6*   PLT  164   --   179  186   MCV  92.4   --   97.2  96.9   NA  137   --   135*  135*   K  4.4   --   4.4  4.5   CL  98   --   99  98   CO2  32   --   26  23   BUN  7*   -- 37*  36*   CREA  2.50*   --   9.29*  9.54*   CA  8.7   --   8.6  9.6   GLU  78   --   120*  129*   APTT   --   41.1*   --    --           Assessment:     Active Problems:    CAD (coronary artery disease) (11/27/2015)      Unstable angina (HCC) (2/1/2016)      Elevated troponin (11/18/2017)      Chronic anemia (11/18/2017)      [facility] (11/18/2017)      80 y.o. female with PMH including but not limited to CKD on PD at home, CAD (NSTEMI and PCI 11/20 on Brilinta/ASA) and GERD seen in cs for anemia was hgb down to 6.6. Improved today after 2 UPRBC. She sees Dr Zoë Biggs and workup for anemia has included colonoscopy in the past year. She was scheduled to have EGD but was admitted with NSTEMI. She has chronic constipation for which she takes Linzess. She reports some off and on discomfort in her right abd but denies abd pain. She is on Brilinta and ASA s/p PCI. She denies recent BRRB or tarry stools but did have black stools a few weeks ago. Has abd wall hematoma. Plan:     1) Continue PPI  2) Monitor H&H   3) EGD when stable or if signs of active GIB    Susan Huggins NP  Patient is seen and examined in collaboration with Dr. Alejandra Rosales. Assessment and plan as per Dr. Hannah Garcia.

## 2017-11-24 NOTE — PROGRESS NOTES
PTT=48.8.  2000 unit bolus administered via dialyzer and infusion rate increased to 0.9 ml/hr or 900 units/hr per CRRT protocol.

## 2017-11-24 NOTE — PROGRESS NOTES
Bedside, Verbal and Written shift change report given to Destiney Alvarez RN (oncoming nurse) by Jamarcus Bach (offgoing nurse). Report included the following information SBAR, Kardex, MAR, Recent Results and Cardiac Rhythm Sinus Rhythm.

## 2017-11-24 NOTE — PROGRESS NOTES
PTT=41.4.  2000unit heparin bolus administered and heparin rate increased by 200units/hr via dialyzer.

## 2017-11-24 NOTE — PROGRESS NOTES
Dr. Brian Chow at bedside to see pt. Right arm weakness improved but still w/some numbness. Per MD, pt c/o pinched feeling in Right shoulder. Will continue to monitor. Discussed CRRT and plan. Will restart heparin infusion at 500 un/hr-- DO NOT TITRATE-- pt is on Brilinta/ASA therapy as well. 1un PRBCs infusing and pt tolerating. VSS. Call light in reach.

## 2017-11-24 NOTE — PROGRESS NOTES
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 She has a history of GI bleeding and had anemia-induced unstable angina in the past. 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 She states the pain has since resolved with NTG paste.  No fevers or chills. No nausea, vomiting or diarrhea.  She states that she is essentially anuric and occasionally makes minimal urine.  She has a h/o CVA and TIA. No fever or chills, no problems with PD drainage or discoloration of PD fluid. No increased thirst. She wishes regular diet and supplement. She denies any other acute complaints  Her edema has improved in the past couple of days with intensified dialysis.     s-  dyspnea improving and abdominal distention is better off PD - she had a BM with no apparent bleeding noted - blood loss may have come from hematoma rather than recurrent GI bleeding - - she had numbness of the right arm and c/o pinched feeling in right shoulder, no motor deficit - CRRT for volume and biochemical control and transfusion is tolerated well - s/p cardiac cath on anti-platelet therapy with dropping Hb -  plan for another units of PRBC    Past Medical History:   Diagnosis Date    Anemia of chronic renal failure 4/28/2015    Anterior myocardial infarction ([address] 5/21/2015    CAD (coronary artery disease)     Chest pain     Chronic kidney disease, stage III (moderate) 8/15/2014    on dialysis    CKD (chronic kidney disease) stage 4, GFR 15-29 ml/min ([address] 4/28/2015    Debility 5/5/2015    Depression 12/29/2015    Edema 12/29/2015    Endocrine disease     Hypothyroidism    GERD (gastroesophageal reflux disease)     Heart murmur 12/29/2015    HLD (hyperlipidemia) 12/29/2015    Hypertension     Hypothyroidism 4/28/2015    Ischemic cardiomyopathy 12/29/2015    Nausea     S/P PTCA (percutaneous transluminal coronary angioplasty); LAD PTCA of  ISR 11/27/15 5/24/2016    STEMI (ST elevation myocardial infarction) ([facility] 4/28/2015    Unspecified sleep apnea     uses cpap machine    Unstable angina ([facility] [address]       Past Surgical History:   Procedure Laterality Date    HX BACK SURGERY  1990    neck surgery cervical disc    HX BACK SURGERY      lower back    HX CATARACT REMOVAL Bilateral     HX CHOLECYSTECTOMY  19702    gall bladder     HX KNEE REPLACEMENT Right 2006    HX PTCA  4/28/2015    2.25 Xience stent to mid LAD for occluded artery, anterior MI, EF 25%. Moderate disease distal LAD and distal OM PCI CX and RCA 2004, then PCI RCA and LAD in 2009. Prior to Admission medications    Medication Sig Start Date End Date Taking? Authorizing Provider   folic acid (FOLVITE) 1 mg tablet Take 1 mg by mouth daily. Yes Historical Provider   potassium chloride (K-DUR, KLOR-CON) 20 mEq tablet Take 20 mEq by mouth two (2) times a day. Yes Historical Provider   traZODone (DESYREL) 50 mg tablet Take  by mouth nightly. Yes Historical Provider   megestrol (MEGACE) 400 mg/10 mL (40 mg/mL) suspension Take 200 mg by mouth daily. Yes Historical Provider   amLODIPine (NORVASC) 10 mg tablet Take 1 Tab by mouth daily. 9/23/17   [facility] DO   pantoprazole (PROTONIX) 40 mg tablet Take 1 Tab by mouth Daily (before breakfast). 9/23/17   [facility]   sucralfate (CARAFATE) 100 mg/mL suspension Take 10 mL by mouth Before breakfast, lunch, dinner and at bedtime. Indications: PREVENTION OF STRESS ULCER 9/23/17   [facility], DO   torsemide [facility]) 100 mg tablet Take  by mouth daily. Historical Provider   nitroglycerin (NITROLINGUAL) 400 mcg/spray spray 1 Spray by SubLINGual route every five (5) minutes as needed for Chest Pain. Historical Provider   linaclotide Larance Carwin) 145 mcg cap capsule Take  by mouth Daily (before breakfast). Historical Provider   magnesium oxide (MAG-OX) 400 mg tablet Take 400 mg by mouth daily. Historical Provider   PNV NO.122/IRON/FOLIC ACID (PRENATAL MULTI PO) Take  by mouth. Historical Provider   metoprolol tartrate (LOPRESSOR) 25 mg tablet Take 12.5 mg by mouth daily. Take PRN for BP <120. 6/23/16   Zuri Ignacio III, MD   ondansetron (ZOFRAN ODT) 4 mg disintegrating tablet Take 4 mg by mouth three (3) times daily as needed. Historical Provider   metoclopramide HCl (REGLAN) 5 mg tablet Take 5 mg by mouth two (2) times a day. Historical Provider   ticagrelor (BRILINTA) 90 mg tablet Take 1 Tab by mouth two (2) times a day. 2/4/16   MINDY Garcia   promethazine (PHENERGAN) 25 mg tablet Take 25 mg by mouth every eight (8) hours as needed for Nausea. Historical Provider   sevelamer carbonate (RENVELA) 800 mg tab tab Take 800 mg by mouth three (3) times daily (with meals). Historical Provider   gentamicin (GARAMYCIN) 0.1 % topical cream APPLY TO PD catheter exit site at daily dressing change 5/12/15   Thu Riley MD   aspirin delayed-release 81 mg tablet Take 1 Tab by mouth daily. 5/5/15   Gisela Hollingsworth PA-C   darbepoetin toya in polysorbat (ARANESP, POLYSORBATE,) 40 mcg/mL injection 40 mcg by SubCUTAneous route every fourteen (14) days. Indications: ANEMIA IN CHRONIC KIDNEY DISEASE    Historical Provider   rosuvastatin (CRESTOR) 20 mg tablet Take 20 mg by mouth nightly. Historical Provider   ranolazine ER (RANEXA) 500 mg SR tablet Take 500 mg by mouth two (2) times a day. Historical Provider   levothyroxine (SYNTHROID) 150 mcg tablet Take 150 mcg by mouth Daily (before breakfast).     Historical Provider     Allergies   Allergen Reactions    Codeine Nausea and Vomiting      Social History   Substance Use Topics    Smoking status: Never Smoker    Smokeless tobacco: Never Used    Alcohol use No      Family History   Problem Relation Age of Onset    Heart Disease Mother     Hypertension Mother     Cancer Mother      Lung    Stroke Father     Hypertension Father     Breast Cancer Neg Hx           Review of Systems    Constitutional: no fever, weak  Eyes: fair vision,    Ears, nose, mouth, throat, and face:fair hearing,   Respiratory: no asthma,  Chronic home O2 is being used - improved dyspnea  Cardiovascular:no palpitation, no  chest pain,   Gastrointestinal:no diarrhea,  Had BM today  Genitourinary: no dysuria,   Hematologic/lymphatic: no bleeding tendency,   Neurological: no seizures   Behvioral/Psych: no psych hospitalization   Endocrine: no goiter,       Objective:       Visit Vitals    /69    Pulse 83    Temp 98.4 °F (36.9 °C)    Resp 18    Ht 5' 10\" (1.778 m)    Wt 110.3 kg (243 lb 2.7 oz)    SpO2 99%    BMI 34.89 kg/m2       General:  Alert, cooperative, no distress, appears stated age. Head:  Normocephalic, without obvious abnormality, atraumatic. Eyes:  Conjunctivae/corneas clear. EOMs intact. Throat: Lips, mucosa, and tongue normal. Teeth and gums normal.   Neck: Supple, symmetrical, trachea midline, no adenopathy,  no JVD. Lungs:   Clear to auscultation bilaterally. Heart:  Regular rate and rhythm, S1, S2 normal, 2/6 systolic  murmur, no rub or gallop. Abdomen:   Soft, non-tender. No masses,  No organomegaly. . PD catheter in place without exudate   Extremities: Extremities normal, atraumatic, no cyanosis. 1-2+edema. Skin: Skin color, texture, turgor normal. No rashes or lesions. Lymph nodes: Cervical and supraclavicular nodes normal.   Neurologic: Grossly intact. No asterixis. Data Review:     Results for Jermaine Nation (MRN [id number]) as of 11/22/2017 16:39   Ref.  Range 9/23/2017 04:27 11/18/2017 12:45 11/19/2017 00:06 11/21/2017 00:38 11/22/2017 05:36   WBC Latest Ref Range: 4.3 - 11.1 K/uL 7.0 8.9 7.6 9.3 13.9 (H)   RBC Latest Ref Range: 4.05 - 5.25 M/uL 3.05 (L) 2.97 (L) 2.79 (L) 2.45 (L) 2.54 (L)   HGB Latest Ref Range: 11.7 - 15.4 g/dL 9.2 (L) 8.9 (L) 8.5 (L) 7.4 (L) 7.8 (L)   HCT Latest Ref Range: 35.8 - 46.3 % 28.9 (L) 28.5 (L) 26.1 (L) 23.0 (L) 24.6 (L)   MCV Latest Ref Range: 79.6 - 97.8 FL 94.8 96.0 93.5 93.9 96.9   MCH Latest Ref Range: 26.1 - 32.9 PG 30.2 30.0 30.5 30.2 30.7   MCHC Latest Ref Range: 31.4 - 35.0 g/dL 31.8 31.2 (L) 32.6 32.2 31.7   RDW Latest Ref Range: 11.9 - 14.6 % 15.6 (H) 15.4 (H) 15.4 (H) 15.6 (H) 16.0 (H)   PLATELET Latest Ref Range: 150 - 450 K/uL 235 212 177 160 186   MPV Latest Ref Range: 10.8 - 14.1 FL 10.0 (L) 10.3 (L) 10.1 (L) 10.1 (L) 10.4 (L)     Results for Jose Mishra (MRN [id number]) as of 11/22/2017 16:39   Ref.  Range 11/19/2017 06:30 11/20/2017 05:01 11/21/2017 07:08 11/22/2017 05:36   Sodium Latest Ref Range: 136 - 145 mmol/L 138 136 134 (L) 135 (L)   Potassium Latest Ref Range: 3.5 - 5.1 mmol/L 3.2 (L) 4.0 4.3 4.5   Chloride Latest Ref Range: 98 - 107 mmol/L 102 100 99 98   CO2 Latest Ref Range: 21 - 32 mmol/L 26 24 24 23   Anion gap Latest Ref Range: 7 - 16 mmol/L 10 12 11 14   Glucose Latest Ref Range: 65 - 100 mg/dL 121 (H) 91 119 (H) 129 (H)   BUN Latest Ref Range: 8 - 23 MG/DL 31 (H) 34 (H) 39 (H) 36 (H)   Creatinine Latest Ref Range: 0.6 - 1.0 MG/DL 8.86 (H) 9.63 (H) 9.84 (H) 9.54 (H)   Calcium Latest Ref Range: 8.3 - 10.4 MG/DL 9.7 9.1 8.5 9.6   GFR est non-AA Latest Ref Range: >60 ml/min/1.73m2 5 (L) 4 (L) 4 (L) 4 (L)   GFR est AA Latest Ref Range: >60 ml/min/1.73m2 5 (L) 5 (L) 5 (L) 5 (L)       Recent Results (from the past 24 hour(s))   PLEASE READ & DOCUMENT PPD TEST IN 24 HRS    Collection Time: 11/23/17  2:45 PM   Result Value Ref Range    PPD negative Negative    mm Induration 0 mm mm   PTT    Collection Time: 11/23/17  8:07 PM   Result Value Ref Range    aPTT 41.1 (H) 23.5 - 31.7 SEC   PTT, CRRT PROTOCOL (PTT DRIP)    Collection Time: 11/23/17 11:25 PM Result Value Ref Range    PTT, CRRT PROTOCOL 58.8 (H) 23.5 - 31.7 SEC   CBC W/O DIFF    Collection Time: 11/24/17  3:17 AM   Result Value Ref Range    WBC 11.9 (H) 4.3 - 11.1 K/uL    RBC 2.75 (L) 4.05 - 5.25 M/uL    HGB 8.3 (L) 11.7 - 15.4 g/dL    HCT 25.4 (L) 35.8 - 46.3 %    MCV 92.4 79.6 - 97.8 FL    MCH 30.2 26.1 - 32.9 PG    MCHC 32.7 31.4 - 35.0 g/dL    RDW 17.5 (H) 11.9 - 14.6 %    PLATELET 082 793 - 412 K/uL    MPV 9.6 (L) 10.8 - 33.7 FL   METABOLIC PANEL, BASIC    Collection Time: 11/24/17  3:17 AM   Result Value Ref Range    Sodium 137 136 - 145 mmol/L    Potassium 4.4 3.5 - 5.1 mmol/L    Chloride 98 98 - 107 mmol/L    CO2 32 21 - 32 mmol/L    Anion gap 7 7 - 16 mmol/L    Glucose 78 65 - 100 mg/dL    BUN 7 (L) 8 - 23 MG/DL    Creatinine 2.50 (H) 0.6 - 1.0 MG/DL    GFR est AA 24 (L) >60 ml/min/1.73m2    GFR est non-AA 19 (L) >60 ml/min/1.73m2    Calcium 8.7 8.3 - 10.4 MG/DL   PTT, CRRT PROTOCOL (PTT DRIP)    Collection Time: 11/24/17  3:17 AM   Result Value Ref Range    PTT, CRRT PROTOCOL 48.2 (H) 23.5 - 31.7 SEC   EKG, 12 LEAD, INITIAL    Collection Time: 11/24/17  6:36 AM   Result Value Ref Range    Ventricular Rate 82 BPM    Atrial Rate 82 BPM    P-R Interval 168 ms    QRS Duration 102 ms    Q-T Interval 434 ms    QTC Calculation (Bezet) 507 ms    Calculated P Axis 61 degrees    Calculated R Axis -23 degrees    Calculated T Axis 86 degrees    Diagnosis       Sinus rhythm with Fusion complexes  Cannot rule out Anterior infarct (cited on or before 23-NOV-2017)  Abnormal ECG  When compared with ECG of 23-NOV-2017 06:49,  Fusion complexes are now Present  Serial changes of Anterior infarct Present  Confirmed by CARLOS GARY (), Cristina Wilson ([id number]) on 11/24/2017 7:25:19 AM     PTT, CRRT PROTOCOL (PTT DRIP)    Collection Time: 11/24/17  6:38 AM   Result Value Ref Range    PTT, CRRT PROTOCOL 71.4 (H) 23.5 - 31.7 SEC       CXR viewed by me - no major infiltrate or fluid excess, CM with left possible minor effusion is present        CT abdomen/pelvis  CT ABDOMEN:  Peritoneal fluid in the perihepatic space, mild paracolic gutters,  extending down into the pelvis. Peritoneal dialysis catheter noted. There is not  any evidence of retroperitoneal hematoma identified. Strandy fluid density does  extend into the extraperitoneal space in the lower abdomen and pelvis. There is  radiodensity within the renal collecting systems, possibly previously  administered IV contrast. There is peripancreatic fluid and stranding, cannot  exclude acute pancreatitis. No biliary dilatation. Spleen is not enlarged. Small  bowel normal caliber.   CT PELVIS:   Moderate to large volume pelvic fluid.   There is diffuse asymmetric enlargement of the right rectus and oblique  abdominal muscles. There are are of higher attenuation the contralateral side  and findings are consistent with an abdominal wall hematoma. IMPRESSION:    1. Evidence of right abdominal wall hematoma. 2. No CT evidence to suggest retroperitoneal hematoma. 3. Extensive fluid in the abdomen and pelvis, presumed related to peritoneal  dialysis. Active Problems:    CAD (coronary artery disease) (11/27/2015)      Unstable angina ([facility] [address] (2/1/2016)      Elevated troponin (11/18/2017)      Chronic anemia (11/18/2017)      ESRD (end stage renal disease) ([facility] [address] (11/18/2017)        Assessment:     1. CAD x NSTEMI, Unstable angina -  - C with complex CAD and acute thrombotic lesion in pLAD and subtotal occlusion in mLAD. The RCA has severe proximal and mid RCA disease. She has additional stenting of LAD with Resolute in mid/distal and proximal vessel. These all touched the previously stented mid vessel. Recommend life-long DAPT    2, ESRD -  - hold PD due to problems with drainage of PD fluid    - in CCU on CRRT    3. Fluid excess -  - improving with CRRT     4.  Anemia -  - intensive anemia therapy in [place]  - on WAYNE and IV iron and B12  - S/P two units PRBC  - for transfusion again on CRRT    5. History of GI bleed -  - monitor clinically and serial Hb  - she had a drop in Hb to 7 range   - plan GI consult for in am    6. Hypokalemia   - resolved     7.  Abdominal pain and tenderness with drop in hb , on DAPT   No evidence of retroperitoneal hematoma       Plan:     As above - 35

## 2017-11-24 NOTE — PROGRESS NOTES
Dialysis machine alarming high venous and arterial line pressures. Machine clotted off. Dialysis RN at bedside. Unable to return blood in venous line to pt--approx 200 ml. Both ports flushed w/20 ml NS and capped. Called and updated Dr. Massimo Feldman. Per MD, will not restart CRRT. MD to discuss dialysis options with pt tmrw.   F/u labs in am.

## 2017-11-25 LAB
ANION GAP SERPL CALC-SCNC: 6 MMOL/L (ref 7–16)
ATRIAL RATE: 75 BPM
BUN SERPL-MCNC: 8 MG/DL (ref 8–23)
CALCIUM SERPL-MCNC: 9.6 MG/DL (ref 8.3–10.4)
CALCULATED P AXIS, ECG09: 46 DEGREES
CALCULATED R AXIS, ECG10: -28 DEGREES
CALCULATED T AXIS, ECG11: 69 DEGREES
CHLORIDE SERPL-SCNC: 98 MMOL/L (ref 98–107)
CO2 SERPL-SCNC: 33 MMOL/L (ref 21–32)
CREAT SERPL-MCNC: 2.24 MG/DL (ref 0.6–1)
DIAGNOSIS, 93000: NORMAL
ERYTHROCYTE [DISTWIDTH] IN BLOOD BY AUTOMATED COUNT: 18.3 % (ref 11.9–14.6)
GLUCOSE SERPL-MCNC: 105 MG/DL (ref 65–100)
HCT VFR BLD AUTO: 30.9 % (ref 35.8–46.3)
HGB BLD-MCNC: 9.7 G/DL (ref 11.7–15.4)
MCH RBC QN AUTO: 29.5 PG (ref 26.1–32.9)
MCHC RBC AUTO-ENTMCNC: 31.4 G/DL (ref 31.4–35)
MCV RBC AUTO: 93.9 FL (ref 79.6–97.8)
P-R INTERVAL, ECG05: 182 MS
PLATELET # BLD AUTO: 165 K/UL (ref 150–450)
PMV BLD AUTO: 9.9 FL (ref 10.8–14.1)
POTASSIUM SERPL-SCNC: 3.7 MMOL/L (ref 3.5–5.1)
Q-T INTERVAL, ECG07: 422 MS
QRS DURATION, ECG06: 104 MS
QTC CALCULATION (BEZET), ECG08: 471 MS
RBC # BLD AUTO: 3.29 M/UL (ref 4.05–5.25)
SODIUM SERPL-SCNC: 137 MMOL/L (ref 136–145)
VENTRICULAR RATE, ECG03: 75 BPM
WBC # BLD AUTO: 9.1 K/UL (ref 4.3–11.1)

## 2017-11-25 PROCEDURE — 90945 DIALYSIS ONE EVALUATION: CPT

## 2017-11-25 PROCEDURE — 74011250636 HC RX REV CODE- 250/636: Performed by: INTERNAL MEDICINE

## 2017-11-25 PROCEDURE — 85027 COMPLETE CBC AUTOMATED: CPT | Performed by: INTERNAL MEDICINE

## 2017-11-25 PROCEDURE — 36415 COLL VENOUS BLD VENIPUNCTURE: CPT | Performed by: INTERNAL MEDICINE

## 2017-11-25 PROCEDURE — 65660000000 HC RM CCU STEPDOWN

## 2017-11-25 PROCEDURE — 74011250637 HC RX REV CODE- 250/637: Performed by: INTERNAL MEDICINE

## 2017-11-25 PROCEDURE — 74011250637 HC RX REV CODE- 250/637: Performed by: PHYSICIAN ASSISTANT

## 2017-11-25 PROCEDURE — 93005 ELECTROCARDIOGRAM TRACING: CPT | Performed by: INTERNAL MEDICINE

## 2017-11-25 PROCEDURE — 80048 BASIC METABOLIC PNL TOTAL CA: CPT | Performed by: INTERNAL MEDICINE

## 2017-11-25 RX ADMIN — ZINC 1 TABLET: TAB ORAL at 08:20

## 2017-11-25 RX ADMIN — SUCRALFATE 1 G: 1 SUSPENSION ORAL at 00:23

## 2017-11-25 RX ADMIN — TICAGRELOR 90 MG: 90 TABLET ORAL at 00:21

## 2017-11-25 RX ADMIN — PANTOPRAZOLE SODIUM 40 MG: 40 TABLET, DELAYED RELEASE ORAL at 17:27

## 2017-11-25 RX ADMIN — TICAGRELOR 90 MG: 90 TABLET ORAL at 10:45

## 2017-11-25 RX ADMIN — Medication 5 ML: at 00:21

## 2017-11-25 RX ADMIN — METOPROLOL TARTRATE 12.5 MG: 25 TABLET ORAL at 08:20

## 2017-11-25 RX ADMIN — RANOLAZINE 1000 MG: 500 TABLET, FILM COATED, EXTENDED RELEASE ORAL at 08:21

## 2017-11-25 RX ADMIN — ROSUVASTATIN CALCIUM 20 MG: 20 TABLET, FILM COATED ORAL at 00:21

## 2017-11-25 RX ADMIN — Medication 10 ML: at 23:42

## 2017-11-25 RX ADMIN — METOCLOPRAMIDE HYDROCHLORIDE 5 MG: 10 TABLET ORAL at 08:22

## 2017-11-25 RX ADMIN — AMLODIPINE BESYLATE 5 MG: 5 TABLET ORAL at 08:22

## 2017-11-25 RX ADMIN — STANDARDIZED SENNA CONCENTRATE AND DOCUSATE SODIUM 1 TABLET: 8.6; 5 TABLET, FILM COATED ORAL at 08:20

## 2017-11-25 RX ADMIN — PANTOPRAZOLE SODIUM 40 MG: 40 TABLET, DELAYED RELEASE ORAL at 08:20

## 2017-11-25 RX ADMIN — SUCRALFATE 1 G: 1 SUSPENSION ORAL at 10:45

## 2017-11-25 RX ADMIN — GENTAMICIN SULFATE: 1 CREAM TOPICAL at 18:26

## 2017-11-25 RX ADMIN — Medication 400 MG: at 08:22

## 2017-11-25 RX ADMIN — SUCRALFATE 1 G: 1 SUSPENSION ORAL at 08:22

## 2017-11-25 RX ADMIN — TORSEMIDE 100 MG: 100 TABLET ORAL at 08:22

## 2017-11-25 RX ADMIN — TICAGRELOR 90 MG: 90 TABLET ORAL at 23:40

## 2017-11-25 RX ADMIN — METOCLOPRAMIDE HYDROCHLORIDE 5 MG: 10 TABLET ORAL at 17:27

## 2017-11-25 RX ADMIN — CYANOCOBALAMIN 1000 MCG: 1000 INJECTION, SOLUTION INTRAMUSCULAR at 17:29

## 2017-11-25 RX ADMIN — RANOLAZINE 1000 MG: 500 TABLET, FILM COATED, EXTENDED RELEASE ORAL at 17:27

## 2017-11-25 RX ADMIN — ASPIRIN 81 MG: 81 TABLET, COATED ORAL at 08:22

## 2017-11-25 RX ADMIN — RENAGEL 800 MG: 400 TABLET ORAL at 08:21

## 2017-11-25 RX ADMIN — SUCRALFATE 1 G: 1 SUSPENSION ORAL at 17:27

## 2017-11-25 RX ADMIN — CALCITRIOL 0.25 MCG: 0.25 CAPSULE, LIQUID FILLED ORAL at 08:21

## 2017-11-25 RX ADMIN — LEVOTHYROXINE SODIUM 150 MCG: 150 TABLET ORAL at 08:22

## 2017-11-25 RX ADMIN — TORSEMIDE 100 MG: 100 TABLET ORAL at 17:27

## 2017-11-25 NOTE — PROGRESS NOTES
Problem: Nutrition Deficit  Goal: *Optimize nutritional status  Nutrition LOS Note: day 7  Assessment  Diet order(s): cardiac with nepro and ensure high protein ordered with each meal daily   Food,Nutrition, and Pertinent History: Patient seen in company of family member at bedside. Patient reports that she is not eating well d/t dislike of the hospital foods. Also, she has been receiving ensure and nepro with each meal and will not drink any supplementation except for boost.  Per patient, she was eating well PTA and drinking boost to supplement. History notable for ESRD with PD, CAD and CVA. Acknowledge note of intermittent abdominal pain/tenderness, however, patient does not report this as a barrier to intake. She is receiving Renagel with each meal, MVI w/ Zn daily, Reglan, and demadex. Anthropometrics: Height: 5' 10\" (177.8 cm), Weight Source: Standing scale (comment), Weight: 106 kg (233 lb 11.2 oz), Body mass index is 33.53 kg/(m^2). BMI class of overweight for age. Current weight is not a dry weight. Edema: 2+ pitting to BLEs  Macronutrient Needs:  · EER:  0394-8476 kcal /day (20-25 kcal/kg I BW)-pt remains edematous   · EPR:  82-95 grams protein/day (1.2-1.4 grams/kg IBW)(GFR 27)-ESRD  Intake/Comparative Standards: Average intake for past 7 day(s)/10 recorded meal(s): 42%. This potentially meets ~61% of kcal and ~45% of protein needs    Nutrition Diagnosis: Inadequate oral intake r/t decreased ability to consume adequate oral intake, as evidenced by patient report of dislike of food served, dislike of current supplementation ordered, and meeting above noted needs. Intervention:   Meals and snacks: Add renal diet restriction to current diet. Supplementation: Discontinue ensure high protein TID and Nepro TID   Add boost TID. Discharge nutrition plan: No discharge needs identified. Could continue boost as needed.     Amelia Garza [address], [address] [place] [address], [address] [place] [address], [phone]

## 2017-11-25 NOTE — PROGRESS NOTES
Upon second neuro assessment, pt now unable to raise left arm above shoulder level and is able to raise right arm fully.

## 2017-11-25 NOTE — PROGRESS NOTES
[facility] CARDIOLOGY PROGRESS NOTE           11/25/2017 8:34 AM    Admit Date: 11/18/2017      Subjective:   No CP and feels markedly better post PCI. Some fatigue and worsening anemia. CT with no RP bleed but has abdominal wall hematoma. Got CRRT initiated and transfused 2 units PRBC. Doing better. Labs stable. Was complaining of right Upper ext weakness and tingling. No objective findings of weakness. Pt moves right arm      ROS:  Cardiovascular:  As noted above    Objective:      Vitals:    11/24/17 1800 11/24/17 2103 11/25/17 0122 11/25/17 0516   BP:  118/55 120/61 138/65   Pulse: 80 75 73 75   Resp: 21 18 20 22   Temp:  97.3 °F (36.3 °C) 98.1 °F (36.7 °C) 97.6 °F (36.4 °C)   SpO2:  100% 99% 92%   Weight:    106 kg (233 lb 11.2 oz)   Height:           Physical Exam:  General-No Acute Distress  Neck- supple, no JVD  CV- regular rate and rhythm no MRG  Lung- clear bilaterally  Abd- soft, nontender, nondistended  Ext- no edema bilaterally. Skin- warm and dry    Data Review:   Recent Labs      11/25/17   0501  11/24/17   0317   NA  137  137   K  3.7  4.4   BUN  8  7*   CREA  2.24*  2.50*   GLU  105*  78   WBC  9.1  11.9*   HGB  9.7*  8.3*   HCT  30.9*  25.4*   PLT  165  164       Assessment/Plan:     Active Problems:    CAD (coronary artery disease) (11/27/2015)    S/P complex PCI and stable on ASA and Brilinta      Unstable angina (HCC) (2/1/2016)    As above      Elevated troponin (11/18/2017)    As above      Chronic anemia (11/18/2017)has been transfused twice    This is worse and awaiting labs and hematology assistance. ESRD (end stage renal disease) ([facility] [address] [facility] (11/18/2017)    On PD.  Numbers looking good    Will need disposition assistance          Ajit Rooney MD  11/25/2017 8:34 AM

## 2017-11-25 NOTE — PROGRESS NOTES
Upon third assessment, pt able to fully raise both arms above head, still with no neuro deficits upon exam.  Pt states that neither of her arms are now hurting and are not \"weak feeling\". Pt is able to walk with minimal assist to bedside commode then walks approx 6 feet into recliner.

## 2017-11-25 NOTE — PROGRESS NOTES
Bedside and Verbal shift change report given to [facility], RN (oncoming nurse) by self (offgoing nurse). Report included the following information SBAR, Kardex, MAR and Recent Results.

## 2017-11-25 NOTE — PROGRESS NOTES
Bedside and Verbal shift change report given to self (oncoming nurse) by Savannah Lemus Rn (offgoing nurse). Report included the following information SBAR, Kardex, MAR and Recent Results.

## 2017-11-25 NOTE — PROGRESS NOTES
Bedside and Verbal shift change report given to self (oncoming nurse) by Kae Wilson RN (offgoing nurse). Report included the following information Kardex, ED Summary, MAR and Recent Results.

## 2017-11-25 NOTE — PROGRESS NOTES
Problem: Falls - Risk of  Goal: *Absence of Falls  Document Harrison Fall Risk and appropriate interventions in the flowsheet.    Outcome: Progressing Towards Goal  Fall Risk Interventions:  Mobility Interventions: Assess mobility with egress test, Bed/chair exit alarm, Communicate number of staff needed for ambulation/transfer, Mechanical lift, OT consult for ADLs, Patient to call before getting OOB, PT Consult for mobility concerns, PT Consult for assist device competence, Strengthening exercises (ROM-active/passive), Utilize walker, cane, or other assitive device, Utilize gait belt for transfers/ambulation         Medication Interventions: Assess postural VS orthostatic hypotension, Patient to call before getting OOB, Evaluate medications/consider consulting pharmacy, Bed/chair exit alarm, Teach patient to arise slowly, Utilize gait belt for transfers/ambulation    Elimination Interventions: Bed/chair exit alarm, Call light in reach, Elevated toilet seat, Patient to call for help with toileting needs, Toilet paper/wipes in reach, Toileting schedule/hourly rounds, Urinal in reach

## 2017-11-25 NOTE — PROGRESS NOTES
Bedside and Verbal shift change report given to Vu Mosqueda RN (oncoming nurse) by self Jana whitaker). Report included the following information SBAR, Kardex, MAR and Recent Results.

## 2017-11-25 NOTE — PROGRESS NOTES
Pt complaining of right arm pain & weakness, full neuro assessment completed with no deficits detected. Charge RN Lukas Winter notified and completed assessment as well with no deficits. Pt complaining of more of a muscle pain in right arm and is unable to raise it above shoulder level. Dr. Kevan Kidd mentions in prior note that he is aware of arm pain and has assessed patient. Pt also complains of weakness in legs when walking. Will continue to monitor.

## 2017-11-25 NOTE — PROGRESS NOTES
Report received from Adal Chavarria [facility]. Patient transferred to floor. Placed on monitor. Report given to night shift nurse, Daria Dominguez RN. Patient resting in bed.

## 2017-11-25 NOTE — PROGRESS NOTES
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 She has a history of GI bleeding and had anemia-induced unstable angina in the past. 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 She states the pain has since resolved with NTG paste.  No fevers or chills. No nausea, vomiting or diarrhea.  She states that she is essentially anuric and occasionally makes minimal urine.  She has a h/o CVA and TIA. No fever or chills, no problems with PD drainage or discoloration of PD fluid. No increased thirst. She wishes regular diet and supplement. She denies any other acute complaints  Her edema has improved in the past couple of days with intensified dialysis.     s-  Transferred out of ICU after stopping CRRT and resumed PD - dyspnea improving and abdominal distention is better off PD - PD draining satisfactorily since she had a BM with no apparent bleeding noted - blood loss may have come from hematoma rather than recurrent GI bleeding -  numbness of the right arm and c/o pinched feeling in right shoulder and leg weakness with inability to ambulate are at issue - for eval for 9th floor rehab - discussed with Dr. Cruzito Kendrick -     Past Medical History:   Diagnosis Date    Anemia of chronic renal failure 4/28/2015    Anterior myocardial infarction [facility]) 5/21/2015    CAD (coronary artery disease)     Chest pain     Chronic kidney disease, stage III (moderate) 8/15/2014    on dialysis    CKD (chronic kidney disease) stage 4, GFR 15-29 ml/min ([address] 4/28/2015    Debility 5/5/2015    Depression 12/29/2015    Edema 12/29/2015    Endocrine disease     Hypothyroidism    GERD (gastroesophageal reflux disease)     Heart murmur 12/29/2015    HLD (hyperlipidemia) 12/29/2015    Hypertension     Hypothyroidism 4/28/2015    Ischemic cardiomyopathy 12/29/2015    Nausea     S/P PTCA (percutaneous transluminal coronary angioplasty); LAD PTCA of  ISR 11/27/15 5/24/2016    STEMI (ST elevation myocardial infarction) ([facility] [address] 4/28/2015    Unspecified sleep apnea     uses cpap machine    Unstable angina ([facility] [address]       Past Surgical History:   Procedure Laterality Date    HX BACK SURGERY  1990    neck surgery cervical disc    HX BACK SURGERY      lower back    HX CATARACT REMOVAL Bilateral     HX CHOLECYSTECTOMY  19702    gall bladder     HX KNEE REPLACEMENT Right 2006    HX PTCA  4/28/2015    2.25 Xience stent to mid LAD for occluded artery, anterior MI, EF 25%. Moderate disease distal LAD and distal OM PCI CX and RCA 2004, then PCI RCA and LAD in 2009. Prior to Admission medications    Medication Sig Start Date End Date Taking? Authorizing Provider   folic acid (FOLVITE) 1 mg tablet Take 1 mg by mouth daily. Yes Historical Provider   potassium chloride (K-DUR, KLOR-CON) 20 mEq tablet Take 20 mEq by mouth two (2) times a day. Yes Historical Provider   traZODone (DESYREL) 50 mg tablet Take  by mouth nightly. Yes Historical Provider   megestrol (MEGACE) 400 mg/10 mL (40 mg/mL) suspension Take 200 mg by mouth daily. Yes Historical Provider   amLODIPine (NORVASC) 10 mg tablet Take 1 Tab by mouth daily. 9/23/17   Josse Lee,    pantoprazole (PROTONIX) 40 mg tablet Take 1 Tab by mouth Daily (before breakfast). 9/23/17   Josse Lee,    sucralfate (CARAFATE) 100 mg/mL suspension Take 10 mL by mouth Before breakfast, lunch, dinner and at bedtime. Indications: PREVENTION OF STRESS ULCER 9/23/17   Josse Lee DO   torsemide [facility]) 100 mg tablet Take  by mouth daily.     Historical Provider   nitroglycerin (NITROLINGUAL) 400 mcg/spray spray 1 Spray by SubLINGual route every five (5) minutes as needed for Chest Pain. Historical Provider   linaclotide Esthela Bryon) 145 mcg cap capsule Take  by mouth Daily (before breakfast). Historical Provider   magnesium oxide (MAG-OX) 400 mg tablet Take 400 mg by mouth daily. Historical Provider   PNV NO.122/IRON/FOLIC ACID (PRENATAL MULTI PO) Take  by mouth. Historical Provider   metoprolol tartrate (LOPRESSOR) 25 mg tablet Take 12.5 mg by mouth daily. Take PRN for BP <120. 6/23/16   Dougie Falcon III, MD   ondansetron (ZOFRAN ODT) 4 mg disintegrating tablet Take 4 mg by mouth three (3) times daily as needed. Historical Provider   metoclopramide HCl (REGLAN) 5 mg tablet Take 5 mg by mouth two (2) times a day. Historical Provider   ticagrelor (BRILINTA) 90 mg tablet Take 1 Tab by mouth two (2) times a day. 2/4/16   MINDY Wilson   promethazine (PHENERGAN) 25 mg tablet Take 25 mg by mouth every eight (8) hours as needed for Nausea. Historical Provider   sevelamer carbonate (RENVELA) 800 mg tab tab Take 800 mg by mouth three (3) times daily (with meals). Historical Provider   gentamicin (GARAMYCIN) 0.1 % topical cream APPLY TO PD catheter exit site at daily dressing change 5/12/15   Lilibeth Gibbs MD   aspirin delayed-release 81 mg tablet Take 1 Tab by mouth daily. 5/5/15   Gisela Hollingsworth PA-C   darbepoetin toya in polysorbat (ARANESP, POLYSORBATE,) 40 mcg/mL injection 40 mcg by SubCUTAneous route every fourteen (14) days. Indications: ANEMIA IN CHRONIC KIDNEY DISEASE    Historical Provider   rosuvastatin (CRESTOR) 20 mg tablet Take 20 mg by mouth nightly. Historical Provider   ranolazine ER (RANEXA) 500 mg SR tablet Take 500 mg by mouth two (2) times a day. Historical Provider   levothyroxine (SYNTHROID) 150 mcg tablet Take 150 mcg by mouth Daily (before breakfast).     Historical Provider     Allergies   Allergen Reactions    Codeine Nausea and Vomiting      Social History   Substance Use Topics    Smoking status: Never Smoker    Smokeless tobacco: Never Used    Alcohol use No      Family History   Problem Relation Age of Onset    Heart Disease Mother     Hypertension Mother     Cancer Mother      Lung    Stroke Father     Hypertension Father     Breast Cancer Neg Hx           Review of Systems    Constitutional: no fever, weak  Eyes: fair vision,    Ears, nose, mouth, throat, and face:fair hearing,   Respiratory: no asthma,  Chronic home O2 is being used - improved dyspnea  Cardiovascular:no palpitation, no  chest pain,   Gastrointestinal:no diarrhea,  Had BM today  Genitourinary: no dysuria,   Hematologic/lymphatic: no bleeding tendency,   Neurological: no seizures   Behvioral/Psych: no psych hospitalization   Endocrine: no goiter,       Objective:       Visit Vitals    /67 (BP 1 Location: Right arm, BP Patient Position: At rest)    Pulse 81    Temp 99 °F (37.2 °C)    Resp 20    Ht 5' 10\" (1.778 m)    Wt 106 kg (233 lb 11.2 oz)    SpO2 100%    BMI 33.53 kg/m2       General:  Alert, cooperative, no distress, appears stated age. Head:  Normocephalic, without obvious abnormality, atraumatic. Eyes:  Conjunctivae/corneas clear. EOMs intact. Throat: Lips, mucosa, and tongue normal. Teeth and gums normal.   Neck: Supple, symmetrical, trachea midline, no adenopathy,  no JVD. Lungs:   Clear to auscultation bilaterally. Heart:  Regular rate and rhythm, S1, S2 normal, 2/6 systolic  murmur, no rub or gallop. Abdomen:   Soft, non-tender. No masses,  No organomegaly. . PD catheter in place without exudate   Extremities: Extremities normal, atraumatic, no cyanosis. 1-2+edema. Skin: Skin color, texture, turgor normal. No rashes or lesions. Lymph nodes: Cervical and supraclavicular nodes normal.   Neurologic: Grossly intact. No asterixis. Data Review:     Results for Vivienne Kuhn (MRN [id number]) as of 11/22/2017 16:39   Ref.  Range 9/23/2017 04:27 11/18/2017 12:45 11/19/2017 00:06 11/21/2017 00:38 11/22/2017 05:36   WBC Latest Ref Range: 4.3 - 11.1 K/uL 7.0 8.9 7.6 9.3 13.9 (H)   RBC Latest Ref Range: 4.05 - 5.25 M/uL 3.05 (L) 2.97 (L) 2.79 (L) 2.45 (L) 2.54 (L)   HGB Latest Ref Range: 11.7 - 15.4 g/dL 9.2 (L) 8.9 (L) 8.5 (L) 7.4 (L) 7.8 (L)   HCT Latest Ref Range: 35.8 - 46.3 % 28.9 (L) 28.5 (L) 26.1 (L) 23.0 (L) 24.6 (L)   MCV Latest Ref Range: 79.6 - 97.8 FL 94.8 96.0 93.5 93.9 96.9   MCH Latest Ref Range: 26.1 - 32.9 PG 30.2 30.0 30.5 30.2 30.7   MCHC Latest Ref Range: 31.4 - 35.0 g/dL 31.8 31.2 (L) 32.6 32.2 31.7   RDW Latest Ref Range: 11.9 - 14.6 % 15.6 (H) 15.4 (H) 15.4 (H) 15.6 (H) 16.0 (H)   PLATELET Latest Ref Range: 150 - 450 K/uL 235 212 177 160 186   MPV Latest Ref Range: 10.8 - 14.1 FL 10.0 (L) 10.3 (L) 10.1 (L) 10.1 (L) 10.4 (L)     Results for Bradford Peña (MRN [id number]) as of 11/22/2017 16:39   Ref.  Range 11/19/2017 06:30 11/20/2017 05:01 11/21/2017 07:08 11/22/2017 05:36   Sodium Latest Ref Range: 136 - 145 mmol/L 138 136 134 (L) 135 (L)   Potassium Latest Ref Range: 3.5 - 5.1 mmol/L 3.2 (L) 4.0 4.3 4.5   Chloride Latest Ref Range: 98 - 107 mmol/L 102 100 99 98   CO2 Latest Ref Range: 21 - 32 mmol/L 26 24 24 23   Anion gap Latest Ref Range: 7 - 16 mmol/L 10 12 11 14   Glucose Latest Ref Range: 65 - 100 mg/dL 121 (H) 91 119 (H) 129 (H)   BUN Latest Ref Range: 8 - 23 MG/DL 31 (H) 34 (H) 39 (H) 36 (H)   Creatinine Latest Ref Range: 0.6 - 1.0 MG/DL 8.86 (H) 9.63 (H) 9.84 (H) 9.54 (H)   Calcium Latest Ref Range: 8.3 - 10.4 MG/DL 9.7 9.1 8.5 9.6   GFR est non-AA Latest Ref Range: >60 ml/min/1.73m2 5 (L) 4 (L) 4 (L) 4 (L)   GFR est AA Latest Ref Range: >60 ml/min/1.73m2 5 (L) 5 (L) 5 (L) 5 (L)       Recent Results (from the past 24 hour(s))   CBC W/O DIFF    Collection Time: 11/25/17  5:01 AM   Result Value Ref Range    WBC 9.1 4.3 - 11.1 K/uL    RBC 3.29 (L) 4.05 - 5.25 M/uL    HGB 9.7 (L) 11.7 - 15.4 g/dL    HCT 30.9 (L) 35.8 - 46.3 %    MCV 93.9 79.6 - 97.8 FL    MCH 29.5 26.1 - 32.9 PG MCHC 31.4 31.4 - 35.0 g/dL    RDW 18.3 (H) 11.9 - 14.6 %    PLATELET 220 897 - 023 K/uL    MPV 9.9 (L) 10.8 - 70.6 FL   METABOLIC PANEL, BASIC    Collection Time: 11/25/17  5:01 AM   Result Value Ref Range    Sodium 137 136 - 145 mmol/L    Potassium 3.7 3.5 - 5.1 mmol/L    Chloride 98 98 - 107 mmol/L    CO2 33 (H) 21 - 32 mmol/L    Anion gap 6 (L) 7 - 16 mmol/L    Glucose 105 (H) 65 - 100 mg/dL    BUN 8 8 - 23 MG/DL    Creatinine 2.24 (H) 0.6 - 1.0 MG/DL    GFR est AA 27 (L) >60 ml/min/1.73m2    GFR est non-AA 22 (L) >60 ml/min/1.73m2    Calcium 9.6 8.3 - 10.4 MG/DL   EKG, 12 LEAD, INITIAL    Collection Time: 11/25/17  7:20 AM   Result Value Ref Range    Ventricular Rate 75 BPM    Atrial Rate 75 BPM    P-R Interval 182 ms    QRS Duration 104 ms    Q-T Interval 422 ms    QTC Calculation (Bezet) 471 ms    Calculated P Axis 46 degrees    Calculated R Axis -28 degrees    Calculated T Axis 69 degrees    Diagnosis       Normal sinus rhythm  Moderate voltage criteria for LVH, may be normal variant  T wave abnormality, consider anterolateral ischemia  Prolonged QT  Abnormal ECG  When compared with ECG of 24-NOV-2017 06:36,  Fusion complexes are no longer Present  Minimal criteria for Anterior infarct are no longer Present  Confirmed by Diana Guidry MD (), MAINOR BLAIR ([id number]) on 11/25/2017 10:10:40 AM         CXR viewed by me - no major infiltrate or fluid excess, CM with left possible minor effusion is present        CT abdomen/pelvis  CT ABDOMEN:  Peritoneal fluid in the perihepatic space, mild paracolic gutters,  extending down into the pelvis. Peritoneal dialysis catheter noted. There is not  any evidence of retroperitoneal hematoma identified. Strandy fluid density does  extend into the extraperitoneal space in the lower abdomen and pelvis.  There is  radiodensity within the renal collecting systems, possibly previously  administered IV contrast. There is peripancreatic fluid and stranding, cannot  exclude acute pancreatitis. No biliary dilatation. Spleen is not enlarged. Small  bowel normal caliber.   CT PELVIS:   Moderate to large volume pelvic fluid.   There is diffuse asymmetric enlargement of the right rectus and oblique  abdominal muscles. There are are of higher attenuation the contralateral side  and findings are consistent with an abdominal wall hematoma. IMPRESSION:    1. Evidence of right abdominal wall hematoma. 2. No CT evidence to suggest retroperitoneal hematoma. 3. Extensive fluid in the abdomen and pelvis, presumed related to peritoneal  dialysis. Active Problems:    CAD (coronary artery disease) (11/27/2015)      Unstable angina ([address] [address] (2/1/2016)      Elevated troponin (11/18/2017)      Chronic anemia (11/18/2017)      ESRD (end stage renal disease) ([address] (11/18/2017)        Assessment:     1. CAD x NSTEMI, Unstable angina -  - C with complex CAD and acute thrombotic lesion in pLAD and subtotal occlusion in mLAD. The RCA has severe proximal and mid RCA disease. She has additional stenting of LAD with Resolute in mid/distal and proximal vessel. These all touched the previously stented mid vessel. Recommend life-long DAPT    2, ESRD -  - resumed cycler PD successfully    3. Fluid excess -  - improved  with CRRT     4. Anemia -  - intensive anemia therapy in [employer]  - on WAYNE and IV iron and B12  - improved S/P  transfusion of PRBC    5. History of GI bleed -  - monitor clinically and serial Hb  - she had a drop in Hb to 7 range and improved with BT    6. Hypokalemia   - resolved     7.  Abdominal pain and tenderness with drop in hb , on DAPT   No evidence of retroperitoneal hematoma       Plan:     As above - 25

## 2017-11-26 LAB
ANION GAP SERPL CALC-SCNC: 7 MMOL/L (ref 7–16)
ATRIAL RATE: 80 BPM
BUN SERPL-MCNC: 13 MG/DL (ref 8–23)
CALCIUM SERPL-MCNC: 9.7 MG/DL (ref 8.3–10.4)
CALCULATED P AXIS, ECG09: 49 DEGREES
CALCULATED R AXIS, ECG10: -27 DEGREES
CALCULATED T AXIS, ECG11: 74 DEGREES
CHLORIDE SERPL-SCNC: 100 MMOL/L (ref 98–107)
CO2 SERPL-SCNC: 31 MMOL/L (ref 21–32)
CREAT SERPL-MCNC: 3.67 MG/DL (ref 0.6–1)
DIAGNOSIS, 93000: NORMAL
ERYTHROCYTE [DISTWIDTH] IN BLOOD BY AUTOMATED COUNT: 18.7 % (ref 11.9–14.6)
GLUCOSE SERPL-MCNC: 84 MG/DL (ref 65–100)
HCT VFR BLD AUTO: 29.4 % (ref 35.8–46.3)
HGB BLD-MCNC: 9.1 G/DL (ref 11.7–15.4)
MCH RBC QN AUTO: 29.5 PG (ref 26.1–32.9)
MCHC RBC AUTO-ENTMCNC: 31 G/DL (ref 31.4–35)
MCV RBC AUTO: 95.5 FL (ref 79.6–97.8)
P-R INTERVAL, ECG05: 172 MS
PLATELET # BLD AUTO: 166 K/UL (ref 150–450)
PMV BLD AUTO: 9.7 FL (ref 10.8–14.1)
POTASSIUM SERPL-SCNC: 3.6 MMOL/L (ref 3.5–5.1)
Q-T INTERVAL, ECG07: 434 MS
QRS DURATION, ECG06: 98 MS
QTC CALCULATION (BEZET), ECG08: 500 MS
RBC # BLD AUTO: 3.08 M/UL (ref 4.05–5.25)
SODIUM SERPL-SCNC: 138 MMOL/L (ref 136–145)
VENTRICULAR RATE, ECG03: 80 BPM
WBC # BLD AUTO: 10.4 K/UL (ref 4.3–11.1)

## 2017-11-26 PROCEDURE — 85027 COMPLETE CBC AUTOMATED: CPT | Performed by: INTERNAL MEDICINE

## 2017-11-26 PROCEDURE — 36415 COLL VENOUS BLD VENIPUNCTURE: CPT | Performed by: INTERNAL MEDICINE

## 2017-11-26 PROCEDURE — 80048 BASIC METABOLIC PNL TOTAL CA: CPT | Performed by: INTERNAL MEDICINE

## 2017-11-26 PROCEDURE — 74011250637 HC RX REV CODE- 250/637: Performed by: PHYSICIAN ASSISTANT

## 2017-11-26 PROCEDURE — 74011250637 HC RX REV CODE- 250/637: Performed by: INTERNAL MEDICINE

## 2017-11-26 PROCEDURE — 93005 ELECTROCARDIOGRAM TRACING: CPT | Performed by: INTERNAL MEDICINE

## 2017-11-26 PROCEDURE — 65660000000 HC RM CCU STEPDOWN

## 2017-11-26 RX ADMIN — CALCITRIOL 0.25 MCG: 0.25 CAPSULE, LIQUID FILLED ORAL at 10:59

## 2017-11-26 RX ADMIN — Medication 5 ML: at 22:07

## 2017-11-26 RX ADMIN — METOCLOPRAMIDE HYDROCHLORIDE 5 MG: 10 TABLET ORAL at 17:16

## 2017-11-26 RX ADMIN — TICAGRELOR 90 MG: 90 TABLET ORAL at 22:06

## 2017-11-26 RX ADMIN — ROSUVASTATIN CALCIUM 20 MG: 20 TABLET, FILM COATED ORAL at 22:06

## 2017-11-26 RX ADMIN — RANOLAZINE 1000 MG: 500 TABLET, FILM COATED, EXTENDED RELEASE ORAL at 17:13

## 2017-11-26 RX ADMIN — TORSEMIDE 100 MG: 100 TABLET ORAL at 17:13

## 2017-11-26 RX ADMIN — Medication 400 MG: at 11:03

## 2017-11-26 RX ADMIN — METOPROLOL TARTRATE 12.5 MG: 25 TABLET ORAL at 11:10

## 2017-11-26 RX ADMIN — ZINC 1 TABLET: TAB ORAL at 11:02

## 2017-11-26 RX ADMIN — PANTOPRAZOLE SODIUM 40 MG: 40 TABLET, DELAYED RELEASE ORAL at 11:02

## 2017-11-26 RX ADMIN — PANTOPRAZOLE SODIUM 40 MG: 40 TABLET, DELAYED RELEASE ORAL at 17:13

## 2017-11-26 RX ADMIN — STANDARDIZED SENNA CONCENTRATE AND DOCUSATE SODIUM 1 TABLET: 8.6; 5 TABLET, FILM COATED ORAL at 11:01

## 2017-11-26 RX ADMIN — METOCLOPRAMIDE HYDROCHLORIDE 5 MG: 10 TABLET ORAL at 11:01

## 2017-11-26 RX ADMIN — AMLODIPINE BESYLATE 5 MG: 5 TABLET ORAL at 11:09

## 2017-11-26 RX ADMIN — TORSEMIDE 100 MG: 100 TABLET ORAL at 13:23

## 2017-11-26 RX ADMIN — Medication 5 ML: at 14:00

## 2017-11-26 RX ADMIN — ASPIRIN 81 MG: 81 TABLET, COATED ORAL at 10:59

## 2017-11-26 RX ADMIN — RANOLAZINE 1000 MG: 500 TABLET, FILM COATED, EXTENDED RELEASE ORAL at 10:59

## 2017-11-26 RX ADMIN — TICAGRELOR 90 MG: 90 TABLET ORAL at 11:10

## 2017-11-26 RX ADMIN — SUCRALFATE 1 G: 1 SUSPENSION ORAL at 17:13

## 2017-11-26 RX ADMIN — Medication 10 ML: at 06:12

## 2017-11-26 RX ADMIN — SUCRALFATE 1 G: 1 SUSPENSION ORAL at 10:57

## 2017-11-26 RX ADMIN — LEVOTHYROXINE SODIUM 150 MCG: 150 TABLET ORAL at 11:03

## 2017-11-26 NOTE — PROGRESS NOTES
Verbal bedside report received from [facility]. Assumed care of patient.

## 2017-11-26 NOTE — PROGRESS NOTES
[facility] CARDIOLOGY PROGRESS NOTE           11/26/2017 8:34 AM    Admit Date: 11/18/2017      Subjective:   No CP and feels markedly better post PCI. Some fatigue and worsening anemia. CT with no RP bleed but has abdominal wall hematoma. Got CRRT initiated and transfused 2 units PRBC. Doing better. Labs stable. Was complaining of right Upper ext weakness and tingling. No objective findings of weakness. Pt moves right arm      ROS:  Cardiovascular:  As noted above    Objective:      Vitals:    11/25/17 1630 11/25/17 2030 11/26/17 0052 11/26/17 0506   BP: 124/62 119/57 127/59 137/71   Pulse: 84 85 89 85   Resp: 20 18 18 16   Temp: 98.1 °F (36.7 °C) 98.6 °F (37 °C) 98.4 °F (36.9 °C) 98.6 °F (37 °C)   SpO2: 100% 93% 96% 96%   Weight:    107.7 kg (237 lb 7 oz)   Height:           Physical Exam:  General-No Acute Distress  Neck- supple, no JVD  CV- regular rate and rhythm no MRG  Lung- clear bilaterally  Abd- soft, nontender, nondistended  Ext- no edema bilaterally. Skin- warm and dry    Data Review:   Recent Labs      11/26/17   0534  11/25/17   0501   NA  138  137   K  3.6  3.7   BUN  13  8   CREA  3.67*  2.24*   GLU  84  105*   WBC  10.4  9.1   HGB  9.1*  9.7*   HCT  29.4*  30.9*   PLT  166  165       Assessment/Plan:     Active Problems:    CAD (coronary artery disease) (11/27/2015)    S/P complex PCI and stable on ASA and Brilinta      Unstable angina (H[facility]) (2/1/2016)    As above      Elevated troponin (11/18/2017)    As above      Chronic anemia (11/18/2017)has been transfused twice    This is worse and awaiting labs and hematology assistance. ESRD (end stage renal disease) ([facility] [address] (11/18/2017)    On PD.  Numbers looking good    Will need disposition assistance    Agree with rehab Olesya Perez MD  11/26/2017 8:34 AM

## 2017-11-26 NOTE — PROGRESS NOTES
Bedside and Verbal shift change report given to [facility], [facility], RN (oncoming nurse) by self (offgoing nurse). Report included the following information SBAR, Kardex, Intake/Output and Recent Results.

## 2017-11-26 NOTE — PROGRESS NOTES
Problem: Falls - Risk of  Goal: *Absence of Falls  Document Harrison Fall Risk and appropriate interventions in the flowsheet.    Outcome: Progressing Towards Goal  Fall Risk Interventions:  Mobility Interventions: Bed/chair exit alarm, Communicate number of staff needed for ambulation/transfer, Patient to call before getting OOB         Medication Interventions: Patient to call before getting OOB, Teach patient to arise slowly, Bed/chair exit alarm    Elimination Interventions: Call light in reach, Bed/chair exit alarm, Patient to call for help with toileting needs, Toileting schedule/hourly rounds

## 2017-11-26 NOTE — PROGRESS NOTES
[facility] CRITICAL CARE OUTREACH NURSE PROGRESS REPORT      SUBJECTIVE: Called to assess patient secondary to recent transfer from CCU. MEWS Score: 1 (11/25/17 2030)  Vitals:    11/25/17 0950 11/25/17 1230 11/25/17 1630 11/25/17 2030   BP: 127/65 140/67 124/62 119/57   Pulse: 77 81 84 85   Resp: 20 20 20 18   Temp: 97.8 °F (36.6 °C) 99 °F (37.2 °C) 98.1 °F (36.7 °C) 98.6 °F (37 °C)   SpO2: 100% 100% 100% 93%   Weight:       Height:          EKG: normal EKG, normal sinus rhythm, unchanged from previous tracings. LAB DATA:    Recent Labs      11/25/17   0501  11/24/17 0317 11/23/17   0441   NA  137  137  135*   K  3.7  4.4  4.4   CL  98  98  99   CO2  33*  32  26   AGAP  6*  7  10   GLU  105*  78  120*   BUN  8  7*  37*   CREA  2.24*  2.50*  9.29*   GFRAA  27*  24*  5*   GFRNA  22*  19*  4*   CA  9.6  8.7  8.6        Recent Labs      11/25/17   0501  11/24/17 0317 11/23/17   0441   WBC  9.1  11.9*  10.5   HGB  9.7*  8.3*  6.6*   HCT  30.9*  25.4*  20.7*   PLT  165  164  179          OBJECTIVE: On arrival to room, I found patient to be awake, resting in bed. Pain Assessment  Pain Intensity 1: 0 (11/25/17 2340)  Pain Location 1: Chest  Pain Intervention(s) 1: Medication (see MAR)  Patient Stated Pain Goal: 0    ASSESSMENT:  PD halted due to patient stating feeling, \"too full,\" and not draining properly. Patient awake, respirations even and unlabored on RA. NAD noted. PLAN:  Will continue to monitor per outreach protocol.

## 2017-11-26 NOTE — PROGRESS NOTES
Bedside and Verbal shift change report given to self (oncoming nurse) by Aubrey Johnston (offgoing nurse). Report included the following information SBAR, Kardex, MAR and Recent Results.

## 2017-11-26 NOTE — PROGRESS NOTES
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 She has a history of GI bleeding and had anemia-induced unstable angina in the past. 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 She states the pain has since resolved with NTG paste.  No fevers or chills. No nausea, vomiting or diarrhea.  She states that she is essentially anuric and occasionally makes minimal urine.  She has a h/o CVA and TIA. No fever or chills, no problems with PD drainage or discoloration of PD fluid. No increased thirst. She wishes regular diet and supplement. She denies any other acute complaints  Her edema has improved in the past couple of days with intensified dialysis.     s-  Transferred out of ICU after stopping CRRT and resumed PD, however, PD needed to be stopped last night due to recurrent retention of fluid - discussed plans for HD with patient for the next two days and she agrees -  Worsening dyspnea again and recurrent abdominal distention noted -  blood loss may have come from hematoma rather than recurrent GI bleeding -  numbness of the right arm and c/o pinched feeling in right shoulder and leg weakness with inability to ambulate are at issue - for eval for 9th floor rehab - discussed with Dr. Dagoberto Wick -     Past Medical History:   Diagnosis Date    Anemia of chronic renal failure 4/28/2015    Anterior myocardial infarction [facility]) 5/21/2015    CAD (coronary artery disease)     Chest pain     Chronic kidney disease, stage III (moderate) 8/15/2014    on dialysis    CKD (chronic kidney disease) stage 4, GFR 15-29 ml/min ([facility] [address] 4/28/2015    Debility 5/5/2015    Depression 12/29/2015    Edema 12/29/2015    Endocrine disease     Hypothyroidism    GERD (gastroesophageal reflux disease)     Heart murmur 12/29/2015    HLD (hyperlipidemia) 12/29/2015    Hypertension     Hypothyroidism 4/28/2015    Ischemic cardiomyopathy 12/29/2015    Nausea     S/P PTCA (percutaneous transluminal coronary angioplasty); LAD PTCA of  ISR 11/27/15 5/24/2016    STEMI (ST elevation myocardial infarction) ([facility] [address] 4/28/2015    Unspecified sleep apnea     uses cpap machine    Unstable angina ([facility] [address]       Past Surgical History:   Procedure Laterality Date    HX BACK SURGERY  1990    neck surgery cervical disc    HX BACK SURGERY      lower back    HX CATARACT REMOVAL Bilateral     HX CHOLECYSTECTOMY  19702    gall bladder     HX KNEE REPLACEMENT Right 2006    HX PTCA  4/28/2015    2.25 Xience stent to mid LAD for occluded artery, anterior MI, EF 25%. Moderate disease distal LAD and distal OM PCI CX and RCA 2004, then PCI RCA and LAD in 2009. Prior to Admission medications    Medication Sig Start Date End Date Taking? Authorizing Provider   folic acid (FOLVITE) 1 mg tablet Take 1 mg by mouth daily. Yes Historical Provider   potassium chloride (K-DUR, KLOR-CON) 20 mEq tablet Take 20 mEq by mouth two (2) times a day. Yes Historical Provider   traZODone (DESYREL) 50 mg tablet Take  by mouth nightly. Yes Historical Provider   megestrol (MEGACE) 400 mg/10 mL (40 mg/mL) suspension Take 200 mg by mouth daily. Yes Historical Provider   amLODIPine (NORVASC) 10 mg tablet Take 1 Tab by mouth daily. 9/23/17   Dieudonne Castillo DO   pantoprazole (PROTONIX) 40 mg tablet Take 1 Tab by mouth Daily (before breakfast). 9/23/17   Dieudonne Castillo DO   sucralfate (CARAFATE) 100 mg/mL suspension Take 10 mL by mouth Before breakfast, lunch, dinner and at bedtime. Indications: PREVENTION OF STRESS ULCER 9/23/17   Dieudonen Castillo DO   torsemide [facility]) 100 mg tablet Take  by mouth daily.     Historical Provider   nitroglycerin (NITROLINGUAL) 400 mcg/spray spray 1 Spray by SubLINGual route every five (5) minutes as needed for Chest Pain. Historical Provider   linaclotide Jinx Pink) 145 mcg cap capsule Take  by mouth Daily (before breakfast). Historical Provider   magnesium oxide (MAG-OX) 400 mg tablet Take 400 mg by mouth daily. Historical Provider   PNV NO.122/IRON/FOLIC ACID (PRENATAL MULTI PO) Take  by mouth. Historical Provider   metoprolol tartrate (LOPRESSOR) 25 mg tablet Take 12.5 mg by mouth daily. Take PRN for BP <120. 6/23/16   Jill Kumar III, MD   ondansetron (ZOFRAN ODT) 4 mg disintegrating tablet Take 4 mg by mouth three (3) times daily as needed. Historical Provider   metoclopramide HCl (REGLAN) 5 mg tablet Take 5 mg by mouth two (2) times a day. Historical Provider   ticagrelor (BRILINTA) 90 mg tablet Take 1 Tab by mouth two (2) times a day. 2/4/16   Merritt Gosselin [place], PA   promethazine (PHENERGAN) 25 mg tablet Take 25 mg by mouth every eight (8) hours as needed for Nausea. Historical Provider   sevelamer carbonate (RENVELA) 800 mg tab tab Take 800 mg by mouth three (3) times daily (with meals). Historical Provider   gentamicin (GARAMYCIN) 0.1 % topical cream APPLY TO PD catheter exit site at daily dressing change 5/12/15   oNé Rodriguez MD   aspirin delayed-release 81 mg tablet Take 1 Tab by mouth daily. 5/5/15   Gisela Hollingsworth PA-C   darbepoetin toya in polysorbat (ARANESP, POLYSORBATE,) 40 mcg/mL injection 40 mcg by SubCUTAneous route every fourteen (14) days. Indications: ANEMIA IN CHRONIC KIDNEY DISEASE    Historical Provider   rosuvastatin (CRESTOR) 20 mg tablet Take 20 mg by mouth nightly. Historical Provider   ranolazine ER (RANEXA) 500 mg SR tablet Take 500 mg by mouth two (2) times a day. Historical Provider   levothyroxine (SYNTHROID) 150 mcg tablet Take 150 mcg by mouth Daily (before breakfast).     Historical Provider     Allergies   Allergen Reactions    Codeine Nausea and Vomiting Social History   Substance Use Topics    Smoking status: Never Smoker    Smokeless tobacco: Never Used    Alcohol use No      Family History   Problem Relation Age of Onset    Heart Disease Mother     Hypertension Mother     Cancer Mother      Lung    Stroke Father     Hypertension Father     Breast Cancer Neg Hx           Review of Systems    Constitutional: no fever, weak  Eyes: fair vision,    Ears, nose, mouth, throat, and face:fair hearing,   Respiratory: no asthma,  Chronic home O2 is being used - improved dyspnea  Cardiovascular:no palpitation, no  chest pain,   Gastrointestinal:no diarrhea,  Had BM today  Genitourinary: no dysuria,   Hematologic/lymphatic: no bleeding tendency,   Neurological: no seizures   Behvioral/Psych: no psych hospitalization   Endocrine: no goiter,       Objective:       Visit Vitals    /67    Pulse 88    Temp 98 °F (36.7 °C)    Resp 19    Ht 5' 10\" (1.778 m)    Wt 107.7 kg (237 lb 7 oz)    SpO2 96%    BMI 34.07 kg/m2       General:  Alert, cooperative, no distress, appears stated age. Head:  Normocephalic, without obvious abnormality, atraumatic. Eyes:  Conjunctivae/corneas clear. EOMs intact. Throat: Lips, mucosa, and tongue normal. Teeth and gums normal.   Neck: Supple, symmetrical, trachea midline, no adenopathy,  no JVD. Lungs:   Clear to auscultation bilaterally. Heart:  Regular rate and rhythm, S1, S2 normal, 2/6 systolic  murmur, no rub or gallop. Abdomen:   Soft, non-tender. Distended . No masses,  No organomegaly. PD catheter in place without exudate   Extremities: Extremities normal, atraumatic, no cyanosis. 3+edema. Skin: Skin color, texture, turgor normal. No rashes or lesions. Lymph nodes: Cervical and supraclavicular nodes normal.   Neurologic: Grossly intact. No asterixis. Data Review:     Results for Yahaira Eye (MRN [id number]) as of 11/22/2017 16:39   Ref.  Range 9/23/2017 04:27 11/18/2017 12:45 11/19/2017 00:06 11/21/2017 00:38 11/22/2017 05:36   WBC Latest Ref Range: 4.3 - 11.1 K/uL 7.0 8.9 7.6 9.3 13.9 (H)   RBC Latest Ref Range: 4.05 - 5.25 M/uL 3.05 (L) 2.97 (L) 2.79 (L) 2.45 (L) 2.54 (L)   HGB Latest Ref Range: 11.7 - 15.4 g/dL 9.2 (L) 8.9 (L) 8.5 (L) 7.4 (L) 7.8 (L)   HCT Latest Ref Range: 35.8 - 46.3 % 28.9 (L) 28.5 (L) 26.1 (L) 23.0 (L) 24.6 (L)   MCV Latest Ref Range: 79.6 - 97.8 FL 94.8 96.0 93.5 93.9 96.9   MCH Latest Ref Range: 26.1 - 32.9 PG 30.2 30.0 30.5 30.2 30.7   MCHC Latest Ref Range: 31.4 - 35.0 g/dL 31.8 31.2 (L) 32.6 32.2 31.7   RDW Latest Ref Range: 11.9 - 14.6 % 15.6 (H) 15.4 (H) 15.4 (H) 15.6 (H) 16.0 (H)   PLATELET Latest Ref Range: 150 - 450 K/uL 235 212 177 160 186   MPV Latest Ref Range: 10.8 - 14.1 FL 10.0 (L) 10.3 (L) 10.1 (L) 10.1 (L) 10.4 (L)     Results for Viridiana Cruz (MRN [id number]) as of 11/22/2017 16:39   Ref.  Range 11/19/2017 06:30 11/20/2017 05:01 11/21/2017 07:08 11/22/2017 05:36   Sodium Latest Ref Range: 136 - 145 mmol/L 138 136 134 (L) 135 (L)   Potassium Latest Ref Range: 3.5 - 5.1 mmol/L 3.2 (L) 4.0 4.3 4.5   Chloride Latest Ref Range: 98 - 107 mmol/L 102 100 99 98   CO2 Latest Ref Range: 21 - 32 mmol/L 26 24 24 23   Anion gap Latest Ref Range: 7 - 16 mmol/L 10 12 11 14   Glucose Latest Ref Range: 65 - 100 mg/dL 121 (H) 91 119 (H) 129 (H)   BUN Latest Ref Range: 8 - 23 MG/DL 31 (H) 34 (H) 39 (H) 36 (H)   Creatinine Latest Ref Range: 0.6 - 1.0 MG/DL 8.86 (H) 9.63 (H) 9.84 (H) 9.54 (H)   Calcium Latest Ref Range: 8.3 - 10.4 MG/DL 9.7 9.1 8.5 9.6   GFR est non-AA Latest Ref Range: >60 ml/min/1.73m2 5 (L) 4 (L) 4 (L) 4 (L)   GFR est AA Latest Ref Range: >60 ml/min/1.73m2 5 (L) 5 (L) 5 (L) 5 (L)       Recent Results (from the past 24 hour(s))   CBC W/O DIFF    Collection Time: 11/26/17  5:34 AM   Result Value Ref Range    WBC 10.4 4.3 - 11.1 K/uL    RBC 3.08 (L) 4.05 - 5.25 M/uL    HGB 9.1 (L) 11.7 - 15.4 g/dL    HCT 29.4 (L) 35.8 - 46.3 %    MCV 95.5 79.6 - 97.8 FL MCH 29.5 26.1 - 32.9 PG    MCHC 31.0 (L) 31.4 - 35.0 g/dL    RDW 18.7 (H) 11.9 - 14.6 %    PLATELET 038 450 - 691 K/uL    MPV 9.7 (L) 10.8 - 20.7 FL   METABOLIC PANEL, BASIC    Collection Time: 11/26/17  5:34 AM   Result Value Ref Range    Sodium 138 136 - 145 mmol/L    Potassium 3.6 3.5 - 5.1 mmol/L    Chloride 100 98 - 107 mmol/L    CO2 31 21 - 32 mmol/L    Anion gap 7 7 - 16 mmol/L    Glucose 84 65 - 100 mg/dL    BUN 13 8 - 23 MG/DL    Creatinine 3.67 (H) 0.6 - 1.0 MG/DL    GFR est AA 15 (L) >60 ml/min/1.73m2    GFR est non-AA 12 (L) >60 ml/min/1.73m2    Calcium 9.7 8.3 - 10.4 MG/DL   EKG, 12 LEAD, INITIAL    Collection Time: 11/26/17  7:20 AM   Result Value Ref Range    Ventricular Rate 80 BPM    Atrial Rate 80 BPM    P-R Interval 172 ms    QRS Duration 98 ms    Q-T Interval 434 ms    QTC Calculation (Bezet) 500 ms    Calculated P Axis 49 degrees    Calculated R Axis -27 degrees    Calculated T Axis 74 degrees    Diagnosis       Normal sinus rhythm  Moderate voltage criteria for LVH, may be normal variant  T wave abnormality, consider anterolateral ischemia  Prolonged QT  Abnormal ECG  When compared with ECG of 25-NOV-2017 07:20,  No significant change was found         CXR viewed by me - no major infiltrate or fluid excess, CM with left possible minor effusion is present        CT abdomen/pelvis  CT ABDOMEN:  Peritoneal fluid in the perihepatic space, mild paracolic gutters,  extending down into the pelvis. Peritoneal dialysis catheter noted. There is not  any evidence of retroperitoneal hematoma identified. Strandy fluid density does  extend into the extraperitoneal space in the lower abdomen and pelvis. There is  radiodensity within the renal collecting systems, possibly previously  administered IV contrast. There is peripancreatic fluid and stranding, cannot  exclude acute pancreatitis. No biliary dilatation. Spleen is not enlarged.  Small  bowel normal caliber.   CT PELVIS:   Moderate to large volume pelvic fluid.  Monsalve Dawson is diffuse asymmetric enlargement of the right rectus and oblique  abdominal muscles. There are are of higher attenuation the contralateral side  and findings are consistent with an abdominal wall hematoma. IMPRESSION:    1. Evidence of right abdominal wall hematoma. 2. No CT evidence to suggest retroperitoneal hematoma. 3. Extensive fluid in the abdomen and pelvis, presumed related to peritoneal  dialysis. Active Problems:    CAD (coronary artery disease) (11/27/2015)      Unstable angina ([facility] [address] (2/1/2016)      Elevated troponin (11/18/2017)      Chronic anemia (11/18/2017)      ESRD (end stage renal disease) ([facility] [address] (11/18/2017)        Assessment:     1. CAD x NSTEMI, Unstable angina -  - LHC with complex CAD and acute thrombotic lesion in pLAD and subtotal occlusion in mLAD. The RCA has severe proximal and mid RCA disease. She has additional stenting of LAD with Resolute in mid/distal and proximal vessel. These all touched the previously stented mid vessel. Recommend life-long DAPT    2, ESRD -  - again with poor drainage on PD   - plan HD tomorrow and next day    3. Fluid excess -  - recurrent due to poor PD drainage    4. Anemia -  - intensive anemia therapy in [place]  - on WAYNE and IV iron and B12  - improved S/P  transfusion of PRBC    5. History of GI bleed -  - monitor clinically and serial Hb  - she had a drop in Hb to 7 range and improved with BT    6. Hypokalemia   - resolved     7.  Abdominal pain and tenderness with drop in hb , on DAPT   No evidence of retroperitoneal hematoma       Plan:     As above - 25

## 2017-11-26 NOTE — DIALYSIS
Patient complained of her abdomen hurting and being \"too tight\". Stat drain started at 2235 per pt request. The patient wanted to continue with her PD but later during the next dwell cycle she continued to complain of pain due to abdominal distention and another stat drain was started. Dr. Noah Good was called and order received to complete drain and stop treatment. Drain completed and patient disconnected from machine. She stated she feels better now.

## 2017-11-26 NOTE — PROGRESS NOTES
Verbal and bedside report received from [facility]. Taking over for previous RN and assuming care of patient.

## 2017-11-26 NOTE — PROGRESS NOTES
GI DAILY PROGRESS NOTE    Admit Date:  11/18/2017    Today's Date:  11/26/2017        Subjective:     Patient complains of weakness today. Planning to transition to rehab tomorrow. No abdominal pain other than hematoma site. No n/v, or signs of GI bleeding.      Medications:   Current Facility-Administered Medications   Medication Dose Route Frequency    polyethylene glycol (MIRALAX) packet 17 g  17 g Oral DAILY PRN    amLODIPine (NORVASC) tablet 5 mg  5 mg Oral DAILY    senna-docusate (PERICOLACE) 8.6-50 mg per tablet 1 Tab  1 Tab Oral DAILY    torsemide (DEMADEX) tablet 100 mg  100 mg Oral BID    sodium chloride (NS) flush 5-10 mL  5-10 mL IntraVENous Q8H    sodium chloride (NS) flush 5-10 mL  5-10 mL IntraVENous PRN    LORazepam (ATIVAN) tablet 1 mg  1 mg Oral Q6H PRN    HYDROcodone-acetaminophen (NORCO) 5-325 mg per tablet 1 Tab  1 Tab Oral Q4H PRN    potassium chloride 10 mEq in peritoneal dialysis DEXTROSE 2.5% (2.5 mEq/L low calcium) 5,000 mL   IntraPERitoneal DIALYSIS PRN    gentamicin (GARAMYCIN) 0.1 % cream   Topical DAILY PRN    nitroglycerin (NITROSTAT) tablet 0.4 mg  0.4 mg SubLINGual PRN    aspirin delayed-release tablet 81 mg  81 mg Oral DAILY    calcitRIOL (ROCALTROL) capsule 0.25 mcg  0.25 mcg Oral DAILY    levothyroxine (SYNTHROID) tablet 150 mcg  150 mcg Oral ACB    linaclotide (LINZESS) capsule 145 mcg (Patient Supplied)  145 mcg Oral DAILY    magnesium oxide (MAG-OX) tablet 400 mg  400 mg Oral DAILY    metoclopramide HCl (REGLAN) tablet 5 mg  5 mg Oral BID    metoprolol tartrate (LOPRESSOR) tablet 12.5 mg  12.5 mg Oral DAILY    ondansetron (ZOFRAN ODT) tablet 4 mg  4 mg Oral TID PRN    promethazine (PHENERGAN) tablet 25 mg  25 mg Oral Q6H PRN    ranolazine ER (RANEXA) tablet 1,000 mg  1,000 mg Oral BID    rosuvastatin (CRESTOR) tablet 20 mg  20 mg Oral QHS    sevelamer (RENAGEL) tablet 800 mg  800 mg Oral TID WITH MEALS    sucralfate (CARAFATE) 100 mg/mL oral suspension 1 g  1 g Oral AC&HS    morphine injection 2 mg  2 mg IntraVENous Q4H PRN    acetaminophen (TYLENOL) tablet 650 mg  650 mg Oral Q4H PRN    HYDROcodone-acetaminophen (NORCO) 7.5-325 mg per tablet 1 Tab  1 Tab Oral Q4H PRN    ticagrelor (BRILINTA) tablet 90 mg  90 mg Oral BID    epoetin toya (EPOGEN;PROCRIT) injection 20,000 Units  20,000 Units SubCUTAneous Q MON, WED & FRI    cyanocobalamin (VITAMIN B12) injection 1,000 mcg  1,000 mcg IntraMUSCular Q7D    pantoprazole (PROTONIX) tablet 40 mg  40 mg Oral ACB&D    multivitamin w ZN (STRESSTABS W ZINC) tablet  1 Tab Oral DAILY       Review of Systems:  ROS was obtained, with pertinent positives as listed above. No chest pain or SOB. Diet:      Objective:   Vitals:  Visit Vitals    /67    Pulse 84    Temp 97.6 °F (36.4 °C)    Resp 18    Ht 5' 10\" (1.778 m)    Wt 107.7 kg (237 lb 7 oz)    SpO2 100%    BMI 34.07 kg/m2     Intake/Output:     11/24 1901 - 11/26 0700  In: 36 [P.O.:925]  Out: -808   Exam:  General appearance: alert, cooperative, no distress  Lungs: clear to auscultation bilaterally anteriorly  Heart: regular rate and rhythm  Abdomen: soft, tender on right side, hematoma.  Bowel sounds normal. No masses, no organomegaly  Extremities: extremities normal, atraumatic, no cyanosis or edema  Neuro:  alert and oriented    Data Review (Labs):    Recent Labs      11/26/17   0534  11/25/17   0501  11/24/17   0317  11/23/17 2007   WBC  10.4  9.1  11.9*   --    HGB  9.1*  9.7*  8.3*   --    HCT  29.4*  30.9*  25.4*   --    PLT  166  165  164   --    MCV  95.5  93.9  92.4   --    NA  138  137  137   --    K  3.6  3.7  4.4   --    CL  100  98  98   --    CO2  31  33*  32   --    BUN  13  8  7*   --    CREA  3.67*  2.24*  2.50*   --    CA  9.7  9.6  8.7   --    GLU  84  105*  78   --    APTT   --    --    --   41.1*       Assessment:     Active Problems:    CAD (coronary artery disease) (11/27/2015)      Unstable angina (HCC) (2/1/2016)      Elevated troponin (11/18/2017)      Chronic anemia (11/18/2017)      ESRD (end stage renal disease) ([facility] [address] (11/18/2017)        Plan:     Anemia: was likely from hematoma after PCI for NSTEMI. No signs of active GI bleeding. Has chronic anemia and ESRD. She sees Dr Norman Pizano and workup for anemia has included colonoscopy in the past year. She was scheduled to have EGD with him. No urgent need for inpatient EGD. She will follow up with Dr. Norman Pizano as an outpatient for continued management. Please call GI with any questions or concerns, or signs of GI bleeding.  Will sign off for now

## 2017-11-26 NOTE — PROGRESS NOTES
Bedside and Verbal shift change report given to Lorin VO (oncoming nurse) by self (offgoing nurse). Report included the following information SBAR, Kardex, MAR and Recent Results.

## 2017-11-26 NOTE — PROGRESS NOTES
Verbal bedside report given to [facility], oncoming RN. Patient's situation, background, assessment and recommendations provided. Opportunity for questions provided. Oncoming RN assumed care of patient.

## 2017-11-26 NOTE — PROGRESS NOTES
On-call dialysis nurse, Claudette Vidales, called and notified of patient's c/o of her abdomen filling up too much and requested a manual drain. Claudette Vidales, On-call nurse stated she will come and see patient.

## 2017-11-26 NOTE — PROGRESS NOTES
[facility] CRITICAL CARE OUTREACH NURSE PROGRESS REPORT    SUBJECTIVE: Assessed patient secondary to transfer out of CCU on 11/24. MEWS Score: 1 (11/26/17 0506)  Vitals:    11/25/17 2030 11/26/17 0052 11/26/17 0506 11/26/17 0837   BP: 119/57 127/59 137/71 140/66   Pulse: 85 89 85 80   Resp: 18 18 16 18   Temp: 98.6 °F (37 °C) 98.4 °F (36.9 °C) 98.6 °F (37 °C) 97.6 °F (36.4 °C)   SpO2: 93% 96% 96% 100%   Weight:   107.7 kg (237 lb 7 oz)    Height:          EKG: normal EKG, normal sinus rhythm, unchanged from previous tracings. LAB DATA:    Recent Labs      11/26/17 0534 11/25/17 0501 11/24/17 0317   NA  138  137  137   K  3.6  3.7  4.4   CL  100  98  98   CO2  31  33*  32   AGAP  7  6*  7   GLU  84  105*  78   BUN  13  8  7*   CREA  3.67*  2.24*  2.50*   GFRAA  15*  27*  24*   GFRNA  12*  22*  19*   CA  9.7  9.6  8.7        Recent Labs      11/26/17 0534 11/25/17 0501 11/24/17 0317   WBC  10.4  9.1  11.9*   HGB  9.1*  9.7*  8.3*   HCT  29.4*  30.9*  25.4*   PLT  166  165  164          OBJECTIVE: On arrival to room, I found patient to be sitting up in chair. Pain Assessment  Pain Intensity 1: 0 (11/26/17 0744)  Pain Location 1: Chest  Pain Intervention(s) 1: Medication (see MAR)  Patient Stated Pain Goal: 0    ASSESSMENT:  Patient is alert and oriented x3, able to verbalize needs. Respirations even and unlabored, O2 sat 98% on room air. Patient on remote telemetry, NSR with HR 85. Patient has PD cath to LLQ abdomen, HD cath to left thigh. Abdomen . Patient denies needs. PLAN:  Will continue to follow per outreach protocol.

## 2017-11-27 ENCOUNTER — APPOINTMENT (OUTPATIENT)
Dept: INTERVENTIONAL RADIOLOGY/VASCULAR | Age: 82
DRG: 246 | End: 2017-11-27
Attending: INTERNAL MEDICINE
Payer: MEDICARE

## 2017-11-27 LAB
ABO + RH BLD: NORMAL
ANION GAP SERPL CALC-SCNC: 8 MMOL/L (ref 7–16)
ATRIAL RATE: 82 BPM
BLD PROD TYP BPU: NORMAL
BLOOD GROUP ANTIBODIES SERPL: NORMAL
BPU ID: NORMAL
BUN SERPL-MCNC: 25 MG/DL (ref 8–23)
CALCIUM SERPL-MCNC: 9.5 MG/DL (ref 8.3–10.4)
CALCULATED P AXIS, ECG09: 63 DEGREES
CALCULATED R AXIS, ECG10: -8 DEGREES
CALCULATED T AXIS, ECG11: 89 DEGREES
CHLORIDE SERPL-SCNC: 99 MMOL/L (ref 98–107)
CO2 SERPL-SCNC: 31 MMOL/L (ref 21–32)
CREAT SERPL-MCNC: 5.24 MG/DL (ref 0.6–1)
CROSSMATCH RESULT,%XM: NORMAL
DIAGNOSIS, 93000: NORMAL
ERYTHROCYTE [DISTWIDTH] IN BLOOD BY AUTOMATED COUNT: 19.2 % (ref 11.9–14.6)
GLUCOSE SERPL-MCNC: 83 MG/DL (ref 65–100)
HCT VFR BLD AUTO: 28.9 % (ref 35.8–46.3)
HGB BLD-MCNC: 9.1 G/DL (ref 11.7–15.4)
MCH RBC QN AUTO: 29.6 PG (ref 26.1–32.9)
MCHC RBC AUTO-ENTMCNC: 31.5 G/DL (ref 31.4–35)
MCV RBC AUTO: 94.1 FL (ref 79.6–97.8)
P-R INTERVAL, ECG05: 172 MS
PLATELET # BLD AUTO: 167 K/UL (ref 150–450)
PMV BLD AUTO: 9.3 FL (ref 10.8–14.1)
POTASSIUM SERPL-SCNC: 4.1 MMOL/L (ref 3.5–5.1)
Q-T INTERVAL, ECG07: 414 MS
QRS DURATION, ECG06: 102 MS
QTC CALCULATION (BEZET), ECG08: 483 MS
RBC # BLD AUTO: 3.07 M/UL (ref 4.05–5.25)
SODIUM SERPL-SCNC: 138 MMOL/L (ref 136–145)
SPECIMEN EXP DATE BLD: NORMAL
STATUS OF UNIT,%ST: NORMAL
UNIT DIVISION, %UDIV: 0
VENTRICULAR RATE, ECG03: 82 BPM
WBC # BLD AUTO: 9.6 K/UL (ref 4.3–11.1)

## 2017-11-27 PROCEDURE — C1769 GUIDE WIRE: HCPCS

## 2017-11-27 PROCEDURE — 74640000003 HC CRRT SET UP OR EXCHANGE

## 2017-11-27 PROCEDURE — 02PYX3Z REMOVAL OF INFUSION DEVICE FROM GREAT VESSEL, EXTERNAL APPROACH: ICD-10-PCS | Performed by: RADIOLOGY

## 2017-11-27 PROCEDURE — 36580 REPLACE CVAD CATH: CPT

## 2017-11-27 PROCEDURE — 74011250636 HC RX REV CODE- 250/636: Performed by: RADIOLOGY

## 2017-11-27 PROCEDURE — 74011000250 HC RX REV CODE- 250: Performed by: RADIOLOGY

## 2017-11-27 PROCEDURE — 5A1D70Z PERFORMANCE OF URINARY FILTRATION, INTERMITTENT, LESS THAN 6 HOURS PER DAY: ICD-10-PCS | Performed by: INTERNAL MEDICINE

## 2017-11-27 PROCEDURE — 74011250637 HC RX REV CODE- 250/637: Performed by: PHYSICIAN ASSISTANT

## 2017-11-27 PROCEDURE — 77030002986 HC SUT PROL J&J -A

## 2017-11-27 PROCEDURE — 90935 HEMODIALYSIS ONE EVALUATION: CPT

## 2017-11-27 PROCEDURE — 36415 COLL VENOUS BLD VENIPUNCTURE: CPT | Performed by: INTERNAL MEDICINE

## 2017-11-27 PROCEDURE — 85027 COMPLETE CBC AUTOMATED: CPT | Performed by: INTERNAL MEDICINE

## 2017-11-27 PROCEDURE — 74011250637 HC RX REV CODE- 250/637: Performed by: INTERNAL MEDICINE

## 2017-11-27 PROCEDURE — 74011250636 HC RX REV CODE- 250/636: Performed by: INTERNAL MEDICINE

## 2017-11-27 PROCEDURE — 02HV33Z INSERTION OF INFUSION DEVICE INTO SUPERIOR VENA CAVA, PERCUTANEOUS APPROACH: ICD-10-PCS | Performed by: RADIOLOGY

## 2017-11-27 PROCEDURE — C1752 CATH,HEMODIALYSIS,SHORT-TERM: HCPCS

## 2017-11-27 PROCEDURE — 80048 BASIC METABOLIC PNL TOTAL CA: CPT | Performed by: INTERNAL MEDICINE

## 2017-11-27 PROCEDURE — 77030018719 HC DRSG PTCH ANTIMIC J&J -A

## 2017-11-27 PROCEDURE — 74011000250 HC RX REV CODE- 250: Performed by: INTERNAL MEDICINE

## 2017-11-27 PROCEDURE — 65660000000 HC RM CCU STEPDOWN

## 2017-11-27 PROCEDURE — 93005 ELECTROCARDIOGRAM TRACING: CPT | Performed by: INTERNAL MEDICINE

## 2017-11-27 RX ORDER — HEPARIN SODIUM 200 [USP'U]/100ML
1000 INJECTION, SOLUTION INTRAVENOUS ONCE
Status: COMPLETED | OUTPATIENT
Start: 2017-11-27 | End: 2017-11-27

## 2017-11-27 RX ORDER — CYANOCOBALAMIN 1000 UG/ML
1000 INJECTION, SOLUTION INTRAMUSCULAR; SUBCUTANEOUS
Qty: 1 VIAL | Refills: 0 | Status: ON HOLD
Start: 2017-12-02 | End: 2017-12-28

## 2017-11-27 RX ORDER — TORSEMIDE 100 MG/1
100 TABLET ORAL 2 TIMES DAILY
Qty: 60 TAB | Refills: 3 | Status: SHIPPED
Start: 2017-11-27

## 2017-11-27 RX ORDER — PANTOPRAZOLE SODIUM 40 MG/1
40 TABLET, DELAYED RELEASE ORAL
Qty: 30 TAB | Refills: 0 | Status: SHIPPED
Start: 2017-11-27

## 2017-11-27 RX ORDER — LIDOCAINE HYDROCHLORIDE 20 MG/ML
20-200 INJECTION, SOLUTION INFILTRATION; PERINEURAL ONCE
Status: COMPLETED | OUTPATIENT
Start: 2017-11-27 | End: 2017-11-27

## 2017-11-27 RX ORDER — METOPROLOL TARTRATE 25 MG/1
12.5 TABLET, FILM COATED ORAL DAILY
Qty: 30 TAB | Refills: 3 | Status: SHIPPED
Start: 2017-11-27

## 2017-11-27 RX ORDER — AMLODIPINE BESYLATE 5 MG/1
5 TABLET ORAL DAILY
Qty: 30 TAB | Refills: 6 | Status: SHIPPED
Start: 2017-11-27

## 2017-11-27 RX ORDER — HEPARIN SODIUM 1000 [USP'U]/ML
1000-5000 INJECTION, SOLUTION INTRAVENOUS; SUBCUTANEOUS ONCE
Status: ACTIVE | OUTPATIENT
Start: 2017-11-27 | End: 2017-11-28

## 2017-11-27 RX ORDER — HEPARIN SODIUM 1000 [USP'U]/ML
5000 INJECTION, SOLUTION INTRAVENOUS; SUBCUTANEOUS ONCE
Status: DISCONTINUED | OUTPATIENT
Start: 2017-11-27 | End: 2017-11-27 | Stop reason: SDUPTHER

## 2017-11-27 RX ADMIN — AMLODIPINE BESYLATE 5 MG: 5 TABLET ORAL at 17:35

## 2017-11-27 RX ADMIN — RANOLAZINE 1000 MG: 500 TABLET, FILM COATED, EXTENDED RELEASE ORAL at 17:37

## 2017-11-27 RX ADMIN — Medication 5 ML: at 21:42

## 2017-11-27 RX ADMIN — TICAGRELOR 90 MG: 90 TABLET ORAL at 10:46

## 2017-11-27 RX ADMIN — PANTOPRAZOLE SODIUM 40 MG: 40 TABLET, DELAYED RELEASE ORAL at 17:33

## 2017-11-27 RX ADMIN — SUCRALFATE 1 G: 1 SUSPENSION ORAL at 17:33

## 2017-11-27 RX ADMIN — Medication 10 ML: at 04:59

## 2017-11-27 RX ADMIN — LIDOCAINE HYDROCHLORIDE 200 MG: 20 INJECTION, SOLUTION INFILTRATION; PERINEURAL at 13:10

## 2017-11-27 RX ADMIN — ASPIRIN 81 MG: 81 TABLET, COATED ORAL at 10:46

## 2017-11-27 RX ADMIN — ALTEPLASE 4 MG: 2.2 INJECTION, POWDER, LYOPHILIZED, FOR SOLUTION INTRAVENOUS at 11:32

## 2017-11-27 RX ADMIN — METOCLOPRAMIDE HYDROCHLORIDE 5 MG: 10 TABLET ORAL at 17:38

## 2017-11-27 RX ADMIN — PANTOPRAZOLE SODIUM 40 MG: 40 TABLET, DELAYED RELEASE ORAL at 07:37

## 2017-11-27 RX ADMIN — ALTEPLASE 2 MG: 2.2 INJECTION, POWDER, LYOPHILIZED, FOR SOLUTION INTRAVENOUS at 08:36

## 2017-11-27 RX ADMIN — ERYTHROPOIETIN 20000 UNITS: 20000 INJECTION, SOLUTION INTRAVENOUS; SUBCUTANEOUS at 19:50

## 2017-11-27 RX ADMIN — LEVOTHYROXINE SODIUM 150 MCG: 150 TABLET ORAL at 07:37

## 2017-11-27 RX ADMIN — ROSUVASTATIN CALCIUM 20 MG: 20 TABLET, FILM COATED ORAL at 21:41

## 2017-11-27 RX ADMIN — Medication 400 MG: at 17:33

## 2017-11-27 RX ADMIN — HEPARIN SODIUM 2000 UNITS: 200 INJECTION, SOLUTION INTRAVENOUS at 13:09

## 2017-11-27 RX ADMIN — CALCITRIOL 0.25 MCG: 0.25 CAPSULE, LIQUID FILLED ORAL at 17:33

## 2017-11-27 RX ADMIN — TICAGRELOR 90 MG: 90 TABLET ORAL at 23:59

## 2017-11-27 RX ADMIN — METOPROLOL TARTRATE 12.5 MG: 25 TABLET ORAL at 17:38

## 2017-11-27 RX ADMIN — SUCRALFATE 1 G: 1 SUSPENSION ORAL at 07:37

## 2017-11-27 RX ADMIN — TORSEMIDE 100 MG: 100 TABLET ORAL at 17:36

## 2017-11-27 NOTE — PROGRESS NOTES
Verbal and bedside shift report received from [facility], [facility], [facility]. Assumed care of patient.

## 2017-11-27 NOTE — DIALYSIS
Unable to initiate dialysis at this time d/t catheter complications. Pt alert and stable. Dr. Brian Chow notified. Activase ordered at this time.

## 2017-11-27 NOTE — PROGRESS NOTES
PT Note:  PT orders received/reviewed and evaluation attempted. Patient was off the floor to IR. Evaluation will be re-attempted as schedule and patient's status allow.   Thanks,  Kimberly Zarate, PT, DPT

## 2017-11-27 NOTE — PROCEDURES
Date of Procedure: 11/27/2017    Pre-Procedure Diagnosis: Renal failure requiring hemodialysis access. Failed left femoral hemodialysis access. Post-Procedure Diagnosis: SAME    Procedure(s): Fluoroscopic guided revision of the left femoral hemodialysis access    Brief Description of Procedure: Temporary left femoral dialysis catheter placement    Performed By: Mauro Littlejohn MD     Assistants: None    Anesthesia: Local    Estimated Blood Loss: Minimal    Specimens: None    Implants: Left femoral temporary hemodialysis access catheter    Findings:  The existing catheter was found to be too short    Complications: None    Recommendations: None    Follow Up: As needed

## 2017-11-27 NOTE — DIALYSIS
HD treatment initiated via left femoral catheter after activase aspirated. Venous aspirated but arterial still not aspirating. Both lumens flushed and patent. CVC dressing changed per protocol, clean, dry, and intact, tego caps changed and intact, bilateral lumen aspirated and flushed with 9cc NS. VS stable, will continue to monitor during tx. No Heparin this tx. Machine tests passed and alarms intact. NAD.

## 2017-11-27 NOTE — PROGRESS NOTES
IR Nurse Pre-Procedure Checklist Part 2      Patient in Ir suite for procedure 7541  Consent signed: Yes    H&P complete:  No    Antibiotics: No    Airway/Mallampati Done: No    Shaved:  No    Pregnancy Form:No    Patient Position: Yes    MD Side: Yes     Biopsy Worksheet: No    Specimen Medium: Not applicable

## 2017-11-27 NOTE — PROGRESS NOTES
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 She has a history of GI bleeding and had anemia-induced unstable angina in the past. 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 She states the pain has since resolved with NTG paste.  No fevers or chills. No nausea, vomiting or diarrhea.  She states that she is essentially anuric and occasionally makes minimal urine.  She has a h/o CVA and TIA. No fever or chills, no problems with PD drainage or discoloration of PD fluid. No increased thirst. She wishes regular diet and supplement. She denies any other acute complaints  Her edema has improved in the past couple of days with intensified dialysis.     s-  Alert and with some dyspnea and much edema - PD was stopped due to recurrent retention of fluid -  for HD today - recurrent abdominal distention noted -  blood loss may have come from hematoma rather than recurrent GI bleeding -  numbness of the right arm and c/o pinched feeling in right shoulder and leg weakness with inability to ambulate are at issue - for eval for 9th floor rehab -      Past Medical History:   Diagnosis Date    Anemia of chronic renal failure 4/28/2015    Anterior myocardial infarction ([facility] [address] 5/21/2015    CAD (coronary artery disease)     Chest pain     Chronic kidney disease, stage III (moderate) 8/15/2014    on dialysis    CKD (chronic kidney disease) stage 4, GFR 15-29 ml/min ([facility]) 4/28/2015    Debility 5/5/2015    Depression 12/29/2015    Edema 12/29/2015    Endocrine disease     Hypothyroidism    GERD (gastroesophageal reflux disease)     Heart murmur 12/29/2015    HLD (hyperlipidemia) 12/29/2015    Hypertension  Hypothyroidism 4/28/2015    Ischemic cardiomyopathy 12/29/2015    Nausea     S/P PTCA (percutaneous transluminal coronary angioplasty); LAD PTCA of  ISR 11/27/15 5/24/2016    STEMI (ST elevation myocardial infarction) ([facility] [address] 4/28/2015    Unspecified sleep apnea     uses cpap machine    Unstable angina ([facility] [address]       Past Surgical History:   Procedure Laterality Date    HX BACK SURGERY  1990    neck surgery cervical disc    HX BACK SURGERY      lower back    HX CATARACT REMOVAL Bilateral     HX CHOLECYSTECTOMY  19702    gall bladder     HX KNEE REPLACEMENT Right 2006    HX PTCA  4/28/2015    2.25 Xience stent to mid LAD for occluded artery, anterior MI, EF 25%. Moderate disease distal LAD and distal OM PCI CX and RCA 2004, then PCI RCA and LAD in 2009. Prior to Admission medications    Medication Sig Start Date End Date Taking? Authorizing Provider   folic acid (FOLVITE) 1 mg tablet Take 1 mg by mouth daily. Yes Historical Provider   potassium chloride (K-DUR, KLOR-CON) 20 mEq tablet Take 20 mEq by mouth two (2) times a day. Yes Historical Provider   traZODone (DESYREL) 50 mg tablet Take  by mouth nightly. Yes Historical Provider   megestrol (MEGACE) 400 mg/10 mL (40 mg/mL) suspension Take 200 mg by mouth daily. Yes Historical Provider   amLODIPine (NORVASC) 10 mg tablet Take 1 Tab by mouth daily. 9/23/17   Lindsay Quintanilla DO   pantoprazole (PROTONIX) 40 mg tablet Take 1 Tab by mouth Daily (before breakfast). 9/23/17   Lindsay Quintanilla DO   sucralfate (CARAFATE) 100 mg/mL suspension Take 10 mL by mouth Before breakfast, lunch, dinner and at bedtime. Indications: PREVENTION OF STRESS ULCER 9/23/17   Lindsay Quintanilla DO   torsemide [facility]) 100 mg tablet Take  by mouth daily. Historical Provider   nitroglycerin (NITROLINGUAL) 400 mcg/spray spray 1 Spray by SubLINGual route every five (5) minutes as needed for Chest Pain.     Historical Provider   linaclotide Marylou Grier) 145 mcg cap capsule Take  by mouth Daily (before breakfast). Historical Provider   magnesium oxide (MAG-OX) 400 mg tablet Take 400 mg by mouth daily. Historical Provider   PNV NO.122/IRON/FOLIC ACID (PRENATAL MULTI PO) Take  by mouth. Historical Provider   metoprolol tartrate (LOPRESSOR) 25 mg tablet Take 12.5 mg by mouth daily. Take PRN for BP <120. 6/23/16   Alaina Warren III, MD   ondansetron (ZOFRAN ODT) 4 mg disintegrating tablet Take 4 mg by mouth three (3) times daily as needed. Historical Provider   metoclopramide HCl (REGLAN) 5 mg tablet Take 5 mg by mouth two (2) times a day. Historical Provider   ticagrelor (BRILINTA) 90 mg tablet Take 1 Tab by mouth two (2) times a day. 2/4/16   MINDY Torres   promethazine (PHENERGAN) 25 mg tablet Take 25 mg by mouth every eight (8) hours as needed for Nausea. Historical Provider   sevelamer carbonate (RENVELA) 800 mg tab tab Take 800 mg by mouth three (3) times daily (with meals). Historical Provider   gentamicin (GARAMYCIN) 0.1 % topical cream APPLY TO PD catheter exit site at daily dressing change 5/12/15   Andrzej Anglin MD   aspirin delayed-release 81 mg tablet Take 1 Tab by mouth daily. 5/5/15   Gisela Hollingsworth PA-C   darbepoetin toya in polysorbat (ARANESP, POLYSORBATE,) 40 mcg/mL injection 40 mcg by SubCUTAneous route every fourteen (14) days. Indications: ANEMIA IN CHRONIC KIDNEY DISEASE    Historical Provider   rosuvastatin (CRESTOR) 20 mg tablet Take 20 mg by mouth nightly. Historical Provider   ranolazine ER (RANEXA) 500 mg SR tablet Take 500 mg by mouth two (2) times a day. Historical Provider   levothyroxine (SYNTHROID) 150 mcg tablet Take 150 mcg by mouth Daily (before breakfast).     Historical Provider     Allergies   Allergen Reactions    Codeine Nausea and Vomiting      Social History   Substance Use Topics    Smoking status: Never Smoker    Smokeless tobacco: Never Used    Alcohol use No      Family History   Problem Relation Age of Onset    Heart Disease Mother     Hypertension Mother     Cancer Mother      Lung    Stroke Father     Hypertension Father     Breast Cancer Neg Hx           Review of Systems    Constitutional: no fever, weak  Eyes: fair vision,    Ears, nose, mouth, throat, and face:fair hearing,   Respiratory: no asthma,  Chronic home O2 is being used - improved dyspnea  Cardiovascular:no palpitation, no  chest pain,   Gastrointestinal:no diarrhea,  Had BM today  Genitourinary: no dysuria,   Hematologic/lymphatic: no bleeding tendency,   Neurological: no seizures   Behvioral/Psych: no psych hospitalization   Endocrine: no goiter,       Objective:       Visit Vitals    /56 (BP 1 Location: Left arm, BP Patient Position: At rest)    Pulse 80    Temp 98.2 °F (36.8 °C)    Resp 18    Ht 5' 10\" (1.778 m)    Wt 105.7 kg (233 lb 1.6 oz)    SpO2 97%    BMI 33.45 kg/m2       General:  Alert, cooperative, no distress, appears stated age. Head:  Normocephalic, without obvious abnormality, atraumatic. Eyes:  Conjunctivae/corneas clear. EOMs intact. Throat: Lips, mucosa, and tongue normal. Teeth and gums normal.   Neck: Supple, symmetrical, trachea midline, no adenopathy,  no JVD. Lungs:   Clear to auscultation bilaterally. Heart:  Regular rate and rhythm, S1, S2 normal, 2/6 systolic  murmur, no rub or gallop. Abdomen:   Soft, non-tender. Distended . No masses,  No organomegaly. PD catheter in place without exudate   Extremities: Extremities normal, atraumatic, no cyanosis. 3+edema. Skin: Skin color, texture, turgor normal. No rashes or lesions. Lymph nodes: Cervical and supraclavicular nodes normal.   Neurologic: Grossly intact. No asterixis.          Data Review:       Recent Results (from the past 24 hour(s))   EKG, 12 LEAD, INITIAL    Collection Time: 11/26/17  7:20 AM   Result Value Ref Range    Ventricular Rate 80 BPM    Atrial Rate 80 BPM    P-R Interval 172 ms    QRS Duration 98 ms    Q-T Interval 434 ms    QTC Calculation (Bezet) 500 ms    Calculated P Axis 49 degrees    Calculated R Axis -27 degrees    Calculated T Axis 74 degrees    Diagnosis       Normal sinus rhythm  Moderate voltage criteria for LVH, may be normal variant  T wave abnormality, consider anterolateral ischemia  Prolonged QT  Abnormal ECG  When compared with ECG of 25-NOV-2017 07:20,  No significant change was found  Confirmed by Elsa Cross ([id number]) on 11/26/2017 6:55:38 PM     CBC W/O DIFF    Collection Time: 11/27/17  4:50 AM   Result Value Ref Range    WBC 9.6 4.3 - 11.1 K/uL    RBC 3.07 (L) 4.05 - 5.25 M/uL    HGB 9.1 (L) 11.7 - 15.4 g/dL    HCT 28.9 (L) 35.8 - 46.3 %    MCV 94.1 79.6 - 97.8 FL    MCH 29.6 26.1 - 32.9 PG    MCHC 31.5 31.4 - 35.0 g/dL    RDW 19.2 (H) 11.9 - 14.6 %    PLATELET 031 607 - 454 K/uL    MPV 9.3 (L) 10.8 - 64.0 FL   METABOLIC PANEL, BASIC    Collection Time: 11/27/17  4:50 AM   Result Value Ref Range    Sodium 138 136 - 145 mmol/L    Potassium 4.1 3.5 - 5.1 mmol/L    Chloride 99 98 - 107 mmol/L    CO2 31 21 - 32 mmol/L    Anion gap 8 7 - 16 mmol/L    Glucose 83 65 - 100 mg/dL    BUN 25 (H) 8 - 23 MG/DL    Creatinine 5.24 (H) 0.6 - 1.0 MG/DL    GFR est AA 10 (L) >60 ml/min/1.73m2    GFR est non-AA 8 (L) >60 ml/min/1.73m2    Calcium 9.5 8.3 - 10.4 MG/DL   EKG, 12 LEAD, INITIAL    Collection Time: 11/27/17  6:31 AM   Result Value Ref Range    Ventricular Rate 82 BPM    Atrial Rate 82 BPM    P-R Interval 172 ms    QRS Duration 102 ms    Q-T Interval 414 ms    QTC Calculation (Bezet) 483 ms    Calculated P Axis 63 degrees    Calculated R Axis -8 degrees    Calculated T Axis 89 degrees    Diagnosis       Normal sinus rhythm  Cannot rule out Anterior infarct , age undetermined  Abnormal ECG  When compared with ECG of 26-NOV-2017 07:20,  Minimal criteria for Anterior infarct are now Present  Confirmed by Raheem Yanez MD (), RUCHI BIRD ([id number]) on 11/27/2017 6:50:56 AM         CXR viewed by me - no major infiltrate or fluid excess, CM with left possible minor effusion is present        CT abdomen/pelvis  CT ABDOMEN:  Peritoneal fluid in the perihepatic space, mild paracolic gutters,  extending down into the pelvis. Peritoneal dialysis catheter noted. There is not  any evidence of retroperitoneal hematoma identified. Strandy fluid density does  extend into the extraperitoneal space in the lower abdomen and pelvis. There is  radiodensity within the renal collecting systems, possibly previously  administered IV contrast. There is peripancreatic fluid and stranding, cannot  exclude acute pancreatitis. No biliary dilatation. Spleen is not enlarged. Small  bowel normal caliber.   CT PELVIS:   Moderate to large volume pelvic fluid.   There is diffuse asymmetric enlargement of the right rectus and oblique  abdominal muscles. There are are of higher attenuation the contralateral side  and findings are consistent with an abdominal wall hematoma. IMPRESSION:    1. Evidence of right abdominal wall hematoma. 2. No CT evidence to suggest retroperitoneal hematoma. 3. Extensive fluid in the abdomen and pelvis, presumed related to peritoneal  dialysis. Active Problems:    CAD (coronary artery disease) (11/27/2015)      Unstable angina ([address] (2/1/2016)      Elevated troponin (11/18/2017)      Chronic anemia (11/18/2017)      ESRD (end stage renal disease) ([address] (11/18/2017)        Assessment:     1. CAD x NSTEMI, Unstable angina -  - [facility] with complex CAD and acute thrombotic lesion in pLAD and subtotal occlusion in mLAD. The RCA has severe proximal and mid RCA disease. She has additional stenting of LAD with Resolute in mid/distal and proximal vessel. These all touched the previously stented mid vessel. Recommend life-long DAPT    2, ESRD -  - poor drainage on PD, hoping for improvement with getting OOB and ambulation  - plan HD today    3. Fluid excess -  - recurrent due to poor PD drainage    4.  Anemia -  - intensive anemia therapy in [employer]  - on WAYNE and IV iron and B12  - improved S/P  transfusion of PRBC    5. History of GI bleed -  - monitor clinically and serial Hb  - she had a drop in Hb to 7 range and improved with BT    6. Hypokalemia   - resolved     7.  Abdominal pain and tenderness with drop in hb , on DAPT   No evidence of retroperitoneal hematoma       Plan:     As above - 25

## 2017-11-27 NOTE — ROUTINE PROCESS
TRANSFER - OUT REPORT:    Verbal report given to Lorin Mccallum on [place]  being transferred to [address] post HD on 2nd floor  for routine post - op       Report consisted of patients Situation, Background, Assessment and   Recommendations(SBAR). Information from the following report(s) SBAR, Procedure Summary and MAR was reviewed with the receiving nurse. Opportunity for questions and clarification was provided. Patient transported with: TRANSFER - OUT REPORT:    Verbal report given to [address] on [place]  being transferred to IR prep then 2nd floor HD for routine post - op       Report consisted of patients Situation, Background, Assessment and   Recommendations(SBAR). Information from the following report(s) SBAR, Procedure Summary and MAR was reviewed with the receiving nurse. Opportunity for questions and clarification was provided.       Patient transported with:   [facility]       Registered Nurse

## 2017-11-27 NOTE — PROGRESS NOTES
Verbal bedside report given to Lorin oncoming RN. Patient's situation, background, assessment and recommendations provided. Opportunity for questions provided. Oncoming RN assumed care of patient.

## 2017-11-27 NOTE — PROGRESS NOTES
[facility] CARDIOLOGY PROGRESS NOTE           11/27/2017 8:34 AM    Admit Date: 11/18/2017      Subjective:   No CP and feels markedly better post PCI. Some fatigue and worsening anemia. CT with no RP bleed but has abdominal wall hematoma. Got CRRT initiated and transfused 2 units PRBC. Doing better. Labs stable. Was complaining of right Upper ext weakness and tingling. No objective findings of weakness. Pt moves right arm      ROS:  Cardiovascular:  As noted above    Objective:      Vitals:    11/26/17 1737 11/26/17 2032 11/27/17 0045 11/27/17 0502   BP: 133/67 128/64 122/61 116/56   Pulse: 88 77 79 80   Resp: 19 18 18 18   Temp: 98 °F (36.7 °C) 97.6 °F (36.4 °C) 98.4 °F (36.9 °C) 98.2 °F (36.8 °C)   SpO2: 96% 100% 98% 97%   Weight:    105.7 kg (233 lb 1.6 oz)   Height:           Physical Exam:  General-No Acute Distress  Neck- supple, no JVD  CV- regular rate and rhythm no MRG  Lung- clear bilaterally  Abd- soft, nontender, nondistended  Ext- no edema bilaterally. Skin- warm and dry    Data Review:   Recent Labs      11/27/17   0450  11/26/17   0534   NA  138  138   K  4.1  3.6   BUN  25*  13   CREA  5.24*  3.67*   GLU  83  84   WBC  9.6  10.4   HGB  9.1*  9.1*   HCT  28.9*  29.4*   PLT  167  166       Assessment/Plan:     Active Problems:    CAD (coronary artery disease) (11/27/2015)    S/P complex PCI and stable on ASA and Brilinta      Unstable angina ([facility]) (2/1/2016)    As above      Elevated troponin (11/18/2017)    As above      Chronic anemia (11/18/2017)has been transfused twice    This is worse and awaiting labs and hematology assistance. ESRD (end stage renal disease) ([facility] [address] (11/18/2017)    On PD.  Numbers looking good    Will need disposition assistance    Agree with rehab Jenn Gibson MD  11/27/2017 8:34 AM

## 2017-11-27 NOTE — DIALYSIS
PD catheter manually aspirated using a 60cc syringe. Not able to aspirate over 10cc, and syringe clogging with fibrin.  Dr. Yen Sarah notified and activase ordered for use intracath for PD cath

## 2017-11-27 NOTE — DISCHARGE SUMMARY
[facility] Cardiology Discharge Summary     Patient ID:  Uyen Ryder  [id number]  33 y.o.  10/26/1932    Admit date: 11/18/2017    Discharge date:  11/29/2017    Admitting Physician: Juliet Pa MD     Discharge Physician: MINDY Samaniego/Dr. Dagoberto Wick    Admission Diagnoses: Unstable angina [facility])    Discharge Diagnoses:   Patient Active Problem List    Diagnosis Date Noted    Elevated troponin 11/18/2017    Chest pain 11/18/2017    CKD (chronic kidney disease) 11/18/2017    Chronic anemia 11/18/2017    ESRD (end stage renal disease) ([facility] 11/18/2017    Abdominal pain 09/18/2017    H/O TIA (transient ischemic attack) and stroke 04/13/2017    S/P PTCA (percutaneous transluminal coronary angioplasty) 04/13/2017    Mixed hyperlipidemia 04/13/2017    Vision changes 03/26/2017    Dizziness 03/26/2017    Refusal of blood transfusions as patient is [employer] 08/25/2016    GERD (gastroesophageal reflux disease) 08/08/2016    Unspecified sleep apnea 08/08/2016    CVA (cerebral vascular accident) Hx of TIA 02/22/2016    Unstable angina ([facility] 02/01/2016    ESRD on peritoneal dialysis ([facility] 02/01/2016    Ischemic cardiomyopathy 12/29/2015    HLD (hyperlipidemia) 12/29/2015    Depression 12/29/2015    CAD (coronary artery disease) 11/27/2015    Debility 05/05/2015    Hypertension 04/28/2015    Anemia of chronic renal failure 04/28/2015    Hypothyroidism 04/28/2015       Cardiology Procedures this admission:  Left heart catheterization with PCI  EchoCardiogram  Consults: GI, Nephrology and Hematology/Oncology    Hospital Course: Patient presented to the emergency department of [facility] with complaints of chest pain described as a squeezing, substernal chest pain. Pain was rated at a 8/10. She had associated nausea and dyspnea. Her troponin was elevated. She has longstanding history of anemia as well.  She was on hospice at home but wished to proceed with treatment and procedures. She was admitted and started on IV Heparin. Her troponin radha to 9. 18. LHC was planned. She underwent cardiac catheterization by Dr. Cyril Agarwal. The proximal LAD had a 99% stenosis that was stented with a 3.0 x 22mm Resolute BRENNAN with 0% residual stenosis. Patient was found to have a 99% stenosis of the mid LAD that was stented with a 2.25 x 22mm Resolute BRENNAN with 0% residual stenosis. The proximal RCA had a 80% stenosis that was stented with a 3.5 x 15mm Resolute BRENNAN with 0% residual stenosis. The mid RCA had a 90% stenosis that was stented with a 3.0 x 18mm Resolute BRENNAN with 0% residual stenosis. Patient tolerated the procedure well and returned to the telemetry floor for recovery. An echocardiogram was performed with report as follows:   -  Left ventricle: Systolic function was normal. Ejection fraction was estimated in the range of 45 % to 50 %. There was mild concentric hypertrophy. -  Left atrium: The atrium was mildly dilated. -  Aortic valve: The valve was trileaflet. Leaflets exhibited mild to moderate sclerosis. There was trivial regurgitation. -  Mitral valve: There was mild annular calcification. There was mild regurgitation. -  Tricuspid valve: There was mild regurgitation. Her Hgb was trending down. She complained of abdominal tenderness. A CT of the abdomen/pelvis showed a right abdominal wall hematoma. She continued to complain of abdominal distension and tenderness. She became more short of breath. Nephrology followed patient. On 11/23, her PD was stopped and placed in drain mode which resulted in draining over 2 liters of fluid. She agreed to transiently switch to CRRT for volume and biochemical control and transfusion. She was transferred to the ICU and started on CRRT. She was transfused with PRBCs as well. Hematology and GI followed patient as well. She was transfused another unit on 11/24. She felt better with less dyspnea. She was transferred back to telemetry.  Her Hgb remained stable. She continued HD through her temporary dialysis catheter. Her left femoral HD access failed. She was taken to IR and underwent revision of the left femoral HD access. She was evaluated for rehab on the 9th floor and accepted. The morning of 11/29/2017 patient was up feeling well without any complaints of chest pain or shortness of breath. Patient's labs were stable. Patient was seen and examined by Dr. Stan Jackson and determined stable and ready for discharge to inpatient rehab. Patient was instructed on the importance of medication compliance including taking aspirin and Brilinta everyday without missing a dose. After receiving drug eluting stents, the patient will need to be on dual anti-platelet therapy for at least 1 year. For maximized medical therapy of CAD, patient will continue use of BB and statin as well. The patient will follow up with [facility]iology Dr. Ariana Lopez in 2 weeks after discharge from inpatient rehab. She will follow up with hematology and nephrology as well. DISPOSITION: The patient is being discharged to inpatient rehab in stable condition on a low saturated fat, low cholesterol and low salt diet. The patient is instructed to advance activities as tolerated to the limit of fatigue or shortness of breath. The patient is instructed to call the office or return to the ER for immediate evaluation for any shortness of breath or chest pain not relieved by NTG. Discharge Exam:   Visit Vitals    /56 (BP 1 Location: Left arm, BP Patient Position: At rest)    Pulse 80    Temp 98.2 °F (36.8 °C)    Resp 18    Ht 5' 10\" (1.778 m)    Wt 105.7 kg (233 lb 1.6 oz)    SpO2 97%    BMI 33.45 kg/m2     Patient has been seen by Dr. Stan Jackson: see his progress note for exam details.     Recent Results (from the past 24 hour(s))   CBC W/O DIFF    Collection Time: 11/27/17  4:50 AM   Result Value Ref Range    WBC 9.6 4.3 - 11.1 K/uL    RBC 3.07 (L) 4.05 - 5.25 M/uL    HGB 9.1 (L) 11.7 - 15.4 g/dL    HCT 28.9 (L) 35.8 - 46.3 %    MCV 94.1 79.6 - 97.8 FL    MCH 29.6 26.1 - 32.9 PG    MCHC 31.5 31.4 - 35.0 g/dL    RDW 19.2 (H) 11.9 - 14.6 %    PLATELET 958 762 - 301 K/uL    MPV 9.3 (L) 10.8 - 81.7 FL   METABOLIC PANEL, BASIC    Collection Time: 11/27/17  4:50 AM   Result Value Ref Range    Sodium 138 136 - 145 mmol/L    Potassium 4.1 3.5 - 5.1 mmol/L    Chloride 99 98 - 107 mmol/L    CO2 31 21 - 32 mmol/L    Anion gap 8 7 - 16 mmol/L    Glucose 83 65 - 100 mg/dL    BUN 25 (H) 8 - 23 MG/DL    Creatinine 5.24 (H) 0.6 - 1.0 MG/DL    GFR est AA 10 (L) >60 ml/min/1.73m2    GFR est non-AA 8 (L) >60 ml/min/1.73m2    Calcium 9.5 8.3 - 10.4 MG/DL   EKG, 12 LEAD, INITIAL    Collection Time: 11/27/17  6:31 AM   Result Value Ref Range    Ventricular Rate 82 BPM    Atrial Rate 82 BPM    P-R Interval 172 ms    QRS Duration 102 ms    Q-T Interval 414 ms    QTC Calculation (Bezet) 483 ms    Calculated P Axis 63 degrees    Calculated R Axis -8 degrees    Calculated T Axis 89 degrees    Diagnosis       Normal sinus rhythm  Cannot rule out Anterior infarct , age undetermined  Abnormal ECG  When compared with ECG of 26-NOV-2017 07:20,  Minimal criteria for Anterior infarct are now Present  Confirmed by Jason Shetty MD (), RUCHI BIRD ([id number]) on 11/27/2017 6:50:56 AM           Patient Instructions:   Current Discharge Medication List      START taking these medications    Details   cyanocobalamin (VITAMIN B12) 1,000 mcg/mL injection 1 mL by IntraMUSCular route every seven (7) days. Indications: Vitamin B12 Deficiency  Qty: 1 Vial, Refills: 0         CONTINUE these medications which have CHANGED    Details   metoprolol tartrate (LOPRESSOR) 25 mg tablet Take 0.5 Tabs by mouth daily. Qty: 30 Tab, Refills: 3      amLODIPine (NORVASC) 5 mg tablet Take 1 Tab by mouth daily. Qty: 30 Tab, Refills: 6      torsemide (DEMADEX) 100 mg tablet Take 1 Tab by mouth two (2) times a day.   Qty: 60 Tab, Refills: 3      pantoprazole (PROTONIX) 40 mg tablet Take 1 Tab by mouth Before breakfast and dinner. Qty: 30 Tab, Refills: 0         CONTINUE these medications which have NOT CHANGED    Details   folic acid (FOLVITE) 1 mg tablet Take 1 mg by mouth daily. potassium chloride (K-DUR, KLOR-CON) 20 mEq tablet Take 20 mEq by mouth two (2) times a day. traZODone (DESYREL) 50 mg tablet Take  by mouth nightly. megestrol (MEGACE) 400 mg/10 mL (40 mg/mL) suspension Take 200 mg by mouth daily. sucralfate (CARAFATE) 100 mg/mL suspension Take 10 mL by mouth Before breakfast, lunch, dinner and at bedtime. Indications: PREVENTION OF STRESS ULCER  Qty: 414 mL, Refills: 0      nitroglycerin (NITROLINGUAL) 400 mcg/spray spray 1 Spray by SubLINGual route every five (5) minutes as needed for Chest Pain.      linaclotide (LINZESS) 145 mcg cap capsule Take  by mouth Daily (before breakfast). magnesium oxide (MAG-OX) 400 mg tablet Take 400 mg by mouth daily. PNV NO.122/IRON/FOLIC ACID (PRENATAL MULTI PO) Take  by mouth. ondansetron (ZOFRAN ODT) 4 mg disintegrating tablet Take 4 mg by mouth three (3) times daily as needed. metoclopramide HCl (REGLAN) 5 mg tablet Take 5 mg by mouth two (2) times a day. ticagrelor (BRILINTA) 90 mg tablet Take 1 Tab by mouth two (2) times a day. Qty: 60 Tab, Refills: 11      sevelamer carbonate (RENVELA) 800 mg tab tab Take 800 mg by mouth three (3) times daily (with meals). gentamicin (GARAMYCIN) 0.1 % topical cream APPLY TO PD catheter exit site at daily dressing change  Qty: 15 g, Refills: 0      aspirin delayed-release 81 mg tablet Take 1 Tab by mouth daily. darbepoetin toya in polysorbat (ARANESP, POLYSORBATE,) 40 mcg/mL injection 40 mcg by SubCUTAneous route every fourteen (14) days. Indications: ANEMIA IN CHRONIC KIDNEY DISEASE      rosuvastatin (CRESTOR) 20 mg tablet Take 20 mg by mouth nightly.       ranolazine ER (RANEXA) 500 mg SR tablet Take 500 mg by mouth two (2) times a day.      levothyroxine (SYNTHROID) 150 mcg tablet Take 150 mcg by mouth Daily (before breakfast).          STOP taking these medications       calcitRIOL (ROCALTROL) 0.25 mcg capsule Comments:   Reason for Stopping:         promethazine (PHENERGAN) 25 mg tablet Comments:   Reason for Stopping:                 Signed:  Angela Ramirez PA-C  11/29/2017

## 2017-11-27 NOTE — PROGRESS NOTES
Bedside and Verbal shift change report given to self (oncoming nurse) by David Mcdonough RN (offgoing nurse).  Report included the following information SBAR, Kardex, ED Summary, Procedure Summary, MAR, Recent Results and Cardiac Rhythm SR.

## 2017-11-27 NOTE — CONSULTS
MD Claire   Medical Director  [address] [place] [address], [address] [facility]  Tel: [phone]     Physical Medicine & Rehabilitation Note-consult    Patient: Raphael Savage MRN: [id number]  SSN: [id number]    YOB: 1932  Age: 80 y.o. Sex: female      Admit Date: 11/18/2017  Admitting Physician: Sheela Liz MD    Medical Decision Making/Plan/Recommend:  Debility/ weakness. Recommend  acute rehab at [facility] . Discussed rehabilitation and further care program options with the patient. Admission to inpatient rehab program is reasonable and necessary due to ongoing medical conditions described below and functional deficits. She will benefit from daily multi disciplinary  inpatient rehabilitation program and the availability of all the needed medical and nursing care. Will discuss with the admissions coordinator of [facility] program for approval and hopefully this patient will meet the admission criteria and then the patient will be transferred there, when approved by the primary physician, with approval of the patient/family. Chief Complaint : Gait dysfunction secondary to below. Admit Diagnosis: Unstable angina (HCC)  CAD (coronary artery disease) (11/27/2015)  S/P complex PCI and stable on ASA and Brilinta  Unstable angina ([address] (2/1/2016)  Acute/ Chronic anemia (11/18/2017)  ESRD (end stage renal disease) On PD/ CRRT  Pain  DVT risk  Acute Rehab Dx:  Debility    Weakness  Gait impairment  deconditioning  Mobility and ambulation deficits  Self Care/ADL deficits    Subjective:     Date of Evaluation:  November 27, 2017    HPI: Raphael Savage is a 80 y.o. female patient at [facility] who was admitted on 11/18/2017  by Sheela Liz MD with below mentioned medical history ,is being seen for Physical Medicine and Rehabilitation.     Raphael Savage who has history ofh/o CAD s/p PCI to LAD and RCA 2-2016, and ESRD on PD presented to the ER on 11/18/2017 with chest pain, admitted with diagnoss of unstable angina. She underwent a successful percutaneous coronary intervention and stentings on 11/20/2017. Post procedure course was complicated by drop in hgb, down to 7.4 on 11/21. She had no obvious signs of bleeding but CT  showed right abdominal wall hematoma. transfusion for anemia was not allowed due to pt being a Jehovah Witness patient. Patient continued to need dialysis, however due to due to problems with drainage of PD fluid, PD was held and she was transferred to CCU for CRRT. Patient with signis of volume overload, improving with CRRT. She was noted to eventually be transfused PRBC due to her hgb being critically low. GI was consulted for possible GI bleed. We are consulted to assist with rehab needs and placement. Patient noted to have significant decline of her function below her baseline due to prolonged immobility and illness. Patient starting to stand, taking few steps, however shows significant functional deficits. She requires minimum to moderate assist for mobility and ambulation. Josse Hess is seen and examined today. Medical Records reviewed. She is normally independent at her baseline.      Medical  Dx:  Past Medical History:   Diagnosis Date    Anemia of chronic renal failure 4/28/2015    Anterior myocardial infarction [facility]) 5/21/2015    CAD (coronary artery disease)     Chest pain     Chronic kidney disease, stage III (moderate) 8/15/2014    on dialysis    CKD (chronic kidney disease) stage 4, GFR 15-29 ml/min ([facility]) 4/28/2015    Debility 5/5/2015    Depression 12/29/2015    Edema 12/29/2015    Endocrine disease     Hypothyroidism    GERD (gastroesophageal reflux disease)     Heart murmur 12/29/2015    HLD (hyperlipidemia) 12/29/2015    Hypertension     Hypothyroidism 4/28/2015    Ischemic cardiomyopathy 12/29/2015    Nausea     S/P PTCA (percutaneous transluminal coronary angioplasty); LAD PTCA of  ISR 11/27/15 5/24/2016    STEMI (ST elevation myocardial infarction) ([facility] 4/28/2015    Unspecified sleep apnea     uses cpap machine    Unstable angina (HCC)         Current Level of Function: bed mobility - min/ mod  A, transfers - min/ mod A, decreased balance , ambulation  - 4' with using a RW, moderate assist.    Prior Level of Function/Work/Activity:  Lives alone in single story home with ramp to enter. Pt is typically mod I, ambulatory around house with rolling walker. Reports at baseline she is independent with ADLs, bathroom/toileting, and fixing simple meals. Pt reports son has been staying with her recently and will be with her until she is doing better. Family History   Problem Relation Age of Onset    Heart Disease Mother     Hypertension Mother     Cancer Mother      Lung    Stroke Father     Hypertension Father     Breast Cancer Neg Hx       Social History   Substance Use Topics    Smoking status: Never Smoker    Smokeless tobacco: Never Used    Alcohol use No     Past Surgical History:   Procedure Laterality Date    HX BACK SURGERY  1990    neck surgery cervical disc    HX BACK SURGERY      lower back    HX CATARACT REMOVAL Bilateral     HX CHOLECYSTECTOMY  [id number]    gall bladder     HX KNEE REPLACEMENT Right 2006    HX PTCA  4/28/2015    2.25 Xience stent to mid LAD for occluded artery, anterior MI, EF 25%. Moderate disease distal LAD and distal OM PCI CX and RCA 2004, then PCI RCA and LAD in 2009. Prior to Admission medications    Medication Sig Start Date End Date Taking? Authorizing Provider   metoprolol tartrate (LOPRESSOR) 25 mg tablet Take 0.5 Tabs by mouth daily. 11/27/17  Yes MINDY Chatman   amLODIPine (NORVASC) 5 mg tablet Take 1 Tab by mouth daily. 11/27/17  Yes MINDY Chatman   torsemide (DEMADEX) 100 mg tablet Take 1 Tab by mouth two (2) times a day.  11/27/17  Yes MINDY Chatman   pantoprazole (PROTONIX) 40 mg tablet Take 1 Tab by mouth Before breakfast and dinner. 11/27/17  Yes MINDY Chatman   cyanocobalamin (VITAMIN B12) 1,000 mcg/mL injection 1 mL by IntraMUSCular route every seven (7) days. Indications: Vitamin B12 Deficiency 12/2/17  Yes MINDY Chatman   folic acid (FOLVITE) 1 mg tablet Take 1 mg by mouth daily. Yes Historical Provider   potassium chloride (K-DUR, KLOR-CON) 20 mEq tablet Take 20 mEq by mouth two (2) times a day. Yes Historical Provider   traZODone (DESYREL) 50 mg tablet Take  by mouth nightly. Yes Historical Provider   megestrol (MEGACE) 400 mg/10 mL (40 mg/mL) suspension Take 200 mg by mouth daily. Yes Historical Provider   sucralfate (CARAFATE) 100 mg/mL suspension Take 10 mL by mouth Before breakfast, lunch, dinner and at bedtime. Indications: PREVENTION OF STRESS ULCER 9/23/17   Nathaniel Chau,    nitroglycerin (NITROLINGUAL) 400 mcg/spray spray 1 Spray by SubLINGual route every five (5) minutes as needed for Chest Pain. Historical Provider   linaclotide Amara Pacheco) 145 mcg cap capsule Take  by mouth Daily (before breakfast). Historical Provider   magnesium oxide (MAG-OX) 400 mg tablet Take 400 mg by mouth daily. Historical Provider   PNV NO.122/IRON/FOLIC ACID (PRENATAL MULTI PO) Take  by mouth. Historical Provider   ondansetron (ZOFRAN ODT) 4 mg disintegrating tablet Take 4 mg by mouth three (3) times daily as needed. Historical Provider   metoclopramide HCl (REGLAN) 5 mg tablet Take 5 mg by mouth two (2) times a day. Historical Provider   ticagrelor (BRILINTA) 90 mg tablet Take 1 Tab by mouth two (2) times a day. 2/4/16   MINDY Beatty   promethazine (PHENERGAN) 25 mg tablet Take 25 mg by mouth every eight (8) hours as needed for Nausea. Historical Provider   sevelamer carbonate (RENVELA) 800 mg tab tab Take 800 mg by mouth three (3) times daily (with meals).     Historical Provider   gentamicin (GARAMYCIN) 0.1 % topical cream APPLY TO PD catheter exit site at daily dressing change 5/12/15   Joan Duarte MD   aspirin delayed-release 81 mg tablet Take 1 Tab by mouth daily. 5/5/15   Gisela Hollingsworth PA-C   darbepoetin toya in polysorbat (ARANESP, POLYSORBATE,) 40 mcg/mL injection 40 mcg by SubCUTAneous route every fourteen (14) days. Indications: ANEMIA IN CHRONIC KIDNEY DISEASE    Historical Provider   rosuvastatin (CRESTOR) 20 mg tablet Take 20 mg by mouth nightly. Historical Provider   ranolazine ER (RANEXA) 500 mg SR tablet Take 500 mg by mouth two (2) times a day. Historical Provider   levothyroxine (SYNTHROID) 150 mcg tablet Take 150 mcg by mouth Daily (before breakfast). Historical Provider     Allergies   Allergen Reactions    Codeine Nausea and Vomiting        Review of Systems: Denies chest pain, shortness of breath, cough, headache, visual problems, abdominal pain, dysurea, calf pain. Pertinent positives are as noted in the medical records and unremarkable otherwise. Objective:     Vitals:  Blood pressure 144/55, pulse 90, temperature 98.2 °F (36.8 °C), resp. rate 18, height 5' 10\" (1.778 m), weight 233 lb 1.6 oz (105.7 kg), SpO2 96 %. Temp (24hrs), Av.1 °F (36.7 °C), Min:97.6 °F (36.4 °C), Max:98.6 °F (37 °C)    BMI (calculated): 33.4 (17 0502)   Intake and Output:   1901 -  0700  In: 18 [P.O.:480]  Out: 0     Physical Exam:   General: Alert and age appropriately oriented. No acute cardio respiratory distress. HEENT: Normocephalic,no scleral icterus  Oral mucosa moist without cyanosis   Lungs: Clear to auscultation  bilaterally. Respiration even and unlabored   Heart: Regular rate and rhythm, S1, S2   No  murmurs, clicks, rub or gallops   Abdomen: Soft, non-tender, nondistended. Bowel sounds present. No organomegaly. Genitourinary: defered   Neuromuscular:      NANCI, EOMI  + mild generalized weakness 4+ to 5-/5. BUE, BLE, more proximal.   Sensation grossly intact to soft touch. Skin/extremity: No rashes, no erythema.1-2+edema. Wound covered.                                                                                         Labs/Studies:  Recent Results (from the past 72 hour(s))   CBC W/O DIFF    Collection Time: 11/25/17  5:01 AM   Result Value Ref Range    WBC 9.1 4.3 - 11.1 K/uL    RBC 3.29 (L) 4.05 - 5.25 M/uL    HGB 9.7 (L) 11.7 - 15.4 g/dL    HCT 30.9 (L) 35.8 - 46.3 %    MCV 93.9 79.6 - 97.8 FL    MCH 29.5 26.1 - 32.9 PG    MCHC 31.4 31.4 - 35.0 g/dL    RDW 18.3 (H) 11.9 - 14.6 %    PLATELET 599 624 - 215 K/uL    MPV 9.9 (L) 10.8 - 77.6 FL   METABOLIC PANEL, BASIC    Collection Time: 11/25/17  5:01 AM   Result Value Ref Range    Sodium 137 136 - 145 mmol/L    Potassium 3.7 3.5 - 5.1 mmol/L    Chloride 98 98 - 107 mmol/L    CO2 33 (H) 21 - 32 mmol/L    Anion gap 6 (L) 7 - 16 mmol/L    Glucose 105 (H) 65 - 100 mg/dL    BUN 8 8 - 23 MG/DL    Creatinine 2.24 (H) 0.6 - 1.0 MG/DL    GFR est AA 27 (L) >60 ml/min/1.73m2    GFR est non-AA 22 (L) >60 ml/min/1.73m2    Calcium 9.6 8.3 - 10.4 MG/DL   EKG, 12 LEAD, INITIAL    Collection Time: 11/25/17  7:20 AM   Result Value Ref Range    Ventricular Rate 75 BPM    Atrial Rate 75 BPM    P-R Interval 182 ms    QRS Duration 104 ms    Q-T Interval 422 ms    QTC Calculation (Bezet) 471 ms    Calculated P Axis 46 degrees    Calculated R Axis -28 degrees    Calculated T Axis 69 degrees    Diagnosis       Normal sinus rhythm  Moderate voltage criteria for LVH, may be normal variant  T wave abnormality, consider anterolateral ischemia  Prolonged QT  Abnormal ECG  When compared with ECG of 24-NOV-2017 06:36,  Fusion complexes are no longer Present  Minimal criteria for Anterior infarct are no longer Present  Confirmed by BRANDEN GARY ()MAINOR ([id number]) on 11/25/2017 10:10:40 AM     CBC W/O DIFF    Collection Time: 11/26/17  5:34 AM   Result Value Ref Range    WBC 10.4 4.3 - 11.1 K/uL    RBC 3.08 (L) 4.05 - 5.25 M/uL    HGB 9.1 (L) 11.7 - 15.4 g/dL    HCT 29.4 (L) 35.8 - 46.3 %    MCV 95.5 79.6 - 97.8 FL    MCH 29.5 26.1 - 32.9 PG    MCHC 31.0 (L) 31.4 - 35.0 g/dL    RDW 18.7 (H) 11.9 - 14.6 %    PLATELET 396 221 - 710 K/uL    MPV 9.7 (L) 10.8 - 73.4 FL   METABOLIC PANEL, BASIC    Collection Time: 11/26/17  5:34 AM   Result Value Ref Range    Sodium 138 136 - 145 mmol/L    Potassium 3.6 3.5 - 5.1 mmol/L    Chloride 100 98 - 107 mmol/L    CO2 31 21 - 32 mmol/L    Anion gap 7 7 - 16 mmol/L    Glucose 84 65 - 100 mg/dL    BUN 13 8 - 23 MG/DL    Creatinine 3.67 (H) 0.6 - 1.0 MG/DL    GFR est AA 15 (L) >60 ml/min/1.73m2    GFR est non-AA 12 (L) >60 ml/min/1.73m2    Calcium 9.7 8.3 - 10.4 MG/DL   EKG, 12 LEAD, INITIAL    Collection Time: 11/26/17  7:20 AM   Result Value Ref Range    Ventricular Rate 80 BPM    Atrial Rate 80 BPM    P-R Interval 172 ms    QRS Duration 98 ms    Q-T Interval 434 ms    QTC Calculation (Bezet) 500 ms    Calculated P Axis 49 degrees    Calculated R Axis -27 degrees    Calculated T Axis 74 degrees    Diagnosis       Normal sinus rhythm  Moderate voltage criteria for LVH, may be normal variant  T wave abnormality, consider anterolateral ischemia  Prolonged QT  Abnormal ECG  When compared with ECG of 25-NOV-2017 07:20,  No significant change was found  Confirmed by Karen Schuler ([id number]) on 11/26/2017 6:55:38 PM     CBC W/O DIFF    Collection Time: 11/27/17  4:50 AM   Result Value Ref Range    WBC 9.6 4.3 - 11.1 K/uL    RBC 3.07 (L) 4.05 - 5.25 M/uL    HGB 9.1 (L) 11.7 - 15.4 g/dL    HCT 28.9 (L) 35.8 - 46.3 %    MCV 94.1 79.6 - 97.8 FL    MCH 29.6 26.1 - 32.9 PG    MCHC 31.5 31.4 - 35.0 g/dL    RDW 19.2 (H) 11.9 - 14.6 %    PLATELET 818 841 - 880 K/uL    MPV 9.3 (L) 10.8 - 85.4 FL   METABOLIC PANEL, BASIC    Collection Time: 11/27/17  4:50 AM   Result Value Ref Range    Sodium 138 136 - 145 mmol/L    Potassium 4.1 3.5 - 5.1 mmol/L    Chloride 99 98 - 107 mmol/L    CO2 31 21 - 32 mmol/L    Anion gap 8 7 - 16 mmol/L    Glucose 83 65 - 100 mg/dL    BUN 25 (H) 8 - 23 MG/DL    Creatinine 5.24 (H) 0.6 - 1.0 MG/DL    GFR est AA 10 (L) >60 ml/min/1.73m2    GFR est non-AA 8 (L) >60 ml/min/1.73m2    Calcium 9.5 8.3 - 10.4 MG/DL   EKG, 12 LEAD, INITIAL    Collection Time: 11/27/17  6:31 AM   Result Value Ref Range    Ventricular Rate 82 BPM    Atrial Rate 82 BPM    P-R Interval 172 ms    QRS Duration 102 ms    Q-T Interval 414 ms    QTC Calculation (Bezet) 483 ms    Calculated P Axis 63 degrees    Calculated R Axis -8 degrees    Calculated T Axis 89 degrees    Diagnosis       Normal sinus rhythm  Cannot rule out Anterior infarct , age undetermined  Abnormal ECG  When compared with ECG of 26-NOV-2017 07:20,  Minimal criteria for Anterior infarct are now Present  Confirmed by Liz Kim MD (), RUCHI BIRD ([id number]) on 11/27/2017 6:50:56 AM         Functional Assessment:  Reviewed participation and progress in therapies  Gross Assessment  AROM: Within functional limits (11/22/17 1530)  Strength: Generally decreased, functional (11/22/17 1530)  Coordination: Generally decreased, functional (11/22/17 1530)  Sensation: Intact (11/22/17 1530)   Bed Mobility  Rolling: Contact guard assistance (11/22/17 1530)  Supine to Sit: Minimum assistance (11/22/17 1530)  Scooting: Contact guard assistance (11/22/17 1530)   Balance  Sitting: Impaired (11/22/17 1530)  Sitting - Static: Good (unsupported) (11/22/17 1530)  Sitting - Dynamic: Fair (occasional) (11/22/17 1530)  Standing: Impaired (11/22/17 1530)  Standing - Static: Fair (11/22/17 1530)  Standing - Dynamic : Fair (11/22/17 1530)               Bed/Mat Mobility  Rolling: Contact guard assistance (11/22/17 1530)  Supine to Sit: Minimum assistance (11/22/17 1530)  Sit to Stand: Minimum assistance (11/22/17 1530)  Bed to Chair: Minimum assistance; Moderate assistance (11/22/17 1530)  Scooting: Contact guard assistance (11/22/17 1530)     Ambulation:  Gait  Base of Support: Widened;Center of gravity altered (11/22/17 )  Speed/Michelle: Pace decreased (<100 feet/min) (11/22/17 1530)  Step Length: Left shortened;Right shortened (11/22/17 1530)  Gait Abnormalities: Trunk sway increased (11/22/17 1530)  Ambulation - Level of Assistance: Minimal assistance; Moderate assistance (11/22/17 1530)  Distance (ft): 4 Feet (ft) (11/22/17 1530)  Assistive Device: Other (comment) (handheld assist) (11/22/17 1530)  Interventions: Verbal cues; Visual/Demos; Safety awareness training; Tactile cues (11/22/17 1530)    Impression/Plan:     Active Problems:    CAD (coronary artery disease) (11/27/2015)      Unstable angina ([facility] (2/1/2016)      Elevated troponin (11/18/2017)      Chronic anemia (11/18/2017)      ESRD (end stage renal disease) ([facility] (11/18/2017)        Current Facility-Administered Medications   Medication Dose Route Frequency Provider Last Rate Last Dose    heparin (porcine) 1,000 unit/mL injection 1,000-5,000 Units  1,000-5,000 Units InterCATHeter ONCE Arya Childs MD        polyethylene glycol (MIRALAX) packet 17 g  17 g Oral DAILY PRN Wallace Apple MD        amLODIPine (NORVASC) tablet 5 mg  5 mg Oral DAILY Wallace Apple MD   5 mg at 11/26/17 1109    senna-docusate (Toñito Jubilee) 8.6-50 mg per tablet 1 Tab  1 Tab Oral DAILY Wallace Apple MD   1 Tab at 11/26/17 1101    torsemide (DEMADEX) tablet 100 mg  100 mg Oral BID Wallace Apple MD   100 mg at 11/26/17 1713    sodium chloride (NS) flush 5-10 mL  5-10 mL IntraVENous Kilo Fontenot MD   10 mL at 11/27/17 0459    sodium chloride (NS) flush 5-10 mL  5-10 mL IntraVENous PRN Sonido Braxton MD   10 mL at 11/22/17 1105    LORazepam (ATIVAN) tablet 1 mg  1 mg Oral Q6H PRN Sonido Braxton MD   1 mg at 11/22/17 0138    HYDROcodone-acetaminophen (NORCO) 5-325 mg per tablet 1 Tab  1 Tab Oral Q4H PRN Sonido Braxton MD        potassium chloride 10 mEq in peritoneal dialysis DEXTROSE 2.5% (2.5 mEq/L low calcium) 5,000 mL   IntraPERitoneal DIALYSIS PRN Misael Rosario MD        gentamicin (GARAMYCIN) 0.1 % cream   Topical DAILY PRN MINDY Kowalski        nitroglycerin (NITROSTAT) tablet 0.4 mg  0.4 mg SubLINGual PRN MINDY Kowalski   0.4 mg at 11/23/17 1324    aspirin delayed-release tablet 81 mg  81 mg Oral DAILY MINDY Kowalski   81 mg at 11/27/17 1046    calcitRIOL (ROCALTROL) capsule 0.25 mcg  0.25 mcg Oral DAILY MINDY Kowalski   0.25 mcg at 11/26/17 1059    levothyroxine (SYNTHROID) tablet 150 mcg  150 mcg Oral ACB MINDY Kowalski   150 mcg at 11/27/17 2399    linaclotide (LINZESS) capsule 145 mcg (Patient Supplied)  145 mcg Oral DAILY MINDY Kowalski   Stopped at 11/21/17 0900    magnesium oxide (MAG-OX) tablet 400 mg  400 mg Oral DAILY MINDY Kowalski   400 mg at 11/26/17 1103    metoclopramide HCl (REGLAN) tablet 5 mg  5 mg Oral BID MINDY Kowalski   5 mg at 11/26/17 1716    metoprolol tartrate (LOPRESSOR) tablet 12.5 mg  12.5 mg Oral DAILY MINDY Kowalski   12.5 mg at 11/26/17 1110    ondansetron (ZOFRAN ODT) tablet 4 mg  4 mg Oral TID PRN MINDY Kowalski   4 mg at 11/20/17 0845    promethazine (PHENERGAN) tablet 25 mg  25 mg Oral Q6H PRN MINDY Kowalski   25 mg at 11/22/17 7295    ranolazine ER (RANEXA) tablet 1,000 mg  1,000 mg Oral BID MINDY Kowalski   1,000 mg at 11/26/17 1713    rosuvastatin (CRESTOR) tablet 20 mg  20 mg Oral QHS MINDY Kowalski   20 mg at 11/26/17 2206    sevelamer (RENAGEL) tablet 800 mg  800 mg Oral TID WITH MEALS MINDY Kowalski   800 mg at 11/25/17 2881    sucralfate (CARAFATE) 100 mg/mL oral suspension 1 g  1 g Oral AC&HS MINDY Kowalski   1 g at 11/27/17 0737    morphine injection 2 mg  2 mg IntraVENous Q4H PRN MINDY Kowalski        acetaminophen (TYLENOL) tablet 650 mg  650 mg Oral Q4H PRN MINDY Kowalski        HYDROcodone-acetaminophen (NORCO) 7.5-325 mg per tablet 1 Tab  1 Tab Oral Q4H PRN MINDY Kowalski       [facility] ticagrelor (BRILINTA) tablet 90 mg  90 mg Oral BID MINDY Avelar   90 mg at 11/27/17 1046    epoetin toya (EPOGEN;PROCRIT) injection 20,000 Units  20,000 Units SubCUTAneous Q MON, WED & FRI Oly Flores MD   20,000 Units at 11/24/17 1738    cyanocobalamin (VITAMIN B12) injection 1,000 mcg  1,000 mcg IntraMUSCular Q7D Oly Flores MD   1,000 mcg at 11/25/17 1729    pantoprazole (PROTONIX) tablet 40 mg  40 mg Oral ACB&D Oly Flores MD   40 mg at 11/27/17 7721    multivitamin w ZN ([address]) tablet  1 Tab Oral DAILY Oly Flores MD   1 Tab at 11/26/17 1102      Recommendations: Recommend sub acute rehab at [address] [place] [address].  Coordination of rehab/medical care. Will follow with SW/ /Admissions Coordinators regarding insurance approvals and acceptance. Rehabilitation Management/ Medical Management: 1. Devices:Walkers, Type: Rolling Walker  2. Consult:Rehab team including PT, OT,  and . 3. Disposition Rehab-discussed with patient. 4. Plexipulse when in bed  5. Thigh-high or knee-high DESIREE's when out of bed  6. DVT Prophylaxis per primary - on DAPT  7. Incentive spirometer Q1H while awake  8. Post op hemorrhagic anemia-monitor. 9. Activity: WBAT BLE, fall precautions, cardiac precaution. 10. Planned Labs: CBC,BMP  11. Diet - renal   12. ESRD. - per nephrology        Thank you for the opportunity to participate in the care of this patient.   Signed By: Susan Galindo MD     November 27, 2017

## 2017-11-27 NOTE — DIALYSIS
HD treatment stopped at this time d/t catheter complications. Catheter unable to aspirate from both ports. Dr. Massimo Feldman aware. Consult for IR for femoral catheter replacement per Dr. Massimo Feldman.

## 2017-11-27 NOTE — PROGRESS NOTES
Consult received from Nephrology for 9th floor rehab consult. Notified Charly Charter on 9th floor of referral and order placed for consult. Will await determination of 9th floor. CM following.      Dr. Milan Garcia reviewed chart states possibly a candidate for 9th  and would evaluate and review PT notes in am.

## 2017-11-27 NOTE — DIALYSIS
Treatment initiated using Left femoral CVC by Kirk Alston. Both ports aspirated and flushed without problems. Machine set per MD orders. Pt VS stable. Will continue to monitor.

## 2017-11-27 NOTE — PROGRESS NOTES
Problem: Falls - Risk of  Goal: *Absence of Falls  Document [place] Fall Risk and appropriate interventions in the flowsheet.    Outcome: Progressing Towards Goal  Fall Risk Interventions:  Mobility Interventions: Bed/chair exit alarm, Communicate number of staff needed for ambulation/transfer, OT consult for ADLs, Patient to call before getting OOB, PT Consult for mobility concerns         Medication Interventions: Bed/chair exit alarm, Patient to call before getting OOB, Teach patient to arise slowly    Elimination Interventions: Bed/chair exit alarm, Call light in reach, Patient to call for help with toileting needs, Toileting schedule/hourly rounds

## 2017-11-27 NOTE — DIALYSIS
HD treatment completed without complication. Total of 2.5 kgs removed. Flushed both ports with 10 mL of NS.  VS stable, NAD. CVC dressing clean, dry, and intact, tego caps intact, bilateral lumens wrapped with 4x4 gauze. Transport contacted for return to room.

## 2017-11-28 LAB
ANION GAP SERPL CALC-SCNC: 9 MMOL/L (ref 7–16)
BUN SERPL-MCNC: 26 MG/DL (ref 8–23)
CALCIUM SERPL-MCNC: 9.3 MG/DL (ref 8.3–10.4)
CHLORIDE SERPL-SCNC: 100 MMOL/L (ref 98–107)
CO2 SERPL-SCNC: 28 MMOL/L (ref 21–32)
CREAT SERPL-MCNC: 5.06 MG/DL (ref 0.6–1)
ERYTHROCYTE [DISTWIDTH] IN BLOOD BY AUTOMATED COUNT: 19.5 % (ref 11.9–14.6)
GLUCOSE SERPL-MCNC: 87 MG/DL (ref 65–100)
HCT VFR BLD AUTO: 29.1 % (ref 35.8–46.3)
HGB BLD-MCNC: 9.3 G/DL (ref 11.7–15.4)
MCH RBC QN AUTO: 30.2 PG (ref 26.1–32.9)
MCHC RBC AUTO-ENTMCNC: 32 G/DL (ref 31.4–35)
MCV RBC AUTO: 94.5 FL (ref 79.6–97.8)
PLATELET # BLD AUTO: 178 K/UL (ref 150–450)
PMV BLD AUTO: 9.7 FL (ref 10.8–14.1)
POTASSIUM SERPL-SCNC: 4 MMOL/L (ref 3.5–5.1)
RBC # BLD AUTO: 3.08 M/UL (ref 4.05–5.25)
SODIUM SERPL-SCNC: 137 MMOL/L (ref 136–145)
WBC # BLD AUTO: 9.4 K/UL (ref 4.3–11.1)

## 2017-11-28 PROCEDURE — 85027 COMPLETE CBC AUTOMATED: CPT | Performed by: INTERNAL MEDICINE

## 2017-11-28 PROCEDURE — 97164 PT RE-EVAL EST PLAN CARE: CPT

## 2017-11-28 PROCEDURE — 74011250637 HC RX REV CODE- 250/637: Performed by: PHYSICIAN ASSISTANT

## 2017-11-28 PROCEDURE — 90935 HEMODIALYSIS ONE EVALUATION: CPT

## 2017-11-28 PROCEDURE — 36415 COLL VENOUS BLD VENIPUNCTURE: CPT | Performed by: INTERNAL MEDICINE

## 2017-11-28 PROCEDURE — 80048 BASIC METABOLIC PNL TOTAL CA: CPT | Performed by: INTERNAL MEDICINE

## 2017-11-28 PROCEDURE — 74640000003 HC CRRT SET UP OR EXCHANGE

## 2017-11-28 PROCEDURE — 74011250637 HC RX REV CODE- 250/637: Performed by: INTERNAL MEDICINE

## 2017-11-28 PROCEDURE — 65660000000 HC RM CCU STEPDOWN

## 2017-11-28 RX ADMIN — ASPIRIN 81 MG: 81 TABLET, COATED ORAL at 13:00

## 2017-11-28 RX ADMIN — LEVOTHYROXINE SODIUM 150 MCG: 150 TABLET ORAL at 06:30

## 2017-11-28 RX ADMIN — STANDARDIZED SENNA CONCENTRATE AND DOCUSATE SODIUM 1 TABLET: 8.6; 5 TABLET, FILM COATED ORAL at 13:00

## 2017-11-28 RX ADMIN — Medication 10 ML: at 15:43

## 2017-11-28 RX ADMIN — METOCLOPRAMIDE HYDROCHLORIDE 5 MG: 10 TABLET ORAL at 13:07

## 2017-11-28 RX ADMIN — TICAGRELOR 90 MG: 90 TABLET ORAL at 21:47

## 2017-11-28 RX ADMIN — TICAGRELOR 90 MG: 90 TABLET ORAL at 13:00

## 2017-11-28 RX ADMIN — TORSEMIDE 100 MG: 100 TABLET ORAL at 17:26

## 2017-11-28 RX ADMIN — METOCLOPRAMIDE HYDROCHLORIDE 5 MG: 10 TABLET ORAL at 17:25

## 2017-11-28 RX ADMIN — METOPROLOL TARTRATE 12.5 MG: 25 TABLET ORAL at 13:00

## 2017-11-28 RX ADMIN — Medication 10 ML: at 21:47

## 2017-11-28 RX ADMIN — SUCRALFATE 1 G: 1 SUSPENSION ORAL at 17:26

## 2017-11-28 RX ADMIN — SUCRALFATE 1 G: 1 SUSPENSION ORAL at 21:47

## 2017-11-28 RX ADMIN — TORSEMIDE 100 MG: 100 TABLET ORAL at 13:00

## 2017-11-28 RX ADMIN — Medication 10 ML: at 06:30

## 2017-11-28 RX ADMIN — RANOLAZINE 1000 MG: 500 TABLET, FILM COATED, EXTENDED RELEASE ORAL at 17:25

## 2017-11-28 RX ADMIN — ROSUVASTATIN CALCIUM 20 MG: 20 TABLET, FILM COATED ORAL at 21:47

## 2017-11-28 RX ADMIN — PANTOPRAZOLE SODIUM 40 MG: 40 TABLET, DELAYED RELEASE ORAL at 17:25

## 2017-11-28 RX ADMIN — SUCRALFATE 1 G: 1 SUSPENSION ORAL at 06:30

## 2017-11-28 RX ADMIN — PANTOPRAZOLE SODIUM 40 MG: 40 TABLET, DELAYED RELEASE ORAL at 06:30

## 2017-11-28 NOTE — PROGRESS NOTES
Dr. Yelitza Gibbons stated patient can go to 9th floor Wednesday 11/29 if medically stable for transfer. Updated patient on this information and she is in agreement.

## 2017-11-28 NOTE — PROGRESS NOTES
Hemodialysis Rounding Note    Subjective:     Rufus Puga is a 80 y.o.  who presents with Unstable angina ([address] The patient is dialyzing utilizing the following method:Intermittent Hemodialysis  Artificial Kidney Dialyzer/Set Up Inspection: Revaclear Max   hours Duration of Treatment (hours): 4 hours   blood flow rate Blood Flow Rate (ml/min): 250 ml/min   dialysate rate     ultrafiltration Goal/Amount of Fluid to Remove (mL): 3600 mL   Heparin is used during the dialysis treatment. Complaints none.      Current Facility-Administered Medications   Medication Dose Route Frequency    polyethylene glycol (MIRALAX) packet 17 g  17 g Oral DAILY PRN    amLODIPine (NORVASC) tablet 5 mg  5 mg Oral DAILY    senna-docusate (PERICOLACE) 8.6-50 mg per tablet 1 Tab  1 Tab Oral DAILY    torsemide (DEMADEX) tablet 100 mg  100 mg Oral BID    sodium chloride (NS) flush 5-10 mL  5-10 mL IntraVENous Q8H    sodium chloride (NS) flush 5-10 mL  5-10 mL IntraVENous PRN    LORazepam (ATIVAN) tablet 1 mg  1 mg Oral Q6H PRN    HYDROcodone-acetaminophen (NORCO) 5-325 mg per tablet 1 Tab  1 Tab Oral Q4H PRN    potassium chloride 10 mEq in peritoneal dialysis DEXTROSE 2.5% (2.5 mEq/L low calcium) 5,000 mL   IntraPERitoneal DIALYSIS PRN    gentamicin (GARAMYCIN) 0.1 % cream   Topical DAILY PRN    nitroglycerin (NITROSTAT) tablet 0.4 mg  0.4 mg SubLINGual PRN    aspirin delayed-release tablet 81 mg  81 mg Oral DAILY    calcitRIOL (ROCALTROL) capsule 0.25 mcg  0.25 mcg Oral DAILY    levothyroxine (SYNTHROID) tablet 150 mcg  150 mcg Oral ACB    linaclotide (LINZESS) capsule 145 mcg (Patient Supplied)  145 mcg Oral DAILY    magnesium oxide (MAG-OX) tablet 400 mg  400 mg Oral DAILY    metoclopramide HCl (REGLAN) tablet 5 mg  5 mg Oral BID    metoprolol tartrate (LOPRESSOR) tablet 12.5 mg  12.5 mg Oral DAILY    ondansetron (ZOFRAN ODT) tablet 4 mg  4 mg Oral TID PRN    promethazine (PHENERGAN) tablet 25 mg 25 mg Oral Q6H PRN    ranolazine ER (RANEXA) tablet 1,000 mg  1,000 mg Oral BID    rosuvastatin (CRESTOR) tablet 20 mg  20 mg Oral QHS    sevelamer (RENAGEL) tablet 800 mg  800 mg Oral TID WITH MEALS    sucralfate (CARAFATE) 100 mg/mL oral suspension 1 g  1 g Oral AC&HS    morphine injection 2 mg  2 mg IntraVENous Q4H PRN    acetaminophen (TYLENOL) tablet 650 mg  650 mg Oral Q4H PRN    HYDROcodone-acetaminophen (NORCO) 7.5-325 mg per tablet 1 Tab  1 Tab Oral Q4H PRN    ticagrelor (BRILINTA) tablet 90 mg  90 mg Oral BID    epoetin toya (EPOGEN;PROCRIT) injection 20,000 Units  20,000 Units SubCUTAneous Q MON, WED & FRI    cyanocobalamin (VITAMIN B12) injection 1,000 mcg  1,000 mcg IntraMUSCular Q7D    pantoprazole (PROTONIX) tablet 40 mg  40 mg Oral ACB&D    multivitamin w ZN (STRESSTABS W ZINC) tablet  1 Tab Oral DAILY       11/28 0701 - 11/28 1900  In: -   Out: 2000 11/26 1901 - 11/28 0700  In: 810 [P.O.:810]  Out: 2500     Recent Results (from the past 24 hour(s))   CBC W/O DIFF    Collection Time: 11/28/17  5:28 AM   Result Value Ref Range    WBC 9.4 4.3 - 11.1 K/uL    RBC 3.08 (L) 4.05 - 5.25 M/uL    HGB 9.3 (L) 11.7 - 15.4 g/dL    HCT 29.1 (L) 35.8 - 46.3 %    MCV 94.5 79.6 - 97.8 FL    MCH 30.2 26.1 - 32.9 PG    MCHC 32.0 31.4 - 35.0 g/dL    RDW 19.5 (H) 11.9 - 14.6 %    PLATELET 797 195 - 980 K/uL    MPV 9.7 (L) 10.8 - 82.9 FL   METABOLIC PANEL, BASIC    Collection Time: 11/28/17  5:28 AM   Result Value Ref Range    Sodium 137 136 - 145 mmol/L    Potassium 4.0 3.5 - 5.1 mmol/L    Chloride 100 98 - 107 mmol/L    CO2 28 21 - 32 mmol/L    Anion gap 9 7 - 16 mmol/L    Glucose 87 65 - 100 mg/dL    BUN 26 (H) 8 - 23 MG/DL    Creatinine 5.06 (H) 0.6 - 1.0 MG/DL    GFR est AA 10 (L) >60 ml/min/1.73m2    GFR est non-AA 9 (L) >60 ml/min/1.73m2    Calcium 9.3 8.3 - 10.4 MG/DL         Review of Systems  A comprehensive review of systems was negative except for that written in the HPI. .    Objective: Blood pressure 121/62, pulse 76, temperature 97.5 °F (36.4 °C), resp. rate 18, height 5' 10\" (1.778 m), weight 106.1 kg (233 lb 14.5 oz), SpO2 97 %. Temp (24hrs), Av.6 °F (36.4 °C), Min:97.1 °F (36.2 °C), Max:98 °F (36.7 °C)      Physical Exam:   Neuro: alert, cooperative, no distress, appears stated age, Skin: Skin color, texture, turgor normal. No rashes or lesions, Neck: supple, symmetrical, trachea midline, no adenopathy, thyroid: not enlarged, symmetric, no tenderness/mass/nodules, no carotid bruit and no JVD, Lungs: clear to auscultation bilaterally, Cardiac: regular rate and rhythm, S1, S2 normal, no murmur, click, rub or gallop, Abdomen: soft, non-tender. Bowel sounds normal. No masses,  no organomegaly and Extremities: extremities normal, atraumatic, no cyanosis or edema      Assessment:     End Stage Renal Disease:  Patient is tolerating dialysis treatment well. .  Additionally the patient has experienced normal dialysis treatment during dialysis. Dry weight   same.     Anemia:    Recent Labs      17   0528   HCT  29.1*   HGB  9.3*       Renal Metabolic Bone Disease:    Recent Labs      17   0528   CA  9.3                        Treatment:  PO4 binders, Cinacaleat, Vitamin D  Hypertension: controlled    Access: adequate monitoring/no changes    Active Problems:    CAD (coronary artery disease) (2015)      Unstable angina (HCC) (2016)      Elevated troponin (2017)      Chronic anemia (2017)      ESRD (end stage renal disease) ([facility] (2017)           Plan:     Continue scheduled dialysis

## 2017-11-28 NOTE — PROGRESS NOTES
Bedside and Verbal shift change report given to Lu Hagen RN (oncoming nurse) by self (offgoing nurse). Report included the following information SBAR, KArdex, procedure summary.

## 2017-11-28 NOTE — PROGRESS NOTES
HD treatment completed without complication. Total of 2.0 kgs removed. Flushed both ports with 10 mL of NS.  VS stable, NAD. CVC dressing clean, dry, and intact, tego caps intact, bilateral lumens wrapped with 4x4 gauze. Transport contacted for return to room.

## 2017-11-28 NOTE — PROGRESS NOTES
Bedside and Verbal shift change report given to Michael Danielson RN (oncoming nurse) by self Howard Ping nurse). Report included the following information SBAR, Kardex, ED Summary, Procedure Summary, Intake/Output, MAR, Recent Results and Cardiac Rhythm SR. Patient is being taken by transport to dialysis.

## 2017-11-28 NOTE — PROGRESS NOTES
Problem: Falls - Risk of  Goal: *Absence of Falls  Document Harrison Fall Risk and appropriate interventions in the flowsheet. Outcome: Progressing Towards Goal  Fall Risk Interventions:  Mobility Interventions: Bed/chair exit alarm, Patient to call before getting OOB, PT Consult for mobility concerns, OT consult for ADLs         Medication Interventions: Bed/chair exit alarm, Patient to call before getting OOB    Elimination Interventions: Bed/chair exit alarm, Call light in reach, Patient to call for help with toileting needs       Patient progressing towards goal with no falls on current admission. Patient without confusion or agitation. Patient has weak and unsteady gait on ambulation, requiring one person assistance. Personal belongings are within reach. Bed is in the low and locked position with side rails up x2 and bed alarm activated. Yellow gripper socks to bilateral feet. Call light within reach and patient verbalizes understanding of use.

## 2017-11-28 NOTE — DIALYSIS
Pd extension set connected. Manual flush performed with 500ml pd fluid. PD catheter drained properly. Pt denies pain.

## 2017-11-28 NOTE — PROGRESS NOTES
Verbal report received from [facility], RN. Patient already in dialysis. Assumed care of patient.

## 2017-11-28 NOTE — PROGRESS NOTES
[facility] CARDIOLOGY PROGRESS NOTE           11/28/2017 8:34 AM    Admit Date: 11/18/2017      Subjective:   No CP and feels markedly better post PCI. Some fatigue and worsening anemia. CT with no RP bleed but has abdominal wall hematoma. Got CRRT initiated and transfused 2 units PRBC. Doing better. Labs stable. Seems to be at baseline await rehab      ROS:  Cardiovascular:  As noted above    Objective:      Vitals:    11/27/17 1828 11/27/17 2107 11/28/17 0039 11/28/17 0445   BP: 137/61 113/62 119/57 125/60   Pulse: 83 74 77 78   Resp: 20 18 17 18   Temp: 98 °F (36.7 °C) 97.1 °F (36.2 °C) 97.6 °F (36.4 °C) 97.5 °F (36.4 °C)   SpO2: 94% 97% 96% 97%   Weight:    106.1 kg (233 lb 14.5 oz)   Height:           Physical Exam:  General-No Acute Distress  Neck- supple, no JVD  CV- regular rate and rhythm no MRG  Lung- clear bilaterally  Abd- soft, nontender, nondistended  Ext- no edema bilaterally. Skin- warm and dry    Data Review:   Recent Labs      11/28/17   0528  11/27/17   0450  11/26/17   0534   NA   --   138  138   K   --   4.1  3.6   BUN   --   25*  13   CREA   --   5.24*  3.67*   GLU   --   83  84   WBC  9.4  9.6  10.4   HGB  9.3*  9.1*  9.1*   HCT  29.1*  28.9*  29.4*   PLT  178  167  166       Assessment/Plan:     Active Problems:    CAD (coronary artery disease) (11/27/2015)    S/P complex PCI and stable on ASA and Brilinta      Unstable angina (HCC) (2/1/2016)    As above      Elevated troponin (11/18/2017)    As above      Chronic anemia (11/18/2017)has been transfused twice    This is worse and awaiting labs and hematology assistance. ESRD (end stage renal disease) ([facility] [address] (11/18/2017)    On PD.  Numbers looking good    Will need disposition assistance    Agree with rehab           Marcel De La Cruz MD  11/28/2017 8:34 AM

## 2017-11-28 NOTE — DIALYSIS
Dialysis lines changed due to clotting in venous chamber. Minimal blood loss, restarted without problems.

## 2017-11-28 NOTE — PROGRESS NOTES
OT NOTE:    OT order received, chart reviewed. Pt receiving HD. OT will attempt evaluation at a later time/date as schedule permits.   Thank you,    Alda Montoya MS, OTR/L  11/28/2017

## 2017-11-28 NOTE — DIALYSIS
HD treatment initiated via left femoral  without difficulty. CVC dressing clean, dry, and intact, tego caps intact, bilateral lumen aspirated and flushed with 9cc NS. VS stable, will continue to monitor during tx. No Heparin this tx. Machine tests passed and alarms intact. NAD.

## 2017-11-28 NOTE — PROGRESS NOTES
Problem: Mobility Impaired (Adult and Pediatric)  Goal: *Acute Goals and Plan of Care (Insert Text)  LTG:  Goals updated 11/28/2017   (1.)Ms. Roque Rosales will move from supine to sit and sit to supine, scoot up and down and roll side to side Iwith supervision with bed flat within 7 day(s). (2.)Ms. Roque Rosales will transfer from bed to chair and chair to bed with CGA using the least restrictive device within 7 day(s). (3.)Ms. Roque Rosales will ambulate with CGA for 100+ feet with the least restrictive device within 7 day(s). (4.)Ms. Roque Rosales will perform seated exercises per HEP to improve strength and mobility within 7 days. ________________________________________________________________________________________________    PHYSICAL THERAPY: Re-evaluation, PM 11/28/2017  INPATIENT: Hospital Day: 11  [place] / [place] / Product Type: Medicare /      NAME/AGE/GENDER: Steve Schulz is a 80 y.o. female   PRIMARY DIAGNOSIS: Unstable angina (HCC) <principal problem not specified> <principal problem not specified>        ICD-10: Treatment Diagnosis:   · Generalized Muscle Weakness (M62.81)  · Difficulty in walking, Not elsewhere classified (R26.2)  · Other abnormalities of gait and mobility (R26.89)   Precaution/Allergies:  Codeine      ASSESSMENT:     Ms. Roque Rosales is an 80year old female admitted from home with NSTEMI. She lives at home alone and reports  I at baseline with household ambulation/ADLs. Son has been staying with her recently, but his health is poor per staff. Presents sitting in recliner with a family member at bedside. Patient was evaluated ~ a week ago, but PT was discontinued after patient was transferred to ICU. Patient states that since Saturday she has had weakness of her R UE which is her dominant hand. LE assessment shows AROM grossly WFL with generalized weakness noted. Sensation intact and equal to B LEs. UE testing deferred to OT. Seated balance fair+ to good.  Pt requires mod assist with sit-stand transfers and ambulated 15' around bed with RW and min assist.   Rested then returned to recliner. Positioned with LEs elevated, needs in reach. Robert Medina  Has spent a prolonged time in acute care and has declined in functional mobility. She is needing assistance with mobility and transfers which is new for her. Will benefit from continued therapy during acute care stay to work on strengthening, transfers, gait, and activity tolerance and improve safety/independence with mobility. Pt would benefit from rehab at discharge and she is very motivated to improve since she lives alone. This section established at most recent assessment   PROBLEM LIST (Impairments causing functional limitations):  1. Decreased Strength  2. Decreased ADL/Functional Activities  3. Decreased Transfer Abilities  4. Decreased Ambulation Ability/Technique  5. Decreased Balance  6. Increased Pain  7. Decreased Activity Tolerance   INTERVENTIONS PLANNED: (Benefits and precautions of physical therapy have been discussed with the patient.)  1. Balance Exercise  2. Bed Mobility  3. Gait Training  4. Home Exercise Program (HEP)  5. Therapeutic Activites  6. Therapeutic Exercise/Strengthening  7. Transfer Training  8. Group Therapy     TREATMENT PLAN: Frequency/Duration: 3 times a week for duration of hospital stay  Rehabilitation Potential For Stated Goals: Good     RECOMMENDED REHABILITATION/EQUIPMENT: (at time of discharge pending progress): Due to the probability of continued deficits (see above) this patient will likely need continued skilled physical therapy after discharge. Equipment:    to be determined pending progress              HISTORY:   History of Present Injury/Illness (Reason for Referral):  Per H&P, \"Patient is a 80 y.o. female who presents with chest pain. She has a h/o CAD s/p PCI to LAD and RCA 2-2016 w echo 3-2017 showing EF 55-60% with grade 1 DD, mild-mod AR/MR.   Nuclear stress 4-2017 w fixed anterior defect likely breast attenuation. Sh was recently discharged from [facility] 9[facility]23 after admission for anemia and possible peritonitis w ascites. She at that time said she was taking asa, plavix and brilinta, the plavix was stopped. She has ESRD on PD, chronic anemia secondary to CKD and h/o AVM and h/o CVA 3-2017. She is a Bahai but has received blood transfusion in the past but today insists she will not use any more transfusions. Her GI is wanting to do another EGD and stopped ASA on Thursday and is to contact us about holding Brilinta/plavix. She presented to the ER with chest pain which had occurred on and off yesterday and continues to come and go this morning. Pain is substernal, squeezing, 8/10, without radiation with occasional mild nausea and SOB, no diaphoresis. Pain is worse with exertion, better with rest and nitro. She again says she is taking ASA, plavix and brilinta (which are all on her med list). No cough, palpitations, dizziness, syncope or LE edema. WBC 8.9, hgb 8.9, , K 3.6, BUN 29, cr 9.05, troponin . 68, EKG NSR w rate 91 w NSST/T wave changes, /87. She continues to have episodic severe SSCP which improves with nitro\"    Past Medical History/Comorbidities:   Ms. Roque Rosales  has a past medical history of Anemia of chronic renal failure (4/28/2015); Anterior myocardial infarction [facility]) (5/21/2015); CAD (coronary artery disease); Chest pain; Chronic kidney disease, stage III (moderate) (8/15/2014); CKD (chronic kidney disease) stage 4, GFR 15-29 ml/min (HCC) (4/28/2015); Debility (5/5/2015); Depression (12/29/2015); Edema (12/29/2015); Endocrine disease; GERD (gastroesophageal reflux disease); Heart murmur (12/29/2015); HLD (hyperlipidemia) (12/29/2015); Hypertension; Hypothyroidism (4/28/2015); Ischemic cardiomyopathy (12/29/2015);  Nausea; S/P PTCA (percutaneous transluminal coronary angioplasty); LAD PTCA of  ISR 11/27/15 (5/24/2016); STEMI (ST elevation myocardial infarction) ([facility] (4/28/2015); Unspecified sleep apnea; and Unstable angina ([facility] Ms. Song Woods  has a past surgical history that includes ptca (4/28/2015); cholecystectomy (47312); knee replacement (Right, 2006); back surgery (1990); cataract removal (Bilateral); and back surgery. Social History/Living Environment:   Home Environment: Private residence  # Steps to Enter: 0  Wheelchair Ramp: Yes  One/Two Story Residence: One story  Living Alone: Yes  Support Systems: Child(faraz), Family member(s)  Patient Expects to be Discharged to[de-identified] Unknown  Current DME Used/Available at Home: Walker, rolling, Shower chair  Tub or Shower Type: Shower  Prior Level of Function/Work/Activity:  Lives alone in single story home with ramp to enter. Pt is typically mod I, ambulatory around house with rolling walker. Reports at baseline she is independent with ADLs, bathroom/toileting, and fixing simple meals. Pt reports son has been staying with her recently and will be with her until she is doing better. Number of Personal Factors/Comorbidities that affect the Plan of Care: 1-2: MODERATE COMPLEXITY   EXAMINATION:   Most Recent Physical Functioning:   Gross Assessment:  AROM: Generally decreased, functional  Strength: Generally decreased, functional  Coordination: Generally decreased, functional  Sensation: Intact               Posture:  Posture (WDL): Exceptions to WDL  Posture Assessment: Forward head, Rounded shoulders  Balance:  Sitting: Impaired  Sitting - Static: Good (unsupported)  Sitting - Dynamic: Fair (occasional)  Standing: Impaired  Standing - Static: Constant support  Standing - Dynamic : Poor Bed Mobility:     Wheelchair Mobility:     Transfers:  Sit to Stand: Moderate assistance  Stand to Sit: Minimum assistance  Gait:     Base of Support: Center of gravity altered; Widened  Speed/Michelle: Slow  Step Length: Right shortened;Left shortened  Distance (ft): 15 Feet (ft) (x2)  Assistive Device: Braden Canavan, rolling;Gait belt  Ambulation - Level of Assistance: Minimal assistance  Interventions: Verbal cues      Body Structures Involved:  1. Heart  2. Muscles Body Functions Affected:  1. Sensory/Pain  2. Cardio  3. Movement Related Activities and Participation Affected:  1. General Tasks and Demands  2. Mobility  3. Self Care  4. Domestic Life  5. Community, Social and [place]   Number of elements that affect the Plan of Care: 4+: HIGH COMPLEXITY   CLINICAL PRESENTATION:   Presentation: Stable and uncomplicated: LOW COMPLEXITY   CLINICAL DECISION MAKIN [facility] Inpatient Short Form  How much difficulty does the patient currently have. .. Unable A Lot A Little None   1. Turning over in bed (including adjusting bedclothes, sheets and blankets)? [] 1   [] 2   [x] 3   [] 4   2. Sitting down on and standing up from a chair with arms ( e.g., wheelchair, bedside commode, etc.)   [] 1   [] 2   [x] 3   [] 4   3. Moving from lying on back to sitting on the side of the bed? [] 1   [] 2   [x] 3   [] 4   How much help from another person does the patient currently need. .. Total A Lot A Little None   4. Moving to and from a bed to a chair (including a wheelchair)? [] 1   [] 2   [x] 3   [] 4   5. Need to walk in hospital room? [] 1   [x] 2   [] 3   [] 4   6. Climbing 3-5 steps with a railing? [x] 1   [] 2   [] 3   [] 4   © , Trustees of [address] [place] [id number], under license to [employer]. All rights reserved      Score:  Initial: 15 Most Recent: X (Date: -- )    Interpretation of Tool:  Represents activities that are increasingly more difficult (i.e. Bed mobility, Transfers, Gait). Score 24 23 22-20 19-15 14-10 9-7 6     Modifier CH CI CJ CK CL CM CN      ?  Mobility - Walking and Moving Around:     - CURRENT STATUS: CK - 40%-59% impaired, limited or restricted    - GOAL STATUS: CI - 1%-19% impaired, limited or restricted    - D/C STATUS:  ---------------To be determined---------------  [place] / [place] / Product Type: Medicare /      Medical Necessity:     · Patient demonstrates fair rehab potential due to higher previous functional level. Reason for Services/Other Comments:  · Patient continues to demonstrate capacity to improve strength, mobility, balance, transfers, activity tolerance which will increase independence, decrease amount of assistance required from caregiver and increase safety. Use of outcome tool(s) and clinical judgement create a POC that gives a: Clear prediction of patient's progress: LOW COMPLEXITY            TREATMENT:   (In addition to Assessment/Re-Assessment sessions the following treatments were rendered)   Pre-treatment Symptoms/Complaints:  \"thank you\"  Pain: Initial:   Pain Intensity 1: 0  Post Session:  0/10     Assessment/Reassessment only, no treatment provided today    Braces/Orthotics/Lines/Etc:   · O2 Device: Room air  Treatment/Session Assessment:    · Response to Treatment: Motivated and cooperative. · Interdisciplinary Collaboration:   o Physical Therapist  o Certified Nursing Assistant/Patient Care Technician  · After treatment position/precautions:   o Up in chair  o Bed/Chair-wheels locked  o Bed in low position  o Call light within reach  o RN notified   · Compliance with Program/Exercises: Will assess as treatment progresses. · Recommendations/Intent for next treatment session: \"Next visit will focus on advancements to more challenging activities and reduction in assistance provided\".   Total Treatment Duration:  PT Patient Time In/Time Out  Time In: 1445  Time Out: [address], PT, DPT

## 2017-11-29 ENCOUNTER — HOSPITAL ENCOUNTER (INPATIENT)
Age: 82
LOS: 29 days | Discharge: HOSPICE | DRG: 947 | End: 2017-12-28
Attending: PHYSICAL MEDICINE & REHABILITATION | Admitting: PHYSICAL MEDICINE & REHABILITATION
Payer: MEDICARE

## 2017-11-29 VITALS
TEMPERATURE: 97.9 F | BODY MASS INDEX: 32.57 KG/M2 | OXYGEN SATURATION: 99 % | WEIGHT: 227.51 LBS | HEART RATE: 76 BPM | SYSTOLIC BLOOD PRESSURE: 124 MMHG | DIASTOLIC BLOOD PRESSURE: 83 MMHG | HEIGHT: 70 IN | RESPIRATION RATE: 18 BRPM

## 2017-11-29 DIAGNOSIS — N18.6 ESRD (END STAGE RENAL DISEASE) (HCC): ICD-10-CM

## 2017-11-29 DIAGNOSIS — Z98.61 S/P PTCA (PERCUTANEOUS TRANSLUMINAL CORONARY ANGIOPLASTY): ICD-10-CM

## 2017-11-29 DIAGNOSIS — R77.8 ELEVATED TROPONIN: ICD-10-CM

## 2017-11-29 DIAGNOSIS — Z99.2 ACUTE RENAL FAILURE SUPERIMPOSED ON CHRONIC KIDNEY DISEASE, ON CHRONIC DIALYSIS, UNSPECIFIED ACUTE RENAL FAILURE TYPE (HCC): ICD-10-CM

## 2017-11-29 DIAGNOSIS — N18.9 ACUTE RENAL FAILURE SUPERIMPOSED ON CHRONIC KIDNEY DISEASE, ON CHRONIC DIALYSIS, UNSPECIFIED ACUTE RENAL FAILURE TYPE (HCC): ICD-10-CM

## 2017-11-29 DIAGNOSIS — E03.2 HYPOTHYROIDISM DUE TO MEDICATION: Chronic | ICD-10-CM

## 2017-11-29 DIAGNOSIS — I25.10 CORONARY ARTERY DISEASE INVOLVING NATIVE CORONARY ARTERY OF NATIVE HEART WITHOUT ANGINA PECTORIS: ICD-10-CM

## 2017-11-29 DIAGNOSIS — I20.0 UNSTABLE ANGINA (HCC): ICD-10-CM

## 2017-11-29 DIAGNOSIS — I10 ESSENTIAL HYPERTENSION: Chronic | ICD-10-CM

## 2017-11-29 DIAGNOSIS — N17.9 ACUTE RENAL FAILURE SUPERIMPOSED ON CHRONIC KIDNEY DISEASE, ON CHRONIC DIALYSIS, UNSPECIFIED ACUTE RENAL FAILURE TYPE (HCC): ICD-10-CM

## 2017-11-29 DIAGNOSIS — E78.2 MIXED HYPERLIPIDEMIA: ICD-10-CM

## 2017-11-29 DIAGNOSIS — K21.9 GASTROESOPHAGEAL REFLUX DISEASE WITHOUT ESOPHAGITIS: ICD-10-CM

## 2017-11-29 DIAGNOSIS — R29.898 WEAKNESS OF BOTH LEGS: Primary | ICD-10-CM

## 2017-11-29 DIAGNOSIS — R10.9 ABDOMINAL PAIN, UNSPECIFIED ABDOMINAL LOCATION: ICD-10-CM

## 2017-11-29 DIAGNOSIS — H61.21 IMPACTED CERUMEN OF RIGHT EAR: ICD-10-CM

## 2017-11-29 LAB
ANION GAP SERPL CALC-SCNC: 8 MMOL/L (ref 7–16)
BUN SERPL-MCNC: 25 MG/DL (ref 8–23)
CALCIUM SERPL-MCNC: 9.1 MG/DL (ref 8.3–10.4)
CHLORIDE SERPL-SCNC: 97 MMOL/L (ref 98–107)
CO2 SERPL-SCNC: 31 MMOL/L (ref 21–32)
CREAT SERPL-MCNC: 5 MG/DL (ref 0.6–1)
ERYTHROCYTE [DISTWIDTH] IN BLOOD BY AUTOMATED COUNT: 19.3 % (ref 11.9–14.6)
GLUCOSE BLD STRIP.AUTO-MCNC: 81 MG/DL (ref 65–100)
GLUCOSE SERPL-MCNC: 84 MG/DL (ref 65–100)
HCT VFR BLD AUTO: 32.7 % (ref 35.8–46.3)
HGB BLD-MCNC: 10.4 G/DL (ref 11.7–15.4)
MAGNESIUM SERPL-MCNC: 2.1 MG/DL (ref 1.8–2.4)
MCH RBC QN AUTO: 30 PG (ref 26.1–32.9)
MCHC RBC AUTO-ENTMCNC: 31.8 G/DL (ref 31.4–35)
MCV RBC AUTO: 94.2 FL (ref 79.6–97.8)
PLATELET # BLD AUTO: 190 K/UL (ref 150–450)
PMV BLD AUTO: 9.2 FL (ref 10.8–14.1)
POTASSIUM SERPL-SCNC: 3.8 MMOL/L (ref 3.5–5.1)
RBC # BLD AUTO: 3.47 M/UL (ref 4.05–5.25)
SODIUM SERPL-SCNC: 136 MMOL/L (ref 136–145)
WBC # BLD AUTO: 8.9 K/UL (ref 4.3–11.1)

## 2017-11-29 PROCEDURE — 36415 COLL VENOUS BLD VENIPUNCTURE: CPT | Performed by: INTERNAL MEDICINE

## 2017-11-29 PROCEDURE — 80048 BASIC METABOLIC PNL TOTAL CA: CPT | Performed by: INTERNAL MEDICINE

## 2017-11-29 PROCEDURE — 90945 DIALYSIS ONE EVALUATION: CPT

## 2017-11-29 PROCEDURE — 99223 1ST HOSP IP/OBS HIGH 75: CPT | Performed by: PHYSICAL MEDICINE & REHABILITATION

## 2017-11-29 PROCEDURE — 74011250637 HC RX REV CODE- 250/637: Performed by: INTERNAL MEDICINE

## 2017-11-29 PROCEDURE — 74011250636 HC RX REV CODE- 250/636: Performed by: PHYSICAL MEDICINE & REHABILITATION

## 2017-11-29 PROCEDURE — 85027 COMPLETE CBC AUTOMATED: CPT | Performed by: INTERNAL MEDICINE

## 2017-11-29 PROCEDURE — 83735 ASSAY OF MAGNESIUM: CPT | Performed by: INTERNAL MEDICINE

## 2017-11-29 PROCEDURE — 65310000000 HC RM PRIVATE REHAB

## 2017-11-29 PROCEDURE — 82962 GLUCOSE BLOOD TEST: CPT

## 2017-11-29 PROCEDURE — 3E1M39Z IRRIGATION OF PERITONEAL CAVITY USING DIALYSATE, PERCUTANEOUS APPROACH: ICD-10-PCS | Performed by: INTERNAL MEDICINE

## 2017-11-29 PROCEDURE — 74011250637 HC RX REV CODE- 250/637: Performed by: PHYSICIAN ASSISTANT

## 2017-11-29 PROCEDURE — 74011250637 HC RX REV CODE- 250/637: Performed by: PHYSICAL MEDICINE & REHABILITATION

## 2017-11-29 RX ORDER — SUCRALFATE 1 G/10ML
1 SUSPENSION ORAL
Status: CANCELLED | OUTPATIENT
Start: 2017-11-29

## 2017-11-29 RX ORDER — HYDROCODONE BITARTRATE AND ACETAMINOPHEN 5; 325 MG/1; MG/1
1 TABLET ORAL
Status: CANCELLED | OUTPATIENT
Start: 2017-11-29

## 2017-11-29 RX ORDER — GENTAMICIN SULFATE 1 MG/G
CREAM TOPICAL DAILY PRN
Status: DISCONTINUED | OUTPATIENT
Start: 2017-11-29 | End: 2017-12-18 | Stop reason: HOSPADM

## 2017-11-29 RX ORDER — NITROGLYCERIN 0.4 MG/1
0.4 TABLET SUBLINGUAL AS NEEDED
Status: DISCONTINUED | OUTPATIENT
Start: 2017-11-29 | End: 2017-12-18 | Stop reason: HOSPADM

## 2017-11-29 RX ORDER — PROMETHAZINE HYDROCHLORIDE 25 MG/1
25 TABLET ORAL
Status: CANCELLED | OUTPATIENT
Start: 2017-11-29

## 2017-11-29 RX ORDER — RANOLAZINE 500 MG/1
1000 TABLET, EXTENDED RELEASE ORAL 2 TIMES DAILY
Status: DISCONTINUED | OUTPATIENT
Start: 2017-11-29 | End: 2017-12-18 | Stop reason: HOSPADM

## 2017-11-29 RX ORDER — ASPIRIN 81 MG/1
81 TABLET ORAL DAILY
Status: DISCONTINUED | OUTPATIENT
Start: 2017-11-30 | End: 2017-12-18 | Stop reason: HOSPADM

## 2017-11-29 RX ORDER — AMLODIPINE BESYLATE 5 MG/1
5 TABLET ORAL DAILY
Status: CANCELLED | OUTPATIENT
Start: 2017-11-30

## 2017-11-29 RX ORDER — METOCLOPRAMIDE 10 MG/1
5 TABLET ORAL 2 TIMES DAILY
Status: DISCONTINUED | OUTPATIENT
Start: 2017-11-29 | End: 2017-12-18 | Stop reason: HOSPADM

## 2017-11-29 RX ORDER — CYANOCOBALAMIN 1000 UG/ML
1000 INJECTION, SOLUTION INTRAMUSCULAR; SUBCUTANEOUS
Status: CANCELLED | OUTPATIENT
Start: 2017-12-02

## 2017-11-29 RX ORDER — LANOLIN ALCOHOL/MO/W.PET/CERES
400 CREAM (GRAM) TOPICAL DAILY
Status: CANCELLED | OUTPATIENT
Start: 2017-11-30

## 2017-11-29 RX ORDER — METOPROLOL TARTRATE 25 MG/1
12.5 TABLET, FILM COATED ORAL DAILY
Status: DISCONTINUED | OUTPATIENT
Start: 2017-11-30 | End: 2017-12-18 | Stop reason: HOSPADM

## 2017-11-29 RX ORDER — AMOXICILLIN 250 MG
1 CAPSULE ORAL DAILY
Status: CANCELLED | OUTPATIENT
Start: 2017-11-30

## 2017-11-29 RX ORDER — CALCITRIOL 0.25 UG/1
0.25 CAPSULE ORAL DAILY
Status: CANCELLED | OUTPATIENT
Start: 2017-11-30

## 2017-11-29 RX ORDER — NITROGLYCERIN 0.4 MG/1
0.4 TABLET SUBLINGUAL AS NEEDED
Status: CANCELLED | OUTPATIENT
Start: 2017-11-29

## 2017-11-29 RX ORDER — POLYETHYLENE GLYCOL 3350 17 G/17G
17 POWDER, FOR SOLUTION ORAL
Status: CANCELLED | OUTPATIENT
Start: 2017-11-29

## 2017-11-29 RX ORDER — POLYETHYLENE GLYCOL 3350 17 G/17G
17 POWDER, FOR SOLUTION ORAL
Status: DISCONTINUED | OUTPATIENT
Start: 2017-11-29 | End: 2017-12-18 | Stop reason: HOSPADM

## 2017-11-29 RX ORDER — AMOXICILLIN 250 MG
1 CAPSULE ORAL DAILY
Status: DISCONTINUED | OUTPATIENT
Start: 2017-11-30 | End: 2017-12-18 | Stop reason: HOSPADM

## 2017-11-29 RX ORDER — AMLODIPINE BESYLATE 5 MG/1
5 TABLET ORAL DAILY
Status: DISCONTINUED | OUTPATIENT
Start: 2017-11-30 | End: 2017-12-18 | Stop reason: HOSPADM

## 2017-11-29 RX ORDER — SODIUM CHLORIDE 0.9 % (FLUSH) 0.9 %
5-10 SYRINGE (ML) INJECTION AS NEEDED
Status: CANCELLED | OUTPATIENT
Start: 2017-11-29

## 2017-11-29 RX ORDER — METOPROLOL TARTRATE 25 MG/1
12.5 TABLET, FILM COATED ORAL DAILY
Status: CANCELLED | OUTPATIENT
Start: 2017-11-30

## 2017-11-29 RX ORDER — CALCITRIOL 0.25 UG/1
0.25 CAPSULE ORAL DAILY
Status: DISCONTINUED | OUTPATIENT
Start: 2017-11-30 | End: 2017-12-18 | Stop reason: HOSPADM

## 2017-11-29 RX ORDER — LORAZEPAM 1 MG/1
1 TABLET ORAL
Status: CANCELLED | OUTPATIENT
Start: 2017-11-29

## 2017-11-29 RX ORDER — ONDANSETRON 4 MG/1
4 TABLET, ORALLY DISINTEGRATING ORAL
Status: DISCONTINUED | OUTPATIENT
Start: 2017-11-29 | End: 2017-12-18 | Stop reason: HOSPADM

## 2017-11-29 RX ORDER — SEVELAMER HYDROCHLORIDE 400 MG/1
800 TABLET, FILM COATED ORAL
Status: DISCONTINUED | OUTPATIENT
Start: 2017-11-29 | End: 2017-12-18 | Stop reason: HOSPADM

## 2017-11-29 RX ORDER — PANTOPRAZOLE SODIUM 40 MG/1
40 TABLET, DELAYED RELEASE ORAL
Status: CANCELLED | OUTPATIENT
Start: 2017-11-29

## 2017-11-29 RX ORDER — ACETAMINOPHEN 325 MG/1
650 TABLET ORAL
Status: DISCONTINUED | OUTPATIENT
Start: 2017-11-29 | End: 2017-12-18 | Stop reason: HOSPADM

## 2017-11-29 RX ORDER — METOCLOPRAMIDE 10 MG/1
5 TABLET ORAL 2 TIMES DAILY
Status: CANCELLED | OUTPATIENT
Start: 2017-11-29

## 2017-11-29 RX ORDER — ONDANSETRON 4 MG/1
4 TABLET, ORALLY DISINTEGRATING ORAL
Status: CANCELLED | OUTPATIENT
Start: 2017-11-29

## 2017-11-29 RX ORDER — TORSEMIDE 100 MG/1
100 TABLET ORAL 2 TIMES DAILY
Status: CANCELLED | OUTPATIENT
Start: 2017-11-29

## 2017-11-29 RX ORDER — HYDROCODONE BITARTRATE AND ACETAMINOPHEN 5; 325 MG/1; MG/1
1 TABLET ORAL
Status: DISCONTINUED | OUTPATIENT
Start: 2017-11-29 | End: 2017-12-18 | Stop reason: HOSPADM

## 2017-11-29 RX ORDER — SODIUM CHLORIDE 0.9 % (FLUSH) 0.9 %
5-10 SYRINGE (ML) INJECTION EVERY 8 HOURS
Status: CANCELLED | OUTPATIENT
Start: 2017-11-29

## 2017-11-29 RX ORDER — HYDROCODONE BITARTRATE AND ACETAMINOPHEN 7.5; 325 MG/1; MG/1
1 TABLET ORAL
Status: DISCONTINUED | OUTPATIENT
Start: 2017-11-29 | End: 2017-12-13

## 2017-11-29 RX ORDER — HYDROCODONE BITARTRATE AND ACETAMINOPHEN 7.5; 325 MG/1; MG/1
1 TABLET ORAL
Status: CANCELLED | OUTPATIENT
Start: 2017-11-29

## 2017-11-29 RX ORDER — ROSUVASTATIN CALCIUM 20 MG/1
20 TABLET, COATED ORAL
Status: CANCELLED | OUTPATIENT
Start: 2017-11-29

## 2017-11-29 RX ORDER — SUCRALFATE 1 G/10ML
1 SUSPENSION ORAL
Status: DISCONTINUED | OUTPATIENT
Start: 2017-11-29 | End: 2017-12-04 | Stop reason: SDUPTHER

## 2017-11-29 RX ORDER — ASPIRIN 81 MG/1
81 TABLET ORAL DAILY
Status: CANCELLED | OUTPATIENT
Start: 2017-11-30

## 2017-11-29 RX ORDER — SODIUM CHLORIDE 0.9 % (FLUSH) 0.9 %
5-10 SYRINGE (ML) INJECTION EVERY 8 HOURS
Status: DISCONTINUED | OUTPATIENT
Start: 2017-11-29 | End: 2017-12-01

## 2017-11-29 RX ORDER — ROSUVASTATIN CALCIUM 20 MG/1
20 TABLET, COATED ORAL
Status: DISCONTINUED | OUTPATIENT
Start: 2017-11-29 | End: 2017-12-04 | Stop reason: SDUPTHER

## 2017-11-29 RX ORDER — LORAZEPAM 1 MG/1
1 TABLET ORAL
Status: DISCONTINUED | OUTPATIENT
Start: 2017-11-29 | End: 2017-12-18 | Stop reason: HOSPADM

## 2017-11-29 RX ORDER — PANTOPRAZOLE SODIUM 40 MG/1
40 TABLET, DELAYED RELEASE ORAL
Status: DISCONTINUED | OUTPATIENT
Start: 2017-11-29 | End: 2017-12-18 | Stop reason: HOSPADM

## 2017-11-29 RX ORDER — LANOLIN ALCOHOL/MO/W.PET/CERES
400 CREAM (GRAM) TOPICAL DAILY
Status: DISCONTINUED | OUTPATIENT
Start: 2017-11-30 | End: 2017-12-18 | Stop reason: HOSPADM

## 2017-11-29 RX ORDER — TORSEMIDE 100 MG/1
100 TABLET ORAL 2 TIMES DAILY
Status: DISCONTINUED | OUTPATIENT
Start: 2017-11-29 | End: 2017-12-18 | Stop reason: HOSPADM

## 2017-11-29 RX ORDER — SODIUM CHLORIDE 0.9 % (FLUSH) 0.9 %
5-10 SYRINGE (ML) INJECTION AS NEEDED
Status: DISCONTINUED | OUTPATIENT
Start: 2017-11-29 | End: 2017-12-18 | Stop reason: HOSPADM

## 2017-11-29 RX ORDER — CYANOCOBALAMIN 1000 UG/ML
1000 INJECTION, SOLUTION INTRAMUSCULAR; SUBCUTANEOUS
Status: DISCONTINUED | OUTPATIENT
Start: 2017-12-02 | End: 2017-12-18 | Stop reason: HOSPADM

## 2017-11-29 RX ORDER — SEVELAMER HYDROCHLORIDE 400 MG/1
800 TABLET, FILM COATED ORAL
Status: CANCELLED | OUTPATIENT
Start: 2017-11-29

## 2017-11-29 RX ORDER — LEVOTHYROXINE SODIUM 150 UG/1
150 TABLET ORAL
Status: CANCELLED | OUTPATIENT
Start: 2017-11-30

## 2017-11-29 RX ORDER — GENTAMICIN SULFATE 1 MG/G
CREAM TOPICAL DAILY PRN
Status: CANCELLED | OUTPATIENT
Start: 2017-11-29

## 2017-11-29 RX ORDER — LEVOTHYROXINE SODIUM 50 UG/1
150 TABLET ORAL
Status: DISCONTINUED | OUTPATIENT
Start: 2017-11-30 | End: 2017-12-18 | Stop reason: HOSPADM

## 2017-11-29 RX ORDER — RANOLAZINE 500 MG/1
1000 TABLET, EXTENDED RELEASE ORAL 2 TIMES DAILY
Status: CANCELLED | OUTPATIENT
Start: 2017-11-29

## 2017-11-29 RX ORDER — ACETAMINOPHEN 325 MG/1
650 TABLET ORAL
Status: CANCELLED | OUTPATIENT
Start: 2017-11-29

## 2017-11-29 RX ORDER — PROMETHAZINE HYDROCHLORIDE 25 MG/1
25 TABLET ORAL
Status: DISCONTINUED | OUTPATIENT
Start: 2017-11-29 | End: 2017-12-18 | Stop reason: HOSPADM

## 2017-11-29 RX ADMIN — SUCRALFATE 1 G: 1 SUSPENSION ORAL at 21:57

## 2017-11-29 RX ADMIN — RANOLAZINE 1000 MG: 500 TABLET, FILM COATED, EXTENDED RELEASE ORAL at 08:32

## 2017-11-29 RX ADMIN — TORSEMIDE 100 MG: 100 TABLET ORAL at 19:26

## 2017-11-29 RX ADMIN — SUCRALFATE 1 G: 1 SUSPENSION ORAL at 06:26

## 2017-11-29 RX ADMIN — METOPROLOL TARTRATE 12.5 MG: 25 TABLET ORAL at 08:34

## 2017-11-29 RX ADMIN — AMLODIPINE BESYLATE 5 MG: 5 TABLET ORAL at 08:35

## 2017-11-29 RX ADMIN — PANTOPRAZOLE SODIUM 40 MG: 40 TABLET, DELAYED RELEASE ORAL at 16:46

## 2017-11-29 RX ADMIN — CALCITRIOL 0.25 MCG: 0.25 CAPSULE, LIQUID FILLED ORAL at 08:33

## 2017-11-29 RX ADMIN — Medication 400 MG: at 08:36

## 2017-11-29 RX ADMIN — RANOLAZINE 1000 MG: 500 TABLET, FILM COATED, EXTENDED RELEASE ORAL at 18:02

## 2017-11-29 RX ADMIN — TICAGRELOR 90 MG: 90 TABLET ORAL at 22:00

## 2017-11-29 RX ADMIN — ERYTHROPOIETIN 20000 UNITS: 20000 INJECTION, SOLUTION INTRAVENOUS; SUBCUTANEOUS at 18:04

## 2017-11-29 RX ADMIN — METOCLOPRAMIDE HYDROCHLORIDE 5 MG: 10 TABLET ORAL at 18:03

## 2017-11-29 RX ADMIN — LEVOTHYROXINE SODIUM 150 MCG: 150 TABLET ORAL at 06:28

## 2017-11-29 RX ADMIN — TICAGRELOR 90 MG: 90 TABLET ORAL at 12:11

## 2017-11-29 RX ADMIN — ASPIRIN 81 MG: 81 TABLET, COATED ORAL at 08:32

## 2017-11-29 RX ADMIN — SUCRALFATE 1 G: 1 SUSPENSION ORAL at 12:11

## 2017-11-29 RX ADMIN — ROSUVASTATIN CALCIUM 20 MG: 20 TABLET, FILM COATED ORAL at 21:57

## 2017-11-29 RX ADMIN — METOCLOPRAMIDE HYDROCHLORIDE 5 MG: 10 TABLET ORAL at 08:32

## 2017-11-29 RX ADMIN — PANTOPRAZOLE SODIUM 40 MG: 40 TABLET, DELAYED RELEASE ORAL at 06:27

## 2017-11-29 RX ADMIN — SUCRALFATE 1 G: 1 SUSPENSION ORAL at 18:04

## 2017-11-29 RX ADMIN — TORSEMIDE 100 MG: 100 TABLET ORAL at 08:33

## 2017-11-29 NOTE — PROGRESS NOTES
Pt arrived to 9th floor via wheel chair. Pt oriented to room and given call bell. Educated pt about safety and calling staff to help whenever getting out of bed. Admission assessment completed. Pt alert and oriented x 4. Dual skin check performed with RN. There is no evidence of skin break down. Assessed and inspected peritoneal dialysis on left lower abdomen and hemodialysis catheter in left femoral. Pt sitting in recliner resting quietly. Pt voices understanding about how to use call bell. Will continue to monitor.

## 2017-11-29 NOTE — IP AVS SNAPSHOT
Ramirez Bello 
 
 
 [address] [facility] [address] [facility] 
[phone] Patient: Ericka Steen MRN: [id number] SBQ:[id number] About your hospitalization You were admitted on:  November 29, 2017 You last received care in the:  [facility] 9 INPATIENT REHAB UNIT You were discharged on:  December 28, 2017 Why you were hospitalized Your primary diagnosis was:  Not on File Your diagnoses also included:  Weakness Of Both Legs, Renal Failure (Arf), Acute On Chronic (Hcc), S/P Ptca (Percutaneous Transluminal Coronary Angioplasty), Hypothyroidism, Esrd (End Stage Renal Disease) (Hcc) Things You Need To Do (next 8 weeks) Follow up with   
please call and schedule appointment with PD clinic as instructed by Dr. Kristy Olmstead Follow up with Leobardo Zhou MD  
  
Phone:  [phone] Where:  [address] [place] [id number] Wednesday Taz 10, 2018 HOSPITAL FOLLOW-UP with Damaris Gillespie MD at  9:45 AM  
Where:  [address] [facility] ([address] [place] [address]) Discharge Orders None A check cony indicates which time of day the medication should be taken. My Medications STOP taking these medications   
 magnesium oxide 400 mg tablet Commonly known as:  MAG-OX  
   
  
 MEGACE 400 mg/10 mL (40 mg/mL) suspension Generic drug:  megestrol  
   
  
 metoclopramide HCl 5 mg tablet Commonly known as:  REGLAN  
   
  
 ondansetron 4 mg disintegrating tablet Commonly known as:  ZOFRAN ODT  
   
  
 potassium chloride 20 mEq tablet Commonly known as:  K-DUR, KLOR-CON PRENATAL MULTI PO  
   
  
 traZODone 50 mg tablet Commonly known as:  DESYREL  
   
  
  
TAKE these medications as instructed Instructions Each Dose to Equal  
 Morning Noon Evening Bedtime * amLODIPine 5 mg tablet Commonly known as:  Wendy Sun Your last dose was: Your next dose is: Take 1 Tab by mouth daily. 5 mg * amLODIPine 5 mg tablet Commonly known as:  Jillyn Boast Start taking on:  12/29/2017 Your last dose was: Your next dose is: Take 1 Tab by mouth daily. 5 mg Aranesp (Polysorbate) 40 mcg/mL injection Generic drug:  darbepoetin toya in polysorbat Your last dose was: Your next dose is:    
   
   
 40 mcg by SubCUTAneous route every fourteen (14) days. Indications: ANEMIA IN CHRONIC KIDNEY DISEASE  
 40 mcg  
    
   
   
   
  
 aspirin delayed-release 81 mg tablet Your last dose was: Your next dose is: Take 1 Tab by mouth daily. 81 mg  
    
   
   
   
  
 calcitRIOL 0.25 mcg capsule Commonly known as:  ROCALTROL Start taking on:  12/29/2017 Your last dose was: Your next dose is: Take 1 Cap by mouth daily. 0.25 mcg CRESTOR 20 mg tablet Generic drug:  rosuvastatin Your last dose was: Your next dose is: Take 20 mg by mouth nightly. 20 mg  
    
   
   
   
  
 cyanocobalamin 1,000 mcg/mL injection Commonly known as:  VITAMIN B12 Your last dose was: Your next dose is:    
   
   
 1 mL by IntraMUSCular route every seven (7) days. Indications: Vitamin B12 Deficiency 1000 mcg  
    
   
   
   
  
 folic acid 1 mg tablet Commonly known as:  [employer] Your last dose was: Your next dose is: Take 1 mg by mouth daily. 1 mg  
    
   
   
   
  
 gentamicin 0.1 % topical cream  
Commonly known as:  GARAMYCIN Your last dose was: Your next dose is:    
   
   
 APPLY TO PD catheter exit site at daily dressing change HYDROcodone-acetaminophen 5-325 mg per tablet Commonly known as:  Mary Laurent Your last dose was: Your next dose is: Take 1 Tab by mouth every four (4) hours as needed. Max Daily Amount: 6 Tabs. 1 Tab  
    
   
   
   
  
 levothyroxine 150 mcg tablet Commonly known as:  SYNTHROID Your last dose was: Your next dose is: Take 150 mcg by mouth Daily (before breakfast). 150 mcg LINZESS 145 mcg Cap capsule Generic drug:  linaclotide Your last dose was: Your next dose is: Take  by mouth Daily (before breakfast). LORazepam 1 mg tablet Commonly known as:  ATIVAN Your last dose was: Your next dose is: Take 0.5 Tabs by mouth every six (6) hours as needed. Max Daily Amount: 2 mg. 0.5 mg  
    
   
   
   
  
 * metoprolol tartrate 25 mg tablet Commonly known as:  LOPRESSOR Your last dose was: Your next dose is: Take 0.5 Tabs by mouth daily. 12.5 mg  
    
   
   
   
  
 * metoprolol tartrate 25 mg tablet Commonly known as:  LOPRESSOR Start taking on:  12/29/2017 Your last dose was: Your next dose is: Take 0.5 Tabs by mouth daily. Indications: Acute Coronary Syndrome 12.5 mg  
    
   
   
   
  
 multivits,Stress Formula-Zinc tablet Start taking on:  12/29/2017 Your last dose was: Your next dose is: Take 1 Tab by mouth daily. 1 Tab  
    
   
   
   
  
 nitroglycerin 400 mcg/spray spray Commonly known as:  Anshu Kim Your last dose was: Your next dose is:    
   
   
 1 Spray by SubLINGual route every five (5) minutes as needed for Chest Pain. 1 Spray  
    
   
   
   
  
 pantoprazole 40 mg tablet Commonly known as:  PROTONIX Your last dose was: Your next dose is: Take 1 Tab by mouth Before breakfast and dinner. 40 mg  
    
   
   
   
  
 * RANEXA 500 mg SR tablet Generic drug:  ranolazine ER Your last dose was: Your next dose is: Take 500 mg by mouth two (2) times a day. 500 mg  
    
   
   
   
  
 * ranolazine  mg SR tablet Commonly known as:  RANEXA Your last dose was: Your next dose is: Take 2 Tabs by mouth two (2) times a day. 1000 mg RENVELA 800 mg Tab tab Generic drug:  sevelamer carbonate Your last dose was: Your next dose is: Take 800 mg by mouth three (3) times daily (with meals). 800 mg  
    
   
   
   
  
 sucralfate 100 mg/mL suspension Commonly known as:  Elnor Noni Your last dose was: Your next dose is: Take 10 mL by mouth Before breakfast, lunch, dinner and at bedtime. Indications: PREVENTION OF STRESS ULCER  
 1 g  
    
   
   
   
  
 ticagrelor 90 mg tablet Commonly known as:  [employer] Your last dose was: Your next dose is: Take 1 Tab by mouth two (2) times a day. 90 mg  
    
   
   
   
  
 torsemide 100 mg tablet Commonly known as:  DEMADEX Your last dose was: Your next dose is: Take 1 Tab by mouth two (2) times a day. 100 mg * Notice: This list has 6 medication(s) that are the same as other medications prescribed for you. Read the directions carefully, and ask your doctor or other care provider to review them with you. Where to Get Your Medications Information on where to get these meds will be given to you by the nurse or doctor. ! Ask your nurse or doctor about these medications  
  amLODIPine 5 mg tablet  
 calcitRIOL 0.25 mcg capsule  
 cyanocobalamin 1,000 mcg/mL injection HYDROcodone-acetaminophen 5-325 mg per tablet LORazepam 1 mg tablet  
 metoprolol tartrate 25 mg tablet  
 multivits,Stress Formula-Zinc tablet  
 ranolazine  mg SR tablet Discharge Instructions DISCHARGE SUMMARY from Nurse PATIENT INSTRUCTIONS: 
 
 
F-face looks uneven A-arms unable to move or move unevenly S-speech slurred or non-existent T-time-call 911 as soon as signs and symptoms begin-DO NOT go Back to bed or wait to see if you get better-TIME IS BRAIN. Warning Signs of HEART ATTACK Call 911 if you have these symptoms: 
? Chest discomfort. Most heart attacks involve discomfort in the center of the chest that lasts more than a few minutes, or that goes away and comes back. It can feel like uncomfortable pressure, squeezing, fullness, or pain. ? Discomfort in other areas of the upper body. Symptoms can include pain or discomfort in one or both arms, the back, neck, jaw, or stomach. ? Shortness of breath with or without chest discomfort. ? Other signs may include breaking out in a cold sweat, nausea, or lightheadedness. Don't wait more than five minutes to call [address]! Fast action can save your life. Calling 911 is almost always the fastest way to get lifesaving treatment. Emergency Medical Services staff can begin treatment when they arrive  up to an hour sooner than if someone gets to the hospital by car. The discharge information has been reviewed with the patient. The patient verbalized understanding. Discharge medications reviewed with the patient and appropriate educational materials and side effects teaching were provided. ___________________________________________________________________________________________________________________________________ MyChart Announcement We are excited to announce that we are making your provider's discharge notes available to you in [email or web address].   You will see these notes when they are completed and signed by the physician that discharged you from your recent hospital stay. If you have any questions or concerns about any information you see in [employer]ap, please call the Health Information Department where you were seen or reach out to your Primary Care Provider for more information about your plan of care. Introducing [facility]! [employer] introduces [employer]ap patient portal. Now you can access parts of your medical record, email your doctor's office, and request medication refills online. 1. In your internet browser, go to [email or web address] 2. Click on the First Time User? Click Here link in the Sign In box. You will see the New Member Sign Up page. 3. Enter your [employer]ap Access Code exactly as it appears below. You will not need to use this code after youve completed the sign-up process. If you do not sign up before the expiration date, you must request a new code. · Cotap Access Code: [email or web address] Expires: 2/16/2018 12:42 PM 
 
4. Enter the last four digits of your Social Security Number (xxxx) and Date of Birth (mm/dd/yyyy) as indicated and click Submit. You will be taken to the next sign-up page. 5. Create a Cotap ID. This will be your Cotap login ID and cannot be changed, so think of one that is secure and easy to remember. 6. Create a [employer]ap password. You can change your password at any time. 7. Enter your Password Reset Question and Answer. This can be used at a later time if you forget your password. 8. Enter your e-mail address. You will receive e-mail notification when new information is available in [address] 9. Click Sign Up. You can now view and download portions of your medical record. 10. Click the Download Summary menu link to download a portable copy of your medical information. If you have questions, please visit the Frequently Asked Questions section of the [employer]ap website. Remember, [employer]ap is NOT to be used for urgent needs. For medical emergencies, dial 911. Now available from your iPhone and Android! Providers Seen During Your Hospitalization Provider Specialty Primary office phone Jacinda Ho MD Physical Medicine and Rehab [phone] Your Primary Care Physician (PCP) Primary Care Physician Office Phone Office Fax [address], [place] [address] You are allergic to the following Allergen Reactions Codeine Nausea and Vomiting Recent Documentation Weight BMI OB Status Smoking Status 96.6 kg 31 kg/m2 Postmenopausal Never Smoker Emergency Contacts Name Discharge Info Relation Home Work Mobile [address] [facility] CAREGIVER [3] Son [22] [phone] Jon Kuhn DISCHARGE CAREGIVER [3] Son [22] [phone] Patient Belongings The following personal items are in your possession at time of discharge: 
  Dental Appliances: Lowers, Uppers  Visual Aid: Glasses      Home Medications: None   Jewelry: None  Clothing: At bedside    Other Valuables: Cell Phone Please provide this summary of care documentation to your next provider. Signatures-by signing, you are acknowledging that this After Visit Summary has been reviewed with you and you have received a copy. Patient Signature:  ____________________________________________________________ Date:  ____________________________________________________________  
  
Aaron Scott Provider Signature:  ____________________________________________________________ Date:  ____________________________________________________________

## 2017-11-29 NOTE — DIALYSIS
Peritoneal dialysis initiated as ordered. Patient states no complaints at this time. Report given to primary RN.

## 2017-11-29 NOTE — DISCHARGE INSTRUCTIONS
Percutaneous Coronary Intervention: What to Expect at [facility]    Percutaneous coronary intervention (PCI) is the name for procedures that are used to open a narrowed or blocked coronary artery. The two most common PCI procedures are coronary angioplasty and coronary stent placement. Your groin or arm may have a bruise and feel sore for a day or two after a percutaneous coronary intervention (PCI). You can do light activities around the house, but nothing strenuous for several days. This care sheet gives you a general idea about how long it will take for you to recover. But each person recovers at a different pace. Follow the steps below to get better as quickly as possible. How can you care for yourself at home? Activity  ? · If the doctor gave you a sedative:  ¨ For 24 hours, don't do anything that requires attention to detail. It takes time for the medicine's effects to completely wear off. ¨ For your safety, do not drive or operate any machinery that could be dangerous. Wait until the medicine wears off and you can think clearly and react easily. ? · Do not do strenuous exercise and do not lift, pull, or push anything heavy until your doctor says it is okay. This may be for a day or two. You can walk around the house and do light activity, such as cooking. ? · If the catheter was placed in your groin, try not to walk up stairs for the first couple of days. ? · If the catheter was placed in your arm near your wrist, do not bend your wrist deeply for the first couple of days. Be careful using your hand to get into and out of a chair or bed. ? · Carry your stent identification card with you at all times. ? · If your doctor recommends it, get more exercise. Walking is a good choice. Bit by bit, increase the amount you walk every day. Try for at least 30 minutes on most days of the week. Diet  ? · Drink plenty of fluids to help your body flush out the dye.  If you have kidney, heart, or liver disease and have to limit fluids, talk with your doctor before you increase the amount of fluids you drink. ? · Keep eating a heart-healthy diet that has lots of fruits, vegetables, and whole grains. If you have not been eating this way, talk to your doctor. You also may want to talk to a dietitian. This expert can help you to learn about healthy foods and plan meals. Medicines  ? · Your doctor will tell you if and when you can restart your medicines. He or she will also give you instructions about taking any new medicines. ? · If you take blood thinners, such as warfarin (Coumadin), clopidogrel (Plavix), or aspirin, be sure to talk to your doctor. He or she will tell you if and when to start taking those medicines again. Make sure that you understand exactly what your doctor wants you to do.   ? · Your doctor will prescribe blood-thinning medicines. You will likely take aspirin plus another antiplatelet, such as clopidogrel (Plavix). It is very important that you take these medicines exactly as directed. These medicines help keep the coronary artery open and reduce your risk of a heart attack. ? · Call your doctor if you think you are having a problem with your medicine. ?Care of the catheter site  ? · For 1 or 2 days, keep a bandage over the spot where the catheter was inserted. The bandage probably will fall off in this time. ? · Put ice or a cold pack on the area for 10 to 20 minutes at a time to help with soreness or swelling. Put a thin cloth between the ice and your skin. ? · You may shower 24 to 48 hours after the procedure, if your doctor okays it. Pat the incision dry. ? · Do not soak the catheter site until it is healed. Don't take a bath for 1 week, or until your doctor tells you it isokay. Follow-up care is a key part of your treatment and safety. Be sure to make and go to all appointments, and call your doctor if you are having problems.  It's also a good idea to know your test results and keep a list of the medicines you take. When should you call for help? Call 911 anytime you think you may need emergency care. For example, call if:  ? · You passed out (lost consciousness). ? · You have severe trouble breathing. ? · You have sudden chest pain and shortness of breath, or you cough up blood. ? · You have symptoms of a heart attack, such as:  ¨ Chest pain or pressure. ¨ Sweating. ¨ Shortness of breath. ¨ Nausea or vomiting. ¨ Pain that spreads from the chest to the neck, jaw, or one or both shoulders or arms. ¨ Dizziness or lightheadedness. ¨ A fast or uneven pulse. After calling 911, chew 1 adult-strength aspirin. Wait for an ambulance. Do not try to drive yourself. ? · You have been diagnosed with angina, and you have angina symptoms that do not go away with rest or are not getting better within 5 minutes after you take one dose of nitroglycerin. ?Call your doctor now or seek immediate medical care if:  ? · You are bleeding from the area where the catheter was put in your artery. ? · You have a fast-growing, painful lump at the catheter site. ? · You have signs of infection, such as:  ¨ Increased pain, swelling, warmth, or redness. ¨ Red streaks leading from the catheter site. ¨ Pus draining from the catheter site. ¨ A fever. ? · Your leg or arm looks blue or feels cold, numb, or tingly. ? Watch closely for changes in your health, and be sure to contact your doctor if you have any problems. Where can you learn more? Go to http://estuardo-bryant.info/. Enter R825 in the search box to learn more about \"Percutaneous Coronary Intervention: What to Expect at Home. \"  Current as of: September 21, 2016  Content Version: 11.4  © 9430-9784 [employer]. Care instructions adapted under license by [employer] (which disclaims liability or warranty for this information).  If you have questions about a medical condition or this instruction, always ask your healthcare professional. [address] any warranty or liability for your use of this information. Reducing Heart Attack Risk With Daily Medicine: Care Instructions  Your Care Instructions    Heart disease is the number one cause of death. If you are at risk for heart disease, there are many medicines that can reduce your risk. These include:  · ACE inhibitors. These are a type of blood pressure medicine. They can reduce the risk of heart attacks and strokes if you are at high risk. · Statin medicines. These lower cholesterol. They can also reduce the risk of heart disease and strokes. · Aspirin. It can help certain people lower their risk of a heart attack or stroke. · Beta-blocker medicines. These are a type of blood pressure and heart medicine. They can reduce the chance of early death if you have had a heart attack. All medicines can cause side effects. So it is important to understand the pros and cons of any medicine you take. It is also important to take your medicines exactly as your doctor tells you to. Follow-up care is a key part of your treatment and safety. Be sure to make and go to all appointments, and call your doctor if you are having problems. It's also a good idea to know your test results and keep a list of the medicines you take. ACE inhibitors  ACE (angiotensin-converting enzyme) inhibitors are used for three main reasons. They lower blood pressure, protect the kidneys, and prevent heart attacks and strokes. Examples include benazepril (Lotensin), lisinopril (Prinivil, Zestril), and ramipril (Altace). Before you start taking an ACE inhibitor, make sure your doctor knows if:  · You are taking a water pill (diuretic). · You are taking potassium pills or using salt substitutes. · You are pregnant or breastfeeding. · You have had a kidney transplant or other kidney problems. ACE inhibitors can cause side effects.  Call your doctor right away if you have:  · Trouble breathing. · Swelling in your face, head, neck, or tongue. · Dizziness or lightheadedness. · A dry cough. Statins  Statins lower cholesterol. Examples include atorvastatin (Lipitor), lovastatin (Mevacor), pravastatin (Pravachol), and simvastatin (Zocor). Before you start taking a statin, make sure your doctor knows if:  · You have had a kidney transplant or other kidney problems. · You have liver disease. · You take any other prescription medicine, over-the-counter medicine, vitamins, supplements, or herbal remedies. · You are pregnant or breastfeeding. Statins can cause side effects. Call your doctor right away if you have:  · New, severe muscle aches. · Brown urine. Aspirin  Taking an aspirin every day can lower your risk for a heart attack. A heart attack occurs when a blood vessel in the heart gets blocked. When this happens, oxygen can't get to the heart muscle, and part of the heart dies. Aspirin can help prevent blood clots that can block the blood vessels. Talk to your doctor before you start taking aspirin every day. He or she may recommend that you take one low-dose aspirin (81 mg) tablet each day, with a meal and a full glass of water. Taking aspirin isn't right for everyone. This is because it can cause serious bleeding. And you may not be able to use aspirin if you:  · Have asthma. · Have an ulcer or other stomach problem. · Take some other medicine (called a blood thinner) that prevents blood clots. · Are allergic to aspirin. Before having a surgery or procedure, tell your doctor or dentist that you take aspirin. He or she will tell you if you should stop taking aspirin beforehand. Make sure that you understand exactly what your doctor wants you to do. Aspirin can cause side effects. Call your doctor right away if you have:  · Unusual bleeding or bruising. · Nausea, vomiting, or heartburn. · Black or bloody stools.   Beta-blockers  Beta-blockers are used for three main reasons. They lower blood pressure, relieve angina symptoms (such as chest pain or pressure), and reduce the chances of a second heart attack. They include atenolol (Tenormin), carvedilol (Coreg), and metoprolol (Lopressor). Before you start taking a beta-blocker, make sure your doctor knows if you have:  · Severe asthma or frequent asthma attacks. · A very slow pulse (less than 55 beats a minute). Beta-blockers can cause side effects. Call your doctor right away if you have:  · Wheezing or trouble breathing. · Dizziness or lightheadedness. · Asthma that gets worse. When should you call for help? Watch closely for changes in your health, and be sure to contact your doctor if you have any problems. Where can you learn more? Go to http://estuardo-bryant.info/. Enter R428 in the search box to learn more about \"Reducing Heart Attack Risk With Daily Medicine: Care Instructions. \"  Current as of: September 21, 2016  Content Version: 11.4  © 8564-2704 [employer]. Care instructions adapted under license by [employer] (which disclaims liability or warranty for this information). If you have questions about a medical condition or this instruction, always ask your healthcare professional. [address] any warranty or liability for your use of this information. Heart-Healthy Diet: Care Instructions  Your Care Instructions    A heart-healthy diet has lots of vegetables, fruits, nuts, beans, and whole grains, and is low in salt. It limits foods that are high in saturated fat, such as meats, cheeses, and fried foods. It may be hard to change your diet, but even small changes can lower your risk of heart attack and heart disease. Follow-up care is a key part of your treatment and safety. Be sure to make and go to all appointments, and call your doctor if you are having problems.  It's also a good idea to know your test results and keep a list of the medicines you take. How can you care for yourself at home? Watch your portions  · Learn what a serving is. A \"serving\" and a \"portion\" are not always the same thing. Make sure that you are not eating larger portions than are recommended. For example, a serving of pasta is ½ cup. A serving size of meat is 2 to 3 ounces. A 3-ounce serving is about the size of a deck of cards. Measure serving sizes until you are good at [employer]" them. Keep in mind that restaurants often serve portions that are 2 or 3 times the size of one serving. · To keep your energy level up and keep you from feeling hungry, eat often but in smaller portions. · Eat only the number of calories you need to stay at a healthy weight. If you need to lose weight, eat fewer calories than your body burns (through exercise and other physical activity). Eat more fruits and vegetables  · Eat a variety of fruit and vegetables every day. Dark green, deep orange, red, or yellow fruits and vegetables are especially good for you. Examples include spinach, carrots, peaches, and berries. · Keep carrots, celery, and other veggies handy for snacks. Buy fruit that is in season and store it where you can see it so that you will be tempted to eat it. · Cook dishes that have a lot of veggies in them, such as stir-fries and soups. Limit saturated and trans fat  · Read food labels, and try to avoid saturated and trans fats. They increase your risk of heart disease. Trans fat is found in many processed foods such as cookies and crackers. · Use olive or canola oil when you cook. Try cholesterol-lowering spreads, such as Benecol or Take Control. · Bake, broil, grill, or steam foods instead of frying them. · Choose lean meats instead of high-fat meats such as hot dogs and sausages. Cut off all visible fat when you prepare meat. · Eat fish, skinless poultry, and meat alternatives such as soy products instead of high-fat meats.  Soy products, such as tofu, may be especially good for your heart. · Choose low-fat or fat-free milk and dairy products. Eat fish  · Eat at least two servings of fish a week. Certain fish, such as salmon and tuna, contain omega-3 fatty acids, which may help reduce your risk of heart attack. Eat foods high in fiber  · Eat a variety of grain products every day. Include whole-grain foods that have lots of fiber and nutrients. Examples of whole-grain foods include oats, whole wheat bread, and brown rice. · Buy whole-grain breads and cereals, instead of white bread or pastries. Limit salt and sodium  · Limit how much salt and sodium you eat to help lower your blood pressure. · Taste food before you salt it. Add only a little salt when you think you need it. With time, your taste buds will adjust to less salt. · Eat fewer snack items, fast foods, and other high-salt, processed foods. Check food labels for the amount of sodium in packaged foods. · Choose low-sodium versions of canned goods (such as soups, vegetables, and beans). Limit sugar  · Limit drinks and foods with added sugar. These include candy, desserts, and soda pop. Limit alcohol  · Limit alcohol to no more than 2 drinks a day for men and 1 drink a day for women. Too much alcohol can cause health problems. When should you call for help? Watch closely for changes in your health, and be sure to contact your doctor if:  ? · You would like help planning heart-healthy meals. Where can you learn more? Go to http://estuardo-bryant.info/. Enter V137 in the search box to learn more about \"Heart-Healthy Diet: Care Instructions. \"  Current as of: September 21, 2016  Content Version: 11.4  © 7764-6529 [employer]. Care instructions adapted under license by [employer] (which disclaims liability or warranty for this information).  If you have questions about a medical condition or this instruction, always ask your healthcare professional. [employer] disclaims any warranty or liability for your use of this information. DISCHARGE SUMMARY from Nurse    PATIENT INSTRUCTIONS:    After general anesthesia or intravenous sedation, for 24 hours or while taking prescription Narcotics:  · Limit your activities  · Do not drive and operate hazardous machinery  · Do not make important personal or business decisions  · Do  not drink alcoholic beverages  · If you have not urinated within 8 hours after discharge, please contact your surgeon on call. Report the following to your surgeon:  · Excessive pain, swelling, redness or odor of or around the surgical area  · Temperature over 100.5  · Nausea and vomiting lasting longer than 4 hours or if unable to take medications  · Any signs of decreased circulation or nerve impairment to extremity: change in color, persistent  numbness, tingling, coldness or increase pain  · Any questions    What to do at Home:  Recommended activity: {discharge activity:80238}, ***    If you experience any of the following symptoms ***, please follow up with ***. *  Please give a list of your current medications to your Primary Care Provider. *  Please update this list whenever your medications are discontinued, doses are      changed, or new medications (including over-the-counter products) are added. *  Please carry medication information at all times in case of emergency situations. These are general instructions for a healthy lifestyle:    No smoking/ No tobacco products/ Avoid exposure to second hand smoke  Surgeon General's Warning:  Quitting smoking now greatly reduces serious risk to your health.     Obesity, smoking, and sedentary lifestyle greatly increases your risk for illness    A healthy diet, regular physical exercise & weight monitoring are important for maintaining a healthy lifestyle    You may be retaining fluid if you have a history of heart failure or if you experience any of the following symptoms:  Weight gain of 3 pounds or more overnight or 5 pounds in a week, increased swelling in our hands or feet or shortness of breath while lying flat in bed. Please call your doctor as soon as you notice any of these symptoms; do not wait until your next office visit. Recognize signs and symptoms of STROKE:    F-face looks uneven    A-arms unable to move or move unevenly    S-speech slurred or non-existent    T-time-call 911 as soon as signs and symptoms begin-DO NOT go       Back to bed or wait to see if you get better-TIME IS BRAIN. Warning Signs of HEART ATTACK     Call 911 if you have these symptoms:   Chest discomfort. Most heart attacks involve discomfort in the center of the chest that lasts more than a few minutes, or that goes away and comes back. It can feel like uncomfortable pressure, squeezing, fullness, or pain.  Discomfort in other areas of the upper body. Symptoms can include pain or discomfort in one or both arms, the back, neck, jaw, or stomach.  Shortness of breath with or without chest discomfort.  Other signs may include breaking out in a cold sweat, nausea, or lightheadedness. Don't wait more than five minutes to call 911 - MINUTES MATTER! Fast action can save your life. Calling 911 is almost always the fastest way to get lifesaving treatment. Emergency Medical Services staff can begin treatment when they arrive -- up to an hour sooner than if someone gets to the hospital by car. The discharge information has been reviewed with the {PATIENT PARENT GUARDIAN:48662}. The {PATIENT PARENT GUARDIAN:27786} verbalized understanding. Discharge medications reviewed with the {Dishcarge meds reviewed YETO:12232} and appropriate educational materials and side effects teaching were provided.   ___________________________________________________________________________________________________________________________________

## 2017-11-29 NOTE — PROGRESS NOTES
TRANSFER - OUT REPORT:    Verbal report given to Danielle Childress RN on Terry being transferred to [facility] for routine progression of care       Report consisted of patients Situation, Background, Assessment and Recommendations (SBAR). Information from the following report(s) SBAR and Recent Results was reviewed with the receiving nurse. Opportunity for questions and clarification was provided.

## 2017-11-29 NOTE — PROGRESS NOTES
[facility] CARDIOLOGY PROGRESS NOTE           11/29/2017 8:34 AM    Admit Date: 11/18/2017      Subjective:   No CP and feels markedly better post PCI. [place] Doing better. Labs stable. Seems to be at baseline await rehab      ROS:  Cardiovascular:  As noted above    Objective:      Vitals:    11/28/17 1100 11/28/17 2039 11/29/17 0105 11/29/17 0514   BP: 121/62 104/67 131/76 131/75   Pulse: 76 86 80 82   Resp:  16 17 16   Temp:  98.3 °F (36.8 °C) 98.4 °F (36.9 °C) 98.3 °F (36.8 °C)   SpO2:  99% (!) 80% 98%   Weight:    103.2 kg (227 lb 8.2 oz)   Height:           Physical Exam:  General-No Acute Distress  Neck- supple, no JVD  CV- regular rate and rhythm no MRG  Lung- clear bilaterally  Abd- soft, nontender, nondistended  Ext- no edema bilaterally. Skin- warm and dry    Data Review:   Recent Labs      11/29/17   0602  11/28/17   0528   NA  136  137   K  3.8  4.0   MG  2.1   --    BUN  25*  26*   CREA  5.00*  5.06*   GLU  84  87   WBC  8.9  9.4   HGB  10.4*  9.3*   HCT  32.7*  29.1*   PLT  190  178       Assessment/Plan:     Active Problems:    CAD (coronary artery disease) (11/27/2015)    S/P complex PCI and stable on ASA and Brilinta      Unstable angina (HCC) (2/1/2016)    As above      Elevated troponin (11/18/2017)    As above      Chronic anemia (11/18/2017)has been transfused twice    This is worse and awaiting labs and hematology assistance. ESRD (end stage renal disease) ([facility] [address] (11/18/2017)    On PD.  Numbers looking good    Will need disposition assistance    Agree with rehab           Shawnee Mojica MD  11/29/2017 8:34 AM

## 2017-11-29 NOTE — PROGRESS NOTES
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 She has a history of GI bleeding and had anemia-induced unstable angina in the past. 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 She states the pain has since resolved with NTG paste.  No fevers or chills. No nausea, vomiting or diarrhea.  She states that she is essentially anuric and occasionally makes minimal urine.  She has a h/o CVA and TIA. No fever or chills, no problems with PD drainage or discoloration of PD fluid. No increased thirst. She wishes regular diet and supplement. She denies any other acute complaints  Her edema has improved in the past couple of days with intensified dialysis. s- no complaints . Transferring to rehab today .  Will try to do PD couple of cycles in the morning     Past Medical History:   Diagnosis Date    Anemia of chronic renal failure 4/28/2015    Anterior myocardial infarction [facility]) 5/21/2015    CAD (coronary artery disease)     Chest pain     Chronic kidney disease, stage III (moderate) 8/15/2014    on dialysis    CKD (chronic kidney disease) stage 4, GFR 15-29 ml/min ([facility] [address] 4/28/2015    Debility 5/5/2015    Depression 12/29/2015    Edema 12/29/2015    Endocrine disease     Hypothyroidism    GERD (gastroesophageal reflux disease)     Heart murmur 12/29/2015    HLD (hyperlipidemia) 12/29/2015    Hypertension     Hypothyroidism 4/28/2015    Ischemic cardiomyopathy 12/29/2015    Nausea     S/P PTCA (percutaneous transluminal coronary angioplasty); LAD PTCA of  ISR 11/27/15 5/24/2016    STEMI (ST elevation myocardial infarction) ([facility] [address] 4/28/2015    Unspecified sleep apnea     uses cpap machine    Unstable angina (HCC)       Past Surgical History:   Procedure Laterality Date    HX BACK SURGERY  1990    neck surgery cervical disc    HX BACK SURGERY      lower back    HX CATARACT REMOVAL Bilateral     HX CHOLECYSTECTOMY  19702    gall bladder     HX KNEE REPLACEMENT Right 2006    HX PTCA  4/28/2015    2.25 Xience stent to mid LAD for occluded artery, anterior MI, EF 25%. Moderate disease distal LAD and distal OM PCI CX and RCA 2004, then PCI RCA and LAD in 2009. Prior to Admission medications    Medication Sig Start Date End Date Taking? Authorizing Provider   metoprolol tartrate (LOPRESSOR) 25 mg tablet Take 0.5 Tabs by mouth daily. 11/27/17  Yes MINDY Chatman   amLODIPine (NORVASC) 5 mg tablet Take 1 Tab by mouth daily. 11/27/17  Yes MINDY Chatman   torsemide (DEMADEX) 100 mg tablet Take 1 Tab by mouth two (2) times a day. 11/27/17  Yes MINDY Chatman   pantoprazole (PROTONIX) 40 mg tablet Take 1 Tab by mouth Before breakfast and dinner. 11/27/17  Yes MINDY Chatman   cyanocobalamin (VITAMIN B12) 1,000 mcg/mL injection 1 mL by IntraMUSCular route every seven (7) days. Indications: Vitamin B12 Deficiency 12/2/17  Yes MINDY Chatman   folic acid (FOLVITE) 1 mg tablet Take 1 mg by mouth daily. Yes Historical Provider   potassium chloride (K-DUR, KLOR-CON) 20 mEq tablet Take 20 mEq by mouth two (2) times a day. Yes Historical Provider   traZODone (DESYREL) 50 mg tablet Take  by mouth nightly. Yes Historical Provider   megestrol (MEGACE) 400 mg/10 mL (40 mg/mL) suspension Take 200 mg by mouth daily. Yes Historical Provider   sucralfate (CARAFATE) 100 mg/mL suspension Take 10 mL by mouth Before breakfast, lunch, dinner and at bedtime. Indications: PREVENTION OF STRESS ULCER 9/23/17   Krystal Mclean, DO   nitroglycerin (NITROLINGUAL) 400 mcg/spray spray 1 Spray by SubLINGual route every five (5) minutes as needed for Chest Pain.     Historical Provider   linaclotide Stanley Mariajose) 145 mcg cap capsule Take  by mouth Daily (before breakfast). Historical Provider   magnesium oxide (MAG-OX) 400 mg tablet Take 400 mg by mouth daily. Historical Provider   PNV NO.122/IRON/FOLIC ACID (PRENATAL MULTI PO) Take  by mouth. Historical Provider   ondansetron (ZOFRAN ODT) 4 mg disintegrating tablet Take 4 mg by mouth three (3) times daily as needed. Historical Provider   metoclopramide HCl (REGLAN) 5 mg tablet Take 5 mg by mouth two (2) times a day. Historical Provider   ticagrelor (BRILINTA) 90 mg tablet Take 1 Tab by mouth two (2) times a day. 2/4/16   MINDY Sterling   promethazine (PHENERGAN) 25 mg tablet Take 25 mg by mouth every eight (8) hours as needed for Nausea. Historical Provider   sevelamer carbonate (RENVELA) 800 mg tab tab Take 800 mg by mouth three (3) times daily (with meals). Historical Provider   gentamicin (GARAMYCIN) 0.1 % topical cream APPLY TO PD catheter exit site at daily dressing change 5/12/15   Raghu Banks MD   aspirin delayed-release 81 mg tablet Take 1 Tab by mouth daily. 5/5/15   Gisela Hollingsworth PA-C   darbepoetin toya in polysorbat (ARANESP, POLYSORBATE,) 40 mcg/mL injection 40 mcg by SubCUTAneous route every fourteen (14) days. Indications: ANEMIA IN CHRONIC KIDNEY DISEASE    Historical Provider   rosuvastatin (CRESTOR) 20 mg tablet Take 20 mg by mouth nightly. Historical Provider   ranolazine ER (RANEXA) 500 mg SR tablet Take 500 mg by mouth two (2) times a day. Historical Provider   levothyroxine (SYNTHROID) 150 mcg tablet Take 150 mcg by mouth Daily (before breakfast).     Historical Provider     Allergies   Allergen Reactions    Codeine Nausea and Vomiting      Social History   Substance Use Topics    Smoking status: Never Smoker    Smokeless tobacco: Never Used    Alcohol use No      Family History   Problem Relation Age of Onset    Heart Disease Mother     Hypertension Mother     Cancer Mother      Lung    Stroke Father     Hypertension Father     Breast Cancer Neg Hx           Review of Systems    Constitutional: no fever, weak  Eyes: fair vision,    Ears, nose, mouth, throat, and face:fair hearing,   Respiratory: no asthma,  Chronic home O2 is being used - improved dyspnea  Cardiovascular:no palpitation, no  chest pain,   Gastrointestinal:no diarrhea,  Had BM today  Genitourinary: no dysuria,   Hematologic/lymphatic: no bleeding tendency,   Neurological: no seizures   Behvioral/Psych: no psych hospitalization   Endocrine: no goiter,       Objective:       Visit Vitals    /83    Pulse 76    Temp 97.9 °F (36.6 °C)    Resp 18    Ht 5' 10\" (1.778 m)    Wt 103.2 kg (227 lb 8.2 oz)    SpO2 99%    BMI 32.65 kg/m2       General:  Alert, cooperative, no distress, appears stated age. Head:  Normocephalic, without obvious abnormality, atraumatic. Eyes:  Conjunctivae/corneas clear. EOMs intact. Throat: Lips, mucosa, and tongue normal. Teeth and gums normal.   Neck: Supple, symmetrical, trachea midline, no adenopathy,  no JVD. Lungs:   Clear to auscultation bilaterally. Heart:  Regular rate and rhythm, S1, S2 normal, 2/6 systolic  murmur, no rub or gallop. Abdomen:   Soft, non-tender. Distended . No masses,  No organomegaly. PD catheter in place without exudate   Extremities: Extremities normal, atraumatic, no cyanosis. 3+edema. Skin: Skin color, texture, turgor normal. No rashes or lesions. Lymph nodes: Cervical and supraclavicular nodes normal.   Neurologic: Grossly intact. No asterixis.          Data Review:       Recent Results (from the past 24 hour(s))   CBC W/O DIFF    Collection Time: 11/29/17  6:02 AM   Result Value Ref Range    WBC 8.9 4.3 - 11.1 K/uL    RBC 3.47 (L) 4.05 - 5.25 M/uL    HGB 10.4 (L) 11.7 - 15.4 g/dL    HCT 32.7 (L) 35.8 - 46.3 %    MCV 94.2 79.6 - 97.8 FL    MCH 30.0 26.1 - 32.9 PG    MCHC 31.8 31.4 - 35.0 g/dL    RDW 19.3 (H) 11.9 - 14.6 % PLATELET 286 814 - 757 K/uL    MPV 9.2 (L) 10.8 - 64.7 FL   METABOLIC PANEL, BASIC    Collection Time: 11/29/17  6:02 AM   Result Value Ref Range    Sodium 136 136 - 145 mmol/L    Potassium 3.8 3.5 - 5.1 mmol/L    Chloride 97 (L) 98 - 107 mmol/L    CO2 31 21 - 32 mmol/L    Anion gap 8 7 - 16 mmol/L    Glucose 84 65 - 100 mg/dL    BUN 25 (H) 8 - 23 MG/DL    Creatinine 5.00 (H) 0.6 - 1.0 MG/DL    GFR est AA 11 (L) >60 ml/min/1.73m2    GFR est non-AA 9 (L) >60 ml/min/1.73m2    Calcium 9.1 8.3 - 10.4 MG/DL   MAGNESIUM    Collection Time: 11/29/17  6:02 AM   Result Value Ref Range    Magnesium 2.1 1.8 - 2.4 mg/dL       CXR viewed by me - no major infiltrate or fluid excess, CM with left possible minor effusion is present        CT abdomen/pelvis  CT ABDOMEN:  Peritoneal fluid in the perihepatic space, mild paracolic gutters,  extending down into the pelvis. Peritoneal dialysis catheter noted. There is not  any evidence of retroperitoneal hematoma identified. Strandy fluid density does  extend into the extraperitoneal space in the lower abdomen and pelvis. There is  radiodensity within the renal collecting systems, possibly previously  administered IV contrast. There is peripancreatic fluid and stranding, cannot  exclude acute pancreatitis. No biliary dilatation. Spleen is not enlarged. Small  bowel normal caliber.   CT PELVIS:   Moderate to large volume pelvic fluid.   There is diffuse asymmetric enlargement of the right rectus and oblique  abdominal muscles. There are are of higher attenuation the contralateral side  and findings are consistent with an abdominal wall hematoma. IMPRESSION:    1. Evidence of right abdominal wall hematoma. 2. No CT evidence to suggest retroperitoneal hematoma. 3. Extensive fluid in the abdomen and pelvis, presumed related to peritoneal  dialysis.     Active Problems:    CAD (coronary artery disease) (11/27/2015)      Unstable angina (HCC) (2/1/2016)      Elevated troponin (11/18/2017)      Chronic anemia (11/18/2017)      ESRD (end stage renal disease) ([facility] [address] (11/18/2017)        Assessment:     1. CAD x NSTEMI, Unstable angina -  - [facility] with complex CAD and acute thrombotic lesion in pLAD and subtotal occlusion in mLAD. The RCA has severe proximal and mid RCA disease. She has additional stenting of LAD with Resolute in mid/distal and proximal vessel. These all touched the previously stented mid vessel. Recommend life-long DAPT    2, ESRD -  - poor drainage on PD, hoping for improvement with getting OOB and ambulation  - Transferring to rehab today . Will try to do PD couple of cycles in the morning     3. Fluid excess -  - recurrent due to poor PD drainage    4. Anemia -  - intensive anemia therapy in [employer]  - on WAYNE and IV iron and B12  - improved S/P  transfusion of PRBC    5. History of GI bleed -  - monitor clinically and serial Hb  - she had a drop in Hb to 7 range and improved with BT    6. Hypokalemia   - resolved     7.  Abdominal pain and tenderness with drop in hb , on DAPT   No evidence of retroperitoneal hematoma       Plan:     As above - 25

## 2017-11-29 NOTE — PROGRESS NOTES
End Of Shift Functional Summary, Nursing      TOILETING:  Does patient need assist with clothing management and/or pericare? No    TOILET TRANSFER:  Pt requires minimal assistance. Pt uses walker. BLADDER:  Pt does not have a castillo catheter that staff manages. Pt does not take medication. Pt is continent. of bladder and voids in toilet. BOWEL:  Pt does not take medication. Pt is continent of bowel and uses toilet. Pt has had 0 bowel accidents during this shift. BED/CHAIR TRANSFER  Pt requires minimal assistance. Patient requires the assistance of 2 staff member(s). Pt uses walker    EATING  Pt requires no assistance. Pt wears dentures. TUBE FEEDINGS:  Pt does not  receive nutrition through tube feedings. Documentation reviewed and plan of care discussed/reviewed with   patient, therapists, oncoming nurse and patient assistant during the shift.

## 2017-11-29 NOTE — PROGRESS NOTES
Care Management Interventions  PCP Verified by CM: Yes (October 2017)  Transition of Care Consult (CM Consult): Other (9th floor [facility])  Physical Therapy Consult: Yes  Occupational Therapy Consult: Yes  Current Support Network: Lives Alone  Confirm Follow Up Transport: Family  Plan discussed with Pt/Family/Caregiver: Yes  Freedom of Choice Offered: Yes  Discharge Location  Discharge Placement: Rehab hospital/unit acute  Patient to be d/c today to [address] on 9th floor. Notified Lobo Elmore coordinator of 9th floor of d/c orders.

## 2017-11-29 NOTE — PROGRESS NOTES
Patient refused IV restart at this time, stated \"I'm going to Peritoneal Dialysis in the morning, then Transferring to 9th Floor, so I don't need another IV stick. After much encouragement and informing the need to follow Telemetry Protocol while on Unit; and Hospital protocol, patient still declines IV placement.

## 2017-11-30 LAB
ANION GAP SERPL CALC-SCNC: 10 MMOL/L (ref 7–16)
BASOPHILS # BLD: 0 K/UL (ref 0–0.2)
BASOPHILS NFR BLD: 0 % (ref 0–2)
BUN SERPL-MCNC: 28 MG/DL (ref 8–23)
CALCIUM SERPL-MCNC: 9 MG/DL (ref 8.3–10.4)
CHLORIDE SERPL-SCNC: 100 MMOL/L (ref 98–107)
CO2 SERPL-SCNC: 28 MMOL/L (ref 21–32)
CREAT SERPL-MCNC: 5.77 MG/DL (ref 0.6–1)
DIFFERENTIAL METHOD BLD: ABNORMAL
EOSINOPHIL # BLD: 0.2 K/UL (ref 0–0.8)
EOSINOPHIL NFR BLD: 3 % (ref 0.5–7.8)
ERYTHROCYTE [DISTWIDTH] IN BLOOD BY AUTOMATED COUNT: 18.8 % (ref 11.9–14.6)
GLUCOSE SERPL-MCNC: 112 MG/DL (ref 65–100)
HCT VFR BLD AUTO: 29.7 % (ref 35.8–46.3)
HGB BLD-MCNC: 9.3 G/DL (ref 11.7–15.4)
IMM GRANULOCYTES # BLD: 0 K/UL (ref 0–0.5)
IMM GRANULOCYTES NFR BLD AUTO: 0 % (ref 0–5)
LYMPHOCYTES # BLD: 0.8 K/UL (ref 0.5–4.6)
LYMPHOCYTES NFR BLD: 10 % (ref 13–44)
MCH RBC QN AUTO: 29.4 PG (ref 26.1–32.9)
MCHC RBC AUTO-ENTMCNC: 31.3 G/DL (ref 31.4–35)
MCV RBC AUTO: 94 FL (ref 79.6–97.8)
MONOCYTES # BLD: 0.8 K/UL (ref 0.1–1.3)
MONOCYTES NFR BLD: 10 % (ref 4–12)
NEUTS SEG # BLD: 5.9 K/UL (ref 1.7–8.2)
NEUTS SEG NFR BLD: 77 % (ref 43–78)
PLATELET # BLD AUTO: 198 K/UL (ref 150–450)
PMV BLD AUTO: 9.2 FL (ref 10.8–14.1)
POTASSIUM SERPL-SCNC: 3.4 MMOL/L (ref 3.5–5.1)
RBC # BLD AUTO: 3.16 M/UL (ref 4.05–5.25)
SODIUM SERPL-SCNC: 138 MMOL/L (ref 136–145)
WBC # BLD AUTO: 7.8 K/UL (ref 4.3–11.1)

## 2017-11-30 PROCEDURE — 97530 THERAPEUTIC ACTIVITIES: CPT

## 2017-11-30 PROCEDURE — 74011250637 HC RX REV CODE- 250/637: Performed by: PHYSICAL MEDICINE & REHABILITATION

## 2017-11-30 PROCEDURE — 65310000000 HC RM PRIVATE REHAB

## 2017-11-30 PROCEDURE — 99232 SBSQ HOSP IP/OBS MODERATE 35: CPT | Performed by: PHYSICAL MEDICINE & REHABILITATION

## 2017-11-30 PROCEDURE — 85025 COMPLETE CBC W/AUTO DIFF WBC: CPT | Performed by: PHYSICAL MEDICINE & REHABILITATION

## 2017-11-30 PROCEDURE — 97166 OT EVAL MOD COMPLEX 45 MIN: CPT

## 2017-11-30 PROCEDURE — 97163 PT EVAL HIGH COMPLEX 45 MIN: CPT

## 2017-11-30 PROCEDURE — 36415 COLL VENOUS BLD VENIPUNCTURE: CPT | Performed by: PHYSICAL MEDICINE & REHABILITATION

## 2017-11-30 PROCEDURE — 97110 THERAPEUTIC EXERCISES: CPT

## 2017-11-30 PROCEDURE — 80048 BASIC METABOLIC PNL TOTAL CA: CPT | Performed by: PHYSICAL MEDICINE & REHABILITATION

## 2017-11-30 RX ORDER — POLYETHYLENE GLYCOL 3350 17 G/17G
17 POWDER, FOR SOLUTION ORAL DAILY
Status: DISCONTINUED | OUTPATIENT
Start: 2017-11-30 | End: 2017-12-18 | Stop reason: HOSPADM

## 2017-11-30 RX ADMIN — RENAGEL 800 MG: 400 TABLET ORAL at 16:26

## 2017-11-30 RX ADMIN — STANDARDIZED SENNA CONCENTRATE AND DOCUSATE SODIUM 1 TABLET: 8.6; 5 TABLET, FILM COATED ORAL at 08:03

## 2017-11-30 RX ADMIN — LEVOTHYROXINE SODIUM 150 MCG: 50 TABLET ORAL at 06:33

## 2017-11-30 RX ADMIN — Medication 400 MG: at 08:02

## 2017-11-30 RX ADMIN — PANTOPRAZOLE SODIUM 40 MG: 40 TABLET, DELAYED RELEASE ORAL at 16:26

## 2017-11-30 RX ADMIN — TICAGRELOR 90 MG: 90 TABLET ORAL at 23:25

## 2017-11-30 RX ADMIN — PANTOPRAZOLE SODIUM 40 MG: 40 TABLET, DELAYED RELEASE ORAL at 06:33

## 2017-11-30 RX ADMIN — SUCRALFATE 1 G: 1 SUSPENSION ORAL at 16:25

## 2017-11-30 RX ADMIN — RENAGEL 800 MG: 400 TABLET ORAL at 08:02

## 2017-11-30 RX ADMIN — ROSUVASTATIN CALCIUM 20 MG: 20 TABLET, FILM COATED ORAL at 21:39

## 2017-11-30 RX ADMIN — METOCLOPRAMIDE HYDROCHLORIDE 5 MG: 10 TABLET ORAL at 17:19

## 2017-11-30 RX ADMIN — CALCITRIOL 0.25 MCG: 0.25 CAPSULE, LIQUID FILLED ORAL at 08:02

## 2017-11-30 RX ADMIN — SUCRALFATE 1 G: 1 SUSPENSION ORAL at 21:39

## 2017-11-30 RX ADMIN — RANOLAZINE 1000 MG: 500 TABLET, FILM COATED, EXTENDED RELEASE ORAL at 08:02

## 2017-11-30 RX ADMIN — TORSEMIDE 100 MG: 100 TABLET ORAL at 17:19

## 2017-11-30 RX ADMIN — TICAGRELOR 90 MG: 90 TABLET ORAL at 11:38

## 2017-11-30 RX ADMIN — Medication 10 ML: at 14:56

## 2017-11-30 RX ADMIN — ASPIRIN 81 MG: 81 TABLET, COATED ORAL at 08:02

## 2017-11-30 RX ADMIN — ZINC 1 TABLET: TAB ORAL at 08:03

## 2017-11-30 RX ADMIN — SUCRALFATE 1 G: 1 SUSPENSION ORAL at 06:33

## 2017-11-30 RX ADMIN — RANOLAZINE 1000 MG: 500 TABLET, FILM COATED, EXTENDED RELEASE ORAL at 17:18

## 2017-11-30 RX ADMIN — RENAGEL 800 MG: 400 TABLET ORAL at 11:38

## 2017-11-30 RX ADMIN — METOCLOPRAMIDE HYDROCHLORIDE 5 MG: 10 TABLET ORAL at 08:03

## 2017-11-30 RX ADMIN — AMLODIPINE BESYLATE 5 MG: 5 TABLET ORAL at 08:03

## 2017-11-30 RX ADMIN — ACETAMINOPHEN 650 MG: 325 TABLET ORAL at 10:27

## 2017-11-30 RX ADMIN — TORSEMIDE 100 MG: 100 TABLET ORAL at 08:02

## 2017-11-30 RX ADMIN — SUCRALFATE 1 G: 1 SUSPENSION ORAL at 11:38

## 2017-11-30 RX ADMIN — LORAZEPAM 1 MG: 1 TABLET ORAL at 10:31

## 2017-11-30 RX ADMIN — METOPROLOL TARTRATE 12.5 MG: 25 TABLET ORAL at 08:03

## 2017-11-30 NOTE — DIALYSIS
PD Catheter disconnected from Cycler using aseptic technique. Patient c/o fullness and tightness in abdomen. Cycler read UF as -663 cc. Connected patient to manual drain bag using aseptic technique and patient drained ~700 cc. Patient stated some relief in abdomen and abdomen soft to palpation. Betadine cap applied to PD catheter and report given to Karri Aguirre, primary RN. Dr. Rosemarie Denney aware of continued ineffective PD draining and ineffective PD treatment.

## 2017-11-30 NOTE — H&P
[facility], [address]  Tel: [phone]  Fax: [phone]      HISTORY AND PHYSICAL  IRC       Admit Date: 11/29/2017    Primary Care Physician: Gunjan Norman MD  Specialty Group: nephrolgy, GI    Chief Complaint : Gait dysfunction secondary to below. Admit Diagnosis: Unstable angina (HCC)  CAD (coronary artery disease) (11/27/2015)  S/P complex PCI and stable on ASA and Brilinta  Unstable angina (HCC) (2/1/2016)  Acute/ Chronic anemia (11/18/2017)  ESRD (end stage renal disease) On PD/ CRRT  Pain  DVT risk  Acute Rehab Dx:  Debility    Weakness  Gait impairment  deconditioning  Mobility and ambulation deficits  Self Care/ADL deficits    Medical Dx:  Past Medical History:   Diagnosis Date    Anemia of chronic renal failure 4/28/2015    Anterior myocardial infarction ([facility] [address] 5/21/2015    CAD (coronary artery disease)     Chest pain     Chronic kidney disease, stage III (moderate) 8/15/2014    on dialysis    CKD (chronic kidney disease) stage 4, GFR 15-29 ml/min ([facility] [address] 4/28/2015    Debility 5/5/2015    Depression 12/29/2015    Edema 12/29/2015    Endocrine disease     Hypothyroidism    GERD (gastroesophageal reflux disease)     Heart murmur 12/29/2015    HLD (hyperlipidemia) 12/29/2015    Hypertension     Hypothyroidism 4/28/2015    Ischemic cardiomyopathy 12/29/2015    Nausea     S/P PTCA (percutaneous transluminal coronary angioplasty); LAD PTCA of  ISR 11/27/15 5/24/2016    STEMI (ST elevation myocardial infarction) ([facility] [address] 4/28/2015    Unspecified sleep apnea     uses cpap machine    Unstable angina [facility])        Date of Evaluation:  November 29, 2017    HPI: Marcene Bosworth is a 80 y.o. female patient at [facility] who was admitted on 11/18/2017  by Cecile Koehler MD with below mentioned medical history ,is being seen for Physical Medicine and Rehabilitation.     Marcene Bosworth who has history ofh/o CAD s/p PCI to LAD and RCA 2-2016, and ESRD on PD presented to the ER on 11/18/2017 with chest pain, admitted with diagnoss of unstable angina. She underwent a successful percutaneous coronary intervention and stentings on 11/20/2017. Post procedure course was complicated by drop in hgb, down to 7.4 on 11/21.  She had no obvious signs of bleeding but CT  showed right abdominal wall hematoma. transfusion for anemia was not allowed due to pt being a Jehov[employer] patient. Patient continued to need dialysis, however due to due to problems with drainage of PD fluid, PD was held and she was transferred to CCU for CRRT. Patient with signis of volume overload, improving with CRRT. She was noted to eventually be transfused PRBC due to her hgb being critically low. GI was consulted for possible GI bleed. Patient noted to have significant decline of her function below her baseline due to prolonged immobility and illness. Patient starting to stand, taking few steps, however shows significant functional deficits. She requires minimum to moderate assist for mobility and ambulation. Tawanna Barnett is seen and examined today. Medical Records reviewed. She is normally independent at her baseline. Physical therapy was initiated and patient was starting to mobilize. However, she shows significant functional deficits due to prolonged immobility and hospitalization. Our service was consulted for rehab needs and we recommended inpatient hospital rehabilitation is reasonable and necessary due to ongoing acute medical issues which have not changed since initial pre-admission evaluation. and rehab needs still likely best managed in IRU setting. The patient was evaluated by [facility] admissions coordinators. I reviewed the preadmission screening and have approved for admission to the [facility]. The patient was cleared for transfer to rehab today.  Patient continues to have ongoing  medical issues outlined above requiring physician medical supervision and functional deficits which would benefit from continued intensive therapies. Current Level of Function: (evaluated by acute therapy staff, with bed mobility, transfers, balance personally observed post-admission in the IRF setting minutes prior to submission of document) bathing - mod A, lower body dressing - max A, toileting - max A, bed mobility - min/ mod  A, transfers - min/ mod A, decreased balance , ambulation  - 4' with using a RW, moderate assist.    Prior Level of Function/Work/Activity:  Lives alone in single story home with ramp to enter. Pt is typically mod I, ambulatory around house with rolling walker. Reports at baseline she is independent with ADLs, bathroom/toileting, and fixing simple meals.  Pt reports son has been staying with her recently and will be with her until she is doing better.         Past Medical History:   Diagnosis Date    Anemia of chronic renal failure 4/28/2015    Anterior myocardial infarction [facility]) 5/21/2015    CAD (coronary artery disease)     Chest pain     Chronic kidney disease, stage III (moderate) 8/15/2014    on dialysis    CKD (chronic kidney disease) stage 4, GFR 15-29 ml/min ([address] 4/28/2015    Debility 5/5/2015    Depression 12/29/2015    Edema 12/29/2015    Endocrine disease     Hypothyroidism    GERD (gastroesophageal reflux disease)     Heart murmur 12/29/2015    HLD (hyperlipidemia) 12/29/2015    Hypertension     Hypothyroidism 4/28/2015    Ischemic cardiomyopathy 12/29/2015    Nausea     S/P PTCA (percutaneous transluminal coronary angioplasty); LAD PTCA of  ISR 11/27/15 5/24/2016    STEMI (ST elevation myocardial infarction) ([address] 4/28/2015    Unspecified sleep apnea     uses cpap machine    Unstable angina ([address]       Past Surgical History:   Procedure Laterality Date    HX BACK SURGERY  1990    neck surgery cervical disc    HX BACK SURGERY      lower back    HX CATARACT REMOVAL Bilateral     HX CHOLECYSTECTOMY  [id number] gall bladder     HX KNEE REPLACEMENT Right 2006    HX PTCA  4/28/2015    2.25 Xience stent to mid LAD for occluded artery, anterior MI, EF 25%. Moderate disease distal LAD and distal OM PCI CX and RCA 2004, then PCI RCA and LAD in 2009.      Allergies   Allergen Reactions    Codeine Nausea and Vomiting      Family History   Problem Relation Age of Onset    Heart Disease Mother     Hypertension Mother     Cancer Mother      Lung    Stroke Father     Hypertension Father     Breast Cancer Neg Hx       Social History   Substance Use Topics    Smoking status: Never Smoker    Smokeless tobacco: Never Used    Alcohol use No      Current Facility-Administered Medications   Medication Dose Route Frequency    acetaminophen (TYLENOL) tablet 650 mg  650 mg Oral Q4H PRN    HYDROcodone-acetaminophen (NORCO) 7.5-325 mg per tablet 1 Tab  1 Tab Oral Q4H PRN    HYDROcodone-acetaminophen (NORCO) 5-325 mg per tablet 1 Tab  1 Tab Oral Q4H PRN    LORazepam (ATIVAN) tablet 1 mg  1 mg Oral Q6H PRN    sodium chloride (NS) flush 5-10 mL  5-10 mL IntraVENous Q8H    sodium chloride (NS) flush 5-10 mL  5-10 mL IntraVENous PRN    [START ON 11/30/2017] amLODIPine (NORVASC) tablet 5 mg  5 mg Oral DAILY    [START ON 11/30/2017] aspirin delayed-release tablet 81 mg  81 mg Oral DAILY    [START ON 11/30/2017] calcitRIOL (ROCALTROL) capsule 0.25 mcg  0.25 mcg Oral DAILY    [START ON 12/2/2017] cyanocobalamin (VITAMIN B12) injection 1,000 mcg  1,000 mcg IntraMUSCular Q7D    epoetin toya (EPOGEN;PROCRIT) injection 20,000 Units  20,000 Units SubCUTAneous Q MON, WED & FRI    gentamicin (GARAMYCIN) 0.1 % cream   Topical DAILY PRN    [START ON 11/30/2017] levothyroxine (SYNTHROID) tablet 150 mcg  150 mcg Oral ACB    [START ON 11/30/2017] linaclotide (LINZESS) capsule 145 mcg (Patient Supplied)  145 mcg Oral DAILY    [START ON 11/30/2017] magnesium oxide (MAG-OX) tablet 400 mg  400 mg Oral DAILY    metoclopramide HCl (REGLAN) tablet 5 mg  5 mg Oral BID    [START ON 11/30/2017] metoprolol tartrate (LOPRESSOR) tablet 12.5 mg  12.5 mg Oral DAILY    [START ON 11/30/2017] multivitamin w ZN (STRESSTABS W ZINC) tablet  1 Tab Oral DAILY    nitroglycerin (NITROSTAT) tablet 0.4 mg  0.4 mg SubLINGual PRN    ondansetron (ZOFRAN ODT) tablet 4 mg  4 mg Oral TID PRN    pantoprazole (PROTONIX) tablet 40 mg  40 mg Oral ACB&D    polyethylene glycol (MIRALAX) packet 17 g  17 g Oral DAILY PRN    promethazine (PHENERGAN) tablet 25 mg  25 mg Oral Q6H PRN    ranolazine ER (RANEXA) tablet 1,000 mg  1,000 mg Oral BID    rosuvastatin (CRESTOR) tablet 20 mg  20 mg Oral QHS    [START ON 11/30/2017] senna-docusate (PERICOLACE) 8.6-50 mg per tablet 1 Tab  1 Tab Oral DAILY    sevelamer (RENAGEL) tablet 800 mg  800 mg Oral TID WITH MEALS    sucralfate (CARAFATE) 100 mg/mL oral suspension 1 g  1 g Oral AC&HS    ticagrelor (BRILINTA) tablet 90 mg  90 mg Oral BID    torsemide (DEMADEX) tablet 100 mg  100 mg Oral BID       Review of Systems:  Denies: fevers, chills, sweats, fatigue, malaise, anorexia, weight loss   Denies: blurry vision, loss of vision, eye pain, photophobia   Denies: hearing loss, ringing in the ears, earache, epistaxis   Denies: chest pain, palpitations, syncope, orthopnea, paroxysmal nocturnal dyspnea, claudication   Denies: dysphagia, odynophagia, nausea, vomiting, diarrhea, constipation, abdominal pain, jaundice, melena   Denies: frequency, dysuria, nocturia, urinary incontinence, stones, hematuria   Denies: polydipsia/polyuria, skin changes, temperature intolerance, unexpected weight gain   Denies: back pain, joint pain, joint swelling, muscle pain   Denies: bleeding problems, blood transfusions, bruising, pallor, swollen lymph nodes   Denies: headache, dysarthria, blurred vision, diplopia,seizure, focal deficits.       Objective:     Visit Vitals    /72    Pulse 80    Temp 98.3 °F (36.8 °C)    Resp 16    SpO2 96%      Intake and Output:     Physical Exam:   General: Alert and age appropriately oriented. No acute cardio respiratory distress. HEENT: Normocephalic,no scleral icterus  Oral mucosa moist without cyanosis   Lungs: Clear to auscultation  bilaterally. Respiration even and unlabored   Heart: Regular rate and rhythm, S1, S2   No  murmurs, clicks, rub or gallops   Abdomen: Soft, non-tender, nondistended. Bowel sounds present. No organomegaly. Genitourinary: defered   Neuromuscular:     NANCI, EOMI  + mild generalized weakness 4+ to 5-/5. BUE, BLE, more proximal.   Sensation grossly intact to soft touch. Skin/extremity: No rashes, no erythema. 2+edema.   Wound covered.         Lab Review:    Recent Results (from the past 72 hour(s))   CBC W/O DIFF    Collection Time: 11/27/17  4:50 AM   Result Value Ref Range    WBC 9.6 4.3 - 11.1 K/uL    RBC 3.07 (L) 4.05 - 5.25 M/uL    HGB 9.1 (L) 11.7 - 15.4 g/dL    HCT 28.9 (L) 35.8 - 46.3 %    MCV 94.1 79.6 - 97.8 FL    MCH 29.6 26.1 - 32.9 PG    MCHC 31.5 31.4 - 35.0 g/dL    RDW 19.2 (H) 11.9 - 14.6 %    PLATELET 788 902 - 117 K/uL    MPV 9.3 (L) 10.8 - 19.6 FL   METABOLIC PANEL, BASIC    Collection Time: 11/27/17  4:50 AM   Result Value Ref Range    Sodium 138 136 - 145 mmol/L    Potassium 4.1 3.5 - 5.1 mmol/L    Chloride 99 98 - 107 mmol/L    CO2 31 21 - 32 mmol/L    Anion gap 8 7 - 16 mmol/L    Glucose 83 65 - 100 mg/dL    BUN 25 (H) 8 - 23 MG/DL    Creatinine 5.24 (H) 0.6 - 1.0 MG/DL    GFR est AA 10 (L) >60 ml/min/1.73m2    GFR est non-AA 8 (L) >60 ml/min/1.73m2    Calcium 9.5 8.3 - 10.4 MG/DL   EKG, 12 LEAD, INITIAL    Collection Time: 11/27/17  6:31 AM   Result Value Ref Range    Ventricular Rate 82 BPM    Atrial Rate 82 BPM    P-R Interval 172 ms    QRS Duration 102 ms    Q-T Interval 414 ms    QTC Calculation (Bezet) 483 ms    Calculated P Axis 63 degrees    Calculated R Axis -8 degrees    Calculated T Axis 89 degrees    Diagnosis       Normal sinus rhythm  Cannot rule out Anterior infarct , age undetermined  Abnormal ECG  When compared with ECG of 26-NOV-2017 07:20,  Minimal criteria for Anterior infarct are now Present  Confirmed by [facility] EMILEE GARY (), RUCHI BIRD ([id number]) on 11/27/2017 6:50:56 AM     CBC W/O DIFF    Collection Time: 11/28/17  5:28 AM   Result Value Ref Range    WBC 9.4 4.3 - 11.1 K/uL    RBC 3.08 (L) 4.05 - 5.25 M/uL    HGB 9.3 (L) 11.7 - 15.4 g/dL    HCT 29.1 (L) 35.8 - 46.3 %    MCV 94.5 79.6 - 97.8 FL    MCH 30.2 26.1 - 32.9 PG    MCHC 32.0 31.4 - 35.0 g/dL    RDW 19.5 (H) 11.9 - 14.6 %    PLATELET 392 397 - 774 K/uL    MPV 9.7 (L) 10.8 - 55.6 FL   METABOLIC PANEL, BASIC    Collection Time: 11/28/17  5:28 AM   Result Value Ref Range    Sodium 137 136 - 145 mmol/L    Potassium 4.0 3.5 - 5.1 mmol/L    Chloride 100 98 - 107 mmol/L    CO2 28 21 - 32 mmol/L    Anion gap 9 7 - 16 mmol/L    Glucose 87 65 - 100 mg/dL    BUN 26 (H) 8 - 23 MG/DL    Creatinine 5.06 (H) 0.6 - 1.0 MG/DL    GFR est AA 10 (L) >60 ml/min/1.73m2    GFR est non-AA 9 (L) >60 ml/min/1.73m2    Calcium 9.3 8.3 - 10.4 MG/DL   CBC W/O DIFF    Collection Time: 11/29/17  6:02 AM   Result Value Ref Range    WBC 8.9 4.3 - 11.1 K/uL    RBC 3.47 (L) 4.05 - 5.25 M/uL    HGB 10.4 (L) 11.7 - 15.4 g/dL    HCT 32.7 (L) 35.8 - 46.3 %    MCV 94.2 79.6 - 97.8 FL    MCH 30.0 26.1 - 32.9 PG    MCHC 31.8 31.4 - 35.0 g/dL    RDW 19.3 (H) 11.9 - 14.6 %    PLATELET 312 473 - 965 K/uL    MPV 9.2 (L) 10.8 - 17.4 FL   METABOLIC PANEL, BASIC    Collection Time: 11/29/17  6:02 AM   Result Value Ref Range    Sodium 136 136 - 145 mmol/L    Potassium 3.8 3.5 - 5.1 mmol/L    Chloride 97 (L) 98 - 107 mmol/L    CO2 31 21 - 32 mmol/L    Anion gap 8 7 - 16 mmol/L    Glucose 84 65 - 100 mg/dL    BUN 25 (H) 8 - 23 MG/DL    Creatinine 5.00 (H) 0.6 - 1.0 MG/DL    GFR est AA 11 (L) >60 ml/min/1.73m2    GFR est non-AA 9 (L) >60 ml/min/1.73m2    Calcium 9.1 8.3 - 10.4 MG/DL   MAGNESIUM    Collection Time: 11/29/17  6:02 AM   Result Value Ref Range Magnesium 2.1 1.8 - 2.4 mg/dL   GLUCOSE, POC    Collection Time: 11/29/17  3:56 PM   Result Value Ref Range    Glucose (POC) 81 65 - 100 mg/dL       Functional Assessment:  Balance:  Sitting: Impaired  Sitting - Static: Good (unsupported)  Sitting - Dynamic: Fair (occasional)  Standing: Impaired  Standing - Static: Constant support  Standing - Dynamic : Poor     Transfers:  Sit to Stand: Moderate assistance  Stand to Sit: Minimum assistance      Ambulation:  Gait:  Base of Support: Center of gravity altered; Widened  Speed/Michelle: Slow  Step Length: Right shortened;Left shortened  Distance (ft): 15 Feet (ft) (x2)  Assistive Device: Walker, rolling;Gait belt  Ambulation - Level of Assistance: Minimal assistance  Interventions: Verbal cues      Condition on Admission: Good      Assessment:    The Post Assessment Physician Evaluation (DAVON) found the current functional status to be comparable with the Pre-admission Screening. The Patient is a good candidate for acute inpatient rehabilitation. Nothing since the Pre-admission screen has changed that determination. Rehabilitation Plan  The patient has shown the ability to tolerate and benefit from 3 hours of therapy daily and is being admitted to a comprehensive acute inpatient rehabilitation program consisting of at least 3 hours of combined physical and occupational therapies. Begin intensive Physical Therapy for a minimum of 1.5 hours a day, at least 5 out of 7 days per week to address bed mobility, transfers, ambulation, strengthening, balance, and endurance. Begin intensive Occupational Therapy for a minimum of 1.5 hours a day, at least 5 out of 7 days per week to address ADL ( bathing, LE dressing, toileting) and adaptive equipment as needed.       Continue 24-hour skilled rehabilitation nursing for bowel and bladder management, skin care for decubitus ulcer prevention , pain management and ongoing medication administration      Continue daily physician medical management:    CAD (coronary artery disease) (11/27/2015)/ S/P complex PCI / Unstable angina ([facility] [address] (2/1/2016)  - on ASA and Brilinta  -hypertension - continue norvasc, lopressor  - continue ranexa, statin  - demadex continued. Acute/ Chronic anemia (11/18/2017) - continue to monitor.   - conitnue epogen    ESRD (end stage renal disease) On PD/ CRRT  - PD resumed. - monitor fluids status. Nephrology managing. Will follow at IRU. Pneumonia prophylaxis- Insentive spirometer every hour while awake    DVT risk / DVT Prophylaxis- Will require daily physician exam to assess for signs and symptoms as patient is at increased risk for of thromboembolism. Mobilization as tolerated. Intermittent pneumatic compression devices when in bed Thigh-high or knee-high thromboembolic deterrent hose when out of bed. Lovenox subq daily. Pain Control: stable, mild-to-moderate joint symptoms intermittently, reasonably well controlled by PRN meds. Will require regular pain assessment and comprenhensive pain management,     Wound Care: Monitor wound status daily per staff and physician. At risk for failure. Will require 24/7 rehab nursing. Keep wound clean and dry     bowel program - as needed    GERD - resume PPI. At times may need additional antacids, Maalox prn.  - continue sucralfate    Hypothyroid  - synthroid. The patient's prognosis for significant practical improvement within a reasonable period of time appears good and the estimated length of stay is 14 days and he is expected to return home with spouse and continued rehabilitation with home health therapy.      Given the patient's complex neurologic/medical condition and the risk of further medical complications including: DVT, PE, skin breakdown, pneumonia due to decreased mobility, infection at surgical site , CVA, MI, cardiac arrhythmias due to HTN, increased risk of thromboembolism secondary to decreased blood volume and increased energy expenditure in a patient with known acute blood loss could potentially impact the IRF program with decreased cardiovascular efficiency, postural hypotension and cardiac arrhythmia. For these ongoing medical issues, rehabilitation services could not be safely provided at a lower level of care such as a skilled nursing facility or nursing home.         Signed By: Elaine Gonzales MD     November 29, 2017

## 2017-11-30 NOTE — PROGRESS NOTES
Problem: Falls - Risk of  Goal: *Absence of Falls  Document Harrison Fall Risk and appropriate interventions in the flowsheet.    Outcome: Progressing Towards Goal  Fall Risk Interventions:  Mobility Interventions: OT consult for ADLs, Patient to call before getting OOB, Utilize walker, cane, or other assitive device         Medication Interventions: Patient to call before getting OOB    Elimination Interventions: Call light in reach, Patient to call for help with toileting needs, Toileting schedule/hourly rounds

## 2017-11-30 NOTE — PROGRESS NOTES
Patient has continued PD catheter dysfunction despite having BMs. last yesterday -   Her PD catheter curl was not in the pelvis on last imaging and clinically is still malpositioned -  Due to inability to exercise it is not able to move into the proper position -   - plan for conversion of temp catheter to tunnelled cathetee (she does not wish a upper central line)  - she will be able to exercise, ambulate with the femoral central line and hopefully the PD catheter curl willl move into the pelvis, otherwise will need surgical assistance for laparoscopic PD catheter repositioning  - patient agrees with plans

## 2017-11-30 NOTE — PROGRESS NOTES
End Of Shift Functional Summary, Nursing      TOILETING:  Does patient need assist with clothing management and/or pericare? Yes: Comment: CGA    TOILET TRANSFER:  Pt requires moderate assistance. Pt uses walker. BLADDER:  Pt does not have a castillo catheter that staff manages. Pt does not take medication. Pt is anuric. Pt requires peritoneal dialysis. (An accident is when the episode is not contained in a brief AND/OR the clothing/linen requires changing/cleaning up.)    BOWEL:  Pt does not take medication. Pt is continent of bowel and uses toilet. Pt requires staff for CGA. Pt has had 0 bowel accidents or BMs during this shift. (An accident is when the episode is not contained in a brief AND/OR the clothing/linen requires changing/cleaning up.)    BED/CHAIR TRANSFER  Pt requires moderate assistance. Patient requires the assistance of 1 staff member(s). Pt uses a walker. Documentation reviewed and plan of care discussed/reviewed with   patient, oncoming nurse and patient assistant during the shift.

## 2017-11-30 NOTE — PROGRESS NOTES
Care Management Interventions  Transition of Care Consult (CM Consult): Discharge Planning  Discharge Durable Medical Equipment: Yes (shower chair and walker)  Physical Therapy Consult: Yes  Occupational Therapy Consult: Yes  Current Support Network: Lives Alone, Own Home, Other (neighbors and Taoism friends, 2 sons)  Confirm Follow Up Transport: Other (see comment) (Uses transportation through [employer])  Plan discussed with Pt/Family/Caregiver: Yes  Freedom of Choice Offered: Yes  Discharge Location  Discharge Placement: Home with family assistance (continued therapy)    Pt admitted to rehab from [facility] - debility. Has been living at home alone. She receives services from [employer], [employer] (housekeeping) and [employer] (transportation). She had two sons, one living in the area, Amina Ruiz and Tova Steve who lives in [place]. Pt lives in a one story home with a ramp. She has Medicare, a supplemental with RX coverage. PT has a shower chair and rolling walker. Pt has breakfast delivered daily. Would like all meals to be delivered or prepared for her, however not interested in MOW. Will follow for dc planning and case management needs.

## 2017-11-30 NOTE — PROGRESS NOTES
Upon discussion between therapy supervisor & nephrologist, it was determined that pt. Has a L temporary femoral catheter for HD access w/ L hip ROM restrictions. Therefore, will hold PT until pt. Is able to tolerate hip flexion in order to safely mobilize.

## 2017-11-30 NOTE — CONSULTS
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 She has a history of GI bleeding and had anemia-induced unstable angina in the past. 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 She states the pain has since resolved with NTG paste.  No fevers or chills. No nausea, vomiting or diarrhea.  She states that she is essentially anuric and occasionally makes minimal urine.  She has a h/o CVA and TIA. No fever or chills, no problems with PD drainage or discoloration of PD fluid. No increased thirst. She wishes regular diet and supplement. She denies any other acute complaints  Her edema has improved in the past couple of days with intensified dialysis. s- no complaints . seen in rehab today , complains of fullness and discomfort after completing pd with retention of fluid   Past Medical History:   Diagnosis Date    Anemia of chronic renal failure 4/28/2015    Anterior myocardial infarction [facility]) 5/21/2015    CAD (coronary artery disease)     Chest pain     Chronic kidney disease, stage III (moderate) 8/15/2014    on dialysis    CKD (chronic kidney disease) stage 4, GFR 15-29 ml/min ([facility]) 4/28/2015    Debility 5/5/2015    Depression 12/29/2015    Edema 12/29/2015    Endocrine disease     Hypothyroidism    GERD (gastroesophageal reflux disease)     Heart murmur 12/29/2015    HLD (hyperlipidemia) 12/29/2015    Hypertension     Hypothyroidism 4/28/2015    Ischemic cardiomyopathy 12/29/2015    Nausea     S/P PTCA (percutaneous transluminal coronary angioplasty); LAD PTCA of  ISR 11/27/15 5/24/2016    STEMI (ST elevation myocardial infarction) ([facility] [address] 4/28/2015    Unspecified sleep apnea uses cpap machine    Unstable angina ([facility] [address]       Past Surgical History:   Procedure Laterality Date    HX BACK SURGERY  1990    neck surgery cervical disc    HX BACK SURGERY      lower back    HX CATARACT REMOVAL Bilateral     HX CHOLECYSTECTOMY  19702    gall bladder     HX KNEE REPLACEMENT Right 2006    HX PTCA  4/28/2015    2.25 Xience stent to mid LAD for occluded artery, anterior MI, EF 25%. Moderate disease distal LAD and distal OM PCI CX and RCA 2004, then PCI RCA and LAD in 2009. Prior to Admission medications    Medication Sig Start Date End Date Taking? Authorizing Provider   metoprolol tartrate (LOPRESSOR) 25 mg tablet Take 0.5 Tabs by mouth daily. 11/27/17  Yes MINDY Chatman   amLODIPine (NORVASC) 5 mg tablet Take 1 Tab by mouth daily. 11/27/17  Yes MINDY Chatman   torsemide (DEMADEX) 100 mg tablet Take 1 Tab by mouth two (2) times a day. 11/27/17  Yes MINDY Chatman   pantoprazole (PROTONIX) 40 mg tablet Take 1 Tab by mouth Before breakfast and dinner. 11/27/17  Yes MINDY Chatman   magnesium oxide (MAG-OX) 400 mg tablet Take 400 mg by mouth daily. Yes Historical Provider   metoclopramide HCl (REGLAN) 5 mg tablet Take 5 mg by mouth two (2) times a day. Yes Historical Provider   ticagrelor (BRILINTA) 90 mg tablet Take 1 Tab by mouth two (2) times a day. 2/4/16  Yes MINDY Chatman   aspirin delayed-release 81 mg tablet Take 1 Tab by mouth daily. 5/5/15  Yes Gisela Hollingsworth PA-C   rosuvastatin (CRESTOR) 20 mg tablet Take 20 mg by mouth nightly. Yes Historical Provider   levothyroxine (SYNTHROID) 150 mcg tablet Take 150 mcg by mouth Daily (before breakfast). Yes Historical Provider   cyanocobalamin (VITAMIN B12) 1,000 mcg/mL injection 1 mL by IntraMUSCular route every seven (7) days. Indications: Vitamin B12 Deficiency 12/2/17   MINDY Thibodeaux   folic acid (FOLVITE) 1 mg tablet Take 1 mg by mouth daily.     Historical Provider potassium chloride (K-DUR, KLOR-CON) 20 mEq tablet Take 20 mEq by mouth two (2) times a day. Historical Provider   traZODone (DESYREL) 50 mg tablet Take  by mouth nightly. Historical Provider   megestrol (MEGACE) 400 mg/10 mL (40 mg/mL) suspension Take 200 mg by mouth daily. Historical Provider   sucralfate (CARAFATE) 100 mg/mL suspension Take 10 mL by mouth Before breakfast, lunch, dinner and at bedtime. Indications: PREVENTION OF STRESS ULCER 9/23/17   Charis Pete DO   nitroglycerin (NITROLINGUAL) 400 mcg/spray spray 1 Spray by SubLINGual route every five (5) minutes as needed for Chest Pain. Historical Provider   linaclotide Magalys Pill) 145 mcg cap capsule Take  by mouth Daily (before breakfast). Historical Provider   PNV NO.122/IRON/FOLIC ACID (PRENATAL MULTI PO) Take  by mouth. Historical Provider   ondansetron (ZOFRAN ODT) 4 mg disintegrating tablet Take 4 mg by mouth three (3) times daily as needed. Historical Provider   sevelamer carbonate (RENVELA) 800 mg tab tab Take 800 mg by mouth three (3) times daily (with meals). Historical Provider   gentamicin (GARAMYCIN) 0.1 % topical cream APPLY TO PD catheter exit site at daily dressing change 5/12/15   Gucci Arauz MD   darbepoetin toya in polysorbat (ARANESP, POLYSORBATE,) 40 mcg/mL injection 40 mcg by SubCUTAneous route every fourteen (14) days. Indications: ANEMIA IN CHRONIC KIDNEY DISEASE    Historical Provider   ranolazine ER (RANEXA) 500 mg SR tablet Take 500 mg by mouth two (2) times a day.     Historical Provider     Allergies   Allergen Reactions    Codeine Nausea and Vomiting      Social History   Substance Use Topics    Smoking status: Never Smoker    Smokeless tobacco: Never Used    Alcohol use No      Family History   Problem Relation Age of Onset    Heart Disease Mother     Hypertension Mother     Cancer Mother      Lung    Stroke Father     Hypertension Father     Breast Cancer Neg Hx Review of Systems    Constitutional: no fever, weak  Eyes: fair vision,    Ears, nose, mouth, throat, and face:fair hearing,   Respiratory: no asthma,  Chronic home O2 is being used - improved dyspnea  Cardiovascular:no palpitation, no  chest pain,   Gastrointestinal:no diarrhea,  Had BM today  Genitourinary: no dysuria,   Hematologic/lymphatic: no bleeding tendency,   Neurological: no seizures   Behvioral/Psych: no psych hospitalization   Endocrine: no goiter,       Objective:       Visit Vitals    /73 (BP 1 Location: Right arm, BP Patient Position: At rest)    Pulse 75    Temp 98.2 °F (36.8 °C)    Resp 18    SpO2 98%       General:  Alert, cooperative, no distress, appears stated age. Head:  Normocephalic, without obvious abnormality, atraumatic. Eyes:  Conjunctivae/corneas clear. EOMs intact. Throat: Lips, mucosa, and tongue normal. Teeth and gums normal.   Neck: Supple, symmetrical, trachea midline, no adenopathy,  no JVD. Lungs:   Clear to auscultation bilaterally. Heart:  Regular rate and rhythm, S1, S2 normal, 2/6 systolic  murmur, no rub or gallop. Abdomen:   Soft, non-tender. Distended . No masses,  No organomegaly. PD catheter in place without exudate   Extremities: Extremities normal, atraumatic, no cyanosis. 3+edema. Skin: Skin color, texture, turgor normal. No rashes or lesions. Lymph nodes: Cervical and supraclavicular nodes normal.   Neurologic: Grossly intact. No asterixis.          Data Review:       Recent Results (from the past 24 hour(s))   GLUCOSE, POC    Collection Time: 11/29/17  3:56 PM   Result Value Ref Range    Glucose (POC) 81 65 - 100 mg/dL   CBC WITH AUTOMATED DIFF    Collection Time: 11/30/17  6:15 AM   Result Value Ref Range    WBC 7.8 4.3 - 11.1 K/uL    RBC 3.16 (L) 4.05 - 5.25 M/uL    HGB 9.3 (L) 11.7 - 15.4 g/dL    HCT 29.7 (L) 35.8 - 46.3 %    MCV 94.0 79.6 - 97.8 FL    MCH 29.4 26.1 - 32.9 PG    MCHC 31.3 (L) 31.4 - 35.0 g/dL    RDW 18.8 (H) 11.9 - 14.6 %    PLATELET 906 322 - 498 K/uL    MPV 9.2 (L) 10.8 - 14.1 FL    DF AUTOMATED      NEUTROPHILS 77 43 - 78 %    LYMPHOCYTES 10 (L) 13 - 44 %    MONOCYTES 10 4.0 - 12.0 %    EOSINOPHILS 3 0.5 - 7.8 %    BASOPHILS 0 0.0 - 2.0 %    IMMATURE GRANULOCYTES 0 0.0 - 5.0 %    ABS. NEUTROPHILS 5.9 1.7 - 8.2 K/UL    ABS. LYMPHOCYTES 0.8 0.5 - 4.6 K/UL    ABS. MONOCYTES 0.8 0.1 - 1.3 K/UL    ABS. EOSINOPHILS 0.2 0.0 - 0.8 K/UL    ABS. BASOPHILS 0.0 0.0 - 0.2 K/UL    ABS. IMM. GRANS. 0.0 0.0 - 0.5 K/UL   METABOLIC PANEL, BASIC    Collection Time: 11/30/17  6:15 AM   Result Value Ref Range    Sodium 138 136 - 145 mmol/L    Potassium 3.4 (L) 3.5 - 5.1 mmol/L    Chloride 100 98 - 107 mmol/L    CO2 28 21 - 32 mmol/L    Anion gap 10 7 - 16 mmol/L    Glucose 112 (H) 65 - 100 mg/dL    BUN 28 (H) 8 - 23 MG/DL    Creatinine 5.77 (H) 0.6 - 1.0 MG/DL    GFR est AA 9 (L) >60 ml/min/1.73m2    GFR est non-AA 7 (L) >60 ml/min/1.73m2    Calcium 9.0 8.3 - 10.4 MG/DL       CXR viewed by me - no major infiltrate or fluid excess, CM with left possible minor effusion is present        CT abdomen/pelvis  CT ABDOMEN:  Peritoneal fluid in the perihepatic space, mild paracolic gutters,  extending down into the pelvis. Peritoneal dialysis catheter noted. There is not  any evidence of retroperitoneal hematoma identified. Strandy fluid density does  extend into the extraperitoneal space in the lower abdomen and pelvis. There is  radiodensity within the renal collecting systems, possibly previously  administered IV contrast. There is peripancreatic fluid and stranding, cannot  exclude acute pancreatitis. No biliary dilatation. Spleen is not enlarged. Small  bowel normal caliber.   CT PELVIS:   Moderate to large volume pelvic fluid.   There is diffuse asymmetric enlargement of the right rectus and oblique  abdominal muscles.  There are are of higher attenuation the contralateral side  and findings are consistent with an abdominal wall hematoma. IMPRESSION:    1. Evidence of right abdominal wall hematoma. 2. No CT evidence to suggest retroperitoneal hematoma. 3. Extensive fluid in the abdomen and pelvis, presumed related to peritoneal  dialysis. Active Problems:    Weakness of both legs (11/29/2017)      Renal failure (ARF), acute on chronic (HCC) (11/29/2017)        Assessment:     1. CAD x NSTEMI, Unstable angina -  - LHC with complex CAD and acute thrombotic lesion in pLAD and subtotal occlusion in mLAD. The RCA has severe proximal and mid RCA disease. She has additional stenting of LAD with Resolute in mid/distal and proximal vessel. These all touched the previously stented mid vessel. Recommend life-long DAPT    2, ESRD -  - trail of PD went on well , but last night PD was ineffective secondary to retention of fluid   - planned to convert emp hd line to perm cath in am by Dr Aguila Wang   -Will do hd in am through perm cath     3. Fluid excess -  - recurrent due to poor PD drainage    4. Anemia -  - intensive anemia therapy in [employer]  - on WAYNE and IV iron and B12  - improved S/P  transfusion of PRBC    5. History of GI bleed -  - monitor clinically and serial Hb  - she had a drop in Hb to 7 range and improved with BT    6. Hypokalemia   - resolved     7.  Abdominal pain and tenderness with drop in hb , on DAPT   No evidence of retroperitoneal hematoma       Plan:     As above - 25

## 2017-11-30 NOTE — PROGRESS NOTES
OT Daily Note    Time In 3380   Time Out 1115     *Per conversation between Nephrology and therapy supervisor at 1200 today, 17, it was determined that patient has a left temporary femoral catheter for dialysis, so OOB activity is restricted at this time. Will hold OOB therapy until patient is cleared for functional mobility. *    Patient encountered this session seated in recliner, note no functional mobility completed    Subjective: \"There's a cramping [distal to R shoulder] when I get it to here [ ~90 degrees shoulder flexion]. \"  Pain: 5/10 with R shoulder flexion at 90 degrees, patient reports pain resolves with AROM/stretching exercise    Precautions: Other (comment) (Fall Risk)    Self-Care   Patient completed medication management task seated in recliner at tray table for cognitive/BUE strength and [facility] task. Patient sorted 4/4 medications into pillbox organizers with 100% accuracy provided opportunity to self correct, note minimal loss of grasp on beads (simulating pills). Patient completed [facility] task to replace beads into appropriate pill bottles upon completion of task. Patient completes self-AAROM, LUE supporting RUE, with fatigue to manage beads and bottles using R dominant hand. Strengthening/AROM   Patient completed 1 set x 10 reps of AAROM (LUE supporting RUE with fingers laced) BUE therapeutic exercises seated in recliner. Patient completed shoulder flexion and chest press exercises before time  this session provided increased time and cues for technique. Assessment: Patient completed medication management simulation with 100% accuracy. Muscular cramping noted distal to R shoulder, resolves with AAROM exercise/stretch. Education: Etiology of pain near shoulder, purpose of AAROM exercise/stretching  Interdisciplinary Communication: Collaborated with Issa Ahuja and therapy supervisor, Manohar Alvarez, regarding patient's status and functional mobility restrictions.   See above for details.     Plan: Continue to address ADL/IADL, functional mobility, activity tolerance, balance, strengthening, coordination, education, cognition as patient is cleared for therapies by MD. Liza Dewitt OTR/L English

## 2017-11-30 NOTE — PROGRESS NOTES
Problem: Falls - Risk of  Goal: *Absence of Falls  Document [place] Fall Risk and appropriate interventions in the flowsheet.    Outcome: Progressing Towards Goal  Fall Risk Interventions:  Mobility Interventions: Utilize walker, cane, or other assitive device         Medication Interventions: Patient to call before getting OOB    Elimination Interventions: Call light in reach

## 2017-11-30 NOTE — PROGRESS NOTES
Pt with R femoral dress pulled back. Pt states it was stuck to the covers. Sterile dressing change done.

## 2017-11-30 NOTE — PROGRESS NOTES
MD Claire,   Medical Director  [address] [facility], [address] [facility]  Tel: [phone]       SFD PROGRESS NOTE    Norbert Offer  Admit Date: 11/29/2017  Admit Diagnosis: DEBILITY; Renal failure (ARF), acute on chronic ([facility] [address] Rogelio Panda*  Chief Complaint : Gait dysfunction secondary to below. Admit Diagnosis: Unstable angina ([facility] [address]  CAD (coronary artery disease) (11/27/2015)  S/P complex PCI and stable on ASA and Brilinta  Unstable angina ([facility] [address] (2/1/2016)  Acute/ Chronic anemia (11/18/2017)  ESRD (end stage renal disease) On PD/ CRRT  Pain  DVT risk  Acute Rehab Dx:  Debility    Weakness  Gait impairment  deconditioning  Mobility and ambulation deficits  Self Care/ADL deficits     Subjective     Patient seen and examined. Vss. No acute complaints. PT, OT well tolerated. however still PD cath not well functioning. Also noted drainage for femoral catheter. Will hold PT due to possible complication with femoral cath. Nephrologist aware per our rehab supervisor.      Objective:     Current Facility-Administered Medications   Medication Dose Route Frequency    acetaminophen (TYLENOL) tablet 650 mg  650 mg Oral Q4H PRN    HYDROcodone-acetaminophen (NORCO) 7.5-325 mg per tablet 1 Tab  1 Tab Oral Q4H PRN    HYDROcodone-acetaminophen (NORCO) 5-325 mg per tablet 1 Tab  1 Tab Oral Q4H PRN    LORazepam (ATIVAN) tablet 1 mg  1 mg Oral Q6H PRN    sodium chloride (NS) flush 5-10 mL  5-10 mL IntraVENous Q8H    sodium chloride (NS) flush 5-10 mL  5-10 mL IntraVENous PRN    amLODIPine (NORVASC) tablet 5 mg  5 mg Oral DAILY    aspirin delayed-release tablet 81 mg  81 mg Oral DAILY    calcitRIOL (ROCALTROL) capsule 0.25 mcg  0.25 mcg Oral DAILY    [START ON 12/2/2017] cyanocobalamin (VITAMIN B12) injection 1,000 mcg  1,000 mcg IntraMUSCular Q7D    epoetin toya (EPOGEN;PROCRIT) injection 20,000 Units  20,000 Units SubCUTAneous Q MON, WED & FRI    gentamicin (GARAMYCIN) 0.1 % cream   Topical DAILY PRN    levothyroxine (SYNTHROID) tablet 150 mcg  150 mcg Oral ACB    linaclotide (LINZESS) capsule 145 mcg (Patient Supplied)  145 mcg Oral DAILY    magnesium oxide (MAG-OX) tablet 400 mg  400 mg Oral DAILY    metoclopramide HCl (REGLAN) tablet 5 mg  5 mg Oral BID    metoprolol tartrate (LOPRESSOR) tablet 12.5 mg  12.5 mg Oral DAILY    multivitamin w ZN (STRESSTABS W ZINC) tablet  1 Tab Oral DAILY    nitroglycerin (NITROSTAT) tablet 0.4 mg  0.4 mg SubLINGual PRN    ondansetron (ZOFRAN ODT) tablet 4 mg  4 mg Oral TID PRN    pantoprazole (PROTONIX) tablet 40 mg  40 mg Oral ACB&D    polyethylene glycol (MIRALAX) packet 17 g  17 g Oral DAILY PRN    promethazine (PHENERGAN) tablet 25 mg  25 mg Oral Q6H PRN    ranolazine ER (RANEXA) tablet 1,000 mg  1,000 mg Oral BID    rosuvastatin (CRESTOR) tablet 20 mg  20 mg Oral QHS    senna-docusate (PERICOLACE) 8.6-50 mg per tablet 1 Tab  1 Tab Oral DAILY    sevelamer (RENAGEL) tablet 800 mg  800 mg Oral TID WITH MEALS    sucralfate (CARAFATE) 100 mg/mL oral suspension 1 g  1 g Oral AC&HS    ticagrelor (BRILINTA) tablet 90 mg  90 mg Oral BID    torsemide (DEMADEX) tablet 100 mg  100 mg Oral BID     Review of Systems:   Denies chest pain, shortness of breath, cough, headache, visual problems, abdominal pain, dysurea, calf pain. Pertinent positives are as noted in the medical records and unremarkable otherwise. Visit Vitals    /73 (BP 1 Location: Right arm, BP Patient Position: At rest)    Pulse 75    Temp 98.2 °F (36.8 °C)    Resp 18    SpO2 98%        Physical Exam:   General: Alert and age appropriately oriented. No acute cardio respiratory distress. HEENT: Normocephalic,no scleral icterus  Oral mucosa moist without cyanosis   Lungs: Clear to auscultation  bilaterally. Respiration even and unlabored   Heart: Regular rate and rhythm, S1, S2   No  murmurs, clicks, rub or gallops   Abdomen: Soft, non-tender, nondistended. Bowel sounds present. No organomegaly. Genitourinary: Benign . Neuromuscular:      Grossly no focal motor deficits noted. Moves ankles. Ankle dorsiflexion 5/5  Ankle plantarflexion 5/5  No sensory deficits distally. Exam limited by pain. Skin/extremity: No rashes, no erythema. No calf tenderness BLE  Wound covered. Functional Assessment:                                Estee Cisse Fall Risk Assessment:  Estee Cisse Fall Risk  Mobility: Ambulates or transfers with assist devices or assistance/unsteady gait (11/30/17 0727)  Mobility Interventions: OT consult for ADLs; Patient to call before getting OOB;Utilize walker, cane, or other assitive device (11/30/17 0727)  Mentation: Alert, oriented x 3 (11/30/17 0727)  Medication: Patient receiving anticonvulsants, sedatives(tranquilizers), psychotropics or hypnotics, hypoglycemics, narcotics, sleep aids, antihypertensives, laxatives, or diuretics (11/30/17 0727)  Medication Interventions: Patient to call before getting OOB (11/30/17 7699)  Elimination: Needs assistance with toileting (11/30/17 0727)  Elimination Interventions: Call light in reach; Patient to call for help with toileting needs; Toileting schedule/hourly rounds (11/30/17 0727)  Prior Fall History: No (11/30/17 0727)  Total Score: 3 (11/30/17 0727)  High Fall Risk: Yes (11/30/17 0727)     Speech Assessment:         Ambulation:        Labs/Studies:  Recent Results (from the past 72 hour(s))   CBC W/O DIFF    Collection Time: 11/28/17  5:28 AM   Result Value Ref Range    WBC 9.4 4.3 - 11.1 K/uL    RBC 3.08 (L) 4.05 - 5.25 M/uL    HGB 9.3 (L) 11.7 - 15.4 g/dL    HCT 29.1 (L) 35.8 - 46.3 %    MCV 94.5 79.6 - 97.8 FL    MCH 30.2 26.1 - 32.9 PG    MCHC 32.0 31.4 - 35.0 g/dL    RDW 19.5 (H) 11.9 - 14.6 %    PLATELET 637 041 - 644 K/uL    MPV 9.7 (L) 10.8 - 38.3 FL   METABOLIC PANEL, BASIC    Collection Time: 11/28/17  5:28 AM   Result Value Ref Range    Sodium 137 136 - 145 mmol/L    Potassium 4.0 3.5 - 5.1 mmol/L    Chloride 100 98 - 107 mmol/L    CO2 28 21 - 32 mmol/L    Anion gap 9 7 - 16 mmol/L    Glucose 87 65 - 100 mg/dL    BUN 26 (H) 8 - 23 MG/DL    Creatinine 5.06 (H) 0.6 - 1.0 MG/DL    GFR est AA 10 (L) >60 ml/min/1.73m2    GFR est non-AA 9 (L) >60 ml/min/1.73m2    Calcium 9.3 8.3 - 10.4 MG/DL   CBC W/O DIFF    Collection Time: 11/29/17  6:02 AM   Result Value Ref Range    WBC 8.9 4.3 - 11.1 K/uL    RBC 3.47 (L) 4.05 - 5.25 M/uL    HGB 10.4 (L) 11.7 - 15.4 g/dL    HCT 32.7 (L) 35.8 - 46.3 %    MCV 94.2 79.6 - 97.8 FL    MCH 30.0 26.1 - 32.9 PG    MCHC 31.8 31.4 - 35.0 g/dL    RDW 19.3 (H) 11.9 - 14.6 %    PLATELET 597 010 - 243 K/uL    MPV 9.2 (L) 10.8 - 00.1 FL   METABOLIC PANEL, BASIC    Collection Time: 11/29/17  6:02 AM   Result Value Ref Range    Sodium 136 136 - 145 mmol/L    Potassium 3.8 3.5 - 5.1 mmol/L    Chloride 97 (L) 98 - 107 mmol/L    CO2 31 21 - 32 mmol/L    Anion gap 8 7 - 16 mmol/L    Glucose 84 65 - 100 mg/dL    BUN 25 (H) 8 - 23 MG/DL    Creatinine 5.00 (H) 0.6 - 1.0 MG/DL    GFR est AA 11 (L) >60 ml/min/1.73m2    GFR est non-AA 9 (L) >60 ml/min/1.73m2    Calcium 9.1 8.3 - 10.4 MG/DL   MAGNESIUM    Collection Time: 11/29/17  6:02 AM   Result Value Ref Range    Magnesium 2.1 1.8 - 2.4 mg/dL   GLUCOSE, POC    Collection Time: 11/29/17  3:56 PM   Result Value Ref Range    Glucose (POC) 81 65 - 100 mg/dL   CBC WITH AUTOMATED DIFF    Collection Time: 11/30/17  6:15 AM   Result Value Ref Range    WBC 7.8 4.3 - 11.1 K/uL    RBC 3.16 (L) 4.05 - 5.25 M/uL    HGB 9.3 (L) 11.7 - 15.4 g/dL    HCT 29.7 (L) 35.8 - 46.3 %    MCV 94.0 79.6 - 97.8 FL    MCH 29.4 26.1 - 32.9 PG    MCHC 31.3 (L) 31.4 - 35.0 g/dL    RDW 18.8 (H) 11.9 - 14.6 %    PLATELET 000 695 - 844 K/uL    MPV 9.2 (L) 10.8 - 14.1 FL    DF AUTOMATED      NEUTROPHILS 77 43 - 78 %    LYMPHOCYTES 10 (L) 13 - 44 %    MONOCYTES 10 4.0 - 12.0 %    EOSINOPHILS 3 0.5 - 7.8 % BASOPHILS 0 0.0 - 2.0 %    IMMATURE GRANULOCYTES 0 0.0 - 5.0 %    ABS. NEUTROPHILS 5.9 1.7 - 8.2 K/UL    ABS. LYMPHOCYTES 0.8 0.5 - 4.6 K/UL    ABS. MONOCYTES 0.8 0.1 - 1.3 K/UL    ABS. EOSINOPHILS 0.2 0.0 - 0.8 K/UL    ABS. BASOPHILS 0.0 0.0 - 0.2 K/UL    ABS. IMM.  GRANS. 0.0 0.0 - 0.5 K/UL   METABOLIC PANEL, BASIC    Collection Time: 11/30/17  6:15 AM   Result Value Ref Range    Sodium 138 136 - 145 mmol/L    Potassium 3.4 (L) 3.5 - 5.1 mmol/L    Chloride 100 98 - 107 mmol/L    CO2 28 21 - 32 mmol/L    Anion gap 10 7 - 16 mmol/L    Glucose 112 (H) 65 - 100 mg/dL    BUN 28 (H) 8 - 23 MG/DL    Creatinine 5.77 (H) 0.6 - 1.0 MG/DL    GFR est AA 9 (L) >60 ml/min/1.73m2    GFR est non-AA 7 (L) >60 ml/min/1.73m2    Calcium 9.0 8.3 - 10.4 MG/DL       Assessment:     Problem List as of 11/30/2017  Date Reviewed: 3/2/2017          Codes Class Noted - Resolved    Weakness of both legs ICD-10-CM: R29.898  ICD-9-CM: 729.89  11/29/2017 - Present        Renal failure (ARF), acute on chronic ([facility] ICD-10-CM: N17.9, N18.9  ICD-9-CM: 584.9, 585.9  11/29/2017 - Present        Elevated troponin ICD-10-CM: R74.8  ICD-9-CM: 790.6  11/18/2017 - Present        Chest pain ICD-10-CM: R07.9  ICD-9-CM: 786.50  11/18/2017 - Present        CKD (chronic kidney disease) ICD-10-CM: N18.9  ICD-9-CM: 585.9  11/18/2017 - Present        Chronic anemia ICD-10-CM: D64.9  ICD-9-CM: 285.9  11/18/2017 - Present        ESRD (end stage renal disease) ([facility] [address] ICD-10-CM: N18.6  ICD-9-CM: 585.6  11/18/2017 - Present        Abdominal pain ICD-10-CM: R10.9  ICD-9-CM: 789.00  9/18/2017 - Present        H/O TIA (transient ischemic attack) and stroke ICD-10-CM: Z86.73  ICD-9-CM: V12.54  4/13/2017 - Present        S/P PTCA (percutaneous transluminal coronary angioplasty) ICD-10-CM: Z98.61  ICD-9-CM: V45.82  4/13/2017 - Present        Mixed hyperlipidemia ICD-10-CM: E78.2  ICD-9-CM: 272.2  4/13/2017 - Present        Vision changes ICD-10-CM: H53.9  ICD-9-CM: 368.9 3/26/2017 - Present        Dizziness ICD-10-CM: R42  ICD-9-CM: 780.4  3/26/2017 - Present        Refusal of blood transfusions as patient is [employer] (Chronic) ICD-10-CM: Z53.1  ICD-9-CM: V62.6  8/25/2016 - Present        GERD (gastroesophageal reflux disease) ICD-10-CM: K21.9  ICD-9-CM: 530.81  8/8/2016 - Present        Unspecified sleep apnea ICD-10-CM: G47.30  ICD-9-CM: 780.57  8/8/2016 - Present    Overview Signed 8/8/2016 11:09 AM by Sridhar Guerrero     uses cpap machine             CVA (cerebral vascular accident) Hx of TIA ICD-10-CM: I63.9  ICD-9-CM: 434.91  2/22/2016 - Present        Unstable angina ([address] ICD-10-CM: I20.0  ICD-9-CM: 411.1  2/1/2016 - Present        ESRD on peritoneal dialysis [facility]) (Chronic) ICD-10-CM: N18.6, Z99.2  ICD-9-CM: 585.6, V45.11  2/1/2016 - Present        Ischemic cardiomyopathy ICD-10-CM: I25.5  ICD-9-CM: 414.8  12/29/2015 - Present        HLD (hyperlipidemia) ICD-10-CM: E78.5  ICD-9-CM: 272.4  12/29/2015 - Present        Depression ICD-10-CM: F32.9  ICD-9-CM: 867  12/29/2015 - Present        CAD (coronary artery disease) ICD-10-CM: I25.10  ICD-9-CM: 414.00  11/27/2015 - Present        Debility ICD-10-CM: R53.81  ICD-9-CM: 799.3  5/5/2015 - Present        Hypertension (Chronic) ICD-10-CM: I10  ICD-9-CM: 401.9  4/28/2015 - Present        Anemia of chronic renal failure (Chronic) ICD-10-CM: N18.9, D63.1  ICD-9-CM: 285.21, 585.9  4/28/2015 - Present        Hypothyroidism (Chronic) ICD-10-CM: E03.9  ICD-9-CM: 244.9  4/28/2015 - Present        RESOLVED: Postural hypotension ICD-10-CM: I95.1  ICD-9-CM: 458.0  8/9/2016 - 3/26/2017        RESOLVED: Chest pain ICD-10-CM: R07.9  ICD-9-CM: 786.50  8/8/2016 - 3/26/2017        RESOLVED: Nausea ICD-10-CM: R11.0  ICD-9-CM: 787.02  8/8/2016 - 3/26/2017        RESOLVED: S/P PTCA (percutaneous transluminal coronary angioplasty); LAD PTCA of  ISR 11/27/15 ICD-10-CM: Z98.61  ICD-9-CM: V45.82  5/24/2016 - 3/26/2017        RESOLVED: TIA (transient ischemic attack) ICD-10-CM: G45.9  ICD-9-CM: 435.9  2/10/2016 - 3/26/2017        RESOLVED: Edema ICD-10-CM: R60.9  ICD-9-CM: 782.3  12/29/2015 - 3/26/2017        RESOLVED: Anterior myocardial infarction [facility]) ICD-10-CM: I21.09  ICD-9-CM: 410.10  5/21/2015 - 3/26/2017        RESOLVED: STEMI (ST elevation myocardial infarction) ([facility] [address] ICD-10-CM: I21.3  ICD-9-CM: 410.90  4/28/2015 - 2/1/2016              Plan:      CAD (coronary artery disease) (11/27/2015)/ S/P complex PCI / Unstable angina ([facility] [address] (2/1/2016)  - on ASA and Brilinta  -hypertension - continue norvasc, lopressor  - continue ranexa, statin  - demadex continued.      Acute/ Chronic anemia (11/18/2017) - continue to monitor.   - conitnue epogen     ESRD (end stage renal disease) On PD/ CRRT  - PD resumed. - monitor fluids status. Nephrology managing. Will follow at IRU.   - 11/30 problem with PD/ catheter. Nephrologist aware. Will need to hold PT due to femoral cath precautions. nephrologist to follow. May need to have tunneled cath.      Pneumonia prophylaxis- Insentive spirometer every hour while awake     DVT risk / DVT Prophylaxis- Will require daily physician exam to assess for signs and symptoms as patient is at increased risk for of thromboembolism. Mobilization as tolerated. Intermittent pneumatic compression devices when in bed Thigh-high or knee-high thromboembolic deterrent hose when out of bed. Lovenox subq daily.      Pain Control: stable, mild-to-moderate joint symptoms intermittently, reasonably well controlled by PRN meds. Will require regular pain assessment and comprenhensive pain management,      Wound Care: Monitor wound status daily per staff and physician. At risk for failure. Will require 24/7 rehab nursing. Keep wound clean and dry      bowel program - as needed     GERD - resume PPI.  At times may need additional antacids, Maalox prn.  - continue sucralfate     Hypothyroid  - synthroid.     Time spent was 25 minutes with over 1/2 in direct patient care/examination, consultation and coordination of care.      Signed By: Alfredo Ornelas MD     November 30, 2017

## 2017-11-30 NOTE — PROGRESS NOTES
End Of Shift Functional Summary, Nursing      TOILETING:  Does patient need assist with clothing management and/or pericare? Yes: Comment: pull pants up and down    TOILET TRANSFER:  Pt requires moderate assistance. Pt uses walker. BLADDER:  Patient has anuria on dialysis. BOWEL:  Pt does take medication. Pt is continent of bowel and uses toilet. Pt requires staff to position device    Pt has had 0 bowel accidents during this shift requiring moderate assistance from staff to clean up. (An accident is when the episode is not contained in a brief AND/OR the clothing/linen requires changing/cleaning up.)    BED/CHAIR TRANSFER  Pt requires moderate assistance. Patient requires the assistance of 1 staff member(s). Pt uses walker    EATING  Pt requires setup. Documentation reviewed and plan of care discussed/reviewed with   therapists, oncoming nurse and patient assistant during the shift.

## 2017-11-30 NOTE — PROGRESS NOTES
[facility] OCCUPATIONAL THERAPY INITIAL EVALUATION    Time In: 0701  Time Out: 0825  *Per conversation between Nephrology and therapy supervisor at 1200 today, 11/30/17, it was determined that patient has a left temporary femoral catheter for dialysis, so OOB activity is restricted at this time. Will hold OOB therapy until patient is cleared for functional mobility. *    Precautions: Falls    Vitals:   Patient Vitals for the past 8 hrs:   Temp Pulse Resp BP SpO2   11/30/17 0700 98.2 °F (36.8 °C) 75 18 138/73 98 %         Pain: None indicated    History of Presenting Illness (per previous reports[facility] s/p PCI to LAD and RCA 2-2016, and ESRD on PD presented to the ER on 11/18/2017 with chest pain, admitted with diagnoss of unstable angina. She underwent a successful percutaneous coronary intervention and stentings on 11/20/2017. Post procedure course was complicated by drop in hgb, down to 7.4 on 11/21.  She had no obvious signs of bleeding but CT  showed right abdominal wall hematoma. transfusion for anemia was not allowed due to pt being a Jehov[employer] patient. Patient continued to need dialysis, however due to due to problems with drainage of PD fluid, PD was held and she was transferred to CCU for CRRT.  Patient with signis of volume overload, improving with CRRT. She was noted to eventually be transfused PRBC due to her hgb being critically low. GI was consulted for possible GI bleed. Patient noted to have significant decline of her function below her baseline due to prolonged immobility and illness. Patient starting to stand, taking few steps, however shows significant functional deficits. She requires minimum to moderate assist for mobility and ambulation. Gene Kuhn is seen and examined today. Medical Records reviewed. She is normally independent at her baseline.      Past Medical History/ Co-morbidities:   Past Medical History:   Diagnosis Date    Anemia of chronic renal failure 4/28/2015    Anterior myocardial infarction [facility]) 5/21/2015    CAD (coronary artery disease)     Chest pain     Chronic kidney disease, stage III (moderate) 8/15/2014    on dialysis    CKD (chronic kidney disease) stage 4, GFR 15-29 ml/min ([facility] 4/28/2015    Debility 5/5/2015    Depression 12/29/2015    Edema 12/29/2015    Endocrine disease     Hypothyroidism    GERD (gastroesophageal reflux disease)     Heart murmur 12/29/2015    HLD (hyperlipidemia) 12/29/2015    Hypertension     Hypothyroidism 4/28/2015    Ischemic cardiomyopathy 12/29/2015    Nausea     S/P PTCA (percutaneous transluminal coronary angioplasty); LAD PTCA of  ISR 11/27/15 5/24/2016    STEMI (ST elevation myocardial infarction) ([facility] [address] [facility] 4/28/2015    Unspecified sleep apnea     uses cpap machine    Unstable angina ([facility] [address]        Patient's Goal: \"Get back to where I was. \"    Previous Level of Function: Independent with ADL/IADL however not driving PTA, managing medications and finances    [address] residence    Lives Alone Yes    Support Systems Friends \ neighbors, Family member(s) (One son local, one son from [place] (terminal cancer))    Shower Situation Shower    Current DME Shower chair, Walker, rolling    Lift Chair      Stairs to [employer] 0       Rails         W/C Ramp Yes    Interior Steps        Upper Extremity Function   AMBROCIO BRENNER   FMC  Impaired  Impaired   GMC  Intact  Intact   Light Touch No apparent deficit No apparent deficit   Sharp/Dull Discrimination       Hot/Cold       Proprioception       Stereognosis       9 Hole Peg Test Impaired (49 seconds) Impaired (54 seconds)   General Comments        AMBROCIO BRENNER   General Evalutaion       AROM       Shoulder Flexion 4 3+   Shoulder Extension        Shoulder Abduction 4- 3+   Shoulder Adduction       Elbow Flexion 4 4   Elbow Extension  3+ 4   Wrist Flexion/Extension 4-      4 4   General Comments     0/5 No palpable muscle contraction  1/5 Palpable muscle contraction, no joint movement  2-/5 Less than full range of motion in gravity eliminated position  2/5 Able to complete full range of motion in gravity eliminated position  2+/5 Able to initiate movement against gravity  3-/5 More than half but not full range of motion against gravity  3/5 Able to complete full range of motion against gravity  3+/5 Completes full range of motion against gravity with minimal resistance  4-/5 Completes full range of motion against gravity with minimal-moderate resistance  4/5 Completes full range of motion against gravity with moderate resistance  4+/5 Completes full range of motion against gravity with moderate-maximum resistance  5/5 Completes full range of motion against gravity with maximum resistance    Cognition   Performance Comments   Orientation Level Oriented X4    Comprehension Level 6 Mode: Auditory  Hearing Aid:None  Glasses:Reading glasses   Expression (Native Language) 6 Mode: Verbal  Increased time d/t dyspnea   Social Interaction/Pragmatics 6 Increased time   Problem Solving 5 Solves complex problems with cues, or basic problems 90% or more of the time. Memory 5 Recognizes, recalls, or executes 3 steps of 3 step request 90% of the time (cueing, reminders less than 10%, loses track of time) or cueing and coaxing under stressful/unfamiliar conditions.    Comments       To complete ADL evaluation as cleared by MD/Nephrology    Vision/Perception: Tracking and peripheral vision intact, however patient reports recent cataract procedure and unclear distant vision    Instrumental Activities of Daily Living   Performance Comments   Meal Preperation Maximum assistance    Homemaking Maximum assistance    Medication Management Independent    Financial Management Independent        Session: Patient seen at bed level for OT interview and evaluation of vision, cognition, UE strength/sensation, and [address] this AM, note PD running in patient's hospital room. See above for details. Per conversation between Nephrology and therapy supervisor at 1200 today, 11/30/17, it was determined that patient has a left temporary femoral catheter for dialysis, so OOB activity is restricted at this time. Will hold OOB therapy until patient is cleared for functional mobility. Interdisciplinary Communication: Collaborated with PT and nursing for current level of function, plan of care, and measures to assure safety during their stay. Updated board with current level of function to increase carryover between disciplines. Patient/Family Education: Patient was/were educated On the role of OT, On POC and On IRC expectations. Problem List: [address], Activity Tolerance, Visual Perceptual , Safety Awareness, Strength, Pain, Sitting Balance, Standing Balance and Cognition    Functional Limitations: ADL, IADL, Functional Transfers and Functional Mobility    Goals: Please see Care Plan. OT order received and chart reviewed. OT orders have been acknowledged. Patient will benefit from skilled OT services to address ADL, functional transfers, UE strength, UE coordination, balance, cognition and activity tolerance to maximize functional performance with daily self-care tasks and functional mobility. Treatment is likely to include ADL, Balance, Strength, Activity tolerance, Visual perceptual, Cognitive, DME, AE, Family  and Safety awareness training to increase independence with self-care. Patient will be seen for 1.5-2 hours of skilled OT services 5-6 days a week.      Phill Drew, OT

## 2017-12-01 ENCOUNTER — APPOINTMENT (OUTPATIENT)
Dept: GENERAL RADIOLOGY | Age: 82
DRG: 947 | End: 2017-12-01
Attending: INTERNAL MEDICINE
Payer: MEDICARE

## 2017-12-01 ENCOUNTER — APPOINTMENT (OUTPATIENT)
Dept: INTERVENTIONAL RADIOLOGY/VASCULAR | Age: 82
DRG: 947 | End: 2017-12-01
Attending: INTERNAL MEDICINE
Payer: MEDICARE

## 2017-12-01 PROCEDURE — C1894 INTRO/SHEATH, NON-LASER: HCPCS

## 2017-12-01 PROCEDURE — 74011250636 HC RX REV CODE- 250/636: Performed by: PHYSICAL MEDICINE & REHABILITATION

## 2017-12-01 PROCEDURE — 74011250636 HC RX REV CODE- 250/636: Performed by: INTERNAL MEDICINE

## 2017-12-01 PROCEDURE — 77001 FLUOROGUIDE FOR VEIN DEVICE: CPT

## 2017-12-01 PROCEDURE — C1769 GUIDE WIRE: HCPCS

## 2017-12-01 PROCEDURE — 65310000000 HC RM PRIVATE REHAB

## 2017-12-01 PROCEDURE — 74020 XR ABD (AP AND ERECT OR DECUB): CPT

## 2017-12-01 PROCEDURE — 74011000250 HC RX REV CODE- 250: Performed by: INTERNAL MEDICINE

## 2017-12-01 PROCEDURE — 77030018719 HC DRSG PTCH ANTIMIC J&J -A

## 2017-12-01 PROCEDURE — 90935 HEMODIALYSIS ONE EVALUATION: CPT

## 2017-12-01 PROCEDURE — 74011250637 HC RX REV CODE- 250/637: Performed by: PHYSICAL MEDICINE & REHABILITATION

## 2017-12-01 PROCEDURE — C1750 CATH, HEMODIALYSIS,LONG-TERM: HCPCS

## 2017-12-01 PROCEDURE — 02H633Z INSERTION OF INFUSION DEVICE INTO RIGHT ATRIUM, PERCUTANEOUS APPROACH: ICD-10-PCS | Performed by: INTERNAL MEDICINE

## 2017-12-01 PROCEDURE — 77030002916 HC SUT ETHLN J&J -A

## 2017-12-01 PROCEDURE — 5A1D70Z PERFORMANCE OF URINARY FILTRATION, INTERMITTENT, LESS THAN 6 HOURS PER DAY: ICD-10-PCS | Performed by: INTERNAL MEDICINE

## 2017-12-01 RX ORDER — LIDOCAINE HYDROCHLORIDE AND EPINEPHRINE 20; 5 MG/ML; UG/ML
2-20 INJECTION, SOLUTION EPIDURAL; INFILTRATION; INTRACAUDAL; PERINEURAL ONCE
Status: CANCELLED | OUTPATIENT
Start: 2017-12-01 | End: 2017-12-01

## 2017-12-01 RX ORDER — LIDOCAINE HYDROCHLORIDE 20 MG/ML
1-20 INJECTION, SOLUTION INFILTRATION; PERINEURAL ONCE
Status: COMPLETED | OUTPATIENT
Start: 2017-12-01 | End: 2017-12-01

## 2017-12-01 RX ORDER — LORAZEPAM 1 MG/1
1 TABLET ORAL
Status: CANCELLED | OUTPATIENT
Start: 2017-12-01

## 2017-12-01 RX ORDER — ROSUVASTATIN CALCIUM 20 MG/1
20 TABLET, COATED ORAL
Status: CANCELLED | OUTPATIENT
Start: 2017-12-01 | Stop reason: SDUPTHER

## 2017-12-01 RX ORDER — MIDAZOLAM HYDROCHLORIDE 1 MG/ML
.5-2 INJECTION, SOLUTION INTRAMUSCULAR; INTRAVENOUS
Status: DISCONTINUED | OUTPATIENT
Start: 2017-12-01 | End: 2017-12-01

## 2017-12-01 RX ORDER — HEPARIN SODIUM 1000 [USP'U]/ML
2000-6000 INJECTION, SOLUTION INTRAVENOUS; SUBCUTANEOUS ONCE
Status: COMPLETED | OUTPATIENT
Start: 2017-12-01 | End: 2017-12-01

## 2017-12-01 RX ORDER — FENTANYL CITRATE 50 UG/ML
12.5-5 INJECTION, SOLUTION INTRAMUSCULAR; INTRAVENOUS
Status: CANCELLED | OUTPATIENT
Start: 2017-12-01

## 2017-12-01 RX ORDER — CALCITRIOL 0.25 UG/1
0.25 CAPSULE ORAL DAILY
Status: CANCELLED | OUTPATIENT
Start: 2017-12-02

## 2017-12-01 RX ORDER — HYDROCODONE BITARTRATE AND ACETAMINOPHEN 7.5; 325 MG/1; MG/1
1 TABLET ORAL
Status: CANCELLED | OUTPATIENT
Start: 2017-12-01

## 2017-12-01 RX ORDER — ACETAMINOPHEN 325 MG/1
650 TABLET ORAL
Status: CANCELLED | OUTPATIENT
Start: 2017-12-01

## 2017-12-01 RX ORDER — METOCLOPRAMIDE 10 MG/1
5 TABLET ORAL 2 TIMES DAILY
Status: CANCELLED | OUTPATIENT
Start: 2017-12-01

## 2017-12-01 RX ORDER — TORSEMIDE 100 MG/1
100 TABLET ORAL 2 TIMES DAILY
Status: CANCELLED | OUTPATIENT
Start: 2017-12-01

## 2017-12-01 RX ORDER — ASPIRIN 81 MG/1
81 TABLET ORAL DAILY
Status: CANCELLED | OUTPATIENT
Start: 2017-12-02

## 2017-12-01 RX ORDER — AMLODIPINE BESYLATE 5 MG/1
5 TABLET ORAL DAILY
Status: CANCELLED | OUTPATIENT
Start: 2017-12-02

## 2017-12-01 RX ORDER — RANOLAZINE 500 MG/1
1000 TABLET, EXTENDED RELEASE ORAL 2 TIMES DAILY
Status: CANCELLED | OUTPATIENT
Start: 2017-12-01

## 2017-12-01 RX ORDER — LEVOTHYROXINE SODIUM 50 UG/1
150 TABLET ORAL
Status: CANCELLED | OUTPATIENT
Start: 2017-12-02

## 2017-12-01 RX ORDER — PANTOPRAZOLE SODIUM 40 MG/1
40 TABLET, DELAYED RELEASE ORAL
Status: CANCELLED | OUTPATIENT
Start: 2017-12-01

## 2017-12-01 RX ORDER — NITROGLYCERIN 0.4 MG/1
0.4 TABLET SUBLINGUAL AS NEEDED
Status: CANCELLED | OUTPATIENT
Start: 2017-12-01

## 2017-12-01 RX ORDER — HEPARIN SODIUM 200 [USP'U]/100ML
1000 INJECTION, SOLUTION INTRAVENOUS ONCE
Status: CANCELLED | OUTPATIENT
Start: 2017-12-01 | End: 2017-12-01

## 2017-12-01 RX ORDER — HYDROCODONE BITARTRATE AND ACETAMINOPHEN 5; 325 MG/1; MG/1
1 TABLET ORAL
Status: CANCELLED | OUTPATIENT
Start: 2017-12-01

## 2017-12-01 RX ORDER — MIDAZOLAM HYDROCHLORIDE 1 MG/ML
.5-2 INJECTION, SOLUTION INTRAMUSCULAR; INTRAVENOUS
Status: CANCELLED | OUTPATIENT
Start: 2017-12-01

## 2017-12-01 RX ORDER — ONDANSETRON 4 MG/1
4 TABLET, ORALLY DISINTEGRATING ORAL
Status: CANCELLED | OUTPATIENT
Start: 2017-12-01

## 2017-12-01 RX ORDER — SEVELAMER HYDROCHLORIDE 400 MG/1
800 TABLET, FILM COATED ORAL
Status: CANCELLED | OUTPATIENT
Start: 2017-12-01

## 2017-12-01 RX ORDER — SODIUM CHLORIDE 0.9 % (FLUSH) 0.9 %
5-10 SYRINGE (ML) INJECTION AS NEEDED
Status: CANCELLED | OUTPATIENT
Start: 2017-12-01

## 2017-12-01 RX ORDER — HEPARIN SODIUM 200 [USP'U]/100ML
1000 INJECTION, SOLUTION INTRAVENOUS ONCE
Status: COMPLETED | OUTPATIENT
Start: 2017-12-01 | End: 2017-12-01

## 2017-12-01 RX ORDER — PROMETHAZINE HYDROCHLORIDE 25 MG/1
25 TABLET ORAL
Status: CANCELLED | OUTPATIENT
Start: 2017-12-01

## 2017-12-01 RX ORDER — FENTANYL CITRATE 50 UG/ML
12.5-5 INJECTION, SOLUTION INTRAMUSCULAR; INTRAVENOUS
Status: DISCONTINUED | OUTPATIENT
Start: 2017-12-01 | End: 2017-12-01

## 2017-12-01 RX ORDER — SUCRALFATE 1 G/10ML
1 SUSPENSION ORAL
Status: CANCELLED | OUTPATIENT
Start: 2017-12-01 | Stop reason: SDUPTHER

## 2017-12-01 RX ORDER — METOPROLOL TARTRATE 25 MG/1
12.5 TABLET, FILM COATED ORAL DAILY
Status: CANCELLED | OUTPATIENT
Start: 2017-12-02

## 2017-12-01 RX ORDER — HEPARIN SODIUM 1000 [USP'U]/ML
2000-6000 INJECTION, SOLUTION INTRAVENOUS; SUBCUTANEOUS ONCE
Status: CANCELLED | OUTPATIENT
Start: 2017-12-01 | End: 2017-12-01

## 2017-12-01 RX ORDER — CYANOCOBALAMIN 1000 UG/ML
1000 INJECTION, SOLUTION INTRAMUSCULAR; SUBCUTANEOUS
Status: CANCELLED | OUTPATIENT
Start: 2017-12-02

## 2017-12-01 RX ORDER — LANOLIN ALCOHOL/MO/W.PET/CERES
400 CREAM (GRAM) TOPICAL DAILY
Status: CANCELLED | OUTPATIENT
Start: 2017-12-02

## 2017-12-01 RX ORDER — LIDOCAINE HYDROCHLORIDE AND EPINEPHRINE 20; 5 MG/ML; UG/ML
2-20 INJECTION, SOLUTION EPIDURAL; INFILTRATION; INTRACAUDAL; PERINEURAL ONCE
Status: ACTIVE | OUTPATIENT
Start: 2017-12-01 | End: 2017-12-01

## 2017-12-01 RX ORDER — GENTAMICIN SULFATE 1 MG/G
CREAM TOPICAL DAILY PRN
Status: CANCELLED | OUTPATIENT
Start: 2017-12-01

## 2017-12-01 RX ORDER — POLYETHYLENE GLYCOL 3350 17 G/17G
17 POWDER, FOR SOLUTION ORAL
Status: CANCELLED | OUTPATIENT
Start: 2017-12-01

## 2017-12-01 RX ORDER — AMOXICILLIN 250 MG
1 CAPSULE ORAL DAILY
Status: CANCELLED | OUTPATIENT
Start: 2017-12-02

## 2017-12-01 RX ORDER — LIDOCAINE HYDROCHLORIDE 20 MG/ML
1-20 INJECTION, SOLUTION INFILTRATION; PERINEURAL ONCE
Status: CANCELLED | OUTPATIENT
Start: 2017-12-01 | End: 2017-12-01

## 2017-12-01 RX ORDER — POLYETHYLENE GLYCOL 3350 17 G/17G
17 POWDER, FOR SOLUTION ORAL DAILY
Status: CANCELLED | OUTPATIENT
Start: 2017-12-02

## 2017-12-01 RX ADMIN — HEPARIN SODIUM 2000 UNITS: 200 INJECTION, SOLUTION INTRAVENOUS at 09:56

## 2017-12-01 RX ADMIN — PANTOPRAZOLE SODIUM 40 MG: 40 TABLET, DELAYED RELEASE ORAL at 16:53

## 2017-12-01 RX ADMIN — SUCRALFATE 1 G: 1 SUSPENSION ORAL at 16:53

## 2017-12-01 RX ADMIN — ROSUVASTATIN CALCIUM 20 MG: 20 TABLET, FILM COATED ORAL at 22:09

## 2017-12-01 RX ADMIN — TORSEMIDE 100 MG: 100 TABLET ORAL at 17:13

## 2017-12-01 RX ADMIN — HEPARIN SODIUM 6200 UNITS: 1000 INJECTION, SOLUTION INTRAVENOUS; SUBCUTANEOUS at 10:18

## 2017-12-01 RX ADMIN — TICAGRELOR 90 MG: 90 TABLET ORAL at 22:08

## 2017-12-01 RX ADMIN — METOCLOPRAMIDE HYDROCHLORIDE 5 MG: 10 TABLET ORAL at 17:13

## 2017-12-01 RX ADMIN — ERYTHROPOIETIN 20000 UNITS: 20000 INJECTION, SOLUTION INTRAVENOUS; SUBCUTANEOUS at 17:12

## 2017-12-01 RX ADMIN — RENAGEL 800 MG: 400 TABLET ORAL at 16:53

## 2017-12-01 RX ADMIN — RANOLAZINE 1000 MG: 500 TABLET, FILM COATED, EXTENDED RELEASE ORAL at 17:13

## 2017-12-01 RX ADMIN — LIDOCAINE HYDROCHLORIDE 300 MG: 20 INJECTION, SOLUTION INFILTRATION; PERINEURAL at 09:59

## 2017-12-01 NOTE — PROCEDURES
Tunnelled Dialysis Catheter Placement Note    12/01/17        Indication: Needed for access for dialysis    Procedure Summary: Tunnelled dialysis catheter was inserted  without complications. Location: Left femoral vein    Procedure Details: After applying local anesthesia/moderate sedation the vessel was cannulated under fluoroscopic guidance, the vessel was serially dilated and then the 55 cm Palindrome Bajwa dialysis catheter was tunnelled from the exit site to the venotomy site and then inserted over the wire and the tip was advanced to the right atrium. The catheter ports flushed well with saline and the catheter was sutured in place using 2-0 Nylon and the venotomy site was closed. Sterile caps were placed and a sterile pressure dressing was applied. Final fluoroscopy demonstrated good catheter position and no evidence of complications.      Complications: None    Estimated blood loss: less than 10 mL    Mere Short M.D.

## 2017-12-01 NOTE — PROGRESS NOTES
Problem: Falls - Risk of  Goal: *Absence of Falls  Document Harrison Fall Risk and appropriate interventions in the flowsheet.    Outcome: Progressing Towards Goal  Fall Risk Interventions:  Mobility Interventions: Patient to call before getting OOB, Utilize walker, cane, or other assitive device         Medication Interventions: Patient to call before getting OOB    Elimination Interventions: Call light in reach, Patient to call for help with toileting needs, Toileting schedule/hourly rounds, Urinal in reach

## 2017-12-01 NOTE — PROGRESS NOTES
TRANSFER - OUT REPORT:    Verbal report given to Hanny Amaro RN(name) on Sharif Orozco  being transferred to dialysis(unit) for routine progression of care       Report consisted of patients Situation, Background, Assessment and   Recommendations(SBAR). Information from the following report(s) Procedure Summary and MAR was reviewed with the receiving nurse. Lines:       Opportunity for questions and clarification was provided.       Patient transported with:   [employer]

## 2017-12-01 NOTE — PROGRESS NOTES
90 minutes OT treatment time missed today d/t patient off the floor for Permacath placement in the AM, dialysis in the PM.  To make up treatment time as patient is available/able to tolerate and as scheduling allows.     Christos Garcia, OT  12/1/2017

## 2017-12-01 NOTE — PROGRESS NOTES
Hemodialysis treatment initiated using left femoral catheter by Barbara Bach RN. Aspirated and flushed both ports without problem. Dressing clean, dry and intact. Pt alert and VS stable. Machine settings per MD order. Will monitor during treatment.

## 2017-12-01 NOTE — PROGRESS NOTES
Hemodialysis Rounding Note    Subjective:     Tawanna Barnett is a 80 y.o.  who presents with DEBILITY  Renal failure (ARF), acute on chronic (HCC)  Weakness of both legs. The patient is dialyzing utilizing the following method:Intermittent Hemodialysis  Artificial Kidney Dialyzer/Set Up Inspection: Revaclear Max   hours     blood flow rate Blood Flow Rate (ml/min): 350 ml/min   dialysate rate     ultrafiltration Goal/Amount of Fluid to Remove (mL): 3600 mL   Heparin is used during the dialysis treatment. Complaints none.      Current Facility-Administered Medications   Medication Dose Route Frequency    lidocaine-EPINEPHrine (PF) (XYLOCAINE) 2 %-1:200,000 injection  mg  2-20 mL SubCUTAneous ONCE    polyethylene glycol (MIRALAX) packet 17 g  17 g Oral DAILY    acetaminophen (TYLENOL) tablet 650 mg  650 mg Oral Q4H PRN    HYDROcodone-acetaminophen (NORCO) 7.5-325 mg per tablet 1 Tab  1 Tab Oral Q4H PRN    HYDROcodone-acetaminophen (NORCO) 5-325 mg per tablet 1 Tab  1 Tab Oral Q4H PRN    LORazepam (ATIVAN) tablet 1 mg  1 mg Oral Q6H PRN    sodium chloride (NS) flush 5-10 mL  5-10 mL IntraVENous PRN    amLODIPine (NORVASC) tablet 5 mg  5 mg Oral DAILY    aspirin delayed-release tablet 81 mg  81 mg Oral DAILY    calcitRIOL (ROCALTROL) capsule 0.25 mcg  0.25 mcg Oral DAILY    [START ON 12/2/2017] cyanocobalamin (VITAMIN B12) injection 1,000 mcg  1,000 mcg IntraMUSCular Q7D    epoetin toya (EPOGEN;PROCRIT) injection 20,000 Units  20,000 Units SubCUTAneous Q MON, WED & FRI    gentamicin (GARAMYCIN) 0.1 % cream   Topical DAILY PRN    levothyroxine (SYNTHROID) tablet 150 mcg  150 mcg Oral ACB    linaclotide (LINZESS) capsule 145 mcg (Patient Supplied)  145 mcg Oral DAILY    magnesium oxide (MAG-OX) tablet 400 mg  400 mg Oral DAILY    metoclopramide HCl (REGLAN) tablet 5 mg  5 mg Oral BID    metoprolol tartrate (LOPRESSOR) tablet 12.5 mg  12.5 mg Oral DAILY    multivitamin w ZN (STRESSTABS W ZINC) tablet  1 Tab Oral DAILY    nitroglycerin (NITROSTAT) tablet 0.4 mg  0.4 mg SubLINGual PRN    ondansetron (ZOFRAN ODT) tablet 4 mg  4 mg Oral TID PRN    pantoprazole (PROTONIX) tablet 40 mg  40 mg Oral ACB&D    polyethylene glycol (MIRALAX) packet 17 g  17 g Oral DAILY PRN    promethazine (PHENERGAN) tablet 25 mg  25 mg Oral Q6H PRN    ranolazine ER (RANEXA) tablet 1,000 mg  1,000 mg Oral BID    rosuvastatin (CRESTOR) tablet 20 mg  20 mg Oral QHS    senna-docusate (PERICOLACE) 8.6-50 mg per tablet 1 Tab  1 Tab Oral DAILY    sevelamer (RENAGEL) tablet 800 mg  800 mg Oral TID WITH MEALS    sucralfate (CARAFATE) 100 mg/mL oral suspension 1 g  1 g Oral AC&HS    ticagrelor (BRILINTA) tablet 90 mg  90 mg Oral BID    torsemide (DEMADEX) tablet 100 mg  100 mg Oral BID          1901 -  0700  In: -   Out: 100     No results found for this or any previous visit (from the past 24 hour(s)). Review of Systems  A comprehensive review of systems was negative except for that written in the HPI. .    Objective:     Blood pressure 148/90, pulse 73, temperature 98.3 °F (36.8 °C), resp. rate 17, SpO2 96 %. Temp (24hrs), Av °F (36.7 °C), Min:97.2 °F (36.2 °C), Max:98.3 °F (36.8 °C)      Physical Exam:   Neuro: alert, cooperative, no distress, appears stated age, Skin: Skin color, texture, turgor normal. No rashes or lesions, Neck: supple, symmetrical, trachea midline, no adenopathy, thyroid: not enlarged, symmetric, no tenderness/mass/nodules, no carotid bruit and no JVD, Lungs: clear to auscultation bilaterally, Cardiac: regular rate and rhythm, S1, S2 normal, no murmur, click, rub or gallop, Abdomen: soft, non-tender. Bowel sounds normal. No masses,  no organomegaly and Extremities: extremities normal, atraumatic, no cyanosis or edema      Assessment:     End Stage Renal Disease:  Patient is tolerating dialysis treatment well. .  Additionally the patient has experienced normal dialysis treatment during dialysis. Dry weight   same.     Anemia:    Recent Labs      11/30/17   0615   HCT  29.7*   HGB  9.3*       Renal Metabolic Bone Disease:    Recent Labs      11/30/17   0615   CA  9.0                        Treatment:  PO4 binders, Cinacaleat, Vitamin D  Hypertension: controlled    Access: adequate monitoring/no changes     Active Problems:    Weakness of both legs (11/29/2017)      Renal failure (ARF), acute on chronic ([facility] (11/29/2017)           Plan:     Continue scheduled dialysis     S/p Lt femoral perm cath - working well

## 2017-12-01 NOTE — PROGRESS NOTES
HD treatment completed without complication. Total of 3.0 kgs removed. Flushed both ports with 10 mL of NS.  VS stable, NAD. CVC dressing clean, dry, and intact, tego caps intact, bilateral lumens wrapped with 4x4 gauze. Transported to radiology.

## 2017-12-01 NOTE — PROGRESS NOTES
Unable to see pt. This AM secondary to permacath placement. Unable to see pt. This PM secondary to pt. Off the floor for HD. Will cont. Efforts.

## 2017-12-01 NOTE — PROGRESS NOTES
End Of Shift Functional Summary, Nursing      BLADDER:  Pt does not have a castillo catheter that staff manages. Pt does not take medication. Pt is anuric. BOWEL:  Pt does take medication. Pt is continent of bowel and uses bedside commode. Pt requires staff to position device    Pt has had 0 bowel accidents during this shift requiring minimal assistance from staff to clean up.  (An accident is when the episode is not contained in a brief AND/OR the clothing/linen requires changing/cleaning up.)

## 2017-12-01 NOTE — PROGRESS NOTES
MD Claire,   Medical Director  [address] [facility], [address]  Tel: [phone]       [facility] PROGRESS NOTE    Michelle Arriaga  Admit Date: 11/29/2017  Admit Diagnosis: DEBILITY; Renal failure (ARF), acute on chronic ([facility] [address] Charlene Tony*  Chief Complaint : Gait dysfunction secondary to below. Admit Diagnosis: Unstable angina ([facility] [address]  CAD (coronary artery disease) (11/27/2015)  S/P complex PCI and stable on ASA and Brilinta  Unstable angina ([facility] [address] (2/1/2016)  Acute/ Chronic anemia (11/18/2017)  ESRD (end stage renal disease) On PD/ CRRT  Pain  DVT risk  Acute Rehab Dx:  Debility    Weakness  Gait impairment  deconditioning  Mobility and ambulation deficits  Self Care/ADL deficits     Subjective     Patient seen and examined. Vss. Afebrile. Nephrology will continue HD via perm cath. Mild discomfort due to retention of fluid following PD.      Objective:     Current Facility-Administered Medications   Medication Dose Route Frequency    polyethylene glycol (MIRALAX) packet 17 g  17 g Oral DAILY    acetaminophen (TYLENOL) tablet 650 mg  650 mg Oral Q4H PRN    HYDROcodone-acetaminophen (NORCO) 7.5-325 mg per tablet 1 Tab  1 Tab Oral Q4H PRN    HYDROcodone-acetaminophen (NORCO) 5-325 mg per tablet 1 Tab  1 Tab Oral Q4H PRN    LORazepam (ATIVAN) tablet 1 mg  1 mg Oral Q6H PRN    sodium chloride (NS) flush 5-10 mL  5-10 mL IntraVENous PRN    amLODIPine (NORVASC) tablet 5 mg  5 mg Oral DAILY    aspirin delayed-release tablet 81 mg  81 mg Oral DAILY    calcitRIOL (ROCALTROL) capsule 0.25 mcg  0.25 mcg Oral DAILY    [START ON 12/2/2017] cyanocobalamin (VITAMIN B12) injection 1,000 mcg  1,000 mcg IntraMUSCular Q7D    epoetin toya (EPOGEN;PROCRIT) injection 20,000 Units  20,000 Units SubCUTAneous Q MON, WED & FRI    gentamicin (GARAMYCIN) 0.1 % cream   Topical DAILY PRN    levothyroxine (SYNTHROID) tablet 150 mcg  150 mcg Oral ACB    linaclotide (LINZESS) capsule 145 mcg (Patient Supplied)  145 mcg Oral DAILY    magnesium oxide (MAG-OX) tablet 400 mg  400 mg Oral DAILY    metoclopramide HCl (REGLAN) tablet 5 mg  5 mg Oral BID    metoprolol tartrate (LOPRESSOR) tablet 12.5 mg  12.5 mg Oral DAILY    multivitamin w ZN (STRESSTABS W ZINC) tablet  1 Tab Oral DAILY    nitroglycerin (NITROSTAT) tablet 0.4 mg  0.4 mg SubLINGual PRN    ondansetron (ZOFRAN ODT) tablet 4 mg  4 mg Oral TID PRN    pantoprazole (PROTONIX) tablet 40 mg  40 mg Oral ACB&D    polyethylene glycol (MIRALAX) packet 17 g  17 g Oral DAILY PRN    promethazine (PHENERGAN) tablet 25 mg  25 mg Oral Q6H PRN    ranolazine ER (RANEXA) tablet 1,000 mg  1,000 mg Oral BID    rosuvastatin (CRESTOR) tablet 20 mg  20 mg Oral QHS    senna-docusate (PERICOLACE) 8.6-50 mg per tablet 1 Tab  1 Tab Oral DAILY    sevelamer (RENAGEL) tablet 800 mg  800 mg Oral TID WITH MEALS    sucralfate (CARAFATE) 100 mg/mL oral suspension 1 g  1 g Oral AC&HS    ticagrelor (BRILINTA) tablet 90 mg  90 mg Oral BID    torsemide (DEMADEX) tablet 100 mg  100 mg Oral BID     Review of Systems:   Denies chest pain, shortness of breath, cough, headache, visual problems, abdominal pain, dysurea, calf pain. Pertinent positives are as noted in the medical records and unremarkable otherwise. Visit Vitals    /74    Pulse 82    Temp 98.3 °F (36.8 °C)    Resp 12    SpO2 97%        Physical Exam:   General: Alert and age appropriately oriented. No acute cardio respiratory distress. HEENT: Normocephalic,no scleral icterus  Oral mucosa moist without cyanosis   Lungs: Clear to auscultation  bilaterally. Respiration even and unlabored   Heart: Regular rate and rhythm, S1, S2   No  murmurs, clicks, rub or gallops   Abdomen: Soft, non-tender, nondistended. Bowel sounds present. No organomegaly. Genitourinary: Benign . Neuromuscular:      Grossly no focal motor deficits noted. Moves ankles.   Ankle dorsiflexion 5/5  Ankle plantarflexion 5/5  No sensory deficits distally. Exam limited by pain. Skin/extremity: No rashes, no erythema. No calf tenderness BLE  Wound covered. Functional Assessment:          Balance  Sitting - Static: Good (unsupported) (11/30/17 1200)  Sitting - Dynamic: Good (unsupported) (11/30/17 1200)  Standing - Static: Fair (11/30/17 1200)                     [facility] Fall Risk Assessment:  [facility] Fall Risk  Mobility: Ambulates or transfers with assist devices or assistance/unsteady gait (12/01/17 5001)  Mobility Interventions: Patient to call before getting OOB;Utilize walker, cane, or other assitive device (12/01/17 9296)  Mentation: Alert, oriented x 3 (12/01/17 6816)  Medication: Patient receiving anticonvulsants, sedatives(tranquilizers), psychotropics or hypnotics, hypoglycemics, narcotics, sleep aids, antihypertensives, laxatives, or diuretics (12/01/17 2126)  Medication Interventions: Patient to call before getting OOB (12/01/17 8305)  Elimination: Needs assistance with toileting (12/01/17 5679)  Elimination Interventions: Call light in reach; Patient to call for help with toileting needs; Toileting schedule/hourly rounds;Urinal in reach (12/01/17 2122)  Prior Fall History: No (12/01/17 6154)  Total Score: 3 (12/01/17 3712)  High Fall Risk: Yes (12/01/17 8079)     Speech Assessment:         Ambulation:  Gait  Distance (ft): 5 Feet (ft) (to recliner chair) (11/30/17 1200)  Assistive Device: Tiffany Callander, rolling;Gait belt (11/30/17 1200)     Labs/Studies:  Recent Results (from the past 72 hour(s))   CBC W/O DIFF    Collection Time: 11/29/17  6:02 AM   Result Value Ref Range    WBC 8.9 4.3 - 11.1 K/uL    RBC 3.47 (L) 4.05 - 5.25 M/uL    HGB 10.4 (L) 11.7 - 15.4 g/dL    HCT 32.7 (L) 35.8 - 46.3 %    MCV 94.2 79.6 - 97.8 FL    MCH 30.0 26.1 - 32.9 PG    MCHC 31.8 31.4 - 35.0 g/dL    RDW 19.3 (H) 11.9 - 14.6 %    PLATELET 955 612 - 088 K/uL    MPV 9.2 (L) 10.8 - 36.7 FL   METABOLIC PANEL, BASIC    Collection Time: 11/29/17  6:02 AM   Result Value Ref Range    Sodium 136 136 - 145 mmol/L    Potassium 3.8 3.5 - 5.1 mmol/L    Chloride 97 (L) 98 - 107 mmol/L    CO2 31 21 - 32 mmol/L    Anion gap 8 7 - 16 mmol/L    Glucose 84 65 - 100 mg/dL    BUN 25 (H) 8 - 23 MG/DL    Creatinine 5.00 (H) 0.6 - 1.0 MG/DL    GFR est AA 11 (L) >60 ml/min/1.73m2    GFR est non-AA 9 (L) >60 ml/min/1.73m2    Calcium 9.1 8.3 - 10.4 MG/DL   MAGNESIUM    Collection Time: 11/29/17  6:02 AM   Result Value Ref Range    Magnesium 2.1 1.8 - 2.4 mg/dL   GLUCOSE, POC    Collection Time: 11/29/17  3:56 PM   Result Value Ref Range    Glucose (POC) 81 65 - 100 mg/dL   CBC WITH AUTOMATED DIFF    Collection Time: 11/30/17  6:15 AM   Result Value Ref Range    WBC 7.8 4.3 - 11.1 K/uL    RBC 3.16 (L) 4.05 - 5.25 M/uL    HGB 9.3 (L) 11.7 - 15.4 g/dL    HCT 29.7 (L) 35.8 - 46.3 %    MCV 94.0 79.6 - 97.8 FL    MCH 29.4 26.1 - 32.9 PG    MCHC 31.3 (L) 31.4 - 35.0 g/dL    RDW 18.8 (H) 11.9 - 14.6 %    PLATELET 348 826 - 689 K/uL    MPV 9.2 (L) 10.8 - 14.1 FL    DF AUTOMATED      NEUTROPHILS 77 43 - 78 %    LYMPHOCYTES 10 (L) 13 - 44 %    MONOCYTES 10 4.0 - 12.0 %    EOSINOPHILS 3 0.5 - 7.8 %    BASOPHILS 0 0.0 - 2.0 %    IMMATURE GRANULOCYTES 0 0.0 - 5.0 %    ABS. NEUTROPHILS 5.9 1.7 - 8.2 K/UL    ABS. LYMPHOCYTES 0.8 0.5 - 4.6 K/UL    ABS. MONOCYTES 0.8 0.1 - 1.3 K/UL    ABS. EOSINOPHILS 0.2 0.0 - 0.8 K/UL    ABS. BASOPHILS 0.0 0.0 - 0.2 K/UL    ABS. IMM.  GRANS. 0.0 0.0 - 0.5 K/UL   METABOLIC PANEL, BASIC    Collection Time: 11/30/17  6:15 AM   Result Value Ref Range    Sodium 138 136 - 145 mmol/L    Potassium 3.4 (L) 3.5 - 5.1 mmol/L    Chloride 100 98 - 107 mmol/L    CO2 28 21 - 32 mmol/L    Anion gap 10 7 - 16 mmol/L    Glucose 112 (H) 65 - 100 mg/dL    BUN 28 (H) 8 - 23 MG/DL    Creatinine 5.77 (H) 0.6 - 1.0 MG/DL    GFR est AA 9 (L) >60 ml/min/1.73m2    GFR est non-AA 7 (L) >60 ml/min/1.73m2    Calcium 9.0 8.3 - 10.4 MG/DL       Assessment:     Problem List as of 12/1/2017  Date Reviewed: 3/2/2017          Codes Class Noted - Resolved    Weakness of both legs ICD-10-CM: R29.898  ICD-9-CM: 729.89  11/29/2017 - Present        Renal failure (ARF), acute on chronic ([facility] ICD-10-CM: N17.9, N18.9  ICD-9-CM: 584.9, 585.9  11/29/2017 - Present        Elevated troponin ICD-10-CM: R74.8  ICD-9-CM: 790.6  11/18/2017 - Present        Chest pain ICD-10-CM: R07.9  ICD-9-CM: 786.50  11/18/2017 - Present        CKD (chronic kidney disease) ICD-10-CM: N18.9  ICD-9-CM: 585.9  11/18/2017 - Present        Chronic anemia ICD-10-CM: D64.9  ICD-9-CM: 285.9  11/18/2017 - Present        ESRD (end stage renal disease) ([facility] ICD-10-CM: N18.6  ICD-9-CM: 585.6  11/18/2017 - Present        Abdominal pain ICD-10-CM: R10.9  ICD-9-CM: 789.00  9/18/2017 - Present        H/O TIA (transient ischemic attack) and stroke ICD-10-CM: Z86.73  ICD-9-CM: V12.54  4/13/2017 - Present        S/P PTCA (percutaneous transluminal coronary angioplasty) ICD-10-CM: Z98.61  ICD-9-CM: V45.82  4/13/2017 - Present        Mixed hyperlipidemia ICD-10-CM: E78.2  ICD-9-CM: 272.2  4/13/2017 - Present        Vision changes ICD-10-CM: H53.9  ICD-9-CM: 368.9  3/26/2017 - Present        Dizziness ICD-10-CM: R42  ICD-9-CM: 780.4  3/26/2017 - Present        Refusal of blood transfusions as patient is Mandaen (Chronic) ICD-10-CM: Z53.1  ICD-9-CM: V62.6  8/25/2016 - Present        GERD (gastroesophageal reflux disease) ICD-10-CM: K21.9  ICD-9-CM: 530.81  8/8/2016 - Present        Unspecified sleep apnea ICD-10-CM: G47.30  ICD-9-CM: 780.57  8/8/2016 - Present    Overview Signed 8/8/2016 11:09 AM by Rizwan Alanis     uses cpap machine             CVA (cerebral vascular accident) Hx of TIA ICD-10-CM: I63.9  ICD-9-CM: 434.91  2/22/2016 - Present        Unstable angina (HCC) ICD-10-CM: I20.0  ICD-9-CM: 411.1  2/1/2016 - Present        ESRD on peritoneal dialysis ([facility] [address] (Chronic) ICD-10-CM: N18.6, Z99.2  ICD-9-CM: 585.6, V45.11  2/1/2016 - Present        Ischemic cardiomyopathy ICD-10-CM: I25.5  ICD-9-CM: 414.8  12/29/2015 - Present        HLD (hyperlipidemia) ICD-10-CM: E78.5  ICD-9-CM: 272.4  12/29/2015 - Present        Depression ICD-10-CM: F32.9  ICD-9-CM: 068  12/29/2015 - Present        CAD (coronary artery disease) ICD-10-CM: I25.10  ICD-9-CM: 414.00  11/27/2015 - Present        Debility ICD-10-CM: R53.81  ICD-9-CM: 799.3  5/5/2015 - Present        Hypertension (Chronic) ICD-10-CM: I10  ICD-9-CM: 401.9  4/28/2015 - Present        Anemia of chronic renal failure (Chronic) ICD-10-CM: N18.9, D63.1  ICD-9-CM: 285.21, 585.9  4/28/2015 - Present        Hypothyroidism (Chronic) ICD-10-CM: E03.9  ICD-9-CM: 244.9  4/28/2015 - Present        RESOLVED: Postural hypotension ICD-10-CM: I95.1  ICD-9-CM: 458.0  8/9/2016 - 3/26/2017        RESOLVED: Chest pain ICD-10-CM: R07.9  ICD-9-CM: 786.50  8/8/2016 - 3/26/2017        RESOLVED: Nausea ICD-10-CM: R11.0  ICD-9-CM: 787.02  8/8/2016 - 3/26/2017        RESOLVED: S/P PTCA (percutaneous transluminal coronary angioplasty); LAD PTCA of  ISR 11/27/15 ICD-10-CM: Z98.61  ICD-9-CM: V45.82  5/24/2016 - 3/26/2017        RESOLVED: TIA (transient ischemic attack) ICD-10-CM: G45.9  ICD-9-CM: 435.9  2/10/2016 - 3/26/2017        RESOLVED: Edema ICD-10-CM: R60.9  ICD-9-CM: 782.3  12/29/2015 - 3/26/2017        RESOLVED: Anterior myocardial infarction ([facility] ICD-10-CM: I21.09  ICD-9-CM: 410.10  5/21/2015 - 3/26/2017        RESOLVED: STEMI (ST elevation myocardial infarction) ([facility] ICD-10-CM: I21.3  ICD-9-CM: 410.90  4/28/2015 - 2/1/2016              Plan:      CAD (coronary artery disease) (11/27/2015)/ S/P complex PCI / Unstable angina ([facility] (2/1/2016)  - on ASA and Brilinta  -hypertension - continue norvasc, lopressor  - continue ranexa, statin  - demadex continued. - cardiac precautions continued.  continue stage I cardiac rehab.      Acute/ Chronic anemia (11/18/2017) - continue to monitor.   - continue epogen  - on WAYNE and IV iron and B12     ESRD (end stage renal disease) On PD/ CRRT  - PD resumed. - monitor fluids status. Nephrology managing. Will follow at IRU.   - 11/30 problem with PD/ catheter. Nephrologist aware. Will need to hold PT due to femoral cath precautions. nephrologist to follow. May need to have tunneled cath.      Pneumonia prophylaxis- Insentive spirometer every hour while awake     DVT risk / DVT Prophylaxis-   On daily physician exam to assess for signs and symptoms as patient is at increased risk for of thromboembolism. Mobilization as tolerated.      Pain Control: stable, mild-to-moderate joint symptoms intermittently, reasonably well controlled by PRN meds. Will require regular pain assessment and comprenhensive pain management,      Wound Care: Monitor wound status daily per staff and physician. At risk for failure. Will require 24/7 rehab nursing. Keep wound clean and dry      bowel program - as needed     GERD - resume PPI. At times may need additional antacids, Maalox prn.  - continue sucralfate     Hypothyroid  - synthroid.     Time spent was 25 minutes with over 1/2 in direct patient care/examination, consultation and coordination of care.      Signed By: Juan Daniel Luz MD     December 1, 2017

## 2017-12-01 NOTE — PROGRESS NOTES
Pt requests to get up to bedside commode. Explained to pt that she is on strict bedrest orders and that staff could not get her up with rationale for orders. Offered use of bedpan. Pt was insistent and despite bedrest orders, respectfully, requested to get up and use the bedside commode. Pt also had large amount of clear sanguinous fluid in the area of her left femoral dialysis catheter. Dressing was saturated. Left femoral dialysis catheter dressing was changed using sterile technique. Pt tolerated well. [facility] Pt had small BM--brown and formed. Pt is currently back in bed.

## 2017-12-02 PROCEDURE — 65310000000 HC RM PRIVATE REHAB

## 2017-12-02 PROCEDURE — 90935 HEMODIALYSIS ONE EVALUATION: CPT

## 2017-12-02 PROCEDURE — 74011250636 HC RX REV CODE- 250/636: Performed by: PHYSICAL MEDICINE & REHABILITATION

## 2017-12-02 PROCEDURE — 97530 THERAPEUTIC ACTIVITIES: CPT

## 2017-12-02 PROCEDURE — 97110 THERAPEUTIC EXERCISES: CPT

## 2017-12-02 PROCEDURE — 74011250637 HC RX REV CODE- 250/637: Performed by: PHYSICAL MEDICINE & REHABILITATION

## 2017-12-02 PROCEDURE — 5A1D70Z PERFORMANCE OF URINARY FILTRATION, INTERMITTENT, LESS THAN 6 HOURS PER DAY: ICD-10-PCS | Performed by: INTERNAL MEDICINE

## 2017-12-02 RX ADMIN — RANOLAZINE 1000 MG: 500 TABLET, FILM COATED, EXTENDED RELEASE ORAL at 18:37

## 2017-12-02 RX ADMIN — ASPIRIN 81 MG: 81 TABLET, COATED ORAL at 09:20

## 2017-12-02 RX ADMIN — POLYETHYLENE GLYCOL 3350 17 G: 17 POWDER, FOR SOLUTION ORAL at 05:51

## 2017-12-02 RX ADMIN — TICAGRELOR 90 MG: 90 TABLET ORAL at 11:48

## 2017-12-02 RX ADMIN — LEVOTHYROXINE SODIUM 150 MCG: 50 TABLET ORAL at 05:48

## 2017-12-02 RX ADMIN — CYANOCOBALAMIN 1000 MCG: 1000 INJECTION, SOLUTION INTRAMUSCULAR at 18:39

## 2017-12-02 RX ADMIN — TORSEMIDE 100 MG: 100 TABLET ORAL at 09:19

## 2017-12-02 RX ADMIN — METOCLOPRAMIDE HYDROCHLORIDE 5 MG: 10 TABLET ORAL at 18:36

## 2017-12-02 RX ADMIN — SUCRALFATE 1 G: 1 SUSPENSION ORAL at 05:50

## 2017-12-02 RX ADMIN — TORSEMIDE 100 MG: 100 TABLET ORAL at 18:36

## 2017-12-02 RX ADMIN — SUCRALFATE 1 G: 1 SUSPENSION ORAL at 11:49

## 2017-12-02 RX ADMIN — Medication 400 MG: at 09:20

## 2017-12-02 RX ADMIN — PANTOPRAZOLE SODIUM 40 MG: 40 TABLET, DELAYED RELEASE ORAL at 15:46

## 2017-12-02 RX ADMIN — ROSUVASTATIN CALCIUM 20 MG: 20 TABLET, FILM COATED ORAL at 21:21

## 2017-12-02 RX ADMIN — SUCRALFATE 1 G: 1 SUSPENSION ORAL at 15:47

## 2017-12-02 RX ADMIN — RANOLAZINE 1000 MG: 500 TABLET, FILM COATED, EXTENDED RELEASE ORAL at 09:18

## 2017-12-02 RX ADMIN — PANTOPRAZOLE SODIUM 40 MG: 40 TABLET, DELAYED RELEASE ORAL at 05:49

## 2017-12-02 RX ADMIN — CALCITRIOL 0.25 MCG: 0.25 CAPSULE, LIQUID FILLED ORAL at 09:18

## 2017-12-02 RX ADMIN — METOCLOPRAMIDE HYDROCHLORIDE 5 MG: 10 TABLET ORAL at 09:20

## 2017-12-02 RX ADMIN — METOPROLOL TARTRATE 12.5 MG: 25 TABLET ORAL at 09:22

## 2017-12-02 NOTE — CONSULTS
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 She has a history of GI bleeding and had anemia-induced unstable angina in the past. 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 She states the pain has since resolved with NTG paste.  No fevers or chills. No nausea, vomiting or diarrhea.  She states that she is essentially anuric and occasionally makes minimal urine.  She has a h/o CVA and TIA. No fever or chills, no problems with PD drainage or discoloration of PD fluid. No increased thirst. She wishes regular diet and supplement. She denies any other acute complaints  Her edema has improved in the past couple of days with intensified dialysis. s- no complaints . seen in rehab today feels better , still has le  edema   Past Medical History:   Diagnosis Date    Anemia of chronic renal failure 4/28/2015    Anterior myocardial infarction [facility]) 5/21/2015    CAD (coronary artery disease)     Chest pain     Chronic kidney disease, stage III (moderate) 8/15/2014    on dialysis    CKD (chronic kidney disease) stage 4, GFR 15-29 ml/min ([facility]) 4/28/2015    Debility 5/5/2015    Depression 12/29/2015    Edema 12/29/2015    Endocrine disease     Hypothyroidism    GERD (gastroesophageal reflux disease)     Heart murmur 12/29/2015    HLD (hyperlipidemia) 12/29/2015    Hypertension     Hypothyroidism 4/28/2015    Ischemic cardiomyopathy 12/29/2015    Nausea     S/P PTCA (percutaneous transluminal coronary angioplasty); LAD PTCA of  ISR 11/27/15 5/24/2016    STEMI (ST elevation myocardial infarction) ([facility] 4/28/2015    Unspecified sleep apnea     uses cpap machine    Unstable angina ([facility] [address] Past Surgical History:   Procedure Laterality Date    HX BACK SURGERY  1990    neck surgery cervical disc    HX BACK SURGERY      lower back    HX CATARACT REMOVAL Bilateral     HX CHOLECYSTECTOMY  19702    gall bladder     HX KNEE REPLACEMENT Right 2006    HX PTCA  4/28/2015    2.25 Xience stent to mid LAD for occluded artery, anterior MI, EF 25%. Moderate disease distal LAD and distal OM PCI CX and RCA 2004, then PCI RCA and LAD in 2009. Prior to Admission medications    Medication Sig Start Date End Date Taking? Authorizing Provider   metoprolol tartrate (LOPRESSOR) 25 mg tablet Take 0.5 Tabs by mouth daily. 11/27/17  Yes MINDY Chatman   amLODIPine (NORVASC) 5 mg tablet Take 1 Tab by mouth daily. 11/27/17  Yes MINDY Chatman   torsemide (DEMADEX) 100 mg tablet Take 1 Tab by mouth two (2) times a day. 11/27/17  Yes MINDY Chatman   pantoprazole (PROTONIX) 40 mg tablet Take 1 Tab by mouth Before breakfast and dinner. 11/27/17  Yes MINDY Chatman   magnesium oxide (MAG-OX) 400 mg tablet Take 400 mg by mouth daily. Yes Historical Provider   metoclopramide HCl (REGLAN) 5 mg tablet Take 5 mg by mouth two (2) times a day. Yes Historical Provider   ticagrelor (BRILINTA) 90 mg tablet Take 1 Tab by mouth two (2) times a day. 2/4/16  Yes MINDY Chatman   aspirin delayed-release 81 mg tablet Take 1 Tab by mouth daily. 5/5/15  Yes Gisela Hollingsworth PA-C   rosuvastatin (CRESTOR) 20 mg tablet Take 20 mg by mouth nightly. Yes Historical Provider   levothyroxine (SYNTHROID) 150 mcg tablet Take 150 mcg by mouth Daily (before breakfast). Yes Historical Provider   cyanocobalamin (VITAMIN B12) 1,000 mcg/mL injection 1 mL by IntraMUSCular route every seven (7) days. Indications: Vitamin B12 Deficiency 12/2/17   MINDY Garland   folic acid (FOLVITE) 1 mg tablet Take 1 mg by mouth daily.     Historical Provider   potassium chloride (K-DUR, KLOR-CON) 20 mEq tablet Take 20 mEq by mouth two (2) times a day. Historical Provider   traZODone (DESYREL) 50 mg tablet Take  by mouth nightly. Historical Provider   megestrol (MEGACE) 400 mg/10 mL (40 mg/mL) suspension Take 200 mg by mouth daily. Historical Provider   sucralfate (CARAFATE) 100 mg/mL suspension Take 10 mL by mouth Before breakfast, lunch, dinner and at bedtime. Indications: PREVENTION OF STRESS ULCER 9/23/17   Montana Garcia, DO   nitroglycerin (NITROLINGUAL) 400 mcg/spray spray 1 Spray by SubLINGual route every five (5) minutes as needed for Chest Pain. Historical Provider   linaclotide Bartholome Baas) 145 mcg cap capsule Take  by mouth Daily (before breakfast). Historical Provider   PNV NO.122/IRON/FOLIC ACID (PRENATAL MULTI PO) Take  by mouth. Historical Provider   ondansetron (ZOFRAN ODT) 4 mg disintegrating tablet Take 4 mg by mouth three (3) times daily as needed. Historical Provider   sevelamer carbonate (RENVELA) 800 mg tab tab Take 800 mg by mouth three (3) times daily (with meals). Historical Provider   gentamicin (GARAMYCIN) 0.1 % topical cream APPLY TO PD catheter exit site at daily dressing change 5/12/15   J Luis Mahoney MD   darbepoetin toya in polysorbat (ARANESP, POLYSORBATE,) 40 mcg/mL injection 40 mcg by SubCUTAneous route every fourteen (14) days. Indications: ANEMIA IN CHRONIC KIDNEY DISEASE    Historical Provider   ranolazine ER (RANEXA) 500 mg SR tablet Take 500 mg by mouth two (2) times a day.     Historical Provider     Allergies   Allergen Reactions    Codeine Nausea and Vomiting      Social History   Substance Use Topics    Smoking status: Never Smoker    Smokeless tobacco: Never Used    Alcohol use No      Family History   Problem Relation Age of Onset    Heart Disease Mother     Hypertension Mother     Cancer Mother      Lung    Stroke Father     Hypertension Father     Breast Cancer Neg Hx           Review of Systems    Constitutional: no fever, weak  Eyes: fair vision,    Ears, nose, mouth, throat, and face:fair hearing,   Respiratory: no asthma,  Chronic home O2 is being used - improved dyspnea  Cardiovascular:no palpitation, no  chest pain,   Gastrointestinal:no diarrhea,  Had BM today  Genitourinary: no dysuria,   Hematologic/lymphatic: no bleeding tendency,   Neurological: no seizures   Behvioral/Psych: no psych hospitalization   Endocrine: no goiter,       Objective:       Visit Vitals    /74    Pulse 80    Temp 97.8 °F (36.6 °C)    Resp 18    Wt 101.6 kg (224 lb)    SpO2 97%    BMI 32.14 kg/m2       General:  Alert, cooperative, no distress, appears stated age. Head:  Normocephalic, without obvious abnormality, atraumatic. Eyes:  Conjunctivae/corneas clear. EOMs intact. Throat: Lips, mucosa, and tongue normal. Teeth and gums normal.   Neck: Supple, symmetrical, trachea midline, no adenopathy,  no JVD. Lungs:   Clear to auscultation bilaterally. Heart:  Regular rate and rhythm, S1, S2 normal, 2/6 systolic  murmur, no rub or gallop. Abdomen:   Soft, non-tender. Distended . No masses,  No organomegaly. PD catheter in place without exudate   Extremities: Extremities normal, atraumatic, no cyanosis. 3+edema. Skin: Skin color, texture, turgor normal. No rashes or lesions. Lymph nodes: Cervical and supraclavicular nodes normal.   Neurologic: Grossly intact. No asterixis. Data Review:       No results found for this or any previous visit (from the past 24 hour(s)). CXR viewed by me - no major infiltrate or fluid excess, CM with left possible minor effusion is present        CT abdomen/pelvis  CT ABDOMEN:  Peritoneal fluid in the perihepatic space, mild paracolic gutters,  extending down into the pelvis. Peritoneal dialysis catheter noted. There is not  any evidence of retroperitoneal hematoma identified. Strandy fluid density does  extend into the extraperitoneal space in the lower abdomen and pelvis.  There is  radiodensity within the renal collecting systems, possibly previously  administered IV contrast. There is peripancreatic fluid and stranding, cannot  exclude acute pancreatitis. No biliary dilatation. Spleen is not enlarged. Small  bowel normal caliber.   CT PELVIS:   Moderate to large volume pelvic fluid.   There is diffuse asymmetric enlargement of the right rectus and oblique  abdominal muscles. There are are of higher attenuation the contralateral side  and findings are consistent with an abdominal wall hematoma. IMPRESSION:    1. Evidence of right abdominal wall hematoma. 2. No CT evidence to suggest retroperitoneal hematoma. 3. Extensive fluid in the abdomen and pelvis, presumed related to peritoneal  dialysis. Active Problems:    Weakness of both legs (11/29/2017)      Renal failure (ARF), acute on chronic (HCC) (11/29/2017)        Assessment:     1. CAD x NSTEMI, Unstable angina -  - LHC with complex CAD and acute thrombotic lesion in pLAD and subtotal occlusion in mLAD. The RCA has severe proximal and mid RCA disease. She has additional stenting of LAD with Resolute in mid/distal and proximal vessel. These all touched the previously stented mid vessel. Recommend life-long DAPT    2, ESRD -  - trail of PD went on well , but last night PD was ineffective secondary to retention of fluid   -s/p  hd again today  through perm cath   - next hd on Tuesday . Will follow TTS schedule in rehab   3. Fluid excess -  - recurrent due to poor PD drainage    4. Anemia -  - intensive anemia therapy in [employer] [facility]  - on WAYNE and IV iron and B12  - improved S/P  transfusion of PRBC    5. History of GI bleed -  - monitor clinically and serial Hb  - she had a drop in Hb to 7 range and improved with BT    6. Hypokalemia   - resolved     7.  Abdominal pain and tenderness with drop in hb , on DAPT   No evidence of retroperitoneal hematoma       Plan:     As above - 25

## 2017-12-02 NOTE — PROGRESS NOTES
Problem: Falls - Risk of  Goal: *Absence of Falls  Document Harrison Fall Risk and appropriate interventions in the flowsheet.    Outcome: Progressing Towards Goal  Fall Risk Interventions:  Mobility Interventions: Patient to call before getting OOB         Medication Interventions: Patient to call before getting OOB    Elimination Interventions: Call light in reach

## 2017-12-02 NOTE — DIALYSIS
HD treatment completed without complication. Total of 3 kg removed. Flushed both ports with 9 mL of NS.  VS stable, NAD. CVC dressing clean, dry, and intact, tego caps intact, bilateral lumens wrapped with 4x4 gauze. Transport contacted for return to room.

## 2017-12-02 NOTE — PROGRESS NOTES
MD Claire,   Medical Director  [address] [facility], [address] [facility]  Tel: [phone]       [facility] PROGRESS NOTE    Juan Linares  Admit Date: 11/29/2017  Admit Diagnosis: DEBILITY  Renal failure (ARF), acute on chronic (HCC)  Weakness of both legs  Chief Complaint : Gait dysfunction secondary to below. Admit Diagnosis: Unstable angina ([facility] [address]  CAD (coronary artery disease) (11/27/2015)  S/P complex PCI and stable on ASA and Brilinta  Unstable angina ([facility] [address] (2/1/2016)  Acute/ Chronic anemia (11/18/2017)  ESRD (end stage renal disease) On PD/ CRRT  Pain  DVT risk  Acute Rehab Dx:  Debility    Weakness  Gait impairment  deconditioning  Mobility and ambulation deficits  Self Care/ADL deficits     Subjective     Patient seen and examined. Vss. No acute complaints. PT, OT well tolerated. however still PD cath not well functioning. Also noted drainage for femoral catheter. Will hold PT due to possible complication with femoral cath. Nephrologist aware per our rehab supervisor. Back from HD. No pain complaints. Reports decreased edema and improved leg strength. Denies SOB, dizziness, palpations or SOB. Participating in therapy.     Objective:     Current Facility-Administered Medications   Medication Dose Route Frequency    polyethylene glycol (MIRALAX) packet 17 g  17 g Oral DAILY    acetaminophen (TYLENOL) tablet 650 mg  650 mg Oral Q4H PRN    HYDROcodone-acetaminophen (NORCO) 7.5-325 mg per tablet 1 Tab  1 Tab Oral Q4H PRN    HYDROcodone-acetaminophen (NORCO) 5-325 mg per tablet 1 Tab  1 Tab Oral Q4H PRN    LORazepam (ATIVAN) tablet 1 mg  1 mg Oral Q6H PRN    sodium chloride (NS) flush 5-10 mL  5-10 mL IntraVENous PRN    amLODIPine (NORVASC) tablet 5 mg  5 mg Oral DAILY    aspirin delayed-release tablet 81 mg  81 mg Oral DAILY    calcitRIOL (ROCALTROL) capsule 0.25 mcg  0.25 mcg Oral DAILY    cyanocobalamin (VITAMIN B12) injection 1,000 mcg  1,000 mcg IntraMUSCular Q7D    epoetin toya (EPOGEN;PROCRIT) injection 20,000 Units  20,000 Units SubCUTAneous Q MON, WED & FRI    gentamicin (GARAMYCIN) 0.1 % cream   Topical DAILY PRN    levothyroxine (SYNTHROID) tablet 150 mcg  150 mcg Oral ACB    linaclotide (LINZESS) capsule 145 mcg (Patient Supplied)  145 mcg Oral DAILY    magnesium oxide (MAG-OX) tablet 400 mg  400 mg Oral DAILY    metoclopramide HCl (REGLAN) tablet 5 mg  5 mg Oral BID    metoprolol tartrate (LOPRESSOR) tablet 12.5 mg  12.5 mg Oral DAILY    multivitamin w ZN (STRESSTABS W ZINC) tablet  1 Tab Oral DAILY    nitroglycerin (NITROSTAT) tablet 0.4 mg  0.4 mg SubLINGual PRN    ondansetron (ZOFRAN ODT) tablet 4 mg  4 mg Oral TID PRN    pantoprazole (PROTONIX) tablet 40 mg  40 mg Oral ACB&D    polyethylene glycol (MIRALAX) packet 17 g  17 g Oral DAILY PRN    promethazine (PHENERGAN) tablet 25 mg  25 mg Oral Q6H PRN    ranolazine ER (RANEXA) tablet 1,000 mg  1,000 mg Oral BID    rosuvastatin (CRESTOR) tablet 20 mg  20 mg Oral QHS    senna-docusate (PERICOLACE) 8.6-50 mg per tablet 1 Tab  1 Tab Oral DAILY    sevelamer (RENAGEL) tablet 800 mg  800 mg Oral TID WITH MEALS    sucralfate (CARAFATE) 100 mg/mL oral suspension 1 g  1 g Oral AC&HS    ticagrelor (BRILINTA) tablet 90 mg  90 mg Oral BID    torsemide (DEMADEX) tablet 100 mg  100 mg Oral BID     Review of Systems:   Denies chest pain, shortness of breath, cough, headache, visual problems, abdominal pain, dysurea, calf pain. Pertinent positives are as noted in the medical records and unremarkable otherwise. Visit Vitals    /74    Pulse 80    Temp 97.8 °F (36.6 °C)    Resp 18    Wt 101.6 kg (224 lb)    SpO2 97%    BMI 32.14 kg/m2        Physical Exam:   General: Alert and age appropriately oriented. No acute cardio respiratory distress. HEENT: Normocephalic,no scleral icterus  Oral mucosa moist without cyanosis   Lungs: Clear to auscultation  bilaterally.   Respiration even and unlabored   Heart: Regular rate and rhythm, S1, S2   No  murmurs, clicks, rub or gallops   Abdomen: Soft, non-tender, nondistended. Bowel sounds present. No organomegaly. Genitourinary: Benign . Neuromuscular:      Grossly no focal motor deficits noted. Moves ankles. Ankle dorsiflexion 5/5  Ankle plantarflexion 5/5  No sensory deficits distally. Exam limited by pain. Skin/extremity: No rashes, no erythema. No calf tenderness BLE  Wound covered.                                                                             Functional Assessment:  Gross Assessment  AROM: Generally decreased, functional (12/02/17 1200)  Strength: Generally decreased, functional (12/02/17 1200)       Balance  Sitting - Static: Good (unsupported) (11/30/17 1200)  Sitting - Dynamic: Good (unsupported) (11/30/17 1200)  Standing - Static: Fair (11/30/17 1200)                     Albert Zaldivar Fall Risk Assessment:  Albert Zaldivar Fall Risk  Mobility: Ambulates or transfers with assist devices or assistance/unsteady gait (12/02/17 0715)  Mobility Interventions: Patient to call before getting OOB (12/02/17 0715)  Mentation: Alert, oriented x 3 (12/02/17 0715)  Medication: Patient receiving anticonvulsants, sedatives(tranquilizers), psychotropics or hypnotics, hypoglycemics, narcotics, sleep aids, antihypertensives, laxatives, or diuretics (12/02/17 0715)  Medication Interventions: Patient to call before getting OOB (12/02/17 0715)  Elimination: Needs assistance with toileting (12/02/17 0715)  Elimination Interventions: Call light in reach (12/02/17 0715)  Prior Fall History: No (12/02/17 0715)  Total Score: 3 (12/02/17 0715)  Standard Fall Precautions: Yes (12/02/17 0715)  High Fall Risk: Yes (12/02/17 0307)     Speech Assessment:         Ambulation:  Gait  Distance (ft): 5 Feet (ft) (to recliner chair) (11/30/17 1200)  Assistive Device: Willye Chambers, rolling;Gait belt (11/30/17 1200)     Labs/Studies:  Recent Results (from the past 72 hour(s)) GLUCOSE, POC    Collection Time: 11/29/17  3:56 PM   Result Value Ref Range    Glucose (POC) 81 65 - 100 mg/dL   CBC WITH AUTOMATED DIFF    Collection Time: 11/30/17  6:15 AM   Result Value Ref Range    WBC 7.8 4.3 - 11.1 K/uL    RBC 3.16 (L) 4.05 - 5.25 M/uL    HGB 9.3 (L) 11.7 - 15.4 g/dL    HCT 29.7 (L) 35.8 - 46.3 %    MCV 94.0 79.6 - 97.8 FL    MCH 29.4 26.1 - 32.9 PG    MCHC 31.3 (L) 31.4 - 35.0 g/dL    RDW 18.8 (H) 11.9 - 14.6 %    PLATELET 729 986 - 340 K/uL    MPV 9.2 (L) 10.8 - 14.1 FL    DF AUTOMATED      NEUTROPHILS 77 43 - 78 %    LYMPHOCYTES 10 (L) 13 - 44 %    MONOCYTES 10 4.0 - 12.0 %    EOSINOPHILS 3 0.5 - 7.8 %    BASOPHILS 0 0.0 - 2.0 %    IMMATURE GRANULOCYTES 0 0.0 - 5.0 %    ABS. NEUTROPHILS 5.9 1.7 - 8.2 K/UL    ABS. LYMPHOCYTES 0.8 0.5 - 4.6 K/UL    ABS. MONOCYTES 0.8 0.1 - 1.3 K/UL    ABS. EOSINOPHILS 0.2 0.0 - 0.8 K/UL    ABS. BASOPHILS 0.0 0.0 - 0.2 K/UL    ABS. IMM.  GRANS. 0.0 0.0 - 0.5 K/UL   METABOLIC PANEL, BASIC    Collection Time: 11/30/17  6:15 AM   Result Value Ref Range    Sodium 138 136 - 145 mmol/L    Potassium 3.4 (L) 3.5 - 5.1 mmol/L    Chloride 100 98 - 107 mmol/L    CO2 28 21 - 32 mmol/L    Anion gap 10 7 - 16 mmol/L    Glucose 112 (H) 65 - 100 mg/dL    BUN 28 (H) 8 - 23 MG/DL    Creatinine 5.77 (H) 0.6 - 1.0 MG/DL    GFR est AA 9 (L) >60 ml/min/1.73m2    GFR est non-AA 7 (L) >60 ml/min/1.73m2    Calcium 9.0 8.3 - 10.4 MG/DL       Assessment:     Problem List as of 12/2/2017  Date Reviewed: 3/2/2017          Codes Class Noted - Resolved    Weakness of both legs ICD-10-CM: R29.898  ICD-9-CM: 729.89  11/29/2017 - Present        Renal failure (ARF), acute on chronic ([facility] [address] ICD-10-CM: N17.9, N18.9  ICD-9-CM: 584.9, 585.9  11/29/2017 - Present        Elevated troponin ICD-10-CM: R74.8  ICD-9-CM: 790.6  11/18/2017 - Present        Chest pain ICD-10-CM: R07.9  ICD-9-CM: 786.50  11/18/2017 - Present        CKD (chronic kidney disease) ICD-10-CM: N18.9  ICD-9-CM: 585.9  11/18/2017 - Present        Chronic anemia ICD-10-CM: D64.9  ICD-9-CM: 285.9  11/18/2017 - Present        ESRD (end stage renal disease) ([facility] [address] ICD-10-CM: N18.6  ICD-9-CM: 585.6  11/18/2017 - Present        Abdominal pain ICD-10-CM: R10.9  ICD-9-CM: 789.00  9/18/2017 - Present        H/O TIA (transient ischemic attack) and stroke ICD-10-CM: Z86.73  ICD-9-CM: V12.54  4/13/2017 - Present        S/P PTCA (percutaneous transluminal coronary angioplasty) ICD-10-CM: Z98.61  ICD-9-CM: V45.82  4/13/2017 - Present        Mixed hyperlipidemia ICD-10-CM: E78.2  ICD-9-CM: 272.2  4/13/2017 - Present        Vision changes ICD-10-CM: H53.9  ICD-9-CM: 368.9  3/26/2017 - Present        Dizziness ICD-10-CM: R42  ICD-9-CM: 780.4  3/26/2017 - Present        Refusal of blood transfusions as patient is [employer] (Chronic) ICD-10-CM: Z53.1  ICD-9-CM: V62.6  8/25/2016 - Present        GERD (gastroesophageal reflux disease) ICD-10-CM: K21.9  ICD-9-CM: 530.81  8/8/2016 - Present        Unspecified sleep apnea ICD-10-CM: G47.30  ICD-9-CM: 780.57  8/8/2016 - Present    Overview Signed 8/8/2016 11:09 AM by Stephanie Reid     uses cpap machine             CVA (cerebral vascular accident) Hx of TIA ICD-10-CM: I63.9  ICD-9-CM: 434.91  2/22/2016 - Present        Unstable angina ([facility] [address] ICD-10-CM: I20.0  ICD-9-CM: 411.1  2/1/2016 - Present        ESRD on peritoneal dialysis [facility]) (Chronic) ICD-10-CM: N18.6, Z99.2  ICD-9-CM: 585.6, V45.11  2/1/2016 - Present        Ischemic cardiomyopathy ICD-10-CM: I25.5  ICD-9-CM: 414.8  12/29/2015 - Present        HLD (hyperlipidemia) ICD-10-CM: E78.5  ICD-9-CM: 272.4  12/29/2015 - Present        Depression ICD-10-CM: F32.9  ICD-9-CM: 233  12/29/2015 - Present        CAD (coronary artery disease) ICD-10-CM: I25.10  ICD-9-CM: 414.00  11/27/2015 - Present        Debility ICD-10-CM: R53.81  ICD-9-CM: 799.3  5/5/2015 - Present        Hypertension (Chronic) ICD-10-CM: I10  ICD-9-CM: 401.9  4/28/2015 - Present Anemia of chronic renal failure (Chronic) ICD-10-CM: N18.9, D63.1  ICD-9-CM: 285.21, 585.9  4/28/2015 - Present        Hypothyroidism (Chronic) ICD-10-CM: E03.9  ICD-9-CM: 244.9  4/28/2015 - Present        RESOLVED: Postural hypotension ICD-10-CM: I95.1  ICD-9-CM: 458.0  8/9/2016 - 3/26/2017        RESOLVED: Chest pain ICD-10-CM: R07.9  ICD-9-CM: 786.50  8/8/2016 - 3/26/2017        RESOLVED: Nausea ICD-10-CM: R11.0  ICD-9-CM: 787.02  8/8/2016 - 3/26/2017        RESOLVED: S/P PTCA (percutaneous transluminal coronary angioplasty); LAD PTCA of  ISR 11/27/15 ICD-10-CM: Z98.61  ICD-9-CM: V45.82  5/24/2016 - 3/26/2017        RESOLVED: TIA (transient ischemic attack) ICD-10-CM: G45.9  ICD-9-CM: 435.9  2/10/2016 - 3/26/2017        RESOLVED: Edema ICD-10-CM: R60.9  ICD-9-CM: 782.3  12/29/2015 - 3/26/2017        RESOLVED: Anterior myocardial infarction ([facility] ICD-10-CM: I21.09  ICD-9-CM: 410.10  5/21/2015 - 3/26/2017        RESOLVED: STEMI (ST elevation myocardial infarction) ([facility] ICD-10-CM: I21.3  ICD-9-CM: 410.90  4/28/2015 - 2/1/2016              Plan:      CAD (coronary artery disease) (11/27/2015)/ S/P complex PCI / Unstable angina ([facility] (2/1/2016)  - on ASA and Brilinta  -hypertension - continue norvasc, lopressor  - continue ranexa, statin  - demadex continued.      Acute/ Chronic anemia (11/18/2017) - continue to monitor.   - conitnue epogen     ESRD (end stage renal disease) On PD/ CRRT  - PD resumed. - monitor fluids status. Nephrology managing. Will follow at IRU.   - 11/30 problem with PD/ catheter. Nephrologist aware. Will need to hold PT due to femoral cath precautions. nephrologist to follow. May need to have tunneled cath.    - HD for last couple of days secondary to edema, scheduled for HD on Tues.     Pneumonia prophylaxis- Insentive spirometer every hour while awake     DVT risk / DVT Prophylaxis- Will require daily physician exam to assess for signs and symptoms as patient is at increased risk for of thromboembolism. Mobilization as tolerated. Intermittent pneumatic compression devices when in bed Thigh-high or knee-high thromboembolic deterrent hose when out of bed. Lovenox subq daily.      Pain Control: stable, mild-to-moderate joint symptoms intermittently, reasonably well controlled by PRN meds. Will require regular pain assessment and comprenhensive pain management,      Wound Care: Monitor wound status daily per staff and physician. At risk for failure. Will require 24/7 rehab nursing. Keep wound clean and dry      bowel program - as needed     GERD - resume PPI. At times may need additional antacids, Maalox prn.  - continue sucralfate     Hypothyroid  - synthroid.     Time spent was 15 minutes with over 1/2 in direct patient care/examination, consultation and coordination of care.      Signed By: Crystal Qureshi MD     December 2, 2017

## 2017-12-02 NOTE — PROGRESS NOTES
Patient had an uneventful night without complaint. Hourly rounds were performed throughout the shift. All needs met at this time. Report given to oncoming nurse.

## 2017-12-02 NOTE — PROGRESS NOTES
Patient resting up in bed. Alert and oriented with pleasant affect. Lung sounds clear. S1S2, bowel sounds active. Patient transported to dialysis. Denies pain or discomfort. No other verbalized needs at present time. Assessment completed. See doc flow sheet for further assessments.

## 2017-12-02 NOTE — DIALYSIS
Treatment initiated using Left femoral CVC by Lesley Carpenter. Both ports aspirated and flushed without problems. Machine set per MD orders. Pt VS stable. Will continue to monitor.

## 2017-12-02 NOTE — PROGRESS NOTES
PHYSICAL THERAPY DAILY NOTE  Time In: 0814  Time Out: 1101  Patient Seen For: AM;Therapeutic exercise    Subjective: Patient in dialysis treatment. Agreeable to PT treatment during dialysis. No new complaints and no c/o pain at this time. \"Feel tired\". Objective:  Patient semi-supine in bed. Orientation Assessment :   Alert and age appropriately oriented. Affect/Behavior:   Appropriate and Cooperative. Basic Command Following:   intact. Safety/Judgment:   intact. Pain Present:   No.  Vital Signs:  /74    Other (comment) (Fall Risk)  GROSS ASSESSMENT Daily Assessment   Measurements taken in supine. AROM: Generally decreased, functional  Strength: Generally decreased, functional       BED/MAT MOBILITY Daily Assessment   Did not occur today. TRANSFERS Daily Assessment   Did not occur today. GAIT Daily Assessment            STEPS or STAIRS Daily Assessment            BALANCE Daily Assessment            WHEELCHAIR MOBILITY Daily Assessment            LOWER EXTREMITY EXERCISES Daily Assessment    Extremity: Both  Exercise Type #1: Supine lower extremity strengthening  Sets Performed: 2  Reps Performed: 10  Level of Assist: Stand-by assistance  Exercise Type #2: Other (comment) (RUE joint mobilizations)  Sets Performed: 1  Reps Performed:  (Hold 15 seconds)          Assessment: Education:  Teaching Method:   Demonstration, Explanation. PT Impairments or Limitations:   Ambulation deficits, Balance deficits, Endurance deficits, Strength deficits, Transfer deficits. Rehab Potential Physical Therapy:   Good. Grossly no focal motor deficits noted. No rashes, no erythema. No calf tenderness BLE. Patient performed all given exercises listed. Patient was left in semi-supine position in duialysis to complete treatment. Nurse notified of completion of treatment. Plan of Care:  The patient has shown the ability to tolerate and benefit from physical therapy daily in a comprehensive inpatient rehabilitation program.  Continue intensive Physical Therapy  to address bed mobility, transfers, ambulation, strengthening, balance, and endurance. Continue with plan of care and focus on goals.         Delaney Champion, PTA  12/2/2017

## 2017-12-02 NOTE — PROGRESS NOTES
End Of Shift Functional Summary, Nursing      TOILETING:  Does patient need assist with clothing management and/or pericare? Yes: Comment: asistance up with walker to toilet    TOILET TRANSFER:  Pt requires moderate assistance. Pt uses wheelchair and walker. BLADDER:  Pt does not have a castillo catheter that staff manages. Pt does take medication. Pt is continent. of bladder and voids in toilet  Pt requires staff to empty device Pt has had 0 bladder accidents during this shift requiring moderate assistance to clean up. (An accident is when the episode is not contained in a brief AND/OR the clothing/linen requires changing/cleaning up.)    BOWEL:  Pt does take medication. Pt is continent of bowel and uses toilet. Pt requires staff to position device    Pt has had 0 bowel accidents during this shift requiring moderate assistance from staff to clean up. (An accident is when the episode is not contained in a brief AND/OR the clothing/linen requires changing/cleaning up.)    BED/CHAIR TRANSFER  Pt requires moderate assistance. Patient requires the assistance of 1 staff member(s). Pt uses walker    BATHING                         EATING  Pt requires no assistance. Pt does not wear dentures. TUBE FEEDINGS:  Pt does not  receive nutrition through tube feedings. Patient requires no assistance with feedings. Documentation reviewed and plan of care discussed/reviewed with   patient, physician, therapists, oncoming nurse, patient assistant and family/spouse during the shift.

## 2017-12-02 NOTE — PROGRESS NOTES
12/02/17 1144   Time Spent With Patient   Time In 0645   Time Out 0724   Patient Seen For: AM;ADLs;Other (see progress notes)   Grooming   Grooming Assistance  S   Comments oral care    Patient was up in bed and alert. Nursing staff had already washed patient. Asked to complete oral/denture care. Supine to sit with min A to move LLE out of bed. Transfer with min A. Cue for hand placement. Able to complete denture care with set up. Theraband exercises x 10 reps to all planes. No c/o pain. Transport came for dialysis. Transfer back to bed. Continue POC.       Raimundo You  12/2/2017

## 2017-12-03 PROCEDURE — 97110 THERAPEUTIC EXERCISES: CPT

## 2017-12-03 PROCEDURE — 97116 GAIT TRAINING THERAPY: CPT

## 2017-12-03 PROCEDURE — 74011250637 HC RX REV CODE- 250/637: Performed by: PHYSICAL MEDICINE & REHABILITATION

## 2017-12-03 PROCEDURE — 65310000000 HC RM PRIVATE REHAB

## 2017-12-03 PROCEDURE — 97530 THERAPEUTIC ACTIVITIES: CPT

## 2017-12-03 PROCEDURE — 74011250637 HC RX REV CODE- 250/637: Performed by: INTERNAL MEDICINE

## 2017-12-03 RX ADMIN — Medication 400 MG: at 08:45

## 2017-12-03 RX ADMIN — ASPIRIN 81 MG: 81 TABLET, COATED ORAL at 08:44

## 2017-12-03 RX ADMIN — PANTOPRAZOLE SODIUM 40 MG: 40 TABLET, DELAYED RELEASE ORAL at 06:21

## 2017-12-03 RX ADMIN — ROSUVASTATIN CALCIUM 20 MG: 20 TABLET, FILM COATED ORAL at 20:51

## 2017-12-03 RX ADMIN — LEVOTHYROXINE SODIUM 150 MCG: 50 TABLET ORAL at 06:21

## 2017-12-03 RX ADMIN — TORSEMIDE 100 MG: 100 TABLET ORAL at 17:43

## 2017-12-03 RX ADMIN — SUCRALFATE 1 G: 1 SUSPENSION ORAL at 16:53

## 2017-12-03 RX ADMIN — AMLODIPINE BESYLATE 5 MG: 5 TABLET ORAL at 08:42

## 2017-12-03 RX ADMIN — METOCLOPRAMIDE HYDROCHLORIDE 5 MG: 10 TABLET ORAL at 17:43

## 2017-12-03 RX ADMIN — RANOLAZINE 1000 MG: 500 TABLET, FILM COATED, EXTENDED RELEASE ORAL at 08:42

## 2017-12-03 RX ADMIN — METOPROLOL TARTRATE 12.5 MG: 25 TABLET ORAL at 08:43

## 2017-12-03 RX ADMIN — SUCRALFATE 1 G: 1 SUSPENSION ORAL at 12:25

## 2017-12-03 RX ADMIN — TICAGRELOR 90 MG: 90 TABLET ORAL at 12:24

## 2017-12-03 RX ADMIN — RANOLAZINE 1000 MG: 500 TABLET, FILM COATED, EXTENDED RELEASE ORAL at 17:41

## 2017-12-03 RX ADMIN — CALCITRIOL 0.25 MCG: 0.25 CAPSULE, LIQUID FILLED ORAL at 09:00

## 2017-12-03 RX ADMIN — SUCRALFATE 1 G: 1 SUSPENSION ORAL at 06:22

## 2017-12-03 RX ADMIN — POLYETHYLENE GLYCOL 3350 17 G: 17 POWDER, FOR SOLUTION ORAL at 08:40

## 2017-12-03 RX ADMIN — TICAGRELOR 90 MG: 90 TABLET ORAL at 23:29

## 2017-12-03 RX ADMIN — TICAGRELOR 90 MG: 90 TABLET ORAL at 00:14

## 2017-12-03 RX ADMIN — PANTOPRAZOLE SODIUM 40 MG: 40 TABLET, DELAYED RELEASE ORAL at 16:56

## 2017-12-03 RX ADMIN — ZINC 1 TABLET: TAB ORAL at 08:41

## 2017-12-03 RX ADMIN — TORSEMIDE 100 MG: 100 TABLET ORAL at 08:45

## 2017-12-03 RX ADMIN — METOCLOPRAMIDE HYDROCHLORIDE 5 MG: 10 TABLET ORAL at 08:41

## 2017-12-03 NOTE — CONSULTS
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 She has a history of GI bleeding and had anemia-induced unstable angina in the past. 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 She states the pain has since resolved with NTG paste.  No fevers or chills. No nausea, vomiting or diarrhea.  She states that she is essentially anuric and occasionally makes minimal urine.  She has a h/o CVA and TIA. No fever or chills, no problems with PD drainage or discoloration of PD fluid. No increased thirst. She wishes regular diet and supplement. She denies any other acute complaints  Her edema has improved in the past couple of days with intensified dialysis. s- no complaints . seen in rehab today feels better , still has le  edema   Past Medical History:   Diagnosis Date    Anemia of chronic renal failure 4/28/2015    Anterior myocardial infarction [facility]) 5/21/2015    CAD (coronary artery disease)     Chest pain     Chronic kidney disease, stage III (moderate) 8/15/2014    on dialysis    CKD (chronic kidney disease) stage 4, GFR 15-29 ml/min ([facility]) 4/28/2015    Debility 5/5/2015    Depression 12/29/2015    Edema 12/29/2015    Endocrine disease     Hypothyroidism    GERD (gastroesophageal reflux disease)     Heart murmur 12/29/2015    HLD (hyperlipidemia) 12/29/2015    Hypertension     Hypothyroidism 4/28/2015    Ischemic cardiomyopathy 12/29/2015    Nausea     S/P PTCA (percutaneous transluminal coronary angioplasty); LAD PTCA of  ISR 11/27/15 5/24/2016    STEMI (ST elevation myocardial infarction) ([facility] [address] 4/28/2015    Unspecified sleep apnea     uses cpap machine    Unstable angina ([facility] [address] Past Surgical History:   Procedure Laterality Date    HX BACK SURGERY  1990    neck surgery cervical disc    HX BACK SURGERY      lower back    HX CATARACT REMOVAL Bilateral     HX CHOLECYSTECTOMY  19702    gall bladder     HX KNEE REPLACEMENT Right 2006    HX PTCA  4/28/2015    2.25 Xience stent to mid LAD for occluded artery, anterior MI, EF 25%. Moderate disease distal LAD and distal OM PCI CX and RCA 2004, then PCI RCA and LAD in 2009. Prior to Admission medications    Medication Sig Start Date End Date Taking? Authorizing Provider   metoprolol tartrate (LOPRESSOR) 25 mg tablet Take 0.5 Tabs by mouth daily. 11/27/17  Yes MINDY Chatman   amLODIPine (NORVASC) 5 mg tablet Take 1 Tab by mouth daily. 11/27/17  Yes MINDY Chatman   torsemide (DEMADEX) 100 mg tablet Take 1 Tab by mouth two (2) times a day. 11/27/17  Yes MINDY Chatman   pantoprazole (PROTONIX) 40 mg tablet Take 1 Tab by mouth Before breakfast and dinner. 11/27/17  Yes MINDY Chatman   magnesium oxide (MAG-OX) 400 mg tablet Take 400 mg by mouth daily. Yes Historical Provider   metoclopramide HCl (REGLAN) 5 mg tablet Take 5 mg by mouth two (2) times a day. Yes Historical Provider   ticagrelor (BRILINTA) 90 mg tablet Take 1 Tab by mouth two (2) times a day. 2/4/16  Yes MINDY Chatman   aspirin delayed-release 81 mg tablet Take 1 Tab by mouth daily. 5/5/15  Yes Gisela Hollingsworth PA-C   rosuvastatin (CRESTOR) 20 mg tablet Take 20 mg by mouth nightly. Yes Historical Provider   levothyroxine (SYNTHROID) 150 mcg tablet Take 150 mcg by mouth Daily (before breakfast). Yes Historical Provider   cyanocobalamin (VITAMIN B12) 1,000 mcg/mL injection 1 mL by IntraMUSCular route every seven (7) days. Indications: Vitamin B12 Deficiency 12/2/17   MINDY Link   folic acid (FOLVITE) 1 mg tablet Take 1 mg by mouth daily.     Historical Provider   potassium chloride (K-DUR, KLOR-CON) 20 mEq tablet Take 20 mEq by mouth two (2) times a day. Historical Provider   traZODone (DESYREL) 50 mg tablet Take  by mouth nightly. Historical Provider   megestrol (MEGACE) 400 mg/10 mL (40 mg/mL) suspension Take 200 mg by mouth daily. Historical Provider   sucralfate (CARAFATE) 100 mg/mL suspension Take 10 mL by mouth Before breakfast, lunch, dinner and at bedtime. Indications: PREVENTION OF STRESS ULCER 9/23/17   Montana Garcia, DO   nitroglycerin (NITROLINGUAL) 400 mcg/spray spray 1 Spray by SubLINGual route every five (5) minutes as needed for Chest Pain. Historical Provider   linaclotide Bartholome Baas) 145 mcg cap capsule Take  by mouth Daily (before breakfast). Historical Provider   PNV NO.122/IRON/FOLIC ACID (PRENATAL MULTI PO) Take  by mouth. Historical Provider   ondansetron (ZOFRAN ODT) 4 mg disintegrating tablet Take 4 mg by mouth three (3) times daily as needed. Historical Provider   sevelamer carbonate (RENVELA) 800 mg tab tab Take 800 mg by mouth three (3) times daily (with meals). Historical Provider   gentamicin (GARAMYCIN) 0.1 % topical cream APPLY TO PD catheter exit site at daily dressing change 5/12/15   J Luis Mahoney MD   darbepoetin toya in polysorbat (ARANESP, POLYSORBATE,) 40 mcg/mL injection 40 mcg by SubCUTAneous route every fourteen (14) days. Indications: ANEMIA IN CHRONIC KIDNEY DISEASE    Historical Provider   ranolazine ER (RANEXA) 500 mg SR tablet Take 500 mg by mouth two (2) times a day.     Historical Provider     Allergies   Allergen Reactions    Codeine Nausea and Vomiting      Social History   Substance Use Topics    Smoking status: Never Smoker    Smokeless tobacco: Never Used    Alcohol use No      Family History   Problem Relation Age of Onset    Heart Disease Mother     Hypertension Mother     Cancer Mother      Lung    Stroke Father     Hypertension Father     Breast Cancer Neg Hx           Review of Systems    Constitutional: no fever, weak  Eyes: fair vision,    Ears, nose, mouth, throat, and face:fair hearing,   Respiratory: no asthma,  Chronic home O2 is being used - improved dyspnea  Cardiovascular:no palpitation, no  chest pain,   Gastrointestinal:no diarrhea,  Had BM today  Genitourinary: no dysuria,   Hematologic/lymphatic: no bleeding tendency,   Neurological: no seizures   Behvioral/Psych: no psych hospitalization   Endocrine: no goiter,       Objective:       Visit Vitals    /82 (BP 1 Location: Right arm, BP Patient Position: Sitting)    Pulse 87    Temp 98 °F (36.7 °C)    Resp 19    Wt 101.6 kg (224 lb)    SpO2 95%    BMI 32.14 kg/m2       General:  Alert, cooperative, no distress, appears stated age. Head:  Normocephalic, without obvious abnormality, atraumatic. Eyes:  Conjunctivae/corneas clear. EOMs intact. Throat: Lips, mucosa, and tongue normal. Teeth and gums normal.   Neck: Supple, symmetrical, trachea midline, no adenopathy,  no JVD. Lungs:   Clear to auscultation bilaterally. Heart:  Regular rate and rhythm, S1, S2 normal, 2/6 systolic  murmur, no rub or gallop. Abdomen:   Soft, non-tender. Distended . No masses,  No organomegaly. PD catheter in place without exudate   Extremities: Extremities normal, atraumatic, no cyanosis. 3+edema. Skin: Skin color, texture, turgor normal. No rashes or lesions. Lymph nodes: Cervical and supraclavicular nodes normal.   Neurologic: Grossly intact. No asterixis. Data Review:       No results found for this or any previous visit (from the past 24 hour(s)). CXR viewed by me - no major infiltrate or fluid excess, CM with left possible minor effusion is present        CT abdomen/pelvis  CT ABDOMEN:  Peritoneal fluid in the perihepatic space, mild paracolic gutters,  extending down into the pelvis. Peritoneal dialysis catheter noted. There is not  any evidence of retroperitoneal hematoma identified.  Strandy fluid density does  extend into the extraperitoneal space in the lower abdomen and pelvis. There is  radiodensity within the renal collecting systems, possibly previously  administered IV contrast. There is peripancreatic fluid and stranding, cannot  exclude acute pancreatitis. No biliary dilatation. Spleen is not enlarged. Small  bowel normal caliber.   CT PELVIS:   Moderate to large volume pelvic fluid.   There is diffuse asymmetric enlargement of the right rectus and oblique  abdominal muscles. There are are of higher attenuation the contralateral side  and findings are consistent with an abdominal wall hematoma. IMPRESSION:    1. Evidence of right abdominal wall hematoma. 2. No CT evidence to suggest retroperitoneal hematoma. 3. Extensive fluid in the abdomen and pelvis, presumed related to peritoneal  dialysis. Active Problems:    Weakness of both legs (11/29/2017)      Renal failure (ARF), acute on chronic (HCC) (11/29/2017)        Assessment:     1. CAD x NSTEMI, Unstable angina -  - LHC with complex CAD and acute thrombotic lesion in pLAD and subtotal occlusion in mLAD. The RCA has severe proximal and mid RCA disease. She has additional stenting of LAD with Resolute in mid/distal and proximal vessel. These all touched the previously stented mid vessel. Recommend life-long DAPT    2, ESRD -  - trail of PD went on well , but last night PD was ineffective secondary to retention of fluid   -s/p  hd again today  through perm cath   - next hd on Tuesday . Will follow TTS schedule in rehab   3. Fluid excess -  - recurrent due to poor PD drainage    4. Anemia -  - intensive anemia therapy in [employer]  - on WAYNE and IV iron and B12  - improved S/P  transfusion of PRBC    5. History of GI bleed -  - monitor clinically and serial Hb  - she had a drop in Hb to 7 range and improved with BT    6. Hypokalemia   - resolved     7.  Abdominal pain and tenderness with drop in hb , on DAPT   No evidence of retroperitoneal hematoma       Plan:     As above - 25

## 2017-12-03 NOTE — PROGRESS NOTES
End Of Shift Functional Summary, Nursing      TOILETING:  Does patient need assist with clothing management and/or pericare? Yes    TOILET TRANSFER:  Pt requires moderate assistance. Pt uses wheelchair and walker. BLADDER:  Pt does not have a castillo catheter that staff manages. Pt does not take medication. Pt is continent. of bladder and voids in toilet  Pt requires staff to position device Pt has had 0 bladder accidents during this shift requiring moderate assistance to clean up. (An accident is when the episode is not contained in a brief AND/OR the clothing/linen requires changing/cleaning up.)    BOWEL:  Pt does take medication. Pt is continent of bowel and uses toilet. Pt requires staff to position device    Pt has had 0 bowel accidents during this shift requiring moderate assistance from staff to clean up.  (An accident is when the episode is not contained in a brief AND/OR the clothing/linen requires changing/cleaning up.)

## 2017-12-03 NOTE — PROGRESS NOTES
MD Claire,   Medical Director  [address] [facility], [address] [facility]  Tel: [phone]       [facility] PROGRESS NOTE    Ramin Short  Admit Date: 11/29/2017  Admit Diagnosis: DEBILITY  Renal failure (ARF), acute on chronic (HCC)  Weakness of both legs  Chief Complaint : Gait dysfunction secondary to below. Admit Diagnosis: Unstable angina ([facility]  CAD (coronary artery disease) (11/27/2015)  S/P complex PCI and stable on ASA and Brilinta  Unstable angina ([facility] [address] (2/1/2016)  Acute/ Chronic anemia (11/18/2017)  ESRD (end stage renal disease) On PD/ CRRT  Pain  DVT risk  Acute Rehab Dx:  Debility    Weakness  Gait impairment  deconditioning  Mobility and ambulation deficits  Self Care/ADL deficits     Subjective     Patient seen and examined. Vss. No acute complaints. PT, OT well tolerated. however still PD cath not well functioning. Also noted drainage for femoral catheter. Will hold PT due to possible complication with femoral cath. Nephrologist aware per our rehab supervisor. Reports fatigue and leg \"heaviness\". Denies SOB, dizziness, palpations or nausea. Participating in therapy.     Objective:     Current Facility-Administered Medications   Medication Dose Route Frequency    polyethylene glycol (MIRALAX) packet 17 g  17 g Oral DAILY    acetaminophen (TYLENOL) tablet 650 mg  650 mg Oral Q4H PRN    HYDROcodone-acetaminophen (NORCO) 7.5-325 mg per tablet 1 Tab  1 Tab Oral Q4H PRN    HYDROcodone-acetaminophen (NORCO) 5-325 mg per tablet 1 Tab  1 Tab Oral Q4H PRN    LORazepam (ATIVAN) tablet 1 mg  1 mg Oral Q6H PRN    sodium chloride (NS) flush 5-10 mL  5-10 mL IntraVENous PRN    amLODIPine (NORVASC) tablet 5 mg  5 mg Oral DAILY    aspirin delayed-release tablet 81 mg  81 mg Oral DAILY    calcitRIOL (ROCALTROL) capsule 0.25 mcg  0.25 mcg Oral DAILY    cyanocobalamin (VITAMIN B12) injection 1,000 mcg  1,000 mcg IntraMUSCular Q7D    epoetin toya (EPOGEN;PROCRIT) injection 20,000 Units  20,000 Units SubCUTAneous Q MON, WED & FRI    gentamicin (GARAMYCIN) 0.1 % cream   Topical DAILY PRN    levothyroxine (SYNTHROID) tablet 150 mcg  150 mcg Oral ACB    linaclotide (LINZESS) capsule 145 mcg (Patient Supplied)  145 mcg Oral DAILY    magnesium oxide (MAG-OX) tablet 400 mg  400 mg Oral DAILY    metoclopramide HCl (REGLAN) tablet 5 mg  5 mg Oral BID    metoprolol tartrate (LOPRESSOR) tablet 12.5 mg  12.5 mg Oral DAILY    multivitamin w ZN (STRESSTABS W ZINC) tablet  1 Tab Oral DAILY    nitroglycerin (NITROSTAT) tablet 0.4 mg  0.4 mg SubLINGual PRN    ondansetron (ZOFRAN ODT) tablet 4 mg  4 mg Oral TID PRN    pantoprazole (PROTONIX) tablet 40 mg  40 mg Oral ACB&D    polyethylene glycol (MIRALAX) packet 17 g  17 g Oral DAILY PRN    promethazine (PHENERGAN) tablet 25 mg  25 mg Oral Q6H PRN    ranolazine ER (RANEXA) tablet 1,000 mg  1,000 mg Oral BID    rosuvastatin (CRESTOR) tablet 20 mg  20 mg Oral QHS    senna-docusate (PERICOLACE) 8.6-50 mg per tablet 1 Tab  1 Tab Oral DAILY    sevelamer (RENAGEL) tablet 800 mg  800 mg Oral TID WITH MEALS    sucralfate (CARAFATE) 100 mg/mL oral suspension 1 g  1 g Oral AC&HS    ticagrelor (BRILINTA) tablet 90 mg  90 mg Oral BID    torsemide (DEMADEX) tablet 100 mg  100 mg Oral BID     Review of Systems:   Denies chest pain, shortness of breath, cough, headache, visual problems, abdominal pain, dysurea, calf pain. Pertinent positives are as noted in the medical records and unremarkable otherwise. Visit Vitals    /82 (BP 1 Location: Right arm, BP Patient Position: Sitting)    Pulse 87    Temp 98 °F (36.7 °C)    Resp 19    Wt 101.6 kg (224 lb)    SpO2 95%    BMI 32.14 kg/m2        Physical Exam:   General: Alert and age appropriately oriented. No acute cardio respiratory distress.    HEENT: Normocephalic,no scleral icterus  Oral mucosa moist without cyanosis   Lungs: Clear to auscultation bilaterally. Respiration even and unlabored   Heart: Regular rate and rhythm, S1, S2   No  murmurs, clicks, rub or gallops   Abdomen: Soft, non-tender, nondistended. Bowel sounds present. Genitourinary: Benign . Neuromuscular:      Grossly no focal motor deficits noted. Moves ankles. Ankle dorsiflexion 5/5  Ankle plantarflexion 5/5  No sensory deficits distally. Exam limited by pain. Skin/extremity: No rashes, no erythema. No calf tenderness BLE  Wound covered.                                                                             Functional Assessment:  Gross Assessment  AROM: Generally decreased, functional (12/03/17 1100)  Strength: Generally decreased, functional (12/03/17 1100)  Coordination: Generally decreased, functional (12/03/17 1100)       Balance  Sitting - Static: Good (unsupported) (12/03/17 1100)  Sitting - Dynamic: Fair (occasional) (12/03/17 1100)  Standing - Static: Fair (12/03/17 1100)                     Marlyse Lace Fall Risk Assessment:  Marlyse Lace Fall Risk  Mobility: Ambulates or transfers with assist devices or assistance/unsteady gait (12/03/17 0720)  Mobility Interventions: Patient to call before getting OOB (12/03/17 0720)  Mentation: Alert, oriented x 3 (12/03/17 0720)  Medication: Patient receiving anticonvulsants, sedatives(tranquilizers), psychotropics or hypnotics, hypoglycemics, narcotics, sleep aids, antihypertensives, laxatives, or diuretics (12/03/17 0720)  Medication Interventions: Patient to call before getting OOB (12/03/17 0720)  Elimination: Needs assistance with toileting (12/03/17 0720)  Elimination Interventions: Call light in reach (12/03/17 0720)  Prior Fall History: No (12/03/17 0720)  Total Score: 3 (12/03/17 0720)  Standard Fall Precautions: Yes (12/03/17 0720)  High Fall Risk: Yes (12/02/17 6780)     Speech Assessment:         Ambulation:  Gait  Distance (ft): 80 Feet (ft) (12/03/17 1100)  Assistive Device: Walker, rolling (WC behind) (12/03/17 1100) Labs/Studies:  No results found for this or any previous visit (from the past 72 hour(s)).     Assessment:     Problem List as of 12/3/2017  Date Reviewed: 3/2/2017          Codes Class Noted - Resolved    Weakness of both legs ICD-10-CM: R29.898  ICD-9-CM: 729.89  11/29/2017 - Present        Renal failure (ARF), acute on chronic ([facility] ICD-10-CM: N17.9, N18.9  ICD-9-CM: 584.9, 585.9  11/29/2017 - Present        Elevated troponin ICD-10-CM: R74.8  ICD-9-CM: 790.6  11/18/2017 - Present        Chest pain ICD-10-CM: R07.9  ICD-9-CM: 786.50  11/18/2017 - Present        CKD (chronic kidney disease) ICD-10-CM: N18.9  ICD-9-CM: 585.9  11/18/2017 - Present        Chronic anemia ICD-10-CM: D64.9  ICD-9-CM: 285.9  11/18/2017 - Present        ESRD (end stage renal disease) ([facility] ICD-10-CM: N18.6  ICD-9-CM: 585.6  11/18/2017 - Present        Abdominal pain ICD-10-CM: R10.9  ICD-9-CM: 789.00  9/18/2017 - Present        H/O TIA (transient ischemic attack) and stroke ICD-10-CM: Z86.73  ICD-9-CM: V12.54  4/13/2017 - Present        S/P PTCA (percutaneous transluminal coronary angioplasty) ICD-10-CM: Z98.61  ICD-9-CM: V45.82  4/13/2017 - Present        Mixed hyperlipidemia ICD-10-CM: E78.2  ICD-9-CM: 272.2  4/13/2017 - Present        Vision changes ICD-10-CM: H53.9  ICD-9-CM: 368.9  3/26/2017 - Present        Dizziness ICD-10-CM: R42  ICD-9-CM: 780.4  3/26/2017 - Present        Refusal of blood transfusions as patient is [employer] (Chronic) ICD-10-CM: Z53.1  ICD-9-CM: V62.6  8/25/2016 - Present        GERD (gastroesophageal reflux disease) ICD-10-CM: K21.9  ICD-9-CM: 530.81  8/8/2016 - Present        Unspecified sleep apnea ICD-10-CM: G47.30  ICD-9-CM: 780.57  8/8/2016 - Present    Overview Signed 8/8/2016 11:09 AM by Donald Miles     uses cpap machine             CVA (cerebral vascular accident) Hx of TIA ICD-10-CM: I63.9  ICD-9-CM: 434.91  2/22/2016 - Present        Unstable angina ([facility] [address] ICD-10-CM: I20.0  ICD-9-CM: 411.1  2/1/2016 - Present        ESRD on peritoneal dialysis [facility]) (Chronic) ICD-10-CM: N18.6, Z99.2  ICD-9-CM: 585.6, V45.11  2/1/2016 - Present        Ischemic cardiomyopathy ICD-10-CM: I25.5  ICD-9-CM: 414.8  12/29/2015 - Present        HLD (hyperlipidemia) ICD-10-CM: E78.5  ICD-9-CM: 272.4  12/29/2015 - Present        Depression ICD-10-CM: F32.9  ICD-9-CM: 729  12/29/2015 - Present        CAD (coronary artery disease) ICD-10-CM: I25.10  ICD-9-CM: 414.00  11/27/2015 - Present        Debility ICD-10-CM: R53.81  ICD-9-CM: 799.3  5/5/2015 - Present        Hypertension (Chronic) ICD-10-CM: I10  ICD-9-CM: 401.9  4/28/2015 - Present        Anemia of chronic renal failure (Chronic) ICD-10-CM: N18.9, D63.1  ICD-9-CM: 285.21, 585.9  4/28/2015 - Present        Hypothyroidism (Chronic) ICD-10-CM: E03.9  ICD-9-CM: 244.9  4/28/2015 - Present        RESOLVED: Postural hypotension ICD-10-CM: I95.1  ICD-9-CM: 458.0  8/9/2016 - 3/26/2017        RESOLVED: Chest pain ICD-10-CM: R07.9  ICD-9-CM: 786.50  8/8/2016 - 3/26/2017        RESOLVED: Nausea ICD-10-CM: R11.0  ICD-9-CM: 787.02  8/8/2016 - 3/26/2017        RESOLVED: S/P PTCA (percutaneous transluminal coronary angioplasty); LAD PTCA of  ISR 11/27/15 ICD-10-CM: Z98.61  ICD-9-CM: V45.82  5/24/2016 - 3/26/2017        RESOLVED: TIA (transient ischemic attack) ICD-10-CM: G45.9  ICD-9-CM: 435.9  2/10/2016 - 3/26/2017        RESOLVED: Edema ICD-10-CM: R60.9  ICD-9-CM: 782.3  12/29/2015 - 3/26/2017        RESOLVED: Anterior myocardial infarction ([facility] ICD-10-CM: I21.09  ICD-9-CM: 410.10  5/21/2015 - 3/26/2017        RESOLVED: STEMI (ST elevation myocardial infarction) ([facility] ICD-10-CM: I21.3  ICD-9-CM: 410.90  4/28/2015 - 2/1/2016              Plan:      CAD (coronary artery disease) (11/27/2015)/ S/P complex PCI / Unstable angina ([facility] (2/1/2016)  - on ASA and Brilinta  -hypertension - continue norvasc, lopressor  - continue ranexa, statin  - demadex continued.    Acute/ Chronic anemia (11/18/2017) - continue to monitor.   - conitnue epogen     ESRD (end stage renal disease) On PD/ CRRT  - PD resumed. - monitor fluids status. Nephrology managing. Will follow at IRU.   - 11/30 problem with PD/ catheter. Nephrologist aware. Will need to hold PT due to femoral cath precautions. nephrologist to follow. May need to have tunneled cath. - nephrology note appreciated, scheduled for HD on Tues.     Pneumonia prophylaxis- Insentive spirometer every hour while awake     DVT risk / DVT Prophylaxis- Will require daily physician exam to assess for signs and symptoms as patient is at increased risk for of thromboembolism. Mobilization as tolerated. Intermittent pneumatic compression devices when in bed Thigh-high or knee-high thromboembolic deterrent hose when out of bed. Lovenox subq daily.      Pain Control: stable, mild-to-moderate joint symptoms intermittently, reasonably well controlled by PRN meds. Will require regular pain assessment and comprenhensive pain management,      Wound Care: Monitor wound status daily per staff and physician. At risk for failure. Will require 24/7 rehab nursing. Keep wound clean and dry      bowel program - as needed     GERD - resume PPI. At times may need additional antacids, Maalox prn.  - continue sucralfate     Hypothyroid  - synthroid.     Time spent was 15 minutes with over 1/2 in direct patient care/examination, consultation and coordination of care.      Signed By: Dagoberto Roger MD     December 3, 2017

## 2017-12-03 NOTE — PROGRESS NOTES
Patient resting up in bed. Alert and oriented with pleasant affect. Lung sounds clear in upper lobes, diminished in bases. S1S2, bowel sounds active. Denies any pain or discomfort at present time. Repositioned to side of bed for breakfast tray. Up to toilet with assist for BM. Tolerated well. Continues as a dialysis patient Tuesdays, Thursdays, and Saturdays. Assessment completed. No further assessments made known at present time. See doc flow sheet for further assessments.

## 2017-12-03 NOTE — PROGRESS NOTES
End Of Shift Functional Summary, Nursing      TOILETING:  Does patient need assist with clothing management and/or pericare? Yes: Comment: stand by assistance with walker    TOILET TRANSFER:  Pt requires minimal assistance. Pt uses walker. BLADDER:  Pt does not have a castillo catheter that staff manages. Pt does take medication. Pt is continent. of bladder and voids in toilet but also has oliguria  Pt requires staff to position device Pt has had 0 bladder accidents during this shift requiring minimal assistance to clean up. (An accident is when the episode is not contained in a brief AND/OR the clothing/linen requires changing/cleaning up.)    BOWEL:  Pt does take medication. Pt is continent of bowel and uses toilet. Pt requires staff to position device    Pt has had 0 bowel accidents during this shift requiring minimal assistance from staff to clean up. (An accident is when the episode is not contained in a brief AND/OR the clothing/linen requires changing/cleaning up.)    BED/CHAIR TRANSFER  Pt requires minimal assistance. Patient requires the assistance of 1 staff member(s). Pt uses walker    BATHING                         EATING  Pt requires no assistance. Pt does not wear dentures. Documentation reviewed and plan of care discussed/reviewed with   patient, physician, therapists, oncoming nurse, patient assistant and family/spouse during the shift.

## 2017-12-03 NOTE — PROGRESS NOTES
PHYSICAL THERAPY DAILY NOTE  Time In: 1539  Time Out: 5488  Patient Seen For: AM;Balance activities;Gait training;Patient education; Therapeutic exercise;Transfer training    Subjective: Patient agreeable to treatment today. States no C/O pain or discomfort at this time. Medications taken about 15 minutes ago per patient/RN. Last PT treatment went well and no new complaints at this time. Anxious to get moving. Objective:  Patient semi-supine in bed. SPT from EOB to WC with RW and CGA. Orientation Assessment :   Alert and age appropriately oriented. Affect/Behavior:   Appropriate and Cooperative. Basic Command Following:   intact. Safety/Judgment:   intact. Pain Present:   No.      Other (comment) (Fall Risk)  GROSS ASSESSMENT Daily Assessment   Measurements taken in sitting during exercises. AROM: Generally decreased, functional  Strength: Generally decreased, functional  Coordination: Generally decreased, functional       BED/MAT MOBILITY Daily Assessment    Rolling Right : 4 (Minimal assistance)  Supine to Sit : 4 (Minimal assistance)       TRANSFERS Daily Assessment    Transfer Type: SPT with walker  Transfer Assistance : 4 (Contact guard assistance)  Sit to Stand Assistance: Contact guard assistance  Car Transfers: Not tested       GAIT Daily Assessment   Base of Support: Narrow; Center of Gravity altered. Speed/Michelle: Slow;;delayed. Step Length: Right shortened;Left shortened  Gait Abnormalities: Trunk sway decreased; Decreased step clearance; Path deviations; Step thru gait. Distance (ft): 80 Feet (ft)  Assistive Device: Gait belt;Walker, rolling (FWB)  Ambulation - Level of assistance: CGA to SBA. Interventions: Safety awareness training; Tactile cues; Verbal cues. Amount of Assistance: 4 (Contact guard assistance)  Distance (ft): 80 Feet (ft)  Assistive Device: Walker, rolling (WC behind)       STEPS or STAIRS Daily Assessment   Did not occur today.  Level of Assist : 0 (Not tested) BALANCE Daily Assessment   Functional Balance Grade Definitions:  (Fair) Accepts minimal challenge; able to maintain balance while turning head/trunk. Standing balance activities of various stance positions, eyes open/closed, 15 seconds each with CGA. Sitting - Static: Good (unsupported)  Sitting - Dynamic: Fair (occasional)  Standing - Static: Fair       WHEELCHAIR MOBILITY Daily Assessment   Self-propel with BLEs for strengthening 30 feet. Wheelchair Setup Assist Required : 4 (Minimal assistance)  Wheelchair Management: Manages left brake;Manages right brake       LOWER EXTREMITY EXERCISES Daily Assessment   Performed various exercises during gait training also. Extremity: Both  Exercise Type #1: Seated lower extremity strengthening  Sets Performed: 1  Reps Performed: 15  Level of Assist: Stand-by assistance  Exercise Type #2: Other (comment) (Motomed x 10 minutes level 3)          Assessment: Education:  Teaching Method:   Demonstration, Explanation. PT Impairments or Limitations:   Ambulation deficits, Balance deficits, Endurance deficits, Strength deficits, Transfer deficits. Rehab Potential Physical Therapy:   Good. Grossly no focal motor deficits noted. No rashes, no erythema. No calf tenderness BLE. Patient performed all given exercises listed. Patient was taken back to room. Left in sitting position in recliner, call bell and necessities in reach. Nurse notified of completion of treatment. Plan of Care: The patient has shown the ability to tolerate and benefit from physical therapy daily in a comprehensive inpatient rehabilitation program.  Continue intensive Physical Therapy  to address bed mobility, transfers, ambulation, strengthening, balance, and endurance. Continue with plan of care and focus on goals. Increase balance and strength next treatment.       Angela Duncan, PTA  12/3/2017

## 2017-12-04 LAB
BASOPHILS # BLD: 0 K/UL (ref 0–0.2)
BASOPHILS NFR BLD: 0 % (ref 0–2)
DIFFERENTIAL METHOD BLD: ABNORMAL
EOSINOPHIL # BLD: 0.3 K/UL (ref 0–0.8)
EOSINOPHIL NFR BLD: 3 % (ref 0.5–7.8)
ERYTHROCYTE [DISTWIDTH] IN BLOOD BY AUTOMATED COUNT: 18.8 % (ref 11.9–14.6)
HCT VFR BLD AUTO: 28.2 % (ref 35.8–46.3)
HGB BLD-MCNC: 8.7 G/DL (ref 11.7–15.4)
IMM GRANULOCYTES # BLD: 0 K/UL (ref 0–0.5)
IMM GRANULOCYTES NFR BLD AUTO: 0 % (ref 0–5)
LYMPHOCYTES # BLD: 1.3 K/UL (ref 0.5–4.6)
LYMPHOCYTES NFR BLD: 17 % (ref 13–44)
MCH RBC QN AUTO: 29.1 PG (ref 26.1–32.9)
MCHC RBC AUTO-ENTMCNC: 30.9 G/DL (ref 31.4–35)
MCV RBC AUTO: 94.3 FL (ref 79.6–97.8)
MONOCYTES # BLD: 0.6 K/UL (ref 0.1–1.3)
MONOCYTES NFR BLD: 7 % (ref 4–12)
NEUTS SEG # BLD: 5.4 K/UL (ref 1.7–8.2)
NEUTS SEG NFR BLD: 73 % (ref 43–78)
PLATELET # BLD AUTO: 330 K/UL (ref 150–450)
PMV BLD AUTO: 9.1 FL (ref 10.8–14.1)
RBC # BLD AUTO: 2.99 M/UL (ref 4.05–5.25)
WBC # BLD AUTO: 7.5 K/UL (ref 4.3–11.1)

## 2017-12-04 PROCEDURE — 74011250636 HC RX REV CODE- 250/636: Performed by: PHYSICAL MEDICINE & REHABILITATION

## 2017-12-04 PROCEDURE — 77030012893

## 2017-12-04 PROCEDURE — 77030020186 HC BOOT HL PROTCT SAGE -B

## 2017-12-04 PROCEDURE — 74011250637 HC RX REV CODE- 250/637: Performed by: INTERNAL MEDICINE

## 2017-12-04 PROCEDURE — 74011250637 HC RX REV CODE- 250/637: Performed by: PHYSICAL MEDICINE & REHABILITATION

## 2017-12-04 PROCEDURE — 85025 COMPLETE CBC W/AUTO DIFF WBC: CPT | Performed by: INTERNAL MEDICINE

## 2017-12-04 PROCEDURE — 97535 SELF CARE MNGMENT TRAINING: CPT

## 2017-12-04 PROCEDURE — 36415 COLL VENOUS BLD VENIPUNCTURE: CPT | Performed by: INTERNAL MEDICINE

## 2017-12-04 PROCEDURE — 97530 THERAPEUTIC ACTIVITIES: CPT

## 2017-12-04 PROCEDURE — 97116 GAIT TRAINING THERAPY: CPT

## 2017-12-04 PROCEDURE — 97110 THERAPEUTIC EXERCISES: CPT

## 2017-12-04 PROCEDURE — 65310000000 HC RM PRIVATE REHAB

## 2017-12-04 RX ORDER — ROSUVASTATIN CALCIUM 20 MG/1
20 TABLET, COATED ORAL
Status: DISCONTINUED | OUTPATIENT
Start: 2017-12-04 | End: 2017-12-18 | Stop reason: HOSPADM

## 2017-12-04 RX ORDER — SUCRALFATE 1 G/10ML
1 SUSPENSION ORAL
Status: DISCONTINUED | OUTPATIENT
Start: 2017-12-04 | End: 2017-12-18 | Stop reason: HOSPADM

## 2017-12-04 RX ADMIN — Medication 400 MG: at 08:47

## 2017-12-04 RX ADMIN — LEVOTHYROXINE SODIUM 150 MCG: 50 TABLET ORAL at 05:42

## 2017-12-04 RX ADMIN — RENAGEL 800 MG: 400 TABLET ORAL at 08:00

## 2017-12-04 RX ADMIN — ZINC 1 TABLET: TAB ORAL at 08:48

## 2017-12-04 RX ADMIN — ROSUVASTATIN CALCIUM 20 MG: 20 TABLET, FILM COATED ORAL at 21:33

## 2017-12-04 RX ADMIN — RANOLAZINE 1000 MG: 500 TABLET, FILM COATED, EXTENDED RELEASE ORAL at 08:46

## 2017-12-04 RX ADMIN — STANDARDIZED SENNA CONCENTRATE AND DOCUSATE SODIUM 1 TABLET: 8.6; 5 TABLET, FILM COATED ORAL at 08:48

## 2017-12-04 RX ADMIN — TICAGRELOR 90 MG: 90 TABLET ORAL at 08:46

## 2017-12-04 RX ADMIN — AMLODIPINE BESYLATE 5 MG: 5 TABLET ORAL at 08:46

## 2017-12-04 RX ADMIN — RENAGEL 800 MG: 400 TABLET ORAL at 16:34

## 2017-12-04 RX ADMIN — ERYTHROPOIETIN 20000 UNITS: 20000 INJECTION, SOLUTION INTRAVENOUS; SUBCUTANEOUS at 16:44

## 2017-12-04 RX ADMIN — METOCLOPRAMIDE HYDROCHLORIDE 5 MG: 10 TABLET ORAL at 16:35

## 2017-12-04 RX ADMIN — RANOLAZINE 1000 MG: 500 TABLET, FILM COATED, EXTENDED RELEASE ORAL at 16:34

## 2017-12-04 RX ADMIN — PANTOPRAZOLE SODIUM 40 MG: 40 TABLET, DELAYED RELEASE ORAL at 16:34

## 2017-12-04 RX ADMIN — TORSEMIDE 100 MG: 100 TABLET ORAL at 08:48

## 2017-12-04 RX ADMIN — POLYETHYLENE GLYCOL 3350 17 G: 17 POWDER, FOR SOLUTION ORAL at 08:46

## 2017-12-04 RX ADMIN — SUCRALFATE 1 G: 1 SUSPENSION ORAL at 12:07

## 2017-12-04 RX ADMIN — CALCITRIOL 0.25 MCG: 0.25 CAPSULE, LIQUID FILLED ORAL at 08:47

## 2017-12-04 RX ADMIN — ASPIRIN 81 MG: 81 TABLET, COATED ORAL at 08:48

## 2017-12-04 RX ADMIN — METOCLOPRAMIDE HYDROCHLORIDE 5 MG: 10 TABLET ORAL at 08:46

## 2017-12-04 RX ADMIN — TICAGRELOR 90 MG: 90 TABLET ORAL at 21:33

## 2017-12-04 RX ADMIN — PANTOPRAZOLE SODIUM 40 MG: 40 TABLET, DELAYED RELEASE ORAL at 05:42

## 2017-12-04 RX ADMIN — METOPROLOL TARTRATE 12.5 MG: 25 TABLET ORAL at 08:47

## 2017-12-04 RX ADMIN — SUCRALFATE 1 G: 1 SUSPENSION ORAL at 05:42

## 2017-12-04 RX ADMIN — RENAGEL 800 MG: 400 TABLET ORAL at 12:06

## 2017-12-04 RX ADMIN — TORSEMIDE 100 MG: 100 TABLET ORAL at 16:34

## 2017-12-04 NOTE — PROGRESS NOTES
PHYSICAL THERAPY DAILY NOTE  Time In: 4080  Time Out: 1010  Patient Seen For: AM;Gait training; Therapeutic exercise;Transfer training    Subjective: Pt. Concerned about her nosebleed. States it has been bleeding \"since I got up this morning\". Objective: Other (comment) (Fall Risk)     Observation revealed redness B heels L>R, both blanchable to touch    Pt. Provided w/ another w/c to improve sitting comfort & fit. W/c adjusted to fit pt. TRANSFERS Daily Assessment   VCs for hand placement Transfer Type: SPT with walker  Transfer Assistance : 4 (Minimal assistance)  Sit to Stand Assistance: Minimal assistance  Car Transfers: Not tested       GAIT Daily Assessment    Amount of Assistance: 4 (Contact guard assistance)  Distance (ft): 20 Feet (ft) (x2, 10'x1)  Assistive Device: Gait belt;Walker, rolling   Flexed posture, foot flat shuffling gait pattern, decreased fran      BALANCE Daily Assessment    Sitting - Static: Good (unsupported)  Sitting - Dynamic: Good (unsupported)  Standing - Static: Fair  Standing - Dynamic : Impaired       LOWER EXTREMITY EXERCISES Daily Assessment    Pt. Performed Motomed @ resistance level 3 x10 minutes to increase strength & endurance B LEs     Vital Signs:   Patient Vitals for the past 8 hrs:   Temp Pulse Resp BP SpO2   12/04/17 0813 98.8 °F (37.1 °C) 81 14 116/80 97 %         Pain level: no c/o pain    Patient education: pt. Educated on purpose of Motomed    Interdisciplinary Communication: RN made aware of nosebleed & provided packing    Pt. Left in w/c in room in NAD, call bell in reach. Assessment: Pt. Able to increase activity tolerance this session, but unable to increase gait distance from yesterday. Pt. Cont. To function below her baseline & benefits from cont. PT services to address. Plan of Care: Continue with POC and progress as tolerated.      Home Argueta, PT  12/4/2017

## 2017-12-04 NOTE — PROGRESS NOTES
PHYSICAL THERAPY DAILY NOTE  Time In: 1116  Time Out: 0409  Patient Seen For: AM;Other (see progress notes);Gait training; Therapeutic exercise;Transfer training    Subjective: Patient complained of heels feeling sore especially the left. Objective: No pain noted. Heels lifted during exs and used skate board for hip abd/add ex to keep pressure off heels. Other (comment) (Fall Risk)  GROSS ASSESSMENT Daily Assessment            BED/MAT MOBILITY Daily Assessment    Supine to Sit : 5 (Stand-by assistance)  Sit to Supine : 4 (Minimal assistance)       TRANSFERS Daily Assessment    Transfer Type: SPT with walker  Transfer Assistance : 4 (Minimal assistance)  Sit to Stand Assistance: Contact guard assistance  Car Transfers: Not tested       GAIT Daily Assessment    Amount of Assistance: 0 (Not tested)  Distance (ft): 20 Feet (ft) (x2, 10'x1)  Assistive Device: Gait belt;Walker, rolling       STEPS or STAIRS Daily Assessment    Level of Assist : 0 (Not tested)       BALANCE Daily Assessment    Sitting - Static: Good (unsupported)  Sitting - Dynamic: Good (unsupported)  Standing - Static: Fair  Standing - Dynamic : Impaired       WHEELCHAIR MOBILITY Daily Assessment            LOWER EXTREMITY EXERCISES Daily Assessment    Extremity: Both  Exercise Type #1: Supine lower extremity strengthening  Sets Performed: 2  Reps Performed: 10  Level of Assist: Minimal assistance          Assessment: Patient seems very down about her health. Plan of Care: Continue with plan of care to reach PT goals. Returned to room with call bell at reach and lunch set up.     Panchito Guevara, PTA  12/4/2017

## 2017-12-04 NOTE — PROGRESS NOTES
MD Claire,   Medical Director  [address] [facility], [address] [facility]  Tel: [phone]       [facility] PROGRESS NOTE    Sowmya Lo  Admit Date: 11/29/2017  Admit Diagnosis: DEBILITY; Renal failure (ARF), acute on chronic ([facility] Usha Sharma*  Chief Complaint : Gait dysfunction secondary to below. Admit Diagnosis: [facility] ([facility]  CAD (coronary artery disease) (11/27/2015)  S/P complex PCI and stable on ASA and Brilinta  Unstable angina ([facility] (2/1/2016)  Acute/ Chronic anemia (11/18/2017)  ESRD (end stage renal disease) On PD/ CRRT  Pain  DVT risk  Acute Rehab Dx:  Debility    Weakness  Gait impairment  deconditioning  Mobility and ambulation deficits  Self Care/ADL deficits     Subjective     Patient seen and examined. Vss.afebrile. Nose bleed in am, controlled. Still with edema BLE. No acute complaints. PT, OT well tolerated. Nephrology following. TTS schedule to be followed for HD. PD cath not well functioning. Resume PT/ OT, tolerated well today.      Objective:     Current Facility-Administered Medications   Medication Dose Route Frequency    polyethylene glycol (MIRALAX) packet 17 g  17 g Oral DAILY    acetaminophen (TYLENOL) tablet 650 mg  650 mg Oral Q4H PRN    HYDROcodone-acetaminophen (NORCO) 7.5-325 mg per tablet 1 Tab  1 Tab Oral Q4H PRN    HYDROcodone-acetaminophen (NORCO) 5-325 mg per tablet 1 Tab  1 Tab Oral Q4H PRN    LORazepam (ATIVAN) tablet 1 mg  1 mg Oral Q6H PRN    sodium chloride (NS) flush 5-10 mL  5-10 mL IntraVENous PRN    amLODIPine (NORVASC) tablet 5 mg  5 mg Oral DAILY    aspirin delayed-release tablet 81 mg  81 mg Oral DAILY    calcitRIOL (ROCALTROL) capsule 0.25 mcg  0.25 mcg Oral DAILY    cyanocobalamin (VITAMIN B12) injection 1,000 mcg  1,000 mcg IntraMUSCular Q7D    epoetin toya (EPOGEN;PROCRIT) injection 20,000 Units  20,000 Units SubCUTAneous Q MON, WED & FRI    gentamicin (GARAMYCIN) 0.1 % cream   Topical DAILY PRN  levothyroxine (SYNTHROID) tablet 150 mcg  150 mcg Oral ACB    linaclotide (LINZESS) capsule 145 mcg (Patient Supplied)  145 mcg Oral DAILY    magnesium oxide (MAG-OX) tablet 400 mg  400 mg Oral DAILY    metoclopramide HCl (REGLAN) tablet 5 mg  5 mg Oral BID    metoprolol tartrate (LOPRESSOR) tablet 12.5 mg  12.5 mg Oral DAILY    multivitamin w ZN (STRESSTABS W ZINC) tablet  1 Tab Oral DAILY    nitroglycerin (NITROSTAT) tablet 0.4 mg  0.4 mg SubLINGual PRN    ondansetron (ZOFRAN ODT) tablet 4 mg  4 mg Oral TID PRN    pantoprazole (PROTONIX) tablet 40 mg  40 mg Oral ACB&D    polyethylene glycol (MIRALAX) packet 17 g  17 g Oral DAILY PRN    promethazine (PHENERGAN) tablet 25 mg  25 mg Oral Q6H PRN    ranolazine ER (RANEXA) tablet 1,000 mg  1,000 mg Oral BID    rosuvastatin (CRESTOR) tablet 20 mg  20 mg Oral QHS    senna-docusate (PERICOLACE) 8.6-50 mg per tablet 1 Tab  1 Tab Oral DAILY    sevelamer (RENAGEL) tablet 800 mg  800 mg Oral TID WITH MEALS    sucralfate (CARAFATE) 100 mg/mL oral suspension 1 g  1 g Oral AC&HS    ticagrelor (BRILINTA) tablet 90 mg  90 mg Oral BID    torsemide (DEMADEX) tablet 100 mg  100 mg Oral BID     Review of Systems:   Denies chest pain, shortness of breath, cough, headache, visual problems, abdominal pain, dysurea, calf pain. Pertinent positives are as noted in the medical records and unremarkable otherwise. Visit Vitals    /80    Pulse 81    Temp 98.8 °F (37.1 °C)    Resp 14    Wt 221 lb 3.2 oz (100.3 kg)    SpO2 97%    BMI 31.74 kg/m2   Physical Exam:   General: Alert and age appropriately oriented. No acute cardio respiratory distress. HEENT: Normocephalic,no scleral icterus  Oral mucosa moist without cyanosis   Lungs: Clear to auscultation  bilaterally. Respiration even and unlabored   Heart: Regular rate and rhythm, S1, S2   No  murmurs, clicks, rub or gallops   Abdomen: Soft, non-tender, nondistended. Bowel sounds present. No organomegaly. Genitourinary: defered   Neuromuscular:      NANCI, EOMI  + mild generalized weakness 4+ to 5-/5. BUE, BLE, more proximal.   Sensation grossly intact to soft touch. Skin/extremity: No rashes, no erythema. 2+edema. Wound covered.                                                                                  Functional Assessment:  Gross Assessment  AROM: Generally decreased, functional (12/03/17 1100)  Strength: Generally decreased, functional (12/03/17 1100)  Coordination: Generally decreased, functional (12/03/17 1100)       Balance  Sitting - Static: Good (unsupported) (12/03/17 1100)  Sitting - Dynamic: Fair (occasional) (12/03/17 1100)  Standing - Static: Fair (12/03/17 1100)           Toileting  Adaptive Equipment: Grab bars; Walker (12/04/17 1025)         Felton Fall Risk Assessment:  Marie Fall Risk  Mobility: Ambulates or transfers with assist devices or assistance/unsteady gait (12/04/17 0747)  Mobility Interventions: Patient to call before getting OOB (12/04/17 0747)  Mentation: Alert, oriented x 3 (12/04/17 0747)  Medication: Patient receiving anticonvulsants, sedatives(tranquilizers), psychotropics or hypnotics, hypoglycemics, narcotics, sleep aids, antihypertensives, laxatives, or diuretics (12/04/17 0747)  Medication Interventions: Patient to call before getting OOB; Teach patient to arise slowly (12/04/17 0747)  Elimination: Needs assistance with toileting (12/04/17 0747)  Elimination Interventions: Call light in reach (12/04/17 0747)  Prior Fall History: No (12/04/17 0747)  Total Score: 3 (12/04/17 0747)  Standard Fall Precautions: Yes (12/03/17 0720)  High Fall Risk: Yes (12/04/17 0747)     Speech Assessment:         Ambulation:  Gait  Distance (ft): 80 Feet (ft) (12/03/17 1100)  Assistive Device: Walker, rolling (WC behind) (12/03/17 1100)     Labs/Studies:  Recent Results (from the past 72 hour(s))   CBC WITH AUTOMATED DIFF    Collection Time: 12/04/17  6:03 AM   Result Value Ref Range WBC 7.5 4.3 - 11.1 K/uL    RBC 2.99 (L) 4.05 - 5.25 M/uL    HGB 8.7 (L) 11.7 - 15.4 g/dL    HCT 28.2 (L) 35.8 - 46.3 %    MCV 94.3 79.6 - 97.8 FL    MCH 29.1 26.1 - 32.9 PG    MCHC 30.9 (L) 31.4 - 35.0 g/dL    RDW 18.8 (H) 11.9 - 14.6 %    PLATELET 103 855 - 420 K/uL    MPV 9.1 (L) 10.8 - 14.1 FL    DF AUTOMATED      NEUTROPHILS 73 43 - 78 %    LYMPHOCYTES 17 13 - 44 %    MONOCYTES 7 4.0 - 12.0 %    EOSINOPHILS 3 0.5 - 7.8 %    BASOPHILS 0 0.0 - 2.0 %    IMMATURE GRANULOCYTES 0 0.0 - 5.0 %    ABS. NEUTROPHILS 5.4 1.7 - 8.2 K/UL    ABS. LYMPHOCYTES 1.3 0.5 - 4.6 K/UL    ABS. MONOCYTES 0.6 0.1 - 1.3 K/UL    ABS. EOSINOPHILS 0.3 0.0 - 0.8 K/UL    ABS. BASOPHILS 0.0 0.0 - 0.2 K/UL    ABS. IMM.  GRANS. 0.0 0.0 - 0.5 K/UL       Assessment:     Problem List as of 12/4/2017  Date Reviewed: 3/2/2017          Codes Class Noted - Resolved    Weakness of both legs ICD-10-CM: R29.898  ICD-9-CM: 729.89  11/29/2017 - Present        Renal failure (ARF), acute on chronic ([facility] ICD-10-CM: N17.9, N18.9  ICD-9-CM: 584.9, 585.9  11/29/2017 - Present        Elevated troponin ICD-10-CM: R74.8  ICD-9-CM: 790.6  11/18/2017 - Present        Chest pain ICD-10-CM: R07.9  ICD-9-CM: 786.50  11/18/2017 - Present        CKD (chronic kidney disease) ICD-10-CM: N18.9  ICD-9-CM: 585.9  11/18/2017 - Present        Chronic anemia ICD-10-CM: D64.9  ICD-9-CM: 285.9  11/18/2017 - Present        ESRD (end stage renal disease) ([facility] ICD-10-CM: N18.6  ICD-9-CM: 585.6  11/18/2017 - Present        Abdominal pain ICD-10-CM: R10.9  ICD-9-CM: 789.00  9/18/2017 - Present        H/O TIA (transient ischemic attack) and stroke ICD-10-CM: Z86.73  ICD-9-CM: V12.54  4/13/2017 - Present        S/P PTCA (percutaneous transluminal coronary angioplasty) ICD-10-CM: Z98.61  ICD-9-CM: V45.82  4/13/2017 - Present        Mixed hyperlipidemia ICD-10-CM: E78.2  ICD-9-CM: 272.2  4/13/2017 - Present        Vision changes ICD-10-CM: H53.9  ICD-9-CM: 368.9  3/26/2017 - Present Dizziness ICD-10-CM: R42  ICD-9-CM: 780.4  3/26/2017 - Present        Refusal of blood transfusions as patient is Yarsanism (Chronic) ICD-10-CM: Z53.1  ICD-9-CM: V62.6  8/25/2016 - Present        GERD (gastroesophageal reflux disease) ICD-10-CM: K21.9  ICD-9-CM: 530.81  8/8/2016 - Present        Unspecified sleep apnea ICD-10-CM: G47.30  ICD-9-CM: 780.57  8/8/2016 - Present    Overview Signed 8/8/2016 11:09 AM by Glory Galvan     uses cpap machine             CVA (cerebral vascular accident) Hx of TIA ICD-10-CM: I63.9  ICD-9-CM: 434.91  2/22/2016 - Present        Unstable angina ([address] ICD-10-CM: I20.0  ICD-9-CM: 411.1  2/1/2016 - Present        ESRD on peritoneal dialysis [facility]) (Chronic) ICD-10-CM: N18.6, Z99.2  ICD-9-CM: 585.6, V45.11  2/1/2016 - Present        Ischemic cardiomyopathy ICD-10-CM: I25.5  ICD-9-CM: 414.8  12/29/2015 - Present        HLD (hyperlipidemia) ICD-10-CM: E78.5  ICD-9-CM: 272.4  12/29/2015 - Present        Depression ICD-10-CM: F32.9  ICD-9-CM: 379  12/29/2015 - Present        CAD (coronary artery disease) ICD-10-CM: I25.10  ICD-9-CM: 414.00  11/27/2015 - Present        Debility ICD-10-CM: R53.81  ICD-9-CM: 799.3  5/5/2015 - Present        Hypertension (Chronic) ICD-10-CM: I10  ICD-9-CM: 401.9  4/28/2015 - Present        Anemia of chronic renal failure (Chronic) ICD-10-CM: N18.9, D63.1  ICD-9-CM: 285.21, 585.9  4/28/2015 - Present        Hypothyroidism (Chronic) ICD-10-CM: E03.9  ICD-9-CM: 244.9  4/28/2015 - Present        RESOLVED: Postural hypotension ICD-10-CM: I95.1  ICD-9-CM: 458.0  8/9/2016 - 3/26/2017        RESOLVED: Chest pain ICD-10-CM: R07.9  ICD-9-CM: 786.50  8/8/2016 - 3/26/2017        RESOLVED: Nausea ICD-10-CM: R11.0  ICD-9-CM: 787.02  8/8/2016 - 3/26/2017        RESOLVED: S/P PTCA (percutaneous transluminal coronary angioplasty); LAD PTCA of  ISR 11/27/15 ICD-10-CM: Z98.61  ICD-9-CM: V45.82  5/24/2016 - 3/26/2017        RESOLVED: TIA (transient ischemic attack) ICD-10-CM: G45.9  ICD-9-CM: 435.9  2/10/2016 - 3/26/2017        RESOLVED: Edema ICD-10-CM: R60.9  ICD-9-CM: 782.3  12/29/2015 - 3/26/2017        RESOLVED: Anterior myocardial infarction [facility]) ICD-10-CM: I21.09  ICD-9-CM: 410.10  5/21/2015 - 3/26/2017        RESOLVED: STEMI (ST elevation myocardial infarction) ([facility] ICD-10-CM: I21.3  ICD-9-CM: 410.90  4/28/2015 - 2/1/2016              Plan:      CAD (coronary artery disease) (11/27/2015)/ S/P complex PCI / Unstable angina ([facility] (2/1/2016)  - on ASA and Brilinta  -hypertension - continue norvasc, lopressor  - continue ranexa, statin  - demadex continued. Patient almost aneuric. Will discuss with nephrology need for demadex?     Acute/ Chronic anemia (11/18/2017) - continue to monitor.   - conitnue epogen     ESRD (end stage renal disease) On PD/ CRRT  - PD resumed. - monitor fluids status. Nephrology managing. Will follow at IRU.   - 11/30 problem with PD/ catheter. Nephrologist aware. Will need to hold PT due to femoral cath precautions. nephrologist to follow. May need to have tunneled cath. - 12/4 - continue TTS HD. PD per nephrology direction.     Pneumonia prophylaxis- Insentive spirometer every hour while awake     DVT risk / DVT Prophylaxis- Will require daily physician exam to assess for signs and symptoms as patient is at increased risk for of thromboembolism. Mobilization as tolerated. Intermittent pneumatic compression devices when in bed Thigh-high or knee-high thromboembolic deterrent hose when out of bed. Lovenox subq daily.      Pain Control: stable, mild-to-moderate joint symptoms intermittently, reasonably well controlled by PRN meds. Will require regular pain assessment and comprenhensive pain management,      Wound Care: Monitor wound status daily per staff and physician. At risk for failure. Will require 24/7 rehab nursing. Keep wound clean and dry      bowel program - as needed     GERD - resume PPI.  At times may need additional antacids, Maalox prn.  - continue sucralfate     Hypothyroid  - synthroid.     Time spent was 25 minutes with over 1/2 in direct patient care/examination, consultation and coordination of care.      Signed By: Vimal Willard MD     December 4, 2017

## 2017-12-04 NOTE — PROGRESS NOTES
TOILETING:  Does patient need assist with clothing management and/or pericare? Yes     TOILET TRANSFER:  Pt requires minimal assistance. Pt uses wheelchair and walker.     BLADDER:  Pt does not have a castillo catheter that staff manages. Pt does not take medication. Pt is continent. of bladder and voids in toilet  Pt requires staff to position device Pt has had 0 bladder accidents during this shift requiring minimal assistance to clean up. (An accident is when the episode is not contained in a brief AND/OR the clothing/linen requires changing/cleaning up.)     BOWEL:  Pt does take medication. Pt is continent of bowel and uses toilet. Pt requires staff to position device    Pt has had 0 bowel accidents during this shift requiring minimal assistance from staff to clean up.  (An accident is when the episode is not contained in a brief AND/OR the clothing/linen requires changing/cleaning up.)

## 2017-12-04 NOTE — PROGRESS NOTES
12/04/17 1025   Time Spent With Patient   Time In 0832   Time Out 0920   Patient Seen For: AM;ADLs   Grooming   Grooming Assistance  S   Comments Setup seated at sink   Upper Body Bathing   Bathing Assistance, Upper S   Position Performed Seated in chair   Chinyere Victoria Other (comment)  (Wheelchair at sink)   [address] [facility], Lower  Mod A   Position Performed Seated in chair;Standing   Comments Introduced long handled sponge to patient, however required assist to bathe lower LEs d/t time expiring   Toileting   Toileting Assistance (FIM Score) S   Adaptive Equipment Grab bars; Walker   Upper Body Dressing    Dressing Assistance  S   Comments Setup   Lower Body Dressing    Dressing Assistance  Max A   Leg Crossed Method Used No   Position Performed Seated in chair;Standing   Comments Assist to start pants and underwear over feet and don socks d/t time expiring this session   Functional Transfers   Toilet Transfer  Grab bars;Stand pivot transfer with walker   Amount of Assistance Required CGA       S: \"I can't reach my feet. \" Agreeable to therapy. Focus of session was on ADL and functional mobility. Patient was able to ambulate ~10 feet using a RW with CGA. Pain not indicated. Collaborated with PT, Dell Glez and confirmed patient is on track to reach goals as documented in the care plan. Patient tolerated session well, but activity tolerance, balance, functional mobility, strength are still below baseline and require skilled facilitation to successfully and safely complete ADL's and transfers. Patient ended session in wheelchair with PT, Dell Glez to assume care.      Kathrin Barfield, OTR/L

## 2017-12-04 NOTE — PROGRESS NOTES
OT Daily Note    Time In 1032   Time Out 1115     Subjective: \"I've seen that [reacher] on TV. \"  Pain: None indicated    Precautions: Other (comment) (Fall Risk)    Strengthening   Patient completed RUE strengthening task seated at tabletop. Patient retrieved clothespins of varied resistance from tabletop, attached to horizontal poles at midline using RUE according to 5-part color pattern with intermittent rest breaks (d/t fatigue) and minimal cues for adherence to pattern. Patient then moved pins from horizontal to vertical pole for second trial, and from vertical pole down to container on tabletop for third trial.     Self-Care   Patient educated regarding use of reacher and sock aid for increased independence with LB dressing. Patient verbalized and demonstrated understanding, doffing B socks seated in wheelchair using reacher with minimal cues for problem solving, and donned B socks using soft sock aid x 1, rigid sock aid x 2 with increased success using rigid sock aid (minimal assist to help soft sock aid over R foot). Activity Tolerance   Patient completed standing trials at elevated tabletop for [facility] task. Patient transfers sit to stand and maintains standing balance with CGA/SBA x ~3 minute trials before requiring seated rest breaks. Patient completed small pegboard task at tabletop using R hand according to visual pattern with 100% accuracy provided increased time for manipulating pegs. Assessment: Patient tolerated well. To reinforce use of AE during ADL sessions. Education: AE for LB dressing, purpose of therapy  Interdisciplinary Communication: Collaborated with Drew TARIQ and agreed patient is progressing well and on-track to meet goals as stated in [address]. Plan: Continue to address ADL/IADL, functional mobility, activity tolerance, balance, strengthening, coordination, education, cognition.       Daymon Rubinstein, OTR/L

## 2017-12-04 NOTE — PROGRESS NOTES
Problem: Falls - Risk of  Goal: *Absence of Falls  Document Harrison Fall Risk and appropriate interventions in the flowsheet.    Outcome: Progressing Towards Goal  Fall Risk Interventions:  Mobility Interventions: Patient to call before getting OOB         Medication Interventions: Patient to call before getting OOB, Teach patient to arise slowly    Elimination Interventions: Call light in reach

## 2017-12-04 NOTE — PROGRESS NOTES
PHYSICAL THERAPY DAILY NOTE  Time In: 1345  Time Out: 1434  Patient Seen For: PM;Gait training; Therapeutic exercise;Transfer training    Subjective: Pt. Reports being tired this afternoon, but feels able to cont. W/ another PT session today. Objective: Other (comment) (Fall Risk)    TRANSFERS Daily Assessment    Transfer Type: SPT with walker  Transfer Assistance : 4 (Contact guard assistance)  Sit to Stand Assistance: Minimal assistance  Car Transfers: Not tested       GAIT Daily Assessment   Worked on transitioning from w/c to another chair in another part of room to simulate functional, short distance gait in a home environment Amount of Assistance: 4 (Contact guard assistance)  Distance (ft): 25 Feet (ft) (x2)  Assistive Device: Gait belt;Walker, rolling       BALANCE Daily Assessment   Pt. Performed dynamic standing reach & retrieval activity, reaching out of POLLO as well as across midline during functional activities. Pt. Required CGA for stability  Requires multiple seated rest breaks secondary to fatigue Sitting - Static: Good (unsupported)  Sitting - Dynamic: Good (unsupported)  Standing - Static: Fair  Standing - Dynamic : Impaired       LOWER EXTREMITY EXERCISES Daily Assessment    Extremity: Both  Exercise Type #1: Seated lower extremity strengthening  Sets Performed: 1  Reps Performed: 15  Level of Assist: Supervision     SEATED EXERCISES Sets Reps Comments   Ankle Pumps 1 15    Hip Flexion 1 15    Long Arc Quads 1 15    Hip Adduction/Ball Squeeze 1 15    Hamstring Curls with green Theraband 1 15    Hip Extension with green Theraband 1 15      Vital Signs:   Patient Vitals for the past 8 hrs:   Temp Pulse Resp BP SpO2   12/04/17 0813 98.8 °F (37.1 °C) 81 14 116/80 97 %         Pain level: no c/o pain    Patient education: pt. Educated on purpose of standing balance activity in promoting balance & endurance    Interdisciplinary Communication: collaborated w/ OT regarding pt. Progress    Pt.  Left up in recliner in room in NAD, call bell in reach. Assessment: Pt. Demonstrating improved endurance w/ standing activities. Cont. To fatigue quickly w/ ambulation & is not functioning at her baseline. Therefore, pt. Cont. To benefit from PT services to address. Plan of Care: Continue with POC and progress as tolerated.      Asiya Kumar, PT  12/4/2017

## 2017-12-05 PROCEDURE — 97110 THERAPEUTIC EXERCISES: CPT

## 2017-12-05 PROCEDURE — 90935 HEMODIALYSIS ONE EVALUATION: CPT

## 2017-12-05 PROCEDURE — 97530 THERAPEUTIC ACTIVITIES: CPT

## 2017-12-05 PROCEDURE — 74011250637 HC RX REV CODE- 250/637: Performed by: PHYSICAL MEDICINE & REHABILITATION

## 2017-12-05 PROCEDURE — 97116 GAIT TRAINING THERAPY: CPT

## 2017-12-05 PROCEDURE — 5A1D70Z PERFORMANCE OF URINARY FILTRATION, INTERMITTENT, LESS THAN 6 HOURS PER DAY: ICD-10-PCS | Performed by: INTERNAL MEDICINE

## 2017-12-05 PROCEDURE — 65310000000 HC RM PRIVATE REHAB

## 2017-12-05 RX ADMIN — TORSEMIDE 100 MG: 100 TABLET ORAL at 17:34

## 2017-12-05 RX ADMIN — TICAGRELOR 90 MG: 90 TABLET ORAL at 21:47

## 2017-12-05 RX ADMIN — RENAGEL 800 MG: 400 TABLET ORAL at 12:00

## 2017-12-05 RX ADMIN — METOCLOPRAMIDE HYDROCHLORIDE 5 MG: 10 TABLET ORAL at 17:33

## 2017-12-05 RX ADMIN — PANTOPRAZOLE SODIUM 40 MG: 40 TABLET, DELAYED RELEASE ORAL at 17:33

## 2017-12-05 RX ADMIN — RENAGEL 800 MG: 400 TABLET ORAL at 17:33

## 2017-12-05 RX ADMIN — SUCRALFATE 1 G: 1 SUSPENSION ORAL at 21:47

## 2017-12-05 RX ADMIN — SUCRALFATE 1 G: 1 SUSPENSION ORAL at 05:59

## 2017-12-05 RX ADMIN — ROSUVASTATIN CALCIUM 20 MG: 20 TABLET, FILM COATED ORAL at 21:47

## 2017-12-05 RX ADMIN — RANOLAZINE 1000 MG: 500 TABLET, FILM COATED, EXTENDED RELEASE ORAL at 17:33

## 2017-12-05 RX ADMIN — SUCRALFATE 1 G: 1 SUSPENSION ORAL at 17:33

## 2017-12-05 RX ADMIN — LEVOTHYROXINE SODIUM 150 MCG: 50 TABLET ORAL at 05:59

## 2017-12-05 RX ADMIN — PANTOPRAZOLE SODIUM 40 MG: 40 TABLET, DELAYED RELEASE ORAL at 06:02

## 2017-12-05 NOTE — PROGRESS NOTES
MD Claire,   Medical Director  [address] [facility], [address] [facility]  Tel: [phone]       D PROGRESS NOTE    Sharif Orozco  Admit Date: 11/29/2017  Admit Diagnosis: DEBILITY; Renal failure (ARF), acute on chronic ([facility] [address] Jennifer Dodson*  Chief Complaint : Gait dysfunction secondary to below. Admit Diagnosis: Unstable angina ([facility] [address]  CAD (coronary artery disease) (11/27/2015)  S/P complex PCI and stable on ASA and Brilinta  Unstable angina ([facility] [address] (2/1/2016)  Acute/ Chronic anemia (11/18/2017)  ESRD (end stage renal disease) On PD/ CRRT  Pain  DVT risk  Acute Rehab Dx:  Debility    Weakness  Gait impairment  deconditioning  Mobility and ambulation deficits  Self Care/ADL deficits     Subjective     Patient seen and examined. Vss.afebrile. No acute complaints. PT, OT well tolerated. Nephrology continuing HD via p Lt femoral perm cath - working well   Gait and transfers with CGA. Patient shows improved endurance with standing activities.       Objective:     Current Facility-Administered Medications   Medication Dose Route Frequency    rosuvastatin (CRESTOR) tablet 20 mg  20 mg Oral QHS    sucralfate (CARAFATE) 100 mg/mL oral suspension 1 g  1 g Oral AC&HS    polyethylene glycol (MIRALAX) packet 17 g  17 g Oral DAILY    acetaminophen (TYLENOL) tablet 650 mg  650 mg Oral Q4H PRN    HYDROcodone-acetaminophen (NORCO) 7.5-325 mg per tablet 1 Tab  1 Tab Oral Q4H PRN    HYDROcodone-acetaminophen (NORCO) 5-325 mg per tablet 1 Tab  1 Tab Oral Q4H PRN    LORazepam (ATIVAN) tablet 1 mg  1 mg Oral Q6H PRN    sodium chloride (NS) flush 5-10 mL  5-10 mL IntraVENous PRN    amLODIPine (NORVASC) tablet 5 mg  5 mg Oral DAILY    aspirin delayed-release tablet 81 mg  81 mg Oral DAILY    calcitRIOL (ROCALTROL) capsule 0.25 mcg  0.25 mcg Oral DAILY    cyanocobalamin (VITAMIN B12) injection 1,000 mcg  1,000 mcg IntraMUSCular Q7D    epoetin toya (EPOGEN;PROCRIT) injection 20,000 Units 20,000 Units SubCUTAneous Q MON, WED & FRI    gentamicin (GARAMYCIN) 0.1 % cream   Topical DAILY PRN    levothyroxine (SYNTHROID) tablet 150 mcg  150 mcg Oral ACB    linaclotide (LINZESS) capsule 145 mcg (Patient Supplied)  145 mcg Oral DAILY    magnesium oxide (MAG-OX) tablet 400 mg  400 mg Oral DAILY    metoclopramide HCl (REGLAN) tablet 5 mg  5 mg Oral BID    metoprolol tartrate (LOPRESSOR) tablet 12.5 mg  12.5 mg Oral DAILY    multivitamin w ZN (STRESSTABS W ZINC) tablet  1 Tab Oral DAILY    nitroglycerin (NITROSTAT) tablet 0.4 mg  0.4 mg SubLINGual PRN    ondansetron (ZOFRAN ODT) tablet 4 mg  4 mg Oral TID PRN    pantoprazole (PROTONIX) tablet 40 mg  40 mg Oral ACB&D    polyethylene glycol (MIRALAX) packet 17 g  17 g Oral DAILY PRN    promethazine (PHENERGAN) tablet 25 mg  25 mg Oral Q6H PRN    ranolazine ER (RANEXA) tablet 1,000 mg  1,000 mg Oral BID    senna-docusate (PERICOLACE) 8.6-50 mg per tablet 1 Tab  1 Tab Oral DAILY    sevelamer (RENAGEL) tablet 800 mg  800 mg Oral TID WITH MEALS    ticagrelor (BRILINTA) tablet 90 mg  90 mg Oral BID    torsemide (DEMADEX) tablet 100 mg  100 mg Oral BID     Review of Systems:   Denies chest pain, shortness of breath, cough, headache, visual problems, abdominal pain, dysurea, calf pain. Pertinent positives are as noted in the medical records and unremarkable otherwise. Visit Vitals    /77    Pulse 75    Temp 98.3 °F (36.8 °C)    Resp 16    Wt 223 lb 9.6 oz (101.4 kg)    SpO2 96%    BMI 32.08 kg/m2   Physical Exam:   General: Alert and age appropriately oriented. No acute cardio respiratory distress. HEENT: Normocephalic,no scleral icterus  Oral mucosa moist without cyanosis   Lungs: Clear to auscultation  bilaterally. Respiration even and unlabored   Heart: Regular rate and rhythm, S1, S2   No  murmurs, clicks, rub or gallops   Abdomen: Soft, non-tender, nondistended. Bowel sounds present. No organomegaly.    Genitourinary: defered Neuromuscular:      NANCI, EOMI  + mild generalized weakness 4+ to 5-/5. BUE, BLE, more proximal.   Sensation grossly intact to soft touch. Skin/extremity: No rashes, no erythema. 2+edema. Wound covered.                                                                                  Functional Assessment:  Gross Assessment  AROM: Generally decreased, functional (12/03/17 1100)  Strength: Generally decreased, functional (12/03/17 1100)  Coordination: Generally decreased, functional (12/03/17 1100)       Balance  Sitting - Static: Good (unsupported) (12/04/17 1400)  Sitting - Dynamic: Good (unsupported) (12/04/17 1400)  Standing - Static: Fair (12/04/17 1400)  Standing - Dynamic : Impaired (12/04/17 1400)           Toileting  Adaptive Equipment: Grab bars; Walker (12/04/17 1025)         Hema Omeroben Fall Risk Assessment:  Hema Jamilben Fall Risk  Mobility: Ambulates or transfers with assist devices or assistance/unsteady gait (12/05/17 0705)  Mobility Interventions: Patient to call before getting OOB (12/04/17 2246)  Mentation: Alert, oriented x 3 (12/04/17 2246)  Medication: Patient receiving anticonvulsants, sedatives(tranquilizers), psychotropics or hypnotics, hypoglycemics, narcotics, sleep aids, antihypertensives, laxatives, or diuretics (12/04/17 2246)  Medication Interventions: Patient to call before getting OOB (12/04/17 2246)  Elimination: Needs assistance with toileting (12/04/17 2246)  Elimination Interventions: Call light in reach (12/04/17 2246)  Prior Fall History: No (12/04/17 2246)  Total Score: 3 (12/04/17 2246)  Standard Fall Precautions: Yes (12/03/17 0720)  High Fall Risk: Yes (12/04/17 2246)     Speech Assessment:         Ambulation:  Gait  Distance (ft): 25 Feet (ft) (x2) (12/04/17 1400)  Assistive Device: Gait belt;Walker, rolling (12/04/17 1400)     Labs/Studies:  Recent Results (from the past 72 hour(s))   CBC WITH AUTOMATED DIFF    Collection Time: 12/04/17  6:03 AM   Result Value Ref Range    WBC 7.5 4.3 - 11.1 K/uL    RBC 2.99 (L) 4.05 - 5.25 M/uL    HGB 8.7 (L) 11.7 - 15.4 g/dL    HCT 28.2 (L) 35.8 - 46.3 %    MCV 94.3 79.6 - 97.8 FL    MCH 29.1 26.1 - 32.9 PG    MCHC 30.9 (L) 31.4 - 35.0 g/dL    RDW 18.8 (H) 11.9 - 14.6 %    PLATELET 852 494 - 210 K/uL    MPV 9.1 (L) 10.8 - 14.1 FL    DF AUTOMATED      NEUTROPHILS 73 43 - 78 %    LYMPHOCYTES 17 13 - 44 %    MONOCYTES 7 4.0 - 12.0 %    EOSINOPHILS 3 0.5 - 7.8 %    BASOPHILS 0 0.0 - 2.0 %    IMMATURE GRANULOCYTES 0 0.0 - 5.0 %    ABS. NEUTROPHILS 5.4 1.7 - 8.2 K/UL    ABS. LYMPHOCYTES 1.3 0.5 - 4.6 K/UL    ABS. MONOCYTES 0.6 0.1 - 1.3 K/UL    ABS. EOSINOPHILS 0.3 0.0 - 0.8 K/UL    ABS. BASOPHILS 0.0 0.0 - 0.2 K/UL    ABS. IMM.  GRANS. 0.0 0.0 - 0.5 K/UL       Assessment:     Problem List as of 12/5/2017  Date Reviewed: 3/2/2017          Codes Class Noted - Resolved    Weakness of both legs ICD-10-CM: R29.898  ICD-9-CM: 729.89  11/29/2017 - Present        Renal failure (ARF), acute on chronic ([facility] [address] ICD-10-CM: N17.9, N18.9  ICD-9-CM: 584.9, 585.9  11/29/2017 - Present        Elevated troponin ICD-10-CM: R74.8  ICD-9-CM: 790.6  11/18/2017 - Present        Chest pain ICD-10-CM: R07.9  ICD-9-CM: 786.50  11/18/2017 - Present        CKD (chronic kidney disease) ICD-10-CM: N18.9  ICD-9-CM: 585.9  11/18/2017 - Present        Chronic anemia ICD-10-CM: D64.9  ICD-9-CM: 285.9  11/18/2017 - Present        ESRD (end stage renal disease) ([facility] [address] ICD-10-CM: N18.6  ICD-9-CM: 585.6  11/18/2017 - Present        Abdominal pain ICD-10-CM: R10.9  ICD-9-CM: 789.00  9/18/2017 - Present        H/O TIA (transient ischemic attack) and stroke ICD-10-CM: Z86.73  ICD-9-CM: V12.54  4/13/2017 - Present        S/P PTCA (percutaneous transluminal coronary angioplasty) ICD-10-CM: Z98.61  ICD-9-CM: V45.82  4/13/2017 - Present        Mixed hyperlipidemia ICD-10-CM: E78.2  ICD-9-CM: 272.2  4/13/2017 - Present        Vision changes ICD-10-CM: H53.9  ICD-9-CM: 368.9  3/26/2017 - Present Dizziness ICD-10-CM: R42  ICD-9-CM: 780.4  3/26/2017 - Present        Refusal of blood transfusions as patient is [place] (Chronic) ICD-10-CM: Z53.1  ICD-9-CM: V62.6  8/25/2016 - Present        GERD (gastroesophageal reflux disease) ICD-10-CM: K21.9  ICD-9-CM: 530.81  8/8/2016 - Present        Unspecified sleep apnea ICD-10-CM: G47.30  ICD-9-CM: 780.57  8/8/2016 - Present    Overview Signed 8/8/2016 11:09 AM by Donna Sun     uses cpap machine             CVA (cerebral vascular accident) Hx of TIA ICD-10-CM: I63.9  ICD-9-CM: 434.91  2/22/2016 - Present        Unstable angina ([address] ICD-10-CM: I20.0  ICD-9-CM: 411.1  2/1/2016 - Present        ESRD on peritoneal dialysis [facility]) (Chronic) ICD-10-CM: N18.6, Z99.2  ICD-9-CM: 585.6, V45.11  2/1/2016 - Present        Ischemic cardiomyopathy ICD-10-CM: I25.5  ICD-9-CM: 414.8  12/29/2015 - Present        HLD (hyperlipidemia) ICD-10-CM: E78.5  ICD-9-CM: 272.4  12/29/2015 - Present        Depression ICD-10-CM: F32.9  ICD-9-CM: 608  12/29/2015 - Present        CAD (coronary artery disease) ICD-10-CM: I25.10  ICD-9-CM: 414.00  11/27/2015 - Present        Debility ICD-10-CM: R53.81  ICD-9-CM: 799.3  5/5/2015 - Present        Hypertension (Chronic) ICD-10-CM: I10  ICD-9-CM: 401.9  4/28/2015 - Present        Anemia of chronic renal failure (Chronic) ICD-10-CM: N18.9, D63.1  ICD-9-CM: 285.21, 585.9  4/28/2015 - Present        Hypothyroidism (Chronic) ICD-10-CM: E03.9  ICD-9-CM: 244.9  4/28/2015 - Present        RESOLVED: Postural hypotension ICD-10-CM: I95.1  ICD-9-CM: 458.0  8/9/2016 - 3/26/2017        RESOLVED: Chest pain ICD-10-CM: R07.9  ICD-9-CM: 786.50  8/8/2016 - 3/26/2017        RESOLVED: Nausea ICD-10-CM: R11.0  ICD-9-CM: 787.02  8/8/2016 - 3/26/2017        RESOLVED: S/P PTCA (percutaneous transluminal coronary angioplasty); LAD PTCA of  ISR 11/27/15 ICD-10-CM: Z98.61  ICD-9-CM: V45.82  5/24/2016 - 3/26/2017        RESOLVED: TIA (transient ischemic attack) ICD-10-CM: G45.9  ICD-9-CM: 435.9  2/10/2016 - 3/26/2017        RESOLVED: Edema ICD-10-CM: R60.9  ICD-9-CM: 782.3  12/29/2015 - 3/26/2017        RESOLVED: Anterior myocardial infarction [facility]) ICD-10-CM: I21.09  ICD-9-CM: 410.10  5/21/2015 - 3/26/2017        RESOLVED: STEMI (ST elevation myocardial infarction) ([facility] [address] ICD-10-CM: I21.3  ICD-9-CM: 410.90  4/28/2015 - 2/1/2016              Plan:      CAD (coronary artery disease) (11/27/2015)/ S/P complex PCI / Unstable angina ([facility] [address] (2/1/2016)  - on ASA and Brilinta  -hypertension - continue norvasc, lopressor  - continue ranexa, statin  - demadex continued per nephrology.      Acute/ Chronic anemia (11/18/2017) - continue to monitor.   - conitnue epogen     ESRD (end stage renal disease) On PD/ CRRT  - PD resumed. - monitor fluids status. Nephrology managing. Will follow at IRU.   - 11/30 problem with PD/ catheter. Nephrologist aware. Will need to hold PT due to femoral cath precautions. nephrologist to follow. May need to have tunneled cath. - 12/5 - continue hD per nephrology direction via fem perm cath.      Pneumonia prophylaxis- Insentive spirometer every hour while awake     DVT risk / DVT Prophylaxis- continue daily physician exam to assess for signs and symptoms as patient is at increased risk for of thromboembolism. Mobilization as tolerated. Intermittent pneumatic compression devices when in bed Thigh-high or knee-high thromboembolic deterrent hose when out of bed.       Pain Control:  require regular pain assessment and comprenhensive pain management. no new painsource.      Wound Care: Monitor wound status daily per staff and physician. At risk for failure. Will require 24/7 rehab nursing. Keep wound clean and dry      bowel program - as needed     GERD - resume PPI. At times may need additional antacids, Maalox prn.  - continue sucralfate     Hypothyroid  - synthroid. Weakness/ impaired gait - at [id number] with mobility and ambulation.  Slow gains noted.      Time spent was 25 minutes with over 1/2 in direct patient care/examination, consultation and coordination of care.      Signed By: Jamarcus Church MD     December 5, 2017

## 2017-12-05 NOTE — PROGRESS NOTES
OT Daily Note  Time In 1345   Time Out 1448     Subjective: \"I want to do it and get better. \"   Pain: none reported  Education: hand placement during transfers   Interdisciplinary Communication: co-treat with PT for complex balance tasks   Precautions: Other (comment) (Fall Risk)  Location on arrival: up in recliner    Transfers Daily Assessment   Patient transferred recliner <> WC using SPT with RW with CGA and extra time. Patient completed functional mobility task around gym using rolling walker to retrieve objects from various heights, with 4 seated rest breaks. Focus was on functional reaching using rolling walker for household mobility tasks. Patient motivated and willing to push herself despite being fatigued following dialysis. Balance Daily Assessment   Patient completed static balance task with focus on reaching off base of support using RUE to follow written instructions using medium sized pegs, with no errors and multiple seated rest breaks. Completed with constant CGA from PT and OT. Patient completed dynamic standing task on 2 inch dynamic foam pad with constant CGA from both PT and OT while engaging in peg task. Required multiple seated rest breaks during dynamic standing task as well. Good performance with both static standing on floor and dynamic standing on balance pad, but limited by fatigue. Patient was left seated up in recliner with call light/all needs in reach and in no distress. Assessment: See above. Remains a good candidate for rehab and is very motivated. Plan: Continue OT POC with focus on ADL/IADL skills, functional transfers, functional mobility, coordination, strength, static and dynamic balance, and activity tolerance to maximize safety and independence with ADLs and functional transfers.      Raffi Blanton MS, OTR/L  12/5/2017

## 2017-12-05 NOTE — PROGRESS NOTES
PHYSICAL THERAPY DAILY NOTE  Time In: 1345  Time Out: 1448  Patient Seen For: PM;Balance activities;Gait training;Transfer training    Subjective: \"I feel much better after my nap. Thanks for letting me rest.\"         Objective: Other (comment) (Fall Risk)    BED/MAT MOBILITY Daily Assessment    Sit to Supine : 5 (Supervision) (HOB elevated)       TRANSFERS Daily Assessment   Performed repeated sit to/from stand transfers from w/c during functional reaching activities Transfer Type: SPT with walker  Transfer Assistance : 4 (Contact guard assistance)  Sit to Stand Assistance: Supervision       GAIT Daily Assessment   Pt. Worked on functional ambulation skills, ambulating to objects & retrieving them to simulate household ambulation Amount of Assistance: 4 (Contact guard assistance)  Distance (ft): 20 Feet (ft) (x4)  Assistive Device: Gait belt;Walker, rolling       BALANCE Daily Assessment   Pt. Performed standing reach & retrieval activity to focus on dynamic standing balance - first on level ground & then progressing to balance pad for uneven surface challenge. Pt. Performed w/ CGA & able to reach out of POLLO & across midline w/o LOB. Pt. Demonstrated slow excursion out of POLLO, but has adequate hip & ankle strategy. Sitting - Static: Good (unsupported)  Sitting - Dynamic: Good (unsupported)  Standing - Static: Fair  Standing - Dynamic : Impaired       Vital Signs: /70  Pulse 69  Temp 98.3 °F (36.8 °C)  Resp 16  Wt 101.4 kg (223 lb 9.6 oz)  SpO2 96%  BMI 32.08 kg/m2    Pain level: no c/o pain    Patient education: pt. Educated on purpose of balance activities this visit    Interdisciplinary Communication: provided co-treatment this visit as pt. Unable to tolerate separate sessions secondary to excessive fatigue after HD. PT focused on gait & transfer skills while OT focused on UE strengthening during functional activities. Pt. Left up in recliner in NAD, call bell in reach.            Assessment: Pt. Able to progress towards more challenging balance activities this visit & demonstrating improved transfers. However, pt. Fatigues very easily, requiring frequent rest breaks. Pt. Is not yet able to mobilize @ household level independently, which she must to do to return home alone. Therefore, pt. Cont. To benefit from PT services to address. Plan of Care: Continue with POC and progress as tolerated.      Usha Fan, PT  12/5/2017

## 2017-12-05 NOTE — PROGRESS NOTES
OT Daily Note    Time In 0832   Time Out 0915     Subjective: \"My [R shoulder] is getting tired. \"  Pain: None indicated    Precautions: Other (comment) (Fall Risk)    Strengthening/Coordination   Patient completed RUE strengthening/coordination task seated in bed in dialysis. Patient continued small pegboard task initiated last OT session, using R hand to retrieve small pegs and insert into board according to visual pattern. Patient completed R shoulder flexion to access pegs forward on tabletop. Patient completed pattern with decreased problem solving as task progressed requiring moderate-maximal cues to problem solve placement for final ~10 pegs. Patient removed all pegs from board and returned to container on tabletop using R hand upon completion of task. Assessment: Patient tolerated well, however demonstrated poor visuospatial reasoning as activity progressed, possibly d/t either fatigue or anxiety. Education: Purpose of therapy  Interdisciplinary Communication: Collaborated with Tracee Chavez and agreed patient is progressing well and on-track to meet goals as stated in POC. Plan: Continue to address ADL/IADL, functional mobility, activity tolerance, balance, strengthening, coordination, education, cognition.       Jessica Ferrari, OTR/L

## 2017-12-05 NOTE — PROGRESS NOTES
End Of Shift Functional Summary, Nursing        TOILETING:  Does patient need assist with clothing management and/or pericare? No     TOILET TRANSFER:  Pt requires minimal assistance. Pt uses walker.      BLADDER:  Pt does not have a castillo catheter that staff manages. Pt does take medication. Pt is continent. of bladder and voids in toilet but also has oliguria  Pt requires staff to position device Pt has had 0 bladder accidents during this shift.     BOWEL:  Pt does take medication. Pt is continent of bowel and uses toilet. Pt requires staff to position device    Pt has had 0 bowel accidents during this shift.     BED/CHAIR TRANSFER  Pt requires minimal assistance. Patient requires the assistance of 1 staff member(s). Pt uses walker     EATING  Pt requires no assistance. Pt does not wear dentures. Documentation reviewed and plan of care discussed/reviewed with patient, physician, therapists, oncoming nurse, patient assistant during the shift.

## 2017-12-05 NOTE — PROGRESS NOTES
Hemodialysis Rounding Note    Subjective:     Symone Phillips is a 80 y.o.  who presents with DEBILITY  Renal failure (ARF), acute on chronic (HCC)  Weakness of both legs. The patient is dialyzing utilizing the following method:Intermittent Hemodialysis  Artificial Kidney Dialyzer/Set Up Inspection: Revaclear Max   hours Duration of Treatment (hours): 3.5 hours   blood flow rate Blood Flow Rate (ml/min): 350 ml/min   dialysate rate     ultrafiltration Goal/Amount of Fluid to Remove (mL): 3600 mL   Heparin is used during the dialysis treatment. Complaints none.      Current Facility-Administered Medications   Medication Dose Route Frequency    rosuvastatin (CRESTOR) tablet 20 mg  20 mg Oral QHS    sucralfate (CARAFATE) 100 mg/mL oral suspension 1 g  1 g Oral AC&HS    polyethylene glycol (MIRALAX) packet 17 g  17 g Oral DAILY    acetaminophen (TYLENOL) tablet 650 mg  650 mg Oral Q4H PRN    HYDROcodone-acetaminophen (NORCO) 7.5-325 mg per tablet 1 Tab  1 Tab Oral Q4H PRN    HYDROcodone-acetaminophen (NORCO) 5-325 mg per tablet 1 Tab  1 Tab Oral Q4H PRN    LORazepam (ATIVAN) tablet 1 mg  1 mg Oral Q6H PRN    sodium chloride (NS) flush 5-10 mL  5-10 mL IntraVENous PRN    amLODIPine (NORVASC) tablet 5 mg  5 mg Oral DAILY    aspirin delayed-release tablet 81 mg  81 mg Oral DAILY    calcitRIOL (ROCALTROL) capsule 0.25 mcg  0.25 mcg Oral DAILY    cyanocobalamin (VITAMIN B12) injection 1,000 mcg  1,000 mcg IntraMUSCular Q7D    epoetin toya (EPOGEN;PROCRIT) injection 20,000 Units  20,000 Units SubCUTAneous Q MON, WED & FRI    gentamicin (GARAMYCIN) 0.1 % cream   Topical DAILY PRN    levothyroxine (SYNTHROID) tablet 150 mcg  150 mcg Oral ACB    linaclotide (LINZESS) capsule 145 mcg (Patient Supplied)  145 mcg Oral DAILY    magnesium oxide (MAG-OX) tablet 400 mg  400 mg Oral DAILY    metoclopramide HCl (REGLAN) tablet 5 mg  5 mg Oral BID    metoprolol tartrate (LOPRESSOR) tablet 12.5 mg 12.5 mg Oral DAILY    multivitamin w ZN (STRESSTABS W ZINC) tablet  1 Tab Oral DAILY    nitroglycerin (NITROSTAT) tablet 0.4 mg  0.4 mg SubLINGual PRN    ondansetron (ZOFRAN ODT) tablet 4 mg  4 mg Oral TID PRN    pantoprazole (PROTONIX) tablet 40 mg  40 mg Oral ACB&D    polyethylene glycol (MIRALAX) packet 17 g  17 g Oral DAILY PRN    promethazine (PHENERGAN) tablet 25 mg  25 mg Oral Q6H PRN    ranolazine ER (RANEXA) tablet 1,000 mg  1,000 mg Oral BID    senna-docusate (PERICOLACE) 8.6-50 mg per tablet 1 Tab  1 Tab Oral DAILY    sevelamer (RENAGEL) tablet 800 mg  800 mg Oral TID WITH MEALS    ticagrelor (BRILINTA) tablet 90 mg  90 mg Oral BID    torsemide (DEMADEX) tablet 100 mg  100 mg Oral BID           1901 -  0700  In: 480 [P.O.:480]  Out: -     No results found for this or any previous visit (from the past 24 hour(s)). Review of Systems  A comprehensive review of systems was negative except for that written in the HPI. .    Objective:     Blood pressure 114/67, pulse 75, temperature 98.3 °F (36.8 °C), resp. rate 16, weight 101.4 kg (223 lb 9.6 oz), SpO2 96 %. Temp (24hrs), Av.2 °F (36.8 °C), Min:98 °F (36.7 °C), Max:98.3 °F (36.8 °C)      Physical Exam:   Neuro: alert, cooperative, no distress, appears stated age, Skin: Skin color, texture, turgor normal. No rashes or lesions, Neck: supple, symmetrical, trachea midline, no adenopathy, thyroid: not enlarged, symmetric, no tenderness/mass/nodules, no carotid bruit and no JVD, Lungs: clear to auscultation bilaterally, Cardiac: regular rate and rhythm, S1, S2 normal, no murmur, click, rub or gallop, Abdomen: soft, non-tender. Bowel sounds normal. No masses,  no organomegaly and Extremities: extremities normal, atraumatic, no cyanosis or edema      Assessment:     End Stage Renal Disease:  Patient is tolerating dialysis treatment well. .  Additionally the patient has experienced normal dialysis treatment during dialysis.   Dry weight same.    Anemia:    Recent Labs      12/04/17   0603   HCT  28.2*   HGB  8.7*       Renal Metabolic Bone Disease:    No results for input(s): CA, ALB, PTH in the last 72 hours.     No lab exists for component: PO4                     Treatment:  PO4 binders, Cinacaleat, Vitamin D  Hypertension: controlled    Access: adequate monitoring/no changes     Active Problems:    Weakness of both legs (11/29/2017)      Renal failure (ARF), acute on chronic (HCC) (11/29/2017)           Plan:     Continue scheduled dialysis     S/p Lt femoral perm cath - working well

## 2017-12-05 NOTE — PROGRESS NOTES
PHYSICAL THERAPY DAILY NOTE  Time In: 0930  Time Out: 6051  Patient Seen For: AM;Therapeutic exercise    Subjective: \"I was waiting for you to come! \"         Objective: Other (comment) (Fall Risk)    LOWER EXTREMITY EXERCISES Daily Assessment    Extremity: Both  Exercise Type #1: Supine lower extremity strengthening  Sets Performed: 1  Reps Performed: 15  Level of Assist: Contact guard assistance     SUPINE EXERCISES Sets Reps Comments   Ankle Pumps 1 15    hooklying hip adduction 1 15 Squeezing pillow   bridging 1 15    Heel Slides 1 15 Using maxislide   Hip Abduction 1 15 Using maxislide   Short Arc Quad 1 15    hooklying hip IR/ER 1 15    Straight Leg Raise 1 15 AAROM     Vital Signs:   Patient Vitals for the past 4 hrs:   Pulse BP   12/05/17 1045 69 123/70   12/05/17 1032 70 118/67   12/05/17 1002 71 125/71   12/05/17 0930 - 114/67   12/05/17 0900 75 135/77   12/05/17 0827 77 150/78   12/05/17 0802 74 142/81   12/05/17 0732 73 148/81         Pain level: no c/o pain    Patient education: reviewed supine exercises    Interdisciplinary Communication: cleared pt. For PT with HD RN    Pt. Left supine in NAD, call bell in reach         Assessment: Pt. Able to increase to 15 reps, although having difficulty w/ anti-gravity movements secondary to deconditioning & edema. Recommend Prevalon boots to address heel soreness as well as DESIREE hose to address edema. Pt. Cont. To benefit from PT services to address defcits to return to living home alone. Plan of Care: Continue with POC and progress as tolerated.      Bereket Healy, PT  12/5/2017

## 2017-12-05 NOTE — PROGRESS NOTES
Team conference; dc deferred pending progress. Pt is motivated and progressing well but continues to be very deconditioned. Discussed with pt; agreeable. Very motivated to return home independently. Notified Jon Robles with [facility] of deferment.

## 2017-12-05 NOTE — PROGRESS NOTES
HD treatment completed without complication. Total of 3.0 kgs removed. Flushed both ports with 10 mL of NS.  VS stable, NAD. CVC dressing clean, dry, and intact, tego caps intact, bilateral lumens wrapped with 4x4 gauze. Transport contacted for return to room.

## 2017-12-05 NOTE — DIALYSIS
Treatment initiated using  Left femoral CVC by Molly Pink. Both ports aspirated and flushed without problems. Machine set per MD orders. Pt VS stable. Will continue to monitor.

## 2017-12-05 NOTE — PROGRESS NOTES
Problem: Falls - Risk of  Goal: *Absence of Falls  Document Harrison Fall Risk and appropriate interventions in the flowsheet.    Fall Risk Interventions:  Mobility Interventions: Patient to call before getting OOB         Medication Interventions: Patient to call before getting OOB    Elimination Interventions: Call light in reach

## 2017-12-06 PROCEDURE — 97116 GAIT TRAINING THERAPY: CPT

## 2017-12-06 PROCEDURE — 97110 THERAPEUTIC EXERCISES: CPT

## 2017-12-06 PROCEDURE — 97535 SELF CARE MNGMENT TRAINING: CPT

## 2017-12-06 PROCEDURE — 97530 THERAPEUTIC ACTIVITIES: CPT

## 2017-12-06 PROCEDURE — 97150 GROUP THERAPEUTIC PROCEDURES: CPT

## 2017-12-06 PROCEDURE — 74011250636 HC RX REV CODE- 250/636: Performed by: PHYSICAL MEDICINE & REHABILITATION

## 2017-12-06 PROCEDURE — 65310000000 HC RM PRIVATE REHAB

## 2017-12-06 PROCEDURE — 74011250637 HC RX REV CODE- 250/637: Performed by: PHYSICAL MEDICINE & REHABILITATION

## 2017-12-06 RX ADMIN — SUCRALFATE 1 G: 1 SUSPENSION ORAL at 21:07

## 2017-12-06 RX ADMIN — AMLODIPINE BESYLATE 5 MG: 5 TABLET ORAL at 08:57

## 2017-12-06 RX ADMIN — CALCITRIOL 0.25 MCG: 0.25 CAPSULE, LIQUID FILLED ORAL at 08:58

## 2017-12-06 RX ADMIN — ZINC 1 TABLET: TAB ORAL at 08:57

## 2017-12-06 RX ADMIN — SUCRALFATE 1 G: 1 SUSPENSION ORAL at 06:21

## 2017-12-06 RX ADMIN — TORSEMIDE 100 MG: 100 TABLET ORAL at 17:25

## 2017-12-06 RX ADMIN — SUCRALFATE 1 G: 1 SUSPENSION ORAL at 17:26

## 2017-12-06 RX ADMIN — SUCRALFATE 1 G: 1 SUSPENSION ORAL at 12:05

## 2017-12-06 RX ADMIN — TICAGRELOR 90 MG: 90 TABLET ORAL at 21:07

## 2017-12-06 RX ADMIN — TORSEMIDE 100 MG: 100 TABLET ORAL at 08:59

## 2017-12-06 RX ADMIN — ROSUVASTATIN CALCIUM 20 MG: 20 TABLET, FILM COATED ORAL at 21:07

## 2017-12-06 RX ADMIN — RANOLAZINE 1000 MG: 500 TABLET, FILM COATED, EXTENDED RELEASE ORAL at 08:58

## 2017-12-06 RX ADMIN — LEVOTHYROXINE SODIUM 150 MCG: 50 TABLET ORAL at 06:22

## 2017-12-06 RX ADMIN — PANTOPRAZOLE SODIUM 40 MG: 40 TABLET, DELAYED RELEASE ORAL at 06:22

## 2017-12-06 RX ADMIN — METOCLOPRAMIDE HYDROCHLORIDE 5 MG: 10 TABLET ORAL at 09:01

## 2017-12-06 RX ADMIN — PANTOPRAZOLE SODIUM 40 MG: 40 TABLET, DELAYED RELEASE ORAL at 17:25

## 2017-12-06 RX ADMIN — METOPROLOL TARTRATE 12.5 MG: 25 TABLET ORAL at 08:57

## 2017-12-06 RX ADMIN — Medication 400 MG: at 08:56

## 2017-12-06 RX ADMIN — POLYETHYLENE GLYCOL 3350 17 G: 17 POWDER, FOR SOLUTION ORAL at 06:21

## 2017-12-06 RX ADMIN — ERYTHROPOIETIN 20000 UNITS: 20000 INJECTION, SOLUTION INTRAVENOUS; SUBCUTANEOUS at 17:30

## 2017-12-06 RX ADMIN — TICAGRELOR 90 MG: 90 TABLET ORAL at 09:04

## 2017-12-06 RX ADMIN — RANOLAZINE 1000 MG: 500 TABLET, FILM COATED, EXTENDED RELEASE ORAL at 17:26

## 2017-12-06 RX ADMIN — METOCLOPRAMIDE HYDROCHLORIDE 5 MG: 10 TABLET ORAL at 17:24

## 2017-12-06 RX ADMIN — ASPIRIN 81 MG: 81 TABLET, COATED ORAL at 09:00

## 2017-12-06 NOTE — PROGRESS NOTES
PHYSICAL THERAPY DAILY NOTE  Time In: 3749  Time Out: 0963  Patient Seen For: PM;Gait training; Therapeutic exercise;Transfer training; Other (see progress notes)    Subjective: Patient had no complaints. Objective: No pain noted. Other (comment) (Fall Risk)  GROSS ASSESSMENT Daily Assessment            BED/MAT MOBILITY Daily Assessment    Supine to Sit : 5 (Supervision)  Sit to Supine : 5 (Supervision)       TRANSFERS Daily Assessment    Transfer Type: SPT with walker  Transfer Assistance : 4 (Contact guard assistance)  Sit to Stand Assistance: Supervision       GAIT Daily Assessment    Amount of Assistance: 4 (Contact guard assistance)  Distance (ft): 25 Feet (ft)  Assistive Device: Gait belt;Walker, rolling       STEPS or STAIRS Daily Assessment    Level of Assist : 0 (Not tested)       BALANCE Daily Assessment            WHEELCHAIR MOBILITY Daily Assessment            LOWER EXTREMITY EXERCISES Daily Assessment    Extremity: Both  Exercise Type #1: Seated lower extremity strengthening  Sets Performed: 2  Reps Performed: 10  Level of Assist: Contact guard assistance  Exercise Type #2: Other (comment) (standing balance weight shifting exs)  Sets Performed: 2  Reps Performed: 0  Level of Assist: Contact guard assistance          Assessment: Patient has a very low endurance with exs and ambulation. Plan of Care: Continue with plan of care to reach PT goals. Returned to room to recliner with call bell at [facility].     Spike Isyvonne, [facility]  12/6/2017

## 2017-12-06 NOTE — PROGRESS NOTES
MD Claire,   Medical Director  [address] [facility], [address]  Tel: [phone]       [facility] PROGRESS NOTE    Michelle Arriaga  Admit Date: 11/29/2017  Admit Diagnosis: DEBILITY; Renal failure (ARF), acute on chronic ([facility] [address] Charlene Tony*  Chief Complaint : Gait dysfunction secondary to below. Admit Diagnosis: Unstable angina ([facility] [address]  CAD (coronary artery disease) (11/27/2015)  S/P complex PCI and stable on ASA and Brilinta  Unstable angina ([facility] [address] (2/1/2016)  Acute/ Chronic anemia (11/18/2017)  ESRD (end stage renal disease) On PD/ CRRT  Pain  DVT risk  Acute Rehab Dx:  Debility    Weakness  Gait impairment  deconditioning  Mobility and ambulation deficits  Self Care/ADL deficits     Subjective     Patient seen and examined. Vss.afebrile. HD tolerated well yesterday. PT, OT well tolerated. Ambulating short distances with CGA. Nephrology following.        Objective:     Current Facility-Administered Medications   Medication Dose Route Frequency    rosuvastatin (CRESTOR) tablet 20 mg  20 mg Oral QHS    sucralfate (CARAFATE) 100 mg/mL oral suspension 1 g  1 g Oral AC&HS    polyethylene glycol (MIRALAX) packet 17 g  17 g Oral DAILY    acetaminophen (TYLENOL) tablet 650 mg  650 mg Oral Q4H PRN    HYDROcodone-acetaminophen (NORCO) 7.5-325 mg per tablet 1 Tab  1 Tab Oral Q4H PRN    HYDROcodone-acetaminophen (NORCO) 5-325 mg per tablet 1 Tab  1 Tab Oral Q4H PRN    LORazepam (ATIVAN) tablet 1 mg  1 mg Oral Q6H PRN    sodium chloride (NS) flush 5-10 mL  5-10 mL IntraVENous PRN    amLODIPine (NORVASC) tablet 5 mg  5 mg Oral DAILY    aspirin delayed-release tablet 81 mg  81 mg Oral DAILY    calcitRIOL (ROCALTROL) capsule 0.25 mcg  0.25 mcg Oral DAILY    cyanocobalamin (VITAMIN B12) injection 1,000 mcg  1,000 mcg IntraMUSCular Q7D    epoetin toya (EPOGEN;PROCRIT) injection 20,000 Units  20,000 Units SubCUTAneous Q MON, WED & FRI    gentamicin (GARAMYCIN) 0.1 % cream Topical DAILY PRN    levothyroxine (SYNTHROID) tablet 150 mcg  150 mcg Oral ACB    linaclotide (LINZESS) capsule 145 mcg (Patient Supplied)  145 mcg Oral DAILY    magnesium oxide (MAG-OX) tablet 400 mg  400 mg Oral DAILY    metoclopramide HCl (REGLAN) tablet 5 mg  5 mg Oral BID    metoprolol tartrate (LOPRESSOR) tablet 12.5 mg  12.5 mg Oral DAILY    multivitamin w ZN (STRESSTABS W ZINC) tablet  1 Tab Oral DAILY    nitroglycerin (NITROSTAT) tablet 0.4 mg  0.4 mg SubLINGual PRN    ondansetron (ZOFRAN ODT) tablet 4 mg  4 mg Oral TID PRN    pantoprazole (PROTONIX) tablet 40 mg  40 mg Oral ACB&D    polyethylene glycol (MIRALAX) packet 17 g  17 g Oral DAILY PRN    promethazine (PHENERGAN) tablet 25 mg  25 mg Oral Q6H PRN    ranolazine ER (RANEXA) tablet 1,000 mg  1,000 mg Oral BID    senna-docusate (PERICOLACE) 8.6-50 mg per tablet 1 Tab  1 Tab Oral DAILY    sevelamer (RENAGEL) tablet 800 mg  800 mg Oral TID WITH MEALS    ticagrelor (BRILINTA) tablet 90 mg  90 mg Oral BID    torsemide (DEMADEX) tablet 100 mg  100 mg Oral BID     Review of Systems:   Denies chest pain, shortness of breath, cough, headache, visual problems, abdominal pain, dysurea, calf pain. Pertinent positives are as noted in the medical records and unremarkable otherwise. Visit Vitals    /80    Pulse 82    Temp 98.3 °F (36.8 °C)    Resp 18    Wt 219 lb (99.3 kg)    SpO2 97%    BMI 31.42 kg/m2   Physical Exam:   General: Alert and age appropriately oriented. No acute cardio respiratory distress. HEENT: Normocephalic,no scleral icterus  Oral mucosa moist without cyanosis   Lungs: Clear to auscultation  bilaterally. Respiration even and unlabored   Heart: Regular rate and rhythm, S1, S2   No  murmurs, clicks, rub or gallops   Abdomen: Soft, non-tender, nondistended. Bowel sounds present. No organomegaly. Genitourinary: defered   Neuromuscular:      NANCI, EOMI  + mild generalized weakness 4+ to 5-/5.  BUE, BLE, more proximal.   Sensation grossly intact to soft touch. Skin/extremity: No rashes, no erythema. 2+edema. Wound covered.                                                                                  Functional Assessment:  Gross Assessment  AROM: Generally decreased, functional (12/03/17 1100)  Strength: Generally decreased, functional (12/03/17 1100)  Coordination: Generally decreased, functional (12/03/17 1100)       Balance  Sitting - Static: Good (unsupported) (12/05/17 1400)  Sitting - Dynamic: Good (unsupported) (12/05/17 1400)  Standing - Static: Fair (12/05/17 1400)  Standing - Dynamic : Impaired (12/05/17 1400)           Toileting  Adaptive Equipment: Grab bars; Walker (12/04/17 1025)         Margaret Morton Fall Risk Assessment:  Margaret Morton Fall Risk  Mobility: Ambulates or transfers with assist devices or assistance/unsteady gait (12/05/17 2037)  Mobility Interventions: Assess mobility with egress test;Bed/chair exit alarm;Communicate number of staff needed for ambulation/transfer;OT consult for ADLs; Patient to call before getting OOB;PT Consult for assist device competence;PT Consult for mobility concerns;Strengthening exercises (ROM-active/passive); Utilize walker, cane, or other assitive device (12/05/17 2037)  Mentation: Alert, oriented x 3 (12/05/17 2037)  Medication: Patient receiving anticonvulsants, sedatives(tranquilizers), psychotropics or hypnotics, hypoglycemics, narcotics, sleep aids, antihypertensives, laxatives, or diuretics (12/05/17 2037)  Medication Interventions: Assess postural VS orthostatic hypotension;Bed/chair exit alarm;Evaluate medications/consider consulting pharmacy; Patient to call before getting OOB; Teach patient to arise slowly (12/05/17 2037)  Elimination: Needs assistance with toileting (12/05/17 2037)  Elimination Interventions: Bed/chair exit alarm;Call light in reach;Elevated toilet seat;Patient to call for help with toileting needs; Toilet paper/wipes in reach; Toileting schedule/hourly rounds (12/05/17 2037)  Prior Fall History: No (12/05/17 2037)  Total Score: 3 (12/05/17 2037)  Standard Fall Precautions: Yes (12/05/17 2037)  High Fall Risk: Yes (12/05/17 2037)     Speech Assessment:         Ambulation:  Gait  Distance (ft): 20 Feet (ft) (x4) (12/05/17 1400)  Assistive Device: Gait belt;Walker, rolling (12/05/17 1400)     Labs/Studies:  Recent Results (from the past 72 hour(s))   CBC WITH AUTOMATED DIFF    Collection Time: 12/04/17  6:03 AM   Result Value Ref Range    WBC 7.5 4.3 - 11.1 K/uL    RBC 2.99 (L) 4.05 - 5.25 M/uL    HGB 8.7 (L) 11.7 - 15.4 g/dL    HCT 28.2 (L) 35.8 - 46.3 %    MCV 94.3 79.6 - 97.8 FL    MCH 29.1 26.1 - 32.9 PG    MCHC 30.9 (L) 31.4 - 35.0 g/dL    RDW 18.8 (H) 11.9 - 14.6 %    PLATELET 340 631 - 111 K/uL    MPV 9.1 (L) 10.8 - 14.1 FL    DF AUTOMATED      NEUTROPHILS 73 43 - 78 %    LYMPHOCYTES 17 13 - 44 %    MONOCYTES 7 4.0 - 12.0 %    EOSINOPHILS 3 0.5 - 7.8 %    BASOPHILS 0 0.0 - 2.0 %    IMMATURE GRANULOCYTES 0 0.0 - 5.0 %    ABS. NEUTROPHILS 5.4 1.7 - 8.2 K/UL    ABS. LYMPHOCYTES 1.3 0.5 - 4.6 K/UL    ABS. MONOCYTES 0.6 0.1 - 1.3 K/UL    ABS. EOSINOPHILS 0.3 0.0 - 0.8 K/UL    ABS. BASOPHILS 0.0 0.0 - 0.2 K/UL    ABS. IMM.  GRANS. 0.0 0.0 - 0.5 K/UL       Assessment:     Problem List as of 12/6/2017  Date Reviewed: 3/2/2017          Codes Class Noted - Resolved    Weakness of both legs ICD-10-CM: R29.898  ICD-9-CM: 729.89  11/29/2017 - Present        Renal failure (ARF), acute on chronic ([facility] ICD-10-CM: N17.9, N18.9  ICD-9-CM: 584.9, 585.9  11/29/2017 - Present        Elevated troponin ICD-10-CM: R74.8  ICD-9-CM: 790.6  11/18/2017 - Present        Chest pain ICD-10-CM: R07.9  ICD-9-CM: 786.50  11/18/2017 - Present        CKD (chronic kidney disease) ICD-10-CM: N18.9  ICD-9-CM: 585.9  11/18/2017 - Present        Chronic anemia ICD-10-CM: D64.9  ICD-9-CM: 285.9  11/18/2017 - Present        ESRD (end stage renal disease) ([facility] ICD-10-CM: N18.6  ICD-9-CM: 585.6  11/18/2017 - Present        Abdominal pain ICD-10-CM: R10.9  ICD-9-CM: 789.00  9/18/2017 - Present        H/O TIA (transient ischemic attack) and stroke ICD-10-CM: Z86.73  ICD-9-CM: V12.54  4/13/2017 - Present        S/P PTCA (percutaneous transluminal coronary angioplasty) ICD-10-CM: Z98.61  ICD-9-CM: V45.82  4/13/2017 - Present        Mixed hyperlipidemia ICD-10-CM: E78.2  ICD-9-CM: 272.2  4/13/2017 - Present        Vision changes ICD-10-CM: H53.9  ICD-9-CM: 368.9  3/26/2017 - Present        Dizziness ICD-10-CM: R42  ICD-9-CM: 780.4  3/26/2017 - Present        Refusal of blood transfusions as patient is [employer] (Chronic) ICD-10-CM: Z53.1  ICD-9-CM: V62.6  8/25/2016 - Present        GERD (gastroesophageal reflux disease) ICD-10-CM: K21.9  ICD-9-CM: 530.81  8/8/2016 - Present        Unspecified sleep apnea ICD-10-CM: G47.30  ICD-9-CM: 780.57  8/8/2016 - Present    Overview Signed 8/8/2016 11:09 AM by Sridhar Guerrero     uses cpap machine             CVA (cerebral vascular accident) Hx of TIA ICD-10-CM: I63.9  ICD-9-CM: 434.91  2/22/2016 - Present        Unstable angina ([facility] [address] ICD-10-CM: I20.0  ICD-9-CM: 411.1  2/1/2016 - Present        ESRD on peritoneal dialysis ([facility] [address] (Chronic) ICD-10-CM: N18.6, Z99.2  ICD-9-CM: 585.6, V45.11  2/1/2016 - Present        Ischemic cardiomyopathy ICD-10-CM: I25.5  ICD-9-CM: 414.8  12/29/2015 - Present        HLD (hyperlipidemia) ICD-10-CM: E78.5  ICD-9-CM: 272.4  12/29/2015 - Present        Depression ICD-10-CM: F32.9  ICD-9-CM: 149  12/29/2015 - Present        CAD (coronary artery disease) ICD-10-CM: I25.10  ICD-9-CM: 414.00  11/27/2015 - Present        Debility ICD-10-CM: R53.81  ICD-9-CM: 799.3  5/5/2015 - Present        Hypertension (Chronic) ICD-10-CM: I10  ICD-9-CM: 401.9  4/28/2015 - Present        Anemia of chronic renal failure (Chronic) ICD-10-CM: N18.9, D63.1  ICD-9-CM: 285.21, 585.9  4/28/2015 - Present        Hypothyroidism (Chronic) ICD-10-CM: E03.9  ICD-9-CM: 244.9  4/28/2015 - Present        RESOLVED: Postural hypotension ICD-10-CM: I95.1  ICD-9-CM: 458.0  8/9/2016 - 3/26/2017        RESOLVED: Chest pain ICD-10-CM: R07.9  ICD-9-CM: 786.50  8/8/2016 - 3/26/2017        RESOLVED: Nausea ICD-10-CM: R11.0  ICD-9-CM: 787.02  8/8/2016 - 3/26/2017        RESOLVED: S/P PTCA (percutaneous transluminal coronary angioplasty); LAD PTCA of  ISR 11/27/15 ICD-10-CM: Z98.61  ICD-9-CM: V45.82  5/24/2016 - 3/26/2017        RESOLVED: TIA (transient ischemic attack) ICD-10-CM: G45.9  ICD-9-CM: 435.9  2/10/2016 - 3/26/2017        RESOLVED: Edema ICD-10-CM: R60.9  ICD-9-CM: 782.3  12/29/2015 - 3/26/2017        RESOLVED: Anterior myocardial infarction ([facility] ICD-10-CM: I21.09  ICD-9-CM: 410.10  5/21/2015 - 3/26/2017        RESOLVED: STEMI (ST elevation myocardial infarction) ([facility] ICD-10-CM: I21.3  ICD-9-CM: 410.90  4/28/2015 - 2/1/2016              Plan:      CAD (coronary artery disease) (11/27/2015)/ S/P complex PCI / Unstable angina ([facility] (2/1/2016)  - on ASA and Brilinta  -hypertension - continue norvasc, lopressor  - continue ranexa, statin  - demadex continued. Patient almost aneuric. Will discuss with nephrology need for demadex?  - 12/6 stable cardiac exam .       Acute/ Chronic anemia (11/18/2017) - continue to monitor.   - conitnue epogen     ESRD (end stage renal disease) On PD/ CRRT  - PD resumed. - monitor fluids status. Nephrology managing. Will follow at IRU.   - 11/30 problem with PD/ catheter. Nephrologist aware. Will need to hold PT due to femoral cath precautions. nephrologist to follow. May need to have tunneled cath. - 12/4 - continue TTS HD. PD per nephrology direction. - 12/6 Plan possibly to insert another PD tube.  S/p Lt femoral perm catheter working well     Pneumonia prophylaxis- Insentive spirometer every hour while awake     DVT risk / DVT Prophylaxis- continue daily physician exam to assess for signs and symptoms as patient is at increased risk for of thromboembolism. Mobilization as tolerated. Intermittent pneumatic compression devices when in bed Thigh-high or knee-high thromboembolic deterrent hose when out of bed. -    Pain Control: stable, mild-to-moderate joint symptoms intermittently, reasonably well controlled by PRN meds. Will require regular pain assessment and comprenhensive pain management,      Wound Care: Monitor wound status daily per staff and physician. At risk for failure. Will require 24/7 rehab nursing. Keep wound clean and dry      bowel program - as needed     GERD - resume PPI. At times may need additional antacids, Maalox prn.  - continue sucralfate     Hypothyroid  - synthroid.     Time spent was 25 minutes with over 1/2 in direct patient care/examination, consultation and coordination of care.      Signed By: Chasity Clark MD     December 6, 2017

## 2017-12-06 NOTE — PROGRESS NOTES
Patient resting up in bed. Alert and oriented with pleasant and bright affect. Lung sounds clear. S1S2, bowel sounds active. Denies any pain or discomfort at present time. Repositioned up in bed for breakfast tray. Up with walker. Tolerated well. Assessment completed. No other verbalized needs made known. See doc flow sheet for further assessments.

## 2017-12-06 NOTE — PROGRESS NOTES
Problem: Falls - Risk of  Goal: *Absence of Falls  Document Harrison Fall Risk and appropriate interventions in the flowsheet.    Outcome: Progressing Towards Goal  Fall Risk Interventions:  Mobility Interventions: Assess mobility with egress test         Medication Interventions: Evaluate medications/consider consulting pharmacy, Patient to call before getting OOB    Elimination Interventions: Bed/chair exit alarm, Call light in reach, Patient to call for help with toileting needs

## 2017-12-06 NOTE — PROGRESS NOTES
Problem: Falls - Risk of  Goal: *Absence of Falls  Document Harrison Fall Risk and appropriate interventions in the flowsheet.    Outcome: Progressing Towards Goal  Fall Risk Interventions:  Mobility Interventions: Assess mobility with egress test, Bed/chair exit alarm, Communicate number of staff needed for ambulation/transfer, OT consult for ADLs, Patient to call before getting OOB, PT Consult for assist device competence, PT Consult for mobility concerns, Strengthening exercises (ROM-active/passive), Utilize walker, cane, or other assitive device         Medication Interventions: Assess postural VS orthostatic hypotension, Bed/chair exit alarm, Evaluate medications/consider consulting pharmacy, Patient to call before getting OOB, Teach patient to arise slowly    Elimination Interventions: Bed/chair exit alarm, Call light in reach, Elevated toilet seat, Patient to call for help with toileting needs, Toilet paper/wipes in reach, Toileting schedule/hourly rounds

## 2017-12-06 NOTE — PROGRESS NOTES
OT Daily Note    Time In 1116   Time Out 1201     Subjective: \"My R shoulder is sore. \" Nydia Bowling applied with patient reporting decrease in discomfort, therapy to tolerance]  Pain: See above    Precautions: Other (comment) (Fall Risk)    Activity Tolerance   Patient completed BUE FMC/endurance task seated at tabletop with 1.5# cuff weight added to LUE, no weight added to RUE d/t deltoid fatigue. Patient reached to R of tabletop to gather clothing items, completed all fasteners, folded each item using BUEs, and stacked folded items to L of tabletop. Patient required increased time and intermittent rest breaks to complete. Assessment: Patient tolerated well    Education: Purpose of therapy  Interdisciplinary Communication: Collaborated with Ben Washington and agreed patient is progressing well and on-track to meet goals as stated in POC. Plan: Continue to address ADL/IADL, functional mobility, activity tolerance, balance, strengthening, coordination, education, cognition.       Christo Burris, OTR/L

## 2017-12-06 NOTE — PROGRESS NOTES
12/06/17 1026   Time Spent With Patient   Time In 0745   Time Out 0837   Patient Seen For: AM;ADLs   Grooming   Grooming Assistance  S   Comments Setup at sink   Upper Body Bathing   Bathing Assistance, Upper S   Position Performed Seated in chair   Adaptive Equipment Other (comment)  (Wheelchair at sink)   [address], Lower  CGA   Position Performed Seated in chair;Standing   [address] Other (comment); Long handled sponge  (Wheelchair at sink)   Comments Patient educated and uses long handled sponge to bathe lower LEs this session, CGA in stance as patient bathes bottom/sidney area   [address] (FIM Score) S  (Note no clothing)   Adaptive Equipment Grab bars   Upper Body Dressing    Dressing Assistance  S   Comments Setup   Lower Body Dressing    Dressing Assistance  CGA   Leg Crossed Method Used No   Position Performed Seated in chair;Standing   Adaptive Equipment Used Sock aid;Reacher   Comments Patient re-educated and uses reacher/sock aid for LB dressing this session with cues for problem solving . CGA in stance for safety. Functional Transfers   Toilet Transfer  Grab bars;Stand pivot transfer without device   Amount of Assistance Required CGA       S: \"I appreciate what y'all are doing and I want to get all [the therapy] that I can. \" Agreeable to therapy. Focus of session was on ADL and functional mobility. Patient was able to ambulate ~10 feet using a RW with CGA. Pain not indicated. Collaborated with PT, Lilo Elizondo and confirmed patient is on track to reach goals as documented in the care plan. Patient tolerated session well, but balance, functional mobility, strength, coordination are still below baseline and require skilled facilitation to successfully and safely complete ADL's and transfers. Patient ended session in wheelchair with Therapy Reagan Moseley.      Ramy Paz, OTR/L

## 2017-12-06 NOTE — PROGRESS NOTES
End Of Shift Functional Summary, Nursing      TOILETING:  Does patient need assist with clothing management and/or pericare? Yes: Comment: up to toilet with walker x 1 assist.    TOILET TRANSFER:  Pt requires minimal assistance. Pt uses  walkerr. BLADDER:  Pt does not have a castillo catheter that staff manages. Pt does not take medication. Pt is continent. of bladder and voids in toilet  Pt requires staff to empty device Pt has had 0 bladder accidents during this shift requiring minimal assistance to clean up. (An accident is when the episode is not contained in a brief AND/OR the clothing/linen requires changing/cleaning up.)    BOWEL:  Pt does take medication. Pt is continent of bowel and uses toilet. Pt requires staff to position device    Pt has had 0 bowel accidents during this shift requiring minimal assistance from staff to clean up. (An accident is when the episode is not contained in a brief AND/OR the clothing/linen requires changing/cleaning up.)    BED/CHAIR TRANSFER  Pt requires minimal assistance. Patient requires the assistance of 1 staff member(s). Pt uses walker                    EATING  Pt requires no assistance. Pt does not wear dentures. TUBE FEEDINGS:  Pt does not  receive nutrition through tube feedings. Patient requires no assistance with feedings. Documentation reviewed and plan of care discussed/reviewed with   patient, physician, therapists, oncoming nurse, patient assistant and family/spouse during the shift.

## 2017-12-06 NOTE — PROGRESS NOTES
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 She has a history of GI bleeding and had anemia-induced unstable angina in the past. 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 She states the pain has since resolved with NTG paste.  No fevers or chills. No nausea, vomiting or diarrhea.  She states that she is essentially anuric and occasionally makes minimal urine.  She has a h/o CVA and TIA. No fever or chills, no problems with PD drainage or discoloration of PD fluid. No increased thirst. She wishes regular diet and supplement. She denies any other acute complaints  Her edema has improved in the past couple of days with intensified dialysis. s- no complaints . seen in rehab today feels better , still has le  edema   Past Medical History:   Diagnosis Date    Anemia of chronic renal failure 4/28/2015    Anterior myocardial infarction [facility]) 5/21/2015    CAD (coronary artery disease)     Chest pain     Chronic kidney disease, stage III (moderate) 8/15/2014    on dialysis    CKD (chronic kidney disease) stage 4, GFR 15-29 ml/min (formerly [facility]) 4/28/2015    Debility 5/5/2015    Depression 12/29/2015    Edema 12/29/2015    Endocrine disease     Hypothyroidism    GERD (gastroesophageal reflux disease)     Heart murmur 12/29/2015    HLD (hyperlipidemia) 12/29/2015    Hypertension     Hypothyroidism 4/28/2015    Ischemic cardiomyopathy 12/29/2015    Nausea     S/P PTCA (percutaneous transluminal coronary angioplasty); LAD PTCA of  ISR 11/27/15 5/24/2016    STEMI (ST elevation myocardial infarction) ([facility] 4/28/2015    Unspecified sleep apnea     uses cpap machine    Unstable angina ([facility] [address] Past Surgical History:   Procedure Laterality Date    HX BACK SURGERY  1990    neck surgery cervical disc    HX BACK SURGERY      lower back    HX CATARACT REMOVAL Bilateral     HX CHOLECYSTECTOMY  19702    gall bladder     HX KNEE REPLACEMENT Right 2006    HX PTCA  4/28/2015    2.25 Xience stent to mid LAD for occluded artery, anterior MI, EF 25%. Moderate disease distal LAD and distal OM PCI CX and RCA 2004, then PCI RCA and LAD in 2009. Prior to Admission medications    Medication Sig Start Date End Date Taking? Authorizing Provider   metoprolol tartrate (LOPRESSOR) 25 mg tablet Take 0.5 Tabs by mouth daily. 11/27/17  Yes MINDY Chatman   amLODIPine (NORVASC) 5 mg tablet Take 1 Tab by mouth daily. 11/27/17  Yes MINDY Chatman   torsemide (DEMADEX) 100 mg tablet Take 1 Tab by mouth two (2) times a day. 11/27/17  Yes MINDY Chatman   pantoprazole (PROTONIX) 40 mg tablet Take 1 Tab by mouth Before breakfast and dinner. 11/27/17  Yes MINDY Chatman   magnesium oxide (MAG-OX) 400 mg tablet Take 400 mg by mouth daily. Yes Historical Provider   metoclopramide HCl (REGLAN) 5 mg tablet Take 5 mg by mouth two (2) times a day. Yes Historical Provider   ticagrelor (BRILINTA) 90 mg tablet Take 1 Tab by mouth two (2) times a day. 2/4/16  Yes MINDY Chatman   aspirin delayed-release 81 mg tablet Take 1 Tab by mouth daily. 5/5/15  Yes Gisela Hollingsworth PA-C   rosuvastatin (CRESTOR) 20 mg tablet Take 20 mg by mouth nightly. Yes Historical Provider   levothyroxine (SYNTHROID) 150 mcg tablet Take 150 mcg by mouth Daily (before breakfast). Yes Historical Provider   cyanocobalamin (VITAMIN B12) 1,000 mcg/mL injection 1 mL by IntraMUSCular route every seven (7) days. Indications: Vitamin B12 Deficiency 12/2/17   MINDY Mccrary   folic acid (FOLVITE) 1 mg tablet Take 1 mg by mouth daily.     Historical Provider   potassium chloride (K-DUR, KLOR-CON) 20 mEq tablet Take 20 mEq by mouth two (2) times a day. Historical Provider   traZODone (DESYREL) 50 mg tablet Take  by mouth nightly. Historical Provider   megestrol (MEGACE) 400 mg/10 mL (40 mg/mL) suspension Take 200 mg by mouth daily. Historical Provider   sucralfate (CARAFATE) 100 mg/mL suspension Take 10 mL by mouth Before breakfast, lunch, dinner and at bedtime. Indications: PREVENTION OF STRESS ULCER 9/23/17   Gabby Trivedi DO   nitroglycerin (NITROLINGUAL) 400 mcg/spray spray 1 Spray by SubLINGual route every five (5) minutes as needed for Chest Pain. Historical Provider   linaclotide [place] Sin) 145 mcg cap capsule Take  by mouth Daily (before breakfast). Historical Provider   PNV NO.122/IRON/FOLIC ACID (PRENATAL MULTI PO) Take  by mouth. Historical Provider   ondansetron (ZOFRAN ODT) 4 mg disintegrating tablet Take 4 mg by mouth three (3) times daily as needed. Historical Provider   sevelamer carbonate (RENVELA) 800 mg tab tab Take 800 mg by mouth three (3) times daily (with meals). Historical Provider   gentamicin (GARAMYCIN) 0.1 % topical cream APPLY TO PD catheter exit site at daily dressing change 5/12/15   Fatou Galaviz MD   darbepoetin toya in polysorbat (ARANESP, POLYSORBATE,) 40 mcg/mL injection 40 mcg by SubCUTAneous route every fourteen (14) days. Indications: ANEMIA IN CHRONIC KIDNEY DISEASE    Historical Provider   ranolazine ER (RANEXA) 500 mg SR tablet Take 500 mg by mouth two (2) times a day.     Historical Provider     Allergies   Allergen Reactions    Codeine Nausea and Vomiting      Social History   Substance Use Topics    Smoking status: Never Smoker    Smokeless tobacco: Never Used    Alcohol use No      Family History   Problem Relation Age of Onset    Heart Disease Mother     Hypertension Mother     Cancer Mother      Lung    Stroke Father     Hypertension Father     Breast Cancer Neg Hx           Review of Systems    Constitutional: no fever, weak  Eyes: fair vision,    Ears, nose, mouth, throat, and face:fair hearing,   Respiratory: no asthma,  Chronic home O2 is being used - improved dyspnea  Cardiovascular:no palpitation, no  chest pain,   Gastrointestinal:no diarrhea,  Had BM today  Genitourinary: no dysuria,   Hematologic/lymphatic: no bleeding tendency,   Neurological: no seizures   Behvioral/Psych: no psych hospitalization   Endocrine: no goiter,       Objective:       Visit Vitals    /80    Pulse 82    Temp 98.3 °F (36.8 °C)    Resp 18    Wt 99.3 kg (219 lb)    SpO2 97%    BMI 31.42 kg/m2       General:  Alert, cooperative, no distress, appears stated age. Head:  Normocephalic, without obvious abnormality, atraumatic. Eyes:  Conjunctivae/corneas clear. EOMs intact. Throat: Lips, mucosa, and tongue normal. Teeth and gums normal.   Neck: Supple, symmetrical, trachea midline, no adenopathy,  no JVD. Lungs:   Clear to auscultation bilaterally. Heart:  Regular rate and rhythm, S1, S2 normal, 2/6 systolic  murmur, no rub or gallop. Abdomen:   Soft, non-tender. Distended . No masses,  No organomegaly. PD catheter in place without exudate   Extremities: Extremities normal, atraumatic, no cyanosis. 3+edema. Skin: Skin color, texture, turgor normal. No rashes or lesions. Lymph nodes: Cervical and supraclavicular nodes normal.   Neurologic: Grossly intact. No asterixis. Data Review:       No results found for this or any previous visit (from the past 24 hour(s)). CXR viewed by me - no major infiltrate or fluid excess, CM with left possible minor effusion is present        CT abdomen/pelvis  CT ABDOMEN:  Peritoneal fluid in the perihepatic space, mild paracolic gutters,  extending down into the pelvis. Peritoneal dialysis catheter noted. There is not  any evidence of retroperitoneal hematoma identified. Strandy fluid density does  extend into the extraperitoneal space in the lower abdomen and pelvis.  There is  radiodensity within the renal collecting systems, possibly previously  administered IV contrast. There is peripancreatic fluid and stranding, cannot  exclude acute pancreatitis. No biliary dilatation. Spleen is not enlarged. Small  bowel normal caliber.   CT PELVIS:   Moderate to large volume pelvic fluid.   There is diffuse asymmetric enlargement of the right rectus and oblique  abdominal muscles. There are are of higher attenuation the contralateral side  and findings are consistent with an abdominal wall hematoma. IMPRESSION:    1. Evidence of right abdominal wall hematoma. 2. No CT evidence to suggest retroperitoneal hematoma. 3. Extensive fluid in the abdomen and pelvis, presumed related to peritoneal  dialysis. Active Problems:    Weakness of both legs (11/29/2017)      Renal failure (ARF), acute on chronic (HCC) (11/29/2017)        Assessment:     1. CAD x NSTEMI, Unstable angina -  - LHC with complex CAD and acute thrombotic lesion in pLAD and subtotal occlusion in mLAD. The RCA has severe proximal and mid RCA disease. She has additional stenting of LAD with Resolute in mid/distal and proximal vessel. These all touched the previously stented mid vessel. Recommend life-long DAPT    2, ESRD -  - trail of PD went on well , but last night PD was ineffective secondary to retention of fluid   -s/p  hd again today  through perm cath    Will follow TTS schedule in rehab     3. Fluid excess -  - recurrent due to poor PD drainage    4. Anemia -  - intensive anemia therapy in [employer] [facility]  - on WAYNE and IV iron and B12  - improved S/P  transfusion of PRBC    5. History of GI bleed -  - monitor clinically and serial Hb  - she had a drop in Hb to 7 range and improved with BT    6. Hypokalemia   - resolved     7.  Abdominal pain and tenderness with drop in hb , on DAPT   No evidence of retroperitoneal hematoma       Plan:     As above - 25

## 2017-12-06 NOTE — PROGRESS NOTES
Subjective \"I want to get better and go home. \"   Activity Evaluation & bean bag toss   Strength/Endurance Patient with c/o fatigue and right UE weakness. Balance Sit<->stand:  S with RW   Social Interaction Patient was friendly and conversational during the session. Cognitive A&O X4   Comments Patient is motivated and willing to participate with recreational therapy. Patient was handed off to Brandon smith PT at the end of the session. Patient's Recreational Therapy evaluation completed under the [facility] navigation tool on 12/6/17. Please see for specifics regarding plan of care and goals. Patient prefers to be called \"Azale. \" Thank you for the referral.  Glendy Knox, CTRS

## 2017-12-06 NOTE — PROGRESS NOTES
Problem: Nutrition Deficit  Goal: *Optimize nutritional status  Nutrition:  Assessment based on early LOS. Assessment:  Anthropometrics:   Ht - 5'10\", wgt - 99.3 kg (12/6/17 standing scale 9th floor), BMI 31.6 c/w obesity class I, edema - 2+BLEs. Macronutrient Needs:  Estimated calorie needs - 4324-3715 michael/day (15-20 michael/kg/day)   Estimated protein needs - 82-95 gm pro/day (1.2-1.4 gm pro/kgIBW/day)   Intake/Comparative Standards: This patient's average intake of regular diet for the past 4 recorded days/6 meals: 75%. This potentially meets >100% of calorie and 88% of protein goals. Diet:   Regular. Pertinent Medications:   Reglan, Miralax, Carafate. Food/Nutrition History:   The patient presents with one acute nutrition risk factor (eating poorly due to decreased appetite) based on the nursing admission malnutrition screen. She reports that she will not eat a renal diet and asked Dr. Mohinder Andino for a regular diet to improve her oral intake. She reports that today has not been \"a good day\". She reports that many days she returns from dialysis feeling \"like a dirty dish rag. They just wipe you out and you are so weak\". Diagnosis (Nutrition):  Not ready for diet change related to compliance with renal diet restrictions as evidenced by fatigue/malaise after HD. Intervention:  Meals and Snacks: Regular. She has a copy of the menu and uses it for her menu selections when she is in her room when the staff comes by to read the menu. I took this evening's dinner menu selection and tomorrow's breakfast menu selection and relayed it to the kitchen. Medical Food Supplement Therapy: Commercial Beverage: She declines a supplement. Nutrition Discharge Plan: Too soon to determine. Alejandra Schwartz [place]  [phone]

## 2017-12-06 NOTE — PROGRESS NOTES
Pt is resting in bed. prevalon boots were place on B feet when pt was assisted to bed earlier this shift.

## 2017-12-06 NOTE — PROGRESS NOTES
PHYSICAL THERAPY DAILY NOTE  Time In: 836  Time Out: 1003  Patient Seen For: AM;Other (see progress notes);Gait training;Transfer training; Therapeutic exercise    Subjective: Patient had no complaints. Objective: No pain noted. Other (comment) (Fall Risk)  GROSS ASSESSMENT Daily Assessment            BED/MAT MOBILITY Daily Assessment    Supine to Sit : 5 (Stand-by assistance)  Sit to Supine : 5 (Supervision)       TRANSFERS Daily Assessment    Transfer Type: SPT with walker  Transfer Assistance : 4 (Contact guard assistance)  Sit to Stand Assistance: Supervision       GAIT Daily Assessment    Amount of Assistance: 4 (Contact guard assistance)  Distance (ft): 25 Feet (ft)  Assistive Device: Gait belt;Walker, rolling       STEPS or STAIRS Daily Assessment    Level of Assist : 0 (Not tested)       BALANCE Daily Assessment            WHEELCHAIR MOBILITY Daily Assessment            LOWER EXTREMITY EXERCISES Daily Assessment    Extremity: Both  Exercise Type #1: Seated lower extremity strengthening  Sets Performed: 2  Reps Performed: 10  Level of Assist: Contact guard assistance  Exercise Type #2: Other (comment) (standing balance weight shifting exs)  Sets Performed: 2  Reps Performed: 0  Level of Assist: Contact guard assistance          Assessment: Patient making progress toward goals. Plan of Care: Continue with plan of care to reach PT goals. Returned to room with call bell at reach.     Kyle Pulliam PTA  12/6/2017

## 2017-12-06 NOTE — PROGRESS NOTES
OT Daily Note    Time In 1350   Time Out 1430     Subjective: \"I just feel blah today. .. Like I'm getting depressed. \" [patient re-educated regarding Psychology and  services available, declined referral to either]  Pain: None indicated, therapy to tolerance    Precautions: Other (comment) (Fall Risk)    Coordination/Strengthening   Patient completed B hand strengthening/FMC task seated at tabletop. Patient used B hands to manipulate soft (yellow) Theraputty, locate and retrieve medium sized beads from embedded in putty x 20 using R hand and replace into container on tabletop. Patient then used rolling pin in B hands to flatten putty and press beads back into putty (Forming \"cherry pie\") using R hand to manipulate beads. Activity Tolerance   Patient completed standing trials at elevated tabletop for cognitive/standing activity tolerance. Patient tolerated standing trials ~2-3 minutes each with CGA/SBA for dynamic balance to participate in jigsaw puzzle using B hands before requiring seated rest breaks. Note patient required increased time and minimal cues to problem solve puzzle orientation, unable to complete before time expires this session. Assessment: Patient tolerated well. Psychologist has been notified of patient's status. Patient continues with difficulty with visuospatial reasoning and anxiousness, to continue to address. Education: Purpose of therapy  Interdisciplinary Communication: Collaborated with Graham Colindres and agreed patient is progressing well and on-track to meet goals as stated in POC. Plan: Continue to address ADL/IADL, functional mobility, activity tolerance, balance, strengthening, coordination, education, cognition.       Chanda Ludwig, OTR/L

## 2017-12-07 LAB
BASOPHILS # BLD: 0 K/UL (ref 0–0.2)
BASOPHILS NFR BLD: 1 % (ref 0–2)
DIFFERENTIAL METHOD BLD: ABNORMAL
EOSINOPHIL # BLD: 0.2 K/UL (ref 0–0.8)
EOSINOPHIL NFR BLD: 3 % (ref 0.5–7.8)
ERYTHROCYTE [DISTWIDTH] IN BLOOD BY AUTOMATED COUNT: 18.6 % (ref 11.9–14.6)
HCT VFR BLD AUTO: 29 % (ref 35.8–46.3)
HGB BLD-MCNC: 8.8 G/DL (ref 11.7–15.4)
IMM GRANULOCYTES # BLD: 0 K/UL (ref 0–0.5)
IMM GRANULOCYTES NFR BLD AUTO: 0 % (ref 0–5)
LYMPHOCYTES # BLD: 1.1 K/UL (ref 0.5–4.6)
LYMPHOCYTES NFR BLD: 19 % (ref 13–44)
MCH RBC QN AUTO: 29.4 PG (ref 26.1–32.9)
MCHC RBC AUTO-ENTMCNC: 30.3 G/DL (ref 31.4–35)
MCV RBC AUTO: 97 FL (ref 79.6–97.8)
MONOCYTES # BLD: 0.5 K/UL (ref 0.1–1.3)
MONOCYTES NFR BLD: 9 % (ref 4–12)
NEUTS SEG # BLD: 4 K/UL (ref 1.7–8.2)
NEUTS SEG NFR BLD: 68 % (ref 43–78)
PLATELET # BLD AUTO: 358 K/UL (ref 150–450)
PMV BLD AUTO: 8.8 FL (ref 10.8–14.1)
RBC # BLD AUTO: 2.99 M/UL (ref 4.05–5.25)
WBC # BLD AUTO: 5.9 K/UL (ref 4.3–11.1)

## 2017-12-07 PROCEDURE — 97116 GAIT TRAINING THERAPY: CPT

## 2017-12-07 PROCEDURE — 65310000000 HC RM PRIVATE REHAB

## 2017-12-07 PROCEDURE — 97530 THERAPEUTIC ACTIVITIES: CPT

## 2017-12-07 PROCEDURE — 97110 THERAPEUTIC EXERCISES: CPT

## 2017-12-07 PROCEDURE — 5A1D70Z PERFORMANCE OF URINARY FILTRATION, INTERMITTENT, LESS THAN 6 HOURS PER DAY: ICD-10-PCS | Performed by: INTERNAL MEDICINE

## 2017-12-07 PROCEDURE — 36415 COLL VENOUS BLD VENIPUNCTURE: CPT | Performed by: INTERNAL MEDICINE

## 2017-12-07 PROCEDURE — 74011250637 HC RX REV CODE- 250/637: Performed by: PHYSICAL MEDICINE & REHABILITATION

## 2017-12-07 PROCEDURE — 85025 COMPLETE CBC W/AUTO DIFF WBC: CPT | Performed by: INTERNAL MEDICINE

## 2017-12-07 PROCEDURE — 90935 HEMODIALYSIS ONE EVALUATION: CPT

## 2017-12-07 RX ADMIN — SUCRALFATE 1 G: 1 SUSPENSION ORAL at 12:22

## 2017-12-07 RX ADMIN — ROSUVASTATIN CALCIUM 20 MG: 20 TABLET, FILM COATED ORAL at 21:03

## 2017-12-07 RX ADMIN — SUCRALFATE 1 G: 1 SUSPENSION ORAL at 06:34

## 2017-12-07 RX ADMIN — METOCLOPRAMIDE HYDROCHLORIDE 5 MG: 10 TABLET ORAL at 18:00

## 2017-12-07 RX ADMIN — TICAGRELOR 90 MG: 90 TABLET ORAL at 08:48

## 2017-12-07 RX ADMIN — TORSEMIDE 100 MG: 100 TABLET ORAL at 18:01

## 2017-12-07 RX ADMIN — TORSEMIDE 100 MG: 100 TABLET ORAL at 08:52

## 2017-12-07 RX ADMIN — RANOLAZINE 1000 MG: 500 TABLET, FILM COATED, EXTENDED RELEASE ORAL at 18:00

## 2017-12-07 RX ADMIN — GENTAMICIN SULFATE: 1 CREAM TOPICAL at 07:23

## 2017-12-07 RX ADMIN — PANTOPRAZOLE SODIUM 40 MG: 40 TABLET, DELAYED RELEASE ORAL at 17:50

## 2017-12-07 RX ADMIN — ASPIRIN 81 MG: 81 TABLET, COATED ORAL at 08:50

## 2017-12-07 RX ADMIN — CALCITRIOL 0.25 MCG: 0.25 CAPSULE, LIQUID FILLED ORAL at 08:49

## 2017-12-07 RX ADMIN — PANTOPRAZOLE SODIUM 40 MG: 40 TABLET, DELAYED RELEASE ORAL at 06:34

## 2017-12-07 RX ADMIN — METOCLOPRAMIDE HYDROCHLORIDE 5 MG: 10 TABLET ORAL at 08:50

## 2017-12-07 RX ADMIN — Medication 400 MG: at 08:52

## 2017-12-07 RX ADMIN — TICAGRELOR 90 MG: 90 TABLET ORAL at 21:03

## 2017-12-07 RX ADMIN — LEVOTHYROXINE SODIUM 150 MCG: 50 TABLET ORAL at 06:34

## 2017-12-07 RX ADMIN — RANOLAZINE 1000 MG: 500 TABLET, FILM COATED, EXTENDED RELEASE ORAL at 08:48

## 2017-12-07 NOTE — PROGRESS NOTES
OT Daily Note    Time In 0919   Time Out 0955     Subjective: \"I'm so tired, I can only do 2 sets [of therapeutic exercises]. \"  Pain: Not indicated    Precautions: Other (comment) (fall risk)    Strengthening   Patient completed 2 sets x 10 reps of BUE therapeutic exercises seated in bed in Dialysis with 2# cuff weight to LUE, no weight to RUE. Patient completed shoulder flexion, shoulder abduction, scapular protraction/retraction, elbow flexion, and elbow extension exercises provided extended intermittent rest breaks, note patient cued and occasionally required AMBROCIO MCCARTY assisting ELIDIA, to complete final reps. Assessment: Patient very fatigued/lethargic this AM however agreeable to [employer] provided frequent rest breaks. Education: Purpose of therapy  Interdisciplinary Communication: Collaborated with Lobo Hanson regarding patient's status and agreed patient is progressing well and on-track to meet goals as stated in POC. Plan: Continue to address ADL/IADL, functional mobility, activity tolerance, balance, strengthening, coordination, education, cognition.       Ghulam Antunez OTR/L

## 2017-12-07 NOTE — PROGRESS NOTES
MD Claire,   Medical Director  [address] [facility], [address] [facility]  Tel: [phone]       D PROGRESS NOTE    [facility]  Admit Date: 11/29/2017  Admit Diagnosis: DEBILITY; Renal failure (ARF), acute on chronic ([facility] [address] Chito Bear*  Chief Complaint : Gait dysfunction secondary to below. Admit Diagnosis: Unstable angina ([facility] [address]  CAD (coronary artery disease) (11/27/2015)  S/P complex PCI and stable on ASA and Brilinta  Unstable angina ([facility] [address] (2/1/2016)  Acute/ Chronic anemia (11/18/2017)  ESRD (end stage renal disease) On PD/ CRRT  Pain  DVT risk  Acute Rehab Dx:  Debility    Weakness  Gait impairment  deconditioning  Mobility and ambulation deficits  Self Care/ADL deficits     Subjective     Patient seen and examined. Vss.afebrile. HD continued. PD on hold. Monitoring for congestion, volume overload. Denies CP, SOB, cough. + Mild fatigue.      Objective:     Current Facility-Administered Medications   Medication Dose Route Frequency    rosuvastatin (CRESTOR) tablet 20 mg  20 mg Oral QHS    sucralfate (CARAFATE) 100 mg/mL oral suspension 1 g  1 g Oral AC&HS    polyethylene glycol (MIRALAX) packet 17 g  17 g Oral DAILY    acetaminophen (TYLENOL) tablet 650 mg  650 mg Oral Q4H PRN    HYDROcodone-acetaminophen (NORCO) 7.5-325 mg per tablet 1 Tab  1 Tab Oral Q4H PRN    HYDROcodone-acetaminophen (NORCO) 5-325 mg per tablet 1 Tab  1 Tab Oral Q4H PRN    LORazepam (ATIVAN) tablet 1 mg  1 mg Oral Q6H PRN    sodium chloride (NS) flush 5-10 mL  5-10 mL IntraVENous PRN    amLODIPine (NORVASC) tablet 5 mg  5 mg Oral DAILY    aspirin delayed-release tablet 81 mg  81 mg Oral DAILY    calcitRIOL (ROCALTROL) capsule 0.25 mcg  0.25 mcg Oral DAILY    cyanocobalamin (VITAMIN B12) injection 1,000 mcg  1,000 mcg IntraMUSCular Q7D    epoetin toya (EPOGEN;PROCRIT) injection 20,000 Units  20,000 Units SubCUTAneous Q MON, WED & FRI    gentamicin (GARAMYCIN) 0.1 % cream   Topical DAILY PRN    levothyroxine (SYNTHROID) tablet 150 mcg  150 mcg Oral ACB    linaclotide (LINZESS) capsule 145 mcg (Patient Supplied)  145 mcg Oral DAILY    magnesium oxide (MAG-OX) tablet 400 mg  400 mg Oral DAILY    metoclopramide HCl (REGLAN) tablet 5 mg  5 mg Oral BID    metoprolol tartrate (LOPRESSOR) tablet 12.5 mg  12.5 mg Oral DAILY    multivitamin w ZN (STRESSTABS W ZINC) tablet  1 Tab Oral DAILY    nitroglycerin (NITROSTAT) tablet 0.4 mg  0.4 mg SubLINGual PRN    ondansetron (ZOFRAN ODT) tablet 4 mg  4 mg Oral TID PRN    pantoprazole (PROTONIX) tablet 40 mg  40 mg Oral ACB&D    polyethylene glycol (MIRALAX) packet 17 g  17 g Oral DAILY PRN    promethazine (PHENERGAN) tablet 25 mg  25 mg Oral Q6H PRN    ranolazine ER (RANEXA) tablet 1,000 mg  1,000 mg Oral BID    senna-docusate (PERICOLACE) 8.6-50 mg per tablet 1 Tab  1 Tab Oral DAILY    sevelamer (RENAGEL) tablet 800 mg  800 mg Oral TID WITH MEALS    ticagrelor (BRILINTA) tablet 90 mg  90 mg Oral BID    torsemide (DEMADEX) tablet 100 mg  100 mg Oral BID     Review of Systems:   Denies chest pain, shortness of breath, cough, headache, visual problems, abdominal pain, dysurea, calf pain. Pertinent positives are as noted in the medical records and unremarkable otherwise. Visit Vitals    /78    Pulse 73    Temp 97.6 °F (36.4 °C)    Resp 18    Wt 220 lb 3.2 oz (99.9 kg)    SpO2 96%    BMI 31.6 kg/m2   Physical Exam:   General: Alert and age appropriately oriented. No acute cardio respiratory distress. HEENT: Normocephalic,no scleral icterus  Oral mucosa moist without cyanosis   Lungs: Clear to auscultation  bilaterally. Respiration even and unlabored   Heart: Regular rate and rhythm, S1, S2   No  murmurs, clicks, rub or gallops   Abdomen: Soft, non-tender, nondistended. Bowel sounds present. No organomegaly. Genitourinary: defered   Neuromuscular:      NANCI, EOMI  + mild generalized weakness 4+ to 5-/5.  BUE, BLE, more proximal.   Sensation grossly intact to soft touch. Skin/extremity: No rashes, no erythema. 2+edema. Wound covered.   [facility]                                                                             Functional Assessment:  Gross Assessment  AROM: Generally decreased, functional (12/03/17 1100)  Strength: Generally decreased, functional (12/03/17 1100)  Coordination: Generally decreased, functional (12/03/17 1100)       Balance  Sitting - Static: Good (unsupported) (12/05/17 1400)  Sitting - Dynamic: Good (unsupported) (12/05/17 1400)  Standing - Static: Fair (12/05/17 1400)  Standing - Dynamic : Impaired (12/05/17 1400)           Toileting  Adaptive Equipment: Grab bars (12/06/17 1026)         Harrison Fall Risk Assessment:  Mala Slaughter Fall Risk  Mobility: Ambulates or transfers with assist devices or assistance/unsteady gait (12/07/17 0710)  Mobility Interventions: Patient to call before getting OOB;Utilize walker, cane, or other assitive device (12/07/17 0710)  Mentation: Alert, oriented x 3 (12/07/17 0710)  Medication: Patient receiving anticonvulsants, sedatives(tranquilizers), psychotropics or hypnotics, hypoglycemics, narcotics, sleep aids, antihypertensives, laxatives, or diuretics (12/07/17 0710)  Medication Interventions: Evaluate medications/consider consulting pharmacy; Patient to call before getting OOB (12/07/17 0710)  Elimination: Needs assistance with toileting (12/07/17 0710)  Elimination Interventions: Call light in reach (12/07/17 0710)  Prior Fall History: No (12/07/17 0710)  Total Score: 3 (12/07/17 0710)  Standard Fall Precautions: Yes (12/07/17 0710)  High Fall Risk: Yes (12/06/17 2052)     Speech Assessment:         Ambulation:  Gait  Distance (ft): 25 Feet (ft) (12/06/17 1636)  Assistive Device: Gait belt;Walker, rolling (12/06/17 1636)     Labs/Studies:  Recent Results (from the past 72 hour(s))   CBC WITH AUTOMATED DIFF    Collection Time: 12/07/17  5:13 AM   Result Value Ref Range    WBC 5.9 4.3 - 11.1 K/uL    RBC 2.99 (L) 4.05 - 5.25 M/uL    HGB 8.8 (L) 11.7 - 15.4 g/dL    HCT 29.0 (L) 35.8 - 46.3 %    MCV 97.0 79.6 - 97.8 FL    MCH 29.4 26.1 - 32.9 PG    MCHC 30.3 (L) 31.4 - 35.0 g/dL    RDW 18.6 (H) 11.9 - 14.6 %    PLATELET 962 010 - 580 K/uL    MPV 8.8 (L) 10.8 - 14.1 FL    DF AUTOMATED      NEUTROPHILS 68 43 - 78 %    LYMPHOCYTES 19 13 - 44 %    MONOCYTES 9 4.0 - 12.0 %    EOSINOPHILS 3 0.5 - 7.8 %    BASOPHILS 1 0.0 - 2.0 %    IMMATURE GRANULOCYTES 0 0.0 - 5.0 %    ABS. NEUTROPHILS 4.0 1.7 - 8.2 K/UL    ABS. LYMPHOCYTES 1.1 0.5 - 4.6 K/UL    ABS. MONOCYTES 0.5 0.1 - 1.3 K/UL    ABS. EOSINOPHILS 0.2 0.0 - 0.8 K/UL    ABS. BASOPHILS 0.0 0.0 - 0.2 K/UL    ABS. IMM.  GRANS. 0.0 0.0 - 0.5 K/UL       Assessment:     Problem List as of 12/7/2017  Date Reviewed: 3/2/2017          Codes Class Noted - Resolved    Weakness of both legs ICD-10-CM: R29.898  ICD-9-CM: 729.89  11/29/2017 - Present        Renal failure (ARF), acute on chronic ([facility] [address] ICD-10-CM: N17.9, N18.9  ICD-9-CM: 584.9, 585.9  11/29/2017 - Present        Elevated troponin ICD-10-CM: R74.8  ICD-9-CM: 790.6  11/18/2017 - Present        Chest pain ICD-10-CM: R07.9  ICD-9-CM: 786.50  11/18/2017 - Present        CKD (chronic kidney disease) ICD-10-CM: N18.9  ICD-9-CM: 585.9  11/18/2017 - Present        Chronic anemia ICD-10-CM: D64.9  ICD-9-CM: 285.9  11/18/2017 - Present        ESRD (end stage renal disease) ([facility] [address] ICD-10-CM: N18.6  ICD-9-CM: 585.6  11/18/2017 - Present        Abdominal pain ICD-10-CM: R10.9  ICD-9-CM: 789.00  9/18/2017 - Present        H/O TIA (transient ischemic attack) and stroke ICD-10-CM: Z86.73  ICD-9-CM: V12.54  4/13/2017 - Present        S/P PTCA (percutaneous transluminal coronary angioplasty) ICD-10-CM: Z98.61  ICD-9-CM: V45.82  4/13/2017 - Present        Mixed hyperlipidemia ICD-10-CM: E78.2  ICD-9-CM: 272.2  4/13/2017 - Present        Vision changes ICD-10-CM: H53.9  ICD-9-CM: 368.9  3/26/2017 - Present        Dizziness ICD-10-CM: R42  ICD-9-CM: 780.4  3/26/2017 - Present        Refusal of blood transfusions as patient is Sabianist (Chronic) ICD-10-CM: Z53.1  ICD-9-CM: V62.6  8/25/2016 - Present        GERD (gastroesophageal reflux disease) ICD-10-CM: K21.9  ICD-9-CM: 530.81  8/8/2016 - Present        Unspecified sleep apnea ICD-10-CM: G47.30  ICD-9-CM: 780.57  8/8/2016 - Present    Overview Signed 8/8/2016 11:09 AM by Alyssa Diggs     uses cpap machine             CVA (cerebral vascular accident) Hx of TIA ICD-10-CM: I63.9  ICD-9-CM: 434.91  2/22/2016 - Present        Unstable angina ([address] ICD-10-CM: I20.0  ICD-9-CM: 411.1  2/1/2016 - Present        ESRD on peritoneal dialysis [facility]) (Chronic) ICD-10-CM: N18.6, Z99.2  ICD-9-CM: 585.6, V45.11  2/1/2016 - Present        Ischemic cardiomyopathy ICD-10-CM: I25.5  ICD-9-CM: 414.8  12/29/2015 - Present        HLD (hyperlipidemia) ICD-10-CM: E78.5  ICD-9-CM: 272.4  12/29/2015 - Present        Depression ICD-10-CM: F32.9  ICD-9-CM: 932  12/29/2015 - Present        CAD (coronary artery disease) ICD-10-CM: I25.10  ICD-9-CM: 414.00  11/27/2015 - Present        Debility ICD-10-CM: R53.81  ICD-9-CM: 799.3  5/5/2015 - Present        Hypertension (Chronic) ICD-10-CM: I10  ICD-9-CM: 401.9  4/28/2015 - Present        Anemia of chronic renal failure (Chronic) ICD-10-CM: N18.9, D63.1  ICD-9-CM: 285.21, 585.9  4/28/2015 - Present        Hypothyroidism (Chronic) ICD-10-CM: E03.9  ICD-9-CM: 244.9  4/28/2015 - Present        RESOLVED: Postural hypotension ICD-10-CM: I95.1  ICD-9-CM: 458.0  8/9/2016 - 3/26/2017        RESOLVED: Chest pain ICD-10-CM: R07.9  ICD-9-CM: 786.50  8/8/2016 - 3/26/2017        RESOLVED: Nausea ICD-10-CM: R11.0  ICD-9-CM: 787.02  8/8/2016 - 3/26/2017        RESOLVED: S/P PTCA (percutaneous transluminal coronary angioplasty); LAD PTCA of  ISR 11/27/15 ICD-10-CM: Z98.61  ICD-9-CM: V45.82  5/24/2016 - 3/26/2017        RESOLVED: TIA (transient ischemic attack) ICD-10-CM: G45.9  ICD-9-CM: 435.9  2/10/2016 - 3/26/2017        RESOLVED: Edema ICD-10-CM: R60.9  ICD-9-CM: 782.3  12/29/2015 - 3/26/2017        RESOLVED: Anterior myocardial infarction [facility]) ICD-10-CM: I21.09  ICD-9-CM: 410.10  5/21/2015 - 3/26/2017        RESOLVED: STEMI (ST elevation myocardial infarction) ([facility] [address] ICD-10-CM: I21.3  ICD-9-CM: 410.90  4/28/2015 - 2/1/2016              Plan:      CAD (coronary artery disease) (11/27/2015)/ S/P complex PCI / Unstable angina ([facility] [address] (2/1/2016)  - on ASA and Brilinta  -hypertension - continue norvasc, lopressor  - continue ranexa, statin  - demadex continued. Patient almost aneuric. Will discuss with nephrology need for demadex?  - 12/6 no acute cardiopulmonary complaints. BP fair control. Continue current dose antihypertensives.      Acute/ Chronic anemia (11/18/2017) - continue to monitor.   - conitnue epogen     ESRD (end stage renal disease) On PD/ CRRT  - PD resumed. - monitor fluids status. Nephrology managing. Will follow at IRU.   - 11/30 problem with PD/ catheter. Nephrologist aware. Will need to hold PT due to femoral cath precautions. nephrologist to follow. May need to have tunneled cath. - 12/4 - continue TTS HD. PD per nephrology direction. - 12/6 Plan possibly to insert another PD tube. S/p Lt femoral perm catheter working well  - 12/7 - continue to hold PD. Pneumonia prophylaxis- Insentive spirometer every hour while awake     DVT risk / DVT Prophylaxis- continue daily physician exam to assess for signs and symptoms as patient is at increased risk for of thromboembolism. Mobilization as tolerated. Intermittent pneumatic compression devices when in bed Thigh-high or knee-high thromboembolic deterrent hose when out of bed. -    Pain Control: stable, mild-to-moderate joint symptoms intermittently, reasonably well controlled by PRN meds.  Will require regular pain assessment and comprenhensive pain management,      Wound Care: Monitor wound status daily per staff and physician. At risk for failure. Will require 24/7 rehab nursing. Keep wound clean and dry      bowel program - as needed     GERD - resume PPI. At times may need additional antacids, Maalox prn.  - continue sucralfate     Hypothyroid  - synthroid.     Time spent was 25 minutes with over 1/2 in direct patient care/examination, consultation and coordination of care.      Signed By: Vimal Willard MD     December 7, 2017

## 2017-12-07 NOTE — PROGRESS NOTES
PT WEEKLY PROGRESS NOTE   Time In: 2942   Time Out: 1431    Subjective: Pt. Fatigued after dialysis. Objective: Other (comment) (Fall Risk)    Outcome Measures: Vital Signs:   Patient Vitals for the past 8 hrs:   Temp Pulse Resp BP SpO2   12/07/17 1046 - 73 - 129/78 -   12/07/17 1030 - 77 - 125/69 -   12/07/17 0956 - (!) 55 - 136/75 -   12/07/17 0929 - 73 - 139/77 -   12/07/17 0858 - 78 - 131/76 -   12/07/17 0829 - 76 - 135/75 -   12/07/17 0801 - 79 - 149/76 -   12/07/17 0719 - 77 - (!) 161/92 -   12/07/17 0656 97.6 °F (36.4 °C) 74 18 153/82 96 %         Pain level: pt. C/o pain R shoulder  Pain interventions: biofreeze to R shoulder w/ subjective report of improvement in pain    Patient education: pt. Educated on managing RW on a small curb step w/ ramp    Interdisciplinary Communication: provided co-treatment w/ OT as pt. Unable to tolerate separate sessions after HD;  PT focusing on gait training & facilitating weight shift during reaching activities while OT focusing on cognitive training & functional IADLs training       AROM: WFL, limited by decreased strength throughout    FIM SCORES Initial Assessment Weekly Progress Assessment 12/7/2017   Bed/Chair/Wheelchair Transfers 3 4   Wheelchair Mobility   NT   Walking [place] 1 2   Steps/Stairs   NT   Please see IRC Interdisciplinary Eval: Coordination/Balance Section for details regarding FIM score description.     BED/CHAIR/WHEELCHAIR TRANSFERS Initial Assessment Weekly Progress Assessment 12/7/2017   Rolling Right 4 (Minimal assistance) 0 (Not tested)   Rolling Left 0 (Not tested) 0 (Not tested)   Supine to Sit 4 (Minimal assistance) 0 (Not tested)   Sit to Stand Moderate assistance Minimal assistance   Sit to Supine 4 (Minimal assistance) 0 (Not tested)   Transfer Type SPT with walker SPT with walker   Comments flexed posture, heavy reliance on UEs, VCs for safety w/ hand placement VCs to increase anterior lean as well as for hand placement   Car Transfer Not tested NT   Car Type         GROSS ASSESSMENT Weekly Progress Assessment 12/7/2017   AROM Generally decreased, functional   Strength Generally decreased, functional   Coordination Generally decreased, functional   Tone NT   Sensation NT   PROM Limited by edema B LEs     POSTURE Weekly Progress Assessment 12/7/2017   Posture (WDL) Exceptions to WDL   Posture Assessment Trunk flexion;Rounded shoulders     WHEELCHAIR MOBILITY/MANAGEMENT Initial Assessment Weekly Progress Assessment 12/7/2017   Able to Propel 0 feet NT   Curbs/ramps assistance required 0 (Not tested) NT   Wheelchair set up assistance required 0 (Not tested) NT   Wheelchair management Manages left brake, Manages right brake NT     WALKING INDEPENDENCE Initial Assessment Weekly Progress Assessment 12/7/2017   Assistive device Walker, rolling, Gait belt Walker, rolling   Ambulation assistance - level surface 4 (Minimal assistance) 4 (Minimal assistance)   Distance 5 Feet (ft) (to recliner chair) 10 Feet (ft) (x2)   Comments flexed posture, slow fran, increased B stance, foot flat Flexed posture, foot flat, heavy reliance on RW     GAIT Weekly Progress Assessment 12/7/2017   Gait Description (WDL) Exceptions to WDL   Gait Abnormalities Trunk sway increased; Step to gait; Decreased step clearance     STEPS/STAIRS Initial Assessment Weekly Progress Assessment 12/7/2017   Steps/Stairs ambulated 0 NT   Rail Use   NT   Comments   NT   Curbs/Ramps 0 (Not tested) Min A w/ RW on a small curb step w/ 5' ramp     Therapeutic Activity:  Pt. Performed functional reach tasks while using RW, reaching out of POLLO & across midline to improve dynamic standing balance as well as to improve standing endurance. Pt. Required CGA-min A for LOB. Pt. Left up in recliner in NAD, call bell in reach. Assessment: Pt. Making slow progress towards goals due to medical complications associated w/ change from PD to HD. Pt.  Fatigues easily, requiring multiple rest breaks within a session, especially after HD. STGs set @ initial evaluation not met, yet remain appropriate. Will cont. PT services to address functional limitations. Plan of Care: Please see Care Plan for updated LTGs. Family Training:  To be scheduled    Phill Oakley, PT  12/7/2017

## 2017-12-07 NOTE — PROGRESS NOTES
Patient resting up in bed. Alert and oriented with pleasant affect. Lung sounds clear. S1S2, bowel sounds active. Denies pain or discomfort at present time. Presently being transported down to scheduled dialysis. No other voiced needs at present time. Assessment completed. See doc flow sheet for further assessments.

## 2017-12-07 NOTE — PROGRESS NOTES
End Of Shift Functional Summary, Nursing      TOILETING:  Does patient need assist with clothing management and/or pericare? Yes: Comment: contact guard assist to toilet with walker    TOILET TRANSFER:  Pt requires standby assistance/setup. Pt uses walker. BLADDER:  Pt does not have a castillo catheter that staff manages. Pt does not take medication. Pt is continent. of bladder and voids in toilet  Pt requires staff to empty device Pt has had 0 bladder accidents during this shift requiring standby assistance/setup to clean up. (An accident is when the episode is not contained in a brief AND/OR the clothing/linen requires changing/cleaning up.)    BOWEL:  Pt does take medication. Pt is continent of bowel and uses toilet. Pt requires staff to empty device    Pt has had 0 bowel accidents during this shift requiring standby assistance/setup from staff to clean up. (An accident is when the episode is not contained in a brief AND/OR the clothing/linen requires changing/cleaning up.)    BED/CHAIR TRANSFER  Pt requires standby assistance/setup. Patient requires the assistance of 1 staff member(s). Pt uses walker                       EATING  Pt requires no assistance. Pt does not wear dentures. TUBE FEEDINGS:  Pt does  receive nutrition through tube feedings. Patient requires no assistance with feedings. Documentation reviewed and plan of care discussed/reviewed with   patient, physician, therapists, oncoming nurse, patient assistant and family/spouse during the shift.

## 2017-12-07 NOTE — PROGRESS NOTES
OT Progress Note    Time In 1346   Time Out 1431     Subjective: \"I'll do what I can. \"  Pain: R shoulder not rated, Biofreeze applied    Precautions: Other (comment) (fall risk)    Activity Tolerance   Patient seen for co-treatment with PT, Marco A Mcallister. Patient transfers wheelchair <> recliner in hospital room ambulating with RW with assist from PT. Patient completed cognitive, UE strengthening, and dynamic standing balance tasks in simulated grocery shopping area provided intermittent seated rest breaks. Patient provided verbal list of 5 grocery items to remember, and ambulated using RW with assist from PT about grocery shopping area to recall, locate, and retrieve items from high, medium, and low shelves using RUE and place into grocery bag held to R side by therapist.  Note patient recalled 2/5 items accurately without cues, required one cue to recall third item and maximal cues to recall final 2 items. Note patient c/o blurry vision in L eye, provided and wears eye patch to L eye for trial second half of session to adapt and improve functional vision. Patient tolerates standing ~3-5 minute trials before requiring seated rest breaks. Patient completed final standing trial to retrieve pears at produce counter using RUE and reach across midline to place into elevated hanging scale, weighing out 2# in artificial fruit with 100% accuracy, cue to stabilize through LUE on countertop (versus RW) for increased safety. Patient moved pears from hanging scale into bag held by therapist on L side using LUE, stabilizing through counter on RUE d/t R shoulder fatigue, at conclusion of task. Assessment: Patient tolerated well. Decreased STM for recalling grocery list.  Patient reports increased functional vision with eye patch wear to L eye, agreeable to continuing trial.    Patient is progressing well with balance, functional mobility, activity tolerance, strength and coordination for ADL and functional transfers.   See POC for updated goals. Education: Precautions with eye patch wear  Interdisciplinary Communication: Collaborated with Mara Kaye and agreed patient is progressing well and on-track to meet goals as stated in POC. Plan: Continue to address ADL/IADL, functional mobility, activity tolerance, balance, strengthening, coordination, education, cognition.       Yoana Villanueva, OTR/L

## 2017-12-07 NOTE — PROGRESS NOTES
Problem: Falls - Risk of  Goal: *Absence of Falls  Document Harrison Fall Risk and appropriate interventions in the flowsheet.    Outcome: Progressing Towards Goal  Fall Risk Interventions:  Mobility Interventions: Patient to call before getting OOB, Utilize walker, cane, or other assitive device         Medication Interventions: Evaluate medications/consider consulting pharmacy, Patient to call before getting OOB    Elimination Interventions: Call light in reach

## 2017-12-07 NOTE — DIALYSIS
Treatment initiated using Left femoral CVC by Tracey Reed. Both ports aspirated and flushed without problems. Machine set per MD orders. Pt VS stable. Will continue to monitor.

## 2017-12-07 NOTE — PROGRESS NOTES
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 She has a history of GI bleeding and had anemia-induced unstable angina in the past. 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 She states the pain has since resolved with NTG paste.  No fevers or chills. No nausea, vomiting or diarrhea.  She states that she is essentially anuric and occasionally makes minimal urine.  She has a h/o CVA and TIA. No fever or chills, no problems with PD drainage or discoloration of PD fluid. No increased thirst. She wishes regular diet and supplement. She denies any other acute complaints  Her edema has improved in the past couple of days with intensified dialysis. s- no abdominal bloating now . seen in rehab today feels better , still has le  edema   Past Medical History:   Diagnosis Date    Anemia of chronic renal failure 4/28/2015    Anterior myocardial infarction [facility]) 5/21/2015    CAD (coronary artery disease)     Chest pain     Chronic kidney disease, stage III (moderate) 8/15/2014    on dialysis    CKD (chronic kidney disease) stage 4, GFR 15-29 ml/min ([facility]) 4/28/2015    Debility 5/5/2015    Depression 12/29/2015    Edema 12/29/2015    Endocrine disease     Hypothyroidism    GERD (gastroesophageal reflux disease)     Heart murmur 12/29/2015    HLD (hyperlipidemia) 12/29/2015    Hypertension     Hypothyroidism 4/28/2015    Ischemic cardiomyopathy 12/29/2015    Nausea     S/P PTCA (percutaneous transluminal coronary angioplasty); LAD PTCA of  ISR 11/27/15 5/24/2016    STEMI (ST elevation myocardial infarction) ([facility] [address] 4/28/2015    Unspecified sleep apnea     uses cpap machine    Unstable angina [facility])       Past Surgical History:   Procedure Laterality Date    HX BACK SURGERY  1990    neck surgery cervical disc    HX BACK SURGERY      lower back    HX CATARACT REMOVAL Bilateral     HX CHOLECYSTECTOMY  19702    gall bladder     HX KNEE REPLACEMENT Right 2006    HX PTCA  4/28/2015    2.25 Xience stent to mid LAD for occluded artery, anterior MI, EF 25%. Moderate disease distal LAD and distal OM PCI CX and RCA 2004, then PCI RCA and LAD in 2009. Prior to Admission medications    Medication Sig Start Date End Date Taking? Authorizing Provider   metoprolol tartrate (LOPRESSOR) 25 mg tablet Take 0.5 Tabs by mouth daily. 11/27/17  Yes MINDY Chatman   amLODIPine (NORVASC) 5 mg tablet Take 1 Tab by mouth daily. 11/27/17  Yes MINDY Chatman   torsemide (DEMADEX) 100 mg tablet Take 1 Tab by mouth two (2) times a day. 11/27/17  Yes MINDY Chatman   pantoprazole (PROTONIX) 40 mg tablet Take 1 Tab by mouth Before breakfast and dinner. 11/27/17  Yes MINDY Chatman   magnesium oxide (MAG-OX) 400 mg tablet Take 400 mg by mouth daily. Yes Historical Provider   metoclopramide HCl (REGLAN) 5 mg tablet Take 5 mg by mouth two (2) times a day. Yes Historical Provider   ticagrelor (BRILINTA) 90 mg tablet Take 1 Tab by mouth two (2) times a day. 2/4/16  Yes MINDY Chatman   aspirin delayed-release 81 mg tablet Take 1 Tab by mouth daily. 5/5/15  Yes Gisela Hollingsworth PA-C   rosuvastatin (CRESTOR) 20 mg tablet Take 20 mg by mouth nightly. Yes Historical Provider   levothyroxine (SYNTHROID) 150 mcg tablet Take 150 mcg by mouth Daily (before breakfast). Yes Historical Provider   cyanocobalamin (VITAMIN B12) 1,000 mcg/mL injection 1 mL by IntraMUSCular route every seven (7) days. Indications: Vitamin B12 Deficiency 12/2/17   MINDY Miller   folic acid (FOLVITE) 1 mg tablet Take 1 mg by mouth daily.     Historical Provider   potassium chloride (K-DUR, KLOR-CON) 20 mEq tablet Take 20 mEq by mouth two (2) times a day. Historical Provider   traZODone (DESYREL) 50 mg tablet Take  by mouth nightly. Historical Provider   megestrol (MEGACE) 400 mg/10 mL (40 mg/mL) suspension Take 200 mg by mouth daily. Historical Provider   sucralfate (CARAFATE) 100 mg/mL suspension Take 10 mL by mouth Before breakfast, lunch, dinner and at bedtime. Indications: PREVENTION OF STRESS ULCER 9/23/17   Chrissy Began, DO   nitroglycerin (NITROLINGUAL) 400 mcg/spray spray 1 Spray by SubLINGual route every five (5) minutes as needed for Chest Pain. Historical Provider   linaclotide [place]) 145 mcg cap capsule Take  by mouth Daily (before breakfast). Historical Provider   PNV NO.122/IRON/FOLIC ACID (PRENATAL MULTI PO) Take  by mouth. Historical Provider   ondansetron (ZOFRAN ODT) 4 mg disintegrating tablet Take 4 mg by mouth three (3) times daily as needed. Historical Provider   sevelamer carbonate (RENVELA) 800 mg tab tab Take 800 mg by mouth three (3) times daily (with meals). Historical Provider   gentamicin (GARAMYCIN) 0.1 % topical cream APPLY TO PD catheter exit site at daily dressing change 5/12/15   Minal Kinney MD   darbepoetin toya in polysorbat (ARANESP, POLYSORBATE,) 40 mcg/mL injection 40 mcg by SubCUTAneous route every fourteen (14) days. Indications: ANEMIA IN CHRONIC KIDNEY DISEASE    Historical Provider   ranolazine ER (RANEXA) 500 mg SR tablet Take 500 mg by mouth two (2) times a day.     Historical Provider     Allergies   Allergen Reactions    Codeine Nausea and Vomiting      Social History   Substance Use Topics    Smoking status: Never Smoker    Smokeless tobacco: Never Used    Alcohol use No      Family History   Problem Relation Age of Onset    Heart Disease Mother     Hypertension Mother     Cancer Mother      Lung    Stroke Father     Hypertension Father     Breast Cancer Neg Hx           Review of Systems    Constitutional: no fever, weak  Eyes: fair vision,    Ears, nose, mouth, throat, and face:fair hearing,   Respiratory: no asthma,  Chronic home O2 is being used - improved dyspnea  Cardiovascular:no palpitation, no  chest pain,   Gastrointestinal:no diarrhea,  Had BM today  Genitourinary: no dysuria,   Hematologic/lymphatic: no bleeding tendency,   Neurological: no seizures   Behvioral/Psych: no psych hospitalization   Endocrine: no goiter,       Objective:       Visit Vitals    /78    Pulse 73    Temp 97.6 °F (36.4 °C)    Resp 18    Wt 99.9 kg (220 lb 3.2 oz)    SpO2 96%    BMI 31.6 kg/m2       General:  Alert, cooperative, no distress, appears stated age. Head:  Normocephalic, without obvious abnormality, atraumatic. Eyes:  Conjunctivae/corneas clear. EOMs intact. Throat: Lips, mucosa, and tongue normal. Teeth and gums normal.   Neck: Supple, symmetrical, trachea midline, no adenopathy,  no JVD. Lungs:   Clear to auscultation bilaterally. Heart:  Regular rate and rhythm, S1, S2 normal, 2/6 systolic  murmur, no rub or gallop. Abdomen:   Soft, non-tender. Distended . No masses,  No organomegaly. PD catheter in place without exudate   Extremities: Extremities normal, atraumatic, no cyanosis. 3+edema. Skin: Skin color, texture, turgor normal. No rashes or lesions. Lymph nodes: Cervical and supraclavicular nodes normal.   Neurologic: Grossly intact. No asterixis.          Data Review:       Recent Results (from the past 24 hour(s))   CBC WITH AUTOMATED DIFF    Collection Time: 12/07/17  5:13 AM   Result Value Ref Range    WBC 5.9 4.3 - 11.1 K/uL    RBC 2.99 (L) 4.05 - 5.25 M/uL    HGB 8.8 (L) 11.7 - 15.4 g/dL    HCT 29.0 (L) 35.8 - 46.3 %    MCV 97.0 79.6 - 97.8 FL    MCH 29.4 26.1 - 32.9 PG    MCHC 30.3 (L) 31.4 - 35.0 g/dL    RDW 18.6 (H) 11.9 - 14.6 %    PLATELET 002 268 - 638 K/uL    MPV 8.8 (L) 10.8 - 14.1 FL    DF AUTOMATED      NEUTROPHILS 68 43 - 78 %    LYMPHOCYTES 19 13 - 44 %    MONOCYTES 9 4.0 - 12.0 %    EOSINOPHILS 3 0.5 - 7.8 %    BASOPHILS 1 0.0 - 2.0 %    IMMATURE GRANULOCYTES 0 0.0 - 5.0 %    ABS. NEUTROPHILS 4.0 1.7 - 8.2 K/UL    ABS. LYMPHOCYTES 1.1 0.5 - 4.6 K/UL    ABS. MONOCYTES 0.5 0.1 - 1.3 K/UL    ABS. EOSINOPHILS 0.2 0.0 - 0.8 K/UL    ABS. BASOPHILS 0.0 0.0 - 0.2 K/UL    ABS. IMM. GRANS. 0.0 0.0 - 0.5 K/UL       CXR viewed by me - no major infiltrate or fluid excess, CM with left possible minor effusion is present        CT abdomen/pelvis  CT ABDOMEN:  Peritoneal fluid in the perihepatic space, mild paracolic gutters,  extending down into the pelvis. Peritoneal dialysis catheter noted. There is not  any evidence of retroperitoneal hematoma identified. Strandy fluid density does  extend into the extraperitoneal space in the lower abdomen and pelvis. There is  radiodensity within the renal collecting systems, possibly previously  administered IV contrast. There is peripancreatic fluid and stranding, cannot  exclude acute pancreatitis. No biliary dilatation. Spleen is not enlarged. Small  bowel normal caliber.   CT PELVIS:   Moderate to large volume pelvic fluid.   There is diffuse asymmetric enlargement of the right rectus and oblique  abdominal muscles. There are are of higher attenuation the contralateral side  and findings are consistent with an abdominal wall hematoma. IMPRESSION:    1. Evidence of right abdominal wall hematoma. 2. No CT evidence to suggest retroperitoneal hematoma. 3. Extensive fluid in the abdomen and pelvis, presumed related to peritoneal  dialysis. Active Problems:    Weakness of both legs (11/29/2017)      Renal failure (ARF), acute on chronic (HCC) (11/29/2017)        Assessment:     1. CAD x NSTEMI, Unstable angina -  - [facility] with complex CAD and acute thrombotic lesion in pLAD and subtotal occlusion in mLAD. The RCA has severe proximal and mid RCA disease. She has additional stenting of LAD with Resolute in mid/distal and proximal vessel.   These all touched the previously stented mid vessel. Recommend life-long DAPT    2, ESRD -  - trail of PD went on well , but last night PD was ineffective secondary to retention of fluid   -s/p  hd again today  through perm cath    Will follow TTS schedule in rehab     3. Fluid excess -  - recurrent due to poor PD drainage    4. Anemia -  - intensive anemia therapy in [place]  - on WAYNE and IV iron and B12  - improved S/P  transfusion of PRBC    5. History of GI bleed -  - monitor clinically and serial Hb  - she had a drop in Hb to 7 range and improved with BT    6. Hypokalemia   - resolved     7.  Abdominal pain and tenderness with drop in hb , on DAPT   No evidence of retroperitoneal hematoma       Plan:     As above - 25

## 2017-12-07 NOTE — PROGRESS NOTES
PHYSICAL THERAPY DAILY NOTE  Time In: 1005  Time Out: 2042  Patient Seen For: AM;Therapeutic exercise    Subjective: Pt. Reports having a difficult night - didn't sleep well. Objective: Other (comment) (Fall Risk)    LOWER EXTREMITY EXERCISES Daily Assessment    Extremity: Both  Exercise Type #1: Supine lower extremity strengthening  Sets Performed: 1  Reps Performed: 10     SUPINE EXERCISES Sets Reps Comments   Ankle Pumps 1 10    hooklying hip adduction 1 10 Squeezing ball   bridging 1 10    Heel Slides 1 10 Using maxislide, AAROM R LE   Hip Abduction 1 10 Using maxislide   Short Arc Quad 1 10    hooklying hip IR/ER 1 10    Straight Leg Raise 1 10 AAROM     Vital Signs:   Patient Vitals for the past 4 hrs:   Pulse BP   12/07/17 1046 73 129/78   12/07/17 1030 77 125/69   12/07/17 0956 (!) 55 136/75   12/07/17 0929 73 139/77   12/07/17 0858 78 131/76   12/07/17 0829 76 135/75         Pain level: no c/o pain    Patient education: reviewed supine exercises    Interdisciplinary Communication: cleared pt. For participation w/ dialysis RN    Pt. Left supine in dialysis in NAD         Assessment: Pt. More fatigued this AM, decreased number of repetitions of LE exercises w/ increased rest breaks. Pt's endurance is very much affected by her dialysis. Pt. Cont. To benefit from PT services to maximize functional mobility prior to d/c. Plan of Care: Continue with POC and progress as tolerated.      Maxi Carlos, PT  12/7/2017

## 2017-12-07 NOTE — PROGRESS NOTES
Problem: Falls - Risk of  Goal: *Absence of Falls  Document Harrison Fall Risk and appropriate interventions in the flowsheet.    Outcome: Progressing Towards Goal  Fall Risk Interventions:  Mobility Interventions: Assess mobility with egress test, Communicate number of staff needed for ambulation/transfer, OT consult for ADLs, Patient to call before getting OOB, PT Consult for mobility concerns, PT Consult for assist device competence, Strengthening exercises (ROM-active/passive), Utilize walker, cane, or other assitive device         Medication Interventions: Assess postural VS orthostatic hypotension, Evaluate medications/consider consulting pharmacy, Patient to call before getting OOB, Teach patient to arise slowly    Elimination Interventions: Call light in reach, Elevated toilet seat, Toilet paper/wipes in reach, Toileting schedule/hourly rounds, Patient to call for help with toileting needs

## 2017-12-08 ENCOUNTER — APPOINTMENT (OUTPATIENT)
Dept: GENERAL RADIOLOGY | Age: 82
DRG: 947 | End: 2017-12-08
Attending: INTERNAL MEDICINE
Payer: MEDICARE

## 2017-12-08 PROCEDURE — 97116 GAIT TRAINING THERAPY: CPT

## 2017-12-08 PROCEDURE — 74011250636 HC RX REV CODE- 250/636: Performed by: PHYSICAL MEDICINE & REHABILITATION

## 2017-12-08 PROCEDURE — 74011250637 HC RX REV CODE- 250/637: Performed by: PHYSICAL MEDICINE & REHABILITATION

## 2017-12-08 PROCEDURE — 97535 SELF CARE MNGMENT TRAINING: CPT

## 2017-12-08 PROCEDURE — 65310000000 HC RM PRIVATE REHAB

## 2017-12-08 PROCEDURE — 97530 THERAPEUTIC ACTIVITIES: CPT

## 2017-12-08 PROCEDURE — 97110 THERAPEUTIC EXERCISES: CPT

## 2017-12-08 PROCEDURE — 74000 XR ABD (KUB): CPT

## 2017-12-08 RX ADMIN — STANDARDIZED SENNA CONCENTRATE AND DOCUSATE SODIUM 1 TABLET: 8.6; 5 TABLET, FILM COATED ORAL at 12:44

## 2017-12-08 RX ADMIN — CALCITRIOL 0.25 MCG: 0.25 CAPSULE, LIQUID FILLED ORAL at 08:10

## 2017-12-08 RX ADMIN — LEVOTHYROXINE SODIUM 150 MCG: 50 TABLET ORAL at 05:51

## 2017-12-08 RX ADMIN — TICAGRELOR 90 MG: 90 TABLET ORAL at 08:11

## 2017-12-08 RX ADMIN — TORSEMIDE 100 MG: 100 TABLET ORAL at 17:30

## 2017-12-08 RX ADMIN — TICAGRELOR 90 MG: 90 TABLET ORAL at 21:26

## 2017-12-08 RX ADMIN — RENAGEL 800 MG: 400 TABLET ORAL at 17:29

## 2017-12-08 RX ADMIN — ONDANSETRON 4 MG: 4 TABLET, ORALLY DISINTEGRATING ORAL at 07:45

## 2017-12-08 RX ADMIN — RANOLAZINE 1000 MG: 500 TABLET, FILM COATED, EXTENDED RELEASE ORAL at 17:29

## 2017-12-08 RX ADMIN — TORSEMIDE 100 MG: 100 TABLET ORAL at 08:10

## 2017-12-08 RX ADMIN — PANTOPRAZOLE SODIUM 40 MG: 40 TABLET, DELAYED RELEASE ORAL at 17:30

## 2017-12-08 RX ADMIN — SUCRALFATE 1 G: 1 SUSPENSION ORAL at 05:51

## 2017-12-08 RX ADMIN — SUCRALFATE 1 G: 1 SUSPENSION ORAL at 17:36

## 2017-12-08 RX ADMIN — RANOLAZINE 1000 MG: 500 TABLET, FILM COATED, EXTENDED RELEASE ORAL at 08:12

## 2017-12-08 RX ADMIN — ERYTHROPOIETIN 20000 UNITS: 20000 INJECTION, SOLUTION INTRAVENOUS; SUBCUTANEOUS at 17:36

## 2017-12-08 RX ADMIN — ROSUVASTATIN CALCIUM 20 MG: 20 TABLET, FILM COATED ORAL at 21:26

## 2017-12-08 RX ADMIN — AMLODIPINE BESYLATE 5 MG: 5 TABLET ORAL at 08:12

## 2017-12-08 RX ADMIN — METOCLOPRAMIDE HYDROCHLORIDE 5 MG: 10 TABLET ORAL at 17:30

## 2017-12-08 RX ADMIN — METOPROLOL TARTRATE 12.5 MG: 25 TABLET ORAL at 08:11

## 2017-12-08 RX ADMIN — PANTOPRAZOLE SODIUM 40 MG: 40 TABLET, DELAYED RELEASE ORAL at 05:51

## 2017-12-08 RX ADMIN — Medication 400 MG: at 08:10

## 2017-12-08 RX ADMIN — ASPIRIN 81 MG: 81 TABLET, COATED ORAL at 08:11

## 2017-12-08 RX ADMIN — SUCRALFATE 1 G: 1 SUSPENSION ORAL at 21:26

## 2017-12-08 NOTE — PROGRESS NOTES
End Of Shift Functional Summary, Nursing      TOILETING:  Does patient need assist with clothing management and/or pericare? Yes: Comment: clothing management    TOILET TRANSFER:  Pt requires minimal assistance. Pt uses grab bars. BLADDER:  Pt does not have a castillo catheter that staff manages. Pt does take medication. Pt is continent. of bladder and voids in toilet  Pt has had 1 bladder accidents during this shift requiring moderate assistance to clean up. (An accident is when the episode is not contained in a brief AND/OR the clothing/linen requires changing/cleaning up.)    BOWEL:  Pt does take medication. Pt is continent of bowel and uses toilet. Pt has had 0 bowel accidents during this shift. (An accident is when the episode is not contained in a brief AND/OR the clothing/linen requires changing/cleaning up.)    BED/CHAIR TRANSFER  Pt requires minimal assistance. Patient requires the assistance of 1 staff member(s). Pt uses walker    EATING  Pt requires setup. Pt does not wear dentures. TUBE FEEDINGS:  Pt does not  receive nutrition through tube feedings. Patient requires supervision/setup with feedings. Documentation reviewed and plan of care discussed/reviewed with   oncoming nurse during the shift.

## 2017-12-08 NOTE — PROGRESS NOTES
MD Claire,   Medical Director  [address] [facility], [address] [facility]  Tel: [phone]       D PROGRESS NOTE    Dipesh Mancilla  Admit Date: 11/29/2017  Admit Diagnosis: DEBILITY; Renal failure (ARF), acute on chronic ([facility] [address] Sen Needle*  Chief Complaint : Gait dysfunction secondary to below. Admit Diagnosis: Unstable angina ([facility] [address]  CAD (coronary artery disease) (11/27/2015)  S/P complex PCI and stable on ASA and Brilinta  Unstable angina ([facility] [address] (2/1/2016)  Acute/ Chronic anemia (11/18/2017)  ESRD (end stage renal disease) On PD/ CRRT  Pain  DVT risk  Acute Rehab Dx:  Debility    Weakness  Gait impairment  deconditioning  Mobility and ambulation deficits  Self Care/ADL deficits     Subjective     Patient seen and examined. Vss, afebrile. Nephrology continuing with HD. PD on hold. PT, OT tolerated well daily. She remains very motivated.     Objective:     Current Facility-Administered Medications   Medication Dose Route Frequency    rosuvastatin (CRESTOR) tablet 20 mg  20 mg Oral QHS    sucralfate (CARAFATE) 100 mg/mL oral suspension 1 g  1 g Oral AC&HS    polyethylene glycol (MIRALAX) packet 17 g  17 g Oral DAILY    acetaminophen (TYLENOL) tablet 650 mg  650 mg Oral Q4H PRN    HYDROcodone-acetaminophen (NORCO) 7.5-325 mg per tablet 1 Tab  1 Tab Oral Q4H PRN    HYDROcodone-acetaminophen (NORCO) 5-325 mg per tablet 1 Tab  1 Tab Oral Q4H PRN    LORazepam (ATIVAN) tablet 1 mg  1 mg Oral Q6H PRN    sodium chloride (NS) flush 5-10 mL  5-10 mL IntraVENous PRN    amLODIPine (NORVASC) tablet 5 mg  5 mg Oral DAILY    aspirin delayed-release tablet 81 mg  81 mg Oral DAILY    calcitRIOL (ROCALTROL) capsule 0.25 mcg  0.25 mcg Oral DAILY    cyanocobalamin (VITAMIN B12) injection 1,000 mcg  1,000 mcg IntraMUSCular Q7D    epoetin toya (EPOGEN;PROCRIT) injection 20,000 Units  20,000 Units SubCUTAneous Q MON, WED & FRI    gentamicin (GARAMYCIN) 0.1 % cream   Topical DAILY PRN    levothyroxine (SYNTHROID) tablet 150 mcg  150 mcg Oral ACB    linaclotide (LINZESS) capsule 145 mcg (Patient Supplied)  145 mcg Oral DAILY    magnesium oxide (MAG-OX) tablet 400 mg  400 mg Oral DAILY    metoclopramide HCl (REGLAN) tablet 5 mg  5 mg Oral BID    metoprolol tartrate (LOPRESSOR) tablet 12.5 mg  12.5 mg Oral DAILY    multivitamin w ZN (STRESSTABS W ZINC) tablet  1 Tab Oral DAILY    nitroglycerin (NITROSTAT) tablet 0.4 mg  0.4 mg SubLINGual PRN    ondansetron (ZOFRAN ODT) tablet 4 mg  4 mg Oral TID PRN    pantoprazole (PROTONIX) tablet 40 mg  40 mg Oral ACB&D    polyethylene glycol (MIRALAX) packet 17 g  17 g Oral DAILY PRN    promethazine (PHENERGAN) tablet 25 mg  25 mg Oral Q6H PRN    ranolazine ER (RANEXA) tablet 1,000 mg  1,000 mg Oral BID    senna-docusate (PERICOLACE) 8.6-50 mg per tablet 1 Tab  1 Tab Oral DAILY    sevelamer (RENAGEL) tablet 800 mg  800 mg Oral TID WITH MEALS    ticagrelor (BRILINTA) tablet 90 mg  90 mg Oral BID    torsemide (DEMADEX) tablet 100 mg  100 mg Oral BID     Review of Systems:   Denies chest pain, shortness of breath, cough, headache, visual problems, abdominal pain, dysurea, calf pain. Pertinent positives are as noted in the medical records and unremarkable otherwise. Visit Vitals    /83 (BP 1 Location: Right arm, BP Patient Position: At rest)    Pulse 80    Temp 97.4 °F (36.3 °C)    Resp 16    Wt 216 lb 6.4 oz (98.2 kg)    SpO2 97%    BMI 31.05 kg/m2   Physical Exam:   General: Alert and age appropriately oriented. No acute cardio respiratory distress. HEENT: Normocephalic,no scleral icterus  Oral mucosa moist without cyanosis   Lungs: Clear to auscultation  bilaterally. Respiration even and unlabored   Heart: Regular rate and rhythm, S1, S2   No  murmurs, clicks, rub or gallops   Abdomen: Soft, non-tender, nondistended. Bowel sounds present. No organomegaly.    Genitourinary: defered   Neuromuscular:      NANCI, EOMI  + mild generalized weakness 4+ to 5-/5. BUE, BLE, more proximal.   Sensation grossly intact to soft touch. Skin/extremity: No rashes, no erythema. 2+edema. Wound covered.   [facility] Knee                                                                             Functional Assessment:  Gross Assessment  AROM: Generally decreased, functional (12/07/17 1400)  Strength: Generally decreased, functional (12/07/17 1400)  Coordination: Generally decreased, functional (12/07/17 1400)       Balance  Sitting - Static: Good (unsupported) (12/07/17 1400)  Sitting - Dynamic: Good (unsupported) (12/07/17 1400)  Standing - Static: Fair (12/07/17 1400)  Standing - Dynamic : Impaired (12/07/17 1400)           Toileting  Adaptive Equipment: Grab bars (12/06/17 1026)         Harrison Fall Risk Assessment:  Doreatha Schwab Fall Risk  Mobility: Ambulates or transfers with assist devices or assistance/unsteady gait (12/08/17 0745)  Mobility Interventions: Communicate number of staff needed for ambulation/transfer;Patient to call before getting OOB; Strengthening exercises (ROM-active/passive); Utilize walker, cane, or other assitive device (12/08/17 0745)  Mentation: Alert, oriented x 3 (12/08/17 0745)  Mentation Interventions: Evaluate medications/consider consulting pharmacy; Increase mobility;More frequent rounding; Toileting rounds (12/08/17 0745)  Medication: Patient receiving anticonvulsants, sedatives(tranquilizers), psychotropics or hypnotics, hypoglycemics, narcotics, sleep aids, antihypertensives, laxatives, or diuretics (12/08/17 0745)  Medication Interventions: Evaluate medications/consider consulting pharmacy; Patient to call before getting OOB (12/08/17 0745)  Elimination: Needs assistance with toileting (12/08/17 0745)  Elimination Interventions: Call light in reach; Patient to call for help with toileting needs; Toileting schedule/hourly rounds (12/08/17 0745)  Prior Fall History: No (12/08/17 0745)  History of Falls Interventions: Consult care management for discharge planning;Evaluate medications/consider consulting pharmacy (12/08/17 0745)  Total Score: 3 (12/08/17 0745)  Standard Fall Precautions: Yes (12/07/17 0710)  High Fall Risk: Yes (12/08/17 0745)     Speech Assessment:         Ambulation:  Gait  Base of Support: Widened (12/07/17 1400)  Speed/Michelle: Slow;Shuffled (12/07/17 1400)  Step Length: Right shortened;Left shortened (12/07/17 1400)  Stance: Right increased; Left increased (12/07/17 1400)  Gait Abnormalities: Trunk sway increased; Step to gait; Decreased step clearance (12/07/17 1400)  Distance (ft): 10 Feet (ft) (x2) (12/07/17 1400)  Assistive Device: Walker, rolling (12/07/17 1400)  Curbs/Ramps: Minimum assistance (12/07/17 1400)     Labs/Studies:  Recent Results (from the past 72 hour(s))   CBC WITH AUTOMATED DIFF    Collection Time: 12/07/17  5:13 AM   Result Value Ref Range    WBC 5.9 4.3 - 11.1 K/uL    RBC 2.99 (L) 4.05 - 5.25 M/uL    HGB 8.8 (L) 11.7 - 15.4 g/dL    HCT 29.0 (L) 35.8 - 46.3 %    MCV 97.0 79.6 - 97.8 FL    MCH 29.4 26.1 - 32.9 PG    MCHC 30.3 (L) 31.4 - 35.0 g/dL    RDW 18.6 (H) 11.9 - 14.6 %    PLATELET 067 000 - 274 K/uL    MPV 8.8 (L) 10.8 - 14.1 FL    DF AUTOMATED      NEUTROPHILS 68 43 - 78 %    LYMPHOCYTES 19 13 - 44 %    MONOCYTES 9 4.0 - 12.0 %    EOSINOPHILS 3 0.5 - 7.8 %    BASOPHILS 1 0.0 - 2.0 %    IMMATURE GRANULOCYTES 0 0.0 - 5.0 %    ABS. NEUTROPHILS 4.0 1.7 - 8.2 K/UL    ABS. LYMPHOCYTES 1.1 0.5 - 4.6 K/UL    ABS. MONOCYTES 0.5 0.1 - 1.3 K/UL    ABS. EOSINOPHILS 0.2 0.0 - 0.8 K/UL    ABS. BASOPHILS 0.0 0.0 - 0.2 K/UL    ABS. IMM.  GRANS. 0.0 0.0 - 0.5 K/UL       Assessment:     Problem List as of 12/8/2017  Date Reviewed: 3/2/2017          Codes Class Noted - Resolved    Weakness of both legs ICD-10-CM: R29.898  ICD-9-CM: 729.89  11/29/2017 - Present        Renal failure (ARF), acute on chronic ([facility] ICD-10-CM: N17.9, N18.9  ICD-9-CM: 584.9, 585.9  11/29/2017 - Present        Elevated troponin ICD-10-CM: R74.8  ICD-9-CM: 790.6  11/18/2017 - Present        Chest pain ICD-10-CM: R07.9  ICD-9-CM: 786.50  11/18/2017 - Present        CKD (chronic kidney disease) ICD-10-CM: N18.9  ICD-9-CM: 585.9  11/18/2017 - Present        Chronic anemia ICD-10-CM: D64.9  ICD-9-CM: 285.9  11/18/2017 - Present        ESRD (end stage renal disease) ([facility] [address] ICD-10-CM: N18.6  ICD-9-CM: 585.6  11/18/2017 - Present        Abdominal pain ICD-10-CM: R10.9  ICD-9-CM: 789.00  9/18/2017 - Present        H/O TIA (transient ischemic attack) and stroke ICD-10-CM: Z86.73  ICD-9-CM: V12.54  4/13/2017 - Present        S/P PTCA (percutaneous transluminal coronary angioplasty) ICD-10-CM: Z98.61  ICD-9-CM: V45.82  4/13/2017 - Present        Mixed hyperlipidemia ICD-10-CM: E78.2  ICD-9-CM: 272.2  4/13/2017 - Present        Vision changes ICD-10-CM: H53.9  ICD-9-CM: 368.9  3/26/2017 - Present        Dizziness ICD-10-CM: R42  ICD-9-CM: 780.4  3/26/2017 - Present        Refusal of blood transfusions as patient is Tenriism (Chronic) ICD-10-CM: Z53.1  ICD-9-CM: V62.6  8/25/2016 - Present        GERD (gastroesophageal reflux disease) ICD-10-CM: K21.9  ICD-9-CM: 530.81  8/8/2016 - Present        Unspecified sleep apnea ICD-10-CM: G47.30  ICD-9-CM: 780.57  8/8/2016 - Present    Overview Signed 8/8/2016 11:09 AM by Cruz Valdez     uses cpap machine             CVA (cerebral vascular accident) Hx of TIA ICD-10-CM: I63.9  ICD-9-CM: 434.91  2/22/2016 - Present        Unstable angina ([facility] ICD-10-CM: I20.0  ICD-9-CM: 411.1  2/1/2016 - Present        ESRD on peritoneal dialysis ([facility] (Chronic) ICD-10-CM: N18.6, Z99.2  ICD-9-CM: 585.6, V45.11  2/1/2016 - Present        Ischemic cardiomyopathy ICD-10-CM: I25.5  ICD-9-CM: 414.8  12/29/2015 - Present        HLD (hyperlipidemia) ICD-10-CM: E78.5  ICD-9-CM: 272.4  12/29/2015 - Present        Depression ICD-10-CM: F32.9  ICD-9-CM: 366  12/29/2015 - Present        CAD (coronary artery disease) ICD-10-CM: I25.10  ICD-9-CM: 414.00  11/27/2015 - Present        Debility ICD-10-CM: R53.81  ICD-9-CM: 799.3  5/5/2015 - Present        Hypertension (Chronic) ICD-10-CM: I10  ICD-9-CM: 401.9  4/28/2015 - Present        Anemia of chronic renal failure (Chronic) ICD-10-CM: N18.9, D63.1  ICD-9-CM: 285.21, 585.9  4/28/2015 - Present        Hypothyroidism (Chronic) ICD-10-CM: E03.9  ICD-9-CM: 244.9  4/28/2015 - Present        RESOLVED: Postural hypotension ICD-10-CM: I95.1  ICD-9-CM: 458.0  8/9/2016 - 3/26/2017        RESOLVED: Chest pain ICD-10-CM: R07.9  ICD-9-CM: 786.50  8/8/2016 - 3/26/2017        RESOLVED: Nausea ICD-10-CM: R11.0  ICD-9-CM: 787.02  8/8/2016 - 3/26/2017        RESOLVED: S/P PTCA (percutaneous transluminal coronary angioplasty); LAD PTCA of  ISR 11/27/15 ICD-10-CM: Z98.61  ICD-9-CM: V45.82  5/24/2016 - 3/26/2017        RESOLVED: TIA (transient ischemic attack) ICD-10-CM: G45.9  ICD-9-CM: 435.9  2/10/2016 - 3/26/2017        RESOLVED: Edema ICD-10-CM: R60.9  ICD-9-CM: 782.3  12/29/2015 - 3/26/2017        RESOLVED: Anterior myocardial infarction ([facility] [address] ICD-10-CM: I21.09  ICD-9-CM: 410.10  5/21/2015 - 3/26/2017        RESOLVED: STEMI (ST elevation myocardial infarction) ([facility] ICD-10-CM: I21.3  ICD-9-CM: 410.90  4/28/2015 - 2/1/2016              Plan:      CAD (coronary artery disease) (11/27/2015)/ S/P complex PCI / Unstable angina/ hypertension  - on ASA and Brilinta  - continue ranexa, statin  - demadex continued. Patient almost aneuric. Will discuss with nephrology need for demadex?  - 12/6 stable cardiac exam.  - 12/8 - continue norvasc/ metoprolol. BP in good range.     Acute/ Chronic anemia (11/18/2017) - continue to monitor.   - continue epogen.     ESRD (end stage renal disease) On PD/ CRRT  - PD resumed. - monitor fluids status. Nephrology managing. Will follow at IRU.   - 11/30 problem with PD/ catheter. Nephrologist aware. Will need to hold PT due to femoral cath precautions. nephrologist to follow. May need to have tunneled cath. - 12/4 - continue TTS HD. PD per nephrology direction. - 12/6 Plan possibly to insert another PD tube. S/p Lt femoral perm catheter working well.  - 12/8 - continue HD per nephrology. PD on hold. LE edema slightly improved. .       Pneumonia prophylaxis- Insentive spirometer every hour while awake     DVT risk / DVT Prophylaxis- continue daily physician exam to assess for signs and symptoms as patient is at increased risk for of thromboembolism. Mobilization as tolerated. Intermittent pneumatic compression devices when in bed Thigh-high or knee-high thromboembolic deterrent hose when out of bed.   -       Pain Control: stable, mild-to-moderate joint symptoms intermittently, reasonably well controlled by PRN meds. Will require regular pain assessment and comprenhensive pain management,      Wound Care: Monitor wound status daily per staff and physician. At risk for failure. Will require 24/7 rehab nursing. Keep wound clean and dry      bowel program - as needed     GERD - resume PPI. At times may need additional antacids, Maalox prn.  - continue sucralfate     Hypothyroid  - synthroid.     Time spent was 25 minutes with over 1/2 in direct patient care/examination, consultation and coordination of care.      Signed By: Tr Carbajal MD     December 8, 2017

## 2017-12-08 NOTE — PROGRESS NOTES
OT Daily Note  Time In 1300   Time Out 1345     Subjective: \"I'm 86. I was born in [birth year]. \"   Pain: none reported  Education: oriented to time, discussed alternatives for obtaining meals   Interdisciplinary Communication: with  [place] regarding meal options at discharge   Precautions: Other (comment) (fall risk)  Location on arrival: up in recliner    Transfers Daily Assessment   Patient transferred recliner to [facility] using SPT with CGA. Progressing well. Education Daily Assessment   Patient reports wanting options for \"real meals\" at discharge in case she isn't able to ambulate in home to cook. Discussed alternatives such as a paid caregiver, frozen meals, etc. while adhering to renal diet with good understanding. Patient stated she is 80years old; reoriented patient to date and to actual age of 80. Appeared to understanding all education. Coordination Daily Assessment   Patient participated in theraputty activity using light resistance putty, working on  strengthening and fine motor coordination. Required increased time and increased assistance to don gloves to complete task. Would benefit from further coordination and strengthening tasks. Patient was left seated up in gym for PT. Assessment: Making good progress. Plan: Continue OT POC with focus on ADL/IADL skills, functional transfers, functional mobility, coordination, strength, static and dynamic balance, and activity tolerance to maximize safety and independence with ADLs and functional transfers.      Manuel Lucia MS, OTR/L  12/8/2017

## 2017-12-08 NOTE — PROGRESS NOTES
PHYSICAL THERAPY DAILY NOTE  Time In: 3560  Time Out: 1120  Patient Seen For: AM;Therapeutic exercise;Transfer training;Gait training    Subjective: Pt. Concerned about fluid on her LEs. Objective: Other (comment) (Fall Risk)    BED/MAT MOBILITY Daily Assessment    Supine to Sit : 5 (Supervision) (VCs for techique w/o rails)  Sit to Supine : 4 (Minimal assistance) (for LEs)       TRANSFERS Daily Assessment    Transfer Type: SPT with walker  Transfer Assistance : 4 (Contact guard assistance)  Sit to Stand Assistance: Contact guard assistance (from low surface)  Car Transfers: Not tested       GAIT Daily Assessment    Amount of Assistance: 4 (Contact guard assistance)  Distance (ft): 50 Feet (ft)  Assistive Device: Walker, rolling   Flexed posture, foot flat, heavy reliance on RW      BALANCE Daily Assessment    Sitting - Static: Good (unsupported)  Sitting - Dynamic: Good (unsupported)  Standing - Static: Fair  Standing - Dynamic : Impaired       LOWER EXTREMITY EXERCISES Daily Assessment    Extremity: Both  Exercise Type #1: Supine lower extremity strengthening  Sets Performed: 1  Reps Performed: 15  Level of Assist: Minimal assistance     SUPINE EXERCISES Sets Reps Comments   Ankle Pumps 1 15    hooklying hip adduction 1 15 Squeezing ball   bridging 1 15    Heel Slides 1 15 LE on small ball   Hip Abduction 1 15 Using maxislide   Short Arc Quad 1 15    Straight Leg Raise 1 15 AAROM     Vital Signs:   Patient Vitals for the past 12 hrs:   Temp Pulse Resp BP SpO2   12/08/17 0644 97.4 °F (36.3 °C) 80 16 150/83 97 %       Pain level: no c/o pain, states shoulder feels better today    Patient education: pt. Educated on difference between temporary HD catheter & permacath;  Pt. Also educated on services available in the home (both insurance provided as well as private pay)    Interdisciplinary Communication: collaborated w/ OT regarding pt. Progress    Pt. Left up in recliner in NAD, call bell in reach. Assessment: Pt. Able to increase gait distance today, yet remains short of what would be considered functional household gait. Pt. Cont. To fatigue very quickly, requiring multiple rest breaks. Pt. Also w/ cont. Edema B LEs which results in increased effort during mobilization. Therefore, pt. Cont. To benefit from PT services to address to improve functional independence. Plan of Care: Continue with POC and progress as tolerated.      Anastasia Blancas, PT  12/8/2017

## 2017-12-08 NOTE — PROGRESS NOTES
Time In 0656   Time Out 0744     Mobility   Score Comments   Supine to Sit 6 (Modified independent)    Transfer Assist 5 Transfer Type: SPT with walker  Comments: S     Activities of Daily Living    Score Comments   Bathing 5 Body Parts Bathe: Abdomen, Arm, left, Arm, right, Buttocks, Chest, Lower leg and foot, left, Lower leg and foot, right, Adriana area, Thigh, left, Thigh, right  Comments: S, LHS   Upper Body  Dressing/  Undressing 5 Items Applied:Pullover (4 steps)  Comments: Set-up   Lower Body Dressing/  Undressing 5 Items Applied:Sock, left (1 step), Sock, right (1 step), Underpants (3 steps), Elastic waist pants (3 steps)  Adaptive Equipment: Reacher, sock aide  Comments: S   Toileting 5 S   Toilet Transfer 5 S   Education  AE     Pt was in bed and agreeable to tx. Pt's performance with ADL is reflected in above chart. Pt got nauseous at the end of session with no output. Pt said she wet pants, but there was no wetness on them. LARRY Corrigan notified. Pt left with NATALIE Avelar. Pt had good recall of AE. Continue with POC.      Alex Rai OTR/L  12/8/2017

## 2017-12-08 NOTE — PROGRESS NOTES
Pt sitting on toilet. C/o of nausea. Administered po zofran. Alert and oriented x4. Lungs sounds clear bilaterally with respirations even and unlabored. Bowel sounds active in all quadrants. Palpable pulses bilaterally in upper and lower extremities. Left groin dialysis access. Instructed to call for assistance. Call light in reach. Voiced understanding and identified call light.

## 2017-12-08 NOTE — PROGRESS NOTES
Subjective \"I have had rough morning but am ready to work with you. \"   Activity Sewing activity with the use of BUE   Strength/Endurance Patient had difficulty with her [facility] in her RUE and needed extra time to complete the task. Balance NT   Social Interaction Patient was friendly and conversational during the session. Cognitive A&O X4   Comments Patient was assisted to her room and left seated up in her w/c with all needs within reach.      Nikko Nunez, CTRS

## 2017-12-08 NOTE — PROGRESS NOTES
PHYSICAL THERAPY DAILY NOTE  Time In: 4319  Time Out: 4642  Patient Seen For: PM;Gait training; Therapeutic exercise;Transfer training    Subjective: Pt. Asking about home care services, especially regarding meal preparation. Pt. Given handouts of home care agencies & recommended that she or her sons call to inquire about services & pricing. Objective: Other (comment) (Fall Risk)    TRANSFERS Daily Assessment   Worked on repeated sit to/from stand from hi-lo mat w/o use of UEs for functional LE strengthening as well as to facilitate improved anterior weight shift during the movement. Pt. Performed w/ min A initially but able to progress to supervision w/ repeated practice. Transfer Type: SPT with walker  Transfer Assistance : 4 (Contact guard assistance)  Sit to Stand Assistance: Contact guard assistance  Car Transfers: Not tested       GAIT Daily Assessment    Amount of Assistance: 4 (Contact guard assistance)  Distance (ft): 20 Feet (ft)  Assistive Device: Walker, rolling   Flexed posture, foot flat, decreased fran      BALANCE Daily Assessment   Worked on static standing balance & postural control to maintain stability Sitting - Static: Good (unsupported)  Sitting - Dynamic: Good (unsupported)  Standing - Static: Fair  Standing - Dynamic : Impaired       LOWER EXTREMITY EXERCISES Daily Assessment    Pt. Performed Motomed @ resistance level 2 x10 minutes to increase strength & endurance B LEs     Vital Signs:   Patient Vitals for the past 12 hrs:   Temp Pulse Resp BP SpO2   12/08/17 0644 97.4 °F (36.3 °C) 80 16 150/83 97 %       Pain level: no c/o pain    Patient education: pt. Educated on how anterior weight shift improved sit to/from stand via verbal instruction & demonstration    Interdisciplinary Communication: collaborated w/ OT regarding pt. Progress    Pt. Left up in recliner in NAD, call bell in reach.          Assessment: Pt. Making progress w/ repeated practice of sit to/from stand as evidenced by decreased assistance required as lower seat height. Pt. Does cont. To have difficulty w/ eccentric control during lowering due to weak extensors, so benefits from cont. PT services to address. Plan of Care: Continue with POC and progress as tolerated.      Geraldo Mantilla, PT  12/8/2017

## 2017-12-08 NOTE — PROGRESS NOTES
Problem: Falls - Risk of  Goal: *Absence of Falls  Document Harrison Fall Risk and appropriate interventions in the flowsheet.    Outcome: Progressing Towards Goal  Fall Risk Interventions:  Mobility Interventions: Communicate number of staff needed for ambulation/transfer, Patient to call before getting OOB, Strengthening exercises (ROM-active/passive), Utilize walker, cane, or other assitive device    Mentation Interventions: Evaluate medications/consider consulting pharmacy, Increase mobility, More frequent rounding, Toileting rounds    Medication Interventions: Evaluate medications/consider consulting pharmacy, Patient to call before getting OOB    Elimination Interventions: Call light in reach, Patient to call for help with toileting needs, Toileting schedule/hourly rounds    History of Falls Interventions: Consult care management for discharge planning, Evaluate medications/consider consulting pharmacy

## 2017-12-08 NOTE — PROGRESS NOTES
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 She has a history of GI bleeding and had anemia-induced unstable angina in the past. 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 She states the pain has since resolved with NTG paste.  No fevers or chills. No nausea, vomiting or diarrhea.  She states that she is essentially anuric and occasionally makes minimal urine.  She has a h/o CVA and TIA. No fever or chills, no problems with PD drainage or discoloration of PD fluid. No increased thirst. She wishes regular diet and supplement. She denies any other acute complaints  Her edema has improved in the past couple of days with intensified dialysis.     s- c/o discoloration on abdominal wall right - probably small hematoma due to subQ heparin - breathing okay, but still much edema - discussed plans for KUB to re-evaluate PD catheter dysfunction - she had good BMs with daily laxatives and has been mostly upright    Past Medical History:   Diagnosis Date    Anemia of chronic renal failure 4/28/2015    Anterior myocardial infarction [facility]) 5/21/2015    CAD (coronary artery disease)     Chest pain     Chronic kidney disease, stage III (moderate) 8/15/2014    on dialysis    CKD (chronic kidney disease) stage 4, GFR 15-29 ml/min ([address] 4/28/2015    Debility 5/5/2015    Depression 12/29/2015    Edema 12/29/2015    Endocrine disease     Hypothyroidism    GERD (gastroesophageal reflux disease)     Heart murmur 12/29/2015    HLD (hyperlipidemia) 12/29/2015    Hypertension     Hypothyroidism 4/28/2015    Ischemic cardiomyopathy 12/29/2015    Nausea     S/P PTCA (percutaneous transluminal coronary angioplasty); LAD PTCA of  ISR 11/27/15 5/24/2016    STEMI (ST elevation myocardial infarction) ([facility] [address] 4/28/2015    Unspecified sleep apnea     uses cpap machine    Unstable angina ([facility] [address]       Past Surgical History:   Procedure Laterality Date    HX BACK SURGERY  1990    neck surgery cervical disc    HX BACK SURGERY      lower back    HX CATARACT REMOVAL Bilateral     HX CHOLECYSTECTOMY  19702    gall bladder     HX KNEE REPLACEMENT Right 2006    HX PTCA  4/28/2015    2.25 Xience stent to mid LAD for occluded artery, anterior MI, EF 25%. Moderate disease distal LAD and distal OM PCI CX and RCA 2004, then PCI RCA and LAD in 2009. Prior to Admission medications    Medication Sig Start Date End Date Taking? Authorizing Provider   metoprolol tartrate (LOPRESSOR) 25 mg tablet Take 0.5 Tabs by mouth daily. 11/27/17  Yes MINDY Chatman   amLODIPine (NORVASC) 5 mg tablet Take 1 Tab by mouth daily. 11/27/17  Yes MINDY Chatman   torsemide (DEMADEX) 100 mg tablet Take 1 Tab by mouth two (2) times a day. 11/27/17  Yes MINDY Chatman   pantoprazole (PROTONIX) 40 mg tablet Take 1 Tab by mouth Before breakfast and dinner. 11/27/17  Yes MINDY Chatman   magnesium oxide (MAG-OX) 400 mg tablet Take 400 mg by mouth daily. Yes Historical Provider   metoclopramide HCl (REGLAN) 5 mg tablet Take 5 mg by mouth two (2) times a day. Yes Historical Provider   ticagrelor (BRILINTA) 90 mg tablet Take 1 Tab by mouth two (2) times a day. 2/4/16  Yes MINDY Chatman   aspirin delayed-release 81 mg tablet Take 1 Tab by mouth daily. 5/5/15  Yes Gisela Hollingsworth PA-C   rosuvastatin (CRESTOR) 20 mg tablet Take 20 mg by mouth nightly. Yes Historical Provider   levothyroxine (SYNTHROID) 150 mcg tablet Take 150 mcg by mouth Daily (before breakfast). Yes Historical Provider   cyanocobalamin (VITAMIN B12) 1,000 mcg/mL injection 1 mL by IntraMUSCular route every seven (7) days.  Indications: Vitamin B12 Deficiency 12/2/17   Gaby MINDY Calvo   folic acid (FOLVITE) 1 mg tablet Take 1 mg by mouth daily. Historical Provider   potassium chloride (K-DUR, KLOR-CON) 20 mEq tablet Take 20 mEq by mouth two (2) times a day. Historical Provider   traZODone (DESYREL) 50 mg tablet Take  by mouth nightly. Historical Provider   megestrol (MEGACE) 400 mg/10 mL (40 mg/mL) suspension Take 200 mg by mouth daily. Historical Provider   sucralfate (CARAFATE) 100 mg/mL suspension Take 10 mL by mouth Before breakfast, lunch, dinner and at bedtime. Indications: PREVENTION OF STRESS ULCER 9/23/17   Jeison Ng, DO   nitroglycerin (NITROLINGUAL) 400 mcg/spray spray 1 Spray by SubLINGual route every five (5) minutes as needed for Chest Pain. Historical Provider   linaclotide Meryl [place]) 145 mcg cap capsule Take  by mouth Daily (before breakfast). Historical Provider   PNV NO.122/IRON/FOLIC ACID (PRENATAL MULTI PO) Take  by mouth. Historical Provider   ondansetron (ZOFRAN ODT) 4 mg disintegrating tablet Take 4 mg by mouth three (3) times daily as needed. Historical Provider   sevelamer carbonate (RENVELA) 800 mg tab tab Take 800 mg by mouth three (3) times daily (with meals). Historical Provider   gentamicin (GARAMYCIN) 0.1 % topical cream APPLY TO PD catheter exit site at daily dressing change 5/12/15   Elsy Cherry MD   darbepoetin toya in polysorbat (ARANESP, POLYSORBATE,) 40 mcg/mL injection 40 mcg by SubCUTAneous route every fourteen (14) days. Indications: ANEMIA IN CHRONIC KIDNEY DISEASE    Historical Provider   ranolazine ER (RANEXA) 500 mg SR tablet Take 500 mg by mouth two (2) times a day.     Historical Provider     Allergies   Allergen Reactions    Codeine Nausea and Vomiting      Social History   Substance Use Topics    Smoking status: Never Smoker    Smokeless tobacco: Never Used    Alcohol use No      Family History   Problem Relation Age of Onset    Heart Disease Mother     Hypertension Mother     Cancer Mother      Lung    Stroke Father     Hypertension Father     Breast Cancer Neg Hx           Review of Systems    Constitutional: no fever, weak  Eyes: fair vision,    Ears, nose, mouth, throat, and face:fair hearing,   Respiratory: no asthma,  Chronic home O2 is being used - improved dyspnea  Cardiovascular:no palpitation, no  chest pain,   Gastrointestinal:no diarrhea,  Had BM today  Genitourinary: no dysuria,   Hematologic/lymphatic: no bleeding tendency,   Neurological: no seizures   Behvioral/Psych: no psych hospitalization   Endocrine: no goiter,       Objective:       Visit Vitals    /83 (BP 1 Location: Right arm, BP Patient Position: At rest)    Pulse 80    Temp 97.4 °F (36.3 °C)    Resp 16    Wt 98.2 kg (216 lb 6.4 oz)    SpO2 97%    BMI 31.05 kg/m2       General:  Alert, cooperative, no distress, appears stated age. Head:  Normocephalic, without obvious abnormality, atraumatic. Eyes:  Conjunctivae/corneas clear. EOMs intact. Throat: Lips, mucosa, and tongue normal. Teeth and gums normal.   Neck: Supple, symmetrical, trachea midline, no adenopathy,  no JVD. Lungs:   Clear to auscultation bilaterally. Heart:  Regular rate and rhythm, S1, S2 normal, 2/6 systolic  murmur, no rub or gallop. Abdomen:   Soft, non-tender. Distended . No masses,  No organomegaly. PD catheter in place without exudate   Extremities: Extremities normal, atraumatic, no cyanosis. 3+edema. Skin: Skin color, texture, turgor normal. No rashes or lesions. Lymph nodes: Cervical and supraclavicular nodes normal.   Neurologic: Grossly intact. No asterixis. Data Review:       No results found for this or any previous visit (from the past 24 hour(s)).     CXR viewed by me - no major infiltrate or fluid excess, CM with left possible minor effusion is present        CT abdomen/pelvis  CT ABDOMEN:  Peritoneal fluid in the perihepatic space, mild paracolic gutters,  extending down into the pelvis. Peritoneal dialysis catheter noted. There is not  any evidence of retroperitoneal hematoma identified. Strandy fluid density does  extend into the extraperitoneal space in the lower abdomen and pelvis. There is  radiodensity within the renal collecting systems, possibly previously  administered IV contrast. There is peripancreatic fluid and stranding, cannot  exclude acute pancreatitis. No biliary dilatation. Spleen is not enlarged. Small  bowel normal caliber.   CT PELVIS:   Moderate to large volume pelvic fluid.   There is diffuse asymmetric enlargement of the right rectus and oblique  abdominal muscles. There are are of higher attenuation the contralateral side  and findings are consistent with an abdominal wall hematoma. IMPRESSION:    1. Evidence of right abdominal wall hematoma. 2. No CT evidence to suggest retroperitoneal hematoma. 3. Extensive fluid in the abdomen and pelvis, presumed related to peritoneal  dialysis. Active Problems:    Weakness of both legs (11/29/2017)      Renal failure (ARF), acute on chronic (HCC) (11/29/2017)        Assessment:     1. CAD x NSTEMI, Unstable angina -  - [facility] with complex CAD and acute thrombotic lesion in pLAD and subtotal occlusion in mLAD. The RCA has severe proximal and mid RCA disease. She has additional stenting of LAD with Resolute in mid/distal and proximal vessel. These all touched the previously stented mid vessel. Recommend life-long DAPT    2, ESRD -  - on temporary HD via perm cath due to PD catheter dysfunction  - TTS schedule in rehab     3. Fluid excess -  - recurrent due to poor PD drainage  - plan fluid removal on HD    4. Anemia -  - intensive anemia therapy in [employer]  - on WAYNE and IV iron and B12  - improved S/P  transfusion of PRBC    5. History of GI bleed -  - monitor clinically and serial Hb  - she had a drop in Hb to 7 range and improved with BT    6. Hypokalemia   - resolved     7. Abdominal pain and tenderness with drop in Hb , on DAPT   No evidence of retroperitoneal hematoma     8.  PD catheter dysfunction -  - obtain KUB to reevaluate PD catheter position  - if not in proper position will ask surgical consult Dr. Ladonna Monroe:     As above - 25

## 2017-12-09 PROCEDURE — 74011250637 HC RX REV CODE- 250/637: Performed by: PHYSICAL MEDICINE & REHABILITATION

## 2017-12-09 PROCEDURE — 97110 THERAPEUTIC EXERCISES: CPT

## 2017-12-09 PROCEDURE — 90935 HEMODIALYSIS ONE EVALUATION: CPT

## 2017-12-09 PROCEDURE — 5A1D70Z PERFORMANCE OF URINARY FILTRATION, INTERMITTENT, LESS THAN 6 HOURS PER DAY: ICD-10-PCS | Performed by: INTERNAL MEDICINE

## 2017-12-09 PROCEDURE — 65310000000 HC RM PRIVATE REHAB

## 2017-12-09 PROCEDURE — 97532 HC OT COGNITIVE SKILL DEV 15 MIN: CPT

## 2017-12-09 PROCEDURE — 74011250636 HC RX REV CODE- 250/636: Performed by: PHYSICAL MEDICINE & REHABILITATION

## 2017-12-09 RX ADMIN — AMLODIPINE BESYLATE 5 MG: 5 TABLET ORAL at 13:33

## 2017-12-09 RX ADMIN — SUCRALFATE 1 G: 1 SUSPENSION ORAL at 11:30

## 2017-12-09 RX ADMIN — TICAGRELOR 90 MG: 90 TABLET ORAL at 09:00

## 2017-12-09 RX ADMIN — RANOLAZINE 1000 MG: 500 TABLET, FILM COATED, EXTENDED RELEASE ORAL at 13:33

## 2017-12-09 RX ADMIN — CYANOCOBALAMIN 1000 MCG: 1000 INJECTION, SOLUTION INTRAMUSCULAR at 18:00

## 2017-12-09 RX ADMIN — PANTOPRAZOLE SODIUM 40 MG: 40 TABLET, DELAYED RELEASE ORAL at 16:30

## 2017-12-09 RX ADMIN — PANTOPRAZOLE SODIUM 40 MG: 40 TABLET, DELAYED RELEASE ORAL at 06:22

## 2017-12-09 RX ADMIN — METOPROLOL TARTRATE: 25 TABLET ORAL at 13:35

## 2017-12-09 RX ADMIN — LEVOTHYROXINE SODIUM 150 MCG: 50 TABLET ORAL at 06:23

## 2017-12-09 RX ADMIN — Medication 400 MG: at 13:36

## 2017-12-09 RX ADMIN — TORSEMIDE 100 MG: 100 TABLET ORAL at 09:00

## 2017-12-09 RX ADMIN — SUCRALFATE 1 G: 1 SUSPENSION ORAL at 16:30

## 2017-12-09 RX ADMIN — RENAGEL 800 MG: 400 TABLET ORAL at 17:00

## 2017-12-09 RX ADMIN — RENAGEL 800 MG: 400 TABLET ORAL at 13:34

## 2017-12-09 RX ADMIN — CALCITRIOL 0.25 MCG: 0.25 CAPSULE, LIQUID FILLED ORAL at 13:33

## 2017-12-09 RX ADMIN — TORSEMIDE 100 MG: 100 TABLET ORAL at 18:00

## 2017-12-09 RX ADMIN — ASPIRIN 81 MG: 81 TABLET, COATED ORAL at 13:33

## 2017-12-09 RX ADMIN — METOCLOPRAMIDE HYDROCHLORIDE 5 MG: 10 TABLET ORAL at 18:00

## 2017-12-09 RX ADMIN — METOCLOPRAMIDE HYDROCHLORIDE 5 MG: 10 TABLET ORAL at 13:33

## 2017-12-09 RX ADMIN — ZINC 1 TABLET: TAB ORAL at 13:32

## 2017-12-09 RX ADMIN — ROSUVASTATIN CALCIUM 20 MG: 20 TABLET, FILM COATED ORAL at 21:21

## 2017-12-09 RX ADMIN — TICAGRELOR 90 MG: 90 TABLET ORAL at 21:21

## 2017-12-09 NOTE — DIALYSIS
HD treatment completed at 3.5 hours due to clotting in extracorporeal circuit. Total of 2.6 kgs removed. VS stable, bp 156/96 at end of tx. CVC dressing clean, dry, and intact, tego caps intact, bilateral lumen flushed with 9cc NS and curos applied. Transport contacted for return to room. No complaints at this time.

## 2017-12-09 NOTE — PROGRESS NOTES
On dialysis via Femoral cathete. Catheter aspirated and flushed with NS without difficulty. Vital signs stable. Pt noted alert and oriented x 4. Heparin dose is 0. Dialysis machine alarms WNL. Will continue to monitor throughout treatment.

## 2017-12-09 NOTE — PROGRESS NOTES
Patient seems confused. Stated that she did not like the way I spoke to her. When asked what was said that she didn't like, she stated that she didn't like when someone leaned over her and spoke to her harshly when she brought us cookies. While we can see the patient at all times, no one has leaned over the patient or spoken to her disrespectfully. Patient apologized to for any distress that may have been caused, but she is still upset.

## 2017-12-09 NOTE — PROGRESS NOTES
OT Daily Note    Time In 1125   Time Out 1206     Subjective: \"I don't want to do this right now. \" [patient declines further participation in MoCA assessment after being agreeable to initiating task [note patient having difficulty with assessment] despite prompt from therapist to defer task to another day d/t feeling \"out of it. \"]  Pain: None indicated, however patient fatigued/lethargic    Precautions: Other (comment) (fall risk)    Strengthening   Patient completed 1 set x 10 reps of BUE therapeutic exercises seated in bed with 2# cuff weight to LUE and use of dowel ashutosh, LUE supporting RUE for AAROM. Patient completed shoulder flexion, chest press, forward row, backward row, elbow flexion, and elbow extension exercises. Cognition   Initiated MoCA assessment with patient, note patient having increased difficulty with task and refused further participation in assessment at this time following introduction to [facility]" portion of assessment. Score breakdown as follows:     Visuospatial/executive: 1/5   Naming: 3/3     To re-attempt in future OT session. Assessment: Patient agreeable to some therapy this AM however with decreased insight requiring education for purpose of MoCA assessment (decreased STM noted in grocery shopping simulation task on 12/7/17, see OT note), and refused to complete cognitive assessment following difficulty and mild frustration with beginning sections of assessment. See above. Education: Purpose of therapy  Interdisciplinary Communication: Collaborated with Chandler Yates and agreed patient is progressing well and on-track to meet goals as stated in [address]. Plan: Continue to address ADL/IADL, functional mobility, activity tolerance, balance, strengthening, coordination, education, cognition.       Ramy Paz, OTR/L

## 2017-12-09 NOTE — PROGRESS NOTES
PHYSICAL THERAPY DAILY NOTE  Time In: 1015  Time Out: 5901  Patient Seen For: AM;Patient education; Therapeutic exercise    Subjective: Pt agreeable for therapy on this date. Seen during HD. No C/os. Objective: Other (comment) (Fall Risk)    BED/MAT MOBILITY Daily Assessment    Rolling Right : 4 (Contact guard assistance)  Rolling Left : 4 (Contact guard assistance)       LOWER EXTREMITY EXERCISES Daily Assessment    Extremity: Both  Exercise Type #1: Supine lower extremity strengthening  Sets Performed: 3  Reps Performed: 10  Level of Assist: Contact guard assistance     SUPINE EXERCISES Sets Reps Comments   Ankle Pumps 3 10    Quad Sets 3 10    Glut Sets 3 10    Heel Slides 3 10    Hip Abduction 3 10      Pt left in supine in bed at HD. Needs in reach. Assessment: Tolerated well. Limited session due to being at HD. Plan of Care: Continue with POC and progress as tolerated. Chu Kelley  12/9/2017

## 2017-12-09 NOTE — PROGRESS NOTES
Introduced the patient in hemodialysis by Dr. Kristy Olmstead. She has a PD catheter that was placed elsewhere and now has migrated into the upper abdomen and is functioning poorly. He asked if I would consider for repositioning of her PD catheter. Will try and get her on schedule for next week. Will discuss with her fully over the next day or 2.   Full consult to follow

## 2017-12-09 NOTE — PROGRESS NOTES
Hemodialysis Rounding Note    Subjective:     Elli Swain is a 80 y.o.  who presents with DEBILITY  Renal failure (ARF), acute on chronic (HCC)  Weakness of both legs. The patient is dialyzing utilizing the following method:Intermittent Hemodialysis  Artificial Kidney Dialyzer/Set Up Inspection: Revaclear Max   hours Duration of Treatment (hours): 3.5 hours   blood flow rate Blood Flow Rate (ml/min): 300 ml/min   dialysate rate     ultrafiltration Goal/Amount of Fluid to Remove (mL): 3600 mL   Heparin is used during the dialysis treatment. Complaints none.      Current Facility-Administered Medications   Medication Dose Route Frequency    rosuvastatin (CRESTOR) tablet 20 mg  20 mg Oral QHS    sucralfate (CARAFATE) 100 mg/mL oral suspension 1 g  1 g Oral AC&HS    polyethylene glycol (MIRALAX) packet 17 g  17 g Oral DAILY    acetaminophen (TYLENOL) tablet 650 mg  650 mg Oral Q4H PRN    HYDROcodone-acetaminophen (NORCO) 7.5-325 mg per tablet 1 Tab  1 Tab Oral Q4H PRN    HYDROcodone-acetaminophen (NORCO) 5-325 mg per tablet 1 Tab  1 Tab Oral Q4H PRN    LORazepam (ATIVAN) tablet 1 mg  1 mg Oral Q6H PRN    sodium chloride (NS) flush 5-10 mL  5-10 mL IntraVENous PRN    amLODIPine (NORVASC) tablet 5 mg  5 mg Oral DAILY    aspirin delayed-release tablet 81 mg  81 mg Oral DAILY    calcitRIOL (ROCALTROL) capsule 0.25 mcg  0.25 mcg Oral DAILY    cyanocobalamin (VITAMIN B12) injection 1,000 mcg  1,000 mcg IntraMUSCular Q7D    epoetin toya (EPOGEN;PROCRIT) injection 20,000 Units  20,000 Units SubCUTAneous Q MON, WED & FRI    gentamicin (GARAMYCIN) 0.1 % cream   Topical DAILY PRN    levothyroxine (SYNTHROID) tablet 150 mcg  150 mcg Oral ACB    linaclotide (LINZESS) capsule 145 mcg (Patient Supplied)  145 mcg Oral DAILY    magnesium oxide (MAG-OX) tablet 400 mg  400 mg Oral DAILY    metoclopramide HCl (REGLAN) tablet 5 mg  5 mg Oral BID    metoprolol tartrate (LOPRESSOR) tablet 12.5 mg 12.5 mg Oral DAILY    multivitamin w ZN (STRESSTABS W ZINC) tablet  1 Tab Oral DAILY    nitroglycerin (NITROSTAT) tablet 0.4 mg  0.4 mg SubLINGual PRN    ondansetron (ZOFRAN ODT) tablet 4 mg  4 mg Oral TID PRN    pantoprazole (PROTONIX) tablet 40 mg  40 mg Oral ACB&D    polyethylene glycol (MIRALAX) packet 17 g  17 g Oral DAILY PRN    promethazine (PHENERGAN) tablet 25 mg  25 mg Oral Q6H PRN    ranolazine ER (RANEXA) tablet 1,000 mg  1,000 mg Oral BID    senna-docusate (PERICOLACE) 8.6-50 mg per tablet 1 Tab  1 Tab Oral DAILY    sevelamer (RENAGEL) tablet 800 mg  800 mg Oral TID WITH MEALS    ticagrelor (BRILINTA) tablet 90 mg  90 mg Oral BID    torsemide (DEMADEX) tablet 100 mg  100 mg Oral BID           1901 -  0700  In: 240 [P.O.:240]  Out: 1     No results found for this or any previous visit (from the past 24 hour(s)). Review of Systems  Pertinent items are noted in HPI. .    Objective:     Blood pressure 157/79, pulse 78, temperature 97.8 °F (36.6 °C), resp. rate 18, weight 99 kg (218 lb 4.8 oz), SpO2 98 %. Temp (24hrs), Av.8 °F (36.6 °C), Min:97.8 °F (36.6 °C), Max:97.8 °F (36.6 °C)      Physical Exam:   Neck: no adenopathy and no JVD, Lungs: clear to auscultation bilaterally, Cardiac: regular rate and rhythm, Abdomen: soft, non-tender. Bowel sounds normal. No masses,  no organomegaly and Extremities: edema ++      Assessment:     End Stage Renal Disease:  Patient is tolerating dialysis treatment well. .  Additionally the patient has experienced normal dialysis treatment during dialysis. Dry weight   same. Anemia:    Recent Labs      17   0513   HCT  29.0*   HGB  8.8*       Renal Metabolic Bone Disease:  No results for input(s): CA, ALB, PTH in the last 72 hours.     No lab exists for component: PO4                     Treatment:  PO4 binders, Cinacaleat, Vitamin D  Hypertension: controlled    Access: adequate Tunnel Cuff Catheter    Active Problems:    Weakness of both legs (11/29/2017)      Renal failure (ARF), acute on chronic ([facility] [address] (11/29/2017)           Plan:     Continue scheduled dialysis and obtain surgical consult for PD catheter malposition

## 2017-12-09 NOTE — PROGRESS NOTES
Hourly rounding completed. TOILETING:  Does patient need assist with clothing management and/or pericare? Yes      TOILET TRANSFER:  Pt requires minimal assistance.  Pt uses wheelchair and walker.      BLADDER:  Pt does not have a castillo catheter that staff manages.  Pt does not take medication.  Pt is continent. of bladder and voids in toilet  Pt requires staff to position device Pt has had 0 bladder accidents during this shift requiring minimal assistance to clean up.  (An accident is when the episode is not contained in a brief AND/OR the clothing/linen requires changing/cleaning up.)      BOWEL:  Pt does take medication.  Pt is continent of bowel and uses toilet.  Pt requires staff to position device    Pt has had 0 bowel accidents during this shift requiring minimal assistance from staff to clean up.  (An accident is when the episode is not contained in a brief AND/OR the clothing/linen requires changing/cleaning up.)

## 2017-12-09 NOTE — PROGRESS NOTES
MD Claire,   Medical Director  [address] [facility], [address] [facility]  Tel: [phone]       SFD PROGRESS NOTE    Elizabeth Bye  Admit Date: 11/29/2017  Admit Diagnosis: DEBILITY; Renal failure (ARF), acute on chronic ([facility] [address] [employer]*  Chief Complaint : Gait dysfunction secondary to below. Admit Diagnosis: Unstable angina ([facility] [address]  CAD (coronary artery disease) (11/27/2015)  S/P complex PCI and stable on ASA and Brilinta  Unstable angina ([facility] [address] (2/1/2016)  Acute/ Chronic anemia (11/18/2017)  ESRD (end stage renal disease) On PD/ CRRT  Pain  DVT risk  Acute Rehab Dx:  Debility    Weakness  Gait impairment  deconditioning  Mobility and ambulation deficits  Self Care/ADL deficits     Subjective     Patient seen and examined. Vss, afebrile. PT well tolerated. Nephrology continuing with HD. PD on hold. Vascular surgery will plan for revision/ replacement of PD cath next week.       Objective:     Current Facility-Administered Medications   Medication Dose Route Frequency    rosuvastatin (CRESTOR) tablet 20 mg  20 mg Oral QHS    sucralfate (CARAFATE) 100 mg/mL oral suspension 1 g  1 g Oral AC&HS    polyethylene glycol (MIRALAX) packet 17 g  17 g Oral DAILY    acetaminophen (TYLENOL) tablet 650 mg  650 mg Oral Q4H PRN    HYDROcodone-acetaminophen (NORCO) 7.5-325 mg per tablet 1 Tab  1 Tab Oral Q4H PRN    HYDROcodone-acetaminophen (NORCO) 5-325 mg per tablet 1 Tab  1 Tab Oral Q4H PRN    LORazepam (ATIVAN) tablet 1 mg  1 mg Oral Q6H PRN    sodium chloride (NS) flush 5-10 mL  5-10 mL IntraVENous PRN    amLODIPine (NORVASC) tablet 5 mg  5 mg Oral DAILY    aspirin delayed-release tablet 81 mg  81 mg Oral DAILY    calcitRIOL (ROCALTROL) capsule 0.25 mcg  0.25 mcg Oral DAILY    cyanocobalamin (VITAMIN B12) injection 1,000 mcg  1,000 mcg IntraMUSCular Q7D    epoetin toya (EPOGEN;PROCRIT) injection 20,000 Units  20,000 Units SubCUTAneous Q MON, WED & FRI    gentamicin (GARAMYCIN) 0.1 % cream   Topical DAILY PRN    levothyroxine (SYNTHROID) tablet 150 mcg  150 mcg Oral ACB    linaclotide (LINZESS) capsule 145 mcg (Patient Supplied)  145 mcg Oral DAILY    magnesium oxide (MAG-OX) tablet 400 mg  400 mg Oral DAILY    metoclopramide HCl (REGLAN) tablet 5 mg  5 mg Oral BID    metoprolol tartrate (LOPRESSOR) tablet 12.5 mg  12.5 mg Oral DAILY    multivitamin w ZN (STRESSTABS W ZINC) tablet  1 Tab Oral DAILY    nitroglycerin (NITROSTAT) tablet 0.4 mg  0.4 mg SubLINGual PRN    ondansetron (ZOFRAN ODT) tablet 4 mg  4 mg Oral TID PRN    pantoprazole (PROTONIX) tablet 40 mg  40 mg Oral ACB&D    polyethylene glycol (MIRALAX) packet 17 g  17 g Oral DAILY PRN    promethazine (PHENERGAN) tablet 25 mg  25 mg Oral Q6H PRN    ranolazine ER (RANEXA) tablet 1,000 mg  1,000 mg Oral BID    senna-docusate (PERICOLACE) 8.6-50 mg per tablet 1 Tab  1 Tab Oral DAILY    sevelamer (RENAGEL) tablet 800 mg  800 mg Oral TID WITH MEALS    ticagrelor (BRILINTA) tablet 90 mg  90 mg Oral BID    torsemide (DEMADEX) tablet 100 mg  100 mg Oral BID     Review of Systems:   Denies chest pain, shortness of breath, cough, headache, visual problems, abdominal pain, dysurea, calf pain. Pertinent positives are as noted in the medical records and unremarkable otherwise. Visit Vitals    /81    Pulse 78    Temp 97.8 °F (36.6 °C)    Resp 18    Wt 218 lb 4.8 oz (99 kg)    SpO2 98%    BMI 31.32 kg/m2   Physical Exam:   General: Alert and age appropriately oriented. No acute cardio respiratory distress. HEENT: Normocephalic,no scleral icterus  Oral mucosa moist without cyanosis   Lungs: Clear to auscultation  bilaterally. Respiration even and unlabored   Heart: Regular rate and rhythm, S1, S2   No  murmurs, clicks, rub or gallops   Abdomen: Soft, non-tender, nondistended. Bowel sounds present. No organomegaly.    Genitourinary: defered   Neuromuscular:      NANCI, EOMI  + mild generalized weakness 4+ to 5-/5. BUE, BLE, more proximal.   Sensation grossly intact to soft touch. Skin/extremity: No rashes, no erythema. 2+edema. Wound covered.   [email or web address]                                                                             Functional Assessment:  Gross Assessment  AROM: Generally decreased, functional (12/07/17 1400)  Strength: Generally decreased, functional (12/07/17 1400)  Coordination: Generally decreased, functional (12/07/17 1400)       Balance  Sitting - Static: Good (unsupported) (12/08/17 1500)  Sitting - Dynamic: Good (unsupported) (12/08/17 1500)  Standing - Static: Fair (12/08/17 1500)  Standing - Dynamic : Impaired (12/08/17 1500)           Toileting  Adaptive Equipment: Grab bars (12/06/17 1026)         Harrison Fall Risk Assessment:  Dash Castañeda Fall Risk  Mobility: (P) Ambulates or transfers with assist devices or assistance/unsteady gait (12/09/17 1056)  Mobility Interventions: (P) Communicate number of staff needed for ambulation/transfer (12/09/17 1056)  Mentation: (P) Alert, oriented x 3 (12/09/17 1056)  Mentation Interventions: (P) Evaluate medications/consider consulting pharmacy (12/09/17 1056)  Medication: (P) Patient receiving anticonvulsants, sedatives(tranquilizers), psychotropics or hypnotics, hypoglycemics, narcotics, sleep aids, antihypertensives, laxatives, or diuretics (12/09/17 1056)  Medication Interventions: (P) Bed/chair exit alarm; Patient to call before getting OOB (12/09/17 1056)  Elimination: (P) Needs assistance with toileting (12/09/17 1056)  Elimination Interventions: (P) Call light in reach (12/09/17 1056)  Prior Fall History: (P) No (12/09/17 1056)  History of Falls Interventions: Consult care management for discharge planning;Evaluate medications/consider consulting pharmacy (12/08/17 1500)  Total Score: (P) 3 (12/09/17 1056)  Standard Fall Precautions: Yes (12/07/17 0710)  High Fall Risk: Yes (12/08/17 1500)     Speech Assessment:         Ambulation:  Gait  Base of Support: Widened (12/07/17 1400)  Speed/Michelle: Slow;Shuffled (12/07/17 1400)  Step Length: Right shortened;Left shortened (12/07/17 1400)  Stance: Right increased; Left increased (12/07/17 1400)  Gait Abnormalities: Trunk sway increased; Step to gait; Decreased step clearance (12/07/17 1400)  Distance (ft): 20 Feet (ft) (12/08/17 1500)  Assistive Device: Walker, rolling (12/08/17 1500)  Curbs/Ramps: Minimum assistance (12/07/17 1400)     Labs/Studies:  Recent Results (from the past 72 hour(s))   CBC WITH AUTOMATED DIFF    Collection Time: 12/07/17  5:13 AM   Result Value Ref Range    WBC 5.9 4.3 - 11.1 K/uL    RBC 2.99 (L) 4.05 - 5.25 M/uL    HGB 8.8 (L) 11.7 - 15.4 g/dL    HCT 29.0 (L) 35.8 - 46.3 %    MCV 97.0 79.6 - 97.8 FL    MCH 29.4 26.1 - 32.9 PG    MCHC 30.3 (L) 31.4 - 35.0 g/dL    RDW 18.6 (H) 11.9 - 14.6 %    PLATELET 077 099 - 288 K/uL    MPV 8.8 (L) 10.8 - 14.1 FL    DF AUTOMATED      NEUTROPHILS 68 43 - 78 %    LYMPHOCYTES 19 13 - 44 %    MONOCYTES 9 4.0 - 12.0 %    EOSINOPHILS 3 0.5 - 7.8 %    BASOPHILS 1 0.0 - 2.0 %    IMMATURE GRANULOCYTES 0 0.0 - 5.0 %    ABS. NEUTROPHILS 4.0 1.7 - 8.2 K/UL    ABS. LYMPHOCYTES 1.1 0.5 - 4.6 K/UL    ABS. MONOCYTES 0.5 0.1 - 1.3 K/UL    ABS. EOSINOPHILS 0.2 0.0 - 0.8 K/UL    ABS. BASOPHILS 0.0 0.0 - 0.2 K/UL    ABS. IMM.  GRANS. 0.0 0.0 - 0.5 K/UL       Assessment:     Problem List as of 12/9/2017  Date Reviewed: 3/2/2017          Codes Class Noted - Resolved    Weakness of both legs ICD-10-CM: R29.898  ICD-9-CM: 729.89  11/29/2017 - Present        Renal failure (ARF), acute on chronic ([facility] [address] ICD-10-CM: N17.9, N18.9  ICD-9-CM: 584.9, 585.9  11/29/2017 - Present        Elevated troponin ICD-10-CM: R74.8  ICD-9-CM: 790.6  11/18/2017 - Present        Chest pain ICD-10-CM: R07.9  ICD-9-CM: 786.50  11/18/2017 - Present        CKD (chronic kidney disease) ICD-10-CM: N18.9  ICD-9-CM: 585.9  11/18/2017 - Present        Chronic anemia ICD-10-CM: D64.9  ICD-9-CM: 285.9 11/18/2017 - Present        ESRD (end stage renal disease) ([facility] [address] ICD-10-CM: N18.6  ICD-9-CM: 585.6  11/18/2017 - Present        Abdominal pain ICD-10-CM: R10.9  ICD-9-CM: 789.00  9/18/2017 - Present        H/O TIA (transient ischemic attack) and stroke ICD-10-CM: Z86.73  ICD-9-CM: V12.54  4/13/2017 - Present        S/P PTCA (percutaneous transluminal coronary angioplasty) ICD-10-CM: Z98.61  ICD-9-CM: V45.82  4/13/2017 - Present        Mixed hyperlipidemia ICD-10-CM: E78.2  ICD-9-CM: 272.2  4/13/2017 - Present        Vision changes ICD-10-CM: H53.9  ICD-9-CM: 368.9  3/26/2017 - Present        Dizziness ICD-10-CM: R42  ICD-9-CM: 780.4  3/26/2017 - Present        Refusal of blood transfusions as patient is Mu-ism (Chronic) ICD-10-CM: Z53.1  ICD-9-CM: V62.6  8/25/2016 - Present        GERD (gastroesophageal reflux disease) ICD-10-CM: K21.9  ICD-9-CM: 530.81  8/8/2016 - Present        Unspecified sleep apnea ICD-10-CM: G47.30  ICD-9-CM: 780.57  8/8/2016 - Present    Overview Signed 8/8/2016 11:09 AM by Saskia Fuentes     uses cpap machine             CVA (cerebral vascular accident) Hx of TIA ICD-10-CM: I63.9  ICD-9-CM: 434.91  2/22/2016 - Present        Unstable angina ([facility] [address] ICD-10-CM: I20.0  ICD-9-CM: 411.1  2/1/2016 - Present        ESRD on peritoneal dialysis [facility]) (Chronic) ICD-10-CM: N18.6, Z99.2  ICD-9-CM: 585.6, V45.11  2/1/2016 - Present        Ischemic cardiomyopathy ICD-10-CM: I25.5  ICD-9-CM: 414.8  12/29/2015 - Present        HLD (hyperlipidemia) ICD-10-CM: E78.5  ICD-9-CM: 272.4  12/29/2015 - Present        Depression ICD-10-CM: F32.9  ICD-9-CM: 714  12/29/2015 - Present        CAD (coronary artery disease) ICD-10-CM: I25.10  ICD-9-CM: 414.00  11/27/2015 - Present        Debility ICD-10-CM: R53.81  ICD-9-CM: 799.3  5/5/2015 - Present        Hypertension (Chronic) ICD-10-CM: I10  ICD-9-CM: 401.9  4/28/2015 - Present        Anemia of chronic renal failure (Chronic) ICD-10-CM: N18.9, D63.1  ICD-9-CM: 285.21, 585.9  4/28/2015 - Present        Hypothyroidism (Chronic) ICD-10-CM: E03.9  ICD-9-CM: 244.9  4/28/2015 - Present        RESOLVED: Postural hypotension ICD-10-CM: I95.1  ICD-9-CM: 458.0  8/9/2016 - 3/26/2017        RESOLVED: Chest pain ICD-10-CM: R07.9  ICD-9-CM: 786.50  8/8/2016 - 3/26/2017        RESOLVED: Nausea ICD-10-CM: R11.0  ICD-9-CM: 787.02  8/8/2016 - 3/26/2017        RESOLVED: S/P PTCA (percutaneous transluminal coronary angioplasty); LAD PTCA of  ISR 11/27/15 ICD-10-CM: Z98.61  ICD-9-CM: V45.82  5/24/2016 - 3/26/2017        RESOLVED: TIA (transient ischemic attack) ICD-10-CM: G45.9  ICD-9-CM: 435.9  2/10/2016 - 3/26/2017        RESOLVED: Edema ICD-10-CM: R60.9  ICD-9-CM: 782.3  12/29/2015 - 3/26/2017        RESOLVED: Anterior myocardial infarction ([facility] [address] ICD-10-CM: I21.09  ICD-9-CM: 410.10  5/21/2015 - 3/26/2017        RESOLVED: STEMI (ST elevation myocardial infarction) ([facility] [address] ICD-10-CM: I21.3  ICD-9-CM: 410.90  4/28/2015 - 2/1/2016              Plan:      CAD (coronary artery disease) (11/27/2015)/ S/P complex PCI / Unstable angina/ hypertension  - on ASA and Brilinta  - continue ranexa, statin  - demadex continued. Patient almost aneuric.    - 12/9 - continue norvasc/ metoprolol. No dose change.     Acute/ Chronic anemia (11/18/2017) - continue to monitor.   - continue epogen.     ESRD (end stage renal disease) On PD/ CRRT  - PD resumed. - monitor fluids status. Nephrology managing. Will follow at IRU.   - 11/30 problem with PD/ catheter. Nephrologist aware. Will need to hold PT due to femoral cath precautions. nephrologist to follow. May need to have tunneled cath. - 12/4 - continue TTS HD. PD per nephrology direction. - 12/6 Plan possibly to insert another PD tube. S/p Lt femoral perm catheter working well.  - 12/8 - continue HD per nephrology. PD on hold. LE edema slightly improved. - 12/9 - HD per nephro. Vascular plan for PD cath repair.         Pneumonia prophylaxis- Insentive spirometer every hour while awake     DVT risk / DVT Prophylaxis- continue daily physician exam to assess for signs and symptoms as patient is at increased risk for of thromboembolism. Mobilization as tolerated. Intermittent pneumatic compression devices when in bed Thigh-high or knee-high thromboembolic deterrent hose when out of bed.   -       Pain Control: stable, mild-to-moderate joint symptoms intermittently, reasonably well controlled by PRN meds. Will require regular pain assessment and comprenhensive pain management,      Wound Care: Monitor wound status daily per staff and physician. At risk for failure. Will require 24/7 rehab nursing. Keep wound clean and dry      bowel program - as needed     GERD - resume PPI. At times may need additional antacids, Maalox prn.  - continue sucralfate     Hypothyroid  - synthroid.     Time spent was 25 minutes with over 1/2 in direct patient care/examination, consultation and coordination of care.      Signed By: Chalo Roger MD     December 9, 2017

## 2017-12-10 PROCEDURE — 74011250637 HC RX REV CODE- 250/637: Performed by: PHYSICAL MEDICINE & REHABILITATION

## 2017-12-10 PROCEDURE — 97530 THERAPEUTIC ACTIVITIES: CPT

## 2017-12-10 PROCEDURE — 97116 GAIT TRAINING THERAPY: CPT

## 2017-12-10 PROCEDURE — 97110 THERAPEUTIC EXERCISES: CPT

## 2017-12-10 PROCEDURE — 65310000000 HC RM PRIVATE REHAB

## 2017-12-10 PROCEDURE — 74011250637 HC RX REV CODE- 250/637: Performed by: INTERNAL MEDICINE

## 2017-12-10 RX ADMIN — PANTOPRAZOLE SODIUM 40 MG: 40 TABLET, DELAYED RELEASE ORAL at 18:07

## 2017-12-10 RX ADMIN — SUCRALFATE 1 G: 1 SUSPENSION ORAL at 10:44

## 2017-12-10 RX ADMIN — Medication 400 MG: at 10:44

## 2017-12-10 RX ADMIN — ROSUVASTATIN CALCIUM 20 MG: 20 TABLET, FILM COATED ORAL at 22:07

## 2017-12-10 RX ADMIN — SUCRALFATE 1 G: 1 SUSPENSION ORAL at 06:06

## 2017-12-10 RX ADMIN — PANTOPRAZOLE SODIUM 40 MG: 40 TABLET, DELAYED RELEASE ORAL at 06:04

## 2017-12-10 RX ADMIN — METOPROLOL TARTRATE 12.5 MG: 25 TABLET ORAL at 10:00

## 2017-12-10 RX ADMIN — METOCLOPRAMIDE HYDROCHLORIDE 5 MG: 10 TABLET ORAL at 10:45

## 2017-12-10 RX ADMIN — POLYETHYLENE GLYCOL 3350 17 G: 17 POWDER, FOR SOLUTION ORAL at 10:46

## 2017-12-10 RX ADMIN — TORSEMIDE 100 MG: 100 TABLET ORAL at 18:13

## 2017-12-10 RX ADMIN — STANDARDIZED SENNA CONCENTRATE AND DOCUSATE SODIUM 1 TABLET: 8.6; 5 TABLET, FILM COATED ORAL at 10:46

## 2017-12-10 RX ADMIN — SUCRALFATE 1 G: 1 SUSPENSION ORAL at 17:00

## 2017-12-10 RX ADMIN — ASPIRIN 81 MG: 81 TABLET, COATED ORAL at 10:45

## 2017-12-10 RX ADMIN — CALCITRIOL 0.25 MCG: 0.25 CAPSULE, LIQUID FILLED ORAL at 10:46

## 2017-12-10 RX ADMIN — TORSEMIDE 100 MG: 100 TABLET ORAL at 10:44

## 2017-12-10 RX ADMIN — ONDANSETRON 4 MG: 4 TABLET, ORALLY DISINTEGRATING ORAL at 13:06

## 2017-12-10 RX ADMIN — ZINC 1 TABLET: TAB ORAL at 10:46

## 2017-12-10 RX ADMIN — AMLODIPINE BESYLATE 5 MG: 5 TABLET ORAL at 09:00

## 2017-12-10 RX ADMIN — TICAGRELOR 90 MG: 90 TABLET ORAL at 10:45

## 2017-12-10 RX ADMIN — TICAGRELOR 90 MG: 90 TABLET ORAL at 22:07

## 2017-12-10 RX ADMIN — METOCLOPRAMIDE HYDROCHLORIDE 5 MG: 10 TABLET ORAL at 18:07

## 2017-12-10 RX ADMIN — LEVOTHYROXINE SODIUM 150 MCG: 50 TABLET ORAL at 06:04

## 2017-12-10 NOTE — PROGRESS NOTES
PHYSICAL THERAPY DAILY NOTE  Time In: 0912  Time Out: 1010  Patient Seen For: AM;Gait training;Patient education; Therapeutic exercise;Transfer training; Other (see progress notes)    Subjective: patient reporting she feels OK. Reports she likes to warm up on motomed. Reports she saw her nephrologist and a surgeon yesterday in dialysis and they were discussing fixing her peritoneal dialysis catheter. Reports she might have the surgery next week. Reports she gets tired easily but wants to get better. Reports her sons are working on getting things set up so she can go home. Reports she wants to get better so she can go home but she worries a lot about being able to manage at home. Reports she has a ramp in her garage that she can use to get into her home. Objective:Vital Signs:02 sat 96% and HR 87 after ambulating 60ft on room air    Patient Vitals for the past 12 hrs:   Temp Pulse Resp BP SpO2   12/10/17 0658 97.9 °F (36.6 °C) 86 16 143/81 96 %     Pain level:no c/o pain during treatment  Pain location:NA  Pain interventions:NA    Patient education:transfer training, gait training, fall precautions, activity pacing, body mechanics, energy conservation, Patient verbalizing understanding and demonstrating partial understanding of patient education. Recommend follow up education. Interdisciplinary Communication:NA    Other (comment) (Fall Risk)  GROSS ASSESSMENT Daily Assessment     NA       BED/MAT MOBILITY Daily Assessment    Rolling Right : 0 (Not tested)  Rolling Left : 0 (Not tested)  Supine to Sit : 0 (Not tested)  Sit to Supine : 0 (Not tested)       TRANSFERS Daily Assessment   One time verbal cue for body mechanics with sit<>stand. Patient able to self correct body mechanics for sit<>stand after one time cue.  Transfer Type: SPT with walker  Transfer Assistance : 5 (Stand-by assistance)  Sit to Stand Assistance: Stand-by assistance  Car Transfers: Not tested       GAIT Daily Assessment   4 to 5 min sitting rest breaks between ambulation attempts Amount of Assistance: 4 (Contact guard assistance) verbal cues for posture correction. Distance (ft): 60 Feet (ft) (60ft x 1   50ft x 1  10ft x 1  5 ft x 2)  Assistive Device: Walker, rolling;Gait belt       STEPS or STAIRS Daily Assessment    Steps/Stairs Ambulated (#): 0  Level of Assist : 0 (Not tested)       BALANCE Daily Assessment   Static standing with RW facilitating upright posture with SBA and cues Sitting - Static: Good (unsupported)  Sitting - Dynamic: Good (unsupported)  Standing - Static: Fair  Standing - Dynamic : Impaired       WHEELCHAIR MOBILITY Daily Assessment    Curbs/Ramps Assist Required (FIM Score): 0 (Not tested)  Wheelchair Setup Assist Required : 0 (Not tested)       LOWER EXTREMITY EXERCISES Daily Assessment    Extremity: Both  Exercise Type #1: Other (comment) (LE motomed x 10 mins at level 3)  Level of Assist: Supervision          Assessment: Progressing towards goals. Functional strength and mobility slowly improving. Body mechanics with sit<>stand improving. Requires multiple and frequent rest breaks during treatment. 02 sat and HR stable on room air during treatment       Patient returned to room at end of treatment and remained up in recliner with LEs elevated and with needs in reach. .    Plan of Care: Continue with POC and progress as tolerated.      Behzad Ham, PT  12/10/2017

## 2017-12-10 NOTE — PROGRESS NOTES
MD Claire,   Medical Director  [address] [facility], [address] [facility]  Tel: [phone]       [facility] PROGRESS NOTE    Elner Upsala  Admit Date: 11/29/2017  Admit Diagnosis: DEBILITY; Renal failure (ARF), acute on chronic ([facility] [address] Connor Emery*  Chief Complaint : Gait dysfunction secondary to below. Admit Diagnosis: Unstable angina ([facility] [address]  CAD (coronary artery disease) (11/27/2015)  S/P complex PCI and stable on ASA and Brilinta  Unstable angina ([facility] [address] (2/1/2016)  Acute/ Chronic anemia (11/18/2017)  ESRD (end stage renal disease) On PD/ CRRT  Pain  DVT risk  Acute Rehab Dx:  Debility    Weakness  Gait impairment  deconditioning  Mobility and ambulation deficits  Self Care/ADL deficits     Subjective     Patient seen and examined. Vss, afebrile. Nephrology continuing with HD. Fluid excess appears improving with fluid removal via HD. Therapies fairly well tolerated. Transfers with SBA. Short distance gait with CGA. Needed verbal cues for posture correction.     Objective:     Current Facility-Administered Medications   Medication Dose Route Frequency    rosuvastatin (CRESTOR) tablet 20 mg  20 mg Oral QHS    sucralfate (CARAFATE) 100 mg/mL oral suspension 1 g  1 g Oral AC&HS    polyethylene glycol (MIRALAX) packet 17 g  17 g Oral DAILY    acetaminophen (TYLENOL) tablet 650 mg  650 mg Oral Q4H PRN    HYDROcodone-acetaminophen (NORCO) 7.5-325 mg per tablet 1 Tab  1 Tab Oral Q4H PRN    HYDROcodone-acetaminophen (NORCO) 5-325 mg per tablet 1 Tab  1 Tab Oral Q4H PRN    LORazepam (ATIVAN) tablet 1 mg  1 mg Oral Q6H PRN    sodium chloride (NS) flush 5-10 mL  5-10 mL IntraVENous PRN    amLODIPine (NORVASC) tablet 5 mg  5 mg Oral DAILY    aspirin delayed-release tablet 81 mg  81 mg Oral DAILY    calcitRIOL (ROCALTROL) capsule 0.25 mcg  0.25 mcg Oral DAILY    cyanocobalamin (VITAMIN B12) injection 1,000 mcg  1,000 mcg IntraMUSCular Q7D    epoetin toya (EPOGEN;PROCRIT) injection 20,000 Units  20,000 Units SubCUTAneous Q MON, WED & FRI    gentamicin (GARAMYCIN) 0.1 % cream   Topical DAILY PRN    levothyroxine (SYNTHROID) tablet 150 mcg  150 mcg Oral ACB    linaclotide (LINZESS) capsule 145 mcg (Patient Supplied)  145 mcg Oral DAILY    magnesium oxide (MAG-OX) tablet 400 mg  400 mg Oral DAILY    metoclopramide HCl (REGLAN) tablet 5 mg  5 mg Oral BID    metoprolol tartrate (LOPRESSOR) tablet 12.5 mg  12.5 mg Oral DAILY    multivitamin w ZN (STRESSTABS W ZINC) tablet  1 Tab Oral DAILY    nitroglycerin (NITROSTAT) tablet 0.4 mg  0.4 mg SubLINGual PRN    ondansetron (ZOFRAN ODT) tablet 4 mg  4 mg Oral TID PRN    pantoprazole (PROTONIX) tablet 40 mg  40 mg Oral ACB&D    polyethylene glycol (MIRALAX) packet 17 g  17 g Oral DAILY PRN    promethazine (PHENERGAN) tablet 25 mg  25 mg Oral Q6H PRN    ranolazine ER (RANEXA) tablet 1,000 mg  1,000 mg Oral BID    senna-docusate (PERICOLACE) 8.6-50 mg per tablet 1 Tab  1 Tab Oral DAILY    sevelamer (RENAGEL) tablet 800 mg  800 mg Oral TID WITH MEALS    ticagrelor (BRILINTA) tablet 90 mg  90 mg Oral BID    torsemide (DEMADEX) tablet 100 mg  100 mg Oral BID     Review of Systems:   Denies chest pain, shortness of breath, cough, headache, visual problems, abdominal pain, dysurea, calf pain. Pertinent positives are as noted in the medical records and unremarkable otherwise. Visit Vitals    /81 (BP 1 Location: Right arm)    Pulse 86    Temp 97.9 °F (36.6 °C)    Resp 16    Wt 218 lb 4.8 oz (99 kg)    SpO2 96%    BMI 31.32 kg/m2   Physical Exam:   General: Alert and age appropriately oriented. No acute cardio respiratory distress. HEENT: Normocephalic,no scleral icterus  Oral mucosa moist without cyanosis   Lungs: Clear to auscultation  bilaterally. Respiration even and unlabored   Heart: Regular rate and rhythm, S1, S2   No  murmurs, clicks, rub or gallops   Abdomen: Soft, non-tender, nondistended.  Bowel sounds present. No organomegaly. Genitourinary: defered   Neuromuscular:      NANCI, EOMI  + mild generalized weakness 4+ to 5-/5. BUE, BLE, more proximal.   Sensation grossly intact to soft touch. Skin/extremity: No rashes, no erythema. 2+edema. Wound covered.   Alveria Lute                                                                             Functional Assessment:  Gross Assessment  AROM: Generally decreased, functional (12/07/17 1400)  Strength: Generally decreased, functional (12/07/17 1400)  Coordination: Generally decreased, functional (12/07/17 1400)       Balance  Sitting - Static: Good (unsupported) (12/10/17 1000)  Sitting - Dynamic: Good (unsupported) (12/10/17 1000)  Standing - Static: Fair (12/10/17 1000)  Standing - Dynamic : Impaired (12/10/17 1000)           Toileting  Adaptive Equipment: Grab bars (12/06/17 1026)         Manuel Cheatham Fall Risk Assessment:  Manuel Cheatham Fall Risk  Mobility: Ambulates or transfers with assist devices or assistance/unsteady gait (12/09/17 1955)  Mobility Interventions: Communicate number of staff needed for ambulation/transfer;Assess mobility with egress test;OT consult for ADLs; Patient to call before getting OOB;PT Consult for assist device competence;PT Consult for mobility concerns;Utilize walker, cane, or other assitive device;Strengthening exercises (ROM-active/passive) (12/09/17 1955)  Mentation: Alert, oriented x 3 (12/09/17 1955)  Mentation Interventions: Evaluate medications/consider consulting pharmacy (12/09/17 1056)  Medication: Patient receiving anticonvulsants, sedatives(tranquilizers), psychotropics or hypnotics, hypoglycemics, narcotics, sleep aids, antihypertensives, laxatives, or diuretics (12/09/17 1955)  Medication Interventions: Assess postural VS orthostatic hypotension;Bed/chair exit alarm;Evaluate medications/consider consulting pharmacy; Patient to call before getting OOB; Teach patient to arise slowly (12/09/17 1955)  Elimination: Independent with frequency or diarrhea (12/09/17 1955)  Elimination Interventions: Bed/chair exit alarm;Call light in reach;Elevated toilet seat; Toilet paper/wipes in reach; Patient to call for help with toileting needs; Toileting schedule/hourly rounds (12/09/17 1955)  Prior Fall History: No (12/09/17 1955)  History of Falls Interventions: Consult care management for discharge planning;Evaluate medications/consider consulting pharmacy (12/08/17 1500)  Total Score: 3 (12/09/17 1955)  Standard Fall Precautions: Yes (12/09/17 1955)  High Fall Risk: Yes (12/09/17 1955)     Speech Assessment:         Ambulation:  Gait  Base of Support: Widened (12/07/17 1400)  Speed/Michelle: Slow;Shuffled (12/07/17 1400)  Step Length: Right shortened;Left shortened (12/07/17 1400)  Stance: Right increased; Left increased (12/07/17 1400)  Gait Abnormalities: Trunk sway increased; Step to gait; Decreased step clearance (12/07/17 1400)  Distance (ft): 60 Feet (ft) (60ft x 1   50ft x 1  10ft x 1  5 ft x 2) (12/10/17 1000)  Assistive Device: Rafa [place], rolling;Gait belt (12/10/17 1000)  Curbs/Ramps: Minimum assistance (12/07/17 1400)     Labs/Studies:  No results found for this or any previous visit (from the past 67 hour(s)).     Assessment:     Problem List as of 12/10/2017  Date Reviewed: 3/2/2017          Codes Class Noted - Resolved    Weakness of both legs ICD-10-CM: R29.898  ICD-9-CM: 729.89  11/29/2017 - Present        Renal failure (ARF), acute on chronic ([facility] [address] ICD-10-CM: N17.9, N18.9  ICD-9-CM: 584.9, 585.9  11/29/2017 - Present        Elevated troponin ICD-10-CM: R74.8  ICD-9-CM: 790.6  11/18/2017 - Present        Chest pain ICD-10-CM: R07.9  ICD-9-CM: 786.50  11/18/2017 - Present        CKD (chronic kidney disease) ICD-10-CM: N18.9  ICD-9-CM: 585.9  11/18/2017 - Present        Chronic anemia ICD-10-CM: D64.9  ICD-9-CM: 285.9  11/18/2017 - Present        ES[facility]) [facility] ICD-10-CM: N18.6  ICD-9-CM: 585.6  11/18/2017 - Present        Abdominal pain ICD-10-CM: R10.9  ICD-9-CM: 789.00  9/18/2017 - Present        H/O TIA (transient ischemic attack) and stroke ICD-10-CM: Z86.73  ICD-9-CM: V12.54  4/13/2017 - Present        S/P PTCA (percutaneous transluminal coronary angioplasty) ICD-10-CM: Z98.61  ICD-9-CM: V45.82  4/13/2017 - Present        Mixed hyperlipidemia ICD-10-CM: E78.2  ICD-9-CM: 272.2  4/13/2017 - Present        Vision changes ICD-10-CM: H53.9  ICD-9-CM: 368.9  3/26/2017 - Present        Dizziness ICD-10-CM: R42  ICD-9-CM: 780.4  3/26/2017 - Present        Refusal of blood transfusions as patient is [employer] (Chronic) ICD-10-CM: Z53.1  ICD-9-CM: V62.6  8/25/2016 - Present        GERD (gastroesophageal reflux disease) ICD-10-CM: K21.9  ICD-9-CM: 530.81  8/8/2016 - Present        Unspecified sleep apnea ICD-10-CM: G47.30  ICD-9-CM: 780.57  8/8/2016 - Present    Overview Signed 8/8/2016 11:09 AM by Saskia Fuentes     uses cpap machine             CVA (cerebral vascular accident) Hx of TIA ICD-10-CM: I63.9  ICD-9-CM: 434.91  2/22/2016 - Present        Unstable angina ([facility] [address] ICD-10-CM: I20.0  ICD-9-CM: 411.1  2/1/2016 - Present        ESRD on peritoneal dialysis ([facility] [address] (Chronic) ICD-10-CM: N18.6, Z99.2  ICD-9-CM: 585.6, V45.11  2/1/2016 - Present        Ischemic cardiomyopathy ICD-10-CM: I25.5  ICD-9-CM: 414.8  12/29/2015 - Present        HLD (hyperlipidemia) ICD-10-CM: E78.5  ICD-9-CM: 272.4  12/29/2015 - Present        Depression ICD-10-CM: F32.9  ICD-9-CM: 402  12/29/2015 - Present        CAD (coronary artery disease) ICD-10-CM: I25.10  ICD-9-CM: 414.00  11/27/2015 - Present        Debility ICD-10-CM: R53.81  ICD-9-CM: 799.3  5/5/2015 - Present        Hypertension (Chronic) ICD-10-CM: I10  ICD-9-CM: 401.9  4/28/2015 - Present        Anemia of chronic renal failure (Chronic) ICD-10-CM: N18.9, D63.1  ICD-9-CM: 285.21, 585.9  4/28/2015 - Present        Hypothyroidism (Chronic) ICD-10-CM: E03.9  ICD-9-CM: 244.9  4/28/2015 - Present RESOLVED: Postural hypotension ICD-10-CM: I95.1  ICD-9-CM: 458.0  8/9/2016 - 3/26/2017        RESOLVED: Chest pain ICD-10-CM: R07.9  ICD-9-CM: 786.50  8/8/2016 - 3/26/2017        RESOLVED: Nausea ICD-10-CM: R11.0  ICD-9-CM: 787.02  8/8/2016 - 3/26/2017        RESOLVED: S/P PTCA (percutaneous transluminal coronary angioplasty); LAD PTCA of  ISR 11/27/15 ICD-10-CM: Z98.61  ICD-9-CM: V45.82  5/24/2016 - 3/26/2017        RESOLVED: TIA (transient ischemic attack) ICD-10-CM: G45.9  ICD-9-CM: 435.9  2/10/2016 - 3/26/2017        RESOLVED: Edema ICD-10-CM: R60.9  ICD-9-CM: 782.3  12/29/2015 - 3/26/2017        RESOLVED: Anterior myocardial infarction [facility]) ICD-10-CM: I21.09  ICD-9-CM: 410.10  5/21/2015 - 3/26/2017        RESOLVED: STEMI (ST elevation myocardial infarction) ([facility] [address] ICD-10-CM: I21.3  ICD-9-CM: 410.90  4/28/2015 - 2/1/2016              Plan:      CAD (coronary artery disease) (11/27/2015)/ S/P complex PCI / Unstable angina/ hypertension  - on ASA and Brilinta  - continue ranexa, statin. Cardiac precautions. - 12/10 - continue norvasc/ metoprolol. BP in good range.     Acute/ Chronic anemia (11/18/2017) - continue to monitor.   - continue epogen.  - hgb 8.8 on 12/7     ESRD (end stage renal disease) On PD/ CRRT  - PD resumed. - monitor fluids status. Nephrology managing. Will follow at IRU.   - 11/30 problem with PD/ catheter. Nephrologist aware. Will need to hold PT due to femoral cath precautions. nephrologist to follow. May need to have tunneled cath. - 12/4 - continue TTS HD. PD per nephrology direction. - 12/6 Plan possibly to insert another PD tube. S/p Lt femoral perm catheter working well.  - 12/8 - continue HD per nephrology. PD on hold. LE edema slightly improved. .  - 12/10 -  Fluid excess appears improving with fluid removal via HD. Plan for Dr. Mona Amaya MD to repair PD cath. Pneumonia prophylaxis- Insentive spirometer every hour while awake     DVT risk / DVT Prophylaxis- c  - no s/s of DVT. Pain Control: stable, mild-to-moderate joint symptoms intermittently, reasonably well controlled by PRN meds. Will require regular pain assessment and comprenhensive pain management. occ pain meds required.      Wound Care: Monitor wound status daily per staff and physician. At risk for failure. Will require 24/7 rehab nursing. Keep wound clean and dry      bowel program - as needed     GERD - resume PPI. At times may need additional antacids, Maalox prn.  - continue sucralfate     Hypothyroid  - synthroid.     Time spent was 25 minutes with over 1/2 in direct patient care/examination, consultation and coordination of care.      Signed By: Ursula Olson MD     December 10, 2017

## 2017-12-10 NOTE — PROGRESS NOTES
Problem: Falls - Risk of  Goal: *Absence of Falls  Document Harrison Fall Risk and appropriate interventions in the flowsheet.    Outcome: Progressing Towards Goal  Fall Risk Interventions:  Mobility Interventions: Communicate number of staff needed for ambulation/transfer, Assess mobility with egress test, OT consult for ADLs, Patient to call before getting OOB, PT Consult for assist device competence, PT Consult for mobility concerns, Utilize walker, cane, or other assitive device, Strengthening exercises (ROM-active/passive)    Mentation Interventions: Evaluate medications/consider consulting pharmacy    Medication Interventions: Assess postural VS orthostatic hypotension, Bed/chair exit alarm, Evaluate medications/consider consulting pharmacy, Patient to call before getting OOB, Teach patient to arise slowly    Elimination Interventions: Bed/chair exit alarm, Call light in reach, Elevated toilet seat, Toilet paper/wipes in reach, Patient to call for help with toileting needs, Toileting schedule/hourly rounds    History of Falls Interventions: Consult care management for discharge planning, Evaluate medications/consider consulting pharmacy

## 2017-12-10 NOTE — PROGRESS NOTES
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 She has a history of GI bleeding and had anemia-induced unstable angina in the past. 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 She states the pain has since resolved with NTG paste.  No fevers or chills. No nausea, vomiting or diarrhea.  She states that she is essentially anuric and occasionally makes minimal urine.  She has a h/o CVA and TIA. No fever or chills, no problems with PD drainage or discoloration of PD fluid. No increased thirst. She wishes regular diet and supplement. She denies any other acute complaints  Her edema has improved in the past couple of days with intensified dialysis.     s- in better spirits - was told that she has a touch of Alzheimers and is determined not to worry about it - wishes to know when repositioning of PD catheter will be as her son wishes to be here then -   discoloration on abdominal wall due to small hematoma from subQ heparin - breathing okay and edema is improving -  KUB demonstrates dislodged PD catheter again and she is scheduled for surgical intervention Dr. Amira Medrano -     Past Medical History:   Diagnosis Date    Anemia of chronic renal failure 4/28/2015    Anterior myocardial infarction [facility]) 5/21/2015    CAD (coronary artery disease)     Chest pain     Chronic kidney disease, stage III (moderate) 8/15/2014    on dialysis    CKD (chronic kidney disease) stage 4, GFR 15-29 ml/min ([facility] [address] 4/28/2015    Debility 5/5/2015    Depression 12/29/2015    Edema 12/29/2015    Endocrine disease     Hypothyroidism    GERD (gastroesophageal reflux disease)     Heart murmur 12/29/2015    HLD (hyperlipidemia) 12/29/2015    Hypertension     Hypothyroidism 4/28/2015    Ischemic cardiomyopathy 12/29/2015    Nausea     S/P PTCA (percutaneous transluminal coronary angioplasty); LAD PTCA of  ISR 11/27/15 5/24/2016    STEMI (ST elevation myocardial infarction) ([facility] [address] 4/28/2015    Unspecified sleep apnea     uses cpap machine    Unstable angina ([facility] [address]       Past Surgical History:   Procedure Laterality Date    HX BACK SURGERY  1990    neck surgery cervical disc    HX BACK SURGERY      lower back    HX CATARACT REMOVAL Bilateral     HX CHOLECYSTECTOMY  19702    gall bladder     HX KNEE REPLACEMENT Right 2006    HX PTCA  4/28/2015    2.25 Xience stent to mid LAD for occluded artery, anterior MI, EF 25%. Moderate disease distal LAD and distal OM PCI CX and RCA 2004, then PCI RCA and LAD in 2009. Prior to Admission medications    Medication Sig Start Date End Date Taking? Authorizing Provider   metoprolol tartrate (LOPRESSOR) 25 mg tablet Take 0.5 Tabs by mouth daily. 11/27/17  Yes MINDY Chatman   amLODIPine (NORVASC) 5 mg tablet Take 1 Tab by mouth daily. 11/27/17  Yes MINDY Chatman   torsemide (DEMADEX) 100 mg tablet Take 1 Tab by mouth two (2) times a day. 11/27/17  Yes MINDY Chatman   pantoprazole (PROTONIX) 40 mg tablet Take 1 Tab by mouth Before breakfast and dinner. 11/27/17  Yes MINDY Chatman   magnesium oxide (MAG-OX) 400 mg tablet Take 400 mg by mouth daily. Yes Historical Provider   metoclopramide HCl (REGLAN) 5 mg tablet Take 5 mg by mouth two (2) times a day. Yes Historical Provider   ticagrelor (BRILINTA) 90 mg tablet Take 1 Tab by mouth two (2) times a day. 2/4/16  Yes MINDY Chatman   aspirin delayed-release 81 mg tablet Take 1 Tab by mouth daily. 5/5/15  Yes Gisela Hollingsworth PA-C   rosuvastatin (CRESTOR) 20 mg tablet Take 20 mg by mouth nightly.    Yes Historical Provider   levothyroxine (SYNTHROID) 150 mcg tablet Take 150 mcg by mouth Daily (before breakfast). Yes Historical Provider   cyanocobalamin (VITAMIN B12) 1,000 mcg/mL injection 1 mL by IntraMUSCular route every seven (7) days. Indications: Vitamin B12 Deficiency 12/2/17   MINDY Camargo   folic acid (FOLVITE) 1 mg tablet Take 1 mg by mouth daily. Historical Provider   potassium chloride (K-DUR, KLOR-CON) 20 mEq tablet Take 20 mEq by mouth two (2) times a day. Historical Provider   traZODone (DESYREL) 50 mg tablet Take  by mouth nightly. Historical Provider   megestrol (MEGACE) 400 mg/10 mL (40 mg/mL) suspension Take 200 mg by mouth daily. Historical Provider   sucralfate (CARAFATE) 100 mg/mL suspension Take 10 mL by mouth Before breakfast, lunch, dinner and at bedtime. Indications: PREVENTION OF STRESS ULCER 9/23/17   Mague Reece DO   nitroglycerin (NITROLINGUAL) 400 mcg/spray spray 1 Spray by SubLINGual route every five (5) minutes as needed for Chest Pain. Historical Provider   linaclotide [place]) 145 mcg cap capsule Take  by mouth Daily (before breakfast). Historical Provider   PNV NO.122/IRON/FOLIC ACID (PRENATAL MULTI PO) Take  by mouth. Historical Provider   ondansetron (ZOFRAN ODT) 4 mg disintegrating tablet Take 4 mg by mouth three (3) times daily as needed. Historical Provider   sevelamer carbonate (RENVELA) 800 mg tab tab Take 800 mg by mouth three (3) times daily (with meals). Historical Provider   gentamicin (GARAMYCIN) 0.1 % topical cream APPLY TO PD catheter exit site at daily dressing change 5/12/15   Damian Noguera MD   darbepoetin toya in polysorbat (ARANESP, POLYSORBATE,) 40 mcg/mL injection 40 mcg by SubCUTAneous route every fourteen (14) days. Indications: ANEMIA IN CHRONIC KIDNEY DISEASE    Historical Provider   ranolazine ER (RANEXA) 500 mg SR tablet Take 500 mg by mouth two (2) times a day.     Historical Provider     Allergies   Allergen Reactions    Codeine Nausea and Vomiting      Social History   Substance Use Topics    Smoking status: Never Smoker    Smokeless tobacco: Never Used    Alcohol use No      Family History   Problem Relation Age of Onset    Heart Disease Mother     Hypertension Mother     Cancer Mother      Lung    Stroke Father     Hypertension Father     Breast Cancer Neg Hx           Review of Systems    Constitutional: no fever, weak  Eyes: fair vision,    Ears, nose, mouth, throat, and face:fair hearing,   Respiratory: no asthma,  Chronic home O2 is being used - improved dyspnea  Cardiovascular:no palpitation, no  chest pain,   Gastrointestinal:no diarrhea,  Had BM today  Genitourinary: no dysuria,   Hematologic/lymphatic: no bleeding tendency,   Neurological: no seizures   Behvioral/Psych: no psych hospitalization   Endocrine: no goiter,       Objective:       Visit Vitals    /81 (BP 1 Location: Right arm)    Pulse 86    Temp 97.9 °F (36.6 °C)    Resp 16    Wt 99 kg (218 lb 4.8 oz)    SpO2 96%    BMI 31.32 kg/m2       General:  Alert, cooperative, no distress, appears stated age. Head:  Normocephalic, without obvious abnormality, atraumatic. Eyes:  Conjunctivae/corneas clear. EOMs intact. Throat: Lips, mucosa, and tongue normal. Teeth and gums normal.   Neck: Supple, symmetrical, trachea midline, no adenopathy,  no JVD. Lungs:   Clear to auscultation bilaterally. Heart:  Regular rate and rhythm, S1, S2 normal, 2/6 systolic  murmur, no rub or gallop. Abdomen:   Soft, non-tender. Distended . No masses,  No organomegaly. PD catheter in place without exudate   Extremities: Extremities normal, atraumatic, no cyanosis. 3+edema. Skin: Skin color, texture, turgor normal. No rashes or lesions. Lymph nodes: Cervical and supraclavicular nodes normal.   Neurologic: Grossly intact. No asterixis. Data Review:       No results found for this or any previous visit (from the past 24 hour(s)).     CXR viewed by me - no major infiltrate or fluid excess, CM with left possible minor effusion is present        CT abdomen/pelvis  CT ABDOMEN:  Peritoneal fluid in the perihepatic space, mild paracolic gutters,  extending down into the pelvis. Peritoneal dialysis catheter noted. There is not  any evidence of retroperitoneal hematoma identified. Strandy fluid density does  extend into the extraperitoneal space in the lower abdomen and pelvis. There is  radiodensity within the renal collecting systems, possibly previously  administered IV contrast. There is peripancreatic fluid and stranding, cannot  exclude acute pancreatitis. No biliary dilatation. Spleen is not enlarged. Small  bowel normal caliber.   CT PELVIS:   Moderate to large volume pelvic fluid.   There is diffuse asymmetric enlargement of the right rectus and oblique  abdominal muscles. There are are of higher attenuation the contralateral side  and findings are consistent with an abdominal wall hematoma. IMPRESSION:    1. Evidence of right abdominal wall hematoma. 2. No CT evidence to suggest retroperitoneal hematoma. 3. Extensive fluid in the abdomen and pelvis, presumed related to peritoneal  Dialysis. KUB - PD catheter still unchanged compared to last week - remains in the sidney-umbilical area      Active Problems:    Weakness of both legs (11/29/2017)      Renal failure (ARF), acute on chronic (HCC) (11/29/2017)        Assessment:     1. CAD x NSTEMI, Unstable angina -  - [facility] with complex CAD and acute thrombotic lesion in pLAD and subtotal occlusion in mLAD. The RCA has severe proximal and mid RCA disease. She has additional stenting of LAD with Resolute in mid/distal and proximal vessel. These all touched the previously stented mid vessel. Recommend life-long DAPT    2, ESRD -  - on temporary HD via perm cath due to PD catheter dysfunction  - TTS schedule in rehab     3. Fluid excess -  - recurrent due to poor PD drainage  - improving with fluid removal on HD    4.  Anemia -  - intensive anemia therapy in [place]  - on WAYNE and IV iron and B12  - improved S/P BT    5. History of GI bleed -  - monitor clinically and serial Hb  - she had a drop in Hb to 7 range and improved with BT    6. Hypokalemia   - resolved     7. Abdominal pain and tenderness with drop in Hb , on DAPT   No evidence of retroperitoneal hematoma     8.  PD catheter dysfunction -  - for PD catheter reposition, see consult Dr. Ladonna Monroe:     As above - 25

## 2017-12-10 NOTE — PROGRESS NOTES
Pt had prevalon boots on BLE. Reports they are making feet to hot. Boots removed and BLE elevated on pillows so heels are off bed.

## 2017-12-11 LAB
BASOPHILS # BLD: 0 K/UL (ref 0–0.2)
BASOPHILS NFR BLD: 0 % (ref 0–2)
DIFFERENTIAL METHOD BLD: ABNORMAL
EOSINOPHIL # BLD: 0.2 K/UL (ref 0–0.8)
EOSINOPHIL NFR BLD: 3 % (ref 0.5–7.8)
ERYTHROCYTE [DISTWIDTH] IN BLOOD BY AUTOMATED COUNT: 19.2 % (ref 11.9–14.6)
HCT VFR BLD AUTO: 28 % (ref 35.8–46.3)
HGB BLD-MCNC: 8.4 G/DL (ref 11.7–15.4)
IMM GRANULOCYTES # BLD: 0 K/UL (ref 0–0.5)
IMM GRANULOCYTES NFR BLD AUTO: 0 % (ref 0–5)
LYMPHOCYTES # BLD: 1.2 K/UL (ref 0.5–4.6)
LYMPHOCYTES NFR BLD: 21 % (ref 13–44)
MCH RBC QN AUTO: 29.5 PG (ref 26.1–32.9)
MCHC RBC AUTO-ENTMCNC: 30 G/DL (ref 31.4–35)
MCV RBC AUTO: 98.2 FL (ref 79.6–97.8)
MONOCYTES # BLD: 0.7 K/UL (ref 0.1–1.3)
MONOCYTES NFR BLD: 11 % (ref 4–12)
NEUTS SEG # BLD: 3.7 K/UL (ref 1.7–8.2)
NEUTS SEG NFR BLD: 65 % (ref 43–78)
PLATELET # BLD AUTO: 394 K/UL (ref 150–450)
PMV BLD AUTO: 8.8 FL (ref 10.8–14.1)
RBC # BLD AUTO: 2.85 M/UL (ref 4.05–5.25)
WBC # BLD AUTO: 5.8 K/UL (ref 4.3–11.1)

## 2017-12-11 PROCEDURE — 74011250637 HC RX REV CODE- 250/637: Performed by: INTERNAL MEDICINE

## 2017-12-11 PROCEDURE — 36415 COLL VENOUS BLD VENIPUNCTURE: CPT | Performed by: INTERNAL MEDICINE

## 2017-12-11 PROCEDURE — 85025 COMPLETE CBC W/AUTO DIFF WBC: CPT | Performed by: INTERNAL MEDICINE

## 2017-12-11 PROCEDURE — 74011250637 HC RX REV CODE- 250/637: Performed by: PHYSICAL MEDICINE & REHABILITATION

## 2017-12-11 PROCEDURE — 97110 THERAPEUTIC EXERCISES: CPT

## 2017-12-11 PROCEDURE — 97530 THERAPEUTIC ACTIVITIES: CPT

## 2017-12-11 PROCEDURE — 65310000000 HC RM PRIVATE REHAB

## 2017-12-11 PROCEDURE — 97535 SELF CARE MNGMENT TRAINING: CPT

## 2017-12-11 PROCEDURE — 97116 GAIT TRAINING THERAPY: CPT

## 2017-12-11 PROCEDURE — 74011250636 HC RX REV CODE- 250/636: Performed by: PHYSICAL MEDICINE & REHABILITATION

## 2017-12-11 RX ADMIN — POLYETHYLENE GLYCOL 3350 17 G: 17 POWDER, FOR SOLUTION ORAL at 08:49

## 2017-12-11 RX ADMIN — METOCLOPRAMIDE HYDROCHLORIDE 5 MG: 10 TABLET ORAL at 08:53

## 2017-12-11 RX ADMIN — TICAGRELOR 90 MG: 90 TABLET ORAL at 22:36

## 2017-12-11 RX ADMIN — RENAGEL 800 MG: 400 TABLET ORAL at 12:00

## 2017-12-11 RX ADMIN — CALCITRIOL 0.25 MCG: 0.25 CAPSULE, LIQUID FILLED ORAL at 08:52

## 2017-12-11 RX ADMIN — PANTOPRAZOLE SODIUM 40 MG: 40 TABLET, DELAYED RELEASE ORAL at 06:18

## 2017-12-11 RX ADMIN — ZINC 1 TABLET: TAB ORAL at 08:51

## 2017-12-11 RX ADMIN — METOPROLOL TARTRATE 12.5 MG: 25 TABLET ORAL at 08:51

## 2017-12-11 RX ADMIN — PANTOPRAZOLE SODIUM 40 MG: 40 TABLET, DELAYED RELEASE ORAL at 17:13

## 2017-12-11 RX ADMIN — ERYTHROPOIETIN 20000 UNITS: 20000 INJECTION, SOLUTION INTRAVENOUS; SUBCUTANEOUS at 17:24

## 2017-12-11 RX ADMIN — TORSEMIDE 100 MG: 100 TABLET ORAL at 17:11

## 2017-12-11 RX ADMIN — STANDARDIZED SENNA CONCENTRATE AND DOCUSATE SODIUM 1 TABLET: 8.6; 5 TABLET, FILM COATED ORAL at 08:48

## 2017-12-11 RX ADMIN — METOCLOPRAMIDE HYDROCHLORIDE 5 MG: 10 TABLET ORAL at 17:11

## 2017-12-11 RX ADMIN — LEVOTHYROXINE SODIUM 150 MCG: 50 TABLET ORAL at 06:18

## 2017-12-11 RX ADMIN — RENAGEL 800 MG: 400 TABLET ORAL at 17:19

## 2017-12-11 RX ADMIN — AMLODIPINE BESYLATE 5 MG: 5 TABLET ORAL at 08:51

## 2017-12-11 RX ADMIN — ROSUVASTATIN CALCIUM 20 MG: 20 TABLET, FILM COATED ORAL at 22:36

## 2017-12-11 RX ADMIN — SUCRALFATE 1 G: 1 SUSPENSION ORAL at 06:18

## 2017-12-11 RX ADMIN — RANOLAZINE 1000 MG: 500 TABLET, FILM COATED, EXTENDED RELEASE ORAL at 08:48

## 2017-12-11 RX ADMIN — ASPIRIN 81 MG: 81 TABLET, COATED ORAL at 08:53

## 2017-12-11 RX ADMIN — RENAGEL 800 MG: 400 TABLET ORAL at 08:50

## 2017-12-11 RX ADMIN — TORSEMIDE 100 MG: 100 TABLET ORAL at 08:49

## 2017-12-11 RX ADMIN — TICAGRELOR 90 MG: 90 TABLET ORAL at 08:50

## 2017-12-11 RX ADMIN — RANOLAZINE 1000 MG: 500 TABLET, FILM COATED, EXTENDED RELEASE ORAL at 17:20

## 2017-12-11 RX ADMIN — SUCRALFATE 1 G: 1 SUSPENSION ORAL at 12:00

## 2017-12-11 RX ADMIN — SUCRALFATE 1 G: 1 SUSPENSION ORAL at 17:11

## 2017-12-11 RX ADMIN — Medication 400 MG: at 08:52

## 2017-12-11 NOTE — PROGRESS NOTES
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 She has a history of GI bleeding and had anemia-induced unstable angina in the past. 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 She states the pain has since resolved with NTG paste.  No fevers or chills. No nausea, vomiting or diarrhea.  She states that she is essentially anuric and occasionally makes minimal urine.  She has a h/o CVA and TIA. No fever or chills, no problems with PD drainage or discoloration of PD fluid. No increased thirst. She wishes regular diet and supplement. She denies any other acute complaints  Her edema has improved in the past couple of days with intensified dialysis. s- very emotional that his son who is in [place] is nopt doing well with his pancreatic cancer .  awaitingto see Dr Aguila Bolanos in regards to her pd catheter     Past Medical History:   Diagnosis Date    Anemia of chronic renal failure 4/28/2015    Anterior myocardial infarction [facility]) 5/21/2015    CAD (coronary artery disease)     Chest pain     Chronic kidney disease, stage III (moderate) 8/15/2014    on dialysis    CKD (chronic kidney disease) stage 4, GFR 15-29 ml/min ([address] 4/28/2015    Debility 5/5/2015    Depression 12/29/2015    Edema 12/29/2015    Endocrine disease     Hypothyroidism    GERD (gastroesophageal reflux disease)     Heart murmur 12/29/2015    HLD (hyperlipidemia) 12/29/2015    Hypertension     Hypothyroidism 4/28/2015    Ischemic cardiomyopathy 12/29/2015    Nausea     S/P PTCA (percutaneous transluminal coronary angioplasty); LAD PTCA of  ISR 11/27/15 5/24/2016    STEMI (ST elevation myocardial infarction) ([facility] [address] 4/28/2015  Unspecified sleep apnea     uses cpap machine    Unstable angina (HCC)       Past Surgical History:   Procedure Laterality Date    HX BACK SURGERY  1990    neck surgery cervical disc    HX BACK SURGERY      lower back    HX CATARACT REMOVAL Bilateral     HX CHOLECYSTECTOMY  19702    gall bladder     HX KNEE REPLACEMENT Right 2006    HX PTCA  4/28/2015    2.25 Xience stent to mid LAD for occluded artery, anterior MI, EF 25%. Moderate disease distal LAD and distal OM PCI CX and RCA 2004, then PCI RCA and LAD in 2009. Prior to Admission medications    Medication Sig Start Date End Date Taking? Authorizing Provider   metoprolol tartrate (LOPRESSOR) 25 mg tablet Take 0.5 Tabs by mouth daily. 11/27/17  Yes MINDY Chatman   amLODIPine (NORVASC) 5 mg tablet Take 1 Tab by mouth daily. 11/27/17  Yes MINDY Chatman   torsemide (DEMADEX) 100 mg tablet Take 1 Tab by mouth two (2) times a day. 11/27/17  Yes MINDY Chatman   pantoprazole (PROTONIX) 40 mg tablet Take 1 Tab by mouth Before breakfast and dinner. 11/27/17  Yes MINDY Chatman   magnesium oxide (MAG-OX) 400 mg tablet Take 400 mg by mouth daily. Yes Historical Provider   metoclopramide HCl (REGLAN) 5 mg tablet Take 5 mg by mouth two (2) times a day. Yes Historical Provider   ticagrelor (BRILINTA) 90 mg tablet Take 1 Tab by mouth two (2) times a day. 2/4/16  Yes MINDY Chatman   aspirin delayed-release 81 mg tablet Take 1 Tab by mouth daily. 5/5/15  Yes Gisela Hollingsworth PA-C   rosuvastatin (CRESTOR) 20 mg tablet Take 20 mg by mouth nightly. Yes Historical Provider   levothyroxine (SYNTHROID) 150 mcg tablet Take 150 mcg by mouth Daily (before breakfast). Yes Historical Provider   cyanocobalamin (VITAMIN B12) 1,000 mcg/mL injection 1 mL by IntraMUSCular route every seven (7) days. Indications: Vitamin B12 Deficiency 12/2/17   MINDY Harris   folic acid (FOLVITE) 1 mg tablet Take 1 mg by mouth daily. Historical Provider   potassium chloride (K-DUR, KLOR-CON) 20 mEq tablet Take 20 mEq by mouth two (2) times a day. Historical Provider   traZODone (DESYREL) 50 mg tablet Take  by mouth nightly. Historical Provider   megestrol (MEGACE) 400 mg/10 mL (40 mg/mL) suspension Take 200 mg by mouth daily. Historical Provider   sucralfate (CARAFATE) 100 mg/mL suspension Take 10 mL by mouth Before breakfast, lunch, dinner and at bedtime. Indications: PREVENTION OF STRESS ULCER 9/23/17   Stas Wray,    nitroglycerin (NITROLINGUAL) 400 mcg/spray spray 1 Spray by SubLINGual route every five (5) minutes as needed for Chest Pain. Historical Provider   linaclotide [place]) 145 mcg cap capsule Take  by mouth Daily (before breakfast). Historical Provider   PNV NO.122/IRON/FOLIC ACID (PRENATAL MULTI PO) Take  by mouth. Historical Provider   ondansetron (ZOFRAN ODT) 4 mg disintegrating tablet Take 4 mg by mouth three (3) times daily as needed. Historical Provider   sevelamer carbonate (RENVELA) 800 mg tab tab Take 800 mg by mouth three (3) times daily (with meals). Historical Provider   gentamicin (GARAMYCIN) 0.1 % topical cream APPLY TO PD catheter exit site at daily dressing change 5/12/15   Chidi James MD   darbepoetin toya in polysorbat (ARANESP, POLYSORBATE,) 40 mcg/mL injection 40 mcg by SubCUTAneous route every fourteen (14) days. Indications: ANEMIA IN CHRONIC KIDNEY DISEASE    Historical Provider   ranolazine ER (RANEXA) 500 mg SR tablet Take 500 mg by mouth two (2) times a day.     Historical Provider     Allergies   Allergen Reactions    Codeine Nausea and Vomiting      Social History   Substance Use Topics    Smoking status: Never Smoker    Smokeless tobacco: Never Used    Alcohol use No      Family History   Problem Relation Age of Onset    Heart Disease Mother     Hypertension Mother     Cancer Mother      Lung    Stroke Father     Hypertension Father     Breast Cancer Neg Hx           Review of Systems    Constitutional: no fever, weak  Eyes: fair vision,    Ears, nose, mouth, throat, and face:fair hearing,   Respiratory: no asthma,  Chronic home O2 is being used - improved dyspnea  Cardiovascular:no palpitation, no  chest pain,   Gastrointestinal:no diarrhea,  Had BM today  Genitourinary: no dysuria,   Hematologic/lymphatic: no bleeding tendency,   Neurological: no seizures   Behvioral/Psych: no psych hospitalization   Endocrine: no goiter,       Objective:       Visit Vitals    /81    Pulse 87    Temp 98.5 °F (36.9 °C)    Resp 16    Wt 97.3 kg (214 lb 6.4 oz)    SpO2 97%    BMI 30.76 kg/m2       General:  Alert, cooperative, no distress, appears stated age. Head:  Normocephalic, without obvious abnormality, atraumatic. Eyes:  Conjunctivae/corneas clear. EOMs intact. Throat: Lips, mucosa, and tongue normal. Teeth and gums normal.   Neck: Supple, symmetrical, trachea midline, no adenopathy,  no JVD. Lungs:   Clear to auscultation bilaterally. Heart:  Regular rate and rhythm, S1, S2 normal, 2/6 systolic  murmur, no rub or gallop. Abdomen:   Soft, non-tender. Distended . No masses,  No organomegaly. PD catheter in place without exudate   Extremities: Extremities normal, atraumatic, no cyanosis. 3+edema. Skin: Skin color, texture, turgor normal. No rashes or lesions. Lymph nodes: Cervical and supraclavicular nodes normal.   Neurologic: Grossly intact. No asterixis.          Data Review:       Recent Results (from the past 24 hour(s))   CBC WITH AUTOMATED DIFF    Collection Time: 12/11/17  6:13 AM   Result Value Ref Range    WBC 5.8 4.3 - 11.1 K/uL    RBC 2.85 (L) 4.05 - 5.25 M/uL    HGB 8.4 (L) 11.7 - 15.4 g/dL    HCT 28.0 (L) 35.8 - 46.3 %    MCV 98.2 (H) 79.6 - 97.8 FL    MCH 29.5 26.1 - 32.9 PG    MCHC 30.0 (L) 31.4 - 35.0 g/dL    RDW 19.2 (H) 11.9 - 14.6 %    PLATELET 823 215 - 502 K/uL    MPV 8.8 (L) 10.8 - 14.1 FL    DF AUTOMATED NEUTROPHILS 65 43 - 78 %    LYMPHOCYTES 21 13 - 44 %    MONOCYTES 11 4.0 - 12.0 %    EOSINOPHILS 3 0.5 - 7.8 %    BASOPHILS 0 0.0 - 2.0 %    IMMATURE GRANULOCYTES 0 0.0 - 5.0 %    ABS. NEUTROPHILS 3.7 1.7 - 8.2 K/UL    ABS. LYMPHOCYTES 1.2 0.5 - 4.6 K/UL    ABS. MONOCYTES 0.7 0.1 - 1.3 K/UL    ABS. EOSINOPHILS 0.2 0.0 - 0.8 K/UL    ABS. BASOPHILS 0.0 0.0 - 0.2 K/UL    ABS. IMM. GRANS. 0.0 0.0 - 0.5 K/UL       CXR viewed by me - no major infiltrate or fluid excess, CM with left possible minor effusion is present        CT abdomen/pelvis  CT ABDOMEN:  Peritoneal fluid in the perihepatic space, mild paracolic gutters,  extending down into the pelvis. Peritoneal dialysis catheter noted. There is not  any evidence of retroperitoneal hematoma identified. Strandy fluid density does  extend into the extraperitoneal space in the lower abdomen and pelvis. There is  radiodensity within the renal collecting systems, possibly previously  administered IV contrast. There is peripancreatic fluid and stranding, cannot  exclude acute pancreatitis. No biliary dilatation. Spleen is not enlarged. Small  bowel normal caliber.   CT PELVIS:   Moderate to large volume pelvic fluid.   There is diffuse asymmetric enlargement of the right rectus and oblique  abdominal muscles. There are are of higher attenuation the contralateral side  and findings are consistent with an abdominal wall hematoma. IMPRESSION:    1. Evidence of right abdominal wall hematoma. 2. No CT evidence to suggest retroperitoneal hematoma. 3. Extensive fluid in the abdomen and pelvis, presumed related to peritoneal  Dialysis. KUB - PD catheter still unchanged compared to last week - remains in the sidney-umbilical area      Active Problems:    Weakness of both legs (11/29/2017)      Renal failure (ARF), acute on chronic (HCC) (11/29/2017)        Assessment:     1.  CAD x NSTEMI, Unstable angina -  - [facility] with complex CAD and acute thrombotic lesion in pLAD and subtotal occlusion in mLAD. The RCA has severe proximal and mid RCA disease. She has additional stenting of LAD with Resolute in mid/distal and proximal vessel. These all touched the previously stented mid vessel. Recommend life-long DAPT    2, ESRD -  - on temporary HD via perm cath due to PD catheter dysfunction  - TTS schedule in rehab     3. Fluid excess -  - recurrent due to poor PD drainage  - improving with fluid removal on HD    4. Anemia -  - intensive anemia therapy in [place]  - on WAYNE and IV iron and B12  - improved S/P BT    5. History of GI bleed -  - monitor clinically and serial Hb  - she had a drop in Hb to 7 range and improved with BT    6. Hypokalemia   - resolved     7. Abdominal pain and tenderness with drop in Hb , on DAPT   No evidence of retroperitoneal hematoma     8.  PD catheter dysfunction -  - for PD catheter reposition, see consult Dr. Carrasco Khadar:     As above - 25

## 2017-12-11 NOTE — PROGRESS NOTES
End Of Shift Functional Summary, Nursing      TOILETING:  Does patient need assist with clothing management and/or pericare? No    TOILET TRANSFER:  Pt requires minimal assistance. Pt uses walker. BLADDER:  Pt does not have a castillo catheter that staff manages. Pt does not take medication. Pt is continent. of bladder and voids in toilet  Pt has had 0 bladder accidents during this shift   (An accident is when the episode is not contained in a brief AND/OR the clothing/linen requires changing/cleaning up.)    BOWEL:  Pt does take medication. Pt is continent of bowel and uses toilet. Pt has had 0 bowel accidents during this shift . (An accident is when the episode is not contained in a brief AND/OR the clothing/linen requires changing/cleaning up.)    BED/CHAIR TRANSFER  Pt requires minimal assistance. Patient requires the assistance of 1 staff member(s). Pt uses walker      EATING  Pt requires no assistance. Pt does not wear dentures. TUBE FEEDINGS:  Pt does not  receive nutrition through tube feedings. Documentation reviewed and plan of care discussed/reviewed with   patient assistant during the shift.

## 2017-12-11 NOTE — PROGRESS NOTES
12/11/17 1008   Time Spent With Patient   Time In 0733   Time Out 0816   Patient Seen For: AM;ADLs   Grooming   Grooming Assistance  S   Comments Wheelchair at sink, assist to reach items stored on high shelves   Upper Body Bathing   Bathing Assistance, Upper Mod I   Position Performed Seated in chair   Comments Wheelchair at sink   Lower [address], Lower  SBA   Position Performed Seated in chair;Standing   Adaptive Equipment Other (comment); Long handled sponge  (Wheelchair at sink)   Comments SBA in stance and setup of wheelchair   [address] (FIM Score) S   Adaptive Equipment Grab bars   Upper Body Dressing    Dressing Assistance  S   Comments Setup   Lower Body Dressing    Dressing Assistance  Min A   Leg Crossed Method Used No   Position Performed Seated in chair;Standing   Adaptive Equipment Used Reacher;Sock aid   Comments Cues for problem solving reacher, increased assist to complete this session d/t patient tearful and with decreased activity tolerance    Functional Transfers   Toilet Transfer  Grab bars;Stand pivot transfer with walker   Amount of Assistance Required SBA       S: Agreeable to therapy, note patient receives phone call from son (terminal cancer, living in [place], reports to patient he is not feeling well) during session and patient tearful resulting in decreased activity tolerance, encouragement and increased assist provided d/t patient's emotional state  Focus of session was on ADL and functional mobility. Patient was able to ambulate ~20 feet using a RW with close SBA/intermittent CGA. Pain not indicated. Collaborated with PT, Juanita Segura and Psychologist, Dr. Kailey Taylor regarding patient's status and confirmed patient is on track to reach goals as documented in the care plan.    Patient tolerated session well, but strength, balance, functional mobility, cognition, coordination, activity tolerance, medical status are still below baseline and require skilled facilitation to successfully and safely complete ADL's and transfers. Patient ended session in recliner with call remote and phone within reach.      Margarita Rene, OTR/L

## 2017-12-11 NOTE — PROGRESS NOTES
PHYSICAL THERAPY DAILY NOTE  Time In: 1009  Time Out: 6026  Patient Seen For: AM;Gait training; Therapeutic exercise;Transfer training    Subjective: Pt. Tearful during session, found out her son, Kyree Estrella, is heading to the hospital & she is concerned that is in his last days. Objective: Other (comment) (Fall Risk)    TRANSFERS Daily Assessment    Transfer Type: SPT with walker  Transfer Assistance : 5 (Supervision/setup)  Sit to Stand Assistance: Supervision (Vcs for hand placement)       GAIT Daily Assessment    Amount of Assistance: 4 (Contact guard assistance)  Distance (ft): 50 Feet (ft) (x2)  Assistive Device: Walker, rolling   Flexed posture, decreased step length, foot flat    STEPS or STAIRS Daily Assessment   Pt. Ascended/descended 15' ramp w/ RW min A         BALANCE Daily Assessment    Sitting - Static: Good (unsupported)  Sitting - Dynamic: Good (unsupported)  Standing - Static: Fair  Standing - Dynamic : Impaired       LOWER EXTREMITY EXERCISES Daily Assessment    Pt. Performed Motomed @ resistance level 2 x10 minutes to increase strength & endurance B LEs     Vital Signs: HR 81-94, O2 95-92% RA    Pain level: no c/o pain    Patient education: pt. Educated on navigating ramp w/ RW    Interdisciplinary Communication: discussed pt's difficult news w/ OT to make her aware    Pt. Left up in recliner in NAD, call bell in reach. Assessment: Pt. Able to progress activity this visit as able to ambulate on incline of ramp. Pt. Cont. To fatigue easily, requiring multiple seated rest breaks. Pt. Cont. To benefit from PT services to address. Plan of Care: Continue with POC and progress as tolerated.      Valeria Pérez, PT  12/11/2017

## 2017-12-11 NOTE — CONSULTS
[address]   [address] FAX: [phone]    Vascular Surgery Consult    Subjective:      Bard Suarez is a 80 y.o. female who presented to the ER on 11/18/17 for chest pain. She has a history of: CAD s/p PCI to LAD and RCA 2-2016, and ESRD on PD. She underwent a successful percutaneous coronary intervention and stentings on 11/20/2017. Post procedure course was complicated by drop in hgb, down to 7.4 on 11/21.  She had no obvious signs of bleeding but CT  showed right abdominal wall hematoma. transfusion for anemia was not allowed due to pt being a [employer] patient. Patient continued to need dialysis, however due to due to problems with drainage of PD fluid, PD was held and she was transferred to CCU for CRRT.  Patient with signis of volume overload, improving with CRRT. She was noted to eventually be transfused PRBC due to her hgb being critically low. GI was consulted for possible GI bleed. Patient noted to have significant decline of her function below her baseline due to prolonged immobility and illness, therefore, she was sent to the 9th floor for continued rehab.      We are asked to see the patient in consultation for PD catheter malfunction by Dr. Sheela Solomon. She had a KUB which indicated a dislodged PD catheter.         Past Medical History:   Diagnosis Date    Anemia of chronic renal failure 4/28/2015    Anterior myocardial infarction [facility]) 5/21/2015    CAD (coronary artery disease)     Chest pain     Chronic kidney disease, stage III (moderate) 8/15/2014    on dialysis    CKD (chronic kidney disease) stage 4, GFR 15-29 ml/min ([facility]) 4/28/2015    Debility 5/5/2015    Depression 12/29/2015    Edema 12/29/2015    Endocrine disease     Hypothyroidism    GERD (gastroesophageal reflux disease)     Heart murmur 12/29/2015    HLD (hyperlipidemia) 12/29/2015    Hypertension     Hypothyroidism 4/28/2015    Ischemic cardiomyopathy 12/29/2015    Nausea     S/P PTCA (percutaneous transluminal coronary angioplasty); LAD PTCA of  ISR 11/27/15 5/24/2016    STEMI (ST elevation myocardial infarction) ([facility] [address] 4/28/2015    Unspecified sleep apnea     uses cpap machine    Unstable angina ([facility] [address]      Past Surgical History:   Procedure Laterality Date    HX BACK SURGERY  1990    neck surgery cervical disc    HX BACK SURGERY      lower back    HX CATARACT REMOVAL Bilateral     HX CHOLECYSTECTOMY  28978    gall bladder     HX KNEE REPLACEMENT Right 2006    HX PTCA  4/28/2015    2.25 Xience stent to mid LAD for occluded artery, anterior MI, EF 25%. Moderate disease distal LAD and distal OM PCI CX and RCA 2004, then PCI RCA and LAD in 2009.       Family History   Problem Relation Age of Onset    Heart Disease Mother     Hypertension Mother     Cancer Mother      Lung    Stroke Father     Hypertension Father     Breast Cancer Neg Hx      Social History     Social History    Marital status:      Spouse name: N/A    Number of children: N/A    Years of education: N/A     Social History Main Topics    Smoking status: Never Smoker    Smokeless tobacco: Never Used    Alcohol use No    Drug use: Not on file    Sexual activity: Not on file     Other Topics Concern    Not on file     Social History Narrative      Current Facility-Administered Medications   Medication Dose Route Frequency    rosuvastatin (CRESTOR) tablet 20 mg  20 mg Oral QHS    sucralfate (CARAFATE) 100 mg/mL oral suspension 1 g  1 g Oral AC&HS    polyethylene glycol (MIRALAX) packet 17 g  17 g Oral DAILY    acetaminophen (TYLENOL) tablet 650 mg  650 mg Oral Q4H PRN    HYDROcodone-acetaminophen (NORCO) 7.5-325 mg per tablet 1 Tab  1 Tab Oral Q4H PRN    HYDROcodone-acetaminophen (NORCO) 5-325 mg per tablet 1 Tab  1 Tab Oral Q4H PRN    LORazepam (ATIVAN) tablet 1 mg  1 mg Oral Q6H PRN    sodium chloride (NS) flush 5-10 mL  5-10 mL IntraVENous PRN    amLODIPine (NORVASC) tablet 5 mg  5 mg Oral DAILY    aspirin delayed-release tablet 81 mg  81 mg Oral DAILY    calcitRIOL (ROCALTROL) capsule 0.25 mcg  0.25 mcg Oral DAILY    cyanocobalamin (VITAMIN B12) injection 1,000 mcg  1,000 mcg IntraMUSCular Q7D    epoetin toya (EPOGEN;PROCRIT) injection 20,000 Units  20,000 Units SubCUTAneous Q MON, WED & FRI    gentamicin (GARAMYCIN) 0.1 % cream   Topical DAILY PRN    levothyroxine (SYNTHROID) tablet 150 mcg  150 mcg Oral ACB    linaclotide (LINZESS) capsule 145 mcg (Patient Supplied)  145 mcg Oral DAILY    magnesium oxide (MAG-OX) tablet 400 mg  400 mg Oral DAILY    metoclopramide HCl (REGLAN) tablet 5 mg  5 mg Oral BID    metoprolol tartrate (LOPRESSOR) tablet 12.5 mg  12.5 mg Oral DAILY    multivitamin w ZN (STRESSTABS W ZINC) tablet  1 Tab Oral DAILY    nitroglycerin (NITROSTAT) tablet 0.4 mg  0.4 mg SubLINGual PRN    ondansetron (ZOFRAN ODT) tablet 4 mg  4 mg Oral TID PRN    pantoprazole (PROTONIX) tablet 40 mg  40 mg Oral ACB&D    polyethylene glycol (MIRALAX) packet 17 g  17 g Oral DAILY PRN    promethazine (PHENERGAN) tablet 25 mg  25 mg Oral Q6H PRN    ranolazine ER (RANEXA) tablet 1,000 mg  1,000 mg Oral BID    senna-docusate (PERICOLACE) 8.6-50 mg per tablet 1 Tab  1 Tab Oral DAILY    sevelamer (RENAGEL) tablet 800 mg  800 mg Oral TID WITH MEALS    ticagrelor (BRILINTA) tablet 90 mg  90 mg Oral BID    torsemide (DEMADEX) tablet 100 mg  100 mg Oral BID      Allergies   Allergen Reactions    Codeine Nausea and Vomiting       Review of Systems:  A comprehensive review of systems was negative except for that written in the History of Present Illness.     Objective:     Patient Vitals for the past 8 hrs:   BP Temp Pulse Resp SpO2 Weight   17 0720 159/81 98.5 °F (36.9 °C) 87 16 97 % -   17 0707 - - - - - 214 lb 6.4 oz (97.3 kg)     Temp (24hrs), Av.4 °F (36.9 °C), Min:98.2 °F (36.8 °C), Max:98.5 °F (36.9 °C)      Physical Exam:  GENERAL: alert, cooperative, no distress, appears stated age, LUNG: clear to auscultation bilaterally, HEART: regular rate and rhythm, S1, S2 normal, no murmur, click, rub or gallop, ABDOMEN: soft, non-tender. Bowel sounds normal. No masses,  no organomegaly. PD catheter in place    Assessment/Plan:   Continue HD for now. Will try to get her on the surgery schedule this week for repositioning of her PD catheter. Will let the patient know when, as she would like her son present for the operation. Thank you for allowing us to participate in the care of Ms. Jennifer Green. We will follow along with you. Signed By: Daymon Harada, NP     December 11, 2017       Elements of this note have been dictated using speech recognition software. As a result, errors of speech recognition may have occurred.

## 2017-12-11 NOTE — PROGRESS NOTES
Patient not in room today to discuss procedure. Tentatively have planned for repositioning of PD catheter  On Friday. Will try and meet with her over the next day or 2 to discuss.

## 2017-12-11 NOTE — PROGRESS NOTES
Problem: Falls - Risk of  Goal: *Absence of Falls  Document Harrison Fall Risk and appropriate interventions in the flowsheet.    Outcome: Progressing Towards Goal  Fall Risk Interventions:  Mobility Interventions: Assess mobility with egress test, Communicate number of staff needed for ambulation/transfer, OT consult for ADLs, Patient to call before getting OOB, PT Consult for mobility concerns, PT Consult for assist device competence, Strengthening exercises (ROM-active/passive), Utilize walker, cane, or other assitive device    Mentation Interventions: Evaluate medications/consider consulting pharmacy    Medication Interventions: Assess postural VS orthostatic hypotension, Evaluate medications/consider consulting pharmacy, Patient to call before getting OOB, Teach patient to arise slowly    Elimination Interventions: Call light in reach, Patient to call for help with toileting needs, Elevated toilet seat, Toilet paper/wipes in reach, Toileting schedule/hourly rounds    History of Falls Interventions: Consult care management for discharge planning, Evaluate medications/consider consulting pharmacy

## 2017-12-11 NOTE — PROGRESS NOTES
MD Claire,   Medical Director  [address] [facility], [address]  Tel: [phone]       [facility] PROGRESS NOTE    [facility]  Admit Date: 11/29/2017  Admit Diagnosis: DEBILITY; Renal failure (ARF), acute on chronic ([facility] [address] Chito Bear*  Chief Complaint : Gait dysfunction secondary to below. Admit Diagnosis: Unstable angina ([facility] [address]  CAD (coronary artery disease) (11/27/2015)  S/P complex PCI and stable on ASA and Brilinta  Unstable angina ([facility] [address] (2/1/2016)  Acute/ Chronic anemia (11/18/2017)  ESRD (end stage renal disease) On PD/ CRRT  Pain  DVT risk  Acute Rehab Dx:  Debility    Weakness  Gait impairment  deconditioning  Mobility and ambulation deficits  Self Care/ADL deficits     Subjective     Patient seen and examined. Vss, afebrile. LE edema improving. Endurance, strength and activity level improving as well as function. PT, OT note continued functional gains. Ambulating 61' with CGA.      Objective:     Current Facility-Administered Medications   Medication Dose Route Frequency    rosuvastatin (CRESTOR) tablet 20 mg  20 mg Oral QHS    sucralfate (CARAFATE) 100 mg/mL oral suspension 1 g  1 g Oral AC&HS    polyethylene glycol (MIRALAX) packet 17 g  17 g Oral DAILY    acetaminophen (TYLENOL) tablet 650 mg  650 mg Oral Q4H PRN    HYDROcodone-acetaminophen (NORCO) 7.5-325 mg per tablet 1 Tab  1 Tab Oral Q4H PRN    HYDROcodone-acetaminophen (NORCO) 5-325 mg per tablet 1 Tab  1 Tab Oral Q4H PRN    LORazepam (ATIVAN) tablet 1 mg  1 mg Oral Q6H PRN    sodium chloride (NS) flush 5-10 mL  5-10 mL IntraVENous PRN    amLODIPine (NORVASC) tablet 5 mg  5 mg Oral DAILY    aspirin delayed-release tablet 81 mg  81 mg Oral DAILY    calcitRIOL (ROCALTROL) capsule 0.25 mcg  0.25 mcg Oral DAILY    cyanocobalamin (VITAMIN B12) injection 1,000 mcg  1,000 mcg IntraMUSCular Q7D    epoetin toya (EPOGEN;PROCRIT) injection 20,000 Units  20,000 Units SubCUTAneous Q MON, WED & FRI  gentamicin (GARAMYCIN) 0.1 % cream   Topical DAILY PRN    levothyroxine (SYNTHROID) tablet 150 mcg  150 mcg Oral ACB    linaclotide (LINZESS) capsule 145 mcg (Patient Supplied)  145 mcg Oral DAILY    magnesium oxide (MAG-OX) tablet 400 mg  400 mg Oral DAILY    metoclopramide HCl (REGLAN) tablet 5 mg  5 mg Oral BID    metoprolol tartrate (LOPRESSOR) tablet 12.5 mg  12.5 mg Oral DAILY    multivitamin w ZN (STRESSTABS W ZINC) tablet  1 Tab Oral DAILY    nitroglycerin (NITROSTAT) tablet 0.4 mg  0.4 mg SubLINGual PRN    ondansetron (ZOFRAN ODT) tablet 4 mg  4 mg Oral TID PRN    pantoprazole (PROTONIX) tablet 40 mg  40 mg Oral ACB&D    polyethylene glycol (MIRALAX) packet 17 g  17 g Oral DAILY PRN    promethazine (PHENERGAN) tablet 25 mg  25 mg Oral Q6H PRN    ranolazine ER (RANEXA) tablet 1,000 mg  1,000 mg Oral BID    senna-docusate (PERICOLACE) 8.6-50 mg per tablet 1 Tab  1 Tab Oral DAILY    sevelamer (RENAGEL) tablet 800 mg  800 mg Oral TID WITH MEALS    ticagrelor (BRILINTA) tablet 90 mg  90 mg Oral BID    torsemide (DEMADEX) tablet 100 mg  100 mg Oral BID     Review of Systems:   Denies chest pain, shortness of breath, cough, headache, visual problems, abdominal pain, dysurea, calf pain. Pertinent positives are as noted in the medical records and unremarkable otherwise. Visit Vitals    /81    Pulse 87    Temp 98.5 °F (36.9 °C)    Resp 16    Wt 214 lb 6.4 oz (97.3 kg)    SpO2 97%    BMI 30.76 kg/m2   Physical Exam:   General: Alert and age appropriately oriented. No acute cardio respiratory distress. HEENT: Normocephalic,no scleral icterus  Oral mucosa moist without cyanosis   Lungs: Clear to auscultation  bilaterally. Respiration even and unlabored   Heart: Regular rate and rhythm, S1, S2   No  murmurs, clicks, rub or gallops   Abdomen: Soft, non-tender, nondistended. Bowel sounds present. No organomegaly.    Genitourinary: defered   Neuromuscular:      NANCI, EOMI  + mild generalized weakness 4+ to 5-/5. BUE, BLE, more proximal.   Sensation grossly intact to soft touch. Skin/extremity: No rashes, no erythema. 2+edema. Wound covered.   Carleen Tomas                                                                             Functional Assessment:  Gross Assessment  AROM: Generally decreased, functional (12/07/17 1400)  Strength: Generally decreased, functional (12/07/17 1400)  Coordination: Generally decreased, functional (12/07/17 1400)       Balance  Sitting - Static: Good (unsupported) (12/10/17 1000)  Sitting - Dynamic: Good (unsupported) (12/10/17 1000)  Standing - Static: Fair (12/10/17 1000)  Standing - Dynamic : Impaired (12/10/17 1000)           Toileting  Adaptive Equipment: Grab bars (12/06/17 1026)         [facility] Fall Risk Assessment:  [facility] Fall Risk  Mobility: Ambulates or transfers with assist devices or assistance/unsteady gait (12/10/17 1952)  Mobility Interventions: Assess mobility with egress test;Communicate number of staff needed for ambulation/transfer;OT consult for ADLs; Patient to call before getting OOB;PT Consult for mobility concerns;PT Consult for assist device competence;Strengthening exercises (ROM-active/passive); Utilize walker, cane, or other assitive device (12/10/17 1952)  Mentation: Alert, oriented x 3 (12/10/17 1952)  Mentation Interventions: Evaluate medications/consider consulting pharmacy (12/10/17 0900)  Medication: Patient receiving anticonvulsants, sedatives(tranquilizers), psychotropics or hypnotics, hypoglycemics, narcotics, sleep aids, antihypertensives, laxatives, or diuretics (12/10/17 1952)  Medication Interventions: Assess postural VS orthostatic hypotension; Evaluate medications/consider consulting pharmacy; Patient to call before getting OOB; Teach patient to arise slowly (12/10/17 1952)  Elimination: Needs assistance with toileting (12/10/17 1952)  Elimination Interventions: Call light in reach; Patient to call for help with toileting needs; Elevated toilet seat; Toilet paper/wipes in reach; Toileting schedule/hourly rounds (12/10/17 1952)  Prior Fall History: No (12/10/17 1952)  History of Falls Interventions: Consult care management for discharge planning;Evaluate medications/consider consulting pharmacy (12/08/17 1500)  Total Score: 3 (12/10/17 1952)  Standard Fall Precautions: Yes (12/10/17 1952)  High Fall Risk: Yes (12/10/17 1952)     Speech Assessment:         Ambulation:  Gait  Base of Support: Widened (12/07/17 1400)  Speed/Michelle: Slow;Shuffled (12/07/17 1400)  Step Length: Right shortened;Left shortened (12/07/17 1400)  Stance: Right increased; Left increased (12/07/17 1400)  Gait Abnormalities: Trunk sway increased; Step to gait; Decreased step clearance (12/07/17 1400)  Distance (ft): 60 Feet (ft) (60ft x 1   50ft x 1  10ft x 1  5 ft x 2) (12/10/17 1000)  Assistive Device: Walker, rolling;Gait belt (12/10/17 1000)  Curbs/Ramps: Minimum assistance (12/07/17 1400)     Labs/Studies:  Recent Results (from the past 72 hour(s))   CBC WITH AUTOMATED DIFF    Collection Time: 12/11/17  6:13 AM   Result Value Ref Range    WBC 5.8 4.3 - 11.1 K/uL    RBC 2.85 (L) 4.05 - 5.25 M/uL    HGB 8.4 (L) 11.7 - 15.4 g/dL    HCT 28.0 (L) 35.8 - 46.3 %    MCV 98.2 (H) 79.6 - 97.8 FL    MCH 29.5 26.1 - 32.9 PG    MCHC 30.0 (L) 31.4 - 35.0 g/dL    RDW 19.2 (H) 11.9 - 14.6 %    PLATELET 073 895 - 583 K/uL    MPV 8.8 (L) 10.8 - 14.1 FL    DF AUTOMATED      NEUTROPHILS 65 43 - 78 %    LYMPHOCYTES 21 13 - 44 %    MONOCYTES 11 4.0 - 12.0 %    EOSINOPHILS 3 0.5 - 7.8 %    BASOPHILS 0 0.0 - 2.0 %    IMMATURE GRANULOCYTES 0 0.0 - 5.0 %    ABS. NEUTROPHILS 3.7 1.7 - 8.2 K/UL    ABS. LYMPHOCYTES 1.2 0.5 - 4.6 K/UL    ABS. MONOCYTES 0.7 0.1 - 1.3 K/UL    ABS. EOSINOPHILS 0.2 0.0 - 0.8 K/UL    ABS. BASOPHILS 0.0 0.0 - 0.2 K/UL    ABS. IMM.  GRANS. 0.0 0.0 - 0.5 K/UL       Assessment:     Problem List as of 12/11/2017  Date Reviewed: 3/2/2017          Codes Class Noted - Resolved    Weakness of both legs ICD-10-CM: R29.898  ICD-9-CM: 729.89  11/29/2017 - Present        Renal failure (ARF), acute on chronic (HCC) ICD-10-CM: N17.9, N18.9  ICD-9-CM: 584.9, 585.9  11/29/2017 - Present        Elevated troponin ICD-10-CM: R74.8  ICD-9-CM: 790.6  11/18/2017 - Present        Chest pain ICD-10-CM: R07.9  ICD-9-CM: 786.50  11/18/2017 - Present        CKD (chronic kidney disease) ICD-10-CM: N18.9  ICD-9-CM: 585.9  11/18/2017 - Present        Chronic anemia ICD-10-CM: D64.9  ICD-9-CM: 285.9  11/18/2017 - Present        ESRD (end stage renal disease) ([facility] ICD-10-CM: N18.6  ICD-9-CM: 585.6  11/18/2017 - Present        Abdominal pain ICD-10-CM: R10.9  ICD-9-CM: 789.00  9/18/2017 - Present        H/O TIA (transient ischemic attack) and stroke ICD-10-CM: Z86.73  ICD-9-CM: V12.54  4/13/2017 - Present        S/P PTCA (percutaneous transluminal coronary angioplasty) ICD-10-CM: Z98.61  ICD-9-CM: V45.82  4/13/2017 - Present        Mixed hyperlipidemia ICD-10-CM: E78.2  ICD-9-CM: 272.2  4/13/2017 - Present        Vision changes ICD-10-CM: H53.9  ICD-9-CM: 368.9  3/26/2017 - Present        Dizziness ICD-10-CM: R42  ICD-9-CM: 780.4  3/26/2017 - Present        Refusal of blood transfusions as patient is Faith (Chronic) ICD-10-CM: Z53.1  ICD-9-CM: V62.6  8/25/2016 - Present        GERD (gastroesophageal reflux disease) ICD-10-CM: K21.9  ICD-9-CM: 530.81  8/8/2016 - Present        Unspecified sleep apnea ICD-10-CM: G47.30  ICD-9-CM: 780.57  8/8/2016 - Present    Overview Signed 8/8/2016 11:09 AM by Kendra Yoder     uses cpap machine             CVA (cerebral vascular accident) Hx of TIA ICD-10-CM: I63.9  ICD-9-CM: 434.91  2/22/2016 - Present        Unstable angina (HCC) ICD-10-CM: I20.0  ICD-9-CM: 411.1  2/1/2016 - Present        ESRD on peritoneal dialysis ([facility] [address] (Chronic) ICD-10-CM: N18.6, Z99.2  ICD-9-CM: 585.6, V45.11  2/1/2016 - Present        Ischemic cardiomyopathy ICD-10-CM: I25.5  ICD-9-CM: 414.8  12/29/2015 - Present        HLD (hyperlipidemia) ICD-10-CM: E78.5  ICD-9-CM: 272.4  12/29/2015 - Present        Depression ICD-10-CM: F32.9  ICD-9-CM: 023  12/29/2015 - Present        CAD (coronary artery disease) ICD-10-CM: I25.10  ICD-9-CM: 414.00  11/27/2015 - Present        Debility ICD-10-CM: R53.81  ICD-9-CM: 799.3  5/5/2015 - Present        Hypertension (Chronic) ICD-10-CM: I10  ICD-9-CM: 401.9  4/28/2015 - Present        Anemia of chronic renal failure (Chronic) ICD-10-CM: N18.9, D63.1  ICD-9-CM: 285.21, 585.9  4/28/2015 - Present        Hypothyroidism (Chronic) ICD-10-CM: E03.9  ICD-9-CM: 244.9  4/28/2015 - Present        RESOLVED: Postural hypotension ICD-10-CM: I95.1  ICD-9-CM: 458.0  8/9/2016 - 3/26/2017        RESOLVED: Chest pain ICD-10-CM: R07.9  ICD-9-CM: 786.50  8/8/2016 - 3/26/2017        RESOLVED: Nausea ICD-10-CM: R11.0  ICD-9-CM: 787.02  8/8/2016 - 3/26/2017        RESOLVED: S/P PTCA (percutaneous transluminal coronary angioplasty); LAD PTCA of  ISR 11/27/15 ICD-10-CM: Z98.61  ICD-9-CM: V45.82  5/24/2016 - 3/26/2017        RESOLVED: TIA (transient ischemic attack) ICD-10-CM: G45.9  ICD-9-CM: 435.9  2/10/2016 - 3/26/2017        RESOLVED: Edema ICD-10-CM: R60.9  ICD-9-CM: 782.3  12/29/2015 - 3/26/2017        RESOLVED: Anterior myocardial infarction ([facility] [address] ICD-10-CM: I21.09  ICD-9-CM: 410.10  5/21/2015 - 3/26/2017        RESOLVED: STEMI (ST elevation myocardial infarction) ([facility] [address] ICD-10-CM: I21.3  ICD-9-CM: 410.90  4/28/2015 - 2/1/2016              Plan:      CAD (coronary artery disease) (11/27/2015)/ S/P complex PCI / Unstable angina/ hypertension  - on ASA and Brilinta  - continue ranexa, statin  - demadex continued. Patient almost aneuric. Will discuss with nephrology need for demadex?  - 12/6 stable cardiac exam.  - 12/8 - continue norvasc/ metoprolol. BP in good range.  - 12.11 - continue cardiac precautions. Medical management continued. Continued on Demadex.  No signs of failure, ischemia.      Acute/ Chronic anemia (11/18/2017) - continue to monitor.   - continue epogen.     ESRD (end stage renal disease) On PD/ CRRT  - PD resumed. - monitor fluids status. Nephrology managing. Will follow at IRU.   - 11/30 problem with PD/ catheter. Nephrologist aware. Will need to hold PT due to femoral cath precautions. nephrologist to follow. May need to have tunneled cath. - 12/4 - continue TTS HD. PD per nephrology direction. - 12/6 Plan possibly to insert another PD tube. S/p Lt femoral perm catheter working well.  - 12/8 - continue HD per nephrology. PD on hold. LE edema slightly improved. - 12/11 - KUB demonstrates dislodged PD catheter again. Surgical intervention planned per Dr. Teresa Mayo MD          Pneumonia prophylaxis- Insentive spirometer every hour while awake     DVT risk / DVT Prophylaxis- continue daily physician exam to assess for signs and symptoms as patient is at increased risk for of thromboembolism. Mobilization as tolerated. Intermittent pneumatic compression devices when in bed Thigh-high or knee-high thromboembolic deterrent hose when out of bed.   - 12/11 - no signs of DVT. Pain Control: stable, mild-to-moderate joint symptoms intermittently, reasonably well controlled by PRN meds. Will require regular pain assessment and comprenhensive pain management. - 12/11no significant pain source presently.        Wound Care: Monitor wound status daily per staff and physician. At risk for failure. Will require 24/7 rehab nursing. Keep wound clean and dry      bowel program - as needed     GERD - resume PPI. At times may need additional antacids, Maalox prn.  - continue sucralfate     Hypothyroid  - synthroid.     Time spent was 25 minutes with over 1/2 in direct patient care/examination, consultation and coordination of care.      Signed By: Elaine Gonzales MD     December 11, 2017

## 2017-12-11 NOTE — PROGRESS NOTES
PHYSICAL THERAPY DAILY NOTE  Time In: 2441  Time Out: 1440  Patient Seen For: PM;Gait training; Therapeutic exercise;Transfer training    Subjective: \"I just want to be able to go down to [place] to be with Mariel Gale. \"         Objective: Other (comment) (Fall Risk)    TRANSFERS Daily Assessment    Transfer Type: SPT with walker  Transfer Assistance : 5 (Supervision/setup)  Sit to Stand Assistance: Supervision       GAIT Daily Assessment    Amount of Assistance: 4 (Minimal assistance)  Distance (ft): 30 Feet (ft) (x2)  Assistive Device: Gait belt;Walker, rollator   Min A to stabilize rollator as well as max VCs for rollator management      BALANCE Daily Assessment    Sitting - Static: Good (unsupported)  Sitting - Dynamic: Good (unsupported)  Standing - Static: Fair  Standing - Dynamic : Impaired       LOWER EXTREMITY EXERCISES Daily Assessment    Extremity: Both  Exercise Type #1: Standing lower extremity strengthening  Sets Performed: 1  Reps Performed: 5  Level of Assist: Contact guard assistance     STANDING EXERCISES Sets Reps Comments   Heel Raises 1 10    Toe Raises 1 5    Hip Flexion/Marching 1 5    Hip Abduction 1 5    Hip Extension 1 5    Mini Squats 1 5      Vital Signs:   Patient Vitals for the past 8 hrs:   Temp Pulse Resp BP SpO2   12/11/17 0720 98.5 °F (36.9 °C) 87 16 159/81 97 %         Pain level: no c/o pain    Patient education: pt. Educated on using rollator    Interdisciplinary Communication: collaborated w/ OT regarding pt. Progress    Pt. Left up in recliner in NAD, call bell in reach. Assessment: Progressed to standing exercises this visit. Pt. Had a difficulty time performing secondary to rapid fatigue, needing seated rest break in between each exercise. However, feel pt. Benefits from increased standing activity to prepare for d/c home alone. Trial of rollator satisfactory;  Pt.  Requires more assistance to stabilize rollator & requires frequent cues for management of brakes & seat (pt. Trying to sit in sat rather than walk). Will cont. W/ rollator trials to determine safest A/D for d/c home. Plan of Care:  Continue with POC and progress as tolerated.      Anastasia Blancas, PT  12/11/2017

## 2017-12-11 NOTE — PROGRESS NOTES
OT Daily Note    Time In 1305   Time Out 1352     Subjective: \"How would you do the ? \"   Pain: None indicated    Precautions: Other (comment) (fall risk)    Activity Tolerance   Patient completed standing trials at elevated tabletop for dynamic standing balance/standing activity tolerance. Patient tolerated standing trials x ~5 minutes each this session before requiring seated rest breaks to participate in [facility] task at tabletop. Home Management   Patient educated regarding environmental organization, safe item transport, and functional mobility for kitchen management ambulating with RW at discharge. Patient verbalized understanding, to complete kitchen management task in future OT session d/t time expiring this session. Self-Care   Patient requested toileting, transferred wheelchair <> toilet SPT with CGA (no grab bar, no AD). Patient completed toileting with modified independence, washed hands setup seated in wheelchair at sink. Assessment: Patient tolerated well. Note required cues to problem solve replacing caps onto markers sufficiently this session. Verbalized understanding for kitchen management education, to complete kitchen-based task in future session for reinforcement. Education: Kitchen Management  Interdisciplinary Communication: Collaborated with Deedee Kuhn and agreed patient is progressing well and on-track to meet goals as stated in POC. Plan: Continue to address ADL/IADL, functional mobility, activity tolerance, balance, strengthening, coordination, education, cognition.       Chetan Jay, OTR/L

## 2017-12-11 NOTE — PROGRESS NOTES
OT Daily Note    Time In 0919   Time Out 1001     Subjective: \"I'll do what I can, but if I can't do it, I can't do it. \" [patient continues to be tearful this session and require increased time for participation d/t conversation with son this AM]  Pain: None indication    Precautions: Other (comment) (fall risk)    Strengthening/Cognition   Patient completed BUE strengthening/coordination and cognitive task seated at tabletop with 1.5# cuff weight to LUE, no weight to RUE. Patient completed rubber band tangram puzzle on slanted board using B hands to manipulate rubber bands. Patient required increased time to complete (see Subjective above) and 2 cues to correct errors, note decreased accuracy for correcting second error despite max cuing. Assessment: Patient tolerated session. Education: Purpose of therapy  Interdisciplinary Communication: Collaborated with Ramandeep Area and agreed patient is progressing well and on-track to meet goals as stated in POC. Plan: Continue to address ADL/IADL, functional mobility, activity tolerance, balance, strengthening, coordination, education, cognition.       Kendal Hooks, OTR/L

## 2017-12-11 NOTE — PROGRESS NOTES
OT Daily Note  Time In 1127   Time Out 1205     Subjective: \"I'm just so worried about my son. \"   Pain: none reported  Education: breathing techniques for anxiety mangement  Interdisciplinary Communication: with PT regarding patient's family concerns   Precautions: Other (comment) (fall risk)  Location on arrival: up in recliner    Transfers Daily Assessment   Patient transferred recliner <> WC using SPT with SBA and minimal verbal cues for hand placement during transfers. Progressing well. Strengthening Daily Assessment   Patient participated in bilateral cat exercises with 8 pounds total weight 10 x 1 sets x shoulder flexion/extension, 5 pounds x 10 x 1 set shoulder horizontal abduction/adduction, and 5 pounds x 10 x 1 set x V-pattern, working on BUE strengthening and increasing activity tolerance. Educated patient on roslyn core during exercises with good response. Good performance with task, but required multiple rest breaks. Education Daily Assessment   Educated patient on breathing techniques for anxiety management with fair understanding and fair carryover. Would benefit from continued anxiety management training. Patient was left seated up in recliner with call light/all needs in reach and in no distress. Assessment: See above. Somewhat limited by anxiety as related to her son's diagnosis, but willing to participate and motivated to regain strength for discharge. Plan: Continue OT POC with focus on ADL/IADL skills, functional transfers, functional mobility, coordination, strength, static and dynamic balance, and activity tolerance to maximize safety and independence with ADLs and functional transfers.      Jazz Kent MS, OTR/L  12/11/2017

## 2017-12-12 PROCEDURE — 97530 THERAPEUTIC ACTIVITIES: CPT

## 2017-12-12 PROCEDURE — 74011250637 HC RX REV CODE- 250/637: Performed by: PHYSICAL MEDICINE & REHABILITATION

## 2017-12-12 PROCEDURE — 65310000000 HC RM PRIVATE REHAB

## 2017-12-12 PROCEDURE — 90935 HEMODIALYSIS ONE EVALUATION: CPT

## 2017-12-12 PROCEDURE — 97110 THERAPEUTIC EXERCISES: CPT

## 2017-12-12 PROCEDURE — 96125 COGNITIVE TEST BY HC PRO: CPT

## 2017-12-12 PROCEDURE — 5A1D70Z PERFORMANCE OF URINARY FILTRATION, INTERMITTENT, LESS THAN 6 HOURS PER DAY: ICD-10-PCS | Performed by: INTERNAL MEDICINE

## 2017-12-12 RX ADMIN — SUCRALFATE 1 G: 1 SUSPENSION ORAL at 06:28

## 2017-12-12 RX ADMIN — TORSEMIDE 100 MG: 100 TABLET ORAL at 17:42

## 2017-12-12 RX ADMIN — RANOLAZINE 1000 MG: 500 TABLET, FILM COATED, EXTENDED RELEASE ORAL at 09:00

## 2017-12-12 RX ADMIN — ZINC 1 TABLET: TAB ORAL at 09:16

## 2017-12-12 RX ADMIN — METOCLOPRAMIDE HYDROCHLORIDE 5 MG: 10 TABLET ORAL at 09:21

## 2017-12-12 RX ADMIN — RANOLAZINE 1000 MG: 500 TABLET, FILM COATED, EXTENDED RELEASE ORAL at 17:41

## 2017-12-12 RX ADMIN — METOCLOPRAMIDE HYDROCHLORIDE 5 MG: 10 TABLET ORAL at 17:42

## 2017-12-12 RX ADMIN — Medication 400 MG: at 09:18

## 2017-12-12 RX ADMIN — CALCITRIOL 0.25 MCG: 0.25 CAPSULE, LIQUID FILLED ORAL at 09:00

## 2017-12-12 RX ADMIN — LEVOTHYROXINE SODIUM 150 MCG: 50 TABLET ORAL at 06:28

## 2017-12-12 RX ADMIN — ROSUVASTATIN CALCIUM 20 MG: 20 TABLET, FILM COATED ORAL at 22:02

## 2017-12-12 RX ADMIN — ASPIRIN 81 MG: 81 TABLET, COATED ORAL at 09:22

## 2017-12-12 RX ADMIN — SUCRALFATE 1 G: 1 SUSPENSION ORAL at 17:41

## 2017-12-12 RX ADMIN — PANTOPRAZOLE SODIUM 40 MG: 40 TABLET, DELAYED RELEASE ORAL at 17:43

## 2017-12-12 RX ADMIN — PANTOPRAZOLE SODIUM 40 MG: 40 TABLET, DELAYED RELEASE ORAL at 06:28

## 2017-12-12 RX ADMIN — TICAGRELOR 90 MG: 90 TABLET ORAL at 22:03

## 2017-12-12 RX ADMIN — TICAGRELOR 90 MG: 90 TABLET ORAL at 09:18

## 2017-12-12 NOTE — PROGRESS NOTES
Team conference; tentative discharge scheduled for Dec 22. Recommend 24 S if possible. Discussed with pt; encouraged pt to explore options available to her to provide needed care. [facility] that they [facility] 6 days a week in the am to assist her. SW to contact them to see if hours can be extended. Continue to follow.

## 2017-12-12 NOTE — PROGRESS NOTES
Progress Note    Patient: Elizabeth Robles MRN: [id number]  SSN: [id number]    YOB: 1932  Age: 80 y.o. Sex: female      Admit Date: 11/29/2017    LOS: 13 days       Subjective:     Patient seen in dialysis, no complaints. Said PD catheter stopped functioning just prior to admission. Objective:     Vitals:    12/12/17 0803 12/12/17 0831 12/12/17 0858 12/12/17 0930   BP: 159/89 143/73 146/81 148/84   Pulse: 89 85 82 79   Resp: 16      Temp:       SpO2:       Weight:            Intake and Output:  Current Shift:    Last three shifts: 12/10 1901 - 12/12 0700  In: 600 [P.O.:600]  Out: -     Physical Exam:   GENERAL: alert, cooperative, no distress  NECK: supple, no JVD or bruits  LUNG: clear to auscultation bilaterally  HEART: regular rate and rhythm, soft SHARLEEN best auscultated at left sternal border  ABDOMEN: soft, obese but not distended, non-tender, normoactive bowel sounds; PD catheter in place at left abdomen  EXTREMITIES: warm, normal ROM  PULSES: 2+ and symmetric at radial, brachial    Lab/Data Review:  No significant labs in last 24h; prior labs reviewed. Assessment / Plan: Active Problems:    Weakness of both legs (11/29/2017)      Renal failure (ARF), acute on chronic (HCC) (11/29/2017)      Continue care / rehab per primary  HD per nephrology  Tentative plan for PD catheter revision / replacement in OR on Friday, 12/15/2017 by Dr. Lydia Romano By: Vandana Reddy PA-C    December 12, 2017      Physician Assistant with Vascular Surgery Haleigh Higgins MD / Saskia Snyder.  Gem English MD / Lionel Vazquez MD

## 2017-12-12 NOTE — PROGRESS NOTES
Problem: Falls - Risk of  Goal: *Absence of Falls  Document [facility] Fall Risk and appropriate interventions in the flowsheet.    Outcome: Progressing Towards Goal  Fall Risk Interventions:  Mobility Interventions: Patient to call before getting OOB, Utilize walker, cane, or other assitive device    Mentation Interventions: Evaluate medications/consider consulting pharmacy, Increase mobility, Adequate sleep, hydration, pain control    Medication Interventions: Evaluate medications/consider consulting pharmacy, Patient to call before getting OOB    Elimination Interventions: Call light in reach    History of Falls Interventions: Consult care management for discharge planning, Evaluate medications/consider consulting pharmacy

## 2017-12-12 NOTE — PROGRESS NOTES
OT Daily Note    Time In 1259   Time Out 1344     Subjective:\"I feel so tired right now, I'm sorry. \" Agreeable to therapy. Pain:Improved in right UE during session. Education:On RUE tightness and ways to decrease discomfort in the future. Interdisciplinary Communication: Collaborated with Lobo Hanson and patient is making progress towards goals. Precautions: Other (comment) (fall risk)    Balance/functional mobility Daily Assessment   SPT from bed to w/c with CGA. Strengthening/activity tolerance Daily Assessment   UE exercise bike completed for 4 minutes with moderate resistance at moderate pace, backwards, to increase activity tolerance and UE strength. Right joint mobilization completed at [facility] joint mostly posteriorly and inferiorly. Also completed PROM in shoulder ER, flexion, and abduction. Light PROM completed in scapular depression and elevation. No band used for ER with towel under right elbow at side 2 sets of 10. Red band used for 2 sets of 15 scapular rows. Also used in 2 sets of 10 for IR with towel roll under right arm as well. Isometric strengthening in both IR and ER in right shoulder in 1 set of 8 holding for 3-5 seconds each at different amounts of IR/ER. Patient right subscapularis is tight and weak, appears more of an acute injury based on soft end feel in ER and ability to regain near full ROM within session. Ended session:In w/c in therapy gym to work with Lobo Hanson next.      Tyler Backer OTR/L

## 2017-12-12 NOTE — PROGRESS NOTES
OT Daily Note    Time In 1120   Time Out 1200     Subjective: \"This [R] arm is weak. \"  Pain: None indicated    Precautions: Other (comment) (fall risk)    Assessment   Gross assessment of R shoulder/rotator cuff completed this session with patient seated in bed (dialysis). Decreased abduction and external rotation noted for Apley scratch test.  Supraspinatus weakness noted with \"empty can\" test, infraspinatus/teres minor weakness also noted. Mild pain noted by patient for Beauford Chimes' Test (subacromial impingement/rotator cuff tendonitis) and Cross-arm test (acromioclavicular joint disorder). Strengthening   Patient completed 1 sets x 10 reps of BUE therapeutic exercises seated in bed, 2# cuff weight to LUE and no weight added to RUE. Patient completed overhead press, shoulder flexion, chest press, and elbow flexion completing AAROM for RUE utilizing dowel ashutosh, completed AROM using RUE only for shoulder abduction, external rotation, and internal rotation. Initiated second set, completing overhead press, chest press, and shoulder flexion x 10 each before time expires this session. Assessment: Patient tolerated well. Tendonitis and/or weakness in rotator cuff muscles suspected (see above). Education: Purpose of therapy  Interdisciplinary Communication: Collaborated with OTGuerda regarding rotator cuff testing and PTDino Junior and agreed patient is progressing well and on-track to meet goals as stated in POC. Plan: Continue to address ADL/IADL, functional mobility, activity tolerance, balance, strengthening, coordination, education, cognition.       ARTUR Gaona/L

## 2017-12-12 NOTE — PROGRESS NOTES
Hourly rounding completed this shift. Pt has voiced no complaints. TOILETING:  Does patient need assist with clothing management and/or pericare? Yes      TOILET TRANSFER:  Pt requires minimal assistance.  Pt uses wheelchair and walker.      BLADDER:  Pt does not have a castillo catheter that staff manages.  Pt does not take medication.  Pt is continent. of bladder and voids in toilet  Pt requires staff to position device Pt has had 0 bladder accidents during this shift requiring minimal assistance to clean up.  (An accident is when the episode is not contained in a brief AND/OR the clothing/linen requires changing/cleaning up.)      BOWEL:  Pt does take medication.  Pt is continent of bowel and uses toilet.  Pt requires staff to position device    Pt has had 0 bowel accidents during this shift requiring minimal assistance from staff to clean up.  (An accident is when the episode is not contained in a brief AND/OR the clothing/linen requires changing/cleaning up.)

## 2017-12-12 NOTE — PROGRESS NOTES
Patient resting up in bed. Alert and oriented with pleasant affect. Lung sounds clear. S1S2, bowel sounds active. Left femoral cath in place for dialysis. Patient transported to dialysis in bed. Assessment completed. No other verbalized needs made known. Will administer scheduled medication in dialysis. See MAR. See doc flow sheet for further assessments.

## 2017-12-12 NOTE — PROGRESS NOTES
,  End Of Shift Functional Summary, Nursing      TOILETING:  Does patient need assist with clothing management and/or pericare? Yes: Comment: 1 person assist.    TOILET TRANSFER:  Pt requires standby assistance/setup. Pt uses walker. BLADDER:  Pt does not have a castillo catheter that staff manages. Pt does take medication. Pt is continent. of bladder and voids in toilet  Pt requires staff to empty device Pt has had 0 bladder accidents during this shift requiring standby assistance/setup to clean up. (An accident is when the episode is not contained in a brief AND/OR the clothing/linen requires changing/cleaning up.)    BOWEL:  Pt does take medication. Pt is continent of bowel and uses toilet. Pt requires staff to empty device    Pt has had 0 bowel accidents during this shift requiring standby assistance/setup from staff to clean up. (An accident is when the episode is not contained in a brief AND/OR the clothing/linen requires changing/cleaning up.)                                 EATING  Pt requires no assistance. Pt wears dentures. TUBE FEEDINGS:  Pt does not  receive nutrition through tube feedings. Patient requires no assistance with feedings. Documentation reviewed and plan of care discussed/reviewed with   patient, physician, therapists, oncoming nurse, patient assistant and family/spouse during the shift.

## 2017-12-12 NOTE — DIALYSIS
On dialysis via left femoral catheter. Both ports draw back and flush easily. No heparin used for this treatment. Dressing changed to site per protocol and both [place] caps changed. Vital signs HR=89, FP=676/89. Pt noted alert and oriented x 4. Will continue to monitor throughout treatment.

## 2017-12-12 NOTE — PROGRESS NOTES
PHYSICAL THERAPY DAILY NOTE  Time In: 1018  Time Out: 1043  Patient Seen For: AM;Therapeutic exercise    Subjective: \"I think my legs are moving better. \"         Objective: Other (comment) (Fall Risk)    LOWER EXTREMITY EXERCISES Daily Assessment    Extremity: Both  Exercise Type #1: Supine lower extremity strengthening  Sets Performed: 3  Reps Performed: 5  Level of Assist: Contact guard assistance     SUPINE EXERCISES Sets Reps Comments   Ankle Pumps 3 15    Glut Sets 3 15    Heel Slides 3 15    Hip Abduction 3 15    Short Arc Quad 3 15    Straight Leg Raise 3 15      Vital Signs:   Patient Vitals for the past 4 hrs:   Pulse BP   12/12/17 1158 73 154/51   12/12/17 1129 77 144/87   12/12/17 1059 76 133/77   12/12/17 1028 76 158/81   12/12/17 1001 78 142/79   12/12/17 0930 79 148/84   12/12/17 0858 82 146/81         Pain level: no c/o pain    Patient education: reviewed supine exercises    Interdisciplinary Communication: cleared pt. For PT w/ HD RN    Pt. Left supine in NAD in HD         Assessment: Pt. Cont. To fatigue w/ LE exercises, but able to push through when breaking down to 3 sets of 5. Pt. Cont. To benefit from PT to address strength & endurance deficits. Plan of Care: Continue with POC and progress as tolerated.      Lisa Agarwal, PT  12/12/2017

## 2017-12-12 NOTE — PROGRESS NOTES
PT  Resting in chair no complaints noted . Pt still worried about her son . Let pt vent of her concern of her son .

## 2017-12-12 NOTE — PROGRESS NOTES
Hemodialysis Rounding Note    Subjective:     Juan Linares is a 80 y.o.  who presents with DEBILITY  Renal failure (ARF), acute on chronic (HCC)  Weakness of both legs. The patient is dialyzing utilizing the following method:Intermittent Hemodialysis  Artificial Kidney Dialyzer/Set Up Inspection: Revaclear Max   hours Duration of Treatment (hours): 3.5 hours   blood flow rate Blood Flow Rate (ml/min): 350 ml/min   dialysate rate     ultrafiltration Goal/Amount of Fluid to Remove (mL): 3600 mL   Heparin is used during the dialysis treatment. Complaints none.      Current Facility-Administered Medications   Medication Dose Route Frequency    rosuvastatin (CRESTOR) tablet 20 mg  20 mg Oral QHS    sucralfate (CARAFATE) 100 mg/mL oral suspension 1 g  1 g Oral AC&HS    polyethylene glycol (MIRALAX) packet 17 g  17 g Oral DAILY    acetaminophen (TYLENOL) tablet 650 mg  650 mg Oral Q4H PRN    HYDROcodone-acetaminophen (NORCO) 7.5-325 mg per tablet 1 Tab  1 Tab Oral Q4H PRN    HYDROcodone-acetaminophen (NORCO) 5-325 mg per tablet 1 Tab  1 Tab Oral Q4H PRN    LORazepam (ATIVAN) tablet 1 mg  1 mg Oral Q6H PRN    sodium chloride (NS) flush 5-10 mL  5-10 mL IntraVENous PRN    amLODIPine (NORVASC) tablet 5 mg  5 mg Oral DAILY    aspirin delayed-release tablet 81 mg  81 mg Oral DAILY    calcitRIOL (ROCALTROL) capsule 0.25 mcg  0.25 mcg Oral DAILY    cyanocobalamin (VITAMIN B12) injection 1,000 mcg  1,000 mcg IntraMUSCular Q7D    epoetin toya (EPOGEN;PROCRIT) injection 20,000 Units  20,000 Units SubCUTAneous Q MON, WED & FRI    gentamicin (GARAMYCIN) 0.1 % cream   Topical DAILY PRN    levothyroxine (SYNTHROID) tablet 150 mcg  150 mcg Oral ACB    linaclotide (LINZESS) capsule 145 mcg (Patient Supplied)  145 mcg Oral DAILY    magnesium oxide (MAG-OX) tablet 400 mg  400 mg Oral DAILY    metoclopramide HCl (REGLAN) tablet 5 mg  5 mg Oral BID    metoprolol tartrate (LOPRESSOR) tablet 12.5 mg 12.5 mg Oral DAILY    multivitamin w ZN (STRESSTABS W ZINC) tablet  1 Tab Oral DAILY    nitroglycerin (NITROSTAT) tablet 0.4 mg  0.4 mg SubLINGual PRN    ondansetron (ZOFRAN ODT) tablet 4 mg  4 mg Oral TID PRN    pantoprazole (PROTONIX) tablet 40 mg  40 mg Oral ACB&D    polyethylene glycol (MIRALAX) packet 17 g  17 g Oral DAILY PRN    promethazine (PHENERGAN) tablet 25 mg  25 mg Oral Q6H PRN    ranolazine ER (RANEXA) tablet 1,000 mg  1,000 mg Oral BID    senna-docusate (PERICOLACE) 8.6-50 mg per tablet 1 Tab  1 Tab Oral DAILY    sevelamer (RENAGEL) tablet 800 mg  800 mg Oral TID WITH MEALS    ticagrelor (BRILINTA) tablet 90 mg  90 mg Oral BID    torsemide (DEMADEX) tablet 100 mg  100 mg Oral BID          12/10 1901 -  0700  In: 600 [P.O.:600]  Out: -     No results found for this or any previous visit (from the past 24 hour(s)). Review of Systems  A comprehensive review of systems was negative except for that written in the HPI. .    Objective:     Blood pressure 142/79, pulse 78, temperature 98.8 °F (37.1 °C), resp. rate 16, weight 98.3 kg (216 lb 12.8 oz), SpO2 96 %. Temp (24hrs), Av.2 °F (36.8 °C), Min:97.6 °F (36.4 °C), Max:98.8 °F (37.1 °C)      Physical Exam:   Neuro: alert, cooperative, no distress, appears stated age, Skin: Skin color, texture, turgor normal. No rashes or lesions, Neck: supple, symmetrical, trachea midline, no adenopathy, thyroid: not enlarged, symmetric, no tenderness/mass/nodules, no carotid bruit and no JVD, Lungs: clear to auscultation bilaterally, Cardiac: regular rate and rhythm, S1, S2 normal, no murmur, click, rub or gallop, Abdomen: soft, non-tender. Bowel sounds normal. No masses,  no organomegaly and Extremities: extremities normal, atraumatic, no cyanosis or edema      Assessment:     End Stage Renal Disease:  Patient is tolerating dialysis treatment well. .  Additionally the patient has experienced normal dialysis treatment during dialysis.   Dry weight same.    Anemia:    Recent Labs      12/11/17   0613   HCT  28.0*   HGB  8.4*       Renal Metabolic Bone Disease:    No results for input(s): CA, ALB, PTH in the last 72 hours.     No lab exists for component: PO4                     Treatment:  PO4 binders, Cinacaleat, Vitamin D  Hypertension: controlled    Access: adequate monitoring/no changes     Active Problems:    Weakness of both legs (11/29/2017)      Renal failure (ARF), acute on chronic (HCC) (11/29/2017)           Plan:     Continue scheduled dialysis     S/p Lt femoral perm cath - working well

## 2017-12-12 NOTE — PROGRESS NOTES
12/12/17 1315   Time Spent With Patient   Time In 4217   Time Out 1300   Mental Status   Neurologic State Alert   Orientation Level Disoriented to time   Cognition Impaired decision making;Memory loss   Perception Appears intact   Perseveration No perseveration noted   Safety/Judgement Fall prevention   Psychosocial   Patient Behaviors Calm   Reading Comprehension   Visual Impairment No impairment   Pre-Morbid Reading Status Literate   Overall Impairment Severity None   Written Expression   Pre-Morbid Dominant Hand Unknown/unable to assess      12/12/17 1317   Verbal Expression   Primary Mode of Expression Verbal   Initiation No impairment   Automatic Speech Task No impairment   Repetition No impairment   Naming Impaired   Effective Techniques Provide extra time; Word retrieval strategies   Overall Impairment Mild   FIM Score (Verbal Expression) 4   Oral-Motor Structure/Motor Speech   Face No impairment   Labial No impairment   Oral Hygiene adequate   Lingual No impairment   Apraxic Characteristics None   Dysarthric Characteristics None   Overall Impairment Severity None   Neuro-Linguistics/Cognitive Function   Reasoning  Abstract reasoning; [place] reasoning;Convergent thinking;Deductive reasoning;Divergent thinking; Inductive reasoning   Organizational Complex functional tasks; Sequencing   Problem Solving Complex; Functional   FIM Score (Problem Solving) 4   Memory  Short-term   FIM Score (Memory) 3   Attention  No impairment   Overall Impairment Severity Moderate   Pragmatics   Pragmatics Impairment No impairment   Pragmatics Impairment Severity Mild   FIM Score (Pragmatics/Social Interaction) 5   Pt completed basic cognitive-linguistic evaluation with performance as follows: simple yes/no questions 5/5, complex yes/no questions 5/5, 1-2 step commands 10/10, 3 step commands 5/5, named 5 items per minute in food & animal categories, problem solving questions 4/5, reasoning questions 3/5 and recalled 0/3 items in short term memory task. Pt presents with noted cognitive-linguistic deficits in orientation, problem solving, reasoning, word retrieval, and memory.     [place] MA/CCC/SLP

## 2017-12-12 NOTE — DIALYSIS
Off dialysis at 1156 and 3 Kg's removed. Left femoral Catheter flushed with 10 ml NS per protocol. Vital signs  HR=73, SZ=699/51. Pt noted alert and oriented. Pt to room after treatment completed.

## 2017-12-12 NOTE — PROGRESS NOTES
MD Claire,   Medical Director  [address] [facility], [address] [facility]  Tel: [phone]       [facility] PROGRESS NOTE    Kiara Purchase  Admit Date: 11/29/2017  Admit Diagnosis: DEBILITY; Renal failure (ARF), acute on chronic ([facility] [address] Dorys Natasha*  Chief Complaint : Gait dysfunction secondary to below. Admit Diagnosis: Unstable angina ([facility] [address]  CAD (coronary artery disease) (11/27/2015)  S/P complex PCI and stable on ASA and Brilinta  Unstable angina ([facility] [address] (2/1/2016)  Acute/ Chronic anemia (11/18/2017)  ESRD (end stage renal disease) On PD/ CRRT  Pain  DVT risk  Acute Rehab Dx:  Debility    Weakness  Gait impairment  deconditioning  Mobility and ambulation deficits  Self Care/ADL deficits     Subjective     Patient seen and examined. Vss, afebrile. Case discussed in team conference. medically without acute problems. Tolerated HD well today. Functionally improving steadily as medical condition improves, as LE edema strength improves.      Objective:     Current Facility-Administered Medications   Medication Dose Route Frequency    rosuvastatin (CRESTOR) tablet 20 mg  20 mg Oral QHS    sucralfate (CARAFATE) 100 mg/mL oral suspension 1 g  1 g Oral AC&HS    polyethylene glycol (MIRALAX) packet 17 g  17 g Oral DAILY    acetaminophen (TYLENOL) tablet 650 mg  650 mg Oral Q4H PRN    HYDROcodone-acetaminophen (NORCO) 7.5-325 mg per tablet 1 Tab  1 Tab Oral Q4H PRN    HYDROcodone-acetaminophen (NORCO) 5-325 mg per tablet 1 Tab  1 Tab Oral Q4H PRN    LORazepam (ATIVAN) tablet 1 mg  1 mg Oral Q6H PRN    sodium chloride (NS) flush 5-10 mL  5-10 mL IntraVENous PRN    amLODIPine (NORVASC) tablet 5 mg  5 mg Oral DAILY    aspirin delayed-release tablet 81 mg  81 mg Oral DAILY    calcitRIOL (ROCALTROL) capsule 0.25 mcg  0.25 mcg Oral DAILY    cyanocobalamin (VITAMIN B12) injection 1,000 mcg  1,000 mcg IntraMUSCular Q7D    epoetin toya (EPOGEN;PROCRIT) injection 20,000 Units  20,000 Units SubCUTAneous Q MON, WED & FRI    gentamicin (GARAMYCIN) 0.1 % cream   Topical DAILY PRN    levothyroxine (SYNTHROID) tablet 150 mcg  150 mcg Oral ACB    linaclotide (LINZESS) capsule 145 mcg (Patient Supplied)  145 mcg Oral DAILY    magnesium oxide (MAG-OX) tablet 400 mg  400 mg Oral DAILY    metoclopramide HCl (REGLAN) tablet 5 mg  5 mg Oral BID    metoprolol tartrate (LOPRESSOR) tablet 12.5 mg  12.5 mg Oral DAILY    multivitamin w ZN (STRESSTABS W ZINC) tablet  1 Tab Oral DAILY    nitroglycerin (NITROSTAT) tablet 0.4 mg  0.4 mg SubLINGual PRN    ondansetron (ZOFRAN ODT) tablet 4 mg  4 mg Oral TID PRN    pantoprazole (PROTONIX) tablet 40 mg  40 mg Oral ACB&D    polyethylene glycol (MIRALAX) packet 17 g  17 g Oral DAILY PRN    promethazine (PHENERGAN) tablet 25 mg  25 mg Oral Q6H PRN    ranolazine ER (RANEXA) tablet 1,000 mg  1,000 mg Oral BID    senna-docusate (PERICOLACE) 8.6-50 mg per tablet 1 Tab  1 Tab Oral DAILY    sevelamer (RENAGEL) tablet 800 mg  800 mg Oral TID WITH MEALS    ticagrelor (BRILINTA) tablet 90 mg  90 mg Oral BID    torsemide (DEMADEX) tablet 100 mg  100 mg Oral BID     Review of Systems:   Denies chest pain, shortness of breath, cough, headache, visual problems, abdominal pain, dysurea, calf pain. Pertinent positives are as noted in the medical records and unremarkable otherwise. Visit Vitals    /87    Pulse 77    Temp 98.8 °F (37.1 °C)    Resp 16    Wt 216 lb 12.8 oz (98.3 kg)    SpO2 96%    BMI 31.11 kg/m2   Physical Exam:   General: Alert and age appropriately oriented. No acute cardio respiratory distress. HEENT: Normocephalic,no scleral icterus  Oral mucosa moist without cyanosis   Lungs: Clear to auscultation  bilaterally. Respiration even and unlabored   Heart: Regular rate and rhythm, S1, S2   No  murmurs, clicks, rub or gallops   Abdomen: Soft, non-tender, nondistended. Bowel sounds present. No organomegaly.    Genitourinary: defered Neuromuscular:      NANCI, EOMI  + mild generalized weakness 4+ to 5-/5. BUE, BLE, more proximal weakness, LE weaker. .   Sensation grossly intact to soft touch. Skin/extremity: No rashes, no erythema. 1+edema. Wound covered. Fem cath site without drainage, C/D/I.                                                                                   Functional Assessment:  Gross Assessment  AROM: Generally decreased, functional (12/07/17 1400)  Strength: Generally decreased, functional (12/07/17 1400)  Coordination: Generally decreased, functional (12/07/17 1400)       Balance  Sitting - Static: Good (unsupported) (12/11/17 1400)  Sitting - Dynamic: Good (unsupported) (12/11/17 1400)  Standing - Static: Fair (12/11/17 1400)  Standing - Dynamic : Impaired (12/11/17 1400)           Toileting  Adaptive Equipment: Grab bars (12/11/17 1008)         Gian Hooks Fall Risk Assessment:  Gian Hooks Fall Risk  Mobility: Ambulates or transfers with assist devices or assistance/unsteady gait (12/12/17 0715)  Mobility Interventions: Patient to call before getting OOB;Utilize walker, cane, or other assitive device (12/12/17 0715)  Mentation: Alert, oriented x 3 (12/12/17 0715)  Mentation Interventions: Evaluate medications/consider consulting pharmacy; Increase mobility; Adequate sleep, hydration, pain control (12/12/17 0715)  Medication: Patient receiving anticonvulsants, sedatives(tranquilizers), psychotropics or hypnotics, hypoglycemics, narcotics, sleep aids, antihypertensives, laxatives, or diuretics (12/12/17 0715)  Medication Interventions: Evaluate medications/consider consulting pharmacy; Patient to call before getting OOB (12/12/17 0715)  Elimination: Needs assistance with toileting (12/12/17 0715)  Elimination Interventions: Call light in reach (12/12/17 0715)  Prior Fall History: No (12/12/17 0715)  History of Falls Interventions: Consult care management for discharge planning;Evaluate medications/consider consulting pharmacy (12/12/17 0715)  Total Score: 3 (12/12/17 0715)  Standard Fall Precautions: Yes (12/12/17 0715)  High Fall Risk: Yes (12/12/17 0116)     Speech Assessment:         Ambulation:  Gait  Base of Support: Widened (12/07/17 1400)  Speed/Michelle: Slow;Shuffled (12/07/17 1400)  Step Length: Right shortened;Left shortened (12/07/17 1400)  Stance: Right increased; Left increased (12/07/17 1400)  Gait Abnormalities: Trunk sway increased; Step to gait; Decreased step clearance (12/07/17 1400)  Distance (ft): 30 Feet (ft) (x2) (12/11/17 1400)  Assistive Device: Gait belt;Walker, rollator (12/11/17 1400)  Curbs/Ramps: Minimum assistance (12/07/17 1400)     Labs/Studies:  Recent Results (from the past 72 hour(s))   CBC WITH AUTOMATED DIFF    Collection Time: 12/11/17  6:13 AM   Result Value Ref Range    WBC 5.8 4.3 - 11.1 K/uL    RBC 2.85 (L) 4.05 - 5.25 M/uL    HGB 8.4 (L) 11.7 - 15.4 g/dL    HCT 28.0 (L) 35.8 - 46.3 %    MCV 98.2 (H) 79.6 - 97.8 FL    MCH 29.5 26.1 - 32.9 PG    MCHC 30.0 (L) 31.4 - 35.0 g/dL    RDW 19.2 (H) 11.9 - 14.6 %    PLATELET 037 235 - 999 K/uL    MPV 8.8 (L) 10.8 - 14.1 FL    DF AUTOMATED      NEUTROPHILS 65 43 - 78 %    LYMPHOCYTES 21 13 - 44 %    MONOCYTES 11 4.0 - 12.0 %    EOSINOPHILS 3 0.5 - 7.8 %    BASOPHILS 0 0.0 - 2.0 %    IMMATURE GRANULOCYTES 0 0.0 - 5.0 %    ABS. NEUTROPHILS 3.7 1.7 - 8.2 K/UL    ABS. LYMPHOCYTES 1.2 0.5 - 4.6 K/UL    ABS. MONOCYTES 0.7 0.1 - 1.3 K/UL    ABS. EOSINOPHILS 0.2 0.0 - 0.8 K/UL    ABS. BASOPHILS 0.0 0.0 - 0.2 K/UL    ABS. IMM.  GRANS. 0.0 0.0 - 0.5 K/UL       Assessment:     Problem List as of 12/12/2017  Date Reviewed: 3/2/2017          Codes Class Noted - Resolved    Weakness of both legs ICD-10-CM: R29.898  ICD-9-CM: 729.89  11/29/2017 - Present        Renal failure (ARF), acute on chronic ([facility] [address] ICD-10-CM: N17.9, N18.9  ICD-9-CM: 584.9, 585.9  11/29/2017 - Present        Elevated troponin ICD-10-CM: R74.8  ICD-9-CM: 790.6  11/18/2017 - Present        Chest pain ICD-10-CM: R07.9  ICD-9-CM: 786.50  11/18/2017 - Present        CKD (chronic kidney disease) ICD-10-CM: N18.9  ICD-9-CM: 585.9  11/18/2017 - Present        Chronic anemia ICD-10-CM: D64.9  ICD-9-CM: 285.9  11/18/2017 - Present        ESRD (end stage renal disease) ([facility] [address] ICD-10-CM: N18.6  ICD-9-CM: 585.6  11/18/2017 - Present        Abdominal pain ICD-10-CM: R10.9  ICD-9-CM: 789.00  9/18/2017 - Present        H/O TIA (transient ischemic attack) and stroke ICD-10-CM: Z86.73  ICD-9-CM: V12.54  4/13/2017 - Present        S/P PTCA (percutaneous transluminal coronary angioplasty) ICD-10-CM: Z98.61  ICD-9-CM: V45.82  4/13/2017 - Present        Mixed hyperlipidemia ICD-10-CM: E78.2  ICD-9-CM: 272.2  4/13/2017 - Present        Vision changes ICD-10-CM: H53.9  ICD-9-CM: 368.9  3/26/2017 - Present        Dizziness ICD-10-CM: R42  ICD-9-CM: 780.4  3/26/2017 - Present        Refusal of blood transfusions as patient is [employer] (Chronic) ICD-10-CM: Z53.1  ICD-9-CM: V62.6  8/25/2016 - Present        GERD (gastroesophageal reflux disease) ICD-10-CM: K21.9  ICD-9-CM: 530.81  8/8/2016 - Present        Unspecified sleep apnea ICD-10-CM: G47.30  ICD-9-CM: 780.57  8/8/2016 - Present    Overview Signed 8/8/2016 11:09 AM by Aguilar Regan     uses cpap machine             CVA (cerebral vascular accident) Hx of TIA ICD-10-CM: I63.9  ICD-9-CM: 434.91  2/22/2016 - Present        Unstable angina ([facility] [address] ICD-10-CM: I20.0  ICD-9-CM: 411.1  2/1/2016 - Present        ESRD on peritoneal dialysis [facility]) (Chronic) ICD-10-CM: N18.6, Z99.2  ICD-9-CM: 585.6, V45.11  2/1/2016 - Present        Ischemic cardiomyopathy ICD-10-CM: I25.5  ICD-9-CM: 414.8  12/29/2015 - Present        HLD (hyperlipidemia) ICD-10-CM: E78.5  ICD-9-CM: 272.4  12/29/2015 - Present        Depression ICD-10-CM: F32.9  ICD-9-CM: 484  12/29/2015 - Present        CAD (coronary artery disease) ICD-10-CM: I25.10  ICD-9-CM: 414.00  11/27/2015 - Present        Debility ICD-10-CM: R53.81  ICD-9-CM: 799.3  5/5/2015 - Present        Hypertension (Chronic) ICD-10-CM: I10  ICD-9-CM: 401.9  4/28/2015 - Present        Anemia of chronic renal failure (Chronic) ICD-10-CM: N18.9, D63.1  ICD-9-CM: 285.21, 585.9  4/28/2015 - Present        Hypothyroidism (Chronic) ICD-10-CM: E03.9  ICD-9-CM: 244.9  4/28/2015 - Present        RESOLVED: Postural hypotension ICD-10-CM: I95.1  ICD-9-CM: 458.0  8/9/2016 - 3/26/2017        RESOLVED: Chest pain ICD-10-CM: R07.9  ICD-9-CM: 786.50  8/8/2016 - 3/26/2017        RESOLVED: Nausea ICD-10-CM: R11.0  ICD-9-CM: 787.02  8/8/2016 - 3/26/2017        RESOLVED: S/P PTCA (percutaneous transluminal coronary angioplasty); LAD PTCA of  ISR 11/27/15 ICD-10-CM: Z98.61  ICD-9-CM: V45.82  5/24/2016 - 3/26/2017        RESOLVED: TIA (transient ischemic attack) ICD-10-CM: G45.9  ICD-9-CM: 435.9  2/10/2016 - 3/26/2017        RESOLVED: Edema ICD-10-CM: R60.9  ICD-9-CM: 782.3  12/29/2015 - 3/26/2017        RESOLVED: Anterior myocardial infarction ([facility] [address] ICD-10-CM: I21.09  ICD-9-CM: 410.10  5/21/2015 - 3/26/2017        RESOLVED: STEMI (ST elevation myocardial infarction) ([facility] [address] ICD-10-CM: I21.3  ICD-9-CM: 410.90  4/28/2015 - 2/1/2016              Plan:      CAD (coronary artery disease) (11/27/2015)/ S/P complex PCI / Unstable angina/ hypertension  - on ASA and Brilinta  - continue ranexa, statin  - demadex continued. Patient almost aneuric. Will discuss with nephrology need for demadex?  - 12/12 stable cardiac exam. High risk. Continue cardiac precautions, No angina, signs of failure. continue norvasc/ metoprolol. BP in good range.     Acute/ Chronic anemia (11/18/2017) - continue to monitor.   - continue epogen. - 12/12  hgb 8.8-> 8.4(12/11)     ESRD (end stage renal disease) On PD/ CRRT  - PD resumed. - monitor fluids status. Nephrology managing. Will follow at IRU.   - 11/30 problem with PD/ catheter. Nephrologist aware. Will need to hold PT due to femoral cath precautions. nephrologist to follow. May need to have tunneled cath. - 12/4 - continue TTS HD. PD per nephrology direction. - 12/6 Plan possibly to insert another PD tube. S/p Lt femoral perm catheter working well.  - 12/8 - continue HD per nephrology. PD on hold. LE edema slightly improved. - 12/12  PD continues to be on hold due to PD catheter malfunction. Plans for replacement PD catheter noted. Pneumonia prophylaxis- Insentive spirometer every hour while awake     DVT risk / DVT Prophylaxis- continue daily physician exam to assess for signs and symptoms as patient is at increased risk for of thromboembolism. Mobilization as tolerated. 12/12 - continuing intermittent pneumatic compression devices when in bed Thigh-high or knee-high thromboembolic deterrent hose when out of bed. Pain Control: stable, mild-to-moderate joint symptoms intermittently, reasonably well controlled by PRN meds. Will require regular pain assessment and comprenhensive pain management,      Wound Care: Monitor wound status daily per staff and physician. At risk for failure. Will require 24/7 rehab nursing. Keep wound clean and dry      bowel program - as needed     GERD - resume PPI. At times may need additional antacids, Maalox prn.  - continue sucralfate     Hypothyroid  - synthroid.     Time spent was 25 minutes with over 1/2 in direct patient care/examination, consultation and coordination of care.      Signed By: Elizabeth Ochoa MD     December 12, 2017

## 2017-12-12 NOTE — PROGRESS NOTES
Problem: Neurolinguistics Impaired (Adult)  Goal: *Speech Goal: (INSERT TEXT)  LTG: Pt will problem solve, monitor and evaluate solutions in everyday situations without assistance with 90% Accuracy across environments. STG: Pt will demonstrate functional problem solving strategies in various aspects of daily living without assistance with 90% accuracy during session. STG: Pt will increase short term memory skills for new information learned with minimal assistance 90% of the time across environments. STG: Pt will increase orientation skills for the immediate environment and recent events to 90% accuracy minmal assistance across environments. STG: Pt will demonstrate increase in word retrieval skills without assistance with 90% accuracy across environments. Juan Hammond

## 2017-12-13 PROCEDURE — 74011250636 HC RX REV CODE- 250/636: Performed by: PHYSICAL MEDICINE & REHABILITATION

## 2017-12-13 PROCEDURE — 97530 THERAPEUTIC ACTIVITIES: CPT

## 2017-12-13 PROCEDURE — 97116 GAIT TRAINING THERAPY: CPT

## 2017-12-13 PROCEDURE — 74011250637 HC RX REV CODE- 250/637: Performed by: PHYSICAL MEDICINE & REHABILITATION

## 2017-12-13 PROCEDURE — 97110 THERAPEUTIC EXERCISES: CPT

## 2017-12-13 PROCEDURE — 74011250637 HC RX REV CODE- 250/637: Performed by: INTERNAL MEDICINE

## 2017-12-13 PROCEDURE — 65310000000 HC RM PRIVATE REHAB

## 2017-12-13 PROCEDURE — 97535 SELF CARE MNGMENT TRAINING: CPT

## 2017-12-13 PROCEDURE — 92507 TX SP LANG VOICE COMM INDIV: CPT

## 2017-12-13 RX ADMIN — LEVOTHYROXINE SODIUM 150 MCG: 50 TABLET ORAL at 05:14

## 2017-12-13 RX ADMIN — AMLODIPINE BESYLATE 5 MG: 5 TABLET ORAL at 08:41

## 2017-12-13 RX ADMIN — ASPIRIN 81 MG: 81 TABLET, COATED ORAL at 08:40

## 2017-12-13 RX ADMIN — ERYTHROPOIETIN 20000 UNITS: 20000 INJECTION, SOLUTION INTRAVENOUS; SUBCUTANEOUS at 17:02

## 2017-12-13 RX ADMIN — RANOLAZINE 1000 MG: 500 TABLET, FILM COATED, EXTENDED RELEASE ORAL at 16:56

## 2017-12-13 RX ADMIN — METOPROLOL TARTRATE 12.5 MG: 25 TABLET ORAL at 08:41

## 2017-12-13 RX ADMIN — TICAGRELOR 90 MG: 90 TABLET ORAL at 08:40

## 2017-12-13 RX ADMIN — PANTOPRAZOLE SODIUM 40 MG: 40 TABLET, DELAYED RELEASE ORAL at 05:14

## 2017-12-13 RX ADMIN — SUCRALFATE 1 G: 1 SUSPENSION ORAL at 05:14

## 2017-12-13 RX ADMIN — ONDANSETRON 4 MG: 4 TABLET, ORALLY DISINTEGRATING ORAL at 08:44

## 2017-12-13 RX ADMIN — CALCITRIOL 0.25 MCG: 0.25 CAPSULE, LIQUID FILLED ORAL at 08:41

## 2017-12-13 RX ADMIN — RANOLAZINE 1000 MG: 500 TABLET, FILM COATED, EXTENDED RELEASE ORAL at 08:41

## 2017-12-13 RX ADMIN — TORSEMIDE 100 MG: 100 TABLET ORAL at 16:57

## 2017-12-13 RX ADMIN — TORSEMIDE 100 MG: 100 TABLET ORAL at 08:41

## 2017-12-13 RX ADMIN — Medication 400 MG: at 08:40

## 2017-12-13 RX ADMIN — STANDARDIZED SENNA CONCENTRATE AND DOCUSATE SODIUM 1 TABLET: 8.6; 5 TABLET, FILM COATED ORAL at 05:36

## 2017-12-13 RX ADMIN — TICAGRELOR 90 MG: 90 TABLET ORAL at 21:06

## 2017-12-13 RX ADMIN — POLYETHYLENE GLYCOL 3350 17 G: 17 POWDER, FOR SOLUTION ORAL at 08:42

## 2017-12-13 RX ADMIN — ROSUVASTATIN CALCIUM 20 MG: 20 TABLET, FILM COATED ORAL at 21:06

## 2017-12-13 RX ADMIN — ZINC 1 TABLET: TAB ORAL at 08:40

## 2017-12-13 RX ADMIN — PANTOPRAZOLE SODIUM 40 MG: 40 TABLET, DELAYED RELEASE ORAL at 16:57

## 2017-12-13 RX ADMIN — METOCLOPRAMIDE HYDROCHLORIDE 5 MG: 10 TABLET ORAL at 16:57

## 2017-12-13 NOTE — PROGRESS NOTES
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 She has a history of GI bleeding and had anemia-induced unstable angina in the past. 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 She states the pain has since resolved with NTG paste.  No fevers or chills. No nausea, vomiting or diarrhea.  She states that she is essentially anuric and occasionally makes minimal urine.  She has a h/o CVA and TIA. No fever or chills, no problems with PD drainage or discoloration of PD fluid. No increased thirst. She wishes regular diet and supplement. She denies any other acute complaints  Her edema has improved in the past couple of days with intensified dialysis. s-no complaints .  Getting better with rehab     Past Medical History:   Diagnosis Date    Anemia of chronic renal failure 4/28/2015    Anterior myocardial infarction [facility]) 5/21/2015    CAD (coronary artery disease)     Chest pain     Chronic kidney disease, stage III (moderate) 8/15/2014    on dialysis    CKD (chronic kidney disease) stage 4, GFR 15-29 ml/min ([facility]) 4/28/2015    Debility 5/5/2015    Depression 12/29/2015    Edema 12/29/2015    Endocrine disease     Hypothyroidism    GERD (gastroesophageal reflux disease)     Heart murmur 12/29/2015    HLD (hyperlipidemia) 12/29/2015    Hypertension     Hypothyroidism 4/28/2015    Ischemic cardiomyopathy 12/29/2015    Nausea     S/P PTCA (percutaneous transluminal coronary angioplasty); LAD PTCA of  ISR 11/27/15 5/24/2016    STEMI (ST elevation myocardial infarction) ([facility] [address] 4/28/2015    Unspecified sleep apnea     uses cpap machine    Unstable angina ([facility] [address]       Past Surgical History: Procedure Laterality Date    HX BACK SURGERY  1990    neck surgery cervical disc    HX BACK SURGERY      lower back    HX CATARACT REMOVAL Bilateral     HX CHOLECYSTECTOMY  19702    gall bladder     HX KNEE REPLACEMENT Right 2006    HX PTCA  4/28/2015    2.25 Xience stent to mid LAD for occluded artery, anterior MI, EF 25%. Moderate disease distal LAD and distal OM PCI CX and RCA 2004, then PCI RCA and LAD in 2009. Prior to Admission medications    Medication Sig Start Date End Date Taking? Authorizing Provider   metoprolol tartrate (LOPRESSOR) 25 mg tablet Take 0.5 Tabs by mouth daily. 11/27/17  Yes MINDY Chatman   amLODIPine (NORVASC) 5 mg tablet Take 1 Tab by mouth daily. 11/27/17  Yes MINDY Chatman   torsemide (DEMADEX) 100 mg tablet Take 1 Tab by mouth two (2) times a day. 11/27/17  Yes MINDY Chatman   pantoprazole (PROTONIX) 40 mg tablet Take 1 Tab by mouth Before breakfast and dinner. 11/27/17  Yes MINDY Chatman   magnesium oxide (MAG-OX) 400 mg tablet Take 400 mg by mouth daily. Yes Historical Provider   metoclopramide HCl (REGLAN) 5 mg tablet Take 5 mg by mouth two (2) times a day. Yes Historical Provider   ticagrelor (BRILINTA) 90 mg tablet Take 1 Tab by mouth two (2) times a day. 2/4/16  Yes MINDY Chatman   aspirin delayed-release 81 mg tablet Take 1 Tab by mouth daily. 5/5/15  Yes Gisela Hollingsworth PA-C   rosuvastatin (CRESTOR) 20 mg tablet Take 20 mg by mouth nightly. Yes Historical Provider   levothyroxine (SYNTHROID) 150 mcg tablet Take 150 mcg by mouth Daily (before breakfast). Yes Historical Provider   cyanocobalamin (VITAMIN B12) 1,000 mcg/mL injection 1 mL by IntraMUSCular route every seven (7) days. Indications: Vitamin B12 Deficiency 12/2/17   MINDY Conner   folic acid (FOLVITE) 1 mg tablet Take 1 mg by mouth daily.     Historical Provider   potassium chloride (K-DUR, KLOR-CON) 20 mEq tablet Take 20 mEq by mouth two (2) times a day. Historical Provider   traZODone (DESYREL) 50 mg tablet Take  by mouth nightly. Historical Provider   megestrol (MEGACE) 400 mg/10 mL (40 mg/mL) suspension Take 200 mg by mouth daily. Historical Provider   sucralfate (CARAFATE) 100 mg/mL suspension Take 10 mL by mouth Before breakfast, lunch, dinner and at bedtime. Indications: PREVENTION OF STRESS ULCER 9/23/17   Montana Proud, DO   nitroglycerin (NITROLINGUAL) 400 mcg/spray spray 1 Spray by SubLINGual route every five (5) minutes as needed for Chest Pain. Historical Provider   linaclotide Bartholome Baas) 145 mcg cap capsule Take  by mouth Daily (before breakfast). Historical Provider   PNV NO.122/IRON/FOLIC ACID (PRENATAL MULTI PO) Take  by mouth. Historical Provider   ondansetron (ZOFRAN ODT) 4 mg disintegrating tablet Take 4 mg by mouth three (3) times daily as needed. Historical Provider   sevelamer carbonate (RENVELA) 800 mg tab tab Take 800 mg by mouth three (3) times daily (with meals). Historical Provider   gentamicin (GARAMYCIN) 0.1 % topical cream APPLY TO PD catheter exit site at daily dressing change 5/12/15   J Luis Mahoney MD   darbepoetin toya in polysorbat (ARANESP, POLYSORBATE,) 40 mcg/mL injection 40 mcg by SubCUTAneous route every fourteen (14) days. Indications: ANEMIA IN CHRONIC KIDNEY DISEASE    Historical Provider   ranolazine ER (RANEXA) 500 mg SR tablet Take 500 mg by mouth two (2) times a day.     Historical Provider     Allergies   Allergen Reactions    Codeine Nausea and Vomiting      Social History   Substance Use Topics    Smoking status: Never Smoker    Smokeless tobacco: Never Used    Alcohol use No      Family History   Problem Relation Age of Onset    Heart Disease Mother     Hypertension Mother     Cancer Mother      Lung    Stroke Father     Hypertension Father     Breast Cancer Neg Hx           Review of Systems    Constitutional: no fever, weak  Eyes: fair vision,    Ears, nose, mouth, throat, and face:fair hearing,   Respiratory: no asthma,  Chronic home O2 is being used - improved dyspnea  Cardiovascular:no palpitation, no  chest pain,   Gastrointestinal:no diarrhea,  Had BM today  Genitourinary: no dysuria,   Hematologic/lymphatic: no bleeding tendency,   Neurological: no seizures   Behvioral/Psych: no psych hospitalization   Endocrine: no goiter,       Objective:       Visit Vitals    /74 (BP 1 Location: Left arm, BP Patient Position: At rest)    Pulse 79    Temp 98.1 °F (36.7 °C)    Resp 18    Wt 95.9 kg (211 lb 6.4 oz)    SpO2 96%    BMI 30.33 kg/m2       General:  Alert, cooperative, no distress, appears stated age. Head:  Normocephalic, without obvious abnormality, atraumatic. Eyes:  Conjunctivae/corneas clear. EOMs intact. Throat: Lips, mucosa, and tongue normal. Teeth and gums normal.   Neck: Supple, symmetrical, trachea midline, no adenopathy,  no JVD. Lungs:   Clear to auscultation bilaterally. Heart:  Regular rate and rhythm, S1, S2 normal, 2/6 systolic  murmur, no rub or gallop. Abdomen:   Soft, non-tender. Distended . No masses,  No organomegaly. PD catheter in place without exudate   Extremities: Extremities normal, atraumatic, no cyanosis. 3+edema. Skin: Skin color, texture, turgor normal. No rashes or lesions. Lymph nodes: Cervical and supraclavicular nodes normal.   Neurologic: Grossly intact. No asterixis. Data Review:       No results found for this or any previous visit (from the past 24 hour(s)). CXR viewed by me - no major infiltrate or fluid excess, CM with left possible minor effusion is present        CT abdomen/pelvis  CT ABDOMEN:  Peritoneal fluid in the perihepatic space, mild paracolic gutters,  extending down into the pelvis. Peritoneal dialysis catheter noted. There is not  any evidence of retroperitoneal hematoma identified.  Strandy fluid density does  extend into the extraperitoneal space in the lower abdomen and pelvis. There is  radiodensity within the renal collecting systems, possibly previously  administered IV contrast. There is peripancreatic fluid and stranding, cannot  exclude acute pancreatitis. No biliary dilatation. Spleen is not enlarged. Small  bowel normal caliber.   CT PELVIS:   Moderate to large volume pelvic fluid.   There is diffuse asymmetric enlargement of the right rectus and oblique  abdominal muscles. There are are of higher attenuation the contralateral side  and findings are consistent with an abdominal wall hematoma. IMPRESSION:    1. Evidence of right abdominal wall hematoma. 2. No CT evidence to suggest retroperitoneal hematoma. 3. Extensive fluid in the abdomen and pelvis, presumed related to peritoneal  Dialysis. KUB - PD catheter still unchanged compared to last week - remains in the sidney-umbilical area      Active Problems:    Weakness of both legs (11/29/2017)      Renal failure (ARF), acute on chronic (HCC) (11/29/2017)        Assessment:     1. CAD x NSTEMI, Unstable angina -  - [facility] with complex CAD and acute thrombotic lesion in pLAD and subtotal occlusion in mLAD. The RCA has severe proximal and mid RCA disease. She has additional stenting of LAD with Resolute in mid/distal and proximal vessel. These all touched the previously stented mid vessel. Recommend life-long DAPT    2, ESRD -  - on temporary HD via perm cath due to PD catheter dysfunction  - TTS schedule in rehab     3. Fluid excess -  - recurrent due to poor PD drainage  - improving with fluid removal on HD    4. Anemia -  - intensive anemia therapy in [place]  - on WAYNE and IV iron and B12  - improved S/P BT    5. History of GI bleed -  - monitor clinically and serial Hb  - she had a drop in Hb to 7 range and improved with BT    6. Hypokalemia   - resolved     7. Abdominal pain and tenderness with drop in Hb , on DAPT   No evidence of retroperitoneal hematoma     8.  PD catheter dysfunction -  Tentative plan for PD catheter revision / replacement in OR on Friday      Plan:     As above - 25

## 2017-12-13 NOTE — PROGRESS NOTES
12/13/17 1316   Time Spent With Patient   Time In 1300   Time Out 1327   Patient Seen For: PM ;Neuro-linguistics   Mental Status   Neurologic State Alert   Orientation Level Oriented X4   Cognition Appropriate decision making   Perception Appears intact   Perseveration No perseveration noted   Safety/Judgement Fall prevention   Pt completed problem solving and word finding tasks with 90% accuracy   Fco Dexter MA/HARSHA/SLP

## 2017-12-13 NOTE — PROGRESS NOTES
Problem: Falls - Risk of  Goal: *Absence of Falls  Document Harrison Fall Risk and appropriate interventions in the flowsheet.    Outcome: Progressing Towards Goal  Fall Risk Interventions:  Mobility Interventions: Patient to call before getting OOB, PT Consult for assist device competence, Strengthening exercises (ROM-active/passive), Utilize walker, cane, or other assitive device    Mentation Interventions: Bed/chair exit alarm, Door open when patient unattended, Evaluate medications/consider consulting pharmacy, Increase mobility, More frequent rounding, Toileting rounds    Medication Interventions: Bed/chair exit alarm, Evaluate medications/consider consulting pharmacy, Patient to call before getting OOB, Teach patient to arise slowly    Elimination Interventions: Call light in reach, Patient to call for help with toileting needs, Toileting schedule/hourly rounds    History of Falls Interventions: Consult care management for discharge planning, Evaluate medications/consider consulting pharmacy

## 2017-12-13 NOTE — PROGRESS NOTES
Pt asking to discuss reason for swallowing study scheduled today. No other c/o's voiced 0520. Pt vomited 0730 pills given to her this morning. Phernergan 25 mg given po for c/o nausea and vomiting.  Pericolace given early per pt request

## 2017-12-13 NOTE — PROGRESS NOTES
End Of Shift Functional Summary, Nursing      TOILETING:  Does patient need assist with clothing management and/or pericare? Yes: Comment: 1 assist for clothing management and pericare    TOILET TRANSFER:  Pt requires standby assistance/setup. Pt uses wheelchair. BLADDER:  Pt does not have a castillo catheter that staff manages. Pt does not take medication. Pt is incontinent/oliguric and is on dialysis. Pt requires staff to run dialysis. Pt has had 0 bladder accidents during this shift .  (An accident is when the episode is not contained in a brief AND/OR the clothing/linen requires changing/cleaning up.)    BOWEL:  Pt does take medication. Pt is continent of bowel and uses toilet. Pt has had 0 bowel accidents during this shift. (An accident is when the episode is not contained in a brief AND/OR the clothing/linen requires changing/cleaning up.)    BED/CHAIR TRANSFER  Pt requires minimal assistance. Patient requires the assistance of 1 staff member(s). Pt uses walker    EATING  Pt requires setup. Pt does not wear dentures. TUBE FEEDINGS:  Pt does not  receive nutrition through tube feedings. Patient requires supervision/setup with feedings. Documentation reviewed and plan of care discussed/reviewed with   oncoming nurse and patient assistant during the shift.

## 2017-12-13 NOTE — PROGRESS NOTES
OT Daily Note    Time In 0920   Time Out 1002     Subjective: Agreeable to therapy  Pain: None indicated    Precautions: Other (comment) (fall risk)    Activity Tolerance/Cognition   Patient completed visuospatial reasoning task seated/standing at tabletop for activity tolerance and RUE strengthening, as well as cognitive retraining. Patient completed Pattern Play 3-D puzzle according to visual pattern, requiring entire session to complete half of puzzle accurately. Patient completed standing trials to participate in task ~5 minutes each before requiring seated rest breaks, note knees buckling with fatigue, defer to completing task at seated level. Patient reached using RUE to gather pieces from container on tabletop, required moderate cues and increased time to problem solve puzzle. Assessment: Patient tolerated therapy. Decreased visuospatial problem solving noted. Education: Purpose of therapy  Interdisciplinary Communication: Collaborated with Vladimir Segura and agreed patient is progressing well and on-track to meet goals as stated in POC. Plan: Continue to address ADL/IADL, functional mobility, activity tolerance, balance, strengthening, coordination, education, cognition.       Yu Amato, OTR/L

## 2017-12-13 NOTE — PROGRESS NOTES
12/13/17 1015   Time Spent With Patient   Time In 0803   Time Out 0844   Patient Seen For: AM;ADLs   Grooming   Grooming Assistance  Mod I   Comments Wheelchair at sink   Upper Body Bathing   Bathing Assistance, Upper Mod I   Position Performed Seated in chair   Adaptive Equipment Other (comment)  (Wheelchair at sink)   Lower Body [address], Lower  SBA   Position Performed Seated in chair;Standing   Adaptive Equipment Other (comment); Long handled sponge  (Wheelchair at sink)   Upper Body [address]   Lower Body Dressing    Dressing Assistance  Min A   Leg Crossed Method Used No   Position Performed Seated in chair;Standing   Adaptive Equipment Used Sock aid;Reacher   Comments Cues to use reacher, patient with decresaed problem solving becoming frustrated and requesting assist to help brief over L foot       S: \"I think this got wet, my doctor told me not to get it wet. You need to tell my nurse to change it. \" [patient continues to be anxious, gauze surrounding femoral catheter hose becoming damp with sponge bathing] Agreeable to therapy. Focus of session was on ADL and functional mobility. Patient was able to ambulate ~10 feet using a RW with close SBA. Pain not indicated. Collaborated with PT, Marco A Mcallister and confirmed patient is on track to reach goals as documented in the care plan. Patient tolerated session well, but cognition, balance, functional mobility, activity tolerance, strength, coordination are still below baseline and require skilled facilitation to successfully and safely complete ADL's and transfers. Patient ended session in wheelchair with RN, Pricila Cronin present.      Juan Galvan, OTR/L

## 2017-12-13 NOTE — PROGRESS NOTES
Pt resting quietly in bed. Denies needs or concerns at this time. Alert and oriented x3. Lungs sounds clear bilaterally with respirations even and unlabored. Bowel sounds active in all quadrants. Peritoneal dialysis in place but not being used at this time. Left groin dialysis catheter in place. Pt stated dressing was changed yesterday but no date on dressing. This nurse dated dressing.  +2 pulses bilaterally in upper and lower extremities. Instructed to call for assistance. Call light in reach. Voiced understanding and identified call light.

## 2017-12-13 NOTE — PROGRESS NOTES
Subjective \"I have had a rough morning but I want to work with you. \"   Activity Tennis/balloon hit with her RUE   Strength/Endurance Patient tolerated holding the racket without difficulty. She was motivated to use her RUE and needed rest breaks throughout the session due RUE fatigue and overall endurance. Balance Sit<->stand:  CGA with RW   Social Interaction Patient was friendly and conversational during the session. Cognitive A&O X4   Comments Patient was handed off to CHRISTOPHER Luna at the end of the session.        Autumn Valladares, AYANS

## 2017-12-13 NOTE — PROGRESS NOTES
PHYSICAL THERAPY DAILY NOTE  Time In: 1004  Time Out: 1107  Patient Seen For: AM;Balance activities;Gait training; Therapeutic exercise;Transfer training    Subjective: \"I want to be able to do for myself. \"         Objective: Other (comment) (Fall Risk)    TRANSFERS Daily Assessment   VCs for hand placement as well as to increase anterior lean Transfer Type: SPT with walker  Transfer Assistance : 5 (Supervision/setup)  Sit to Stand Assistance: Supervision  Car Transfers: Not tested       GAIT Daily Assessment    Amount of Assistance: 5 (Supervision/setup)  Distance (ft): 60 Feet (ft) (x2, 25'x1)  Assistive Device: Walker, rollator   Flexed posture, decreased fran, heavy reliance on RW, decreased heel strike/toe off      BALANCE Daily Assessment    Sitting - Static: Good (unsupported)  Sitting - Dynamic: Good (unsupported)  Standing - Static: Good  Standing - Dynamic : Impaired       LOWER EXTREMITY EXERCISES Daily Assessment   Performed w/ B UE support, seated rest breaks in between exercises Extremity: Both  Exercise Type #1: Standing lower extremity strengthening  Sets Performed: 1  Reps Performed:  (7)  Level of Assist: Contact guard assistance     STANDING EXERCISES Sets Reps Comments   Heel Raises 1 10    Toe Raises 1 10    Hip Flexion/Marching 1 7    Hamstring Curls 1 7    Hip Abduction 1 7    Hip Extension 1 7    Mini Squats 1 7      Vital Signs: /88 (BP 1 Location: Right arm, BP Patient Position: Sitting)  Pulse 86  Temp 97.7 °F (36.5 °C)  Resp 18  Wt 95.6 kg (210 lb 12.8 oz)  SpO2 98%  BMI 30.25 kg/m2    Pain level: no c/o pain    Patient education: pt. Educated on proper transfer techniques    Interdisciplinary Communication: discussed pt's progress w/ pt's home health hospice RN who arrived for a visit during PT session    Pt. Left up in recliner in NAD, call bell in reach.            Assessment: Pt. Making progress this visit as able to increase reps of standing exercises & progress overall gait distance. Pt. Cont. To fatigue easily, requiring frequent seated rest breaks, and would currently have difficulty managing household tasks. Therefore, she cont. To benefit from PT services to address. Plan of Care: Continue with POC and progress as tolerated.      Ofelia Jordan, PT  12/13/2017

## 2017-12-13 NOTE — PROGRESS NOTES
Hourly rounding completed this shift. Pt has voiced no complaints.     TOILETING:  Does patient need assist with clothing management and/or pericare? Yes      TOILET TRANSFER:  Pt requires minimal assistance.  Pt uses wheelchair and walker.      BLADDER:  Pt does not have a castillo catheter that staff manages.  Pt does not take medication.  Pt is continent. of bladder and voids in toilet occasionally( Pt  dialyzes Pt requires staff to position device Pt has had 0 bladder accidents during this shift requiring minimal assistance to clean up.  (An accident is when the episode is not contained in a brief AND/OR the clothing/linen requires changing/cleaning up.)      BOWEL:  Pt does take medication.  Pt is continent of bowel and uses toilet.  Pt requires staff to position device    Pt has had 0 bowel accidents during this shift requiring minimal assistance from staff to clean up.  (An accident is when the episode is not contained in a brief AND/OR the clothing/linen requires changing/cleaning up.)

## 2017-12-13 NOTE — PROGRESS NOTES
OT Daily Note    Time In 1115   Time Out 1205     Subjective: \"That was helpful to see and think about. \" [TTB transfer]  Pain: None indicated    Precautions: Other (comment) (fall risk)    Home Management   Patient educated regarding environmental organization, safe item transport, and functional mobility for kitchen management ambulating with RW at discharge. Patient verbalized understanding, to complete simulation task during future session as kitchen occupied at this time. Patient provided printed information regarding walker caddies and ideas for obtaining. Self-Care   Patient educated regarding DME setup, reimbursement issues, ideas for obtaining, and safe transfer technique for TTB. Patient verbalized understanding, transferred <> TTB this session with HHA and good safety awareness/recall. Assessment:  Patient tolerated well. Good safety awareness and problem solving thinking through IADL and functional transfers with therapist this session. To reinforce in future session. Education: Kitchen Management, TTB/transfer  Interdisciplinary Communication: Collaborated with Adra Flow and agreed patient is progressing well and on-track to meet goals as stated in POC. Plan: Continue to address ADL/IADL, functional mobility, activity tolerance, balance, strengthening, coordination, education, cognition.       Yoana Villanueva, OTR/L

## 2017-12-14 LAB
BASOPHILS # BLD: 0 K/UL (ref 0–0.2)
BASOPHILS NFR BLD: 0 % (ref 0–2)
DIFFERENTIAL METHOD BLD: ABNORMAL
EOSINOPHIL # BLD: 0.2 K/UL (ref 0–0.8)
EOSINOPHIL NFR BLD: 3 % (ref 0.5–7.8)
ERYTHROCYTE [DISTWIDTH] IN BLOOD BY AUTOMATED COUNT: 19.3 % (ref 11.9–14.6)
HCT VFR BLD AUTO: 29.8 % (ref 35.8–46.3)
HGB BLD-MCNC: 8.9 G/DL (ref 11.7–15.4)
IMM GRANULOCYTES # BLD: 0 K/UL (ref 0–0.5)
IMM GRANULOCYTES NFR BLD AUTO: 0 % (ref 0–5)
LYMPHOCYTES # BLD: 1.2 K/UL (ref 0.5–4.6)
LYMPHOCYTES NFR BLD: 20 % (ref 13–44)
MCH RBC QN AUTO: 29.5 PG (ref 26.1–32.9)
MCHC RBC AUTO-ENTMCNC: 29.9 G/DL (ref 31.4–35)
MCV RBC AUTO: 98.7 FL (ref 79.6–97.8)
MONOCYTES # BLD: 0.7 K/UL (ref 0.1–1.3)
MONOCYTES NFR BLD: 11 % (ref 4–12)
NEUTS SEG # BLD: 4 K/UL (ref 1.7–8.2)
NEUTS SEG NFR BLD: 66 % (ref 43–78)
PLATELET # BLD AUTO: 372 K/UL (ref 150–450)
PMV BLD AUTO: 9.3 FL (ref 10.8–14.1)
RBC # BLD AUTO: 3.02 M/UL (ref 4.05–5.25)
WBC # BLD AUTO: 6.2 K/UL (ref 4.3–11.1)

## 2017-12-14 PROCEDURE — 97530 THERAPEUTIC ACTIVITIES: CPT

## 2017-12-14 PROCEDURE — 97110 THERAPEUTIC EXERCISES: CPT

## 2017-12-14 PROCEDURE — 36591 DRAW BLOOD OFF VENOUS DEVICE: CPT

## 2017-12-14 PROCEDURE — 74011250637 HC RX REV CODE- 250/637: Performed by: PHYSICAL MEDICINE & REHABILITATION

## 2017-12-14 PROCEDURE — 85025 COMPLETE CBC W/AUTO DIFF WBC: CPT | Performed by: INTERNAL MEDICINE

## 2017-12-14 PROCEDURE — 90935 HEMODIALYSIS ONE EVALUATION: CPT

## 2017-12-14 PROCEDURE — 65310000000 HC RM PRIVATE REHAB

## 2017-12-14 PROCEDURE — 5A1D70Z PERFORMANCE OF URINARY FILTRATION, INTERMITTENT, LESS THAN 6 HOURS PER DAY: ICD-10-PCS | Performed by: INTERNAL MEDICINE

## 2017-12-14 RX ADMIN — ROSUVASTATIN CALCIUM 20 MG: 20 TABLET, FILM COATED ORAL at 21:16

## 2017-12-14 RX ADMIN — METOCLOPRAMIDE HYDROCHLORIDE 5 MG: 10 TABLET ORAL at 16:22

## 2017-12-14 RX ADMIN — AMLODIPINE BESYLATE 5 MG: 5 TABLET ORAL at 14:38

## 2017-12-14 RX ADMIN — Medication 400 MG: at 14:38

## 2017-12-14 RX ADMIN — SUCRALFATE 1 G: 1 SUSPENSION ORAL at 05:29

## 2017-12-14 RX ADMIN — TICAGRELOR 90 MG: 90 TABLET ORAL at 21:16

## 2017-12-14 RX ADMIN — PANTOPRAZOLE SODIUM 40 MG: 40 TABLET, DELAYED RELEASE ORAL at 16:22

## 2017-12-14 RX ADMIN — LEVOTHYROXINE SODIUM 150 MCG: 50 TABLET ORAL at 05:28

## 2017-12-14 RX ADMIN — CALCITRIOL 0.25 MCG: 0.25 CAPSULE, LIQUID FILLED ORAL at 14:38

## 2017-12-14 RX ADMIN — ASPIRIN 81 MG: 81 TABLET, COATED ORAL at 14:38

## 2017-12-14 RX ADMIN — PANTOPRAZOLE SODIUM 40 MG: 40 TABLET, DELAYED RELEASE ORAL at 05:28

## 2017-12-14 RX ADMIN — RANOLAZINE 1000 MG: 500 TABLET, FILM COATED, EXTENDED RELEASE ORAL at 16:21

## 2017-12-14 RX ADMIN — SUCRALFATE 1 G: 1 SUSPENSION ORAL at 16:21

## 2017-12-14 RX ADMIN — TORSEMIDE 100 MG: 100 TABLET ORAL at 16:22

## 2017-12-14 NOTE — DIALYSIS
HD treatment completed without complication. Total of 3 kgs removed. VS stable, bp 140/82 at end of tx. CVC dressing clean, dry, and intact, tego caps intact, bilateral lumen flushed with 9cc NS and curos applied. Transport contacted for return to room. No complaints at this time.

## 2017-12-14 NOTE — PROGRESS NOTES
MD Claire,   Medical Director  [address] [facility], [address] [facility]  Tel: [phone]       SFD PROGRESS NOTE    Dipesh Mancilla  Admit Date: 11/29/2017  Admit Diagnosis: DEBILITY; Renal failure (ARF), acute on chronic ([facility] Sen Needle*  Chief Complaint : Gait dysfunction secondary to below. Admit Diagnosis: Unstable angina ([facility]  CAD (coronary artery disease) (11/27/2015)  S/P complex PCI and stable on ASA and Brilinta  Unstable angina ([facility] (2/1/2016)  Acute/ Chronic anemia (11/18/2017)  ESRD (end stage renal disease) On PD/ CRRT  Pain  DVT risk  Acute Rehab Dx:  Debility    Weakness  Gait impairment  deconditioning  Mobility and ambulation deficits  Self Care/ADL deficits     Subjective     Patient seen and examined. Vss, afebrile. Patient feels good. Continent of B/B. PT, OT and ST progressing well. patiewnt improving on distance able to walk, improving endurance, activity tolerance. Edema BLE improving. Nephrology following, appreciated. Continuing HD.     Objective:     Current Facility-Administered Medications   Medication Dose Route Frequency    rosuvastatin (CRESTOR) tablet 20 mg  20 mg Oral QHS    sucralfate (CARAFATE) 100 mg/mL oral suspension 1 g  1 g Oral AC&HS    polyethylene glycol (MIRALAX) packet 17 g  17 g Oral DAILY    acetaminophen (TYLENOL) tablet 650 mg  650 mg Oral Q4H PRN    HYDROcodone-acetaminophen (NORCO) 7.5-325 mg per tablet 1 Tab  1 Tab Oral Q4H PRN    HYDROcodone-acetaminophen (NORCO) 5-325 mg per tablet 1 Tab  1 Tab Oral Q4H PRN    LORazepam (ATIVAN) tablet 1 mg  1 mg Oral Q6H PRN    sodium chloride (NS) flush 5-10 mL  5-10 mL IntraVENous PRN    amLODIPine (NORVASC) tablet 5 mg  5 mg Oral DAILY    aspirin delayed-release tablet 81 mg  81 mg Oral DAILY    calcitRIOL (ROCALTROL) capsule 0.25 mcg  0.25 mcg Oral DAILY    cyanocobalamin (VITAMIN B12) injection 1,000 mcg  1,000 mcg IntraMUSCular Q7D    epoetin toya (EPOGEN;PROCRIT) injection 20,000 Units  20,000 Units SubCUTAneous Q MON, WED & FRI    gentamicin (GARAMYCIN) 0.1 % cream   Topical DAILY PRN    levothyroxine (SYNTHROID) tablet 150 mcg  150 mcg Oral ACB    linaclotide (LINZESS) capsule 145 mcg (Patient Supplied)  145 mcg Oral DAILY    magnesium oxide (MAG-OX) tablet 400 mg  400 mg Oral DAILY    metoclopramide HCl (REGLAN) tablet 5 mg  5 mg Oral BID    metoprolol tartrate (LOPRESSOR) tablet 12.5 mg  12.5 mg Oral DAILY    multivitamin w ZN (STRESSTABS W ZINC) tablet  1 Tab Oral DAILY    nitroglycerin (NITROSTAT) tablet 0.4 mg  0.4 mg SubLINGual PRN    ondansetron (ZOFRAN ODT) tablet 4 mg  4 mg Oral TID PRN    pantoprazole (PROTONIX) tablet 40 mg  40 mg Oral ACB&D    polyethylene glycol (MIRALAX) packet 17 g  17 g Oral DAILY PRN    promethazine (PHENERGAN) tablet 25 mg  25 mg Oral Q6H PRN    ranolazine ER (RANEXA) tablet 1,000 mg  1,000 mg Oral BID    senna-docusate (PERICOLACE) 8.6-50 mg per tablet 1 Tab  1 Tab Oral DAILY    sevelamer (RENAGEL) tablet 800 mg  800 mg Oral TID WITH MEALS    ticagrelor (BRILINTA) tablet 90 mg  90 mg Oral BID    torsemide (DEMADEX) tablet 100 mg  100 mg Oral BID     Review of Systems:   Denies chest pain, shortness of breath, cough, headache, visual problems, abdominal pain, dysurea, calf pain. Pertinent positives are as noted in the medical records and unremarkable otherwise. Visit Vitals    /73 (BP 1 Location: Left arm, BP Patient Position: At rest)    Pulse 72    Temp 98 °F (36.7 °C)    Resp 18    Wt 211 lb 6.4 oz (95.9 kg)    SpO2 99%    BMI 30.33 kg/m2   Physical Exam:   General: Alert and age appropriately oriented. No acute cardio respiratory distress. HEENT: Normocephalic,no scleral icterus  Oral mucosa moist without cyanosis   Lungs: Clear to auscultation  bilaterally.   Respiration even and unlabored   Heart: Regular rate and rhythm, S1, S2   No  murmurs, clicks, rub or gallops   Abdomen: Soft, non-tender, nondistended. Bowel sounds present. No organomegaly. Genitourinary: defered   Neuromuscular:      NANCI, EOMI  + mild generalized weakness 4+ to 5-/5. BUE, BLE, more proximal.   Sensation grossly intact to soft touch. Skin/extremity: No rashes, no erythema. 1+edema. Wound covered.   Rommel Pollock                                                                             Functional Assessment:  Gross Assessment  AROM: Generally decreased, functional (12/07/17 1400)  Strength: Generally decreased, functional (12/07/17 1400)  Coordination: Generally decreased, functional (12/07/17 1400)       Balance  Sitting - Static: Good (unsupported) (12/13/17 1400)  Sitting - Dynamic: Good (unsupported) (12/13/17 1400)  Standing - Static: Good (12/13/17 1400)  Standing - Dynamic : Impaired (12/13/17 1400)           Toileting  Adaptive Equipment: Grab bars (12/11/17 1008)         Francesca Hernandez Fall Risk Assessment:  Francesca Hernandezm Fall Risk  Mobility: Ambulates or transfers with assist devices or assistance/unsteady gait (12/13/17 1531)  Mobility Interventions: Patient to call before getting OOB;PT Consult for assist device competence;Strengthening exercises (ROM-active/passive); Utilize walker, cane, or other assitive device (12/13/17 1531)  Mentation: Alert, oriented x 3 (12/13/17 1531)  Mentation Interventions: Door open when patient unattended;Evaluate medications/consider consulting pharmacy; Increase mobility;More frequent rounding; Toileting rounds (12/13/17 1531)  Medication: Patient receiving anticonvulsants, sedatives(tranquilizers), psychotropics or hypnotics, hypoglycemics, narcotics, sleep aids, antihypertensives, laxatives, or diuretics (12/13/17 1531)  Medication Interventions: Evaluate medications/consider consulting pharmacy; Bed/chair exit alarm; Patient to call before getting OOB; Teach patient to arise slowly (12/13/17 1531)  Elimination: Needs assistance with toileting (12/13/17 1531)  Elimination Interventions: Call light in reach; Patient to call for help with toileting needs; Toileting schedule/hourly rounds (12/13/17 1531)  Prior Fall History: No (12/13/17 1531)  History of Falls Interventions: Consult care management for discharge planning;Evaluate medications/consider consulting pharmacy (12/13/17 1531)  Total Score: 3 (12/13/17 1531)  Standard Fall Precautions: Yes (12/12/17 0715)  High Fall Risk: Yes (12/13/17 1531)     Speech Assessment:         Ambulation:  Gait  Base of Support: Widened (12/07/17 1400)  Speed/Michelle: Slow;Shuffled (12/07/17 1400)  Step Length: Right shortened;Left shortened (12/07/17 1400)  Stance: Right increased; Left increased (12/07/17 1400)  Gait Abnormalities: Trunk sway increased; Step to gait; Decreased step clearance (12/07/17 1400)  Distance (ft): 60 Feet (ft) (x2, 25'x1) (12/13/17 1400)  Assistive Device: Walker, rollator (12/13/17 1400)  Curbs/Ramps: Minimum assistance (12/07/17 1400)     Labs/Studies:  Recent Results (from the past 72 hour(s))   CBC WITH AUTOMATED DIFF    Collection Time: 12/11/17  6:13 AM   Result Value Ref Range    WBC 5.8 4.3 - 11.1 K/uL    RBC 2.85 (L) 4.05 - 5.25 M/uL    HGB 8.4 (L) 11.7 - 15.4 g/dL    HCT 28.0 (L) 35.8 - 46.3 %    MCV 98.2 (H) 79.6 - 97.8 FL    MCH 29.5 26.1 - 32.9 PG    MCHC 30.0 (L) 31.4 - 35.0 g/dL    RDW 19.2 (H) 11.9 - 14.6 %    PLATELET 378 136 - 550 K/uL    MPV 8.8 (L) 10.8 - 14.1 FL    DF AUTOMATED      NEUTROPHILS 65 43 - 78 %    LYMPHOCYTES 21 13 - 44 %    MONOCYTES 11 4.0 - 12.0 %    EOSINOPHILS 3 0.5 - 7.8 %    BASOPHILS 0 0.0 - 2.0 %    IMMATURE GRANULOCYTES 0 0.0 - 5.0 %    ABS. NEUTROPHILS 3.7 1.7 - 8.2 K/UL    ABS. LYMPHOCYTES 1.2 0.5 - 4.6 K/UL    ABS. MONOCYTES 0.7 0.1 - 1.3 K/UL    ABS. EOSINOPHILS 0.2 0.0 - 0.8 K/UL    ABS. BASOPHILS 0.0 0.0 - 0.2 K/UL    ABS. IMM.  GRANS. 0.0 0.0 - 0.5 K/UL       Assessment:     Problem List as of 12/13/2017  Date Reviewed: 3/2/2017          Codes Class Noted - Resolved    Weakness of both legs ICD-10-CM: G95.081  ICD-9-CM: 729.89  11/29/2017 - Present        Renal failure (ARF), acute on chronic (HCC) ICD-10-CM: N17.9, N18.9  ICD-9-CM: 584.9, 585.9  11/29/2017 - Present        Elevated troponin ICD-10-CM: R74.8  ICD-9-CM: 790.6  11/18/2017 - Present        Chest pain ICD-10-CM: R07.9  ICD-9-CM: 786.50  11/18/2017 - Present        CKD (chronic kidney disease) ICD-10-CM: N18.9  ICD-9-CM: 585.9  11/18/2017 - Present        Chronic anemia ICD-10-CM: D64.9  ICD-9-CM: 285.9  11/18/2017 - Present        ESRD (end stage renal disease) ([facility] ICD-10-CM: N18.6  ICD-9-CM: 585.6  11/18/2017 - Present        Abdominal pain ICD-10-CM: R10.9  ICD-9-CM: 789.00  9/18/2017 - Present        H/O TIA (transient ischemic attack) and stroke ICD-10-CM: Z86.73  ICD-9-CM: V12.54  4/13/2017 - Present        S/P PTCA (percutaneous transluminal coronary angioplasty) ICD-10-CM: Z98.61  ICD-9-CM: V45.82  4/13/2017 - Present        Mixed hyperlipidemia ICD-10-CM: E78.2  ICD-9-CM: 272.2  4/13/2017 - Present        Vision changes ICD-10-CM: H53.9  ICD-9-CM: 368.9  3/26/2017 - Present        Dizziness ICD-10-CM: R42  ICD-9-CM: 780.4  3/26/2017 - Present        Refusal of blood transfusions as patient is [place] (Chronic) ICD-10-CM: Z53.1  ICD-9-CM: V62.6  8/25/2016 - Present        GERD (gastroesophageal reflux disease) ICD-10-CM: K21.9  ICD-9-CM: 530.81  8/8/2016 - Present        Unspecified sleep apnea ICD-10-CM: G47.30  ICD-9-CM: 780.57  8/8/2016 - Present    Overview Signed 8/8/2016 11:09 AM by Ashley Siegel     uses cpap machine             CVA (cerebral vascular accident) Hx of TIA ICD-10-CM: I63.9  ICD-9-CM: 434.91  2/22/2016 - Present        Unstable angina (HCC) ICD-10-CM: I20.0  ICD-9-CM: 411.1  2/1/2016 - Present        ESRD on peritoneal dialysis ([facility] [address] (Chronic) ICD-10-CM: N18.6, Z99.2  ICD-9-CM: 585.6, V45.11  2/1/2016 - Present        Ischemic cardiomyopathy ICD-10-CM: I25.5  ICD-9-CM: 414.8  12/29/2015 - Present        HLD (hyperlipidemia) ICD-10-CM: E78.5  ICD-9-CM: 272.4  12/29/2015 - Present        Depression ICD-10-CM: F32.9  ICD-9-CM: 061  12/29/2015 - Present        CAD (coronary artery disease) ICD-10-CM: I25.10  ICD-9-CM: 414.00  11/27/2015 - Present        Debility ICD-10-CM: R53.81  ICD-9-CM: 799.3  5/5/2015 - Present        Hypertension (Chronic) ICD-10-CM: I10  ICD-9-CM: 401.9  4/28/2015 - Present        Anemia of chronic renal failure (Chronic) ICD-10-CM: N18.9, D63.1  ICD-9-CM: 285.21, 585.9  4/28/2015 - Present        Hypothyroidism (Chronic) ICD-10-CM: E03.9  ICD-9-CM: 244.9  4/28/2015 - Present        RESOLVED: Postural hypotension ICD-10-CM: I95.1  ICD-9-CM: 458.0  8/9/2016 - 3/26/2017        RESOLVED: Chest pain ICD-10-CM: R07.9  ICD-9-CM: 786.50  8/8/2016 - 3/26/2017        RESOLVED: Nausea ICD-10-CM: R11.0  ICD-9-CM: 787.02  8/8/2016 - 3/26/2017        RESOLVED: S/P PTCA (percutaneous transluminal coronary angioplasty); LAD PTCA of  ISR 11/27/15 ICD-10-CM: Z98.61  ICD-9-CM: V45.82  5/24/2016 - 3/26/2017        RESOLVED: TIA (transient ischemic attack) ICD-10-CM: G45.9  ICD-9-CM: 435.9  2/10/2016 - 3/26/2017        RESOLVED: Edema ICD-10-CM: R60.9  ICD-9-CM: 782.3  12/29/2015 - 3/26/2017        RESOLVED: Anterior myocardial infarction ([facility] ICD-10-CM: I21.09  ICD-9-CM: 410.10  5/21/2015 - 3/26/2017        RESOLVED: STEMI (ST elevation myocardial infarction) ([facility] ICD-10-CM: I21.3  ICD-9-CM: 410.90  4/28/2015 - 2/1/2016              Plan:      CAD (coronary artery disease) (11/27/2015)/ S/P complex PCI / Unstable angina/ hypertension  - on ASA and Brilinta  - continue ranexa, statin  - demadex continued. Patient almost aneuric. Will discuss with nephrology need for demadex?  - 12/6 stable cardiac exam.  - 12/8 - continue norvasc/ metoprolol.  BP in good range.     Acute/ Chronic anemia (11/18/2017) - continue to monitor.   - continue epogen.     ESRD (end stage renal disease) On PD/ CRRT  - PD resumed. - monitor fluids status. Nephrology managing. Will follow at IRU.   - 11/30 problem with PD/ catheter. Nephrologist aware. Will need to hold PT due to femoral cath precautions. nephrologist to follow. May need to have tunneled cath. - 12/4 - continue TTS HD. PD per nephrology direction. - 12/6 Plan possibly to insert another PD tube. S/p Lt femoral perm catheter working well.  - 12/8 - continue HD per nephrology. PD on hold. LE edema slightly improved. .  - 12/13- continuing HD per nephrology direction. Malfunctioning PD catheter. Plan for PD catheter  replacement in OR on Friday. Pneumonia prophylaxis- Insentive spirometer every hour while awake     DVT risk / DVT Prophylaxis- continue daily physician exam to assess for signs and symptoms as patient is at increased risk for of thromboembolism. Mobilization as tolerated. Intermittent pneumatic compression devices when in bed Thigh-high or knee-high thromboembolic deterrent hose when out of bed.   - on brillanta bId+ aspirin daily. continue SCDs. Pain Control: stable, mild-to-moderate joint symptoms intermittently, reasonably well controlled by PRN meds. Will require regular pain assessment and comprenhensive pain management. - has not required norco. Will d/c.   12/13 - no new pain source.      Wound Care: Monitor wound status daily per staff and physician. At risk for failure. Will require 24/7 rehab nursing. Keep wound clean and dry - monitoring femoral cath site, appears benign.      bowel program - as needed     GERD - resume PPI. At times may need additional antacids, Maalox prn.  - continue sucralfate     Hypothyroid  - synthroid.     Time spent was 25 minutes with over 1/2 in direct patient care/examination, consultation and coordination of care.      Signed By: Reshma Gimenez MD     December 13, 2017

## 2017-12-14 NOTE — PROGRESS NOTES
End Of Shift Functional Summary, Nursing        TOILETING:  Does patient need assist with clothing management and/or pericare? Yes: Comment: 1 assist for clothing management and pericare     TOILET TRANSFER:  Pt requires standby assistance/setup. Pt uses wheelchair.     BLADDER:  Pt does not have a castillo catheter that staff manages. Pt does not take medication. Pt is incontinent/oliguric and is on dialysis. Pt requires staff to run dialysis. Pt has had 0 bladder accidents during this shift .  (An accident is when the episode is not contained in a brief AND/OR the clothing/linen requires changing/cleaning up.)     BOWEL:  Pt does take medication. Pt is continent of bowel and uses toilet. Pt has had 0 bowel accidents during this shift. (An accident is when the episode is not contained in a brief AND/OR the clothing/linen requires changing/cleaning up.)     BED/CHAIR TRANSFER  Pt requires minimal assistance. Patient requires the assistance of 1 staff member(s). Pt uses walker     EATING  Pt requires setup. TUBE FEEDINGS:  Pt does not  receive nutrition through tube feedings. Patient requires supervision/setup with feedings.     Documentation reviewed and plan of care discussed/reviewed with   oncoming nurse and patient assistant during the shift.

## 2017-12-14 NOTE — DIALYSIS
HD treatment initiated via right femoral cath without difficulty. CVC dressing clean, dry, and intact, tego caps intact, bilateral lumen aspirated and flushed with 9cc NS. VS stable, will continue to monitor during tx. No Heparin this tx. Machine tests passed and alarms intact. NAD.

## 2017-12-14 NOTE — PROGRESS NOTES
PHYSICAL THERAPY DAILY NOTE  Time In: 1350  Time Out: 1427  Patient Seen For: PM;Gait training; Therapeutic exercise;Transfer training    Subjective: \"I just don't know why I'm so tired this afternoon. \"         Objective: Other (comment) (Fall Risk)    TRANSFERS Daily Assessment    Transfer Type: SPT with walker  Transfer Assistance : 5 (Supervision/setup)  Sit to Stand Assistance: Supervision       GAIT Daily Assessment   Worked on gait training on carpeted surfaces while maneuvering around obstacles to simulate typical household ambulation. Pt. Required min A secondary to B knees buckling. Amount of Assistance: 4 (Contact guard assistance)  Distance (ft): 25 Feet (ft) (x2)  Assistive Device: Walker, rollator       BALANCE Daily Assessment    Sitting - Static: Good (unsupported)  Sitting - Dynamic: Good (unsupported)  Standing - Static: Good (w/ B UE support)  Standing - Dynamic : Impaired       LOWER EXTREMITY EXERCISES Daily Assessment    Pt. Performed motomed @ resistance level 2 x10 minutes to increase strength & endurance B LEs     Vital Signs: /88 (BP 1 Location: Right arm, BP Patient Position: Sitting)  Pulse 86  Temp 97.7 °F (36.5 °C)  Resp 18  Wt 95.6 kg (210 lb 12.8 oz)  SpO2 98%  BMI 30.25 kg/m2    Pain level: no c/o pain    Patient education: pt. Educated on how to ambulate w/ carpet w/ RW    Interdisciplinary Communication: collaborated w/ OT regarding pts progress & d/c plan w/ home hospice    Pt. Left in recliner in NAD, call bell in reach. Assessment: Pt. Much more fatigued this PM w/ B LEs tremulous & increased B knee buckling noted. Pt. Remains motivated to progress w/ therapy, but extreme fatigue in PM would make it difficult to mobilize in her home @ this time. Will cont. To address w/ PT. Plan of Care: Continue with POC and progress as tolerated.      Nelson Gomes, PT  12/14/2017

## 2017-12-14 NOTE — PROGRESS NOTES
Problem: Falls - Risk of  Goal: *Absence of Falls  Document Harrison Fall Risk and appropriate interventions in the flowsheet.    Outcome: Progressing Towards Goal  Fall Risk Interventions:  Mobility Interventions: Patient to call before getting OOB, Communicate number of staff needed for ambulation/transfer, PT Consult for assist device competence, Utilize walker, cane, or other assitive device, Strengthening exercises (ROM-active/passive)    Mentation Interventions: Bed/chair exit alarm, Door open when patient unattended, Evaluate medications/consider consulting pharmacy, Increase mobility, Toileting rounds    Medication Interventions: Evaluate medications/consider consulting pharmacy, Patient to call before getting OOB, Teach patient to arise slowly    Elimination Interventions: Call light in reach, Patient to call for help with toileting needs, Toileting schedule/hourly rounds    History of Falls Interventions: Door open when patient unattended, Evaluate medications/consider consulting pharmacy, Consult care management for discharge planning

## 2017-12-14 NOTE — PROGRESS NOTES
OT Daily Note    Time In 1120   Time Out 1200     Subjective: \"I'm old, so I have my house set up so I don't have to reach anything too high. \"   Pain: None indicated, see above    Precautions: Other (comment) (fall risk)    Strengthening   Patient completed 3 sets x 5-10 reps of BUE therapeutic exercises seated in bed in Dialysis with 2# cuff weight to LUE only, utilizing dowel ashutosh for RUE AAROM. Patient completed shoulder flexion, chest press, forward row, and backward row exercises provided extended, intermittent rest breaks. Assessment: Patient tolerated therapy    Education: Purpose of therapy  Interdisciplinary Communication: Collaborated with Ebony Diamond and agreed patient is progressing well and on-track to meet goals as stated in POC. Plan: Continue to address ADL/IADL, functional mobility, activity tolerance, balance, strengthening, coordination, education, cognition.       Shannen Mancilla OTR/L

## 2017-12-14 NOTE — PROGRESS NOTES
Pt resting quietly in bed. Pt very marcelo this AM.  Requests to know what time she is going to dialysis this morning. Alert and oriented x4. Lungs sounds clear bilaterally with respirations even and unlabored. Bowel sounds active in all quadrants. Peritoneal dialysis in place but not being used at this time. Left groin dialysis catheter in place. +2 pulses bilaterally in upper and lower extremities. Instructed to call for assistance. Call light in reach. Voiced understanding and identified call light.

## 2017-12-14 NOTE — PROGRESS NOTES
PHYSICAL THERAPY DAILY NOTE  Time In: 8496  Time Out: 1106  Patient Seen For: AM;Therapeutic exercise; Other (see progress notes)    Subjective: Patient had no complaints. Objective: No pain noted. Other (comment) (Fall Risk)  GROSS ASSESSMENT Daily Assessment            BED/MAT MOBILITY Daily Assessment    Supine to Sit : 0 (Not tested)  Sit to Supine : 0 (Not tested)       TRANSFERS Daily Assessment    Transfer Type: SPT with walker  Transfer Assistance : 5 (Supervision/setup)  Sit to Stand Assistance: Supervision       GAIT Daily Assessment    Amount of Assistance: 0 (Not tested)  Distance (ft): 25 Feet (ft) (x2)  Assistive Device: Walker, rollator       STEPS or STAIRS Daily Assessment    Level of Assist : 0 (Not tested)       BALANCE Daily Assessment    Sitting - Static: Good (unsupported)  Sitting - Dynamic: Good (unsupported)  Standing - Static: Good (w/ B UE support)  Standing - Dynamic : Impaired       WHEELCHAIR MOBILITY Daily Assessment            LOWER EXTREMITY EXERCISES Daily Assessment   Patient in dialysis. Donned DESIREE compression hose in supine. Extremity: Both  Exercise Type #1: Supine lower extremity strengthening  Sets Performed: 1  Reps Performed: 15  Level of Assist: Minimal assistance          Assessment: Patient seems to be getting stronger and improving with all mobility. Plan of Care: Continue with plan of care to reach PT goals. Returned to room with call josue at reach.     Dea Ohara, PTA  12/14/2017

## 2017-12-14 NOTE — PROGRESS NOTES
OT Progress Note    Time In 0918   Time Out 1000     Subjective: \"I always do what I can. \"  Pain: None indicated    Precautions: Other (comment) (fall risk)    Cognition   Patient completed BUE strengthening/cognitive task seated in bed in Dialysis at tray table with 2# cuff weight to LUE, no weight to RUE. Patient reaches to R side of table using RUE (with repeated cues to recall instructions) and uses BUEs to construct PVC pipe tree design according to visual pattern. Note patient requires increased time and moderate verbal cues to problem solve pattern and construct accurately. Patient deconstructs and replaces pieces to table using RUE upon completion of task. Strengthening   Patient tolerated PROM stretch to R shoulder in internal/external rotation in preparation for AROM exercise. Patient completed 1 set x 8 reps of overhead press, abduction press, and internal/external rotation AROM therapeutic exercise using RUE seated in bed before time expires this session. Patient required intermittent rest breaks to complete. Assessment: Patient tolerated session. Progressing slowly with RUE strength/coordination and activity tolerance, however patient continues to be limited by decreased recall and problem solving, possibly due in part to anxiety/stress. See POC for progress with ADLs and functional mobility. Patient completes ADL and functional transfers with supervision/CGA overall ambulating with RW and completing sponge bathing at sink d/t femoral catheter in LLE. Patient requires increased time and intermittent seated rest breaks to complete simple tasks d/t decreased activity tolerance.   Patient also requires occasional minimal assist for ADLs d/t decreased problem solving resulting in frustration and request for assistance from therapist.    Education: Purpose of therapy  Interdisciplinary Communication: Collaborated with Kelly Pollard and agreed patient is progressing well and on-track to meet goals as stated in POC. Plan: Continue to address ADL/IADL, functional mobility, activity tolerance, balance, strengthening, coordination, education, cognition.      Carlton Ferreira, OTR/L

## 2017-12-14 NOTE — PROGRESS NOTES
End Of Shift Functional Summary, Nursing      TOILETING:  Does patient need assist with clothing management and/or sidney care? No    TOILET TRANSFER:  Pt requires standby assistance/setup. Pt uses walker. BLADDER:  Pt does not have a castillo catheter that staff manages. Pt does not take medication. Pt is continent. of bladder and voids in toilet    (An accident is when the episode is not contained in a brief AND/OR the clothing/linen requires changing/cleaning up.)    BOWEL:  Pt does take medication. Pt is continent of bowel and uses toilet.   (An accident is when the episode is not contained in a brief AND/OR the clothing/linen requires changing/cleaning up.)

## 2017-12-14 NOTE — PROGRESS NOTES
Spoke with hospice case manager regarding services. Pt has an aide and an RN who come an hour each daily 6 days a week. Can extend to 7 days a week but unable to provide more hours per day. CM reports that pt is close to her baseline prior to admission. Continue to encourage pt to reach out to [place] family and neighbors for additional assistance.

## 2017-12-14 NOTE — PROGRESS NOTES
PHYSICAL THERAPY DAILY NOTE  Time In: 6363  Time Out: 7073  Patient Seen For: PM;Other (see progress notes);Gait training; Therapeutic exercise;Transfer training    Subjective: Patient had no complaints. Objective: No pain noted. Other (comment) (Fall Risk)  GROSS ASSESSMENT Daily Assessment            BED/MAT MOBILITY Daily Assessment    Supine to Sit : 0 (Not tested)  Sit to Supine : 0 (Not tested)       TRANSFERS Daily Assessment    Transfer Type: SPT with walker  Transfer Assistance : 5 (Supervision/setup)  Sit to Stand Assistance: Supervision       GAIT Daily Assessment    Amount of Assistance: 0 (Not tested)  Distance (ft): 25 Feet (ft) (x2)  Assistive Device: Walker, rollator       STEPS or STAIRS Daily Assessment    Level of Assist : 0 (Not tested)       BALANCE Daily Assessment    Sitting - Static: Good (unsupported)  Sitting - Dynamic: Good (unsupported)  Standing - Static: Good (w/ B UE support)  Standing - Dynamic : Impaired       WHEELCHAIR MOBILITY Daily Assessment            LOWER EXTREMITY EXERCISES Daily Assessment    Extremity: Both  Exercise Type #1: Supine lower extremity strengthening  Sets Performed: 1  Reps Performed: 15  Level of Assist: Minimal assistance          Assessment: Patient making good progress. Plan of Care: Continue with plan of care to reach PT goals. Returned to room to bed with call josue at reach.     Tracy Zhang, PTA  12/14/2017

## 2017-12-14 NOTE — PROGRESS NOTES
Problem: Falls - Risk of  Goal: *Absence of Falls  Document Harrison Fall Risk and appropriate interventions in the flowsheet.    Fall Risk Interventions:  Mobility Interventions: Patient to call before getting OOB    Mentation Interventions: Door open when patient unattended    Medication Interventions: Patient to call before getting OOB, Teach patient to arise slowly    Elimination Interventions: Call light in reach, Patient to call for help with toileting needs, Toilet paper/wipes in reach, Toileting schedule/hourly rounds    History of Falls Interventions: Door open when patient unattended

## 2017-12-14 NOTE — PROGRESS NOTES
MD Claire,   Medical Director  [address] [facility], [address] [facility]  Tel: [phone]       SFD PROGRESS NOTE    [facility]  Admit Date: 11/29/2017  Admit Diagnosis: DEBILITY; Renal failure (ARF), acute on chronic ([facility] Chito Bear*  Chief Complaint : Gait dysfunction secondary to below. Admit Diagnosis: Unstable angina ([facility]  CAD (coronary artery disease) (11/27/2015)  S/P complex PCI and stable on ASA and Brilinta  Unstable angina ([facility] (2/1/2016)  Acute/ Chronic anemia (11/18/2017)  ESRD (end stage renal disease) On PD/ CRRT  Pain  DVT risk  Acute Rehab Dx:  Debility    Weakness  Gait impairment  deconditioning  Mobility and ambulation deficits  Self Care/ADL deficits     Subjective     Patient seen and examined. Vss, afebrile. Patient progressing steadily, making functional gains. She is ambulating with a RW with supervision; noted with flexed posture, decreased fran, heavy reliance on RW, decreased heel strike/toe off. Discussed with therapy, she does fatigue easily, requiring frequent seated rest breaks. Still feel she will have trouble negotiating some ADls at home.      Objective:     Current Facility-Administered Medications   Medication Dose Route Frequency    rosuvastatin (CRESTOR) tablet 20 mg  20 mg Oral QHS    sucralfate (CARAFATE) 100 mg/mL oral suspension 1 g  1 g Oral AC&HS    polyethylene glycol (MIRALAX) packet 17 g  17 g Oral DAILY    acetaminophen (TYLENOL) tablet 650 mg  650 mg Oral Q4H PRN    HYDROcodone-acetaminophen (NORCO) 5-325 mg per tablet 1 Tab  1 Tab Oral Q4H PRN    LORazepam (ATIVAN) tablet 1 mg  1 mg Oral Q6H PRN    sodium chloride (NS) flush 5-10 mL  5-10 mL IntraVENous PRN    amLODIPine (NORVASC) tablet 5 mg  5 mg Oral DAILY    aspirin delayed-release tablet 81 mg  81 mg Oral DAILY    calcitRIOL (ROCALTROL) capsule 0.25 mcg  0.25 mcg Oral DAILY    cyanocobalamin (VITAMIN B12) injection 1,000 mcg  1,000 mcg IntraMUSCular Q7D    epoetin toya (EPOGEN;PROCRIT) injection 20,000 Units  20,000 Units SubCUTAneous Q MON, WED & FRI    gentamicin (GARAMYCIN) 0.1 % cream   Topical DAILY PRN    levothyroxine (SYNTHROID) tablet 150 mcg  150 mcg Oral ACB    linaclotide (LINZESS) capsule 145 mcg (Patient Supplied)  145 mcg Oral DAILY    magnesium oxide (MAG-OX) tablet 400 mg  400 mg Oral DAILY    metoclopramide HCl (REGLAN) tablet 5 mg  5 mg Oral BID    metoprolol tartrate (LOPRESSOR) tablet 12.5 mg  12.5 mg Oral DAILY    multivitamin w ZN (STRESSTABS W ZINC) tablet  1 Tab Oral DAILY    nitroglycerin (NITROSTAT) tablet 0.4 mg  0.4 mg SubLINGual PRN    ondansetron (ZOFRAN ODT) tablet 4 mg  4 mg Oral TID PRN    pantoprazole (PROTONIX) tablet 40 mg  40 mg Oral ACB&D    polyethylene glycol (MIRALAX) packet 17 g  17 g Oral DAILY PRN    promethazine (PHENERGAN) tablet 25 mg  25 mg Oral Q6H PRN    ranolazine ER (RANEXA) tablet 1,000 mg  1,000 mg Oral BID    senna-docusate (PERICOLACE) 8.6-50 mg per tablet 1 Tab  1 Tab Oral DAILY    sevelamer (RENAGEL) tablet 800 mg  800 mg Oral TID WITH MEALS    ticagrelor (BRILINTA) tablet 90 mg  90 mg Oral BID    torsemide (DEMADEX) tablet 100 mg  100 mg Oral BID     Review of Systems:   Denies chest pain, shortness of breath, cough, headache, visual problems, abdominal pain, dysurea, calf pain. Pertinent positives are as noted in the medical records and unremarkable otherwise. Visit Vitals    /83    Pulse 71    Temp 97.7 °F (36.5 °C)    Resp 18    Wt 210 lb 12.8 oz (95.6 kg)    SpO2 98%    BMI 30.25 kg/m2   Physical Exam:   General: Alert and age appropriately oriented. No acute cardio respiratory distress. HEENT: Normocephalic,no scleral icterus  Oral mucosa moist without cyanosis   Lungs: Clear to auscultation  bilaterally.   Respiration even and unlabored   Heart: Regular rate and rhythm, S1, S2   No  murmurs, clicks, rub or gallops   Abdomen: Soft, non-tender, nondistended. Bowel sounds present. No organomegaly. Genitourinary: defered   Neuromuscular:      NANCI, EOMI  + mild generalized weakness 4+ to 5-/5. BUE, BLE, more proximal.   Sensation grossly intact to soft touch. Skin/extremity: No rashes, no erythema. 1+edema. Wound covered.   [address]                                                                             Functional Assessment:  Gross Assessment  AROM: Generally decreased, functional (12/07/17 1400)  Strength: Generally decreased, functional (12/07/17 1400)  Coordination: Generally decreased, functional (12/07/17 1400)       Balance  Sitting - Static: Good (unsupported) (12/14/17 0800)  Sitting - Dynamic: Good (unsupported) (12/14/17 0800)  Standing - Static: Good (w/ B UE support) (12/14/17 0800)  Standing - Dynamic : Impaired (12/14/17 0800)           Toileting  Adaptive Equipment: Grab bars (12/11/17 1008)         Hematris Wound Care Fall Risk Assessment:  Hematris Wound Care Fall Risk  Mobility: Ambulates or transfers with assist devices or assistance/unsteady gait (12/14/17 0815)  Mobility Interventions: Patient to call before getting OOB; Communicate number of staff needed for ambulation/transfer;PT Consult for assist device competence;Utilize walker, cane, or other assitive device;Strengthening exercises (ROM-active/passive) (12/14/17 0815)  Mentation: Alert, oriented x 3 (12/14/17 0815)  Mentation Interventions: Bed/chair exit alarm; Door open when patient unattended;Evaluate medications/consider consulting pharmacy; Increase mobility; Toileting rounds (12/14/17 0815)  Medication: Patient receiving anticonvulsants, sedatives(tranquilizers), psychotropics or hypnotics, hypoglycemics, narcotics, sleep aids, antihypertensives, laxatives, or diuretics (12/14/17 0815)  Medication Interventions: Evaluate medications/consider consulting pharmacy; Patient to call before getting OOB; Teach patient to arise slowly (12/14/17 0815)  Elimination: Needs assistance with toileting (12/14/17 8301)  Elimination Interventions: Call light in reach; Patient to call for help with toileting needs; Toileting schedule/hourly rounds (12/14/17 0815)  Prior Fall History: No (12/14/17 0815)  History of Falls Interventions: Door open when patient unattended;Evaluate medications/consider consulting pharmacy; Consult care management for discharge planning (12/14/17 0815)  Total Score: 3 (12/14/17 0815)  Standard Fall Precautions: Yes (12/12/17 0715)  High Fall Risk: Yes (12/14/17 0815)     Speech Assessment:         Ambulation:  Gait  Base of Support: Widened (12/07/17 1400)  Speed/Michelle: Slow;Shuffled (12/07/17 1400)  Step Length: Right shortened;Left shortened (12/07/17 1400)  Stance: Right increased; Left increased (12/07/17 1400)  Gait Abnormalities: Trunk sway increased; Step to gait; Decreased step clearance (12/07/17 1400)  Distance (ft): 25 Feet (ft) (x2) (12/14/17 0800)  Assistive Device: Woodward Guitar, rollator (12/14/17 0800)  Curbs/Ramps: Minimum assistance (12/07/17 1400)     Labs/Studies:  No results found for this or any previous visit (from the past 67 hour(s)).     Assessment:     Problem List as of 12/14/2017  Date Reviewed: 3/2/2017          Codes Class Noted - Resolved    Weakness of both legs ICD-10-CM: R29.898  ICD-9-CM: 729.89  11/29/2017 - Present        Renal failure (ARF), acute on chronic ([facility] [address] ICD-10-CM: N17.9, N18.9  ICD-9-CM: 584.9, 585.9  11/29/2017 - Present        Elevated troponin ICD-10-CM: R74.8  ICD-9-CM: 790.6  11/18/2017 - Present        Chest pain ICD-10-CM: R07.9  ICD-9-CM: 786.50  11/18/2017 - Present        CKD (chronic kidney disease) ICD-10-CM: N18.9  ICD-9-CM: 585.9  11/18/2017 - Present        Chronic anemia ICD-10-CM: D64.9  ICD-9-CM: 285.9  11/18/2017 - Present        ESRD (end stage renal disease) ([facility] ICD-10-CM: N18.6  ICD-9-CM: 585.6  11/18/2017 - Present        Abdominal pain ICD-10-CM: R10.9  ICD-9-CM: 789.00  9/18/2017 - Present        H/O TIA (transient ischemic attack) and stroke ICD-10-CM: Z86.73  ICD-9-CM: V12.54  4/13/2017 - Present        S/P PTCA (percutaneous transluminal coronary angioplasty) ICD-10-CM: Z98.61  ICD-9-CM: V45.82  4/13/2017 - Present        Mixed hyperlipidemia ICD-10-CM: E78.2  ICD-9-CM: 272.2  4/13/2017 - Present        Vision changes ICD-10-CM: H53.9  ICD-9-CM: 368.9  3/26/2017 - Present        Dizziness ICD-10-CM: R42  ICD-9-CM: 780.4  3/26/2017 - Present        Refusal of blood transfusions as patient is [place] (Chronic) ICD-10-CM: Z53.1  ICD-9-CM: V62.6  8/25/2016 - Present        GERD (gastroesophageal reflux disease) ICD-10-CM: K21.9  ICD-9-CM: 530.81  8/8/2016 - Present        Unspecified sleep apnea ICD-10-CM: G47.30  ICD-9-CM: 780.57  8/8/2016 - Present    Overview Signed 8/8/2016 11:09 AM by Joey Ochoa     uses cpap machine             CVA (cerebral vascular accident) Hx of TIA ICD-10-CM: I63.9  ICD-9-CM: 434.91  2/22/2016 - Present        Unstable angina ([facility] ICD-10-CM: I20.0  ICD-9-CM: 411.1  2/1/2016 - Present        ESRD on peritoneal dialysis ([facility] (Chronic) ICD-10-CM: N18.6, Z99.2  ICD-9-CM: 585.6, V45.11  2/1/2016 - Present        Ischemic cardiomyopathy ICD-10-CM: I25.5  ICD-9-CM: 414.8  12/29/2015 - Present        HLD (hyperlipidemia) ICD-10-CM: E78.5  ICD-9-CM: 272.4  12/29/2015 - Present        Depression ICD-10-CM: F32.9  ICD-9-CM: 379  12/29/2015 - Present        CAD (coronary artery disease) ICD-10-CM: I25.10  ICD-9-CM: 414.00  11/27/2015 - Present        Debility ICD-10-CM: R53.81  ICD-9-CM: 799.3  5/5/2015 - Present        Hypertension (Chronic) ICD-10-CM: I10  ICD-9-CM: 401.9  4/28/2015 - Present        Anemia of chronic renal failure (Chronic) ICD-10-CM: N18.9, D63.1  ICD-9-CM: 285.21, 585.9  4/28/2015 - Present        Hypothyroidism (Chronic) ICD-10-CM: E03.9  ICD-9-CM: 244.9  4/28/2015 - Present        RESOLVED: Postural hypotension ICD-10-CM: I95.1  ICD-9-CM: 458.0  8/9/2016 - 3/26/2017        RESOLVED: Chest pain ICD-10-CM: R07.9  ICD-9-CM: 786.50  8/8/2016 - 3/26/2017        RESOLVED: Nausea ICD-10-CM: R11.0  ICD-9-CM: 787.02  8/8/2016 - 3/26/2017        RESOLVED: S/P PTCA (percutaneous transluminal coronary angioplasty); LAD PTCA of  ISR 11/27/15 ICD-10-CM: Z98.61  ICD-9-CM: V45.82  5/24/2016 - 3/26/2017        RESOLVED: TIA (transient ischemic attack) ICD-10-CM: G45.9  ICD-9-CM: 435.9  2/10/2016 - 3/26/2017        RESOLVED: Edema ICD-10-CM: R60.9  ICD-9-CM: 782.3  12/29/2015 - 3/26/2017        RESOLVED: Anterior myocardial infarction [facility]) ICD-10-CM: I21.09  ICD-9-CM: 410.10  5/21/2015 - 3/26/2017        RESOLVED: STEMI (ST elevation myocardial infarction) ([facility] [address] ICD-10-CM: I21.3  ICD-9-CM: 410.90  4/28/2015 - 2/1/2016              Plan:      CAD (coronary artery disease) (11/27/2015)/ S/P complex PCI / Unstable angina/ hypertension  - on ASA and Brilinta  - continue ranexa, statin  - demadex continued. Patient almost aneuric. Will discuss with nephrology need for demadex?  - 12/6 stable cardiac exam.  - 12/14 - continue norvasc/ metoprolol. BP in good range.     Acute/ Chronic anemia (11/18/2017) - continue to monitor.   - continue epogen.  - last hgb 8.4, (12/11). check today.      ESRD (end stage renal disease) On PD/ CRRT  - PD resumed. - monitor fluids status. Nephrology managing. Will follow at IRU.   - 11/30 problem with PD/ catheter. Nephrologist aware. Will need to hold PT due to femoral cath precautions. nephrologist to follow. May need to have tunneled cath. - 12/4 - continue TTS HD. PD per nephrology direction. - 12/6 Plan possibly to insert another PD tube. S/p Lt femoral perm catheter working well.  - 12/8 - continue HD per nephrology. PD on hold. LE edema slightly improved. .  - 12/13- continuing HD per nephrology direction. Malfunctioning PD catheter. Plan for PD catheter  replacement in OR on Friday. - 12/14 - continue HD, T, Thu, S schedule. Plan for PD catheter replacement tomorrow. Pneumonia prophylaxis- Insentive spirometer every hour while awake     DVT risk / DVT Prophylaxis- continue daily physician exam to assess for signs and symptoms as patient is at increased risk for of thromboembolism. Mobilization as tolerated. Intermittent pneumatic compression devices when in bed Thigh-high or knee-high thromboembolic deterrent hose when out of bed.   - on brillanta bId+ aspirin daily. continue SCDs. Pain Control: stable, mild-to-moderate joint symptoms intermittently, reasonably well controlled by PRN meds. Will require regular pain assessment and comprenhensive pain management. - has not required norco. Will d/c.   12/13 - no new pain source.      Wound Care: Monitor wound status daily per staff and physician. At risk for failure. Will require 24/7 rehab nursing. Keep wound clean and dry - monitoring femoral cath site, appears benign. 12/14  - PD catheter and femoral cath site without signs of infection, drainage,      bowel program - as needed     GERD - resume PPI. At times may need additional antacids, Maalox prn.  - continue sucralfate     Hypothyroid  - synthroid.     Time spent was 25 minutes with over 1/2 in direct patient care/examination, consultation and coordination of care.      Signed By: Sachi Gayle MD     December 14, 2017

## 2017-12-14 NOTE — PROGRESS NOTES
Speech therapy note  Attempted to see this pm, however, pt off the floor.      Farzad Rangel MA/HARSHA/SLP

## 2017-12-14 NOTE — PROGRESS NOTES
Hemodialysis Rounding Note    Subjective:     Luanne Arzola is a 80 y.o.  who presents with DEBILITY  Renal failure (ARF), acute on chronic (HCC)  Weakness of both legs. The patient is dialyzing utilizing the following method:Intermittent Hemodialysis  Artificial Kidney Dialyzer/Set Up Inspection: Revaclear Max   hours Duration of Treatment (hours): 4 hours   blood flow rate Blood Flow Rate (ml/min): 350 ml/min   dialysate rate     ultrafiltration Goal/Amount of Fluid to Remove (mL): 3600 mL   Heparin is used during the dialysis treatment. Complaints none.      Current Facility-Administered Medications   Medication Dose Route Frequency    rosuvastatin (CRESTOR) tablet 20 mg  20 mg Oral QHS    sucralfate (CARAFATE) 100 mg/mL oral suspension 1 g  1 g Oral AC&HS    polyethylene glycol (MIRALAX) packet 17 g  17 g Oral DAILY    acetaminophen (TYLENOL) tablet 650 mg  650 mg Oral Q4H PRN    HYDROcodone-acetaminophen (NORCO) 5-325 mg per tablet 1 Tab  1 Tab Oral Q4H PRN    LORazepam (ATIVAN) tablet 1 mg  1 mg Oral Q6H PRN    sodium chloride (NS) flush 5-10 mL  5-10 mL IntraVENous PRN    amLODIPine (NORVASC) tablet 5 mg  5 mg Oral DAILY    aspirin delayed-release tablet 81 mg  81 mg Oral DAILY    calcitRIOL (ROCALTROL) capsule 0.25 mcg  0.25 mcg Oral DAILY    cyanocobalamin (VITAMIN B12) injection 1,000 mcg  1,000 mcg IntraMUSCular Q7D    epoetin toya (EPOGEN;PROCRIT) injection 20,000 Units  20,000 Units SubCUTAneous Q MON, WED & FRI    gentamicin (GARAMYCIN) 0.1 % cream   Topical DAILY PRN    levothyroxine (SYNTHROID) tablet 150 mcg  150 mcg Oral ACB    linaclotide (LINZESS) capsule 145 mcg (Patient Supplied)  145 mcg Oral DAILY    magnesium oxide (MAG-OX) tablet 400 mg  400 mg Oral DAILY    metoclopramide HCl (REGLAN) tablet 5 mg  5 mg Oral BID    metoprolol tartrate (LOPRESSOR) tablet 12.5 mg  12.5 mg Oral DAILY    multivitamin w ZN (STRESSTABS W ZINC) tablet  1 Tab Oral DAILY    nitroglycerin (NITROSTAT) tablet 0.4 mg  0.4 mg SubLINGual PRN    ondansetron (ZOFRAN ODT) tablet 4 mg  4 mg Oral TID PRN    pantoprazole (PROTONIX) tablet 40 mg  40 mg Oral ACB&D    polyethylene glycol (MIRALAX) packet 17 g  17 g Oral DAILY PRN    promethazine (PHENERGAN) tablet 25 mg  25 mg Oral Q6H PRN    ranolazine ER (RANEXA) tablet 1,000 mg  1,000 mg Oral BID    senna-docusate (PERICOLACE) 8.6-50 mg per tablet 1 Tab  1 Tab Oral DAILY    sevelamer (RENAGEL) tablet 800 mg  800 mg Oral TID WITH MEALS    ticagrelor (BRILINTA) tablet 90 mg  90 mg Oral BID    torsemide (DEMADEX) tablet 100 mg  100 mg Oral BID               No results found for this or any previous visit (from the past 24 hour(s)). Review of Systems  Pertinent items are noted in HPI. .    Objective:     Blood pressure (!) 160/94, pulse 78, temperature 97.7 °F (36.5 °C), resp. rate 18, weight 95.6 kg (210 lb 12.8 oz), SpO2 98 %. Temp (24hrs), Av °F (36.7 °C), Min:97.7 °F (36.5 °C), Max:98.2 °F (36.8 °C)      Physical Exam:   Neck: no adenopathy and no JVD, Lungs: clear to auscultation bilaterally, Cardiac: regular rate and rhythm, Abdomen: soft, non-tender. Bowel sounds normal. No masses,  no organomegaly and Extremities: edema +      Assessment:     End Stage Renal Disease:  Patient is tolerating dialysis treatment well. .  Additionally the patient has experienced normal dialysis treatment during dialysis. Dry weight   same. Anemia:  No results for input(s): HCT, HGB, HCTEXT, HGBEXT in the last 72 hours. Renal Metabolic Bone Disease:  No results for input(s): CA, ALB, PTH in the last 72 hours.     No lab exists for component: PO4                     Treatment:  PO4 binders, Cinacaleat, Vitamin D  Hypertension: controlled    Access: adequate tunnelled catheter    Active Problems:    Weakness of both legs (2017)      Renal failure (ARF), acute on chronic (HCC) (2017)           Plan:     Continue scheduled dialysis and plan for PD catheter repositioning

## 2017-12-15 ENCOUNTER — ANESTHESIA EVENT (OUTPATIENT)
Dept: SURGERY | Age: 82
End: 2017-12-15
Payer: MEDICARE

## 2017-12-15 PROCEDURE — 97110 THERAPEUTIC EXERCISES: CPT

## 2017-12-15 PROCEDURE — 97530 THERAPEUTIC ACTIVITIES: CPT

## 2017-12-15 PROCEDURE — 74011250636 HC RX REV CODE- 250/636: Performed by: PHYSICAL MEDICINE & REHABILITATION

## 2017-12-15 PROCEDURE — 65310000000 HC RM PRIVATE REHAB

## 2017-12-15 PROCEDURE — 97116 GAIT TRAINING THERAPY: CPT

## 2017-12-15 PROCEDURE — 92507 TX SP LANG VOICE COMM INDIV: CPT

## 2017-12-15 PROCEDURE — 74011250637 HC RX REV CODE- 250/637: Performed by: PHYSICAL MEDICINE & REHABILITATION

## 2017-12-15 PROCEDURE — 97535 SELF CARE MNGMENT TRAINING: CPT

## 2017-12-15 RX ORDER — CEFAZOLIN SODIUM/WATER 2 G/20 ML
2 SYRINGE (ML) INTRAVENOUS
Status: CANCELLED | OUTPATIENT
Start: 2017-12-15

## 2017-12-15 RX ADMIN — TORSEMIDE 100 MG: 100 TABLET ORAL at 16:42

## 2017-12-15 RX ADMIN — CALCITRIOL 0.25 MCG: 0.25 CAPSULE, LIQUID FILLED ORAL at 08:33

## 2017-12-15 RX ADMIN — TORSEMIDE 100 MG: 100 TABLET ORAL at 08:33

## 2017-12-15 RX ADMIN — METOCLOPRAMIDE HYDROCHLORIDE 5 MG: 10 TABLET ORAL at 16:42

## 2017-12-15 RX ADMIN — ASPIRIN 81 MG: 81 TABLET, COATED ORAL at 08:32

## 2017-12-15 RX ADMIN — RANOLAZINE 1000 MG: 500 TABLET, FILM COATED, EXTENDED RELEASE ORAL at 16:42

## 2017-12-15 RX ADMIN — ROSUVASTATIN CALCIUM 20 MG: 20 TABLET, FILM COATED ORAL at 20:57

## 2017-12-15 RX ADMIN — SUCRALFATE 1 G: 1 SUSPENSION ORAL at 20:57

## 2017-12-15 RX ADMIN — TICAGRELOR 90 MG: 90 TABLET ORAL at 08:32

## 2017-12-15 RX ADMIN — PANTOPRAZOLE SODIUM 40 MG: 40 TABLET, DELAYED RELEASE ORAL at 16:42

## 2017-12-15 RX ADMIN — RANOLAZINE 1000 MG: 500 TABLET, FILM COATED, EXTENDED RELEASE ORAL at 08:32

## 2017-12-15 RX ADMIN — PANTOPRAZOLE SODIUM 40 MG: 40 TABLET, DELAYED RELEASE ORAL at 06:09

## 2017-12-15 RX ADMIN — METOCLOPRAMIDE HYDROCHLORIDE 5 MG: 10 TABLET ORAL at 08:33

## 2017-12-15 RX ADMIN — Medication 400 MG: at 08:33

## 2017-12-15 RX ADMIN — LEVOTHYROXINE SODIUM 150 MCG: 50 TABLET ORAL at 06:09

## 2017-12-15 RX ADMIN — METOPROLOL TARTRATE 12.5 MG: 25 TABLET ORAL at 08:32

## 2017-12-15 RX ADMIN — ERYTHROPOIETIN 20000 UNITS: 20000 INJECTION, SOLUTION INTRAVENOUS; SUBCUTANEOUS at 17:22

## 2017-12-15 RX ADMIN — AMLODIPINE BESYLATE 5 MG: 5 TABLET ORAL at 08:33

## 2017-12-15 NOTE — PROGRESS NOTES
Subjective \"I am ready to work with you. \"   Activity Connect 4 game with the use of her RUE   Strength/Endurance Patient needed rest breaks throughout the session due to fatigue in her RUE   Balance Sit<->stand:  CGA with RW   Social Interaction Patient was friendly and was appreciative of the session. Cognitive A&O; disoriented to time. Patient had difficulty remembering how to put her bridge in. She needed extra time and was able to complete the task by trial and error. Comments Patient was passed on to Joanne Orr at the end of the session.      Partha Trevizo, CTRS

## 2017-12-15 NOTE — PROGRESS NOTES
Office called by RO Park to check about pt's procedure. Office states that procedure is scheduled for Monday at 1200.

## 2017-12-15 NOTE — PROGRESS NOTES
PHYSICAL THERAPY DAILY NOTE  Time In: 9417  Time Out: 7687  Patient Seen For: PM;Gait training; Therapeutic exercise;Transfer training    Subjective: \"I feel really tired this afternoon. \"         Objective: Other (comment) (Fall Risk)    TRANSFERS Daily Assessment    Transfer Type: SPT with walker  Transfer Assistance : 5 (Supervision/setup)  Sit to Stand Assistance: Supervision  Car Transfers: Not tested       GAIT Daily Assessment   Worked on ambulation on carpeted surface maneuvering around obstacles using RW.  Pt. Performed 25'x2 w/ close supervision for safety w/ balance. Amount of Assistance: 5 (Supervision/setup)  Distance (ft): 60 Feet (ft)  Assistive Device: Walker, rolling;Gait belt       BALANCE Daily Assessment    Sitting - Static: Good (unsupported)  Sitting - Dynamic: Good (unsupported)  Standing - Static: Good (w/ B UE support)  Standing - Dynamic : Impaired       LOWER EXTREMITY EXERCISES Daily Assessment    Pt. Performed Motomed @ resistance level 2 x10 minutes to increase strength & endurance B LEs     Vital Signs:   Patient Vitals for the past 12 hrs:   Temp Pulse Resp BP SpO2   12/15/17 0652 98.4 °F (36.9 °C) 80 17 136/79 95 %       Pain level: no c/o pain    Patient education: pt. Educated on purpose of gait training activity    Interdisciplinary Communication: collaborated w/ RN regarding recommendation that pt. Have assistance to get up to the bathroom    Pt. Left up in recliner in NAD, call bell in reach. Assessment: Pt. Making progress this visit, tolerating increased activity levels w/ less assistance from therapist.  Pt. Cont. To benefit from PT in preparation for d/c home to allow pt. To manage her home mobility independently. Plan of Care: Continue with POC and progress as tolerated.      Colletta Patches, PT  12/15/2017

## 2017-12-15 NOTE — PROGRESS NOTES
OT Daily Note    Time In 1034   Time Out 1121     Subjective: \"I had my house set up like that before because I was so weak. \" [discussed kitchen/home environment organization promoting safety and energy conservation ambulating with RW for IADL at discharge]  Pain: None indicated    Precautions: Other (comment) (fall risk)    Stretching    Patient tolerated RUE PROM stretch seated in wheelchair in preparation for functional reaching task. Patient tolerated internal and external rotation at R shoulder. Home Management/Activity Tolerance   Patient seen for co-treatment with Mirella West. Patient re-educated regarding environmental organization, safe item transport, and functional mobility for kitchen management and promoting energy conservation ambulating with RW at discharge. Patient verbalized and demonstrated understanding, completing dynamic standing balance/RUE strengthening task in kitchen to simulate unloading  ambulating with RW. PT assists patient with ambulation and dynamic standing balance, patient reaches to R using RUE to open/close  and unload x 15 cones from top and bottom racks and place onto kitchen countertop. Note patient requires 1 seated rest break to complete x 15 cones. Patient completes 2 more standing trials at kitchen countertop, stabilizing through LUE on countertop, to move cones using RUE from countertop to high cabinet shelf. Patient completed functional mobility task to ambulate using RW from kitchen to hospital room, required 2 ambulation trials with 1 seated rest break to complete, with balance assist provided by PT. Assessment: Patient tolerated well    Education: IADL and energy conservation  Interdisciplinary Communication: Collaborated with Mirella West and agreed patient is progressing well and on-track to meet goals as stated in POC.      Plan: Continue to address ADL/IADL, functional mobility, activity tolerance, balance, strengthening, coordination, education, cognition.       Karen Rascon, OTR/L

## 2017-12-15 NOTE — PROGRESS NOTES
Pt resting quietly in bed. Upset because she does not know if she is having surgery to correct her peritoneal dialysis catheter which is not useable at this time and has not seen the surgeon. She is also upset because she has been NPO since midnight and \"no one seems to know what is going on\". Alert and oriented x4. Lungs sounds clear bilaterally with respirations even and unlabored. Bowel sounds active in all quadrants. Peritoneal dialysis catheter in place but unable to use. Palpable pulses bilaterally in upper and lower extremities. Left groin dialysis catheter in place. Instructed to call for assistance. Call light in reach. Voiced understanding and identified call light.

## 2017-12-15 NOTE — PROGRESS NOTES
Problem: Falls - Risk of  Goal: *Absence of Falls  Document Harrison Fall Risk and appropriate interventions in the flowsheet.    Outcome: Progressing Towards Goal  Fall Risk Interventions:  Mobility Interventions: Bed/chair exit alarm, Communicate number of staff needed for ambulation/transfer, Patient to call before getting OOB, Strengthening exercises (ROM-active/passive), Utilize walker, cane, or other assitive device    Mentation Interventions: Bed/chair exit alarm, Evaluate medications/consider consulting pharmacy    Medication Interventions: Bed/chair exit alarm, Evaluate medications/consider consulting pharmacy, Patient to call before getting OOB, Teach patient to arise slowly    Elimination Interventions: Call light in reach, Patient to call for help with toileting needs    History of Falls Interventions: Consult care management for discharge planning, Evaluate medications/consider consulting pharmacy

## 2017-12-15 NOTE — PROGRESS NOTES
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 She has a history of GI bleeding and had anemia-induced unstable angina in the past. 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 She states the pain has since resolved with NTG paste.  No fevers or chills. No nausea, vomiting or diarrhea.  She states that she is essentially anuric and occasionally makes minimal urine.  She has a h/o CVA and TIA. No fever or chills, no problems with PD drainage or discoloration of PD fluid. No increased thirst. She wishes regular diet and supplement. She denies any other acute complaints  Her edema has improved in the past couple of days with intensified dialysis. s-no complaints .  Getting better with rehab     Past Medical History:   Diagnosis Date    Anemia of chronic renal failure 4/28/2015    [facility]terior myocardial infarction [facility]) 5/21/2015    CAD (coronary artery disease)     Chest pain     Chronic kidney disease, stage III (moderate) 8/15/2014    on dialysis    CKD (chronic kidney disease) stage 4, GFR 15-29 ml/min ([facility]) 4/28/2015    Debility 5/5/2015    Depression 12/29/2015    Edema 12/29/2015    Endocrine disease     Hypothyroidism    GERD (gastroesophageal reflux disease)     Heart murmur 12/29/2015    HLD (hyperlipidemia) 12/29/2015    Hypertension     Hypothyroidism 4/28/2015    Ischemic cardiomyopathy 12/29/2015    Nausea     S/P PTCA (percutaneous transluminal coronary angioplasty); LAD PTCA of  ISR 11/27/15 5/24/2016    STEMI (ST elevation myocardial infarction) ([facility] [address] 4/28/2015    Unspecified sleep apnea     uses cpap machine    Unstable angina ([facility] [address]       Past Surgical History: Procedure Laterality Date    HX BACK SURGERY  1990    neck surgery cervical disc    HX BACK SURGERY      lower back    HX CATARACT REMOVAL Bilateral     HX CHOLECYSTECTOMY  19702    gall bladder     HX KNEE REPLACEMENT Right 2006    HX PTCA  4/28/2015    2.25 Xience stent to mid LAD for occluded artery, anterior MI, EF 25%. Moderate disease distal LAD and distal OM PCI CX and RCA 2004, then PCI RCA and LAD in 2009. Prior to Admission medications    Medication Sig Start Date End Date Taking? Authorizing Provider   metoprolol tartrate (LOPRESSOR) 25 mg tablet Take 0.5 Tabs by mouth daily. 11/27/17  Yes MINDY Chatman   amLODIPine (NORVASC) 5 mg tablet Take 1 Tab by mouth daily. 11/27/17  Yes MINDY Chatman   torsemide (DEMADEX) 100 mg tablet Take 1 Tab by mouth two (2) times a day. 11/27/17  Yes MINDY Chatman   pantoprazole (PROTONIX) 40 mg tablet Take 1 Tab by mouth Before breakfast and dinner. 11/27/17  Yes MINDY Chatman   magnesium oxide (MAG-OX) 400 mg tablet Take 400 mg by mouth daily. Yes Historical Provider   metoclopramide HCl (REGLAN) 5 mg tablet Take 5 mg by mouth two (2) times a day. Yes Historical Provider   ticagrelor (BRILINTA) 90 mg tablet Take 1 Tab by mouth two (2) times a day. 2/4/16  Yes MINDY Chatman   aspirin delayed-release 81 mg tablet Take 1 Tab by mouth daily. 5/5/15  Yes Gisela Hollingsworth PA-C   rosuvastatin (CRESTOR) 20 mg tablet Take 20 mg by mouth nightly. Yes Historical Provider   levothyroxine (SYNTHROID) 150 mcg tablet Take 150 mcg by mouth Daily (before breakfast). Yes Historical Provider   cyanocobalamin (VITAMIN B12) 1,000 mcg/mL injection 1 mL by IntraMUSCular route every seven (7) days. Indications: Vitamin B12 Deficiency 12/2/17   MINDY Khalil   folic acid (FOLVITE) 1 mg tablet Take 1 mg by mouth daily.     Historical Provider   potassium chloride (K-DUR, KLOR-CON) 20 mEq tablet Take 20 mEq by mouth two (2) times a day. Historical Provider   traZODone (DESYREL) 50 mg tablet Take  by mouth nightly. Historical Provider   megestrol (MEGACE) 400 mg/10 mL (40 mg/mL) suspension Take 200 mg by mouth daily. Historical Provider   sucralfate (CARAFATE) 100 mg/mL suspension Take 10 mL by mouth Before breakfast, lunch, dinner and at bedtime. Indications: PREVENTION OF STRESS ULCER 9/23/17   Mi Pino, DO   nitroglycerin (NITROLINGUAL) 400 mcg/spray spray 1 Spray by SubLINGual route every five (5) minutes as needed for Chest Pain. Historical Provider   linaclotide Arlys Perico) 145 mcg cap capsule Take  by mouth Daily (before breakfast). Historical Provider   PNV NO.122/IRON/FOLIC ACID (PRENATAL MULTI PO) Take  by mouth. Historical Provider   ondansetron (ZOFRAN ODT) 4 mg disintegrating tablet Take 4 mg by mouth three (3) times daily as needed. Historical Provider   sevelamer carbonate (RENVELA) 800 mg tab tab Take 800 mg by mouth three (3) times daily (with meals). Historical Provider   gentamicin (GARAMYCIN) 0.1 % topical cream APPLY TO PD catheter exit site at daily dressing change 5/12/15   Malissa Braswell MD   darbepoetin toya in polysorbat (ARANESP, POLYSORBATE,) 40 mcg/mL injection 40 mcg by SubCUTAneous route every fourteen (14) days. Indications: ANEMIA IN CHRONIC KIDNEY DISEASE    Historical Provider   ranolazine ER (RANEXA) 500 mg SR tablet Take 500 mg by mouth two (2) times a day.     Historical Provider     Allergies   Allergen Reactions    Codeine Nausea and Vomiting      Social History   Substance Use Topics    Smoking status: Never Smoker    Smokeless tobacco: Never Used    Alcohol use No      Family History   Problem Relation Age of Onset    Heart Disease Mother     Hypertension Mother     Cancer Mother      Lung    Stroke Father     Hypertension Father     Breast Cancer Neg Hx           Review of Systems    Constitutional: no fever, weak  Eyes: fair vision,    Ears, nose, mouth, throat, and face:fair hearing,   Respiratory: no asthma,  Chronic home O2 is being used - improved dyspnea  Cardiovascular:no palpitation, no  chest pain,   Gastrointestinal:no diarrhea,  Had BM today  Genitourinary: no dysuria,   Hematologic/lymphatic: no bleeding tendency,   Neurological: no seizures   Behvioral/Psych: no psych hospitalization   Endocrine: no goiter,       Objective:       Visit Vitals    /79 (BP 1 Location: Right arm, BP Patient Position: At rest)    Pulse 80    Temp 98.4 °F (36.9 °C)    Resp 17    Wt 93.4 kg (205 lb 12.8 oz)    SpO2 95%    BMI 29.53 kg/m2       General:  Alert, cooperative, no distress, appears stated age. Head:  Normocephalic, without obvious abnormality, atraumatic. Eyes:  Conjunctivae/corneas clear. EOMs intact. Throat: Lips, mucosa, and tongue normal. Teeth and gums normal.   Neck: Supple, symmetrical, trachea midline, no adenopathy,  no JVD. Lungs:   Clear to auscultation bilaterally. Heart:  Regular rate and rhythm, S1, S2 normal, 2/6 systolic  murmur, no rub or gallop. Abdomen:   Soft, non-tender. Distended . No masses,  No organomegaly. PD catheter in place without exudate   Extremities: Extremities normal, atraumatic, no cyanosis. 3+edema. Skin: Skin color, texture, turgor normal. No rashes or lesions. Lymph nodes: Cervical and supraclavicular nodes normal.   Neurologic: Grossly intact. No asterixis. Data Review:       No results found for this or any previous visit (from the past 24 hour(s)). CXR viewed by me - no major infiltrate or fluid excess, CM with left possible minor effusion is present        CT abdomen/pelvis  CT ABDOMEN:  Peritoneal fluid in the perihepatic space, mild paracolic gutters,  extending down into the pelvis. Peritoneal dialysis catheter noted. There is not  any evidence of retroperitoneal hematoma identified.  Strandy fluid density does  extend into the extraperitoneal space in the lower abdomen and pelvis. There is  radiodensity within the renal collecting systems, possibly previously  administered IV contrast. There is peripancreatic fluid and stranding, cannot  exclude acute pancreatitis. No biliary dilatation. Spleen is not enlarged. Small  bowel normal caliber.   CT PELVIS:   Moderate to large volume pelvic fluid.   There is diffuse asymmetric enlargement of the right rectus and oblique  abdominal muscles. There are are of higher attenuation the contralateral side  and findings are consistent with an abdominal wall hematoma. IMPRESSION:    1. Evidence of right abdominal wall hematoma. 2. No CT evidence to suggest retroperitoneal hematoma. 3. Extensive fluid in the abdomen and pelvis, presumed related to peritoneal  Dialysis. KUB - PD catheter still unchanged compared to last week - remains in the sidney-umbilical area      Active Problems:    Weakness of both legs (11/29/2017)      Renal failure (ARF), acute on chronic (HCC) (11/29/2017)        Assessment:     1. CAD x NSTEMI, Unstable angina -  - [facility] with complex CAD and acute thrombotic lesion in pLAD and subtotal occlusion in mLAD. The RCA has severe proximal and mid RCA disease. She has additional stenting of LAD with Resolute in mid/distal and proximal vessel. These all touched the previously stented mid vessel. Recommend life-long DAPT    2, ESRD -  - on temporary HD via perm cath due to PD catheter dysfunction  - TTS schedule in rehab     3. Fluid excess -  - recurrent due to poor PD drainage  - improving with fluid removal on HD    4. Anemia -  - intensive anemia therapy in [employer]  - on WAYNE and IV iron and B12  - improved S/P BT    5. History of GI bleed -  - monitor clinically and serial Hb  - she had a drop in Hb to 7 range and improved with BT    6. Hypokalemia   - resolved     7. Abdominal pain and tenderness with drop in Hb , on DAPT   No evidence of retroperitoneal hematoma     8.  PD catheter dysfunction -  Tentative plan for PD catheter revision / replacement in OR tomorrow       Plan:     As above - 25

## 2017-12-15 NOTE — PROGRESS NOTES
12/15/17 0911   Time Spent With Patient   Time In 0703   Time Out 0747   Patient Seen For: AM;ADLs   Upper Body Bathing   Bathing Assistance, Upper Mod I   Position Performed Seated in chair   Adaptive Equipment Other (comment)  (Wheelchair at sink)   29 Nikkie Aguilera (FIM Score) S   Adaptive Equipment Grab bars; Walker   Functional Transfers   Toilet Transfer  Grab bars;Stand pivot transfer with walker   Amount of Assistance Required SBA       S: \"That is incompetent. \" [patient frustrated this morning d/t verbal confirmation, note no formal orders placed, per patient from NP yesterday PM regarding PD procedure to be completed this AM]     Focus of session was on ADL and functional mobility, note patient anxious and agitated beginning of session regarding pending PD procedure (see above) and whether her son should proceed to take off work today to be with her for the procedure. Patient provided sympathy/apology, comfort, and education regarding scheduling and sequence of events leading up to this AM, patient's son also educated by therapist via telephone call initiated by patient (note patient required assist to navigate smart phone to dial son's telephone number). Agreeable to therapy. Patient was able to ambulate ~20 feet using a RW with SBA. Pain not indicated. Collaborated with PT, Cecile Sutton and Sathish JUAREZ regarding the above situation this AM and confirmed patient is on track to reach goals as documented in the care plan. Sathish JAUREZ has contacted Nephrology regarding scheduling of patient's procedure. Patient tolerated session well, but cognition, balance, functional mobility, activity tolerance, strength, coordination are still below baseline and require skilled facilitation to successfully and safely complete ADL's and transfers. Patient ended session in wheelchair with Jade STARR to assume assistance for remainder of bathing and dressing as time expires this session. Carlton Ferreira, OTR/L

## 2017-12-15 NOTE — ANESTHESIA PREPROCEDURE EVALUATION
Anesthetic History               Review of Systems / Medical History  Patient summary reviewed, nursing notes reviewed and pertinent labs reviewed    Pulmonary        Sleep apnea  Shortness of breath (Oxygen QHS at home)         Neuro/Psych       CVA (Presented this admission with MI and CVA now with residual left sided weakness.)       Cardiovascular    Hypertension: poorly controlled      CHF    CAD    Exercise tolerance: >4 METS: Borderline 4 METS  Comments: Echo 11/17: EF 45%, No Significant Valvular abnormalities    Cath 11/20/17: BRENNAN to LAD and RCA   GI/Hepatic/Renal     GERD    Renal disease (Currently on HD Last run 12/16): CRI and ESRD       Endo/Other        Obesity and anemia     Other Findings   Comments: [employer]         Physical Exam    Airway  Mallampati: I  TM Distance: 4 - 6 cm  Neck ROM: normal range of motion   Mouth opening: Normal     Cardiovascular    Rhythm: regular  Rate: normal         Dental    Dentition: Upper partial plate     Pulmonary  Breath sounds clear to auscultation               Abdominal  GI exam deferred       Other Findings            Anesthetic Plan    ASA: 4  Anesthesia type: general          Induction: Intravenous  Anesthetic plan and risks discussed with: Patient and Son / Daughter      Presented 11/20/17 with MI (BRENNAN to LAD and RCA) and CVA. Now on 9th floor rehab. Temporary HD catheter in place for ESRD  Currently on ASA/Brilinta for recently placed BRENNAN - I assume will continue for surgery given window of DAPT?  Reviewing chart for cardiology note

## 2017-12-15 NOTE — PROGRESS NOTES
Speech therapy note  Attempted to see pt this am, however, pt getting washed up.       Natalie Magdaleno MA/HARSHA/SLP

## 2017-12-15 NOTE — PROGRESS NOTES
PT WEEKLY PROGRESS NOTE   Time In: 6241   Time Out: 1121    Subjective: Pt. Frustrated that port placement was postponed until Monday. She is concerned that it will delay her discharge. Objective: Other (comment) (Fall Risk)    Outcome Measures: Vital Signs:   Patient Vitals for the past 8 hrs:   Temp Pulse Resp BP SpO2   12/15/17 0652 98.4 °F (36.9 °C) 80 17 136/79 95 %         Pain level: no c/o pain    Patient education: pt. Educated on safety w/ reaching during functional reaching task    Interdisciplinary Communication: provided co-treatment w/ OT to incorporate functional IADLs tasks w/ mobility tasks       AROM: [facility]    FIM SCORES Initial Assessment Weekly Progress Assessment 12/15/2017   Bed/Chair/Wheelchair Transfers 3 5   Wheelchair Mobility   NT   Walking [place] 1 2   Steps/Stairs   NT   Please see IRC Interdisciplinary Eval: Coordination/Balance Section for details regarding FIM score description.     BED/CHAIR/WHEELCHAIR TRANSFERS Initial Assessment Weekly Progress Assessment 12/15/2017   Rolling Right 4 (Minimal assistance) 0 (Not tested)   Rolling Left 0 (Not tested) 0 (Not tested)   Supine to Sit 4 (Minimal assistance) 0 (Not tested)   Sit to Stand Moderate assistance Supervision   Sit to Supine 4 (Minimal assistance) 0 (Not tested)   Transfer Type SPT with walker SPT with walker   Comments flexed posture, heavy reliance on UEs, VCs for safety w/ hand placement     Car Transfer Not tested Not tested   Car Type         GROSS ASSESSMENT Weekly Progress Assessment 12/15/2017   AROM Generally decreased, functional   Strength Generally decreased, functional   Coordination Generally decreased, functional   Tone Normal   Sensation NT   PROM [facility]     POSTURE Weekly Progress Assessment 12/15/2017   Posture (WDL) Exceptions to WDL   Posture Assessment Trunk flexion;Rounded shoulders     WHEELCHAIR MOBILITY/MANAGEMENT Initial Assessment Weekly Progress Assessment 12/15/2017   Able to Propel 0 feet NT   Curbs/ramps assistance required 0 (Not tested) NT   Wheelchair set up assistance required 0 (Not tested) NT   Wheelchair management Manages left brake, Manages right brake NT     WALKING INDEPENDENCE Initial Assessment Weekly Progress Assessment 12/15/2017   Assistive device Walker, rolling, Gait belt Walker, rolling   Ambulation assistance - level surface 4 (Minimal assistance) 5 (supervision)   Distance 5 Feet (ft) (to recliner chair) 50 Feet (ft) (x2, 10'x1)   Comments flexed posture, slow fran, increased B stance, foot flat Flexed posture, decreased fran, foot flat, increased B stance time     GAIT Weekly Progress Assessment 12/15/2017   Gait Description (WDL) Exceptions to WDL   Gait Abnormalities Trunk sway increased; Step to gait; Decreased step clearance     STEPS/STAIRS Initial Assessment Weekly Progress Assessment 12/15/2017   Steps/Stairs ambulated 0 0   Rail Use   NT   Comments   NT   Curbs/Ramps 0 (Not tested) NT     Therapeutic Activity:  Pt. Performed standing functional reach & retrieval activity, reaching out of POLLO in all directions w/ 1 UE support w/o LOB. Pt. Left up in recliner in NAD, call bell in reach. Assessment: Pt. Cont. To make slow progress toward her goals, meeting 1 STG this week. Goals modified as appropriate. Pt. Cont. To function below her baseline & benefits from cont. PT services to improve functional household mobility as she is returning home w/ limited support. Plan of Care: Please see Care Plan for updated LTGs.     Family Training: Needs to be scheduled    Lexy Gonsales, PT  12/15/2017

## 2017-12-15 NOTE — PROGRESS NOTES
12/15/17 1025   Time Spent With Patient   Time In 0950   Time Out 1015   Patient Seen For: AM;Neuro-linguistics   Mental Status   Neurologic State Alert   Orientation Level Disoriented to time   Cognition Memory loss; Impaired decision making   Perseveration No perseveration noted;Perseverates during conversation   Safety/Judgement Fall prevention   Pt started the higher level cognitive assessment of the ELLIE. Pt needed increased time and min-mod cues to complete subtest 1-5 with 85% accuracy.    Louann Franklin MA/CCC/SLP

## 2017-12-15 NOTE — PROGRESS NOTES
MD Claire,   Medical Director  [facility], [address] [facility]  Tel: [phone]       SFD PROGRESS NOTE    Dana Martin  Admit Date: 11/29/2017  Admit Diagnosis: DEBILITY; Renal failure (ARF), acute on chronic ([facility] [address] Maya Willett*  Chief Complaint : Gait dysfunction secondary to below. Admit Diagnosis: Unstable angina ([facility] [address]  CAD (coronary artery disease) (11/27/2015)  S/P complex PCI and stable on ASA and Brilinta  Unstable angina ([facility] [address] (2/1/2016)  Acute/ Chronic anemia (11/18/2017)  ESRD (end stage renal disease) On PD/ CRRT  Pain  DVT risk  Acute Rehab Dx:  Debility    Weakness  Gait impairment  deconditioning  Mobility and ambulation deficits  Self Care/ADL deficits     Subjective     Patient seen and examined. Vss, afebrile. Patient progressing steadily, making functional gains. She is ambulating with a RW with supervision; noted with flexed posture, decreased fran, heavy reliance on RW, decreased heel strike/toe off. Discussed with therapy, she does fatigue easily, requiring frequent seated rest breaks. Still feel she will have trouble negotiating some ADls at home.      Objective:     Current Facility-Administered Medications   Medication Dose Route Frequency    rosuvastatin (CRESTOR) tablet 20 mg  20 mg Oral QHS    sucralfate (CARAFATE) 100 mg/mL oral suspension 1 g  1 g Oral AC&HS    polyethylene glycol (MIRALAX) packet 17 g  17 g Oral DAILY    acetaminophen (TYLENOL) tablet 650 mg  650 mg Oral Q4H PRN    HYDROcodone-acetaminophen (NORCO) 5-325 mg per tablet 1 Tab  1 Tab Oral Q4H PRN    LORazepam (ATIVAN) tablet 1 mg  1 mg Oral Q6H PRN    sodium chloride (NS) flush 5-10 mL  5-10 mL IntraVENous PRN    amLODIPine (NORVASC) tablet 5 mg  5 mg Oral DAILY    aspirin delayed-release tablet 81 mg  81 mg Oral DAILY    calcitRIOL (ROCALTROL) capsule 0.25 mcg  0.25 mcg Oral DAILY    cyanocobalamin (VITAMIN B12) injection 1,000 mcg  1,000 mcg IntraMUSCular Q7D    epoetin toya (EPOGEN;PROCRIT) injection 20,000 Units  20,000 Units SubCUTAneous Q MON, WED & FRI    gentamicin (GARAMYCIN) 0.1 % cream   Topical DAILY PRN    levothyroxine (SYNTHROID) tablet 150 mcg  150 mcg Oral ACB    linaclotide (LINZESS) capsule 145 mcg (Patient Supplied)  145 mcg Oral DAILY    magnesium oxide (MAG-OX) tablet 400 mg  400 mg Oral DAILY    metoclopramide HCl (REGLAN) tablet 5 mg  5 mg Oral BID    metoprolol tartrate (LOPRESSOR) tablet 12.5 mg  12.5 mg Oral DAILY    multivitamin w ZN (STRESSTABS W ZINC) tablet  1 Tab Oral DAILY    nitroglycerin (NITROSTAT) tablet 0.4 mg  0.4 mg SubLINGual PRN    ondansetron (ZOFRAN ODT) tablet 4 mg  4 mg Oral TID PRN    pantoprazole (PROTONIX) tablet 40 mg  40 mg Oral ACB&D    polyethylene glycol (MIRALAX) packet 17 g  17 g Oral DAILY PRN    promethazine (PHENERGAN) tablet 25 mg  25 mg Oral Q6H PRN    ranolazine ER (RANEXA) tablet 1,000 mg  1,000 mg Oral BID    senna-docusate (PERICOLACE) 8.6-50 mg per tablet 1 Tab  1 Tab Oral DAILY    sevelamer (RENAGEL) tablet 800 mg  800 mg Oral TID WITH MEALS    ticagrelor (BRILINTA) tablet 90 mg  90 mg Oral BID    torsemide (DEMADEX) tablet 100 mg  100 mg Oral BID     Review of Systems:   Denies chest pain, shortness of breath, cough, headache, visual problems, abdominal pain, dysurea, calf pain. Pertinent positives are as noted in the medical records and unremarkable otherwise. Visit Vitals    /79 (BP 1 Location: Right arm, BP Patient Position: At rest)    Pulse 80    Temp 98.4 °F (36.9 °C)    Resp 17    Wt 205 lb 12.8 oz (93.4 kg)    SpO2 95%    BMI 29.53 kg/m2   Physical Exam:   General: Alert and age appropriately oriented. No acute cardio respiratory distress. HEENT: Normocephalic,no scleral icterus  Oral mucosa moist without cyanosis   Lungs: Clear to auscultation  bilaterally.   Respiration even and unlabored   Heart: Regular rate and rhythm, S1, S2   No  murmurs, clicks, rub or gallops   Abdomen: Soft, non-tender, nondistended. Bowel sounds present. No organomegaly. Genitourinary: defered   Neuromuscular:      NANCI, EOMI  + mild generalized weakness 4+ to 5-/5. BUE, BLE, more proximal.   Sensation grossly intact to soft touch. Skin/extremity: No rashes, no erythema. 1+edema. Wound covered.                                                                                  Functional Assessment:  Gross Assessment  AROM: Generally decreased, functional (12/07/17 1400)  Strength: Generally decreased, functional (12/07/17 1400)  Coordination: Generally decreased, functional (12/07/17 1400)       Balance  Sitting - Static: Good (unsupported) (12/14/17 0800)  Sitting - Dynamic: Good (unsupported) (12/14/17 0800)  Standing - Static: Good (w/ B UE support) (12/14/17 0800)  Standing - Dynamic : Impaired (12/14/17 0800)           Toileting  Adaptive Equipment: Grab bars; Jesus Brink (12/15/17 0911)         Shelia Schwab Fall Risk Assessment:  Latriciaa Schwab Fall Risk  Mobility: Ambulates or transfers with assist devices or assistance/unsteady gait (12/15/17 0034)  Mobility Interventions: Bed/chair exit alarm (12/15/17 0034)  Mentation: Alert, oriented x 3 (12/15/17 0034)  Mentation Interventions: Bed/chair exit alarm (12/15/17 0034)  Medication: Patient receiving anticonvulsants, sedatives(tranquilizers), psychotropics or hypnotics, hypoglycemics, narcotics, sleep aids, antihypertensives, laxatives, or diuretics (12/15/17 0034)  Medication Interventions: Bed/chair exit alarm (12/15/17 0034)  Elimination: Needs assistance with toileting (12/15/17 0034)  Elimination Interventions: Call light in reach (12/15/17 0034)  Prior Fall History: No (12/15/17 0034)  History of Falls Interventions: Consult care management for discharge planning (12/15/17 0034)  Total Score: 3 (12/15/17 0034)  Standard Fall Precautions: Yes (12/12/17 0715)  High Fall Risk: Yes (12/15/17 0034)     Speech Assessment: Ambulation:  Gait  Base of Support: Widened (12/07/17 1400)  Speed/Michelle: Slow;Shuffled (12/07/17 1400)  Step Length: Right shortened;Left shortened (12/07/17 1400)  Stance: Right increased; Left increased (12/07/17 1400)  Gait Abnormalities: Trunk sway increased; Step to gait; Decreased step clearance (12/07/17 1400)  Distance (ft): 25 Feet (ft) (x2) (12/14/17 0800)  Assistive Device: Rina Radha, rollator (12/14/17 0800)  Curbs/Ramps: Minimum assistance (12/07/17 1400)     Labs/Studies:  Recent Results (from the past 72 hour(s))   CBC WITH AUTOMATED DIFF    Collection Time: 12/14/17  9:16 AM   Result Value Ref Range    WBC 6.2 4.3 - 11.1 K/uL    RBC 3.02 (L) 4.05 - 5.25 M/uL    HGB 8.9 (L) 11.7 - 15.4 g/dL    HCT 29.8 (L) 35.8 - 46.3 %    MCV 98.7 (H) 79.6 - 97.8 FL    MCH 29.5 26.1 - 32.9 PG    MCHC 29.9 (L) 31.4 - 35.0 g/dL    RDW 19.3 (H) 11.9 - 14.6 %    PLATELET 378 314 - 541 K/uL    MPV 9.3 (L) 10.8 - 14.1 FL    DF AUTOMATED      NEUTROPHILS 66 43 - 78 %    LYMPHOCYTES 20 13 - 44 %    MONOCYTES 11 4.0 - 12.0 %    EOSINOPHILS 3 0.5 - 7.8 %    BASOPHILS 0 0.0 - 2.0 %    IMMATURE GRANULOCYTES 0 0.0 - 5.0 %    ABS. NEUTROPHILS 4.0 1.7 - 8.2 K/UL    ABS. LYMPHOCYTES 1.2 0.5 - 4.6 K/UL    ABS. MONOCYTES 0.7 0.1 - 1.3 K/UL    ABS. EOSINOPHILS 0.2 0.0 - 0.8 K/UL    ABS. BASOPHILS 0.0 0.0 - 0.2 K/UL    ABS. IMM.  GRANS. 0.0 0.0 - 0.5 K/UL       Assessment:     Problem List as of 12/15/2017  Date Reviewed: 3/2/2017          Codes Class Noted - Resolved    Weakness of both legs ICD-10-CM: R29.898  ICD-9-CM: 729.89  11/29/2017 - Present        Renal failure (ARF), acute on chronic ([facility] [address] ICD-10-CM: N17.9, N18.9  ICD-9-CM: 584.9, 585.9  11/29/2017 - Present        Elevated troponin ICD-10-CM: R74.8  ICD-9-CM: 790.6  11/18/2017 - Present        Chest pain ICD-10-CM: R07.9  ICD-9-CM: 786.50  11/18/2017 - Present        CKD (chronic kidney disease) ICD-10-CM: N18.9  ICD-9-CM: 585.9  11/18/2017 - Present        Chronic anemia ICD-10-CM: D64.9  ICD-9-CM: 285.9  11/18/2017 - Present        ESRD (end stage renal disease) ([facility] [address] ICD-10-CM: N18.6  ICD-9-CM: 585.6  11/18/2017 - Present        Abdominal pain ICD-10-CM: R10.9  ICD-9-CM: 789.00  9/18/2017 - Present        H/O TIA (transient ischemic attack) and stroke ICD-10-CM: Z86.73  ICD-9-CM: V12.54  4/13/2017 - Present        S/P PTCA (percutaneous transluminal coronary angioplasty) ICD-10-CM: Z98.61  ICD-9-CM: V45.82  4/13/2017 - Present        Mixed hyperlipidemia ICD-10-CM: E78.2  ICD-9-CM: 272.2  4/13/2017 - Present        Vision changes ICD-10-CM: H53.9  ICD-9-CM: 368.9  3/26/2017 - Present        Dizziness ICD-10-CM: R42  ICD-9-CM: 780.4  3/26/2017 - Present        Refusal of blood transfusions as patient is Jainism (Chronic) ICD-10-CM: Z53.1  ICD-9-CM: V62.6  8/25/2016 - Present        GERD (gastroesophageal reflux disease) ICD-10-CM: K21.9  ICD-9-CM: 530.81  8/8/2016 - Present        Unspecified sleep apnea ICD-10-CM: G47.30  ICD-9-CM: 780.57  8/8/2016 - Present    Overview Signed 8/8/2016 11:09 AM by Deforest Phalen     uses cpap machine             CVA (cerebral vascular accident) Hx of TIA ICD-10-CM: I63.9  ICD-9-CM: 434.91  2/22/2016 - Present        Unstable angina ([facility] [address] ICD-10-CM: I20.0  ICD-9-CM: 411.1  2/1/2016 - Present        ESRD on peritoneal dialysis [facility]) (Chronic) ICD-10-CM: N18.6, Z99.2  ICD-9-CM: 585.6, V45.11  2/1/2016 - Present        Ischemic cardiomyopathy ICD-10-CM: I25.5  ICD-9-CM: 414.8  12/29/2015 - Present        HLD (hyperlipidemia) ICD-10-CM: E78.5  ICD-9-CM: 272.4  12/29/2015 - Present        Depression ICD-10-CM: F32.9  ICD-9-CM: 430  12/29/2015 - Present        CAD (coronary artery disease) ICD-10-CM: I25.10  ICD-9-CM: 414.00  11/27/2015 - Present        Debility ICD-10-CM: R53.81  ICD-9-CM: 799.3  5/5/2015 - Present        Hypertension (Chronic) ICD-10-CM: I10  ICD-9-CM: 401.9  4/28/2015 - Present        Anemia of chronic renal failure (Chronic) ICD-10-CM: N18.9, D63.1  ICD-9-CM: 285.21, 585.9  4/28/2015 - Present        Hypothyroidism (Chronic) ICD-10-CM: E03.9  ICD-9-CM: 244.9  4/28/2015 - Present        RESOLVED: Postural hypotension ICD-10-CM: I95.1  ICD-9-CM: 458.0  8/9/2016 - 3/26/2017        RESOLVED: Chest pain ICD-10-CM: R07.9  ICD-9-CM: 786.50  8/8/2016 - 3/26/2017        RESOLVED: Nausea ICD-10-CM: R11.0  ICD-9-CM: 787.02  8/8/2016 - 3/26/2017        RESOLVED: S/P PTCA (percutaneous transluminal coronary angioplasty); LAD PTCA of  ISR 11/27/15 ICD-10-CM: Z98.61  ICD-9-CM: V45.82  5/24/2016 - 3/26/2017        RESOLVED: TIA (transient ischemic attack) ICD-10-CM: G45.9  ICD-9-CM: 435.9  2/10/2016 - 3/26/2017        RESOLVED: Edema ICD-10-CM: R60.9  ICD-9-CM: 782.3  12/29/2015 - 3/26/2017        RESOLVED: Anterior myocardial infarction ([facility] ICD-10-CM: I21.09  ICD-9-CM: 410.10  5/21/2015 - 3/26/2017        RESOLVED: STEMI (ST elevation myocardial infarction) ([facility] ICD-10-CM: I21.3  ICD-9-CM: 410.90  4/28/2015 - 2/1/2016              Plan:      CAD (coronary artery disease) (11/27/2015)/ S/P complex PCI / Unstable angina/ hypertension  - on ASA and Brilinta  - continue ranexa, statin  - demadex continued. Patient almost aneuric. Will discuss with nephrology need for demadex?  - 12/6 stable cardiac exam.  - 12/14 - continue norvasc/ metoprolol. BP in good range. - 12/15 - no dose change. BP WNL.    Acute/ Chronic anemia (11/18/2017) - continue to monitor.   - continue epogen.  - last hgb 8.4, (12/11). check today. - 12/15-> hgb 8.9 (12/14)     ESRD (end stage renal disease) On PD/ CRRT  - PD resumed. - monitor fluids status. Nephrology managing. Will follow at IRU.   - 11/30 problem with PD/ catheter. Nephrologist aware. Will need to hold PT due to femoral cath precautions. nephrologist to follow. May need to have tunneled cath. - 12/4 - continue TTS HD. PD per nephrology direction. - 12/6 Plan possibly to insert another PD tube. S/p Lt femoral perm catheter working well.  - 12/8 - continue HD per nephrology. PD on hold. LE edema slightly improved. .  - 12/13- continuing HD per nephrology direction. Malfunctioning PD catheter. Plan for PD catheter  replacement in OR on Friday. - 12/14 - continue HD, T, Thu, S schedule. Plan for PD catheter replacement tomorrow. - 12/15 - PD catheter revision, replacement  Rescheduled for MOnday. Pneumonia prophylaxis- Insentive spirometer every hour while awake. 12/15  remains asymptomatic. Continue IS.      DVT risk / DVT Prophylaxis- continue daily physician exam to assess for signs and symptoms as patient is at increased risk for of thromboembolism. Mobilization as tolerated. Intermittent pneumatic compression devices when in bed Thigh-high or knee-high thromboembolic deterrent hose when out of bed.   - on brillanta bId+ aspirin daily. continue SCDs. Pain Control: stable, mild-to-moderate joint symptoms intermittently, reasonably well controlled by PRN meds. Will require regular pain assessment and comprenhensive pain management. - has not required norco. Will d/c.   12/13 - no new pain source. 12/15 - has not needed pain medications.      Wound Care: Monitor wound status daily per staff and physician. At risk for failure. Will require 24/7 rehab nursing. Keep wound clean and dry - monitoring femoral cath site, appears benign. 12/15  - PD catheter and femoral cath site without signs of infection, drainage    bowel program - as needed     GERD - resume PPI. At times may need additional antacids, Maalox prn.  - continue sucralfate  Appears asymptomatic. Controlled.      Hypothyroid  - synthroid. No dose change.      Time spent was 25 minutes with over 1/2 in direct patient care/examination, consultation and coordination of care.      Signed By: Nicole Lacey MD     December 15, 2017

## 2017-12-16 PROCEDURE — 97110 THERAPEUTIC EXERCISES: CPT

## 2017-12-16 PROCEDURE — 65310000000 HC RM PRIVATE REHAB

## 2017-12-16 PROCEDURE — 97535 SELF CARE MNGMENT TRAINING: CPT

## 2017-12-16 PROCEDURE — 90935 HEMODIALYSIS ONE EVALUATION: CPT

## 2017-12-16 PROCEDURE — 74011250636 HC RX REV CODE- 250/636: Performed by: PHYSICAL MEDICINE & REHABILITATION

## 2017-12-16 PROCEDURE — 74011250637 HC RX REV CODE- 250/637: Performed by: PHYSICAL MEDICINE & REHABILITATION

## 2017-12-16 PROCEDURE — 5A1D70Z PERFORMANCE OF URINARY FILTRATION, INTERMITTENT, LESS THAN 6 HOURS PER DAY: ICD-10-PCS | Performed by: INTERNAL MEDICINE

## 2017-12-16 PROCEDURE — 99232 SBSQ HOSP IP/OBS MODERATE 35: CPT | Performed by: PHYSICAL MEDICINE & REHABILITATION

## 2017-12-16 RX ORDER — HYDROGEN PEROXIDE 3 %
SOLUTION, NON-ORAL MISCELLANEOUS AS NEEDED
Status: DISCONTINUED | OUTPATIENT
Start: 2017-12-16 | End: 2017-12-18 | Stop reason: HOSPADM

## 2017-12-16 RX ADMIN — CARBAMIDE PEROXIDE 6.5% 5 DROP: 6.5 LIQUID AURICULAR (OTIC) at 11:55

## 2017-12-16 RX ADMIN — ASPIRIN 81 MG: 81 TABLET, COATED ORAL at 08:58

## 2017-12-16 RX ADMIN — SUCRALFATE 1 G: 1 SUSPENSION ORAL at 16:10

## 2017-12-16 RX ADMIN — METOCLOPRAMIDE HYDROCHLORIDE 5 MG: 10 TABLET ORAL at 08:58

## 2017-12-16 RX ADMIN — ZINC 1 TABLET: TAB ORAL at 08:54

## 2017-12-16 RX ADMIN — SUCRALFATE 1 G: 1 SUSPENSION ORAL at 05:24

## 2017-12-16 RX ADMIN — Medication 400 MG: at 09:00

## 2017-12-16 RX ADMIN — METOCLOPRAMIDE HYDROCHLORIDE 5 MG: 10 TABLET ORAL at 17:05

## 2017-12-16 RX ADMIN — PANTOPRAZOLE SODIUM 40 MG: 40 TABLET, DELAYED RELEASE ORAL at 16:10

## 2017-12-16 RX ADMIN — SUCRALFATE 1 G: 1 SUSPENSION ORAL at 21:03

## 2017-12-16 RX ADMIN — TORSEMIDE 100 MG: 100 TABLET ORAL at 09:00

## 2017-12-16 RX ADMIN — CALCITRIOL 0.25 MCG: 0.25 CAPSULE, LIQUID FILLED ORAL at 09:00

## 2017-12-16 RX ADMIN — LEVOTHYROXINE SODIUM 150 MCG: 50 TABLET ORAL at 05:23

## 2017-12-16 RX ADMIN — RANOLAZINE 1000 MG: 500 TABLET, FILM COATED, EXTENDED RELEASE ORAL at 17:04

## 2017-12-16 RX ADMIN — CYANOCOBALAMIN 1000 MCG: 1000 INJECTION, SOLUTION INTRAMUSCULAR at 20:57

## 2017-12-16 RX ADMIN — ROSUVASTATIN CALCIUM 20 MG: 20 TABLET, FILM COATED ORAL at 20:56

## 2017-12-16 RX ADMIN — RANOLAZINE 1000 MG: 500 TABLET, FILM COATED, EXTENDED RELEASE ORAL at 09:00

## 2017-12-16 RX ADMIN — TORSEMIDE 100 MG: 100 TABLET ORAL at 17:06

## 2017-12-16 RX ADMIN — CARBAMIDE PEROXIDE 6.5% 5 DROP: 6.5 LIQUID AURICULAR (OTIC) at 17:07

## 2017-12-16 RX ADMIN — PANTOPRAZOLE SODIUM 40 MG: 40 TABLET, DELAYED RELEASE ORAL at 05:24

## 2017-12-16 NOTE — PROGRESS NOTES
Patient resting up in bed. Alert and oriented. Pleasant affect. Scheduled for dialysis today. Continues with generalized weakness. Lung sounds clear. S1S2, bowel sounds active. Complaint of hard of hearing to right ear. Denies any pain or discomfort at present time. Assessment completed. No other verbalized needs made known. See doc flow sheet for further assessments.

## 2017-12-16 NOTE — DIALYSIS
HD treatment completed without complication. Total of 3 kgs removed. Flushed both ports with 10 mL of NS.  VS stable, NAD. CVC dressing clean, dry, and intact, tego caps intact, bilateral lumens wrapped with 4x4 gauze. Transport contacted for return to room.

## 2017-12-16 NOTE — PROGRESS NOTES
Problem: Falls - Risk of  Goal: *Absence of Falls  Document Harrison Fall Risk and appropriate interventions in the flowsheet.    Outcome: Progressing Towards Goal  Fall Risk Interventions:  Mobility Interventions: Bed/chair exit alarm    Mentation Interventions: Bed/chair exit alarm    Medication Interventions: Patient to call before getting OOB, Teach patient to arise slowly    Elimination Interventions: Call light in reach, Patient to call for help with toileting needs, Toilet paper/wipes in reach, Toileting schedule/hourly rounds    History of Falls Interventions: Door open when patient unattended

## 2017-12-16 NOTE — PROGRESS NOTES
Problem: Falls - Risk of  Goal: *Absence of Falls  Document Harrison Fall Risk and appropriate interventions in the flowsheet.    Outcome: Progressing Towards Goal  Fall Risk Interventions:  Mobility Interventions: Communicate number of staff needed for ambulation/transfer, Patient to call before getting OOB    Mentation Interventions: Door open when patient unattended, Evaluate medications/consider consulting pharmacy    Medication Interventions: Patient to call before getting OOB    Elimination Interventions: Call light in reach    History of Falls Interventions: Door open when patient unattended, Consult care management for discharge planning

## 2017-12-16 NOTE — DIALYSIS
Treatment initiated using Left femoral CVC by Dani Williamson. Both ports aspirated and flushed without problems. Machine set per MD orders. Pt VS stable. Will continue to monitor.

## 2017-12-16 NOTE — PROGRESS NOTES
Jimmy Du Of Shift Functional Summary, Nursing        TOILETING:  Does patient need assist with clothing management and/or sidney care? No     TOILET TRANSFER:  Pt requires standby assistance/setup. Pt uses walker.     BLADDER:  Pt does not have a castillo catheter that staff manages. Pt does not take medication. Pt is continent. of bladder and voids in toilet    (An accident is when the episode is not contained in a brief AND/OR the clothing/linen requires changing/cleaning up.)     BOWEL:  Pt does take medication. Pt is continent of bowel and uses toilet.   (An accident is when the episode is not contained in a brief AND/OR the clothing/linen requires changing/cleaning up.)

## 2017-12-16 NOTE — PROGRESS NOTES
PHYSICAL THERAPY DAILY NOTE  Time In: 1030  Time Out: 1100  Patient Seen For: AM;Patient education; Therapeutic exercise    Subjective: \"I'm just really cold\"         Objective: Pt rates 0/10 pain before and after PT session. Pt complied wit therapy active participation in dialysis. Other (comment) (Fall Risk)  GROSS ASSESSMENT Daily Assessment            BED/MAT MOBILITY Daily Assessment            TRANSFERS Daily Assessment            GAIT Daily Assessment            STEPS or STAIRS Daily Assessment            BALANCE Daily Assessment            WHEELCHAIR MOBILITY Daily Assessment            LOWER EXTREMITY EXERCISES Daily Assessment   BLE heel cord stretch 3.x30 sec holds unilaterally Extremity: Both  Exercise Type #1: Supine lower extremity strengthening  Sets Performed: 2  Reps Performed: 15  Level of Assist: Stand-by assistance          Assessment: Pt displays decrease stamina and BLE strength, requiring rest breaks in between exercises, pt educated on signs and symptoms of CVA and expectation with home health PT up d/c.         Plan of Care: Continue with current POC to help pt achieve safe functional mobility.      Mildred Hutchins, PTA  12/16/2017

## 2017-12-16 NOTE — PROGRESS NOTES
End Of Shift Functional Summary, Nursing      TOILETING:  Does patient need assist with clothing management and/or pericare?  no    TOILET TRANSFER:  Pt requires minimal assistance. Pt uses walker. BLADDER:  Pt does not have a castillo catheter that staff manages. Pt does not take medication. Pt is continent. of bladder and voids in toilet  Pt requires staff to empty device Pt has had 0 bladder accidents during this shift requiring minimal assistance to clean up. (An accident is when the episode is not contained in a brief AND/OR the clothing/linen requires changing/cleaning up.)    BOWEL:  Pt does take medication. Pt is continent of bowel and uses toilet. Pt requires staff to position device    Pt has had 0 bowel accidents during this shift requiring minimal assistance from staff to clean up. (An accident is when the episode is not contained in a brief AND/OR the clothing/linen requires changing/cleaning up.)    BED/CHAIR TRANSFER  Pt requires minimal assistance. Patient requires the assistance of 1 staff member(s). Pt uses walker    BATHING                          EATING  Pt requires no assistance. Pt does not wear dentures. TUBE FEEDINGS:  Pt does not  receive nutrition through tube feedings. Patient requires no assistance with feedings. Documentation reviewed and plan of care discussed/reviewed with   patient, physician, therapists, oncoming nurse, patient assistant and family/spouse during the shift.

## 2017-12-16 NOTE — PROGRESS NOTES
12/16/17 0821   Time Spent With Patient   Time In 0650   Time Out 0759   Patient Seen For: AM;ADLs   Feeding/Eating   Feeding/Eating Assistance NT   Grooming   Grooming Assistance  Mod I   Upper Body Bathing   Bathing Assistance, Upper Mod I   Position Performed Seated in chair   Comments Completed at sink in wc   Lower Body Bathing   Bathing Assistance, Lower  [address] handled sponge   Position Performed Seated in chair;Standing   [address] handled sponge   Comments Seated in chair at sink. LHS for washing and lotion with set up. SBA for standing   Upper Body Dressing    Comments set up    Lower Body Dressing    Dressing Assistance  Mod A   Leg Crossed Method Used No   Position Performed Seated edge of bed;Standing   Comments therapist donned pants due to time constraints for dialysis   Pt in bed upon arrival.  Agreed to wash up prior to dialysis. Completed all grooming/hygiene with mod I. Washed UB with set up. LB with mod A due to time constraints with dialysis. Good bed mobility. Transfer with SBA for safety.   Continue POC.     12/16/2017  Gabriela Eckert, Xochilt Booth

## 2017-12-16 NOTE — PROGRESS NOTES
Jacque Claudio MD,   Medical Director  [address] [facility], [address] [facility]  Tel: [phone]       D PROGRESS NOTE    Harry Luna  Admit Date: 11/29/2017  Admit Diagnosis: DEBILITY; Renal failure (ARF), acute on chronic ([facility] [address] Ondina Pang*    Subjective     Working with OT, just showered. C/o right ear hurting and decreased hearing. No cp, sob, n/v. Tolerating dialysis and will go this a.m.  Great spirits    Objective:     Current Facility-Administered Medications   Medication Dose Route Frequency    hydrogen peroxide 3 %   Topical PRN    carbamide peroxide (DEBROX) 6.5 % otic solution 5 Drop  5 Drop Right Ear BID    rosuvastatin (CRESTOR) tablet 20 mg  20 mg Oral QHS    sucralfate (CARAFATE) 100 mg/mL oral suspension 1 g  1 g Oral AC&HS    polyethylene glycol (MIRALAX) packet 17 g  17 g Oral DAILY    acetaminophen (TYLENOL) tablet 650 mg  650 mg Oral Q4H PRN    HYDROcodone-acetaminophen (NORCO) 5-325 mg per tablet 1 Tab  1 Tab Oral Q4H PRN    LORazepam (ATIVAN) tablet 1 mg  1 mg Oral Q6H PRN    sodium chloride (NS) flush 5-10 mL  5-10 mL IntraVENous PRN    amLODIPine (NORVASC) tablet 5 mg  5 mg Oral DAILY    aspirin delayed-release tablet 81 mg  81 mg Oral DAILY    calcitRIOL (ROCALTROL) capsule 0.25 mcg  0.25 mcg Oral DAILY    cyanocobalamin (VITAMIN B12) injection 1,000 mcg  1,000 mcg IntraMUSCular Q7D    epoetin toya (EPOGEN;PROCRIT) injection 20,000 Units  20,000 Units SubCUTAneous Q MON, WED & FRI    gentamicin (GARAMYCIN) 0.1 % cream   Topical DAILY PRN    levothyroxine (SYNTHROID) tablet 150 mcg  150 mcg Oral ACB    linaclotide (LINZESS) capsule 145 mcg (Patient Supplied)  145 mcg Oral DAILY    magnesium oxide (MAG-OX) tablet 400 mg  400 mg Oral DAILY    metoclopramide HCl (REGLAN) tablet 5 mg  5 mg Oral BID    metoprolol tartrate (LOPRESSOR) tablet 12.5 mg  12.5 mg Oral DAILY    multivitamin w ZN (STRESSTABS W ZINC) tablet  1 Tab Oral DAILY    nitroglycerin (NITROSTAT) tablet 0.4 mg  0.4 mg SubLINGual PRN    ondansetron (ZOFRAN ODT) tablet 4 mg  4 mg Oral TID PRN    pantoprazole (PROTONIX) tablet 40 mg  40 mg Oral ACB&D    polyethylene glycol (MIRALAX) packet 17 g  17 g Oral DAILY PRN    promethazine (PHENERGAN) tablet 25 mg  25 mg Oral Q6H PRN    ranolazine ER (RANEXA) tablet 1,000 mg  1,000 mg Oral BID    senna-docusate (PERICOLACE) 8.6-50 mg per tablet 1 Tab  1 Tab Oral DAILY    sevelamer (RENAGEL) tablet 800 mg  800 mg Oral TID WITH MEALS    torsemide (DEMADEX) tablet 100 mg  100 mg Oral BID     Review of Systems:Denies chest pain, shortness of breath, cough, headache, visual problems, abdominal pain, dysurea, calf pain. Pertinent positives are as noted in the medical records and unremarkable otherwise. Visit Vitals    /90    Pulse 86    Temp 97.8 °F (36.6 °C)    Resp 18    Wt 205 lb 12.8 oz (93.4 kg)    SpO2 92%    BMI 29.53 kg/m2        Physical Exam:   General: Alert and age appropriately oriented. No acute cardio respiratory distress. HEENT: Normocephalic,no scleral icterus  Oral mucosa moist without cyanosis; R. Ear with cerumen impaction, hard dried cerumen   Lungs: Clear to auscultation  bilaterally. Respiration even and unlabored   Heart: Regular rate and rhythm, S1, S2   No  murmurs, clicks, rub or gallops   Abdomen: Soft, non-tender, slightly distended. Bowel sounds present. No organomegaly. obese    Peritoneal cath in place, no eryth or exudate   Neuromuscular:      Generalized prox> distal weakness  Non focal, sens appears intact   Skin/extremity: No rashes, no erythema. No calf tenderness BLE  1+ pedal edema.                                                                              Functional Assessment:  Gross Assessment  AROM: Generally decreased, functional (12/15/17 1200)  Strength: Generally decreased, functional (12/15/17 1200)  Coordination: Generally decreased, functional (12/15/17 1200)       Balance  Sitting - Static: Good (unsupported) (12/15/17 1500)  Sitting - Dynamic: Good (unsupported) (12/15/17 1500)  Standing - Static: Good (w/ B UE support) (12/15/17 1500)  Standing - Dynamic : Impaired (12/15/17 1500)           Toileting  Adaptive Equipment: Grab bars; Verita Lorna (12/15/17 0911)         [facility] Fall Risk Assessment:  [facility] Fall Risk  Mobility: Ambulates or transfers with assist devices or assistance/unsteady gait (12/15/17 2013)  Mobility Interventions: Bed/chair exit alarm (12/15/17 2013)  Mentation: Alert, oriented x 3 (12/15/17 2013)  Mentation Interventions: Bed/chair exit alarm (12/15/17 2013)  Medication: Patient receiving anticonvulsants, sedatives(tranquilizers), psychotropics or hypnotics, hypoglycemics, narcotics, sleep aids, antihypertensives, laxatives, or diuretics (12/15/17 2013)  Medication Interventions: Patient to call before getting OOB; Teach patient to arise slowly (12/15/17 2013)  Elimination: Needs assistance with toileting (12/15/17 2013)  Elimination Interventions: Call light in reach; Patient to call for help with toileting needs; Toilet paper/wipes in reach; Toileting schedule/hourly rounds (12/15/17 2013)  Prior Fall History: No (12/15/17 2013)  History of Falls Interventions: Door open when patient unattended (12/15/17 2013)  Total Score: 3 (12/15/17 2013)  Standard Fall Precautions: Yes (12/12/17 0715)  High Fall Risk: Yes (12/15/17 2013)     Speech Assessment:         Ambulation:  Gait  Base of Support: Widened (12/15/17 1200)  Speed/Michelle: Slow;Shuffled (12/15/17 1200)  Step Length: Right shortened;Left shortened (12/15/17 1200)  Stance: Right increased; Left increased (12/15/17 1200)  Gait Abnormalities: Trunk sway increased; Step to gait; Decreased step clearance (12/15/17 1200)  Distance (ft): 60 Feet (ft) (12/15/17 1500)  Assistive Device: Walker, rolling;Gait belt (12/15/17 1500)  Curbs/Ramps: Minimum assistance (12/07/17 1400)     Labs/Studies:  Recent Results (from the past 72 hour(s))   CBC WITH AUTOMATED DIFF    Collection Time: 12/14/17  9:16 AM   Result Value Ref Range    WBC 6.2 4.3 - 11.1 K/uL    RBC 3.02 (L) 4.05 - 5.25 M/uL    HGB 8.9 (L) 11.7 - 15.4 g/dL    HCT 29.8 (L) 35.8 - 46.3 %    MCV 98.7 (H) 79.6 - 97.8 FL    MCH 29.5 26.1 - 32.9 PG    MCHC 29.9 (L) 31.4 - 35.0 g/dL    RDW 19.3 (H) 11.9 - 14.6 %    PLATELET 751 451 - 210 K/uL    MPV 9.3 (L) 10.8 - 14.1 FL    DF AUTOMATED      NEUTROPHILS 66 43 - 78 %    LYMPHOCYTES 20 13 - 44 %    MONOCYTES 11 4.0 - 12.0 %    EOSINOPHILS 3 0.5 - 7.8 %    BASOPHILS 0 0.0 - 2.0 %    IMMATURE GRANULOCYTES 0 0.0 - 5.0 %    ABS. NEUTROPHILS 4.0 1.7 - 8.2 K/UL    ABS. LYMPHOCYTES 1.2 0.5 - 4.6 K/UL    ABS. MONOCYTES 0.7 0.1 - 1.3 K/UL    ABS. EOSINOPHILS 0.2 0.0 - 0.8 K/UL    ABS. BASOPHILS 0.0 0.0 - 0.2 K/UL    ABS. IMM.  GRANS. 0.0 0.0 - 0.5 K/UL       Assessment:     Problem List as of 12/16/2017  Date Reviewed: 3/2/2017          Codes Class Noted - Resolved    Weakness of both legs ICD-10-CM: R29.898  ICD-9-CM: 729.89  11/29/2017 - Present        Renal failure (ARF), acute on chronic ([facility] [address] ICD-10-CM: N17.9, N18.9  ICD-9-CM: 584.9, 585.9  11/29/2017 - Present        Elevated troponin ICD-10-CM: R74.8  ICD-9-CM: 790.6  11/18/2017 - Present        Chest pain ICD-10-CM: R07.9  ICD-9-CM: 786.50  11/18/2017 - Present        CKD (chronic kidney disease) ICD-10-CM: N18.9  ICD-9-CM: 585.9  11/18/2017 - Present        Chronic anemia ICD-10-CM: D64.9  ICD-9-CM: 285.9  11/18/2017 - Present        ESRD (end stage renal disease) ([facility] ICD-10-CM: N18.6  ICD-9-CM: 585.6  11/18/2017 - Present        Abdominal pain ICD-10-CM: R10.9  ICD-9-CM: 789.00  9/18/2017 - Present        H/O TIA (transient ischemic attack) and stroke ICD-10-CM: Z86.73  ICD-9-CM: V12.54  4/13/2017 - Present        S/P PTCA (percutaneous transluminal coronary angioplasty) ICD-10-CM: Z98.61  ICD-9-CM: V45.82  4/13/2017 - Present        Mixed hyperlipidemia ICD-10-CM: E78.2  ICD-9-CM: 272.2  4/13/2017 - Present        Vision changes ICD-10-CM: H53.9  ICD-9-CM: 368.9  3/26/2017 - Present        Dizziness ICD-10-CM: R42  ICD-9-CM: 780.4  3/26/2017 - Present        Refusal of blood transfusions as patient is [employer] (Chronic) ICD-10-CM: Z53.1  ICD-9-CM: V62.6  8/25/2016 - Present        GERD (gastroesophageal reflux disease) ICD-10-CM: K21.9  ICD-9-CM: 530.81  8/8/2016 - Present        Unspecified sleep apnea ICD-10-CM: G47.30  ICD-9-CM: 780.57  8/8/2016 - Present    Overview Signed 8/8/2016 11:09 AM by Donna Sun     uses cpap machine             CVA (cerebral vascular accident) Hx of TIA ICD-10-CM: I63.9  ICD-9-CM: 434.91  2/22/2016 - Present        Unstable angina ([address] ICD-10-CM: I20.0  ICD-9-CM: 411.1  2/1/2016 - Present        ESRD on peritoneal dialysis [facility]) (Chronic) ICD-10-CM: N18.6, Z99.2  ICD-9-CM: 585.6, V45.11  2/1/2016 - Present        Ischemic cardiomyopathy ICD-10-CM: I25.5  ICD-9-CM: 414.8  12/29/2015 - Present        HLD (hyperlipidemia) ICD-10-CM: E78.5  ICD-9-CM: 272.4  12/29/2015 - Present        Depression ICD-10-CM: F32.9  ICD-9-CM: 936  12/29/2015 - Present        CAD (coronary artery disease) ICD-10-CM: I25.10  ICD-9-CM: 414.00  11/27/2015 - Present        Debility ICD-10-CM: R53.81  ICD-9-CM: 799.3  5/5/2015 - Present        Hypertension (Chronic) ICD-10-CM: I10  ICD-9-CM: 401.9  4/28/2015 - Present        Anemia of chronic renal failure (Chronic) ICD-10-CM: N18.9, D63.1  ICD-9-CM: 285.21, 585.9  4/28/2015 - Present        Hypothyroidism (Chronic) ICD-10-CM: E03.9  ICD-9-CM: 244.9  4/28/2015 - Present        RESOLVED: Postural hypotension ICD-10-CM: I95.1  ICD-9-CM: 458.0  8/9/2016 - 3/26/2017        RESOLVED: Chest pain ICD-10-CM: R07.9  ICD-9-CM: 786.50  8/8/2016 - 3/26/2017        RESOLVED: Nausea ICD-10-CM: R11.0  ICD-9-CM: 787.02  8/8/2016 - 3/26/2017        RESOLVED: S/P PTCA (percutaneous transluminal coronary angioplasty); LAD PTCA of  ISR 11/27/15 ICD-10-CM: Z98.61  ICD-9-CM: V45.82  5/24/2016 - 3/26/2017        RESOLVED: TIA (transient ischemic attack) ICD-10-CM: G45.9  ICD-9-CM: 435.9  2/10/2016 - 3/26/2017        RESOLVED: Edema ICD-10-CM: R60.9  ICD-9-CM: 782.3  12/29/2015 - 3/26/2017        RESOLVED: Anterior myocardial infarction [facility]) ICD-10-CM: I21.09  ICD-9-CM: 410.10  5/21/2015 - 3/26/2017        RESOLVED: STEMI (ST elevation myocardial infarction) ([facility] [address] ICD-10-CM: I21.3  ICD-9-CM: 410.90  4/28/2015 - 2/1/2016              Plan:     Debility s/p Unstable Angina/ CAD x NSTEMI  S/p C with Stents with multiple premorbid comorbidities    1. Continue interdisc rehab efforts, endurance and mobility progressing     2. ESRD; on temporary HD via perma cath ; had PD cath dysfxn; Dialysis T,TH,Sat; tolerating well  -will have PD cath revision/placement on 12/18    3. R ear Cerumen impaction; no sign of infection but cannot visualize tympanic membrane. Peroxide this a.m and begin debrox bid x 4d    4. CAD; cont DAPT, life long    11. Chronic hx of Anemia due to ESRD and hx of prior GI bleed; Jerre Longs witness thus no blood products. On WAYNE and iv iron. cont  b12; monitor; 8.9 on 12/14. 6. HTN; controlled with betablk, and Norvasc  7. HLD on Crestor. 8. Hypothyroidism; on synthroid  9. DVT prev; on Ranexa and ASA. SCDs  10. GERD/PUD; cont Protonix bid    Time spent was 25 minutes with over 1/2 in direct patient care/examination, consultation and coordination of care.      Signed By: Kelsie Alicea MD     December 16, 2017

## 2017-12-16 NOTE — PROGRESS NOTES
Hemodialysis Rounding Note    Subjective:     Dipti Medina is a 80 y.o.  who presents with DEBILITY  Renal failure (ARF), acute on chronic (HCC)  Weakness of both legs. The patient is dialyzing utilizing the following method:Intermittent Hemodialysis  Artificial Kidney Dialyzer/Set Up Inspection: Revaclear Max   hours Duration of Treatment (hours): 4 hours   blood flow rate Blood Flow Rate (ml/min): 350 ml/min   dialysate rate     ultrafiltration Goal/Amount of Fluid to Remove (mL): 3600 mL   Heparin is used during the dialysis treatment. Complaints none.      Current Facility-Administered Medications   Medication Dose Route Frequency    hydrogen peroxide 3 %   Topical PRN    carbamide peroxide (DEBROX) 6.5 % otic solution 5 Drop  5 Drop Right Ear BID    rosuvastatin (CRESTOR) tablet 20 mg  20 mg Oral QHS    sucralfate (CARAFATE) 100 mg/mL oral suspension 1 g  1 g Oral AC&HS    polyethylene glycol (MIRALAX) packet 17 g  17 g Oral DAILY    acetaminophen (TYLENOL) tablet 650 mg  650 mg Oral Q4H PRN    HYDROcodone-acetaminophen (NORCO) 5-325 mg per tablet 1 Tab  1 Tab Oral Q4H PRN    LORazepam (ATIVAN) tablet 1 mg  1 mg Oral Q6H PRN    sodium chloride (NS) flush 5-10 mL  5-10 mL IntraVENous PRN    amLODIPine (NORVASC) tablet 5 mg  5 mg Oral DAILY    aspirin delayed-release tablet 81 mg  81 mg Oral DAILY    calcitRIOL (ROCALTROL) capsule 0.25 mcg  0.25 mcg Oral DAILY    cyanocobalamin (VITAMIN B12) injection 1,000 mcg  1,000 mcg IntraMUSCular Q7D    epoetin toya (EPOGEN;PROCRIT) injection 20,000 Units  20,000 Units SubCUTAneous Q MON, WED & FRI    gentamicin (GARAMYCIN) 0.1 % cream   Topical DAILY PRN    levothyroxine (SYNTHROID) tablet 150 mcg  150 mcg Oral ACB    linaclotide (LINZESS) capsule 145 mcg (Patient Supplied)  145 mcg Oral DAILY    magnesium oxide (MAG-OX) tablet 400 mg  400 mg Oral DAILY    metoclopramide HCl (REGLAN) tablet 5 mg  5 mg Oral BID    metoprolol tartrate (LOPRESSOR) tablet 12.5 mg  12.5 mg Oral DAILY    multivitamin w ZN (STRESSTABS W ZINC) tablet  1 Tab Oral DAILY    nitroglycerin (NITROSTAT) tablet 0.4 mg  0.4 mg SubLINGual PRN    ondansetron (ZOFRAN ODT) tablet 4 mg  4 mg Oral TID PRN    pantoprazole (PROTONIX) tablet 40 mg  40 mg Oral ACB&D    polyethylene glycol (MIRALAX) packet 17 g  17 g Oral DAILY PRN    promethazine (PHENERGAN) tablet 25 mg  25 mg Oral Q6H PRN    ranolazine ER (RANEXA) tablet 1,000 mg  1,000 mg Oral BID    senna-docusate (PERICOLACE) 8.6-50 mg per tablet 1 Tab  1 Tab Oral DAILY    sevelamer (RENAGEL) tablet 800 mg  800 mg Oral TID WITH MEALS    torsemide (DEMADEX) tablet 100 mg  100 mg Oral BID        0701 -  1900  In: -   Out: 3000        No results found for this or any previous visit (from the past 24 hour(s)). Review of Systems  A comprehensive review of systems was negative except for that written in the HPI. .    Objective:     Blood pressure 149/87, pulse 76, temperature 97.8 °F (36.6 °C), resp. rate 18, weight 93.4 kg (205 lb 12.8 oz), SpO2 92 %. Temp (24hrs), Av.1 °F (36.7 °C), Min:97.8 °F (36.6 °C), Max:98.3 °F (36.8 °C)      Physical Exam:   Neuro: alert, cooperative, no distress, appears stated age, Skin: Skin color, texture, turgor normal. No rashes or lesions, Neck: supple, symmetrical, trachea midline, no adenopathy, thyroid: not enlarged, symmetric, no tenderness/mass/nodules, no carotid bruit and no JVD, Lungs: clear to auscultation bilaterally, Cardiac: regular rate and rhythm, S1, S2 normal, no murmur, click, rub or gallop, Abdomen: soft, non-tender. Bowel sounds normal. No masses,  no organomegaly and Extremities: extremities normal, atraumatic, no cyanosis or edema      Assessment:     End Stage Renal Disease:  Patient is tolerating dialysis treatment well. .  Additionally the patient has experienced normal dialysis treatment during dialysis. Dry weight   same.     Anemia:    Recent Labs      12/14/17   0916   HCT  29.8*   HGB  8.9*       Renal Metabolic Bone Disease:    No results for input(s): CA, ALB, PTH in the last 72 hours.     No lab exists for component: PO4                     Treatment:  PO4 binders, Cinacaleat, Vitamin D  Hypertension: controlled    Access: adequate monitoring/no changes     Active Problems:    Weakness of both legs (11/29/2017)      Renal failure (ARF), acute on chronic (HCC) (11/29/2017)           Plan:     Continue scheduled dialysis     S/p Lt femoral perm cath - working well

## 2017-12-17 PROCEDURE — 99232 SBSQ HOSP IP/OBS MODERATE 35: CPT | Performed by: PHYSICAL MEDICINE & REHABILITATION

## 2017-12-17 PROCEDURE — 65310000000 HC RM PRIVATE REHAB

## 2017-12-17 PROCEDURE — 74011250637 HC RX REV CODE- 250/637: Performed by: PHYSICAL MEDICINE & REHABILITATION

## 2017-12-17 PROCEDURE — 97110 THERAPEUTIC EXERCISES: CPT

## 2017-12-17 PROCEDURE — 97116 GAIT TRAINING THERAPY: CPT

## 2017-12-17 RX ADMIN — CARBAMIDE PEROXIDE 6.5% 5 DROP: 6.5 LIQUID AURICULAR (OTIC) at 08:45

## 2017-12-17 RX ADMIN — METOCLOPRAMIDE HYDROCHLORIDE 5 MG: 10 TABLET ORAL at 17:42

## 2017-12-17 RX ADMIN — SUCRALFATE 1 G: 1 SUSPENSION ORAL at 05:31

## 2017-12-17 RX ADMIN — TORSEMIDE 100 MG: 100 TABLET ORAL at 08:42

## 2017-12-17 RX ADMIN — AMLODIPINE BESYLATE 5 MG: 5 TABLET ORAL at 08:44

## 2017-12-17 RX ADMIN — ZINC 1 TABLET: TAB ORAL at 08:50

## 2017-12-17 RX ADMIN — LEVOTHYROXINE SODIUM 150 MCG: 50 TABLET ORAL at 05:30

## 2017-12-17 RX ADMIN — CARBAMIDE PEROXIDE 6.5% 5 DROP: 6.5 LIQUID AURICULAR (OTIC) at 17:43

## 2017-12-17 RX ADMIN — ROSUVASTATIN CALCIUM 20 MG: 20 TABLET, FILM COATED ORAL at 21:55

## 2017-12-17 RX ADMIN — Medication 400 MG: at 08:44

## 2017-12-17 RX ADMIN — CALCITRIOL 0.25 MCG: 0.25 CAPSULE, LIQUID FILLED ORAL at 08:41

## 2017-12-17 RX ADMIN — SUCRALFATE 1 G: 1 SUSPENSION ORAL at 12:02

## 2017-12-17 RX ADMIN — RANOLAZINE 1000 MG: 500 TABLET, FILM COATED, EXTENDED RELEASE ORAL at 17:41

## 2017-12-17 RX ADMIN — RANOLAZINE 1000 MG: 500 TABLET, FILM COATED, EXTENDED RELEASE ORAL at 08:40

## 2017-12-17 RX ADMIN — ASPIRIN 81 MG: 81 TABLET, COATED ORAL at 08:42

## 2017-12-17 RX ADMIN — SUCRALFATE 1 G: 1 SUSPENSION ORAL at 16:29

## 2017-12-17 RX ADMIN — TORSEMIDE 100 MG: 100 TABLET ORAL at 17:42

## 2017-12-17 RX ADMIN — METOCLOPRAMIDE HYDROCHLORIDE 5 MG: 10 TABLET ORAL at 08:44

## 2017-12-17 RX ADMIN — METOPROLOL TARTRATE 12.5 MG: 25 TABLET ORAL at 08:42

## 2017-12-17 RX ADMIN — PANTOPRAZOLE SODIUM 40 MG: 40 TABLET, DELAYED RELEASE ORAL at 16:29

## 2017-12-17 RX ADMIN — PANTOPRAZOLE SODIUM 40 MG: 40 TABLET, DELAYED RELEASE ORAL at 05:30

## 2017-12-17 NOTE — PROGRESS NOTES
Patient resting up in bed. Alert and oriented. Lung sounds clear. S1S2, bowel sounds active. Denies any pain or discomfort. Reminded patient to be NPO after mid night for procedure tomorrow. Repositioned up in bed and assisted to bathroom with walker. Tolerated well. Assessment completed. No other verbalized needs. See doc flow sheet for further assessments.

## 2017-12-17 NOTE — PROGRESS NOTES
Problem: Falls - Risk of  Goal: *Absence of Falls  Document [facility] Fall Risk and appropriate interventions in the flowsheet.    Outcome: Progressing Towards Goal  Fall Risk Interventions:  Mobility Interventions: Patient to call before getting OOB    Mentation Interventions: Door open when patient unattended, Eyeglasses and hearing aids, More frequent rounding, Toileting rounds, Update white board    Medication Interventions: Patient to call before getting OOB, Teach patient to arise slowly    Elimination Interventions: Call light in reach, Patient to call for help with toileting needs, Toilet paper/wipes in reach, Toileting schedule/hourly rounds    History of Falls Interventions: Door open when patient unattended

## 2017-12-17 NOTE — PROGRESS NOTES
End Of Shift Functional Summary, Nursing      TOILETING:  Does patient not need assist with clothing management and/or pericare? Yes: Comment: stand by assist.    TOILET TRANSFER:  Pt requires standby assistance/setup. Pt uses walker. BLADDER:  Pt does not have a castillo catheter that staff manages. Pt does not take medication. Pt is continent. of bladder and voids in toilet  Pt requires staff to empty device Pt has had 0 bladder accidents during this shift requiring standby assistance/setup to clean up. (An accident is when the episode is not contained in a brief AND/OR the clothing/linen requires changing/cleaning up.)    BOWEL:  Pt does take medication. Pt is continent of bowel and uses toilet. Pt requires staff to empty device    Pt has had 0 bowel accidents during this shift requiring standby assistance/setup from staff to clean up. (An accident is when the episode is not contained in a brief AND/OR the clothing/linen requires changing/cleaning up.)                                    EATING  Pt requires no assistance. Pt does not wear dentures. TUBE FEEDINGS:  Pt does not  receive nutrition through tube feedings. Patient requires no assistance with feedings. Documentation reviewed and plan of care discussed/reviewed with   patient, physician, therapists, oncoming nurse, patient assistant and family/spouse during the shift.

## 2017-12-17 NOTE — PROGRESS NOTES
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 She has a history of GI bleeding and had anemia-induced unstable angina in the past. 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 She states the pain has since resolved with NTG paste.  No fevers or chills. No nausea, vomiting or diarrhea.  She states that she is essentially anuric and occasionally makes minimal urine.  She has a h/o CVA and TIA. No fever or chills, no problems with PD drainage or discoloration of PD fluid. No increased thirst. She wishes regular diet and supplement. She denies any other acute complaints  Her edema has improved in the past couple of days with intensified dialysis. s-no complaints .  Getting better with rehab     Past Medical History:   Diagnosis Date    Anemia of chronic renal failure 4/28/2015    Anterior myocardial infarction [facility]) 5/21/2015    CAD (coronary artery disease)     Chest pain     Chronic kidney disease, stage III (moderate) 8/15/2014    on dialysis    CKD (chronic kidney disease) stage 4, GFR 15-29 ml/min ([facility]) 4/28/2015    Debility 5/5/2015    Depression 12/29/2015    Edema 12/29/2015    Endocrine disease     Hypothyroidism    GERD (gastroesophageal reflux disease)     Heart murmur 12/29/2015    HLD (hyperlipidemia) 12/29/2015    Hypertension     Hypothyroidism 4/28/2015    Ischemic cardiomyopathy 12/29/2015    Nausea     S/P PTCA (percutaneous transluminal coronary angioplasty); LAD PTCA of  ISR 11/27/15 5/24/2016    STEMI (ST elevation myocardial infarction) ([facility] [address] 4/28/2015    Unspecified sleep apnea     uses cpap machine    Unstable angina ([facility] [address]       Past Surgical History: Procedure Laterality Date    HX BACK SURGERY  1990    neck surgery cervical disc    HX BACK SURGERY      lower back    HX CATARACT REMOVAL Bilateral     HX CHOLECYSTECTOMY  19702    gall bladder     HX KNEE REPLACEMENT Right 2006    HX PTCA  4/28/2015    2.25 Xience stent to mid LAD for occluded artery, anterior MI, EF 25%. Moderate disease distal LAD and distal OM PCI CX and RCA 2004, then PCI RCA and LAD in 2009. Prior to Admission medications    Medication Sig Start Date End Date Taking? Authorizing Provider   metoprolol tartrate (LOPRESSOR) 25 mg tablet Take 0.5 Tabs by mouth daily. 11/27/17  Yes MINDY Chatman   amLODIPine (NORVASC) 5 mg tablet Take 1 Tab by mouth daily. 11/27/17  Yes MINDY Chatman   torsemide (DEMADEX) 100 mg tablet Take 1 Tab by mouth two (2) times a day. 11/27/17  Yes MINDY Chatman   pantoprazole (PROTONIX) 40 mg tablet Take 1 Tab by mouth Before breakfast and dinner. 11/27/17  Yes MINDY Chatman   magnesium oxide (MAG-OX) 400 mg tablet Take 400 mg by mouth daily. Yes Historical Provider   metoclopramide HCl (REGLAN) 5 mg tablet Take 5 mg by mouth two (2) times a day. Yes Historical Provider   ticagrelor (BRILINTA) 90 mg tablet Take 1 Tab by mouth two (2) times a day. 2/4/16  Yes MINDY Chatman   aspirin delayed-release 81 mg tablet Take 1 Tab by mouth daily. 5/5/15  Yes Gisela Hollingsworth PA-C   rosuvastatin (CRESTOR) 20 mg tablet Take 20 mg by mouth nightly. Yes Historical Provider   levothyroxine (SYNTHROID) 150 mcg tablet Take 150 mcg by mouth Daily (before breakfast). Yes Historical Provider   cyanocobalamin (VITAMIN B12) 1,000 mcg/mL injection 1 mL by IntraMUSCular route every seven (7) days. Indications: Vitamin B12 Deficiency 12/2/17   MINDY Link   folic acid (FOLVITE) 1 mg tablet Take 1 mg by mouth daily.     Historical Provider   potassium chloride (K-DUR, KLOR-CON) 20 mEq tablet Take 20 mEq by mouth two (2) times a day. Historical Provider   traZODone (DESYREL) 50 mg tablet Take  by mouth nightly. Historical Provider   megestrol (MEGACE) 400 mg/10 mL (40 mg/mL) suspension Take 200 mg by mouth daily. Historical Provider   sucralfate (CARAFATE) 100 mg/mL suspension Take 10 mL by mouth Before breakfast, lunch, dinner and at bedtime. Indications: PREVENTION OF STRESS ULCER 9/23/17   Orvis Draft, DO   nitroglycerin (NITROLINGUAL) 400 mcg/spray spray 1 Spray by SubLINGual route every five (5) minutes as needed for Chest Pain. Historical Provider   linaclotide Gail Brood) 145 mcg cap capsule Take  by mouth Daily (before breakfast). Historical Provider   PNV NO.122/IRON/FOLIC ACID (PRENATAL MULTI PO) Take  by mouth. Historical Provider   ondansetron (ZOFRAN ODT) 4 mg disintegrating tablet Take 4 mg by mouth three (3) times daily as needed. Historical Provider   sevelamer carbonate (RENVELA) 800 mg tab tab Take 800 mg by mouth three (3) times daily (with meals). Historical Provider   gentamicin (GARAMYCIN) 0.1 % topical cream APPLY TO PD catheter exit site at daily dressing change 5/12/15   Alton Hill MD   darbepoetin toya in polysorbat (ARANESP, POLYSORBATE,) 40 mcg/mL injection 40 mcg by SubCUTAneous route every fourteen (14) days. Indications: ANEMIA IN CHRONIC KIDNEY DISEASE    Historical Provider   ranolazine ER (RANEXA) 500 mg SR tablet Take 500 mg by mouth two (2) times a day.     Historical Provider     Allergies   Allergen Reactions    Codeine Nausea and Vomiting      Social History   Substance Use Topics    Smoking status: Never Smoker    Smokeless tobacco: Never Used    Alcohol use No      Family History   Problem Relation Age of Onset    Heart Disease Mother     Hypertension Mother     Cancer Mother      Lung    Stroke Father     Hypertension Father     Breast Cancer Neg Hx           Review of Systems    Constitutional: no fever, weak  Eyes: fair vision,    Ears, nose, mouth, throat, and face:fair hearing,   Respiratory: no asthma,  Chronic home O2 is being used - improved dyspnea  Cardiovascular:no palpitation, no  chest pain,   Gastrointestinal:no diarrhea,  Had BM today  Genitourinary: no dysuria,   Hematologic/lymphatic: no bleeding tendency,   Neurological: no seizures   Behvioral/Psych: no psych hospitalization   Endocrine: no goiter,       Objective:       Visit Vitals    /89    Pulse 85    Temp 98 °F (36.7 °C)    Resp 16    Wt 92.3 kg (203 lb 6.4 oz)    SpO2 97%    BMI 29.18 kg/m2       General:  Alert, cooperative, no distress, appears stated age. Head:  Normocephalic, without obvious abnormality, atraumatic. Eyes:  Conjunctivae/corneas clear. EOMs intact. Throat: Lips, mucosa, and tongue normal. Teeth and gums normal.   Neck: Supple, symmetrical, trachea midline, no adenopathy,  no JVD. Lungs:   Clear to auscultation bilaterally. Heart:  Regular rate and rhythm, S1, S2 normal, 2/6 systolic  murmur, no rub or gallop. Abdomen:   Soft, non-tender. Distended . No masses,  No organomegaly. PD catheter in place without exudate   Extremities: Extremities normal, atraumatic, no cyanosis. 3+edema. Skin: Skin color, texture, turgor normal. No rashes or lesions. Lymph nodes: Cervical and supraclavicular nodes normal.   Neurologic: Grossly intact. No asterixis. Data Review:       No results found for this or any previous visit (from the past 24 hour(s)). CXR viewed by me - no major infiltrate or fluid excess, CM with left possible minor effusion is present        CT abdomen/pelvis  CT ABDOMEN:  Peritoneal fluid in the perihepatic space, mild paracolic gutters,  extending down into the pelvis. Peritoneal dialysis catheter noted. There is not  any evidence of retroperitoneal hematoma identified. Strandy fluid density does  extend into the extraperitoneal space in the lower abdomen and pelvis.  There is  radiodensity within the renal collecting systems, possibly previously  administered IV contrast. There is peripancreatic fluid and stranding, cannot  exclude acute pancreatitis. No biliary dilatation. Spleen is not enlarged. Small  bowel normal caliber.   CT PELVIS:   Moderate to large volume pelvic fluid.   There is diffuse asymmetric enlargement of the right rectus and oblique  abdominal muscles. There are are of higher attenuation the contralateral side  and findings are consistent with an abdominal wall hematoma. IMPRESSION:    1. Evidence of right abdominal wall hematoma. 2. No CT evidence to suggest retroperitoneal hematoma. 3. Extensive fluid in the abdomen and pelvis, presumed related to peritoneal  Dialysis. KUB - PD catheter still unchanged compared to last week - remains in the sidney-umbilical area      Active Problems:    Weakness of both legs (11/29/2017)      Renal failure (ARF), acute on chronic (HCC) (11/29/2017)        Assessment:     1. CAD x NSTEMI, Unstable angina -  - LHC with complex CAD and acute thrombotic lesion in pLAD and subtotal occlusion in mLAD. The RCA has severe proximal and mid RCA disease. She has additional stenting of LAD with Resolute in mid/distal and proximal vessel. These all touched the previously stented mid vessel. Recommend life-long DAPT    2, ESRD -  - on temporary HD via perm cath due to PD catheter dysfunction  - TTS schedule in rehab     3. Fluid excess -  - recurrent due to poor PD drainage  - improving with fluid removal on HD    4. Anemia -  - intensive anemia therapy in [place] witness  - on WAYNE and IV iron and B12  - improved S/P BT    5. History of GI bleed -  - monitor clinically and serial Hb  - she had a drop in Hb to 7 range and improved with BT    6. Hypokalemia   - resolved     7. Abdominal pain and tenderness with drop in Hb , on DAPT   No evidence of retroperitoneal hematoma     8.  PD catheter dysfunction -  Tentative plan for PD catheter revision / replacement in OR tomorrow       Plan:     As above - 25

## 2017-12-17 NOTE — PROGRESS NOTES
Kayla Padilla MD,   Medical Director  [address] [facility], [address] [facility]  Tel: [phone]       [facility] PROGRESS NOTE    Harry Luna  Admit Date: 11/29/2017  Admit Diagnosis: DEBILITY; Renal failure (ARF), acute on chronic ([address] Weakne*    Subjective     Says she has a fungal infxn in left great toenail. Not really painful. C/o getting epogen shots in the abdomen bc they hurt.  She has been refusing Renagel    Objective:     Current Facility-Administered Medications   Medication Dose Route Frequency    hydrogen peroxide 3 %   Topical PRN    carbamide peroxide (DEBROX) 6.5 % otic solution 5 Drop  5 Drop Right Ear BID    [START ON 12/19/2017] epoetin toya (EPOGEN;PROCRIT) injection 20,000 Units  20,000 Units SubCUTAneous DIALYSIS TUE, THU & SAT    rosuvastatin (CRESTOR) tablet 20 mg  20 mg Oral QHS    sucralfate (CARAFATE) 100 mg/mL oral suspension 1 g  1 g Oral AC&HS    polyethylene glycol (MIRALAX) packet 17 g  17 g Oral DAILY    acetaminophen (TYLENOL) tablet 650 mg  650 mg Oral Q4H PRN    HYDROcodone-acetaminophen (NORCO) 5-325 mg per tablet 1 Tab  1 Tab Oral Q4H PRN    LORazepam (ATIVAN) tablet 1 mg  1 mg Oral Q6H PRN    sodium chloride (NS) flush 5-10 mL  5-10 mL IntraVENous PRN    amLODIPine (NORVASC) tablet 5 mg  5 mg Oral DAILY    aspirin delayed-release tablet 81 mg  81 mg Oral DAILY    calcitRIOL (ROCALTROL) capsule 0.25 mcg  0.25 mcg Oral DAILY    cyanocobalamin (VITAMIN B12) injection 1,000 mcg  1,000 mcg IntraMUSCular Q7D    gentamicin (GARAMYCIN) 0.1 % cream   Topical DAILY PRN    levothyroxine (SYNTHROID) tablet 150 mcg  150 mcg Oral ACB    linaclotide (LINZESS) capsule 145 mcg (Patient Supplied)  145 mcg Oral DAILY    magnesium oxide (MAG-OX) tablet 400 mg  400 mg Oral DAILY    metoclopramide HCl (REGLAN) tablet 5 mg  5 mg Oral BID    metoprolol tartrate (LOPRESSOR) tablet 12.5 mg  12.5 mg Oral DAILY    multivitamin w ZN (STRESSTABS W ZINC) tablet  1 Tab Oral DAILY    nitroglycerin (NITROSTAT) tablet 0.4 mg  0.4 mg SubLINGual PRN    ondansetron (ZOFRAN ODT) tablet 4 mg  4 mg Oral TID PRN    pantoprazole (PROTONIX) tablet 40 mg  40 mg Oral ACB&D    polyethylene glycol (MIRALAX) packet 17 g  17 g Oral DAILY PRN    promethazine (PHENERGAN) tablet 25 mg  25 mg Oral Q6H PRN    ranolazine ER (RANEXA) tablet 1,000 mg  1,000 mg Oral BID    senna-docusate (PERICOLACE) 8.6-50 mg per tablet 1 Tab  1 Tab Oral DAILY    sevelamer (RENAGEL) tablet 800 mg  800 mg Oral TID WITH MEALS    torsemide (DEMADEX) tablet 100 mg  100 mg Oral BID     Review of Systems:Denies chest pain, shortness of breath, cough, headache, visual problems, abdominal pain, dysurea, calf pain. Pertinent positives are as noted in the medical records and unremarkable otherwise. Visit Vitals    /89    Pulse 85    Temp 98 °F (36.7 °C)    Resp 16    Wt 203 lb 6.4 oz (92.3 kg)    SpO2 97%    BMI 29.18 kg/m2        Physical Exam:   General: Alert and age appropriately oriented. No acute cardio respiratory distress. HEENT: Normocephalic,no scleral icterus  Oral mucosa moist without cyanosis   Lungs: Clear to auscultation  bilaterally. Respiration even and unlabored   Heart: Regular rate and rhythm, S1, S2   No  murmurs, clicks, rub or gallops   Abdomen: Soft, non-tender, nondistended. Bowel sounds present. No organomegaly. PD cath intact, no exudate   Genitourinary: deferred   Neuromuscular:      Generalized prox> distal weakness  Non focal, sens appears intact   Skin/extremity: No rashes, no erythema. No calf tenderness BLE  Trace pedal edema.  onychomycosis noted                                                                            Functional Assessment:  Gross Assessment  AROM: Generally decreased, functional (12/15/17 1200)  Strength: Generally decreased, functional (12/15/17 1200)  Coordination: Generally decreased, functional (12/15/17 1200) Balance  Sitting - Static: Good (unsupported) (12/15/17 1500)  Sitting - Dynamic: Good (unsupported) (12/15/17 1500)  Standing - Static: Good (w/ B UE support) (12/15/17 1500)  Standing - Dynamic : Impaired (12/15/17 1500)           Toileting  Adaptive Equipment: Grab bars; Walker (12/15/17 0911)         Fe Bertrand Fall Risk Assessment:  Fe Bertrand Fall Risk  Mobility: Ambulates or transfers with assist devices or assistance/unsteady gait (12/16/17 1951)  Mobility Interventions: Patient to call before getting OOB (12/16/17 1951)  Mentation: Alert, oriented x 3 (12/16/17 1951)  Mentation Interventions: Door open when patient unattended;Eyeglasses and hearing aids; More frequent rounding; Toileting rounds;Update white board (12/16/17 1951)  Medication: Patient receiving anticonvulsants, sedatives(tranquilizers), psychotropics or hypnotics, hypoglycemics, narcotics, sleep aids, antihypertensives, laxatives, or diuretics (12/16/17 1951)  Medication Interventions: Patient to call before getting OOB; Teach patient to arise slowly (12/16/17 1951)  Elimination: Needs assistance with toileting (12/16/17 1951)  Elimination Interventions: Call light in reach; Patient to call for help with toileting needs; Toilet paper/wipes in reach; Toileting schedule/hourly rounds (12/16/17 1951)  Prior Fall History: No (12/16/17 1951)  History of Falls Interventions: Door open when patient unattended (12/16/17 1951)  Total Score: 3 (12/16/17 1951)  Standard Fall Precautions: Yes (12/16/17 1951)  High Fall Risk: Yes (12/15/17 2013)     Speech Assessment:         Ambulation:  Gait  Base of Support: Widened (12/15/17 1200)  Speed/Michelle: Slow;Shuffled (12/15/17 1200)  Step Length: Right shortened;Left shortened (12/15/17 1200)  Stance: Right increased; Left increased (12/15/17 1200)  Gait Abnormalities: Trunk sway increased; Step to gait; Decreased step clearance (12/15/17 1200)  Distance (ft): 60 Feet (ft) (12/15/17 1500)  Assistive Device: Masoud Flynn, rolling;Gait belt (12/15/17 1500)  Curbs/Ramps: Minimum assistance (12/07/17 1400)     Labs/Studies:  Recent Results (from the past 72 hour(s))   CBC WITH AUTOMATED DIFF    Collection Time: 12/14/17  9:16 AM   Result Value Ref Range    WBC 6.2 4.3 - 11.1 K/uL    RBC 3.02 (L) 4.05 - 5.25 M/uL    HGB 8.9 (L) 11.7 - 15.4 g/dL    HCT 29.8 (L) 35.8 - 46.3 %    MCV 98.7 (H) 79.6 - 97.8 FL    MCH 29.5 26.1 - 32.9 PG    MCHC 29.9 (L) 31.4 - 35.0 g/dL    RDW 19.3 (H) 11.9 - 14.6 %    PLATELET 800 809 - 690 K/uL    MPV 9.3 (L) 10.8 - 14.1 FL    DF AUTOMATED      NEUTROPHILS 66 43 - 78 %    LYMPHOCYTES 20 13 - 44 %    MONOCYTES 11 4.0 - 12.0 %    EOSINOPHILS 3 0.5 - 7.8 %    BASOPHILS 0 0.0 - 2.0 %    IMMATURE GRANULOCYTES 0 0.0 - 5.0 %    ABS. NEUTROPHILS 4.0 1.7 - 8.2 K/UL    ABS. LYMPHOCYTES 1.2 0.5 - 4.6 K/UL    ABS. MONOCYTES 0.7 0.1 - 1.3 K/UL    ABS. EOSINOPHILS 0.2 0.0 - 0.8 K/UL    ABS. BASOPHILS 0.0 0.0 - 0.2 K/UL    ABS. IMM.  GRANS. 0.0 0.0 - 0.5 K/UL       Assessment:     Problem List as of 12/17/2017  Date Reviewed: 3/2/2017          Codes Class Noted - Resolved    Weakness of both legs ICD-10-CM: R29.898  ICD-9-CM: 729.89  11/29/2017 - Present        Renal failure (ARF), acute on chronic ([facility] [address] ICD-10-CM: N17.9, N18.9  ICD-9-CM: 584.9, 585.9  11/29/2017 - Present        Elevated troponin ICD-10-CM: R74.8  ICD-9-CM: 790.6  11/18/2017 - Present        Chest pain ICD-10-CM: R07.9  ICD-9-CM: 786.50  11/18/2017 - Present        CKD (chronic kidney disease) ICD-10-CM: N18.9  ICD-9-CM: 585.9  11/18/2017 - Present        Chronic anemia ICD-10-CM: D64.9  ICD-9-CM: 285.9  11/18/2017 - Present        ESRD (end stage renal disease) ([facility] ICD-10-CM: N18.6  ICD-9-CM: 585.6  11/18/2017 - Present        Abdominal pain ICD-10-CM: R10.9  ICD-9-CM: 789.00  9/18/2017 - Present        H/O TIA (transient ischemic attack) and stroke ICD-10-CM: Z86.73  ICD-9-CM: V12.54  4/13/2017 - Present        S/P PTCA (percutaneous transluminal coronary angioplasty) ICD-10-CM: Z98.61  ICD-9-CM: V45.82  4/13/2017 - Present        Mixed hyperlipidemia ICD-10-CM: E78.2  ICD-9-CM: 272.2  4/13/2017 - Present        Vision changes ICD-10-CM: H53.9  ICD-9-CM: 368.9  3/26/2017 - Present        Dizziness ICD-10-CM: R42  ICD-9-CM: 780.4  3/26/2017 - Present        Refusal of blood transfusions as patient is Shinto (Chronic) ICD-10-CM: Z53.1  ICD-9-CM: V62.6  8/25/2016 - Present        GERD (gastroesophageal reflux disease) ICD-10-CM: K21.9  ICD-9-CM: 530.81  8/8/2016 - Present        Unspecified sleep apnea ICD-10-CM: G47.30  ICD-9-CM: 780.57  8/8/2016 - Present    Overview Signed 8/8/2016 11:09 AM by Clintonaimee Ashanti     uses cpap machine             CVA (cerebral vascular accident) Hx of TIA ICD-10-CM: I63.9  ICD-9-CM: 434.91  2/22/2016 - Present        Unstable angina ([facility] [address] ICD-10-CM: I20.0  ICD-9-CM: 411.1  2/1/2016 - Present        ESRD on peritoneal dialysis ([facility] [address] (Chronic) ICD-10-CM: N18.6, Z99.2  ICD-9-CM: 585.6, V45.11  2/1/2016 - Present        Ischemic cardiomyopathy ICD-10-CM: I25.5  ICD-9-CM: 414.8  12/29/2015 - Present        HLD (hyperlipidemia) ICD-10-CM: E78.5  ICD-9-CM: 272.4  12/29/2015 - Present        Depression ICD-10-CM: F32.9  ICD-9-CM: 322  12/29/2015 - Present        CAD (coronary artery disease) ICD-10-CM: I25.10  ICD-9-CM: 414.00  11/27/2015 - Present        Debility ICD-10-CM: R53.81  ICD-9-CM: 799.3  5/5/2015 - Present        Hypertension (Chronic) ICD-10-CM: I10  ICD-9-CM: 401.9  4/28/2015 - Present        Anemia of chronic renal failure (Chronic) ICD-10-CM: N18.9, D63.1  ICD-9-CM: 285.21, 585.9  4/28/2015 - Present        Hypothyroidism (Chronic) ICD-10-CM: E03.9  ICD-9-CM: 244.9  4/28/2015 - Present        RESOLVED: Postural hypotension ICD-10-CM: I95.1  ICD-9-CM: 458.0  8/9/2016 - 3/26/2017        RESOLVED: Chest pain ICD-10-CM: R07.9  ICD-9-CM: 786.50  8/8/2016 - 3/26/2017        RESOLVED: Nausea ICD-10-CM: R11.0  ICD-9-CM: 787.02  8/8/2016 - 3/26/2017        RESOLVED: S/P PTCA (percutaneous transluminal coronary angioplasty); LAD PTCA of  ISR 11/27/15 ICD-10-CM: Z98.61  ICD-9-CM: V45.82  5/24/2016 - 3/26/2017        RESOLVED: TIA (transient ischemic attack) ICD-10-CM: G45.9  ICD-9-CM: 435.9  2/10/2016 - 3/26/2017        RESOLVED: Edema ICD-10-CM: R60.9  ICD-9-CM: 782.3  12/29/2015 - 3/26/2017        RESOLVED: Anterior myocardial infarction [facility]) ICD-10-CM: I21.09  ICD-9-CM: 410.10  5/21/2015 - 3/26/2017        RESOLVED: STEMI (ST elevation myocardial infarction) ([facility] [address] ICD-10-CM: I21.3  ICD-9-CM: 410.90  4/28/2015 - 2/1/2016            Plan:      Debility s/p Unstable Angina/ CAD x NSTEMI  S/p [facility] with Stents with multiple premorbid comorbidities     1. Continue interdisc rehab efforts, endurance and mobility progressing      2. ESRD; on temporary HD via perma cath ; had PD cath dysfxn; Dialysis T,TH,Sat; tolerating well  -will have PD cath revision/placement on 12/18  -has been refusing Renagel; will check Phos levels (there is not a lab for PO4)     3. R ear Cerumen impaction; no sign of infection but cannot visualize tympanic membrane. Peroxide this a.m and begin debrox bid x 4d  -12/17 warm water flush     4. CAD; cont DAPT, life long     5. Chronic hx of Anemia due to ESRD and hx of prior GI bleed; [employer] witness thus no blood products. On WAYNE and iv iron. cont  b12; monitor; 8.9 on 12/14.   -pt complains of procrit injection in abd; dialysis says they can give with dialysis stating 12/18     6. HTN; controlled with betablk, and Norvasc  7. HLD on Crestor. 8. Hypothyroidism; on synthroid  9. DVT prev; on Ranexa and ASA. SCDs  10. GERD/PUD; cont Protonix bid  11. Onychomycosis; over the counter fungal soln ok for family to bring in. Needs to see a podiatrist      Time spent was 25 minutes with over 1/2 in direct patient care/examination, consultation and coordination of care.      Signed By: Darrion Payton Shantal Arguelles MD     December 17, 2017

## 2017-12-17 NOTE — PROGRESS NOTES
Elli Qureshi, RN Registered Nurse Signed  Progress Notes Date of Service: 12/16/17 0329           .9End Of Shift Functional Summary, Nursing          TOILETING:  Does patient need assist with clothing management and/or sidney care? No      TOILET TRANSFER:  Pt requires standby assistance/setup.  Pt uses walker.      BLADDER:  Pt does not have a castillo catheter that staff manages.  Pt does not take medication.  Pt is continent. of bladder and voids in toilet    (An accident is when the episode is not contained in a brief AND/OR the clothing/linen requires changing/cleaning up.)      BOWEL:  Pt does take medication.  Pt is continent of bowel and uses toilet.   (An accident is when the episode is not contained in a brief AND/OR the clothing/linen requires changing/cleaning up.)

## 2017-12-17 NOTE — PROGRESS NOTES
PHYSICAL THERAPY DAILY NOTE  Time In: 9857  Time Out: 1100  Patient Seen For: AM;Gait training;Patient education; Therapeutic exercise;Transfer training;Balance activities    Subjective: \"I get tired real easy\"         Objective: Pt rates 0/10 pain before and after PT session. Other (comment) (Fall Risk)  GROSS ASSESSMENT Daily Assessment            BED/MAT MOBILITY Daily Assessment            TRANSFERS Daily Assessment    Transfer Type: SPT with walker  Transfer Assistance : 5 (Supervision/setup)       GAIT Daily Assessment   Additional 50', 30', 45', 40' of gait training with FWW SPV Amount of Assistance: 5 (Supervision/setup)  Distance (ft): 130 Feet (ft)  Assistive Device: Walker       STEPS or STAIRS Daily Assessment            BALANCE Daily Assessment    Sitting - Static: Good (unsupported)  Sitting - Dynamic: Good (unsupported)  Standing - Static: Good  Romberg: Pass       WHEELCHAIR MOBILITY Daily Assessment            LOWER EXTREMITY EXERCISES Daily Assessment    Extremity: Both  Exercise Type #1: Standing lower extremity strengthening  Sets Performed: 1  Reps Performed: 15  Level of Assist: Stand-by assistance          Assessment: Pt requires multiple rest breaks between exercises and gait training due to decrease muscular endurance and stamina. Pt educated on pacing techniques at home for safety with pt verbal understanding and agreement. Plan of Care: Continue with current POC to help pt achieve safe functional mobility with independence at home upon d/c.     Darlean Dakins, PTA  12/17/2017

## 2017-12-17 NOTE — PROGRESS NOTES
Problem: Falls - Risk of  Goal: *Absence of Falls  Document Harrison Fall Risk and appropriate interventions in the flowsheet.    Outcome: Progressing Towards Goal  Fall Risk Interventions:  Mobility Interventions: Patient to call before getting OOB    Mentation Interventions: Evaluate medications/consider consulting pharmacy    Medication Interventions: Patient to call before getting OOB    Elimination Interventions: Call light in reach, Patient to call for help with toileting needs    History of Falls Interventions: Consult care management for discharge planning, Door open when patient unattended, Evaluate medications/consider consulting pharmacy

## 2017-12-18 ENCOUNTER — HOSPITAL ENCOUNTER (OUTPATIENT)
Age: 82
Setting detail: OUTPATIENT SURGERY
Discharge: OTHER INSTITUTION | End: 2017-12-18
Attending: SURGERY | Admitting: SURGERY
Payer: MEDICARE

## 2017-12-18 ENCOUNTER — ANESTHESIA (OUTPATIENT)
Dept: SURGERY | Age: 82
End: 2017-12-18
Payer: MEDICARE

## 2017-12-18 VITALS
BODY MASS INDEX: 29.12 KG/M2 | HEART RATE: 65 BPM | WEIGHT: 203.4 LBS | TEMPERATURE: 97.9 F | OXYGEN SATURATION: 93 % | RESPIRATION RATE: 16 BRPM | SYSTOLIC BLOOD PRESSURE: 135 MMHG | DIASTOLIC BLOOD PRESSURE: 70 MMHG | HEIGHT: 70 IN

## 2017-12-18 LAB
BASOPHILS # BLD: 0 K/UL (ref 0–0.2)
BASOPHILS NFR BLD: 0 % (ref 0–2)
DIFFERENTIAL METHOD BLD: ABNORMAL
EOSINOPHIL # BLD: 0.2 K/UL (ref 0–0.8)
EOSINOPHIL NFR BLD: 3 % (ref 0.5–7.8)
ERYTHROCYTE [DISTWIDTH] IN BLOOD BY AUTOMATED COUNT: 19.5 % (ref 11.9–14.6)
HCT VFR BLD AUTO: 30.3 % (ref 35.8–46.3)
HGB BLD-MCNC: 9.2 G/DL (ref 11.7–15.4)
IMM GRANULOCYTES # BLD: 0 K/UL (ref 0–0.5)
IMM GRANULOCYTES NFR BLD AUTO: 0 % (ref 0–5)
LYMPHOCYTES # BLD: 1.4 K/UL (ref 0.5–4.6)
LYMPHOCYTES NFR BLD: 23 % (ref 13–44)
MCH RBC QN AUTO: 29.9 PG (ref 26.1–32.9)
MCHC RBC AUTO-ENTMCNC: 30.4 G/DL (ref 31.4–35)
MCV RBC AUTO: 98.4 FL (ref 79.6–97.8)
MONOCYTES # BLD: 0.7 K/UL (ref 0.1–1.3)
MONOCYTES NFR BLD: 11 % (ref 4–12)
NEUTS SEG # BLD: 3.7 K/UL (ref 1.7–8.2)
NEUTS SEG NFR BLD: 63 % (ref 43–78)
PHOSPHATE SERPL-MCNC: 4.9 MG/DL (ref 2.3–3.7)
PLATELET # BLD AUTO: 316 K/UL (ref 150–450)
PMV BLD AUTO: 9 FL (ref 10.8–14.1)
POTASSIUM BLD-SCNC: 4.8 MMOL/L (ref 3.5–5.1)
RBC # BLD AUTO: 3.08 M/UL (ref 4.05–5.25)
WBC # BLD AUTO: 5.9 K/UL (ref 4.3–11.1)

## 2017-12-18 PROCEDURE — 65310000000 HC RM PRIVATE REHAB

## 2017-12-18 PROCEDURE — 77030010507 HC ADH SKN DERMBND J&J -B: Performed by: SURGERY

## 2017-12-18 PROCEDURE — 77030008477 HC STYL SATN SLP COVD -A: Performed by: ANESTHESIOLOGY

## 2017-12-18 PROCEDURE — 77030031139 HC SUT VCRL2 J&J -A: Performed by: SURGERY

## 2017-12-18 PROCEDURE — 76010000153 HC OR TIME 1.5 TO 2 HR: Performed by: SURGERY

## 2017-12-18 PROCEDURE — 77030035220 HC TRCR ENDOSC BLNTPRT ANCHR COVD -B: Performed by: SURGERY

## 2017-12-18 PROCEDURE — 74011250636 HC RX REV CODE- 250/636: Performed by: ANESTHESIOLOGY

## 2017-12-18 PROCEDURE — 77030019908 HC STETH ESOPH SIMS -A: Performed by: ANESTHESIOLOGY

## 2017-12-18 PROCEDURE — 85025 COMPLETE CBC W/AUTO DIFF WBC: CPT | Performed by: INTERNAL MEDICINE

## 2017-12-18 PROCEDURE — 77030020829: Performed by: SURGERY

## 2017-12-18 PROCEDURE — 84100 ASSAY OF PHOSPHORUS: CPT | Performed by: INTERNAL MEDICINE

## 2017-12-18 PROCEDURE — 36415 COLL VENOUS BLD VENIPUNCTURE: CPT | Performed by: INTERNAL MEDICINE

## 2017-12-18 PROCEDURE — 77030008703 HC TU ET UNCUF COVD -A: Performed by: ANESTHESIOLOGY

## 2017-12-18 PROCEDURE — 77030011640 HC PAD GRND REM COVD -A: Performed by: SURGERY

## 2017-12-18 PROCEDURE — 76060000034 HC ANESTHESIA 1.5 TO 2 HR: Performed by: SURGERY

## 2017-12-18 PROCEDURE — 77030035048 HC TRCR ENDOSC OPTCL COVD -B: Performed by: SURGERY

## 2017-12-18 PROCEDURE — 74011000250 HC RX REV CODE- 250: Performed by: SURGERY

## 2017-12-18 PROCEDURE — 77030020782 HC GWN BAIR PAWS FLX 3M -B: Performed by: ANESTHESIOLOGY

## 2017-12-18 PROCEDURE — 77030032490 HC SLV COMPR SCD KNE COVD -B: Performed by: SURGERY

## 2017-12-18 PROCEDURE — 76210000026 HC REC RM PH II 1 TO 1.5 HR: Performed by: SURGERY

## 2017-12-18 PROCEDURE — 74011250636 HC RX REV CODE- 250/636: Performed by: SURGERY

## 2017-12-18 PROCEDURE — 74011250636 HC RX REV CODE- 250/636

## 2017-12-18 PROCEDURE — 76210000063 HC OR PH I REC FIRST 0.5 HR: Performed by: SURGERY

## 2017-12-18 PROCEDURE — 77030002996 HC SUT SLK J&J -A: Performed by: SURGERY

## 2017-12-18 PROCEDURE — 77030019927 HC TBNG IRR CYSTO BAXT -A: Performed by: SURGERY

## 2017-12-18 PROCEDURE — 77030035051: Performed by: SURGERY

## 2017-12-18 PROCEDURE — 74011000250 HC RX REV CODE- 250

## 2017-12-18 PROCEDURE — 77030008518 HC TBNG INSUF ENDO STRY -B: Performed by: SURGERY

## 2017-12-18 PROCEDURE — 74011250637 HC RX REV CODE- 250/637: Performed by: PHYSICAL MEDICINE & REHABILITATION

## 2017-12-18 PROCEDURE — 84132 ASSAY OF SERUM POTASSIUM: CPT

## 2017-12-18 PROCEDURE — 97535 SELF CARE MNGMENT TRAINING: CPT

## 2017-12-18 PROCEDURE — 74011250636 HC RX REV CODE- 250/636: Performed by: PHYSICIAN ASSISTANT

## 2017-12-18 RX ORDER — ROSUVASTATIN CALCIUM 20 MG/1
20 TABLET, COATED ORAL
Status: DISCONTINUED | OUTPATIENT
Start: 2017-12-18 | End: 2017-12-28 | Stop reason: HOSPADM

## 2017-12-18 RX ORDER — LIDOCAINE HYDROCHLORIDE 20 MG/ML
INJECTION, SOLUTION EPIDURAL; INFILTRATION; INTRACAUDAL; PERINEURAL AS NEEDED
Status: DISCONTINUED | OUTPATIENT
Start: 2017-12-18 | End: 2017-12-18 | Stop reason: HOSPADM

## 2017-12-18 RX ORDER — MIDAZOLAM HYDROCHLORIDE 1 MG/ML
2 INJECTION, SOLUTION INTRAMUSCULAR; INTRAVENOUS ONCE
Status: DISCONTINUED | OUTPATIENT
Start: 2017-12-18 | End: 2017-12-18 | Stop reason: HOSPADM

## 2017-12-18 RX ORDER — TORSEMIDE 100 MG/1
100 TABLET ORAL 2 TIMES DAILY
Status: DISCONTINUED | OUTPATIENT
Start: 2017-12-18 | End: 2017-12-28 | Stop reason: HOSPADM

## 2017-12-18 RX ORDER — AMLODIPINE BESYLATE 5 MG/1
5 TABLET ORAL DAILY
Status: DISCONTINUED | OUTPATIENT
Start: 2017-12-19 | End: 2017-12-28 | Stop reason: HOSPADM

## 2017-12-18 RX ORDER — HEPARIN SODIUM 1000 [USP'U]/ML
INJECTION, SOLUTION INTRAVENOUS; SUBCUTANEOUS AS NEEDED
Status: DISCONTINUED | OUTPATIENT
Start: 2017-12-18 | End: 2017-12-18 | Stop reason: HOSPADM

## 2017-12-18 RX ORDER — ONDANSETRON 4 MG/1
4 TABLET, ORALLY DISINTEGRATING ORAL
Status: DISCONTINUED | OUTPATIENT
Start: 2017-12-18 | End: 2017-12-28 | Stop reason: HOSPADM

## 2017-12-18 RX ORDER — ACETAMINOPHEN 10 MG/ML
1000 INJECTION, SOLUTION INTRAVENOUS
Status: COMPLETED | OUTPATIENT
Start: 2017-12-18 | End: 2017-12-18

## 2017-12-18 RX ORDER — HYDROCODONE BITARTRATE AND ACETAMINOPHEN 5; 325 MG/1; MG/1
1 TABLET ORAL
Status: DISCONTINUED | OUTPATIENT
Start: 2017-12-18 | End: 2017-12-28 | Stop reason: HOSPADM

## 2017-12-18 RX ORDER — CYANOCOBALAMIN 1000 UG/ML
1000 INJECTION, SOLUTION INTRAMUSCULAR; SUBCUTANEOUS
Status: DISCONTINUED | OUTPATIENT
Start: 2017-12-23 | End: 2017-12-28 | Stop reason: HOSPADM

## 2017-12-18 RX ORDER — RANOLAZINE 500 MG/1
1000 TABLET, EXTENDED RELEASE ORAL 2 TIMES DAILY
Status: DISCONTINUED | OUTPATIENT
Start: 2017-12-18 | End: 2017-12-28 | Stop reason: HOSPADM

## 2017-12-18 RX ORDER — SUCRALFATE 1 G/10ML
1 SUSPENSION ORAL
Status: DISCONTINUED | OUTPATIENT
Start: 2017-12-18 | End: 2017-12-28 | Stop reason: HOSPADM

## 2017-12-18 RX ORDER — LEVOTHYROXINE SODIUM 150 UG/1
150 TABLET ORAL
Status: DISCONTINUED | OUTPATIENT
Start: 2017-12-19 | End: 2017-12-28 | Stop reason: HOSPADM

## 2017-12-18 RX ORDER — HYDROMORPHONE HYDROCHLORIDE 2 MG/ML
0.5 INJECTION, SOLUTION INTRAMUSCULAR; INTRAVENOUS; SUBCUTANEOUS
Status: DISCONTINUED | OUTPATIENT
Start: 2017-12-18 | End: 2017-12-18 | Stop reason: HOSPADM

## 2017-12-18 RX ORDER — MIDAZOLAM HYDROCHLORIDE 1 MG/ML
2 INJECTION, SOLUTION INTRAMUSCULAR; INTRAVENOUS
Status: DISCONTINUED | OUTPATIENT
Start: 2017-12-18 | End: 2017-12-18 | Stop reason: HOSPADM

## 2017-12-18 RX ORDER — SODIUM CHLORIDE 0.9 % (FLUSH) 0.9 %
5-10 SYRINGE (ML) INJECTION AS NEEDED
Status: DISCONTINUED | OUTPATIENT
Start: 2017-12-18 | End: 2017-12-18 | Stop reason: HOSPADM

## 2017-12-18 RX ORDER — CEFAZOLIN SODIUM/WATER 2 G/20 ML
2 SYRINGE (ML) INTRAVENOUS
Status: COMPLETED | OUTPATIENT
Start: 2017-12-18 | End: 2017-12-18

## 2017-12-18 RX ORDER — OXYCODONE HYDROCHLORIDE 5 MG/1
5 TABLET ORAL
Status: DISCONTINUED | OUTPATIENT
Start: 2017-12-18 | End: 2017-12-18 | Stop reason: HOSPADM

## 2017-12-18 RX ORDER — EPHEDRINE SULFATE 50 MG/ML
INJECTION, SOLUTION INTRAVENOUS AS NEEDED
Status: DISCONTINUED | OUTPATIENT
Start: 2017-12-18 | End: 2017-12-18 | Stop reason: HOSPADM

## 2017-12-18 RX ORDER — LORAZEPAM 1 MG/1
1 TABLET ORAL
Status: DISCONTINUED | OUTPATIENT
Start: 2017-12-18 | End: 2017-12-28 | Stop reason: HOSPADM

## 2017-12-18 RX ORDER — ALBUTEROL SULFATE 0.83 MG/ML
2.5 SOLUTION RESPIRATORY (INHALATION) AS NEEDED
Status: DISCONTINUED | OUTPATIENT
Start: 2017-12-18 | End: 2017-12-18 | Stop reason: HOSPADM

## 2017-12-18 RX ORDER — SODIUM CHLORIDE 0.9 % (FLUSH) 0.9 %
5-10 SYRINGE (ML) INJECTION AS NEEDED
Status: DISCONTINUED | OUTPATIENT
Start: 2017-12-18 | End: 2017-12-28 | Stop reason: HOSPADM

## 2017-12-18 RX ORDER — SODIUM CHLORIDE, SODIUM LACTATE, POTASSIUM CHLORIDE, CALCIUM CHLORIDE 600; 310; 30; 20 MG/100ML; MG/100ML; MG/100ML; MG/100ML
50 INJECTION, SOLUTION INTRAVENOUS CONTINUOUS
Status: DISCONTINUED | OUTPATIENT
Start: 2017-12-18 | End: 2017-12-18 | Stop reason: HOSPADM

## 2017-12-18 RX ORDER — SODIUM CHLORIDE 0.9 % (FLUSH) 0.9 %
5-10 SYRINGE (ML) INJECTION EVERY 8 HOURS
Status: DISCONTINUED | OUTPATIENT
Start: 2017-12-18 | End: 2017-12-18 | Stop reason: HOSPADM

## 2017-12-18 RX ORDER — CELECOXIB 200 MG/1
200 CAPSULE ORAL
Status: DISCONTINUED | OUTPATIENT
Start: 2017-12-18 | End: 2017-12-18 | Stop reason: HOSPADM

## 2017-12-18 RX ORDER — FENTANYL CITRATE 50 UG/ML
100 INJECTION, SOLUTION INTRAMUSCULAR; INTRAVENOUS ONCE
Status: DISCONTINUED | OUTPATIENT
Start: 2017-12-18 | End: 2017-12-18 | Stop reason: HOSPADM

## 2017-12-18 RX ORDER — ROCURONIUM BROMIDE 10 MG/ML
INJECTION, SOLUTION INTRAVENOUS AS NEEDED
Status: DISCONTINUED | OUTPATIENT
Start: 2017-12-18 | End: 2017-12-18 | Stop reason: HOSPADM

## 2017-12-18 RX ORDER — PANTOPRAZOLE SODIUM 40 MG/1
40 TABLET, DELAYED RELEASE ORAL
Status: DISCONTINUED | OUTPATIENT
Start: 2017-12-18 | End: 2017-12-28 | Stop reason: HOSPADM

## 2017-12-18 RX ORDER — LIDOCAINE HYDROCHLORIDE 10 MG/ML
0.1 INJECTION INFILTRATION; PERINEURAL AS NEEDED
Status: DISCONTINUED | OUTPATIENT
Start: 2017-12-18 | End: 2017-12-18 | Stop reason: HOSPADM

## 2017-12-18 RX ORDER — AMOXICILLIN 250 MG
1 CAPSULE ORAL DAILY
Status: DISCONTINUED | OUTPATIENT
Start: 2017-12-19 | End: 2017-12-24

## 2017-12-18 RX ORDER — CALCITRIOL 0.25 UG/1
0.25 CAPSULE ORAL DAILY
Status: DISCONTINUED | OUTPATIENT
Start: 2017-12-19 | End: 2017-12-28 | Stop reason: HOSPADM

## 2017-12-18 RX ORDER — NEOSTIGMINE METHYLSULFATE 1 MG/ML
INJECTION INTRAVENOUS AS NEEDED
Status: DISCONTINUED | OUTPATIENT
Start: 2017-12-18 | End: 2017-12-18 | Stop reason: HOSPADM

## 2017-12-18 RX ORDER — ASPIRIN 81 MG/1
81 TABLET ORAL DAILY
Status: DISCONTINUED | OUTPATIENT
Start: 2017-12-19 | End: 2017-12-28 | Stop reason: HOSPADM

## 2017-12-18 RX ORDER — PROPOFOL 10 MG/ML
INJECTION, EMULSION INTRAVENOUS AS NEEDED
Status: DISCONTINUED | OUTPATIENT
Start: 2017-12-18 | End: 2017-12-18 | Stop reason: HOSPADM

## 2017-12-18 RX ORDER — TRAMADOL HYDROCHLORIDE 50 MG/1
50 TABLET ORAL
Status: CANCELLED | OUTPATIENT
Start: 2017-12-18

## 2017-12-18 RX ORDER — HYDROGEN PEROXIDE 3 %
SOLUTION, NON-ORAL MISCELLANEOUS AS NEEDED
Status: DISCONTINUED | OUTPATIENT
Start: 2017-12-18 | End: 2017-12-28 | Stop reason: HOSPADM

## 2017-12-18 RX ORDER — SEVELAMER HYDROCHLORIDE 400 MG/1
800 TABLET, FILM COATED ORAL
Status: DISCONTINUED | OUTPATIENT
Start: 2017-12-18 | End: 2017-12-28 | Stop reason: HOSPADM

## 2017-12-18 RX ORDER — HEPARIN SODIUM 5000 [USP'U]/ML
INJECTION, SOLUTION INTRAVENOUS; SUBCUTANEOUS AS NEEDED
Status: DISCONTINUED | OUTPATIENT
Start: 2017-12-18 | End: 2017-12-18 | Stop reason: HOSPADM

## 2017-12-18 RX ORDER — SODIUM CHLORIDE 9 MG/ML
25 INJECTION, SOLUTION INTRAVENOUS CONTINUOUS
Status: DISCONTINUED | OUTPATIENT
Start: 2017-12-18 | End: 2017-12-18 | Stop reason: HOSPADM

## 2017-12-18 RX ORDER — BUPIVACAINE HYDROCHLORIDE 2.5 MG/ML
INJECTION, SOLUTION EPIDURAL; INFILTRATION; INTRACAUDAL AS NEEDED
Status: DISCONTINUED | OUTPATIENT
Start: 2017-12-18 | End: 2017-12-18 | Stop reason: HOSPADM

## 2017-12-18 RX ORDER — POLYETHYLENE GLYCOL 3350 17 G/17G
17 POWDER, FOR SOLUTION ORAL
Status: DISCONTINUED | OUTPATIENT
Start: 2017-12-18 | End: 2017-12-28 | Stop reason: HOSPADM

## 2017-12-18 RX ORDER — GLYCOPYRROLATE 0.2 MG/ML
INJECTION INTRAMUSCULAR; INTRAVENOUS AS NEEDED
Status: DISCONTINUED | OUTPATIENT
Start: 2017-12-18 | End: 2017-12-18 | Stop reason: HOSPADM

## 2017-12-18 RX ORDER — ONDANSETRON 2 MG/ML
INJECTION INTRAMUSCULAR; INTRAVENOUS AS NEEDED
Status: DISCONTINUED | OUTPATIENT
Start: 2017-12-18 | End: 2017-12-18 | Stop reason: HOSPADM

## 2017-12-18 RX ORDER — ACETAMINOPHEN 325 MG/1
650 TABLET ORAL
Status: DISCONTINUED | OUTPATIENT
Start: 2017-12-18 | End: 2017-12-28 | Stop reason: HOSPADM

## 2017-12-18 RX ORDER — FENTANYL CITRATE 50 UG/ML
INJECTION, SOLUTION INTRAMUSCULAR; INTRAVENOUS AS NEEDED
Status: DISCONTINUED | OUTPATIENT
Start: 2017-12-18 | End: 2017-12-18 | Stop reason: HOSPADM

## 2017-12-18 RX ORDER — METOPROLOL TARTRATE 25 MG/1
12.5 TABLET, FILM COATED ORAL DAILY
Status: DISCONTINUED | OUTPATIENT
Start: 2017-12-19 | End: 2017-12-28 | Stop reason: HOSPADM

## 2017-12-18 RX ORDER — METOCLOPRAMIDE 10 MG/1
5 TABLET ORAL 2 TIMES DAILY
Status: DISCONTINUED | OUTPATIENT
Start: 2017-12-18 | End: 2017-12-19

## 2017-12-18 RX ORDER — SODIUM CHLORIDE, SODIUM LACTATE, POTASSIUM CHLORIDE, CALCIUM CHLORIDE 600; 310; 30; 20 MG/100ML; MG/100ML; MG/100ML; MG/100ML
75 INJECTION, SOLUTION INTRAVENOUS CONTINUOUS
Status: DISCONTINUED | OUTPATIENT
Start: 2017-12-18 | End: 2017-12-18 | Stop reason: HOSPADM

## 2017-12-18 RX ADMIN — SODIUM CHLORIDE 25 ML/HR: 900 INJECTION, SOLUTION INTRAVENOUS at 09:25

## 2017-12-18 RX ADMIN — ROCURONIUM BROMIDE 10 MG: 10 INJECTION, SOLUTION INTRAVENOUS at 12:01

## 2017-12-18 RX ADMIN — ACETAMINOPHEN 1000 MG: 10 INJECTION, SOLUTION INTRAVENOUS at 13:33

## 2017-12-18 RX ADMIN — Medication 2 G: at 11:47

## 2017-12-18 RX ADMIN — METOPROLOL TARTRATE 12.5 MG: 25 TABLET ORAL at 07:47

## 2017-12-18 RX ADMIN — METOCLOPRAMIDE HYDROCHLORIDE 5 MG: 10 TABLET ORAL at 17:39

## 2017-12-18 RX ADMIN — RANOLAZINE 1000 MG: 500 TABLET, FILM COATED, EXTENDED RELEASE ORAL at 20:50

## 2017-12-18 RX ADMIN — ONDANSETRON 4 MG: 2 INJECTION INTRAMUSCULAR; INTRAVENOUS at 12:11

## 2017-12-18 RX ADMIN — FENTANYL CITRATE 25 MCG: 50 INJECTION, SOLUTION INTRAMUSCULAR; INTRAVENOUS at 12:01

## 2017-12-18 RX ADMIN — GLYCOPYRROLATE 0.4 MG: 0.2 INJECTION INTRAMUSCULAR; INTRAVENOUS at 12:41

## 2017-12-18 RX ADMIN — AMLODIPINE BESYLATE 5 MG: 5 TABLET ORAL at 07:47

## 2017-12-18 RX ADMIN — TORSEMIDE 100 MG: 100 TABLET ORAL at 17:38

## 2017-12-18 RX ADMIN — GLYCOPYRROLATE 0.2 MG: 0.2 INJECTION INTRAMUSCULAR; INTRAVENOUS at 12:17

## 2017-12-18 RX ADMIN — CALCITRIOL 0.25 MCG: 0.25 CAPSULE, LIQUID FILLED ORAL at 17:38

## 2017-12-18 RX ADMIN — TICAGRELOR 90 MG: 90 TABLET ORAL at 20:51

## 2017-12-18 RX ADMIN — PANTOPRAZOLE SODIUM 40 MG: 40 TABLET, DELAYED RELEASE ORAL at 06:00

## 2017-12-18 RX ADMIN — FENTANYL CITRATE 25 MCG: 50 INJECTION, SOLUTION INTRAMUSCULAR; INTRAVENOUS at 12:15

## 2017-12-18 RX ADMIN — NEOSTIGMINE METHYLSULFATE 3 MG: 1 INJECTION INTRAVENOUS at 12:41

## 2017-12-18 RX ADMIN — LIDOCAINE HYDROCHLORIDE 100 MG: 20 INJECTION, SOLUTION EPIDURAL; INFILTRATION; INTRACAUDAL; PERINEURAL at 11:37

## 2017-12-18 RX ADMIN — ROSUVASTATIN CALCIUM 20 MG: 20 TABLET, FILM COATED ORAL at 20:50

## 2017-12-18 RX ADMIN — PROPOFOL 120 MG: 10 INJECTION, EMULSION INTRAVENOUS at 11:37

## 2017-12-18 RX ADMIN — ROCURONIUM BROMIDE 10 MG: 10 INJECTION, SOLUTION INTRAVENOUS at 12:05

## 2017-12-18 RX ADMIN — LEVOTHYROXINE SODIUM 150 MCG: 50 TABLET ORAL at 05:59

## 2017-12-18 RX ADMIN — FENTANYL CITRATE 50 MCG: 50 INJECTION, SOLUTION INTRAMUSCULAR; INTRAVENOUS at 11:34

## 2017-12-18 RX ADMIN — ASPIRIN 81 MG: 81 TABLET, COATED ORAL at 07:46

## 2017-12-18 RX ADMIN — HYDROCODONE BITARTRATE AND ACETAMINOPHEN 1 TABLET: 5; 325 TABLET ORAL at 19:23

## 2017-12-18 RX ADMIN — EPHEDRINE SULFATE 10 MG: 50 INJECTION, SOLUTION INTRAVENOUS at 12:12

## 2017-12-18 RX ADMIN — ROCURONIUM BROMIDE 40 MG: 10 INJECTION, SOLUTION INTRAVENOUS at 11:37

## 2017-12-18 RX ADMIN — PANTOPRAZOLE SODIUM 40 MG: 40 TABLET, DELAYED RELEASE ORAL at 17:37

## 2017-12-18 NOTE — BRIEF OP NOTE
BRIEF OPERATIVE NOTE    Date of Procedure: 12/18/2017   Preoperative Diagnosis: End stage kidney disease ([facility] [address] [N18.6]  Postoperative Diagnosis: End stage kidney disease ([facility] [address] [N18.6]    Procedure(s):  LAPAROSCOPIC REVISION OF PERITONEAL DIALYSIS CATHETER   Surgeon(s) and Role:     * Deysi Artis MD - Primary         Assistant Staff:       Surgical Staff:  Circ-1: Katharina Orr  Circ-Relief: Ryan Linton RN  Scrub Tech-1: Madeline Carter  Scrub Tech-2: Randye Merlin  Event Time In   Incision Start 1210   Incision Close 1254     Anesthesia: General   Estimated Blood Loss: 15cc  Specimens: * No specimens in log *   Findings: PD catheter encased in adhesions in epigastrum   Complications: None  Implants: * No implants in log *

## 2017-12-18 NOTE — PROGRESS NOTES
Speech therapy note  Attempted to see pt this am, however, pt off floor.      Slava Lee MA/HARSHA/SLP

## 2017-12-18 NOTE — ANESTHESIA POSTPROCEDURE EVALUATION
Post-Anesthesia Evaluation and Assessment    Patient: Josse Hess MRN: [id number]  SSN: [id number]    YOB: 1932  Age: 80 y.o. Sex: female       Cardiovascular Function/Vital Signs  Visit Vitals    /70    Pulse 70    Temp 36.2 °C (97.2 °F)    Resp 16    Ht 5' 9.5\" (1.765 m)    Wt 92.3 kg (203 lb 6.4 oz)    SpO2 95%    BMI 29.61 kg/m2       Patient is status post general anesthesia for Procedure(s):  LAPAROSCOPIC REVISION OF PERITONEAL DIALYSIS CATHETER . Nausea/Vomiting: None    Postoperative hydration reviewed and adequate. Pain:  Pain Scale 1: Numeric (0 - 10) (12/18/17 1304)  Pain Intensity 1: 0 (12/18/17 1304)   Managed    Neurological Status:   Neuro (WDL): Exceptions to WDL (12/18/17 1304)  Neuro  Neurologic State: Drowsy;Sleeping (12/18/17 1304)   At baseline    Mental Status and Level of Consciousness: Arousable    Pulmonary Status:   O2 Device: Nasal cannula (12/18/17 1304)   Adequate oxygenation and airway patent    Complications related to anesthesia: None    Post-anesthesia assessment completed.  No concerns    Signed By: Antwon Boyle MD     December 18, 2017

## 2017-12-18 NOTE — PROGRESS NOTES
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 She has a history of GI bleeding and had anemia-induced unstable angina in the past. 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 She states the pain has since resolved with NTG paste.  No fevers or chills. No nausea, vomiting or diarrhea.  She states that she is essentially anuric and occasionally makes minimal urine.  She has a h/o CVA and TIA. No fever or chills, no problems with PD drainage or discoloration of PD fluid. No increased thirst. She wishes regular diet and supplement. She denies any other acute complaints  Her edema has improved in the past couple of days with intensified dialysis.     s- no dyspnea, no Cp, no N/V - in good spirits going to OR for PD catheter dysfunction - plans for HD tomorrow discussed and will evaluate when re-initiation of PD is aapropriate    Past Medical History:   Diagnosis Date    Anemia of chronic renal failure 4/28/2015    Anterior myocardial infarction [facility]) 5/21/2015    CAD (coronary artery disease)     Chest pain     Chronic kidney disease, stage III (moderate) 8/15/2014    on dialysis    CKD (chronic kidney disease) stage 4, GFR 15-29 ml/min ([facility] [address] 4/28/2015    Debility 5/5/2015    Depression 12/29/2015    Edema 12/29/2015    Endocrine disease     Hypothyroidism    GERD (gastroesophageal reflux disease)     Heart murmur 12/29/2015    HLD (hyperlipidemia) 12/29/2015    Hypertension     Hypothyroidism 4/28/2015    Ischemic cardiomyopathy 12/29/2015    Nausea     S/P PTCA (percutaneous transluminal coronary angioplasty); LAD PTCA of  ISR 11/27/15 5/24/2016    STEMI (ST elevation myocardial infarction) ([facility] [address] 4/28/2015    Unspecified sleep apnea     uses cpap machine    Unstable angina ([facility] [address]       Past Surgical History:   Procedure Laterality Date    HX BACK SURGERY  1990    neck surgery cervical disc    HX BACK SURGERY      lower back    HX CATARACT REMOVAL Bilateral     HX CHOLECYSTECTOMY  19702    gall bladder     HX KNEE REPLACEMENT Right 2006    HX PTCA  4/28/2015    2.25 Xience stent to mid LAD for occluded artery, anterior MI, EF 25%. Moderate disease distal LAD and distal OM PCI CX and RCA 2004, then PCI RCA and LAD in 2009. Prior to Admission medications    Medication Sig Start Date End Date Taking? Authorizing Provider   metoprolol tartrate (LOPRESSOR) 25 mg tablet Take 0.5 Tabs by mouth daily. 11/27/17   MINDY Chatman   amLODIPine (NORVASC) 5 mg tablet Take 1 Tab by mouth daily. 11/27/17   MINDY Luz   torsemide (DEMADEX) 100 mg tablet Take 1 Tab by mouth two (2) times a day. 11/27/17   MINDY Chatman   pantoprazole (PROTONIX) 40 mg tablet Take 1 Tab by mouth Before breakfast and dinner. 11/27/17   MINDY Chatman   cyanocobalamin (VITAMIN B12) 1,000 mcg/mL injection 1 mL by IntraMUSCular route every seven (7) days. Indications: Vitamin B12 Deficiency 12/2/17   MINDY Luz   folic acid (FOLVITE) 1 mg tablet Take 1 mg by mouth daily. Historical Provider   potassium chloride (K-DUR, KLOR-CON) 20 mEq tablet Take 20 mEq by mouth two (2) times a day. Historical Provider   traZODone (DESYREL) 50 mg tablet Take  by mouth nightly. Historical Provider   megestrol (MEGACE) 400 mg/10 mL (40 mg/mL) suspension Take 200 mg by mouth daily. Historical Provider   sucralfate (CARAFATE) 100 mg/mL suspension Take 10 mL by mouth Before breakfast, lunch, dinner and at bedtime.  Indications: PREVENTION OF STRESS ULCER 9/23/17   Noah Mojica, DO   nitroglycerin (NITROLINGUAL) 400 mcg/spray spray 1 Spray by SubLINGual route every five (5) minutes as needed for Chest Pain. Historical Provider   linaclotide Sherjared Karlene) 145 mcg cap capsule Take  by mouth Daily (before breakfast). Historical Provider   magnesium oxide (MAG-OX) 400 mg tablet Take 400 mg by mouth daily. Historical Provider   PNV NO.122/IRON/FOLIC ACID (PRENATAL MULTI PO) Take  by mouth. Historical Provider   ondansetron (ZOFRAN ODT) 4 mg disintegrating tablet Take 4 mg by mouth three (3) times daily as needed. Historical Provider   metoclopramide HCl (REGLAN) 5 mg tablet Take 5 mg by mouth two (2) times a day. Historical Provider   ticagrelor (BRILINTA) 90 mg tablet Take 1 Tab by mouth two (2) times a day. 2/4/16   MINDY Paul   sevelamer carbonate (RENVELA) 800 mg tab tab Take 800 mg by mouth three (3) times daily (with meals). Historical Provider   gentamicin (GARAMYCIN) 0.1 % topical cream APPLY TO PD catheter exit site at daily dressing change 5/12/15   Viral Tillman MD   aspirin delayed-release 81 mg tablet Take 1 Tab by mouth daily. 5/5/15   Gisela Hollingsworth PA-C   darbepoetin toya in polysorbat (ARANESP, POLYSORBATE,) 40 mcg/mL injection 40 mcg by SubCUTAneous route every fourteen (14) days. Indications: ANEMIA IN CHRONIC KIDNEY DISEASE    Historical Provider   rosuvastatin (CRESTOR) 20 mg tablet Take 20 mg by mouth nightly. Historical Provider   ranolazine ER (RANEXA) 500 mg SR tablet Take 500 mg by mouth two (2) times a day. Historical Provider   levothyroxine (SYNTHROID) 150 mcg tablet Take 150 mcg by mouth Daily (before breakfast).     Historical Provider     Allergies   Allergen Reactions    Codeine Nausea and Vomiting      Social History   Substance Use Topics    Smoking status: Never Smoker    Smokeless tobacco: Never Used    Alcohol use No      Family History   Problem Relation Age of Onset    Heart Disease Mother     Hypertension Mother     Cancer Mother      Lung    Stroke Father     Hypertension Father     Breast Cancer Neg Hx           Review of Systems    Constitutional: no fever, weak  Eyes: fair vision,    Ears, nose, mouth, throat, and face:fair hearing,   Respiratory: no asthma,  Chronic home O2 is being used - improved dyspnea  Cardiovascular:no palpitation, no  chest pain,   Gastrointestinal:no diarrhea,  Had BM today  Genitourinary: no dysuria,   Hematologic/lymphatic: no bleeding tendency,   Neurological: no seizures   Behvioral/Psych: no psych hospitalization   Endocrine: no goiter,       Objective:       Visit Vitals    /79 (BP 1 Location: Left arm, BP Patient Position: At rest)    Pulse 71    Temp 97.7 °F (36.5 °C)    Resp 16    Ht 5' 9.5\" (1.765 m)    Wt 92.3 kg (203 lb 6.4 oz)    SpO2 98%    BMI 29.61 kg/m2       General:  Alert, cooperative, no distress, appears stated age. Head:  Normocephalic, without obvious abnormality, atraumatic. Eyes:  Conjunctivae/corneas clear. EOMs intact. Throat: Lips, mucosa, and tongue normal. Teeth and gums normal.   Neck: Supple, symmetrical, trachea midline, no adenopathy,  no JVD. Lungs:   Clear to auscultation bilaterally. Heart:  Regular rate and rhythm, S1, S2 normal, 2/6 systolic  murmur, no rub or gallop. Abdomen:   Soft, non-tender. No masses,  No organomegaly. PD catheter in place without exudate   Extremities: Extremities normal, atraumatic, no cyanosis. 3+edema. Skin: Skin color, texture, turgor normal. No rashes or lesions. Neurologic: Grossly intact. No asterixis.          Data Review:       Recent Results (from the past 24 hour(s))   CBC WITH AUTOMATED DIFF    Collection Time: 12/18/17  6:13 AM   Result Value Ref Range    WBC 5.9 4.3 - 11.1 K/uL    RBC 3.08 (L) 4.05 - 5.25 M/uL    HGB 9.2 (L) 11.7 - 15.4 g/dL    HCT 30.3 (L) 35.8 - 46.3 %    MCV 98.4 (H) 79.6 - 97.8 FL    MCH 29.9 26.1 - 32.9 PG    MCHC 30.4 (L) 31.4 - 35.0 g/dL    RDW 19.5 (H) 11.9 - 14.6 %    PLATELET 928 433 - 668 K/uL    MPV 9.0 (L) 10.8 - 14.1 FL    DF AUTOMATED NEUTROPHILS 63 43 - 78 %    LYMPHOCYTES 23 13 - 44 %    MONOCYTES 11 4.0 - 12.0 %    EOSINOPHILS 3 0.5 - 7.8 %    BASOPHILS 0 0.0 - 2.0 %    IMMATURE GRANULOCYTES 0 0.0 - 5.0 %    ABS. NEUTROPHILS 3.7 1.7 - 8.2 K/UL    ABS. LYMPHOCYTES 1.4 0.5 - 4.6 K/UL    ABS. MONOCYTES 0.7 0.1 - 1.3 K/UL    ABS. EOSINOPHILS 0.2 0.0 - 0.8 K/UL    ABS. BASOPHILS 0.0 0.0 - 0.2 K/UL    ABS. IMM. GRANS. 0.0 0.0 - 0.5 K/UL   PHOSPHORUS    Collection Time: 12/18/17  6:13 AM   Result Value Ref Range    Phosphorus 4.9 (H) 2.3 - 3.7 MG/DL   POC SODIUM-POTASSIUM    Collection Time: 12/18/17  9:22 AM   Result Value Ref Range    Potassium, POC 4.8 3.5 - 5.1 MMOL/L       CXR viewed by me - no major infiltrate or fluid excess, CM with left possible minor effusion is present        CT abdomen/pelvis  CT ABDOMEN:  Peritoneal fluid in the perihepatic space, mild paracolic gutters,  extending down into the pelvis. Peritoneal dialysis catheter noted. There is not  any evidence of retroperitoneal hematoma identified. Strandy fluid density does  extend into the extraperitoneal space in the lower abdomen and pelvis. There is  radiodensity within the renal collecting systems, possibly previously  administered IV contrast. There is peripancreatic fluid and stranding, cannot  exclude acute pancreatitis. No biliary dilatation. Spleen is not enlarged. Small  bowel normal caliber.   CT PELVIS:   Moderate to large volume pelvic fluid.   There is diffuse asymmetric enlargement of the right rectus and oblique  abdominal muscles. There are are of higher attenuation the contralateral side  and findings are consistent with an abdominal wall hematoma. IMPRESSION:    1. Evidence of right abdominal wall hematoma. 2. No CT evidence to suggest retroperitoneal hematoma. 3. Extensive fluid in the abdomen and pelvis, presumed related to peritoneal  Dialysis.     KUB - PD catheter still unchanged compared to last week - remains in the sidney-umbilical area      Active Problems:    * No active hospital problems. *      Assessment:     1. CAD x NSTEMI, Unstable angina -  - [facility] with complex CAD and acute thrombotic lesion in pLAD and subtotal occlusion in mLAD. The RCA has severe proximal and mid RCA disease. She has additional stenting of LAD with Resolute in mid/distal and proximal vessel. These all touched the previously stented mid vessel. Recommend life-long DAPT    2, ESRD -  - on temporary HD via perm cath due to PD catheter dysfunction  - TTS schedule in rehab     3. Fluid excess -  - recurrent due to poor PD drainage  - improving with fluid removal on HD    4. Anemia -  - intensive anemia therapy in [employer]  - on WAYNE and IV iron and B12  - improved S/P BT    5. History of GI bleed -  - monitor clinically and serial Hb  - she had a drop in Hb to 7 range and improved with BT    6. Hypokalemia   - resolved     7. Abdominal pain and tenderness with drop in Hb , on DAPT   No evidence of retroperitoneal hematoma     8.  PD catheter dysfunction -  - PD catheter revision / replacement - in Pre-op for OR today       Plan:     As above - 25

## 2017-12-18 NOTE — PROGRESS NOTES
12/18/17 1010   Time Spent With Patient   Time In 0700   Time Out 0744   Patient Seen For: AM;ADLs   Grooming   Grooming Assistance  Mod I   Comments seated in WC at sink    Upper Body Bathing   Bathing Assistance, Upper Mod I   Lower Body Bathing   Bathing Assistance, Lower  S   Adaptive Equipment Wipes(personal cleansing cloth); Long handled sponge   Position Performed Standing;Seated in chair   Comments seated in WC at sink and standing for sidney care    Toileting   Toileting Assistance (FIM Score) S  (no clothing to manage )   Adaptive Equipment Elevated seat;Grab bars   Upper Body Dressing    Comments donned hospital gown due to impending surgery    Functional Transfers   Toilet Transfer  Other (comment)  (SPT WC to toilet )   Amount of Assistance Required SBA   S: \"I'm doing okay. I didn't sleep. \"   O: Patient presented long sitting in bed. Agreeable to treatment. Patient completed ADL; see above for FIMs. Patient completed sponge bath and donned hospital gown only due to impending surgery. Patient was anxious throughout session, requiring multiple cues for problem solving and sequencing throughout session. A: Patient limited by anxiety but willing to participate. Patient tolerated session well with no complaint of pain. P: Continue OT POC with focus on ADL/IADL skills, functional transfers, functional mobility, coordination, strength, static and dynamic balance, and activity tolerance to maximize safety and independence with ADLs and functional transfers. Patient was left supine in bed with call bell/all other needs in reach. Interdisciplinary communication: Collaborated with nurse and PT regarding patient leaving earlier than anticipated for surgery.      Jazz Kent, MS, OTR/L  12/18/2017

## 2017-12-18 NOTE — PROGRESS NOTES
Problem: Falls - Risk of  Goal: *Absence of Falls  Document Harrison Fall Risk and appropriate interventions in the flowsheet.    Outcome: Progressing Towards Goal  Fall Risk Interventions:  Mobility Interventions: Patient to call before getting OOB    Mentation Interventions: Evaluate medications/consider consulting pharmacy    Medication Interventions: Patient to call before getting OOB    Elimination Interventions: Call light in reach    History of Falls Interventions: Consult care management for discharge planning

## 2017-12-18 NOTE — PROGRESS NOTES
Patient c/o \"gas\" before HS. Nothing was available on the [facility]. Will follow up with day nurse. Patient remained NPO after MN and had her HS hibiclens bath. Patient slept intermittently throughout the night. Hourly rounds were performed throughout the shift. All needs met at this time. Report given to oncoming nurse.

## 2017-12-18 NOTE — PROGRESS NOTES
Progress Note    Patient: Bard Suarez MRN: [id number]  SSN: [id number]    YOB: 1932  Age: 80 y.o. Sex: female      Admit Date: 11/29/2017    LOS: 19 days       Subjective:     Patient has complaints of \"gas,\" admits to belching and passing flatus but still with discomfort. AVSS. NPO for PD cath revision today. Objective:     Vitals:    12/17/17 0749 12/17/17 1526 12/17/17 1921 12/18/17 0655   BP: 145/89 156/80 155/84 140/74   Pulse: 85 77 80 76   Resp: 16 16 16 18   Temp: 98 °F (36.7 °C) 98.1 °F (36.7 °C) 98.3 °F (36.8 °C) 98.2 °F (36.8 °C)   SpO2: 97% 98% 96% 96%   Weight:            Intake and Output:  Current Shift:    Last three shifts: 12/16 1901 - 12/18 0700  In: 460 [P.O.:460]  Out: -     Physical Exam:   GENERAL: alert, cooperative, no distress  NECK: supple, no JVD or bruits  LUNG: clear to auscultation bilaterally  HEART: regular rate and rhythm, soft SHARLENE best auscultated at left sternal border  ABDOMEN: soft, obese but not distended, non-tender, normoactive bowel sounds; PD catheter in place at left abdomen  EXTREMITIES: warm, normal ROM  PULSES: 2+ and symmetric at radial, brachial    Lab/Data Review:  CBC:   Lab Results   Component Value Date/Time    WBC 5.9 12/18/2017 06:13 AM    HGB 9.2 (L) 12/18/2017 06:13 AM    HCT 30.3 (L) 12/18/2017 06:13 AM     12/18/2017 06:13 AM        Assessment / Plan: Active Problems:    Weakness of both legs (11/29/2017)      Renal failure (ARF), acute on chronic (HCC) (11/29/2017)        To OR today for PD catheter revision / replacement by Dr. Isabell Caldwell By: Chris Monroe PA-C    December 18, 2017      Physician Assistant with Vascular Surgery Keon Epstein MD / Luisa Summers.  Jaime Duarte MD / Maya Nesbitt MD

## 2017-12-18 NOTE — PROGRESS NOTES
Speech therapy note  Attempted to see pt this pm, however, pt remains off floor.      Tato Flores MA/HARSHA/SLP

## 2017-12-18 NOTE — PROGRESS NOTES
Pt. Off the floor today in OR for PD port placement. PT held due to procedure & will resume tomorrow per MD orders.

## 2017-12-18 NOTE — PROGRESS NOTES
MD Claire,   Medical Director  [address] [facility], [address] [facility]  Tel: [phone]       [facility] PROGRESS NOTE    Ramin Short  Admit Date: 11/29/2017  Admit Diagnosis: DEBILITY; Renal failure (ARF), acute on chronic ([facility] [address] Claudine Sosa*  Chief Complaint : Gait dysfunction secondary to below. Admit Diagnosis: Unstable angina ([facility] [address]  CAD (coronary artery disease) (11/27/2015)  S/P complex PCI and stable on ASA and Brilinta  Unstable angina ([facility] [address] (2/1/2016)  Acute/ Chronic anemia (11/18/2017)  ESRD (end stage renal disease) On PD/ CRRT  Pain  DVT risk  Acute Rehab Dx:  Debility    Weakness  Gait impairment  deconditioning  Mobility and ambulation deficits  Self Care/ADL deficits     Subjective     Patient seen and examined. Vss, afebrile. Patient  Planned for PD cath revision today. Remains NPO. Holding therapies.      Objective:     Current Facility-Administered Medications   Medication Dose Route Frequency    acetaminophen (TYLENOL) tablet 650 mg  650 mg Oral Q4H PRN    [START ON 12/19/2017] amLODIPine (NORVASC) tablet 5 mg  5 mg Oral DAILY    [START ON 12/19/2017] aspirin delayed-release tablet 81 mg  81 mg Oral DAILY    [START ON 12/19/2017] calcitRIOL (ROCALTROL) capsule 0.25 mcg  0.25 mcg Oral DAILY    carbamide peroxide (DEBROX) 6.5 % otic solution 5 Drop  5 Drop Right Ear BID    cyanocobalamin (VITAMIN B12) injection 1,000 mcg  1,000 mcg IntraMUSCular Q7D    [START ON 12/19/2017] epoetin toya (EPOGEN;PROCRIT) injection 20,000 Units  20,000 Units SubCUTAneous DIALYSIS TUE, THU & SAT    HYDROcodone-acetaminophen (NORCO) 5-325 mg per tablet 1 Tab  1 Tab Oral Q4H PRN    hydrogen peroxide 3 %   Topical PRN    [START ON 12/19/2017] levothyroxine (SYNTHROID) tablet 150 mcg  150 mcg Oral ACB    [START ON 12/19/2017] linaclotide (LINZESS) capsule 145 mcg  145 mcg Oral DAILY    LORazepam (ATIVAN) tablet 1 mg  1 mg Oral Q6H PRN    metoclopramide HCl (REGLAN) tablet 5 mg  5 mg Oral BID    [START ON 12/19/2017] metoprolol tartrate (LOPRESSOR) tablet 12.5 mg  12.5 mg Oral DAILY    [START ON 12/19/2017] multivitamin w ZN (STRESSTABS W ZINC) tablet  1 Tab Oral DAILY    ondansetron (ZOFRAN ODT) tablet 4 mg  4 mg Oral TID PRN    pantoprazole (PROTONIX) tablet 40 mg  40 mg Oral ACB&D    polyethylene glycol (MIRALAX) packet 17 g  17 g Oral DAILY PRN    ranolazine ER (RANEXA) tablet 1,000 mg  1,000 mg Oral BID    rosuvastatin (CRESTOR) tablet 20 mg  20 mg Oral QHS    [START ON 12/19/2017] senna-docusate (PERICOLACE) 8.6-50 mg per tablet 1 Tab  1 Tab Oral DAILY    sevelamer (RENAGEL) tablet 800 mg  800 mg Oral TID WITH MEALS    sodium chloride (NS) flush 5-10 mL  5-10 mL IntraVENous PRN    sucralfate (CARAFATE) 100 mg/mL oral suspension 1 g  1 g Oral AC&HS    ticagrelor (BRILINTA) tablet 90 mg  90 mg Oral BID    torsemide (DEMADEX) tablet 100 mg  100 mg Oral BID     Facility-Administered Medications Ordered in Other Encounters   Medication Dose Route Frequency    lidocaine (XYLOCAINE) 10 mg/mL (1 %) injection 0.1 mL  0.1 mL SubCUTAneous PRN    lactated Ringers infusion  75 mL/hr IntraVENous CONTINUOUS    sodium chloride (NS) flush 5-10 mL  5-10 mL IntraVENous Q8H    sodium chloride (NS) flush 5-10 mL  5-10 mL IntraVENous PRN    celecoxib (CELEBREX) capsule 200 mg  200 mg Oral ONCE PRN    fentaNYL citrate (PF) injection 100 mcg  100 mcg IntraVENous ONCE    midazolam (VERSED) injection 2 mg  2 mg IntraVENous ONCE PRN    midazolam (VERSED) injection 2 mg  2 mg IntraVENous ONCE    lactated Ringers infusion  50 mL/hr IntraVENous CONTINUOUS    sodium chloride (NS) flush 5-10 mL  5-10 mL IntraVENous PRN    albuterol (PROVENTIL VENTOLIN) nebulizer solution 2.5 mg  2.5 mg Inhalation PRN    oxyCODONE IR (ROXICODONE) tablet 5 mg  5 mg Oral ONCE PRN    HYDROmorphone (PF) (DILAUDID) injection 0.5 mg  0.5 mg IntraVENous Multiple    0.9% sodium chloride infusion  25 mL/hr IntraVENous CONTINUOUS     Review of Systems:   Denies chest pain, shortness of breath, cough, headache, visual problems, abdominal pain, dysurea, calf pain. Pertinent positives are as noted in the medical records and unremarkable otherwise. Visit Vitals    /78 (BP 1 Location: Right arm, BP Patient Position: At rest)    Pulse 70    Temp 97.3 °F (36.3 °C)    Resp 20    Wt 203 lb 6.4 oz (92.3 kg)    SpO2 97%    BMI 29.18 kg/m2   Physical Exam:   General: Alert and age appropriately oriented. No acute cardio respiratory distress. HEENT: Normocephalic,no scleral icterus  Oral mucosa moist without cyanosis   Lungs: Clear to auscultation  bilaterally. Respiration even and unlabored   Heart: Regular rate and rhythm, S1, S2   No  murmurs, clicks, rub or gallops   Abdomen: Soft, non-tender, nondistended. Bowel sounds present. No organomegaly. Genitourinary: defered   Neuromuscular:      NANCI, EOMI  + mild generalized weakness 4+ to 5-/5. BUE, BLE, more proximal.   Sensation grossly intact to soft touch. Skin/extremity: No rashes, no erythema. 1+edema.   Wound covered.   Herlinda Nance                                                                             Functional Assessment:  Gross Assessment  AROM: Generally decreased, functional (12/15/17 1200)  Strength: Generally decreased, functional (12/15/17 1200)  Coordination: Generally decreased, functional (12/15/17 1200)       Balance  Sitting - Static: Good (unsupported) (12/17/17 1100)  Sitting - Dynamic: Good (unsupported) (12/17/17 1100)  Standing - Static: Good (12/17/17 1100)  Standing - Dynamic : Impaired (12/15/17 1500)           Toileting  Adaptive Equipment: Elevated seat;Grab bars (12/18/17 1010)         Harrison Fall Risk Assessment:  Sheldon Griffith Fall Risk  Mobility: Ambulates or transfers with assist devices or assistance/unsteady gait (12/17/17 2336)  Mobility Interventions: Patient to call before getting OOB (12/17/17 2336)  Mentation: Alert, oriented x 3 (12/17/17 2336)  Mentation Interventions: Evaluate medications/consider consulting pharmacy (12/17/17 2336)  Medication: Patient receiving anticonvulsants, sedatives(tranquilizers), psychotropics or hypnotics, hypoglycemics, narcotics, sleep aids, antihypertensives, laxatives, or diuretics (12/17/17 2336)  Medication Interventions: Patient to call before getting OOB (12/17/17 2336)  Elimination: Needs assistance with toileting (12/17/17 2336)  Elimination Interventions: Call light in reach (12/17/17 2336)  Prior Fall History: No (12/17/17 2336)  History of Falls Interventions: Consult care management for discharge planning (12/17/17 2336)  Total Score: 3 (12/17/17 2336)  Standard Fall Precautions: Yes (12/17/17 2336)  High Fall Risk: Yes (12/15/17 2013)     Speech Assessment:         Ambulation:  Gait  Base of Support: Widened (12/15/17 1200)  Speed/Michelle: Slow;Shuffled (12/15/17 1200)  Step Length: Right shortened;Left shortened (12/15/17 1200)  Stance: Right increased; Left increased (12/15/17 1200)  Gait Abnormalities: Trunk sway increased; Step to gait; Decreased step clearance (12/15/17 1200)  Distance (ft): 130 Feet (ft) (12/17/17 1100)  Assistive Device: Walker (12/17/17 1100)  Curbs/Ramps: Minimum assistance (12/07/17 1400)     Labs/Studies:  Recent Results (from the past 72 hour(s))   CBC WITH AUTOMATED DIFF    Collection Time: 12/18/17  6:13 AM   Result Value Ref Range    WBC 5.9 4.3 - 11.1 K/uL    RBC 3.08 (L) 4.05 - 5.25 M/uL    HGB 9.2 (L) 11.7 - 15.4 g/dL    HCT 30.3 (L) 35.8 - 46.3 %    MCV 98.4 (H) 79.6 - 97.8 FL    MCH 29.9 26.1 - 32.9 PG    MCHC 30.4 (L) 31.4 - 35.0 g/dL    RDW 19.5 (H) 11.9 - 14.6 %    PLATELET 791 074 - 353 K/uL    MPV 9.0 (L) 10.8 - 14.1 FL    DF AUTOMATED      NEUTROPHILS 63 43 - 78 %    LYMPHOCYTES 23 13 - 44 %    MONOCYTES 11 4.0 - 12.0 %    EOSINOPHILS 3 0.5 - 7.8 %    BASOPHILS 0 0.0 - 2.0 %    IMMATURE GRANULOCYTES 0 0.0 - 5.0 %    ABS.  NEUTROPHILS 3.7 1.7 - 8.2 K/UL    ABS. LYMPHOCYTES 1.4 0.5 - 4.6 K/UL    ABS. MONOCYTES 0.7 0.1 - 1.3 K/UL    ABS. EOSINOPHILS 0.2 0.0 - 0.8 K/UL    ABS. BASOPHILS 0.0 0.0 - 0.2 K/UL    ABS. IMM.  GRANS. 0.0 0.0 - 0.5 K/UL   PHOSPHORUS    Collection Time: 12/18/17  6:13 AM   Result Value Ref Range    Phosphorus 4.9 (H) 2.3 - 3.7 MG/DL   POC SODIUM-POTASSIUM    Collection Time: 12/18/17  9:22 AM   Result Value Ref Range    Potassium, POC 4.8 3.5 - 5.1 MMOL/L       Assessment:     Problem List as of 12/18/2017  Date Reviewed: 3/2/2017          Codes Class Noted - Resolved    Weakness of both legs ICD-10-CM: R29.898  ICD-9-CM: 729.89  11/29/2017 - Present        Renal failure (ARF), acute on chronic ([facility] ICD-10-CM: N17.9, N18.9  ICD-9-CM: 584.9, 585.9  11/29/2017 - Present        Elevated troponin ICD-10-CM: R74.8  ICD-9-CM: 790.6  11/18/2017 - Present        Chest pain ICD-10-CM: R07.9  ICD-9-CM: 786.50  11/18/2017 - Present        CKD (chronic kidney disease) ICD-10-CM: N18.9  ICD-9-CM: 585.9  11/18/2017 - Present        Chronic anemia ICD-10-CM: D64.9  ICD-9-CM: 285.9  11/18/2017 - Present        ESRD (end stage renal disease) ([facility] ICD-10-CM: N18.6  ICD-9-CM: 585.6  11/18/2017 - Present        Abdominal pain ICD-10-CM: R10.9  ICD-9-CM: 789.00  9/18/2017 - Present        H/O TIA (transient ischemic attack) and stroke ICD-10-CM: Z86.73  ICD-9-CM: V12.54  4/13/2017 - Present        S/P PTCA (percutaneous transluminal coronary angioplasty) ICD-10-CM: Z98.61  ICD-9-CM: V45.82  4/13/2017 - Present        Mixed hyperlipidemia ICD-10-CM: E78.2  ICD-9-CM: 272.2  4/13/2017 - Present        Vision changes ICD-10-CM: H53.9  ICD-9-CM: 368.9  3/26/2017 - Present        Dizziness ICD-10-CM: R42  ICD-9-CM: 780.4  3/26/2017 - Present        Refusal of blood transfusions as patient is Rastafari (Chronic) ICD-10-CM: Z53.1  ICD-9-CM: V62.6  8/25/2016 - Present        GERD (gastroesophageal reflux disease) ICD-10-CM: K21.9  ICD-9-CM: 530.81 8/8/2016 - Present        Unspecified sleep apnea ICD-10-CM: G47.30  ICD-9-CM: 780.57  8/8/2016 - Present    Overview Signed 8/8/2016 11:09 AM by Corazon Carrion     uses cpap machine             CVA (cerebral vascular accident) Hx of TIA ICD-10-CM: I63.9  ICD-9-CM: 434.91  2/22/2016 - Present        Unstable angina ([address] ICD-10-CM: I20.0  ICD-9-CM: 411.1  2/1/2016 - Present        ESRD on peritoneal dialysis [facility]) (Chronic) ICD-10-CM: N18.6, Z99.2  ICD-9-CM: 585.6, V45.11  2/1/2016 - Present        Ischemic cardiomyopathy ICD-10-CM: I25.5  ICD-9-CM: 414.8  12/29/2015 - Present        HLD (hyperlipidemia) ICD-10-CM: E78.5  ICD-9-CM: 272.4  12/29/2015 - Present        Depression ICD-10-CM: F32.9  ICD-9-CM: 587  12/29/2015 - Present        CAD (coronary artery disease) ICD-10-CM: I25.10  ICD-9-CM: 414.00  11/27/2015 - Present        Debility ICD-10-CM: R53.81  ICD-9-CM: 799.3  5/5/2015 - Present        Hypertension (Chronic) ICD-10-CM: I10  ICD-9-CM: 401.9  4/28/2015 - Present        Anemia of chronic renal failure (Chronic) ICD-10-CM: N18.9, D63.1  ICD-9-CM: 285.21, 585.9  4/28/2015 - Present        Hypothyroidism (Chronic) ICD-10-CM: E03.9  ICD-9-CM: 244.9  4/28/2015 - Present        RESOLVED: Postural hypotension ICD-10-CM: I95.1  ICD-9-CM: 458.0  8/9/2016 - 3/26/2017        RESOLVED: Chest pain ICD-10-CM: R07.9  ICD-9-CM: 786.50  8/8/2016 - 3/26/2017        RESOLVED: Nausea ICD-10-CM: R11.0  ICD-9-CM: 787.02  8/8/2016 - 3/26/2017        RESOLVED: S/P PTCA (percutaneous transluminal coronary angioplasty); LAD PTCA of  ISR 11/27/15 ICD-10-CM: Z98.61  ICD-9-CM: V45.82  5/24/2016 - 3/26/2017        RESOLVED: TIA (transient ischemic attack) ICD-10-CM: G45.9  ICD-9-CM: 435.9  2/10/2016 - 3/26/2017        RESOLVED: Edema ICD-10-CM: R60.9  ICD-9-CM: 782.3  12/29/2015 - 3/26/2017        RESOLVED: Anterior myocardial infarction [facility]) ICD-10-CM: I21.09  ICD-9-CM: 410.10  5/21/2015 - 3/26/2017        RESOLVED: STEMI (ST elevation myocardial infarction) [facility]) ICD-10-CM: I21.3  ICD-9-CM: 410.90  4/28/2015 - 2/1/2016              Plan:      CAD (coronary artery disease) (11/27/2015)/ S/P complex PCI / Unstable angina/ hypertension  - on ASA and Brilinta  - continue ranexa, statin  - demadex continued. Patient almost aneuric. Will discuss with nephrology need for demadex?  - 12/6 stable cardiac exam.  - 12/14 - continue norvasc/ metoprolol. BP in good range. - 12/15 - no dose change. BP WNL. - 12/18 - stable exam. BP well in control.      Acute/ Chronic anemia (11/18/2017) - continue to monitor.   - continue epogen.  - last hgb 8.4, (12/11). check today. - 12/15-> hgb 8.9 (12/14) ->   - hgb 9.2 (12/18)     ESRD (end stage renal disease) On PD/ CRRT  - PD resumed. - monitor fluids status. Nephrology managing. Will follow at IRU.   - 11/30 problem with PD/ catheter. Nephrologist aware. Will need to hold PT due to femoral cath precautions. nephrologist to follow. May need to have tunneled cath. - 12/4 - continue TTS HD. PD per nephrology direction. - 12/6 Plan possibly to insert another PD tube. S/p Lt femoral perm catheter working well.  - 12/8 - continue HD per nephrology. PD on hold. LE edema slightly improved. .  - 12/13- continuing HD per nephrology direction. Malfunctioning PD catheter. Plan for PD catheter  replacement in OR on Friday. - 12/14 - continue HD, T, Thu, S schedule. Plan for PD catheter replacement tomorrow. - 12/15 - PD catheter revision, replacement  Rescheduled for MOnday. - 12/18 - PD cath revision today. Pneumonia prophylaxis- Insentive spirometer every hour while awake. 12/15  remains asymptomatic. Continue IS.      DVT risk / DVT Prophylaxis- continue daily physician exam to assess for signs and symptoms as patient is at increased risk for of thromboembolism. Mobilization as tolerated.  Intermittent pneumatic compression devices when in bed Thigh-high or knee-high thromboembolic deterrent hose when out of bed.   - on brillanta bId+ aspirin daily. continue SCDs. Pain Control: stable, mild-to-moderate joint symptoms intermittently, reasonably well controlled by PRN meds. Will require regular pain assessment and comprenhensive pain management. - has not required norco. Will d/c.   12/13 - no new pain source. 12/15 - has not needed pain medications.      Wound Care: Monitor wound status daily per staff and physician. At risk for failure. Will require 24/7 rehab nursing. Keep wound clean and dry - monitoring femoral cath site, appears benign. 12/15  - PD catheter and femoral cath site without signs of infection, drainage.  - 12/18 - will need continue monitoring of surgical wound post op. bowel program - as needed     GERD - resume PPI. At times may need additional antacids, Maalox prn.  - continue sucralfate  Appears asymptomatic. Controlled.      Hypothyroid  - synthroid. No dose change.      Time spent was 25 minutes with over 1/2 in direct patient care/examination, consultation and coordination of care.      Signed By: Chalo Roger MD     December 18, 2017

## 2017-12-19 ENCOUNTER — APPOINTMENT (OUTPATIENT)
Dept: CT IMAGING | Age: 82
DRG: 947 | End: 2017-12-19
Attending: INTERNAL MEDICINE
Payer: MEDICARE

## 2017-12-19 LAB
ALBUMIN SERPL-MCNC: 2.3 G/DL (ref 3.2–4.6)
ANION GAP SERPL CALC-SCNC: 7 MMOL/L (ref 7–16)
ANION GAP SERPL CALC-SCNC: 7 MMOL/L (ref 7–16)
BUN SERPL-MCNC: 11 MG/DL (ref 8–23)
BUN SERPL-MCNC: 29 MG/DL (ref 8–23)
CALCIUM SERPL-MCNC: 8.8 MG/DL (ref 8.3–10.4)
CALCIUM SERPL-MCNC: 9.7 MG/DL (ref 8.3–10.4)
CHLORIDE SERPL-SCNC: 97 MMOL/L (ref 98–107)
CHLORIDE SERPL-SCNC: 98 MMOL/L (ref 98–107)
CO2 SERPL-SCNC: 31 MMOL/L (ref 21–32)
CO2 SERPL-SCNC: 32 MMOL/L (ref 21–32)
CREAT SERPL-MCNC: 4.14 MG/DL (ref 0.6–1)
CREAT SERPL-MCNC: 8.89 MG/DL (ref 0.6–1)
ERYTHROCYTE [DISTWIDTH] IN BLOOD BY AUTOMATED COUNT: 19.4 % (ref 11.9–14.6)
GLUCOSE SERPL-MCNC: 100 MG/DL (ref 65–100)
GLUCOSE SERPL-MCNC: 73 MG/DL (ref 65–100)
HCT VFR BLD AUTO: 29.3 % (ref 35.8–46.3)
HGB BLD-MCNC: 8.8 G/DL (ref 11.7–15.4)
MAGNESIUM SERPL-MCNC: 2 MG/DL (ref 1.8–2.4)
MCH RBC QN AUTO: 29.6 PG (ref 26.1–32.9)
MCHC RBC AUTO-ENTMCNC: 30 G/DL (ref 31.4–35)
MCV RBC AUTO: 98.7 FL (ref 79.6–97.8)
PHOSPHATE SERPL-MCNC: 6.4 MG/DL (ref 2.3–3.7)
PLATELET # BLD AUTO: 320 K/UL (ref 150–450)
PMV BLD AUTO: 9 FL (ref 10.8–14.1)
POTASSIUM SERPL-SCNC: 4.3 MMOL/L (ref 3.5–5.1)
POTASSIUM SERPL-SCNC: 5.8 MMOL/L (ref 3.5–5.1)
RBC # BLD AUTO: 2.97 M/UL (ref 4.05–5.25)
SODIUM SERPL-SCNC: 136 MMOL/L (ref 136–145)
SODIUM SERPL-SCNC: 136 MMOL/L (ref 136–145)
TSH SERPL DL<=0.005 MIU/L-ACNC: 0.28 UIU/ML (ref 0.36–3.74)
WBC # BLD AUTO: 7.1 K/UL (ref 4.3–11.1)

## 2017-12-19 PROCEDURE — 70450 CT HEAD/BRAIN W/O DYE: CPT

## 2017-12-19 PROCEDURE — 83735 ASSAY OF MAGNESIUM: CPT | Performed by: INTERNAL MEDICINE

## 2017-12-19 PROCEDURE — 74011250636 HC RX REV CODE- 250/636: Performed by: PHYSICAL MEDICINE & REHABILITATION

## 2017-12-19 PROCEDURE — 84443 ASSAY THYROID STIM HORMONE: CPT | Performed by: INTERNAL MEDICINE

## 2017-12-19 PROCEDURE — 36415 COLL VENOUS BLD VENIPUNCTURE: CPT | Performed by: INTERNAL MEDICINE

## 2017-12-19 PROCEDURE — 85027 COMPLETE CBC AUTOMATED: CPT | Performed by: INTERNAL MEDICINE

## 2017-12-19 PROCEDURE — 74011250637 HC RX REV CODE- 250/637: Performed by: PHYSICAL MEDICINE & REHABILITATION

## 2017-12-19 PROCEDURE — 80069 RENAL FUNCTION PANEL: CPT | Performed by: INTERNAL MEDICINE

## 2017-12-19 PROCEDURE — 90935 HEMODIALYSIS ONE EVALUATION: CPT

## 2017-12-19 PROCEDURE — 5A1D70Z PERFORMANCE OF URINARY FILTRATION, INTERMITTENT, LESS THAN 6 HOURS PER DAY: ICD-10-PCS | Performed by: INTERNAL MEDICINE

## 2017-12-19 PROCEDURE — 80048 BASIC METABOLIC PNL TOTAL CA: CPT | Performed by: INTERNAL MEDICINE

## 2017-12-19 PROCEDURE — 74011250637 HC RX REV CODE- 250/637: Performed by: INTERNAL MEDICINE

## 2017-12-19 PROCEDURE — 74011250636 HC RX REV CODE- 250/636: Performed by: INTERNAL MEDICINE

## 2017-12-19 PROCEDURE — 65310000000 HC RM PRIVATE REHAB

## 2017-12-19 RX ORDER — ZOLPIDEM TARTRATE 5 MG/1
5 TABLET ORAL
Status: DISCONTINUED | OUTPATIENT
Start: 2017-12-19 | End: 2017-12-28 | Stop reason: HOSPADM

## 2017-12-19 RX ORDER — HEPARIN SODIUM 1000 [USP'U]/ML
5000 INJECTION, SOLUTION INTRAVENOUS; SUBCUTANEOUS
Status: DISCONTINUED | OUTPATIENT
Start: 2017-12-19 | End: 2017-12-28 | Stop reason: HOSPADM

## 2017-12-19 RX ORDER — HYDROCODONE BITARTRATE AND ACETAMINOPHEN 10; 325 MG/1; MG/1
1 TABLET ORAL ONCE
Status: COMPLETED | OUTPATIENT
Start: 2017-12-19 | End: 2017-12-19

## 2017-12-19 RX ADMIN — HYDROCODONE BITARTRATE AND ACETAMINOPHEN 1 TABLET: 5; 325 TABLET ORAL at 06:15

## 2017-12-19 RX ADMIN — ASPIRIN 81 MG: 81 TABLET, COATED ORAL at 12:05

## 2017-12-19 RX ADMIN — HYDROCODONE BITARTRATE AND ACETAMINOPHEN 1 TABLET: 5; 325 TABLET ORAL at 21:36

## 2017-12-19 RX ADMIN — RANOLAZINE 1000 MG: 500 TABLET, FILM COATED, EXTENDED RELEASE ORAL at 12:09

## 2017-12-19 RX ADMIN — RENAGEL 800 MG: 400 TABLET ORAL at 16:58

## 2017-12-19 RX ADMIN — HEPARIN SODIUM 5000 UNITS: 1000 INJECTION, SOLUTION INTRAVENOUS; SUBCUTANEOUS at 07:21

## 2017-12-19 RX ADMIN — PANTOPRAZOLE SODIUM 40 MG: 40 TABLET, DELAYED RELEASE ORAL at 05:54

## 2017-12-19 RX ADMIN — RANOLAZINE 1000 MG: 500 TABLET, FILM COATED, EXTENDED RELEASE ORAL at 21:36

## 2017-12-19 RX ADMIN — ROSUVASTATIN CALCIUM 20 MG: 20 TABLET, FILM COATED ORAL at 21:37

## 2017-12-19 RX ADMIN — SUCRALFATE 1 G: 1 SUSPENSION ORAL at 21:41

## 2017-12-19 RX ADMIN — HYDROCODONE BITARTRATE AND ACETAMINOPHEN 1 TABLET: 10; 325 TABLET ORAL at 09:29

## 2017-12-19 RX ADMIN — ERYTHROPOIETIN 20000 UNITS: 20000 INJECTION, SOLUTION INTRAVENOUS; SUBCUTANEOUS at 17:06

## 2017-12-19 RX ADMIN — STANDARDIZED SENNA CONCENTRATE AND DOCUSATE SODIUM 1 TABLET: 8.6; 5 TABLET, FILM COATED ORAL at 12:07

## 2017-12-19 RX ADMIN — LEVOTHYROXINE SODIUM 150 MCG: 150 TABLET ORAL at 06:00

## 2017-12-19 RX ADMIN — ZOLPIDEM TARTRATE 5 MG: 5 TABLET ORAL at 21:37

## 2017-12-19 RX ADMIN — AMLODIPINE BESYLATE 5 MG: 5 TABLET ORAL at 12:05

## 2017-12-19 RX ADMIN — HYDROCODONE BITARTRATE AND ACETAMINOPHEN 1 TABLET: 5; 325 TABLET ORAL at 17:01

## 2017-12-19 RX ADMIN — TICAGRELOR 90 MG: 90 TABLET ORAL at 21:37

## 2017-12-19 RX ADMIN — TICAGRELOR 90 MG: 90 TABLET ORAL at 12:09

## 2017-12-19 RX ADMIN — SUCRALFATE 1 G: 1 SUSPENSION ORAL at 12:00

## 2017-12-19 RX ADMIN — METOPROLOL TARTRATE 12.5 MG: 25 TABLET ORAL at 12:06

## 2017-12-19 RX ADMIN — SUCRALFATE 1 G: 1 SUSPENSION ORAL at 16:55

## 2017-12-19 RX ADMIN — PANTOPRAZOLE SODIUM 40 MG: 40 TABLET, DELAYED RELEASE ORAL at 16:56

## 2017-12-19 RX ADMIN — METOCLOPRAMIDE HYDROCHLORIDE 5 MG: 10 TABLET ORAL at 12:07

## 2017-12-19 RX ADMIN — CALCITRIOL 0.25 MCG: 0.25 CAPSULE, LIQUID FILLED ORAL at 12:11

## 2017-12-19 RX ADMIN — TORSEMIDE 100 MG: 100 TABLET ORAL at 17:00

## 2017-12-19 RX ADMIN — LORAZEPAM 1 MG: 1 TABLET ORAL at 16:54

## 2017-12-19 NOTE — PROGRESS NOTES
Hospitalist Rapid Response note    Rapid response called overhead. 85F with pmhx of chronic anemia, Richy Sit witness, ESRD on PD prior to admit. [id number]  for chest pain and underwent LHC with PCI. Had complication of PD access and was switched to HD. Discharged to 9th floor rehab on 11/29. At time of arrival patient is awake but slightly dazed/lethargic. Slow to respond. Dr Jerry Padilla at bedside - notes he saw both legs shaking from across the preciado. Reports she is usually much more alert. VSS, following commands equally with both sides. Likely a new onset seizure. She had revision of PD cath yesterday and ran on HD today. Will repeat labs - bmp, mag and TSH. Check CT head. Have asked that reglan be discontinued for now as it can lower seizure threshhold. Will follow results.

## 2017-12-19 NOTE — PROGRESS NOTES
Patient refusing therapy this AM while in Dialysis d/t increased pain and decreased activity tolerance. Note Rapid Response called on patient this PM, therapies on hold per MD.  To make up time as patient is cleared for therapy and as scheduling allows.     Giacomo Alvarez OT  12/19/2017

## 2017-12-19 NOTE — OP NOTES
[address] REPORT    Negrita Watson  MR#: [id number]  : 10/26/1932  ACCOUNT #: [de-identified]   DATE OF SERVICE: 2017    PREOPERATIVE DIAGNOSIS:  Malpositioned peritoneal dialysis catheter. POSTOPERATIVE DIAGNOSIS:  Malpositioned peritoneal dialysis catheter. PROCEDURE PERFORMED:  Laparoscopic revision of peritoneal dialysis catheter. SURGEON:  Dr. Dajuan Gomez:  General endotracheal.    ESTIMATED BLOOD LOSS:  Minimal.    DRAINS:  None. SPECIMENS REMOVED:  None. COMPLICATIONS:  None. DESCRIPTION OF PROCEDURE:  The patient was brought to the operating room, placed on the operating table in supine position. Following adequate general endotracheal anesthesia and a timeout procedure, the anterior abdomen was draped and prepped in a sterile fashion, including the existing peritoneal dialysis catheter. A small transverse incision was made in the left upper quadrant, about 2 cm below the costal margin. The wound was deepened down to the level of the fascia. The fascia was incised transversely. The internal and transversalis fascias were incised as well. The peritoneum was then grasped, elevated, and a small incision was made in the peritoneum. The peritoneal cavity was then explored bluntly to ensure that we were, in fact, within the peritoneal cavity safely. Next, a Tomy trocar was then placed and secured with Vicryl suture. The abdomen was then insufflated to 15 mmHg. The PD catheter was identified and was seen moving cephalad from its insertion point, close to the navel, and was intertwined with a large mass of omentum and scar tissue. A 2nd 5 mm port was placed in the left lower quadrant under direct vision. Through this, a blunt laparoscopic grasper was used to dislodge the catheter from the omental adhesions. The catheter was then pulled down to its normal position in the pelvis.   There was no evidence of scarring or significant omentum in the pelvis. At this point, 1 L of sterile saline was administered through the peritoneal dialysis catheter, and it instilled easily. The bag was then placed on the floor, and the dialysate was removed via gravity drainage. Approximately 250 mL of normal saline was left within the peritoneal cavity. At this point, the abdomen was desufflated, and the ports were removed. The Tomy port site was closed in layers with Vicryl suture, followed by skin closure of subcuticular suture, as was the small 5 mm port in the left lower quadrant. Sterile, dry dressings were applied. The peritoneal dialysis catheter was instilled with concentrated heparin solution. The patient tolerated the procedure well. No complications. All needle and sponge counts were correct.       Ofelia Knott MD       [address] [place] [address] /   D: 12/18/2017 14:58     T: 12/19/2017 02:04  JOB #: [id number]

## 2017-12-19 NOTE — PROGRESS NOTES
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 She has a history of GI bleeding and had anemia-induced unstable angina in the past. 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 She states the pain has since resolved with NTG paste.  No fevers or chills. No nausea, vomiting or diarrhea.  She states that she is essentially anuric and occasionally makes minimal urine.  She has a h/o CVA and TIA. No fever or chills, no problems with PD drainage or discoloration of PD fluid. No increased thirst. She wishes regular diet and supplement. She denies any other acute complaints  Her edema has improved in the past couple of days with intensified dialysis. s- no dyspnea, no Cp, no N/V - in good spirits - S/P HD she had an episode of hypoxia, drowsiness and rapid response was called ~12h30. Per staff appeared postictal, per hospitalist likely new onset seizure.  - she seems better now and wishes for a sleeping pill     Past Medical History:   Diagnosis Date    Anemia of chronic renal failure 4/28/2015    Anterior myocardial infarction [facility]) 5/21/2015    CAD (coronary artery disease)     Chest pain     Chronic kidney disease, stage III (moderate) 8/15/2014    on dialysis    CKD (chronic kidney disease) stage 4, GFR 15-29 ml/min ([address] 4/28/2015    Debility 5/5/2015    Depression 12/29/2015    Edema 12/29/2015    Endocrine disease     Hypothyroidism    GERD (gastroesophageal reflux disease)     Heart murmur 12/29/2015    HLD (hyperlipidemia) 12/29/2015    Hypertension     Hypothyroidism 4/28/2015    Ischemic cardiomyopathy 12/29/2015    Nausea     S/P PTCA (percutaneous transluminal coronary angioplasty); LAD PTCA of  ISR 11/27/15 5/24/2016    STEMI (ST elevation myocardial infarction) ([facility] [address] 4/28/2015    Unspecified sleep apnea     uses cpap machine    Unstable angina ([facility] [address]       Past Surgical History:   Procedure Laterality Date    HX BACK SURGERY  1990    neck surgery cervical disc    HX BACK SURGERY      lower back    HX CATARACT REMOVAL Bilateral     HX CHOLECYSTECTOMY  19702    gall bladder     HX KNEE REPLACEMENT Right 2006    HX PTCA  4/28/2015    2.25 Xience stent to mid LAD for occluded artery, anterior MI, EF 25%. Moderate disease distal LAD and distal OM PCI CX and RCA 2004, then PCI RCA and LAD in 2009. Prior to Admission medications    Medication Sig Start Date End Date Taking? Authorizing Provider   metoprolol tartrate (LOPRESSOR) 25 mg tablet Take 0.5 Tabs by mouth daily. 11/27/17  Yes MINDY Chatman   amLODIPine (NORVASC) 5 mg tablet Take 1 Tab by mouth daily. 11/27/17  Yes MINDY Chatman   torsemide (DEMADEX) 100 mg tablet Take 1 Tab by mouth two (2) times a day. 11/27/17  Yes MINDY Chatman   pantoprazole (PROTONIX) 40 mg tablet Take 1 Tab by mouth Before breakfast and dinner. 11/27/17  Yes MINDY Chatman   magnesium oxide (MAG-OX) 400 mg tablet Take 400 mg by mouth daily. Yes Historical Provider   metoclopramide HCl (REGLAN) 5 mg tablet Take 5 mg by mouth two (2) times a day. Yes Historical Provider   ticagrelor (BRILINTA) 90 mg tablet Take 1 Tab by mouth two (2) times a day. 2/4/16  Yes MINDY Chatman   aspirin delayed-release 81 mg tablet Take 1 Tab by mouth daily. 5/5/15  Yes Gisela Hollingsworth PA-C   rosuvastatin (CRESTOR) 20 mg tablet Take 20 mg by mouth nightly. Yes Historical Provider   levothyroxine (SYNTHROID) 150 mcg tablet Take 150 mcg by mouth Daily (before breakfast). Yes Historical Provider   cyanocobalamin (VITAMIN B12) 1,000 mcg/mL injection 1 mL by IntraMUSCular route every seven (7) days.  Indications: Vitamin B12 Deficiency 12/2/17   [place], PA   folic acid (FOLVITE) 1 mg tablet Take 1 mg by mouth daily. Historical Provider   potassium chloride (K-DUR, KLOR-CON) 20 mEq tablet Take 20 mEq by mouth two (2) times a day. Historical Provider   traZODone (DESYREL) 50 mg tablet Take  by mouth nightly. Historical Provider   megestrol (MEGACE) 400 mg/10 mL (40 mg/mL) suspension Take 200 mg by mouth daily. Historical Provider   sucralfate (CARAFATE) 100 mg/mL suspension Take 10 mL by mouth Before breakfast, lunch, dinner and at bedtime. Indications: PREVENTION OF STRESS ULCER 9/23/17   Charis Pete DO   nitroglycerin (NITROLINGUAL) 400 mcg/spray spray 1 Spray by SubLINGual route every five (5) minutes as needed for Chest Pain. Historical Provider   linaclotide Magalys Pill) 145 mcg cap capsule Take  by mouth Daily (before breakfast). Historical Provider   PNV NO.122/IRON/FOLIC ACID (PRENATAL MULTI PO) Take  by mouth. Historical Provider   ondansetron (ZOFRAN ODT) 4 mg disintegrating tablet Take 4 mg by mouth three (3) times daily as needed. Historical Provider   sevelamer carbonate (RENVELA) 800 mg tab tab Take 800 mg by mouth three (3) times daily (with meals). Historical Provider   gentamicin (GARAMYCIN) 0.1 % topical cream APPLY TO PD catheter exit site at daily dressing change 5/12/15   Gucci Arauz MD   darbepoetin toya in polysorbat (ARANESP, POLYSORBATE,) 40 mcg/mL injection 40 mcg by SubCUTAneous route every fourteen (14) days. Indications: ANEMIA IN CHRONIC KIDNEY DISEASE    Historical Provider   ranolazine ER (RANEXA) 500 mg SR tablet Take 500 mg by mouth two (2) times a day.     Historical Provider     Allergies   Allergen Reactions    Codeine Nausea and Vomiting      Social History   Substance Use Topics    Smoking status: Never Smoker    Smokeless tobacco: Never Used    Alcohol use No      Family History   Problem Relation Age of Onset    Heart Disease Mother     Hypertension Mother     Cancer Mother      Lung    Stroke Father     Hypertension Father     Breast Cancer Neg Hx           Review of Systems    Constitutional: no fever, weak  Eyes: fair vision,    Ears, nose, mouth, throat, and face:fair hearing,   Respiratory: no asthma,  Chronic home O2 is being used - improved dyspnea  Cardiovascular:no palpitation, no  chest pain,   Gastrointestinal:no diarrhea,  Had BM today  Genitourinary: no dysuria,   Hematologic/lymphatic: no bleeding tendency,   Neurological: no seizures   Behvioral/Psych: no psych hospitalization   Endocrine: no goiter,       Objective:       Visit Vitals    /71    Pulse 84    Temp 98.6 °F (37 °C)    Resp 17    Wt 92.3 kg (203 lb 6.4 oz)    SpO2 97%    BMI 29.18 kg/m2       General:  Alert, cooperative, no distress, appears stated age. Head:  Normocephalic, without obvious abnormality, atraumatic. Eyes:  Conjunctivae/corneas clear. EOMs intact. Throat: Lips, mucosa, and tongue normal. Teeth and gums normal.   Neck: Supple, symmetrical, trachea midline, no adenopathy,  no JVD. Lungs:   Clear to auscultation bilaterally. Heart:  Regular rate and rhythm, S1, S2 normal, 2/6 systolic  murmur, no rub or gallop. Abdomen:   Soft, non-tender. No masses,  No organomegaly. PD catheter in place without exudate   Extremities: Extremities normal, atraumatic, no cyanosis. 3+edema. Skin: Skin color, texture, turgor normal. No rashes or lesions. Neurologic: Grossly intact. No asterixis.          Data Review:       Recent Results (from the past 24 hour(s))   CBC W/O DIFF    Collection Time: 12/19/17  7:23 AM   Result Value Ref Range    WBC 7.1 4.3 - 11.1 K/uL    RBC 2.97 (L) 4.05 - 5.25 M/uL    HGB 8.8 (L) 11.7 - 15.4 g/dL    HCT 29.3 (L) 35.8 - 46.3 %    MCV 98.7 (H) 79.6 - 97.8 FL    MCH 29.6 26.1 - 32.9 PG    MCHC 30.0 (L) 31.4 - 35.0 g/dL    RDW 19.4 (H) 11.9 - 14.6 %    PLATELET 706 537 - 101 K/uL    MPV 9.0 (L) 10.8 - 14.1 FL   RENAL FUNCTION PANEL    Collection Time: 12/19/17  7:23 AM   Result Value Ref Range    Sodium 136 136 - 145 mmol/L    Potassium 5.8 (H) 3.5 - 5.1 mmol/L    Chloride 98 98 - 107 mmol/L    CO2 31 21 - 32 mmol/L    Anion gap 7 7 - 16 mmol/L    Glucose 73 65 - 100 mg/dL    BUN 29 (H) 8 - 23 MG/DL    Creatinine 8.89 (H) 0.6 - 1.0 MG/DL    GFR est AA 5 (L) >60 ml/min/1.73m2    GFR est non-AA 5 (L) >60 ml/min/1.73m2    Calcium 9.7 8.3 - 10.4 MG/DL    Phosphorus 6.4 (H) 2.3 - 3.7 MG/DL    Albumin 2.3 (L) 3.2 - 4.6 g/dL   METABOLIC PANEL, BASIC    Collection Time: 12/19/17  1:09 PM   Result Value Ref Range    Sodium 136 136 - 145 mmol/L    Potassium 4.3 3.5 - 5.1 mmol/L    Chloride 97 (L) 98 - 107 mmol/L    CO2 32 21 - 32 mmol/L    Anion gap 7 7 - 16 mmol/L    Glucose 100 65 - 100 mg/dL    BUN 11 8 - 23 MG/DL    Creatinine 4.14 (H) 0.6 - 1.0 MG/DL    GFR est AA 13 (L) >60 ml/min/1.73m2    GFR est non-AA 11 (L) >60 ml/min/1.73m2    Calcium 8.8 8.3 - 10.4 MG/DL   MAGNESIUM    Collection Time: 12/19/17  1:09 PM   Result Value Ref Range    Magnesium 2.0 1.8 - 2.4 mg/dL   TSH 3RD GENERATION    Collection Time: 12/19/17  1:09 PM   Result Value Ref Range    TSH 0.276 (L) 0.358 - 3.740 uIU/mL       CXR viewed by me - no major infiltrate or fluid excess, CM with left possible minor effusion is present        CT abdomen/pelvis  CT ABDOMEN:  Peritoneal fluid in the perihepatic space, mild paracolic gutters,  extending down into the pelvis. Peritoneal dialysis catheter noted. There is not  any evidence of retroperitoneal hematoma identified. Strandy fluid density does  extend into the extraperitoneal space in the lower abdomen and pelvis. There is  radiodensity within the renal collecting systems, possibly previously  administered IV contrast. There is peripancreatic fluid and stranding, cannot  exclude acute pancreatitis. No biliary dilatation. Spleen is not enlarged.  Small  bowel normal caliber.   CT PELVIS:   Moderate to large volume pelvic fluid.  Jane Eber is diffuse asymmetric enlargement of the right rectus and oblique  abdominal muscles. There are are of higher attenuation the contralateral side  and findings are consistent with an abdominal wall hematoma. IMPRESSION:    1. Evidence of right abdominal wall hematoma. 2. No CT evidence to suggest retroperitoneal hematoma. 3. Extensive fluid in the abdomen and pelvis, presumed related to peritoneal  Dialysis. KUB - PD catheter still unchanged compared to last week - remains in the sidney-umbilical area      Active Problems:    Hypothyroidism (4/28/2015)      S/P PTCA (percutaneous transluminal coronary angioplasty) (4/13/2017)      ESRD (end stage renal disease) ([facility] [place] [address] (11/18/2017)      Weakness of both legs (11/29/2017)      Renal failure (ARF), acute on chronic ([address] (11/29/2017)        Assessment:     1. CAD x NSTEMI, Unstable angina -  - [facility] with complex CAD and acute thrombotic lesion in pLAD and subtotal occlusion in mLAD. The RCA has severe proximal and mid RCA disease. She has additional stenting of LAD with Resolute in mid/distal and proximal vessel. These all touched the previously stented mid vessel. Recommend life-long DAPT    2, ESRD -  - on temporary HD via perm cath due to PD catheter dysfunction  - TTS schedule in rehab     3. Fluid excess -  - recurrent due to poor PD drainage  - improving with fluid removal on HD    4. Anemia -  - intensive anemia therapy in [place]  - on WAYNE and IV iron and B12  - improved S/P BT    5. History of GI bleed -  - monitor clinically and serial Hb  - she had a drop in Hb to 7 range and improved with BT    6. Hypokalemia   - resolved     7. Abdominal pain and tenderness with drop in Hb , on DAPT   No evidence of retroperitoneal hematoma     8. PD catheter dysfunction -  - S/P PD catheter revision / replacement yesterday     9.  Possible new onset seizure -  - w/u in progress per primary team  - could this have been a hypotensive/arrhythmia episode induced by fluid removal on dialysis?       Plan:     As above - 25

## 2017-12-19 NOTE — PROGRESS NOTES
responded to Rapid Response. Dr stated pt was doing ok. Pt was alert and verbal when  left. No family present. [employer] provided.

## 2017-12-19 NOTE — PROGRESS NOTES
Speech therapy note  Attempted to see pt this am, however, pt off the floor.      Dexter Carrero MA/HARSHA/SLP

## 2017-12-19 NOTE — DIALYSIS
Off dialysis at 1116 and 2 Kg's removed. Left femoral  Catheter flushed with 10 ml NS per protocol. Vital signs  HR=77, QG=622/73. Pt noted alert and oriented. Pt to room after treatment completed.

## 2017-12-19 NOTE — DIALYSIS
HD treatment initiated via left femoral cath without difficulty. CVC dressing clean, dry, and intact, tego caps intact, bilateral lumen aspirated and flushed with 9cc NS. VS stable, will continue to monitor during tx. Heparin 1000 units bolus and 500 units/hr this tx. Machine tests passed and alarms intact. NAD.

## 2017-12-19 NOTE — PROGRESS NOTES
Progress Note    Patient: Eun Do MRN: [id number]  SSN: [id number]    YOB: 1932  Age: 80 y.o. Sex: female      Admit Date: 11/29/2017    LOS: 20 days     1 Day Post-Op    PROCEDURE(S):  Laparoscopic revision of peritoneal dialysis catheter      Subjective:     Patient returning from radiology for head CT, had episode of hypoxia, drowsiness following dialysis and rapid response was called ~12h30. Per staff appeared postictal, per hospitalist likely new onset seizure. During interview this afternoon, patient seems somewhat confused, much less alert. Admits to mild abdominal pain at surgery sites. Objective:     Vitals:    12/19/17 1056 12/19/17 1116 12/19/17 1154 12/19/17 1245   BP: 119/69 143/73 130/77 125/82   Pulse: 75 77 82 80   Resp:    17   Temp:   98.2 °F (36.8 °C) 98.6 °F (37 °C)   SpO2:   91% 97%   Weight:            Intake and Output:  Current Shift: 12/19 0701 - 12/19 1900  In: -   Out: 2000   Last three shifts:      Physical Exam:   GENERAL: mildly anxious  EYE: EOM intact, no nystagmus  LUNG: clear to auscultation bilaterally  HEART: regular rate and rhythm  ABDOMEN: soft, mildly tender, nondistended, bowel sounds normoactive, surgical incisions with dressings intact    Lab/Data Review: All lab results for the last 24 hours reviewed. Imaging:  CT scan of the brain without contrast  IMPRESSION: No acute intracranial abnormality      Assessment / Plan:      Active Problems:    Hypothyroidism (4/28/2015)      S/P PTCA (percutaneous transluminal coronary angioplasty) (4/13/2017)      ESRD (end stage renal disease) ([facility] [address] (11/18/2017)      Weakness of both legs (11/29/2017)      Renal failure (ARF), acute on chronic ([address] (11/29/2017)        Continue care per primary, specialists  Continue HD with left femoral cath  Will notify patient's nephrologist that PD cath has been repositioned, to be flushed at least weekly or per their schedule      Signed By: Skylar Garcia PAAFSANEH    December 19, 2017      Physician Assistant with Ke Hernandez MD / Oscar Torres.  Lacho Armenta MD / Nikole Velásquez MD

## 2017-12-19 NOTE — PROGRESS NOTES
Hourly rounding completed this shift. Pt slept intermittently throughout night. All needs met at this time.      TOILETING:  Does patient need assist with clothing management and/or pericare? Yes      TOILET TRANSFER:  Pt requires minimal assistance.  Pt uses wheelchair and walker.      BLADDER:  Pt does not have a castillo catheter that staff manages.  Pt does not take medication.  Pt is continent. of bladder and voids in toilet occasionally( Pt  dialyzes Pt requires staff to position device Pt has had 0 bladder accidents during this shift requiring minimal assistance to clean up.  (An accident is when the episode is not contained in a brief AND/OR the clothing/linen requires changing/cleaning up.)      BOWEL:  Pt does take medication.  Pt is continent of bowel and uses toilet.  Pt requires staff to position device    Pt has had 0 bowel accidents during this shift requiring minimal assistance from staff to clean up.  (An accident is when the episode is not contained in a brief

## 2017-12-19 NOTE — PROGRESS NOTES
MD Claire,   Medical Director  [address] [facility], [address] [facility]  Tel: [phone]       SFD PROGRESS NOTE       Admit Date: 11/29/2017  Admit Diagnosis: DEBILITY; Renal failure (ARF), acute on chronic ([facility] Bautista Mt*  Chief Complaint : Gait dysfunction secondary to below. Admit Diagnosis: Unstable angina ([facility]  CAD (coronary artery disease) (11/27/2015)  S/P complex PCI and stable on ASA and Brilinta  Unstable angina ([facility] (2/1/2016)  Acute/ Chronic anemia (11/18/2017)  ESRD (end stage renal disease) On PD/ CRRT  Pain  DVT risk  Acute Rehab Dx:  Debility    Weakness  Gait impairment  deconditioning  Mobility and ambulation deficits  Self Care/ADL deficits     Subjective     Patient seen and examined. Vss, afebrile. Patient had an acute episode of obtundation,confusion, hypoxia while eating lunch, soon after return from HD. Witnessed her feet, leg jerking. Patient was unresponsive to voice, did not flow commands, staring forward, eyes open. Patient noted to be hypoxic, saturating in the 70% temporarily. O2 via NC was placed at 6L per minute and rapid response was called. O2 saturation promptly recovered to normal within few minutes, followed by her mental status. Slowly over several minutes patient started answering questions, following commands, appeared post ictal.   Labs and CT head ordered, were WNL. Patient continued to be checked, no new acute abnormalities, appears close to baseline cognitively.      Objective:     Current Facility-Administered Medications   Medication Dose Route Frequency    heparin (porcine) 1,000 unit/mL injection 5,000 Units  5,000 Units Hemodialysis DIALYSIS PRN    acetaminophen (TYLENOL) tablet 650 mg  650 mg Oral Q4H PRN    amLODIPine (NORVASC) tablet 5 mg  5 mg Oral DAILY    aspirin delayed-release tablet 81 mg  81 mg Oral DAILY    calcitRIOL (ROCALTROL) capsule 0.25 mcg  0.25 mcg Oral DAILY    carbamide peroxide (DEBROX) 6.5 % otic solution 5 Drop  5 Drop Right Ear BID    [START ON 12/23/2017] cyanocobalamin (VITAMIN B12) injection 1,000 mcg  1,000 mcg IntraMUSCular Q7D    epoetin toya (EPOGEN;PROCRIT) injection 20,000 Units  20,000 Units SubCUTAneous Q TUE, THU & SAT    HYDROcodone-acetaminophen (NORCO) 5-325 mg per tablet 1 Tab  1 Tab Oral Q4H PRN    hydrogen peroxide 3 %   Topical PRN    levothyroxine (SYNTHROID) tablet 150 mcg  150 mcg Oral ACB    linaclotide (LINZESS) capsule 145 mcg (Patient Supplied)  145 mcg Oral DAILY    LORazepam (ATIVAN) tablet 1 mg  1 mg Oral Q6H PRN    metoclopramide HCl (REGLAN) tablet 5 mg  5 mg Oral BID    metoprolol tartrate (LOPRESSOR) tablet 12.5 mg  12.5 mg Oral DAILY    multivitamin w ZN (STRESSTABS W ZINC) tablet  1 Tab Oral DAILY    ondansetron (ZOFRAN ODT) tablet 4 mg  4 mg Oral TID PRN    pantoprazole (PROTONIX) tablet 40 mg  40 mg Oral ACB&D    polyethylene glycol (MIRALAX) packet 17 g  17 g Oral DAILY PRN    ranolazine ER (RANEXA) tablet 1,000 mg  1,000 mg Oral BID    rosuvastatin (CRESTOR) tablet 20 mg  20 mg Oral QHS    senna-docusate (PERICOLACE) 8.6-50 mg per tablet 1 Tab  1 Tab Oral DAILY    sevelamer (RENAGEL) tablet 800 mg  800 mg Oral TID WITH MEALS    sodium chloride (NS) flush 5-10 mL  5-10 mL IntraVENous PRN    sucralfate (CARAFATE) 100 mg/mL oral suspension 1 g  1 g Oral AC&HS    ticagrelor (BRILINTA) tablet 90 mg  90 mg Oral BID    torsemide (DEMADEX) tablet 100 mg  100 mg Oral BID     Review of Systems:   Denies chest pain, shortness of breath, cough, headache, visual problems, abdominal pain, dysurea, calf pain. Pertinent positives are as noted in the medical records and unremarkable otherwise. Visit Vitals    /63    Pulse 78    Temp 98.4 °F (36.9 °C)    Resp 16    Wt 203 lb 6.4 oz (92.3 kg)    SpO2 98%    BMI 29.18 kg/m2   Physical Exam:   General: Groggy immediately after acute event. Better later.    No acute cardio respiratory distress. HEENT: Normocephalic,no scleral icterus  Oral mucosa moist without cyanosis   Lungs: Clear to auscultation  bilaterally. Respiration even and unlabored   Heart: Regular rate and rhythm, S1, S2   No  murmurs, clicks, rub or gallops   Abdomen: Soft, non-tender, nondistended. Bowel sounds present. No organomegaly. LLE cath covered. Genitourinary: defered   Neuromuscular:      NANCI, EOMI  + mild generalized weakness 4+ to 5-/5. BUE, BLE, more proximal.   Moves all extremities well. Sensation grossly intact to soft touch. Skin/extremity: No rashes, no erythema. 1+edema.   Wound covered.   Ariella People                                                                             Functional Assessment:  Gross Assessment  AROM: Generally decreased, functional (12/15/17 1200)  Strength: Generally decreased, functional (12/15/17 1200)  Coordination: Generally decreased, functional (12/15/17 1200)       Balance  Sitting - Static: Good (unsupported) (12/17/17 1100)  Sitting - Dynamic: Good (unsupported) (12/17/17 1100)  Standing - Static: Good (12/17/17 1100)  Standing - Dynamic : Impaired (12/15/17 1500)           Toileting  Adaptive Equipment: Elevated seat;Grab bars (12/18/17 1010)         Harrison Fall Risk Assessment:  Mohinder Canas Fall Risk  Mobility: Ambulates or transfers with assist devices or assistance/unsteady gait (12/19/17 0700)  Mobility Interventions: Communicate number of staff needed for ambulation/transfer;Patient to call before getting OOB (12/19/17 0700)  Mentation: Alert, oriented x 3 (12/19/17 0700)  Mentation Interventions: Adequate sleep, hydration, pain control;Evaluate medications/consider consulting pharmacy (12/19/17 0700)  Medication: Patient receiving anticonvulsants, sedatives(tranquilizers), psychotropics or hypnotics, hypoglycemics, narcotics, sleep aids, antihypertensives, laxatives, or diuretics (12/19/17 0700)  Medication Interventions: Evaluate medications/consider consulting pharmacy; Patient to call before getting OOB (12/19/17 0700)  Elimination: Needs assistance with toileting (12/19/17 0700)  Elimination Interventions: Call light in reach; Patient to call for help with toileting needs (12/19/17 0700)  Prior Fall History: No (12/19/17 0700)  History of Falls Interventions: Consult care management for discharge planning;Evaluate medications/consider consulting pharmacy (12/19/17 0700)  Total Score: 3 (12/19/17 0700)  Standard Fall Precautions: Yes (12/17/17 2336)  High Fall Risk: Yes (12/19/17 0700)     Speech Assessment:         Ambulation:  Gait  Base of Support: Widened (12/15/17 1200)  Speed/Michelle: Slow;Shuffled (12/15/17 1200)  Step Length: Right shortened;Left shortened (12/15/17 1200)  Stance: Right increased; Left increased (12/15/17 1200)  Gait Abnormalities: Trunk sway increased; Step to gait; Decreased step clearance (12/15/17 1200)  Distance (ft): 130 Feet (ft) (12/17/17 1100)  Assistive Device: Walker (12/17/17 1100)  Curbs/Ramps: Minimum assistance (12/07/17 1400)     Labs/Studies:  Recent Results (from the past 72 hour(s))   CBC WITH AUTOMATED DIFF    Collection Time: 12/18/17  6:13 AM   Result Value Ref Range    WBC 5.9 4.3 - 11.1 K/uL    RBC 3.08 (L) 4.05 - 5.25 M/uL    HGB 9.2 (L) 11.7 - 15.4 g/dL    HCT 30.3 (L) 35.8 - 46.3 %    MCV 98.4 (H) 79.6 - 97.8 FL    MCH 29.9 26.1 - 32.9 PG    MCHC 30.4 (L) 31.4 - 35.0 g/dL    RDW 19.5 (H) 11.9 - 14.6 %    PLATELET 518 519 - 574 K/uL    MPV 9.0 (L) 10.8 - 14.1 FL    DF AUTOMATED      NEUTROPHILS 63 43 - 78 %    LYMPHOCYTES 23 13 - 44 %    MONOCYTES 11 4.0 - 12.0 %    EOSINOPHILS 3 0.5 - 7.8 %    BASOPHILS 0 0.0 - 2.0 %    IMMATURE GRANULOCYTES 0 0.0 - 5.0 %    ABS. NEUTROPHILS 3.7 1.7 - 8.2 K/UL    ABS. LYMPHOCYTES 1.4 0.5 - 4.6 K/UL    ABS. MONOCYTES 0.7 0.1 - 1.3 K/UL    ABS. EOSINOPHILS 0.2 0.0 - 0.8 K/UL    ABS. BASOPHILS 0.0 0.0 - 0.2 K/UL    ABS. IMM.  GRANS. 0.0 0.0 - 0.5 K/UL   PHOSPHORUS    Collection Time: 12/18/17 6:13 AM   Result Value Ref Range    Phosphorus 4.9 (H) 2.3 - 3.7 MG/DL   POC SODIUM-POTASSIUM    Collection Time: 12/18/17  9:22 AM   Result Value Ref Range    Potassium, POC 4.8 3.5 - 5.1 MMOL/L   CBC W/O DIFF    Collection Time: 12/19/17  7:23 AM   Result Value Ref Range    WBC 7.1 4.3 - 11.1 K/uL    RBC 2.97 (L) 4.05 - 5.25 M/uL    HGB 8.8 (L) 11.7 - 15.4 g/dL    HCT 29.3 (L) 35.8 - 46.3 %    MCV 98.7 (H) 79.6 - 97.8 FL    MCH 29.6 26.1 - 32.9 PG    MCHC 30.0 (L) 31.4 - 35.0 g/dL    RDW 19.4 (H) 11.9 - 14.6 %    PLATELET 397 996 - 526 K/uL    MPV 9.0 (L) 10.8 - 14.1 FL       Assessment:     Problem List as of 12/19/2017  Date Reviewed: 3/2/2017          Codes Class Noted - Resolved    Weakness of both legs ICD-10-CM: R29.898  ICD-9-CM: 729.89  11/29/2017 - Present        Renal failure (ARF), acute on chronic ([facility] ICD-10-CM: N17.9, N18.9  ICD-9-CM: 584.9, 585.9  11/29/2017 - Present        Elevated troponin ICD-10-CM: R74.8  ICD-9-CM: 790.6  11/18/2017 - Present        Chest pain ICD-10-CM: R07.9  ICD-9-CM: 786.50  11/18/2017 - Present        CKD (chronic kidney disease) ICD-10-CM: N18.9  ICD-9-CM: 585.9  11/18/2017 - Present        Chronic anemia ICD-10-CM: D64.9  ICD-9-CM: 285.9  11/18/2017 - Present        ESRD (end stage renal disease) ([facility] ICD-10-CM: N18.6  ICD-9-CM: 585.6  11/18/2017 - Present        Abdominal pain ICD-10-CM: R10.9  ICD-9-CM: 789.00  9/18/2017 - Present        H/O TIA (transient ischemic attack) and stroke ICD-10-CM: Z86.73  ICD-9-CM: V12.54  4/13/2017 - Present        S/P PTCA (percutaneous transluminal coronary angioplasty) ICD-10-CM: Z98.61  ICD-9-CM: V45.82  4/13/2017 - Present        Mixed hyperlipidemia ICD-10-CM: E78.2  ICD-9-CM: 272.2  4/13/2017 - Present        Vision changes ICD-10-CM: H53.9  ICD-9-CM: 368.9  3/26/2017 - Present        Dizziness ICD-10-CM: R42  ICD-9-CM: 780.4  3/26/2017 - Present        Refusal of blood transfusions as patient is Gnosticism (Chronic) ICD-10-CM: Z53.1  ICD-9-CM: V62.6  8/25/2016 - Present        GERD (gastroesophageal reflux disease) ICD-10-CM: K21.9  ICD-9-CM: 530.81  8/8/2016 - Present        Unspecified sleep apnea ICD-10-CM: G47.30  ICD-9-CM: 780.57  8/8/2016 - Present    Overview Signed 8/8/2016 11:09 AM by Stephanie Reid     uses cpap machine             CVA (cerebral vascular accident) Hx of TIA ICD-10-CM: I63.9  ICD-9-CM: 434.91  2/22/2016 - Present        Unstable angina ([address] ICD-10-CM: I20.0  ICD-9-CM: 411.1  2/1/2016 - Present        ESRD on peritoneal dialysis [facility]) (Chronic) ICD-10-CM: N18.6, Z99.2  ICD-9-CM: 585.6, V45.11  2/1/2016 - Present        Ischemic cardiomyopathy ICD-10-CM: I25.5  ICD-9-CM: 414.8  12/29/2015 - Present        HLD (hyperlipidemia) ICD-10-CM: E78.5  ICD-9-CM: 272.4  12/29/2015 - Present        Depression ICD-10-CM: F32.9  ICD-9-CM: 272  12/29/2015 - Present        CAD (coronary artery disease) ICD-10-CM: I25.10  ICD-9-CM: 414.00  11/27/2015 - Present        Debility ICD-10-CM: R53.81  ICD-9-CM: 799.3  5/5/2015 - Present        Hypertension (Chronic) ICD-10-CM: I10  ICD-9-CM: 401.9  4/28/2015 - Present        Anemia of chronic renal failure (Chronic) ICD-10-CM: N18.9, D63.1  ICD-9-CM: 285.21, 585.9  4/28/2015 - Present        Hypothyroidism (Chronic) ICD-10-CM: E03.9  ICD-9-CM: 244.9  4/28/2015 - Present        RESOLVED: Postural hypotension ICD-10-CM: I95.1  ICD-9-CM: 458.0  8/9/2016 - 3/26/2017        RESOLVED: Chest pain ICD-10-CM: R07.9  ICD-9-CM: 786.50  8/8/2016 - 3/26/2017        RESOLVED: Nausea ICD-10-CM: R11.0  ICD-9-CM: 787.02  8/8/2016 - 3/26/2017        RESOLVED: S/P PTCA (percutaneous transluminal coronary angioplasty); LAD PTCA of  ISR 11/27/15 ICD-10-CM: Z98.61  ICD-9-CM: V45.82  5/24/2016 - 3/26/2017        RESOLVED: TIA (transient ischemic attack) ICD-10-CM: G45.9  ICD-9-CM: 435.9  2/10/2016 - 3/26/2017        RESOLVED: Edema ICD-10-CM: R60.9  ICD-9-CM: 782.3  12/29/2015 - 3/26/2017 RESOLVED: Anterior myocardial infarction [facility]) ICD-10-CM: I21.09  ICD-9-CM: 410.10  5/21/2015 - 3/26/2017        RESOLVED: STEMI (ST elevation myocardial infarction) [facility]) ICD-10-CM: I21.3  ICD-9-CM: 410.90  4/28/2015 - 2/1/2016              Plan:      CAD (coronary artery disease) (11/27/2015)/ S/P complex PCI / Unstable angina/ hypertension  - on ASA and Brilinta  - continue ranexa, statin  -12/19 no acute failure, cardiac event evident.      Acute/ Chronic anemia (11/18/2017) - continue to monitor.   - continue epogen.  - last hgb 8.4, (12/11). check today. - 12/15-> hgb 8.9 (12/14)     ESRD (end stage renal disease) On PD/ CRRT  - PD resumed. - monitor fluids status. Nephrology managing. Will follow at IRU.   - 11/30 problem with PD/ catheter. S/p Lt femoral perm catheter,  working well. Has been using for HD. Has had revision of PD catheter 12/18. Had HD today. Pneumonia prophylaxis- Insentive spirometer every hour while awake. 12/15  remains asymptomatic. Continue IS.      DVT risk / DVT Prophylaxis- continue daily physician exam to assess for signs and symptoms as patient is at increased risk for of thromboembolism. Mobilization as tolerated. Intermittent pneumatic compression devices when in bed Thigh-high or knee-high thromboembolic deterrent hose when out of bed.   - on brillanta bId+ aspirin daily. continue SCDs. Pain Control: stable, mild-to-moderate joint symptoms intermittently, reasonably well controlled by PRN meds. Will require regular pain assessment and comprenhensive pain management. - has not required norco. However since her procedure yesterday, has needed prn norco, continue prn norco.      Wound Care: will continue to monitor wound status daily per staff and physician. Keep wound clean and dry - monitoring femoral cath site, appears benign.    12/19  - PD catheter and femoral cath site without signs of infection, drainage    bowel program - as needed. + BM.      GERD/ GI- resume PPI. At times may need additional antacids, Maalox prn.  - continue sucralfate. Hold reglan. Hypothyroid  - synthroid. No dose change. AMS, improved. Acute episode today. Appears she may have had a seizure. - pt appeared postictal, improved. Mental status appears close to baseline. AAO x 3. Will follow. No anti - seizure medications for now.          Time spent was 25 minutes with over 1/2 in direct patient care/examination, consultation and coordination of care.      Signed By: Jacinda Ho MD     December 19, 2017

## 2017-12-19 NOTE — PROGRESS NOTES
Problem: Falls - Risk of  Goal: *Absence of Falls  Document Harrison Fall Risk and appropriate interventions in the flowsheet.    Outcome: Progressing Towards Goal  Fall Risk Interventions:  Mobility Interventions: Communicate number of staff needed for ambulation/transfer, Patient to call before getting OOB    Mentation Interventions: Adequate sleep, hydration, pain control, Evaluate medications/consider consulting pharmacy    Medication Interventions: Evaluate medications/consider consulting pharmacy, Patient to call before getting OOB    Elimination Interventions: Call light in reach, Patient to call for help with toileting needs    History of Falls Interventions: Consult care management for discharge planning, Evaluate medications/consider consulting pharmacy

## 2017-12-19 NOTE — PROGRESS NOTES
Team conference; discharge deferred until 12/28. Notified Shannon Odell with [facility]. Discussed with pt. Continue to follow.

## 2017-12-19 NOTE — PROGRESS NOTES
Norco 5 mg given po for c/o of postop pain. Peritoneal catheter surgery site dry and intact. Comfort measures given. Call bell within reach.

## 2017-12-19 NOTE — PROGRESS NOTES
Pt arrived back to room after hemodialysis around 1200. Pt alert but slightly drowsy and VSS. Pt took medicine and swallowed pills. Lunch tray was left with patient. Shortly after pt was left in room, pt became very drowsy and oxygen dropped to the low 70's. MD at bedside and  6L oxygen placed on pt. Rapid response was called and team arrived. Pt was assessed and vital signs became stable with oxygen sat increasing to 99%.  Labs and CT of the head were ordered by the rapid response team.

## 2017-12-19 NOTE — PROGRESS NOTES
Unable to see patient for OT session due to patient off floor for surgery.      Andrews Perez MS, OTR/L  12/18/17

## 2017-12-20 PROCEDURE — 65310000000 HC RM PRIVATE REHAB

## 2017-12-20 PROCEDURE — 97110 THERAPEUTIC EXERCISES: CPT

## 2017-12-20 PROCEDURE — 97116 GAIT TRAINING THERAPY: CPT

## 2017-12-20 PROCEDURE — 92507 TX SP LANG VOICE COMM INDIV: CPT

## 2017-12-20 PROCEDURE — 74011250637 HC RX REV CODE- 250/637: Performed by: PHYSICAL MEDICINE & REHABILITATION

## 2017-12-20 PROCEDURE — 97530 THERAPEUTIC ACTIVITIES: CPT

## 2017-12-20 PROCEDURE — 97535 SELF CARE MNGMENT TRAINING: CPT

## 2017-12-20 RX ADMIN — RENAGEL 800 MG: 400 TABLET ORAL at 17:25

## 2017-12-20 RX ADMIN — TICAGRELOR 90 MG: 90 TABLET ORAL at 09:35

## 2017-12-20 RX ADMIN — LEVOTHYROXINE SODIUM 150 MCG: 150 TABLET ORAL at 04:51

## 2017-12-20 RX ADMIN — RANOLAZINE 1000 MG: 500 TABLET, FILM COATED, EXTENDED RELEASE ORAL at 21:38

## 2017-12-20 RX ADMIN — TICAGRELOR 90 MG: 90 TABLET ORAL at 21:38

## 2017-12-20 RX ADMIN — ASPIRIN 81 MG: 81 TABLET, COATED ORAL at 09:34

## 2017-12-20 RX ADMIN — SUCRALFATE 1 G: 1 SUSPENSION ORAL at 17:26

## 2017-12-20 RX ADMIN — PANTOPRAZOLE SODIUM 40 MG: 40 TABLET, DELAYED RELEASE ORAL at 04:51

## 2017-12-20 RX ADMIN — AMLODIPINE BESYLATE 5 MG: 5 TABLET ORAL at 09:36

## 2017-12-20 RX ADMIN — RANOLAZINE 1000 MG: 500 TABLET, FILM COATED, EXTENDED RELEASE ORAL at 09:34

## 2017-12-20 RX ADMIN — CALCITRIOL 0.25 MCG: 0.25 CAPSULE, LIQUID FILLED ORAL at 09:35

## 2017-12-20 RX ADMIN — HYDROCODONE BITARTRATE AND ACETAMINOPHEN 1 TABLET: 5; 325 TABLET ORAL at 04:51

## 2017-12-20 RX ADMIN — ZINC 1 TABLET: TAB ORAL at 09:33

## 2017-12-20 RX ADMIN — PANTOPRAZOLE SODIUM 40 MG: 40 TABLET, DELAYED RELEASE ORAL at 17:25

## 2017-12-20 RX ADMIN — METOPROLOL TARTRATE 12.5 MG: 25 TABLET ORAL at 09:35

## 2017-12-20 RX ADMIN — TORSEMIDE 100 MG: 100 TABLET ORAL at 09:36

## 2017-12-20 RX ADMIN — TORSEMIDE 100 MG: 100 TABLET ORAL at 17:25

## 2017-12-20 RX ADMIN — ROSUVASTATIN CALCIUM 20 MG: 20 TABLET, FILM COATED ORAL at 21:38

## 2017-12-20 NOTE — PROGRESS NOTES
Patient resting up in bed. Drowsy with slight delayed reaction/verbalizations. Lung sounds clear, but two liters nasal cannula. Continues with generalized weakness. Denies any pain Repositioned for breakfast tray. Assessment completed. No other verbalized needs at present time. See doc flow sheet for further assessments.

## 2017-12-20 NOTE — PROGRESS NOTES
End Of Shift Functional Summary, Nursing          BLADDER:  Pt does not have a castillo catheter that staff manages. Pt does not take medication. Pt is continent. of bladder and voids in toilet    Pt has had 0 bladder accidents during this shift   (An accident is when the episode is not contained in a brief AND/OR the clothing/linen requires changing/cleaning up.)    BOWEL:  Pt does take medication. Pt is continent of bowel and uses toilet. Pt has had 0 bowel accidents during this shift requiring minimal assistance from staff to clean up.  (An accident is when the episode is not contained in a brief AND/OR the clothing/linen requires changing/cleaning up.)

## 2017-12-20 NOTE — PROGRESS NOTES
Problem: Falls - Risk of  Goal: *Absence of Falls  Document Harrison Fall Risk and appropriate interventions in the flowsheet.    Outcome: Progressing Towards Goal  Fall Risk Interventions:  Mobility Interventions: Communicate number of staff needed for ambulation/transfer, Patient to call before getting OOB    Mentation Interventions: Adequate sleep, hydration, pain control    Medication Interventions: Patient to call before getting OOB, Teach patient to arise slowly    Elimination Interventions: Call light in reach, Patient to call for help with toileting needs, Toilet paper/wipes in reach, Toileting schedule/hourly rounds    History of Falls Interventions: Door open when patient unattended

## 2017-12-20 NOTE — PROGRESS NOTES
End Of Shift Functional Summary, Nursing      TOILETING:  Does patient need assist with clothing management and/or pericare? TOILET TRANSFER:  Pt requires minimal assistance. Pt uses walker. BLADDER:  Pt does not have a castillo catheter that staff manages. BLADDER ASSISTANCE:[id number]} Pt has had 0 bladder accidents during this shift requiring minimal assistance to clean up. (An accident is when the episode is not contained in a brief AND/OR the clothing/linen requires changing/cleaning up.)    BOWEL:  Pt does take medication. Pt is continent of bowel and uses toilet. ASSISTANCE:[id number]}    Pt has had 0 bowel accidents during this shift requiring standby assistance/setup from staff to clean up. (An accident is when the episode is not contained in a brief AND/OR the clothing/linen requires changing/cleaning up.)    BED/CHAIR TRANSFER  Pt requires minimal assistance. Patient requires the assistance of 1 staff member(s). Pt uses walker                  EATING  Pt requires no assistance. Pt does not wear dentures. .          Documentation reviewed and plan of care discussed/reviewed with   patient assistant during the shift.

## 2017-12-20 NOTE — PROGRESS NOTES
MD Claire,   Medical Director  [address] [facility], [address] [facility]  Tel: [phone]       [facility] PROGRESS NOTE    Elli Swain  Admit Date: 11/29/2017  Admit Diagnosis: DEBILITY; Renal failure (ARF), acute on chronic ([facility] [address] Nicki Acevedo*  Chief Complaint : Gait dysfunction secondary to below. Admit Diagnosis: Unstable angina ([facility] [address]  CAD (coronary artery disease) (11/27/2015)  S/P complex PCI and stable on ASA and Brilinta  Unstable angina ([facility] [address] (2/1/2016)  Acute/ Chronic anemia (11/18/2017)  ESRD (end stage renal disease) On PD/ CRRT  Pain  DVT risk  Acute Rehab Dx:  Debility    Weakness  Gait impairment  deconditioning  Mobility and ambulation deficits  Self Care/ADL deficits     Subjective     Patient seen and examined. Vss, afebrile. Comfortable on RA. alert and oriented today. Asks about the event yesterday. States she does not remember what had happened to her. Discussed she had probable small seizure, which may occur after a surgical procedure, HD. However explained likely not a concern for chronic seizure risk. Therapy to resume today. She has not been able to participate fully in Rehab sessions due to surgical procedure, need for HD and seizure yesterday.      Objective:     Current Facility-Administered Medications   Medication Dose Route Frequency    heparin (porcine) 1,000 unit/mL injection 5,000 Units  5,000 Units Hemodialysis DIALYSIS PRN    zolpidem (AMBIEN) tablet 5 mg  5 mg Oral QHS PRN    acetaminophen (TYLENOL) tablet 650 mg  650 mg Oral Q4H PRN    amLODIPine (NORVASC) tablet 5 mg  5 mg Oral DAILY    aspirin delayed-release tablet 81 mg  81 mg Oral DAILY    calcitRIOL (ROCALTROL) capsule 0.25 mcg  0.25 mcg Oral DAILY    carbamide peroxide (DEBROX) 6.5 % otic solution 5 Drop  5 Drop Right Ear BID    [START ON 12/23/2017] cyanocobalamin (VITAMIN B12) injection 1,000 mcg  1,000 mcg IntraMUSCular Q7D    epoetin toya (EPOGEN;PROCRIT) injection 20,000 Units  20,000 Units SubCUTAneous Q TUE, THU & SAT    HYDROcodone-acetaminophen (NORCO) 5-325 mg per tablet 1 Tab  1 Tab Oral Q4H PRN    hydrogen peroxide 3 %   Topical PRN    levothyroxine (SYNTHROID) tablet 150 mcg  150 mcg Oral ACB    linaclotide (LINZESS) capsule 145 mcg (Patient Supplied)  145 mcg Oral DAILY    LORazepam (ATIVAN) tablet 1 mg  1 mg Oral Q6H PRN    metoprolol tartrate (LOPRESSOR) tablet 12.5 mg  12.5 mg Oral DAILY    multivitamin w ZN (STRESSTABS W ZINC) tablet  1 Tab Oral DAILY    ondansetron (ZOFRAN ODT) tablet 4 mg  4 mg Oral TID PRN    pantoprazole (PROTONIX) tablet 40 mg  40 mg Oral ACB&D    polyethylene glycol (MIRALAX) packet 17 g  17 g Oral DAILY PRN    ranolazine ER (RANEXA) tablet 1,000 mg  1,000 mg Oral BID    rosuvastatin (CRESTOR) tablet 20 mg  20 mg Oral QHS    senna-docusate (PERICOLACE) 8.6-50 mg per tablet 1 Tab  1 Tab Oral DAILY    sevelamer (RENAGEL) tablet 800 mg  800 mg Oral TID WITH MEALS    sodium chloride (NS) flush 5-10 mL  5-10 mL IntraVENous PRN    sucralfate (CARAFATE) 100 mg/mL oral suspension 1 g  1 g Oral AC&HS    ticagrelor (BRILINTA) tablet 90 mg  90 mg Oral BID    torsemide (DEMADEX) tablet 100 mg  100 mg Oral BID     Review of Systems:   Denies chest pain, shortness of breath, cough, headache, visual problems, abdominal pain, dysurea, calf pain. Pertinent positives are as noted in the medical records and unremarkable otherwise. Visit Vitals    /74    Pulse 78    Temp 98.6 °F (37 °C)    Resp 16    Wt 205 lb 6.4 oz (93.2 kg)    SpO2 98%    BMI 29.9 kg/m2   Physical Exam:   General: Alert and oriented x 3.speech normal.   No acute cardio respiratory distress. HEENT: Normocephalic,no scleral icterus  Oral mucosa moist without cyanosis   Lungs: Clear to auscultation  bilaterally.   Respiration even and unlabored   Heart: Regular rate and rhythm, S1, S2   No  murmurs, clicks, rub or gallops   Abdomen: Soft, non-tender, nondistended. Bowel sounds present. No organomegaly. LLE cath covered. Genitourinary: defered   Neuromuscular:      NANCI, EOMI  + mild generalized weakness 4+ to 5-/5. BUE, BLE, more proximal.   Moves all extremities well. Sensation grossly intact to soft touch. Skin/extremity: No rashes, no erythema. 1+edema. Wound covered.   Sawyer Lion                                                                             Functional Assessment:  Gross Assessment  AROM: Generally decreased, functional (12/15/17 1200)  Strength: Generally decreased, functional (12/15/17 1200)  Coordination: Generally decreased, functional (12/15/17 1200)       Balance  Sitting - Static: Good (unsupported) (12/17/17 1100)  Sitting - Dynamic: Good (unsupported) (12/17/17 1100)  Standing - Static: Good (12/17/17 1100)  Standing - Dynamic : Impaired (12/15/17 1500)           Toileting  Adaptive Equipment: Elevated seat;Grab bars (12/18/17 1010)         Harrison Fall Risk Assessment:  Page Hayes Fall Risk  Mobility: Ambulates or transfers with assist devices or assistance/unsteady gait (12/19/17 2026)  Mobility Interventions: Communicate number of staff needed for ambulation/transfer;Patient to call before getting OOB (12/19/17 2026)  Mentation: Alert, oriented x 3 (12/19/17 2026)  Mentation Interventions: Adequate sleep, hydration, pain control (12/19/17 2026)  Medication: Patient receiving anticonvulsants, sedatives(tranquilizers), psychotropics or hypnotics, hypoglycemics, narcotics, sleep aids, antihypertensives, laxatives, or diuretics (12/19/17 2026)  Medication Interventions: Patient to call before getting OOB; Teach patient to arise slowly (12/19/17 2026)  Elimination: Needs assistance with toileting (12/19/17 2026)  Elimination Interventions: Call light in reach; Patient to call for help with toileting needs; Toilet paper/wipes in reach; Toileting schedule/hourly rounds (12/19/17 2026)  Prior Fall History: No (12/19/17 2026)  History of Falls Interventions: Door open when patient unattended (12/19/17 2026)  Total Score: 3 (12/19/17 2026)  Standard Fall Precautions: Yes (12/17/17 2336)  High Fall Risk: Yes (12/19/17 2026)     Speech Assessment:         Ambulation:  Gait  Base of Support: Widened (12/15/17 1200)  Speed/Michelle: Slow;Shuffled (12/15/17 1200)  Step Length: Right shortened;Left shortened (12/15/17 1200)  Stance: Right increased; Left increased (12/15/17 1200)  Gait Abnormalities: Trunk sway increased; Step to gait; Decreased step clearance (12/15/17 1200)  Distance (ft): 130 Feet (ft) (12/17/17 1100)  Assistive Device: Walker (12/17/17 1100)  Curbs/Ramps: Minimum assistance (12/07/17 1400)     Labs/Studies:  Recent Results (from the past 72 hour(s))   CBC WITH AUTOMATED DIFF    Collection Time: 12/18/17  6:13 AM   Result Value Ref Range    WBC 5.9 4.3 - 11.1 K/uL    RBC 3.08 (L) 4.05 - 5.25 M/uL    HGB 9.2 (L) 11.7 - 15.4 g/dL    HCT 30.3 (L) 35.8 - 46.3 %    MCV 98.4 (H) 79.6 - 97.8 FL    MCH 29.9 26.1 - 32.9 PG    MCHC 30.4 (L) 31.4 - 35.0 g/dL    RDW 19.5 (H) 11.9 - 14.6 %    PLATELET 795 472 - 873 K/uL    MPV 9.0 (L) 10.8 - 14.1 FL    DF AUTOMATED      NEUTROPHILS 63 43 - 78 %    LYMPHOCYTES 23 13 - 44 %    MONOCYTES 11 4.0 - 12.0 %    EOSINOPHILS 3 0.5 - 7.8 %    BASOPHILS 0 0.0 - 2.0 %    IMMATURE GRANULOCYTES 0 0.0 - 5.0 %    ABS. NEUTROPHILS 3.7 1.7 - 8.2 K/UL    ABS. LYMPHOCYTES 1.4 0.5 - 4.6 K/UL    ABS. MONOCYTES 0.7 0.1 - 1.3 K/UL    ABS. EOSINOPHILS 0.2 0.0 - 0.8 K/UL    ABS. BASOPHILS 0.0 0.0 - 0.2 K/UL    ABS. IMM.  GRANS. 0.0 0.0 - 0.5 K/UL   PHOSPHORUS    Collection Time: 12/18/17  6:13 AM   Result Value Ref Range    Phosphorus 4.9 (H) 2.3 - 3.7 MG/DL   POC SODIUM-POTASSIUM    Collection Time: 12/18/17  9:22 AM   Result Value Ref Range    Potassium, POC 4.8 3.5 - 5.1 MMOL/L   CBC W/O DIFF    Collection Time: 12/19/17  7:23 AM   Result Value Ref Range    WBC 7.1 4.3 - 11.1 K/uL    RBC 2.97 (L) 4.05 - 5.25 M/uL    HGB 8.8 (L) 11.7 - 15.4 g/dL    HCT 29.3 (L) 35.8 - 46.3 %    MCV 98.7 (H) 79.6 - 97.8 FL    MCH 29.6 26.1 - 32.9 PG    MCHC 30.0 (L) 31.4 - 35.0 g/dL    RDW 19.4 (H) 11.9 - 14.6 %    PLATELET 793 804 - 654 K/uL    MPV 9.0 (L) 10.8 - 14.1 FL   RENAL FUNCTION PANEL    Collection Time: 12/19/17  7:23 AM   Result Value Ref Range    Sodium 136 136 - 145 mmol/L    Potassium 5.8 (H) 3.5 - 5.1 mmol/L    Chloride 98 98 - 107 mmol/L    CO2 31 21 - 32 mmol/L    Anion gap 7 7 - 16 mmol/L    Glucose 73 65 - 100 mg/dL    BUN 29 (H) 8 - 23 MG/DL    Creatinine 8.89 (H) 0.6 - 1.0 MG/DL    GFR est AA 5 (L) >60 ml/min/1.73m2    GFR est non-AA 5 (L) >60 ml/min/1.73m2    Calcium 9.7 8.3 - 10.4 MG/DL    Phosphorus 6.4 (H) 2.3 - 3.7 MG/DL    Albumin 2.3 (L) 3.2 - 4.6 g/dL   METABOLIC PANEL, BASIC    Collection Time: 12/19/17  1:09 PM   Result Value Ref Range    Sodium 136 136 - 145 mmol/L    Potassium 4.3 3.5 - 5.1 mmol/L    Chloride 97 (L) 98 - 107 mmol/L    CO2 32 21 - 32 mmol/L    Anion gap 7 7 - 16 mmol/L    Glucose 100 65 - 100 mg/dL    BUN 11 8 - 23 MG/DL    Creatinine 4.14 (H) 0.6 - 1.0 MG/DL    GFR est AA 13 (L) >60 ml/min/1.73m2    GFR est non-AA 11 (L) >60 ml/min/1.73m2    Calcium 8.8 8.3 - 10.4 MG/DL   MAGNESIUM    Collection Time: 12/19/17  1:09 PM   Result Value Ref Range    Magnesium 2.0 1.8 - 2.4 mg/dL   TSH 3RD GENERATION    Collection Time: 12/19/17  1:09 PM   Result Value Ref Range    TSH 0.276 (L) 0.358 - 3.740 uIU/mL       Assessment:     Problem List as of 12/20/2017  Date Reviewed: 3/2/2017          Codes Class Noted - Resolved    Weakness of both legs ICD-10-CM: R29.898  ICD-9-CM: 729.89  11/29/2017 - Present        Renal failure (ARF), acute on chronic (HCC) ICD-10-CM: N17.9, N18.9  ICD-9-CM: 584.9, 585.9  11/29/2017 - Present        Elevated troponin ICD-10-CM: R74.8  ICD-9-CM: 790.6  11/18/2017 - Present        Chest pain ICD-10-CM: R07.9  ICD-9-CM: 786.50  11/18/2017 - Present        CKD (chronic kidney disease) ICD-10-CM: N18.9  ICD-9-CM: 585.9  11/18/2017 - Present        Chronic anemia ICD-10-CM: D64.9  ICD-9-CM: 285.9  11/18/2017 - Present        ESRD (end stage renal disease) ([facility] [address] ICD-10-CM: N18.6  ICD-9-CM: 585.6  11/18/2017 - Present        Abdominal pain ICD-10-CM: R10.9  ICD-9-CM: 789.00  9/18/2017 - Present        H/O TIA (transient ischemic attack) and stroke ICD-10-CM: Z86.73  ICD-9-CM: V12.54  4/13/2017 - Present        S/P PTCA (percutaneous transluminal coronary angioplasty) ICD-10-CM: Z98.61  ICD-9-CM: V45.82  4/13/2017 - Present        Mixed hyperlipidemia ICD-10-CM: E78.2  ICD-9-CM: 272.2  4/13/2017 - Present        Vision changes ICD-10-CM: H53.9  ICD-9-CM: 368.9  3/26/2017 - Present        Dizziness ICD-10-CM: R42  ICD-9-CM: 780.4  3/26/2017 - Present        Refusal of blood transfusions as patient is L[employer] day (Chronic) ICD-10-CM: Z53.1  ICD-9-CM: V62.6  8/25/2016 - Present        GERD (gastroesophageal reflux disease) ICD-10-CM: K21.9  ICD-9-CM: 530.81  8/8/2016 - Present        Unspecified sleep apnea ICD-10-CM: G47.30  ICD-9-CM: 780.57  8/8/2016 - Present    Overview Signed 8/8/2016 11:09 AM by Kathya Torres     uses cpap machine             CVA (cerebral vascular accident) Hx of TIA ICD-10-CM: I63.9  ICD-9-CM: 434.91  2/22/2016 - Present        Unstable angina ([facility] ICD-10-CM: I20.0  ICD-9-CM: 411.1  2/1/2016 - Present        ESRD on peritoneal dialysis [facility]) (Chronic) ICD-10-CM: N18.6, Z99.2  ICD-9-CM: 585.6, V45.11  2/1/2016 - Present        Ischemic cardiomyopathy ICD-10-CM: I25.5  ICD-9-CM: 414.8  12/29/2015 - Present        HLD (hyperlipidemia) ICD-10-CM: E78.5  ICD-9-CM: 272.4  12/29/2015 - Present        Depression ICD-10-CM: F32.9  ICD-9-CM: 362  12/29/2015 - Present        CAD (coronary artery disease) ICD-10-CM: I25.10  ICD-9-CM: 414.00  11/27/2015 - Present        Debility ICD-10-CM: R53.81  ICD-9-CM: 799.3  5/5/2015 - Present        Hypertension (Chronic) ICD-10-CM: I10  ICD-9-CM: 401.9  4/28/2015 - Present        Anemia of chronic renal failure (Chronic) ICD-10-CM: N18.9, D63.1  ICD-9-CM: 285.21, 585.9  4/28/2015 - Present        Hypothyroidism (Chronic) ICD-10-CM: E03.9  ICD-9-CM: 244.9  4/28/2015 - Present        RESOLVED: Postural hypotension ICD-10-CM: I95.1  ICD-9-CM: 458.0  8/9/2016 - 3/26/2017        RESOLVED: Chest pain ICD-10-CM: R07.9  ICD-9-CM: 786.50  8/8/2016 - 3/26/2017        RESOLVED: Nausea ICD-10-CM: R11.0  ICD-9-CM: 787.02  8/8/2016 - 3/26/2017        RESOLVED: S/P PTCA (percutaneous transluminal coronary angioplasty); LAD PTCA of  ISR 11/27/15 ICD-10-CM: Z98.61  ICD-9-CM: V45.82  5/24/2016 - 3/26/2017        RESOLVED: TIA (transient ischemic attack) ICD-10-CM: G45.9  ICD-9-CM: 435.9  2/10/2016 - 3/26/2017        RESOLVED: Edema ICD-10-CM: R60.9  ICD-9-CM: 782.3  12/29/2015 - 3/26/2017        RESOLVED: Anterior myocardial infarction [facility]) ICD-10-CM: I21.09  ICD-9-CM: 410.10  5/21/2015 - 3/26/2017        RESOLVED: STEMI (ST elevation myocardial infarction) ([facility] [address] ICD-10-CM: I21.3  ICD-9-CM: 410.90  4/28/2015 - 2/1/2016              Plan:      CAD (coronary artery disease) (11/27/2015)/ S/P complex PCI / Unstable angina/ hypertension  - on ASA and Brilinta  - continue ranexa, statin  -12/20 no acute failure, cardiac event evident.      Acute/ Chronic anemia (11/18/2017) - continue to monitor.   - continue epogen.  - last hgb 8.4, (12/11). check today. - 12/15-> hgb 8.9 (12/14)     ESRD (end stage renal disease) On PD/ CRRT  - PD resumed. - monitor fluids status. Nephrology managing. Will follow at IRU.   - 11/30 problem with PD/ catheter. S/p Lt femoral perm catheter,  working well. Has been using for HD. Has had revision of PD catheter 12/18. Had HD today. - 12/20 - continue to monitor. Expect to change to PD when appropriate per nephrology.        Pneumonia prophylaxis- continue nsentive spirometer every hour while awake.      DVT risk / DVT Prophylaxis- continue daily physician exam to assess for signs and symptoms as patient is at increased risk for of thromboembolism. Mobilization as tolerated. Intermittent pneumatic compression devices when in bed Thigh-high or knee-high thromboembolic deterrent hose when out of bed.   - on brillanta bId+ aspirin daily. continue SCDs. Pain Control: stable, mild-to-moderate joint symptoms intermittently, reasonably well controlled by PRN meds. Will require regular pain assessment and comprenhensive pain management. -  since her PD revision procedure, she has needed prn norco. Required 3 yesterday. Will continue as needed.      Wound Care: will continue to monitor wound status daily per staff and physician. Keep wound clean and dry - monitoring femoral cath site, appears benign. 12/20  - PD catheter and femoral cath site without signs of infection, drainage    bowel program - as needed. + BM.      GERD/  GI- resume PPI. At times may need additional antacids, Maalox prn.  - continue sucralfate. Hold reglan. Hypothyroid  - synthroid. No dose change. AMS, improved. Acute episode today. Appears she may have had a seizure. - mental status improved today. Continue to [employer]. Resume PT, OT and ST.        Time spent was 25 minutes with over 1/2 in direct patient care/examination, consultation and coordination of care.      Signed By: Wendy Curiel MD     December 20, 2017

## 2017-12-20 NOTE — PROGRESS NOTES
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 She has a history of GI bleeding and had anemia-induced unstable angina in the past. 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 She states the pain has since resolved with NTG paste.  No fevers or chills. No nausea, vomiting or diarrhea.  She states that she is essentially anuric and occasionally makes minimal urine.  She has a h/o CVA and TIA. No fever or chills, no problems with PD drainage or discoloration of PD fluid. No increased thirst. She wishes regular diet and supplement. She denies any other acute complaints  Her edema has improved in the past couple of days with intensified dialysis. s- no dyspnea, no Cp, no N/V - in good spirits - feels better compared to yesterday, except for still drowsyness x sleep interruption  - S/P HD yesterday she had an episode of hypoxia, drowsiness and rapid response was called ~12h30. Per staff appeared postictal, per hospitalist likely new onset seizure.  - I discussed with her and staff including dialysis staff that I feel that the episode was precipitation by fluid removal on dialysis    Past Medical History:   Diagnosis Date    Anemia of chronic renal failure 4/28/2015    Anterior myocardial infarction [facility]) 5/21/2015    CAD (coronary artery disease)     Chest pain     Chronic kidney disease, stage III (moderate) 8/15/2014    on dialysis    CKD (chronic kidney disease) stage 4, GFR 15-29 ml/min ([facility] [address] 4/28/2015    Debility 5/5/2015    Depression 12/29/2015    Edema 12/29/2015    Endocrine disease     Hypothyroidism    GERD (gastroesophageal reflux disease)     Heart murmur 12/29/2015    HLD (hyperlipidemia) 12/29/2015    Hypertension     Hypothyroidism 4/28/2015    Ischemic cardiomyopathy 12/29/2015    Nausea     S/P PTCA (percutaneous transluminal coronary angioplasty); LAD PTCA of  ISR 11/27/15 5/24/2016    STEMI (ST elevation myocardial infarction) ([facility] [address] 4/28/2015    Unspecified sleep apnea     uses cpap machine    Unstable angina ([facility] [address]       Past Surgical History:   Procedure Laterality Date    HX BACK SURGERY  1990    neck surgery cervical disc    HX BACK SURGERY      lower back    HX CATARACT REMOVAL Bilateral     HX CHOLECYSTECTOMY  19702    gall bladder     HX KNEE REPLACEMENT Right 2006    HX PTCA  4/28/2015    2.25 Xience stent to mid LAD for occluded artery, anterior MI, EF 25%. Moderate disease distal LAD and distal OM PCI CX and RCA 2004, then PCI RCA and LAD in 2009. Prior to Admission medications    Medication Sig Start Date End Date Taking? Authorizing Provider   metoprolol tartrate (LOPRESSOR) 25 mg tablet Take 0.5 Tabs by mouth daily. 11/27/17  Yes MINDY Chatman   amLODIPine (NORVASC) 5 mg tablet Take 1 Tab by mouth daily. 11/27/17  Yes MINDY Chatman   torsemide (DEMADEX) 100 mg tablet Take 1 Tab by mouth two (2) times a day. 11/27/17  Yes MINDY Chatman   pantoprazole (PROTONIX) 40 mg tablet Take 1 Tab by mouth Before breakfast and dinner. 11/27/17  Yes MINDY Chatman   magnesium oxide (MAG-OX) 400 mg tablet Take 400 mg by mouth daily. Yes Historical Provider   metoclopramide HCl (REGLAN) 5 mg tablet Take 5 mg by mouth two (2) times a day. Yes Historical Provider   ticagrelor (BRILINTA) 90 mg tablet Take 1 Tab by mouth two (2) times a day. 2/4/16  Yes MINDY Chatman   aspirin delayed-release 81 mg tablet Take 1 Tab by mouth daily. 5/5/15  Yes Gisela Hollingsworth PA-C   rosuvastatin (CRESTOR) 20 mg tablet Take 20 mg by mouth nightly.    Yes Historical Provider   levothyroxine (SYNTHROID) 150 mcg tablet Take 150 mcg by mouth Daily (before breakfast). Yes Historical Provider   cyanocobalamin (VITAMIN B12) 1,000 mcg/mL injection 1 mL by IntraMUSCular route every seven (7) days. Indications: Vitamin B12 Deficiency 12/2/17   MINDY Alexander   folic acid (FOLVITE) 1 mg tablet Take 1 mg by mouth daily. Historical Provider   potassium chloride (K-DUR, KLOR-CON) 20 mEq tablet Take 20 mEq by mouth two (2) times a day. Historical Provider   traZODone (DESYREL) 50 mg tablet Take  by mouth nightly. Historical Provider   megestrol (MEGACE) 400 mg/10 mL (40 mg/mL) suspension Take 200 mg by mouth daily. Historical Provider   sucralfate (CARAFATE) 100 mg/mL suspension Take 10 mL by mouth Before breakfast, lunch, dinner and at bedtime. Indications: PREVENTION OF STRESS ULCER 9/23/17   Sydnee Garcia DO   nitroglycerin (NITROLINGUAL) 400 mcg/spray spray 1 Spray by SubLINGual route every five (5) minutes as needed for Chest Pain. Historical Provider   linaclotide Lisette Deidre) 145 mcg cap capsule Take  by mouth Daily (before breakfast). Historical Provider   PNV NO.122/IRON/FOLIC ACID (PRENATAL MULTI PO) Take  by mouth. Historical Provider   ondansetron (ZOFRAN ODT) 4 mg disintegrating tablet Take 4 mg by mouth three (3) times daily as needed. Historical Provider   sevelamer carbonate (RENVELA) 800 mg tab tab Take 800 mg by mouth three (3) times daily (with meals). Historical Provider   gentamicin (GARAMYCIN) 0.1 % topical cream APPLY TO PD catheter exit site at daily dressing change 5/12/15   Natalya Flores MD   darbepoetin toya in polysorbat (ARANESP, POLYSORBATE,) 40 mcg/mL injection 40 mcg by SubCUTAneous route every fourteen (14) days. Indications: ANEMIA IN CHRONIC KIDNEY DISEASE    Historical Provider   ranolazine ER (RANEXA) 500 mg SR tablet Take 500 mg by mouth two (2) times a day.     Historical Provider     Allergies   Allergen Reactions    Codeine Nausea and Vomiting      Social History   Substance Use Topics    Smoking status: Never Smoker    Smokeless tobacco: Never Used    Alcohol use No      Family History   Problem Relation Age of Onset    Heart Disease Mother     Hypertension Mother     Cancer Mother      Lung    Stroke Father     Hypertension Father     Breast Cancer Neg Hx           Review of Systems    Constitutional: no fever, weak  Eyes: fair vision,    Ears, nose, mouth, throat, and face:fair hearing,   Respiratory: no asthma,  Chronic home O2 is being used - improved dyspnea  Cardiovascular:no palpitation, no  chest pain,   Gastrointestinal:no diarrhea,  Had BM today  Genitourinary: no dysuria,   Hematologic/lymphatic: no bleeding tendency,   Neurological: no seizures   Behvioral/Psych: no psych hospitalization   Endocrine: no goiter,       Objective:       Visit Vitals    /74    Pulse 78    Temp 98.6 °F (37 °C)    Resp 16    Wt 93.2 kg (205 lb 6.4 oz)    SpO2 98%    BMI 29.9 kg/m2       General:  Alert, cooperative, no distress, appears stated age. Head:  Normocephalic, without obvious abnormality, atraumatic. Eyes:  Conjunctivae/corneas clear. EOMs intact. Throat: Lips, mucosa, and tongue normal. Teeth and gums normal.   Neck: Supple, symmetrical, trachea midline, no adenopathy,  no JVD. Lungs:   Clear to auscultation bilaterally. Heart:  Regular rate and rhythm, S1, S2 normal, 2/6 systolic  murmur, no rub or gallop. Abdomen:   Soft, non-tender. No masses,  No organomegaly. PD catheter in place without exudate   Extremities: Extremities normal, atraumatic, no cyanosis. 3+edema. Skin: Skin color, texture, turgor normal. No rashes or lesions. Neurologic: Grossly intact. No asterixis.          Data Review:       Recent Results (from the past 24 hour(s))   METABOLIC PANEL, BASIC    Collection Time: 12/19/17  1:09 PM   Result Value Ref Range    Sodium 136 136 - 145 mmol/L    Potassium 4.3 3.5 - 5.1 mmol/L    Chloride 97 (L) 98 - 107 mmol/L    CO2 32 21 - 32 mmol/L    Anion gap 7 7 - 16 mmol/L    Glucose 100 65 - 100 mg/dL    BUN 11 8 - 23 MG/DL    Creatinine 4.14 (H) 0.6 - 1.0 MG/DL    GFR est AA 13 (L) >60 ml/min/1.73m2    GFR est non-AA 11 (L) >60 ml/min/1.73m2    Calcium 8.8 8.3 - 10.4 MG/DL   MAGNESIUM    Collection Time: 12/19/17  1:09 PM   Result Value Ref Range    Magnesium 2.0 1.8 - 2.4 mg/dL   TSH 3RD GENERATION    Collection Time: 12/19/17  1:09 PM   Result Value Ref Range    TSH 0.276 (L) 0.358 - 3.740 uIU/mL       CXR viewed by me - no major infiltrate or fluid excess, CM with left possible minor effusion is present        CT abdomen/pelvis  CT ABDOMEN:  Peritoneal fluid in the perihepatic space, mild paracolic gutters,  extending down into the pelvis. Peritoneal dialysis catheter noted. There is not  any evidence of retroperitoneal hematoma identified. Strandy fluid density does  extend into the extraperitoneal space in the lower abdomen and pelvis. There is  radiodensity within the renal collecting systems, possibly previously  administered IV contrast. There is peripancreatic fluid and stranding, cannot  exclude acute pancreatitis. No biliary dilatation. Spleen is not enlarged. Small  bowel normal caliber.   CT PELVIS:   Moderate to large volume pelvic fluid.   There is diffuse asymmetric enlargement of the right rectus and oblique  abdominal muscles. There are are of higher attenuation the contralateral side  and findings are consistent with an abdominal wall hematoma. IMPRESSION:    1. Evidence of right abdominal wall hematoma. 2. No CT evidence to suggest retroperitoneal hematoma. 3. Extensive fluid in the abdomen and pelvis, presumed related to peritoneal  Dialysis.     KUB - PD catheter still unchanged compared to last week - remains in the sidney-umbilical area      Active Problems:    Hypothyroidism (4/28/2015)      S/P PTCA (percutaneous transluminal coronary angioplasty) (4/13/2017)      ESRD (end stage renal disease) ([facility] [address] (11/18/2017) Weakness of both legs (11/29/2017)      Renal failure (ARF), acute on chronic (HCC) (11/29/2017)        Assessment:     1. CAD x NSTEMI, Unstable angina -  - [facility] with complex CAD and acute thrombotic lesion in pLAD and subtotal occlusion in mLAD. The RCA has severe proximal and mid RCA disease. She has additional stenting of LAD with Resolute in mid/distal and proximal vessel. These all touched the previously stented mid vessel. Recommend life-long DAPT    2, ESRD -  - on temporary HD via perm cath due to PD catheter dysfunction  - TTS schedule in rehab   - gentle fluid removal on HD tomorrow  - plan to switch to PD soon    3. Fluid excess -  - resolved with fluid removal on HD    4. Anemia -  - intensive anemia therapy in [place]  - on WAYNE and IV iron and B12  - improved S/P BT    5. History of GI bleed -  - monitor clinically and serial Hb  - she had a drop in Hb to 7 range and improved with BT    6. Hypokalemia   - resolved     7. Abdominal pain and tenderness with drop in Hb , on DAPT   No evidence of retroperitoneal hematoma     8. PD catheter dysfunction -  - S/P PD catheter revision / replacement yesterday     9. Possible new onset seizure -  - w/u in progress per primary team  - could this have been a hypotensive/arrhythmia episode induced by fluid removal on dialysis?       Plan:     As above - 25

## 2017-12-20 NOTE — PROGRESS NOTES
12/20/17 1005   Time Spent With Patient   Time In 0701   Time Out 0836   Patient Seen For: AM;ADLs   Feeding/Eating   Feeding/Eating Assistance S   Comments Patient requires setup/supervision this AM d/t confusion   Grooming   Grooming Assistance  S   Comments Wheelchair at [facility] Assistance, Upper S   Position Performed Seated in chair   Adaptive Equipment Other (comment)  (Wheelchair at sink)   Lower Body [address], Lower  S   Position Performed Seated in chair;Standing   Adaptive Equipment Other (comment); Long handled sponge  (Wheelchair at sink)   Comments Setup to pump chlorhexidine soap onto long handled sponge   Upper Body Dressing    Dressing Assistance  Min A   Comments To help open front shirt around back, increased time for buttons   Lower Body Dressing    Dressing Assistance  Min A   Leg Crossed Method Used No   Position Performed Seated in chair;Standing   Adaptive Equipment Used Reacher;Sock aid   Comments Cues to problem solve reacher/sock aid and assist to help brief over feet d/t confusion, note patient able to reach feet to doff socks this AM       S: \"The doctor told me I had a stroke. \" [note patient with decreased recall, confused, and with mild paranoia/agitation this session requiring increased time, encouragement, cuing, and assist to complete tasks] Agreeable to therapy. Focus of session was on ADL and functional mobility. Note patient on 2L 02 via nasal cannula upon encounter with 02 sat at 99%. RNShonda cleared patient for trial on room air during ADL session, 02 sats remain greater than or equal to 95% this session on room air. Patient was able to transfer bed to wheelchair SPT with minimal assist.   Pain in L abdomen at PD site at times, not rated. Therapy to tolerance. Collaborated with Naina TARIQ regarding patient's status and confirmed patient is on track to reach goals as documented in the care plan.    Patient tolerated session well, but cognition, balance, functional mobility, activity tolerance, strength/coordination are still below baseline and require skilled facilitation to successfully and safely complete ADL's and transfers. Patient ended session in wheelchair with Austin TARIQ.      Liza Dewitt, OTR/L

## 2017-12-20 NOTE — PROGRESS NOTES
PHYSICAL THERAPY DAILY NOTE  Time In: 0830  Time Out: 3029  Patient Seen For: AM;Other (see progress notes); Therapeutic exercise;Transfer training    Subjective: \" I'm foggy today. \"         Objective: Other (comment) (Fall Risk) On room air during therapy and O2 sats remained  >90% . GROSS ASSESSMENT Daily Assessment            BED/MAT MOBILITY Daily Assessment    Supine to Sit : 0 (Not tested)  Sit to Supine : 0 (Not tested)       TRANSFERS Daily Assessment    Transfer Type: SPT with walker  Transfer Assistance : 5 (Supervision/setup)  Sit to Stand Assistance: Supervision       GAIT Daily Assessment    Amount of Assistance: 0 (Not tested)       STEPS or STAIRS Daily Assessment    Level of Assist : 0 (Not tested)       BALANCE Daily Assessment            WHEELCHAIR MOBILITY Daily Assessment            LOWER EXTREMITY EXERCISES Daily Assessment    Extremity: Both  Exercise Type #1: Seated lower extremity strengthening  Sets Performed: 1  Reps Performed: 20  Level of Assist: Stand-by assistance  Exercise Type #2: Other (comment) (motomed x 10 minutes)  Sets Performed: 1  Reps Performed: 10  Level of Assist: Supervision          Assessment: Patient moving well but very anxious. She seems a little confused and asking questions about what she did yesterday. Plan of Care: Continue with plan of care to reach PT goals. To speech therapy after treatment.     Adriane Washington, PTA  12/20/2017

## 2017-12-20 NOTE — PROGRESS NOTES
OT Daily Note    Time In 1408   Time Out 1432     Subjective: \"Did I have a set back? \" [patient educated regarding no change in functional status since rapid response called yesterday PM for possible seizure]  Pain: Not indicated    Precautions: Other (comment) (fall risk)    Self-Care   Patient donned bathrobe seated EOB with minimal assist to help RUE into sleeve. Patient transferred bed to wheelchair SPT with minimal assist.     Strengthening   Patient tolerated PROM stretch to RUE seated in wheelchair in preparation for AROM strengthening task. Patient tolerated stretch in shoulder flexion, abduction, and internal/external rotation. Patient completed RUE strengthening/functional reaching tasks seated at tabletop. Retrieved x 15 beanbags from elevated surface forward on tabletop and released into container posteriorly on R side (promoting internal rotation) with cues for sustained grasp and accurate release. Patient then reached to R of tabletop to retrieve x 15 beanbags and reached across midline to release onto elevated surface to L of tabletop. Patient required minimal assist at elbow to achieve adequate shoulder flexion/horizontal adduction to access elevated surface. Assessment: Patient tolerated well    Education: Purpose of therapy  Interdisciplinary Communication: Collaborated with Drew TARIQ and agreed patient is progressing well and on-track to meet goals as stated in [facility]. Plan: Continue to address ADL/IADL, functional mobility, activity tolerance, balance, strengthening, coordination, education, cognition.       Daymon Rubinstein, OTR/L

## 2017-12-20 NOTE — PROGRESS NOTES
12/20/17 0956   Time Spent With Patient   Time In [id number]   Time Out 0989   Patient Seen For: AM;Neuro-linguistics   Mental Status   Neurologic State Drowsy   Orientation Level Disoriented to time   Cognition Impaired decision making;Memory loss;Decreased attention/concentration   Perception Appears intact   Perseveration Perseverates during conversation   Safety/Judgement Fall prevention   Pt completed word finding tasks with increased time and min cues with 85% Accuracy. Noted perseveration. Pt reports feeling tired and out of it today. RN reports she got her sleeping pill at 11 pm last night.     Tamar Dumont MA/HARSHA/SLP

## 2017-12-20 NOTE — PROGRESS NOTES
PHYSICAL THERAPY DAILY NOTE  Time In: 6486  Time Out: 2191  Patient Seen For: PM;Other (see progress notes);Gait training; Therapeutic exercise;Transfer training    Subjective: \" I'm still drowsy and tired . \"         Objective: No pain noted. Other (comment) (Fall Risk)  GROSS ASSESSMENT Daily Assessment            BED/MAT MOBILITY Daily Assessment    Supine to Sit : 3 (Moderate assistance)  Sit to Supine : 3 (Moderate assistance)       TRANSFERS Daily Assessment    Transfer Type: SPT with walker  Transfer Assistance : 4 (Contact guard assistance)  Sit to Stand Assistance: Contact guard assistance       GAIT Daily Assessment    Amount of Assistance: 4 (Minimal assistance)  Distance (ft): 40 Feet (ft)  Assistive Device: Walker, rolling       STEPS or STAIRS Daily Assessment    Level of Assist : 0 (Not tested)       BALANCE Daily Assessment            WHEELCHAIR MOBILITY Daily Assessment            LOWER EXTREMITY EXERCISES Daily Assessment    Extremity: Both  Exercise Type #1: Seated lower extremity strengthening  Sets Performed: 2  Reps Performed: 10  Level of Assist: Supervision  Exercise Type #2: Other (comment) (motomed x 10 minutes)  Sets Performed: 1  Reps Performed: 10  Level of Assist: Supervision          Assessment: Patient very drowsy today but participated in all her therapies. Plan of Care: Continue with plan of care to reach PT goals. Returned to room to recliner with call josue at [facility].     Delvin Villalobos, PTA  12/20/2017

## 2017-12-20 NOTE — PROGRESS NOTES
Progress Note    Patient: Flavio Angelo MRN: [id number]  SSN: [id number]    YOB: 1932  Age: 80 y.o. Sex: female      Admit Date: 11/29/2017    LOS: 21 days     2 Days Post-Op    PROCEDURE(S):  12/18/2017  Laparoscopic revision of peritoneal dialysis catheter      Subjective:     Patient has questions about surgery, admits to mild LLQ abdominal pain. Still slightly less alert since rapid response (seizure?) yesterday. Objective:     Vitals:    12/19/17 1650 12/19/17 1906 12/20/17 0509 12/20/17 0744   BP: 117/71 136/84  123/74   Pulse: 84 76  78   Resp:  17  16   Temp:  98.5 °F (36.9 °C)  98.6 °F (37 °C)   SpO2:  97%  98%   Weight:   205 lb 6.4 oz (93.2 kg)         Intake and Output:  Current Shift:    Last three shifts: 12/18 1901 - 12/20 0700  In: -   Out: 2000     Physical Exam:   GENERAL: mildly anxious  EYE: EOM intact, no nystagmus  LUNG: clear to auscultation bilaterally  HEART: regular rate and rhythm  ABDOMEN: soft, mildly tender in LLQ, nondistended, bowel sounds normoactive; surgical dressings removed, LUQ incision intact with skin glue, catheter in place, dressing changed and reapplied to protect cath    Lab/Data Review:  No significant labs in last 24h. Imaging:  Examination: CT scan of the brain without contrast.  History: new onset seizure, 80 years Female history of stroke and TIA     Technique: 5 mm axial imaging of the brain from the posterior fossa to the  vertex. Radiation dose reduction techniques were used for this study:  Our CT  scanners use one or all of the following: Automated exposure control, adjustment  of the mA and/or kVp according to patient's size, iterative reconstruction.     Comparison:  CT brain March 26, 2017     Findings: Focal asymmetric hypodensity involving the left frontoparietal  junction appears unchanged since prior, most likely representing sequela of  chronic injury or infarct. The ventricles, sulci are age-appropriate.  No  intracranial hemorrhage or extra-axial collection is identified. No evidence of  acute infarct. No mass effect or midline shift is present. Basal cisterns are  intact. The visualized paranasal sinuses and mastoid air cells are clear. The  orbits, bones, and soft tissues are normal in appearance.     IMPRESSION: No acute intracranial abnormality. Assessment / Plan: Active Problems:    Hypothyroidism (4/28/2015)      S/P PTCA (percutaneous transluminal coronary angioplasty) (4/13/2017)      ESRD (end stage renal disease) ([facility] [address] (11/18/2017)      Weakness of both legs (11/29/2017)      Renal failure (ARF), acute on chronic ([address] (11/29/2017)        Continue care per primary, specialists  Staff will notify Dr. Kristy Olmstead that PD cath has been repositioned  We will gladly be available if necessary but will sign off      Signed By: Fareed Conley PA-C    December 20, 2017      Physician Assistant with Vascular Surgery Elena Hernandez MD / Hong Perez.  Mateus Johnston MD / Irene Mitchell MD

## 2017-12-20 NOTE — PROGRESS NOTES
Problem: Falls - Risk of  Goal: *Absence of Falls  Document Harrison Fall Risk and appropriate interventions in the flowsheet.    Outcome: Progressing Towards Goal  Fall Risk Interventions:  Mobility Interventions: Communicate number of staff needed for ambulation/transfer, Utilize walker, cane, or other assitive device    Mentation Interventions: Evaluate medications/consider consulting pharmacy, Adequate sleep, hydration, pain control, Increase mobility    Medication Interventions: Evaluate medications/consider consulting pharmacy, Patient to call before getting OOB    Elimination Interventions: Call light in reach, Patient to call for help with toileting needs    History of Falls Interventions: Door open when patient unattended

## 2017-12-21 LAB
ANION GAP SERPL CALC-SCNC: 10 MMOL/L (ref 7–16)
BUN SERPL-MCNC: 32 MG/DL (ref 8–23)
CALCIUM SERPL-MCNC: 9.7 MG/DL (ref 8.3–10.4)
CHLORIDE SERPL-SCNC: 97 MMOL/L (ref 98–107)
CO2 SERPL-SCNC: 28 MMOL/L (ref 21–32)
CREAT SERPL-MCNC: 7.45 MG/DL (ref 0.6–1)
ERYTHROCYTE [DISTWIDTH] IN BLOOD BY AUTOMATED COUNT: 18.8 % (ref 11.9–14.6)
GLUCOSE SERPL-MCNC: 84 MG/DL (ref 65–100)
HCT VFR BLD AUTO: 27.8 % (ref 35.8–46.3)
HGB BLD-MCNC: 8.4 G/DL (ref 11.7–15.4)
MCH RBC QN AUTO: 29.5 PG (ref 26.1–32.9)
MCHC RBC AUTO-ENTMCNC: 30.2 G/DL (ref 31.4–35)
MCV RBC AUTO: 97.5 FL (ref 79.6–97.8)
PLATELET # BLD AUTO: 298 K/UL (ref 150–450)
PMV BLD AUTO: 9.7 FL (ref 10.8–14.1)
POTASSIUM SERPL-SCNC: 4.6 MMOL/L (ref 3.5–5.1)
RBC # BLD AUTO: 2.85 M/UL (ref 4.05–5.25)
SODIUM SERPL-SCNC: 135 MMOL/L (ref 136–145)
WBC # BLD AUTO: 7 K/UL (ref 4.3–11.1)

## 2017-12-21 PROCEDURE — 5A1D70Z PERFORMANCE OF URINARY FILTRATION, INTERMITTENT, LESS THAN 6 HOURS PER DAY: ICD-10-PCS | Performed by: INTERNAL MEDICINE

## 2017-12-21 PROCEDURE — 80048 BASIC METABOLIC PNL TOTAL CA: CPT | Performed by: PHYSICAL MEDICINE & REHABILITATION

## 2017-12-21 PROCEDURE — 36415 COLL VENOUS BLD VENIPUNCTURE: CPT | Performed by: PHYSICAL MEDICINE & REHABILITATION

## 2017-12-21 PROCEDURE — 92507 TX SP LANG VOICE COMM INDIV: CPT

## 2017-12-21 PROCEDURE — 97110 THERAPEUTIC EXERCISES: CPT

## 2017-12-21 PROCEDURE — 74011250636 HC RX REV CODE- 250/636: Performed by: INTERNAL MEDICINE

## 2017-12-21 PROCEDURE — 74011250636 HC RX REV CODE- 250/636: Performed by: PHYSICAL MEDICINE & REHABILITATION

## 2017-12-21 PROCEDURE — 85027 COMPLETE CBC AUTOMATED: CPT | Performed by: PHYSICAL MEDICINE & REHABILITATION

## 2017-12-21 PROCEDURE — 90935 HEMODIALYSIS ONE EVALUATION: CPT

## 2017-12-21 PROCEDURE — 97530 THERAPEUTIC ACTIVITIES: CPT

## 2017-12-21 PROCEDURE — 65310000000 HC RM PRIVATE REHAB

## 2017-12-21 PROCEDURE — 74011250637 HC RX REV CODE- 250/637: Performed by: PHYSICAL MEDICINE & REHABILITATION

## 2017-12-21 PROCEDURE — 77030032490 HC SLV COMPR SCD KNE COVD -B

## 2017-12-21 PROCEDURE — 74640000003 HC CRRT SET UP OR EXCHANGE

## 2017-12-21 RX ORDER — FACIAL-BODY WIPES
10 EACH TOPICAL DAILY PRN
Status: DISCONTINUED | OUTPATIENT
Start: 2017-12-21 | End: 2017-12-28 | Stop reason: HOSPADM

## 2017-12-21 RX ORDER — POLYETHYLENE GLYCOL 3350 17 G/17G
17 POWDER, FOR SOLUTION ORAL DAILY
Status: DISCONTINUED | OUTPATIENT
Start: 2017-12-22 | End: 2017-12-28 | Stop reason: HOSPADM

## 2017-12-21 RX ADMIN — PANTOPRAZOLE SODIUM 40 MG: 40 TABLET, DELAYED RELEASE ORAL at 05:10

## 2017-12-21 RX ADMIN — METOPROLOL TARTRATE 12.5 MG: 25 TABLET ORAL at 09:59

## 2017-12-21 RX ADMIN — RANOLAZINE 1000 MG: 500 TABLET, FILM COATED, EXTENDED RELEASE ORAL at 09:54

## 2017-12-21 RX ADMIN — ROSUVASTATIN CALCIUM 20 MG: 20 TABLET, FILM COATED ORAL at 22:18

## 2017-12-21 RX ADMIN — PANTOPRAZOLE SODIUM 40 MG: 40 TABLET, DELAYED RELEASE ORAL at 17:20

## 2017-12-21 RX ADMIN — RENAGEL 800 MG: 400 TABLET ORAL at 17:00

## 2017-12-21 RX ADMIN — RENAGEL 800 MG: 400 TABLET ORAL at 09:56

## 2017-12-21 RX ADMIN — SUCRALFATE 1 G: 1 SUSPENSION ORAL at 07:30

## 2017-12-21 RX ADMIN — TORSEMIDE 100 MG: 100 TABLET ORAL at 17:19

## 2017-12-21 RX ADMIN — SUCRALFATE 1 G: 1 SUSPENSION ORAL at 05:10

## 2017-12-21 RX ADMIN — CALCITRIOL 0.25 MCG: 0.25 CAPSULE, LIQUID FILLED ORAL at 09:57

## 2017-12-21 RX ADMIN — RENAGEL 800 MG: 400 TABLET ORAL at 13:27

## 2017-12-21 RX ADMIN — RANOLAZINE 1000 MG: 500 TABLET, FILM COATED, EXTENDED RELEASE ORAL at 22:18

## 2017-12-21 RX ADMIN — SUCRALFATE 1 G: 1 SUSPENSION ORAL at 13:27

## 2017-12-21 RX ADMIN — STANDARDIZED SENNA CONCENTRATE AND DOCUSATE SODIUM 1 TABLET: 8.6; 5 TABLET, FILM COATED ORAL at 09:57

## 2017-12-21 RX ADMIN — HEPARIN SODIUM 3000 UNITS: 1000 INJECTION, SOLUTION INTRAVENOUS; SUBCUTANEOUS at 07:34

## 2017-12-21 RX ADMIN — ERYTHROPOIETIN 20000 UNITS: 20000 INJECTION, SOLUTION INTRAVENOUS; SUBCUTANEOUS at 17:20

## 2017-12-21 RX ADMIN — LEVOTHYROXINE SODIUM 150 MCG: 150 TABLET ORAL at 05:10

## 2017-12-21 RX ADMIN — TORSEMIDE 100 MG: 100 TABLET ORAL at 09:57

## 2017-12-21 RX ADMIN — TICAGRELOR 90 MG: 90 TABLET ORAL at 09:55

## 2017-12-21 RX ADMIN — ASPIRIN 81 MG: 81 TABLET, COATED ORAL at 09:54

## 2017-12-21 RX ADMIN — ERYTHROPOIETIN 20000 UNITS: 20000 INJECTION, SOLUTION INTRAVENOUS; SUBCUTANEOUS at 10:01

## 2017-12-21 RX ADMIN — TICAGRELOR 90 MG: 90 TABLET ORAL at 22:18

## 2017-12-21 RX ADMIN — SUCRALFATE 1 G: 1 SUSPENSION ORAL at 17:20

## 2017-12-21 NOTE — PROGRESS NOTES
End Of Shift Functional Summary, Nursing      TOILETING:  Does patient need assist with clothing management and/or pericare? Yes: Comment: up with assistance to bathroom with walker. TOILET TRANSFER:  Pt requires standby assistance/setup. Pt uses walker. BLADDER:  Pt does not have a castillo catheter that staff manages. Pt does not take medication. Pt is continent. of bladder and voids in toilet  Pt requires staff to empty device Pt has had 0 bladder accidents during this shift requiring standby assistance/setup to clean up. (An accident is when the episode is not contained in a brief AND/OR the clothing/linen requires changing/cleaning up.)    BOWEL:  Pt does take medication. Pt is continent of bowel and uses toilet. Pt requires staff to empty device    Pt has had 0 bowel accidents during this shift requiring standby assistance/setup from staff to clean up. (An accident is when the episode is not contained in a brief AND/OR the clothing/linen requires changing/cleaning up.)    BED/CHAIR TRANSFER  Pt requires minimal assistance. Patient requires the assistance of 1 staff member(s). Pt uses walker    BATHING                         EATING  Pt requires no assistance. Pt does not wear dentures. TUBE FEEDINGS:  Pt does not  receive nutrition through tube feedings. Patient requires no assistance with feedings. Documentation reviewed and plan of care discussed/reviewed with   patient, physician, therapists, oncoming nurse and patient assistant during the shift.

## 2017-12-21 NOTE — PROGRESS NOTES
OT Daily Note    Time In 1038   Time Out 1054     Subjective: \"I don't want to mess with this [femoral catheter]. \"  Pain: None indicated    Precautions: Other (comment) (fall risk)    Strengthening   Patient seen seated in bed in Dialysis for preparatory PROM stretching and RUE strengthening therapeutic activity. Patient tolerated PROM stretch to RUE in shoulder flexion, abduction, internal and external rotation prior to task. Patient initiated RUE strengthening task, reaching to retrieve clothespins of light resistance from R side on bed using RUE (completing internal rotation) and attach to horizontal pole forward on tray table. Note no restrictions for therapy indicated upon initiating treatment, however upon elevating HOB to ~60 degrees cycler alarm activated, RN recommending patient return to and remain in supine for remainder of dialysis treatment. Session terminated for Dialysis management, to resume UE exercises in supine next OT session. Assessment: Patient tolerated treatment and agreeable to resuming UE exercises in supine once Dialysis/cycler managed    Education: Purpose of therapy  Interdisciplinary Communication: Collaborated with MARCIANO, Adalberto Lombardi and Nina Nichols regarding patient's status/restrictions and agreed patient is progressing well and on-track to meet goals as stated in POC. Plan: Continue to address ADL/IADL, functional mobility, activity tolerance, balance, strengthening, coordination, education, cognition.       Lucien Dewitt, OTR/L

## 2017-12-21 NOTE — PROGRESS NOTES
OT Daily Note  Time In 1115   Time Out 1148     Subjective: \"What next? \"   Pain: none reported  Education: benefits of exercise   Interdisciplinary Communication: with primary treating OT regarding dialysis  Precautions: Other (comment) (fall risk)  Location on arrival: supine in bed in dialysis     Patient seen while in dialysis; unable to raise HOB beyond supine due to dialysis machine previously giving error messages to previously treating therapists. Strengthening Daily Assessment   Patient completed dowel exercises in supine per below, with 2 pound weight donned on LUE and non-weighted dowel:     Shoulder flexion to 90 degrees AAROM RUE 7 x 3 sets  Elbow flexion AROM 7 x 3 sets  Chest press AAROM RUE 7 x 3 sets   Medium shoulder flexion/extension circles AROM 7 x 3 sets  Diagonals 7 x 3 sets     Patient required short rest breaks between each set. Patient also required cues to persist with tasks, frequently stopping after 5 exercises instead of 7. Would benefit from further strengthening exercises. Patient willing to participate but limited by fatigue. Patient was left supine in bed in dialysis. Assessment: See above. Willing to participate but limited by fatigue. Plan: Continue OT POC with focus on ADL/IADL skills, functional transfers, functional mobility, coordination, strength, static and dynamic balance, and activity tolerance to maximize safety and independence with ADLs and functional transfers.      Vanessa Kingston MS, OTR/L  12/21/2017

## 2017-12-21 NOTE — PROGRESS NOTES
12/21/2017 AM PT : Patient in dialysis attempted exs with LEs but dialysis machine beeping with exs. Will continue therapy in PM after dialysis.  Milena Monroe, PTA

## 2017-12-21 NOTE — PROGRESS NOTES
OT Daily Note  Time In 1347   Time Out 1415     Subjective: \"Honey, I feel like a dishrag. \"   Pain: none reported  Education: benefits of exercise   Interdisciplinary Communication: with primary treating OT regarding POC  Precautions: Other (comment) (fall risk)  Location on arrival: long sitting in bed    Activity Tolerance Daily Assessment   Patient transferred from long sitting to edge of bed with modified independence. Patient sat edge of bed unsupported x approximately 25 minutes while engaging in 20 piece jigsaw puzzle. Required entire session to complete, and required mod A to complete. Patient was able to identify errors approximately half of the time. Patient used RUE with verbal cues but not consistently. Good participation with unsupported sitting considering fatigue from dialysis. Patient was left seated up at edge of bed with call light/all needs in reach and in no distress. Assessment: See above. Limited by fatigue but willing to participate with encouragement. Plan: Continue OT POC with focus on ADL/IADL skills, functional transfers, functional mobility, coordination, strength, static and dynamic balance, and activity tolerance to maximize safety and independence with ADLs and functional transfers.      Emely Rocha MS, OTR/L  12/21/2017

## 2017-12-21 NOTE — PROGRESS NOTES
12/21/17 1440   Time Spent With Patient   Time In 1415   Time Out 1443   Patient Seen For: PM ;Neuro-linguistics   Mental Status   Neurologic State Alert   Orientation Level Oriented X4   Cognition Memory loss; Impaired decision making   Perception Appears intact   Perseveration Perseverates during conversation   Safety/Judgement Fall prevention   Pt sitting up on the side of the bed. Reports sharp pain in side sometimes. Pt completed subtest 6-10 of the ELLIE with min cues with 80% accuracy. Pt completed word finding tasks with min cues with 85% Accuracy.     Elgin To MA/HARSHA/SLP

## 2017-12-21 NOTE — PROGRESS NOTES
Hemodialysis treatment initiated as ordered. Accessed, aspirated, and flushed left femoral catheter with NS. VS stable, pt has no complaints at this time. Heparin bolus-1,000 units on HD and 500 units/hour infusing on dialysis. Machine alarms WNL. Will continue to monitor while on dialysis.

## 2017-12-21 NOTE — PROGRESS NOTES
Patient missed 10 minutes overall OT treatment time today d/t dialysis management and patient refusal d/t fatigue following dialysis. To make up time as patient is able to tolerate and scheduling allows.     Brielle Juarez, OT  12/21/2017

## 2017-12-21 NOTE — DIALYSIS
HD treatment completed without complication. Total of 0.5 kgs removed. VS stable, bp 149/77 at end of tx. CVC dressing clean, dry, and intact, tego caps intact, bilateral lumen flushed with 9cc NS and curos applied. Transport contacted for return to room. No complaints at this time.

## 2017-12-21 NOTE — PROGRESS NOTES
MD Claire,   Medical Director  [address] [facility], [address] [facility]  Tel: [phone]       [facility] PROGRESS NOTE    Maurice Singh  Admit Date: 11/29/2017  Admit Diagnosis: DEBILITY; Renal failure (ARF), acute on chronic ([facility] [address] Orianarobb Yuanjohnnie*  Chief Complaint : Gait dysfunction secondary to below. Admit Diagnosis: Unstable angina ([facility] [address]  CAD (coronary artery disease) (11/27/2015)  S/P complex PCI and stable on ASA and Brilinta  Unstable angina ([facility] [address] (2/1/2016)  Acute/ Chronic anemia (11/18/2017)  ESRD (end stage renal disease) On PD/ CRRT  Pain  DVT risk  Acute Rehab Dx:  Debility    Weakness  Gait impairment  deconditioning  Mobility and ambulation deficits  Self Care/ADL deficits     Subjective     Patient seen and examined. Vss, afebrile. No new acute events, seizures or hypotensive episodes. Therapy resumed and well tolerated. HD tolerated well. Discussed with son in [place] her condition, and   Optimistic prognosis.      Objective:     Current Facility-Administered Medications   Medication Dose Route Frequency    heparin (porcine) 1,000 unit/mL injection 5,000 Units  5,000 Units Hemodialysis DIALYSIS PRN    zolpidem (AMBIEN) tablet 5 mg  5 mg Oral QHS PRN    acetaminophen (TYLENOL) tablet 650 mg  650 mg Oral Q4H PRN    amLODIPine (NORVASC) tablet 5 mg  5 mg Oral DAILY    aspirin delayed-release tablet 81 mg  81 mg Oral DAILY    calcitRIOL (ROCALTROL) capsule 0.25 mcg  0.25 mcg Oral DAILY    carbamide peroxide (DEBROX) 6.5 % otic solution 5 Drop  5 Drop Right Ear BID    [START ON 12/23/2017] cyanocobalamin (VITAMIN B12) injection 1,000 mcg  1,000 mcg IntraMUSCular Q7D    epoetin toya (EPOGEN;PROCRIT) injection 20,000 Units  20,000 Units SubCUTAneous Q TUE, THU & SAT    HYDROcodone-acetaminophen (NORCO) 5-325 mg per tablet 1 Tab  1 Tab Oral Q4H PRN    hydrogen peroxide 3 %   Topical PRN    levothyroxine (SYNTHROID) tablet 150 mcg  150 mcg Oral ACB    linaclotide (LINZESS) capsule 145 mcg (Patient Supplied)  145 mcg Oral DAILY    LORazepam (ATIVAN) tablet 1 mg  1 mg Oral Q6H PRN    metoprolol tartrate (LOPRESSOR) tablet 12.5 mg  12.5 mg Oral DAILY    multivitamin w ZN (STRESSTABS W ZINC) tablet  1 Tab Oral DAILY    ondansetron (ZOFRAN ODT) tablet 4 mg  4 mg Oral TID PRN    pantoprazole (PROTONIX) tablet 40 mg  40 mg Oral ACB&D    polyethylene glycol (MIRALAX) packet 17 g  17 g Oral DAILY PRN    ranolazine ER (RANEXA) tablet 1,000 mg  1,000 mg Oral BID    rosuvastatin (CRESTOR) tablet 20 mg  20 mg Oral QHS    senna-docusate (PERICOLACE) 8.6-50 mg per tablet 1 Tab  1 Tab Oral DAILY    sevelamer (RENAGEL) tablet 800 mg  800 mg Oral TID WITH MEALS    sodium chloride (NS) flush 5-10 mL  5-10 mL IntraVENous PRN    sucralfate (CARAFATE) 100 mg/mL oral suspension 1 g  1 g Oral AC&HS    ticagrelor (BRILINTA) tablet 90 mg  90 mg Oral BID    torsemide (DEMADEX) tablet 100 mg  100 mg Oral BID     Review of Systems:   Denies chest pain, shortness of breath, cough, headache, visual problems, abdominal pain, dysurea, calf pain. Pertinent positives are as noted in the medical records and unremarkable otherwise. Visit Vitals    /78    Pulse 81    Temp 98 °F (36.7 °C)    Resp 14    Wt 206 lb 11.2 oz (93.8 kg)    SpO2 96%    BMI 30.09 kg/m2   Physical Exam:   General: Alert and oriented x 3.speech normal.   No acute cardio respiratory distress. HEENT: Normocephalic,no scleral icterus  Oral mucosa moist without cyanosis   Lungs: Clear to auscultation  bilaterally. Respiration even and unlabored   Heart: Regular rate and rhythm, S1, S2   No  murmurs, clicks, rub or gallops   Abdomen: Soft, non-tender, nondistended. Bowel sounds present. No organomegaly. LLE cath covered. Genitourinary: defered   Neuromuscular:      NANCI, EOMI  + mild generalized weakness 4+ to 5-/5. BUE, BLE, more proximal.   Moves all extremities well.    Sensation grossly intact to soft touch. Skin/extremity: No rashes, no erythema. 1+edema.   Wound covered.   [facility]                                                                             Functional Assessment:  Gross Assessment  AROM: Generally decreased, functional (12/15/17 1200)  Strength: Generally decreased, functional (12/15/17 1200)  Coordination: Generally decreased, functional (12/15/17 1200)       Balance  Sitting - Static: Good (unsupported) (12/17/17 1100)  Sitting - Dynamic: Good (unsupported) (12/17/17 1100)  Standing - Static: Good (12/17/17 1100)  Standing - Dynamic : Impaired (12/15/17 1500)           Toileting  Adaptive Equipment: Elevated seat;Grab bars (12/18/17 1010)         Harrison Fall Risk Assessment:  Bridger Lockwood Fall Risk  Mobility: Ambulates or transfers with assist devices or assistance/unsteady gait (12/20/17 1908)  Mobility Interventions: Communicate number of staff needed for ambulation/transfer (12/20/17 1908)  Mentation: Alert, oriented x 3 (12/20/17 1908)  Mentation Interventions: Evaluate medications/consider consulting pharmacy (12/20/17 1908)  Medication: Patient receiving anticonvulsants, sedatives(tranquilizers), psychotropics or hypnotics, hypoglycemics, narcotics, sleep aids, antihypertensives, laxatives, or diuretics (12/20/17 1908)  Medication Interventions: Evaluate medications/consider consulting pharmacy (12/20/17 1908)  Elimination: Needs assistance with toileting (12/20/17 1908)  Elimination Interventions: Call light in reach (12/20/17 1908)  Prior Fall History: No (12/20/17 1908)  History of Falls Interventions: Door open when patient unattended (12/20/17 1908)  Total Score: 3 (12/20/17 1908)  Standard Fall Precautions: Yes (12/20/17 1908)  High Fall Risk: Yes (12/19/17 2026)     Speech Assessment:         Ambulation:  Gait  Base of Support: Widened (12/15/17 1200)  Speed/Michelle: Slow;Shuffled (12/15/17 1200)  Step Length: Right shortened;Left shortened (12/15/17 1200)  Stance: Right increased; Left increased (12/15/17 1200)  Gait Abnormalities: Trunk sway increased; Step to gait; Decreased step clearance (12/15/17 1200)  Distance (ft): 40 Feet (ft) (12/20/17 1614)  Assistive Device: Walker, rolling (12/20/17 1614)  Curbs/Ramps: Minimum assistance (12/07/17 1400)     Labs/Studies:  Recent Results (from the past 72 hour(s))   POC SODIUM-POTASSIUM    Collection Time: 12/18/17  9:22 AM   Result Value Ref Range    Potassium, POC 4.8 3.5 - 5.1 MMOL/L   CBC W/O DIFF    Collection Time: 12/19/17  7:23 AM   Result Value Ref Range    WBC 7.1 4.3 - 11.1 K/uL    RBC 2.97 (L) 4.05 - 5.25 M/uL    HGB 8.8 (L) 11.7 - 15.4 g/dL    HCT 29.3 (L) 35.8 - 46.3 %    MCV 98.7 (H) 79.6 - 97.8 FL    MCH 29.6 26.1 - 32.9 PG    MCHC 30.0 (L) 31.4 - 35.0 g/dL    RDW 19.4 (H) 11.9 - 14.6 %    PLATELET 810 162 - 913 K/uL    MPV 9.0 (L) 10.8 - 14.1 FL   RENAL FUNCTION PANEL    Collection Time: 12/19/17  7:23 AM   Result Value Ref Range    Sodium 136 136 - 145 mmol/L    Potassium 5.8 (H) 3.5 - 5.1 mmol/L    Chloride 98 98 - 107 mmol/L    CO2 31 21 - 32 mmol/L    Anion gap 7 7 - 16 mmol/L    Glucose 73 65 - 100 mg/dL    BUN 29 (H) 8 - 23 MG/DL    Creatinine 8.89 (H) 0.6 - 1.0 MG/DL    GFR est AA 5 (L) >60 ml/min/1.73m2    GFR est non-AA 5 (L) >60 ml/min/1.73m2    Calcium 9.7 8.3 - 10.4 MG/DL    Phosphorus 6.4 (H) 2.3 - 3.7 MG/DL    Albumin 2.3 (L) 3.2 - 4.6 g/dL   METABOLIC PANEL, BASIC    Collection Time: 12/19/17  1:09 PM   Result Value Ref Range    Sodium 136 136 - 145 mmol/L    Potassium 4.3 3.5 - 5.1 mmol/L    Chloride 97 (L) 98 - 107 mmol/L    CO2 32 21 - 32 mmol/L    Anion gap 7 7 - 16 mmol/L    Glucose 100 65 - 100 mg/dL    BUN 11 8 - 23 MG/DL    Creatinine 4.14 (H) 0.6 - 1.0 MG/DL    GFR est AA 13 (L) >60 ml/min/1.73m2    GFR est non-AA 11 (L) >60 ml/min/1.73m2    Calcium 8.8 8.3 - 10.4 MG/DL   MAGNESIUM    Collection Time: 12/19/17  1:09 PM   Result Value Ref Range    Magnesium 2.0 1.8 - 2.4 mg/dL   TSH 3RD GENERATION Collection Time: 12/19/17  1:09 PM   Result Value Ref Range    TSH 0.276 (L) 0.358 - 3.740 uIU/mL   CBC W/O DIFF    Collection Time: 12/21/17  6:12 AM   Result Value Ref Range    WBC 7.0 4.3 - 11.1 K/uL    RBC 2.85 (L) 4.05 - 5.25 M/uL    HGB 8.4 (L) 11.7 - 15.4 g/dL    HCT 27.8 (L) 35.8 - 46.3 %    MCV 97.5 79.6 - 97.8 FL    MCH 29.5 26.1 - 32.9 PG    MCHC 30.2 (L) 31.4 - 35.0 g/dL    RDW 18.8 (H) 11.9 - 14.6 %    PLATELET 018 683 - 521 K/uL    MPV 9.7 (L) 10.8 - 15.7 FL   METABOLIC PANEL, BASIC    Collection Time: 12/21/17  6:12 AM   Result Value Ref Range    Sodium 135 (L) 136 - 145 mmol/L    Potassium 4.6 3.5 - 5.1 mmol/L    Chloride 97 (L) 98 - 107 mmol/L    CO2 28 21 - 32 mmol/L    Anion gap 10 7 - 16 mmol/L    Glucose 84 65 - 100 mg/dL    BUN 32 (H) 8 - 23 MG/DL    Creatinine 7.45 (H) 0.6 - 1.0 MG/DL    GFR est AA 7 (L) >60 ml/min/1.73m2    GFR est non-AA 6 (L) >60 ml/min/1.73m2    Calcium 9.7 8.3 - 10.4 MG/DL       Assessment:     Problem List as of 12/21/2017  Date Reviewed: 3/2/2017          Codes Class Noted - Resolved    Weakness of both legs ICD-10-CM: R29.898  ICD-9-CM: 729.89  11/29/2017 - Present        Renal failure (ARF), acute on chronic ([facility] [address] ICD-10-CM: N17.9, N18.9  ICD-9-CM: 584.9, 585.9  11/29/2017 - Present        Elevated troponin ICD-10-CM: R74.8  ICD-9-CM: 790.6  11/18/2017 - Present        Chest pain ICD-10-CM: R07.9  ICD-9-CM: 786.50  11/18/2017 - Present        CKD (chronic kidney disease) ICD-10-CM: N18.9  ICD-9-CM: 585.9  11/18/2017 - Present        Chronic anemia ICD-10-CM: D64.9  ICD-9-CM: 285.9  11/18/2017 - Present        ESRD (end stage renal disease) ([facility] ICD-10-CM: N18.6  ICD-9-CM: 585.6  11/18/2017 - Present        Abdominal pain ICD-10-CM: R10.9  ICD-9-CM: 789.00  9/18/2017 - Present        H/O TIA (transient ischemic attack) and stroke ICD-10-CM: Z86.73  ICD-9-CM: V12.54  4/13/2017 - Present        S/P PTCA (percutaneous transluminal coronary angioplasty) ICD-10-CM: Z98.61  ICD-9-CM: V45.82  4/13/2017 - Present        Mixed hyperlipidemia ICD-10-CM: E78.2  ICD-9-CM: 272.2  4/13/2017 - Present        Vision changes ICD-10-CM: H53.9  ICD-9-CM: 368.9  3/26/2017 - Present        Dizziness ICD-10-CM: R42  ICD-9-CM: 780.4  3/26/2017 - Present        Refusal of blood transfusions as patient is [employer] (Chronic) ICD-10-CM: Z53.1  ICD-9-CM: V62.6  8/25/2016 - Present        GERD (gastroesophageal reflux disease) ICD-10-CM: K21.9  ICD-9-CM: 530.81  8/8/2016 - Present        Unspecified sleep apnea ICD-10-CM: G47.30  ICD-9-CM: 780.57  8/8/2016 - Present    Overview Signed 8/8/2016 11:09 AM by Mei Gabriel     uses cpap machine             CVA (cerebral vascular accident) Hx of TIA ICD-10-CM: I63.9  ICD-9-CM: 434.91  2/22/2016 - Present        Unstable angina ([facility] [address] ICD-10-CM: I20.0  ICD-9-CM: 411.1  2/1/2016 - Present        ESRD on peritoneal dialysis ([facility] [address] (Chronic) ICD-10-CM: N18.6, Z99.2  ICD-9-CM: 585.6, V45.11  2/1/2016 - Present        Ischemic cardiomyopathy ICD-10-CM: I25.5  ICD-9-CM: 414.8  12/29/2015 - Present        HLD (hyperlipidemia) ICD-10-CM: E78.5  ICD-9-CM: 272.4  12/29/2015 - Present        Depression ICD-10-CM: F32.9  ICD-9-CM: 901  12/29/2015 - Present        CAD (coronary artery disease) ICD-10-CM: I25.10  ICD-9-CM: 414.00  11/27/2015 - Present        Debility ICD-10-CM: R53.81  ICD-9-CM: 799.3  5/5/2015 - Present        Hypertension (Chronic) ICD-10-CM: I10  ICD-9-CM: 401.9  4/28/2015 - Present        Anemia of chronic renal failure (Chronic) ICD-10-CM: N18.9, D63.1  ICD-9-CM: 285.21, 585.9  4/28/2015 - Present        Hypothyroidism (Chronic) ICD-10-CM: E03.9  ICD-9-CM: 244.9  4/28/2015 - Present        RESOLVED: Postural hypotension ICD-10-CM: I95.1  ICD-9-CM: 458.0  8/9/2016 - 3/26/2017        RESOLVED: Chest pain ICD-10-CM: R07.9  ICD-9-CM: 786.50  8/8/2016 - 3/26/2017        RESOLVED: Nausea ICD-10-CM: R11.0  ICD-9-CM: 787.02  8/8/2016 - 3/26/2017        RESOLVED: S/P PTCA (percutaneous transluminal coronary angioplasty); LAD PTCA of  ISR 11/27/15 ICD-10-CM: Z98.61  ICD-9-CM: V45.82  5/24/2016 - 3/26/2017        RESOLVED: TIA (transient ischemic attack) ICD-10-CM: G45.9  ICD-9-CM: 435.9  2/10/2016 - 3/26/2017        RESOLVED: Edema ICD-10-CM: R60.9  ICD-9-CM: 782.3  12/29/2015 - 3/26/2017        RESOLVED: Anterior myocardial infarction [facility]) ICD-10-CM: I21.09  ICD-9-CM: 410.10  5/21/2015 - 3/26/2017        RESOLVED: STEMI (ST elevation myocardial infarction) ([facility] [address] ICD-10-CM: I21.3  ICD-9-CM: 410.90  4/28/2015 - 2/1/2016              Plan:      CAD (coronary artery disease) (11/27/2015)/ S/P complex PCI / Unstable angina/ hypertension  - on ASA and Brilinta  - continue ranexa, statin  - 12/21 - stable exam. No acute syncopal events. Continue cardiac precautions.      Acute/ Chronic anemia (11/18/2017) - continue to monitor.   - continue epogen. - 12/15-> hgb 8.9 (12/14) -> 8.4(12/21)     ESRD (end stage renal disease) On PD/ CRRT  - PD resumed. - monitor fluids status. Nephrology managing. Will follow at IRU.   - 11/30 problem with PD/ catheter. S/p Lt femoral perm catheter,  working well. Has been using for HD. Has had revision of PD catheter 12/18. Had HD today. - 12/21 -  Expect to change to PD when appropriate per nephrology. Plan to switch to PD soon per nephrology      Pneumonia prophylaxis- continue nsentive spirometer every hour while awake.      DVT risk / DVT Prophylaxis- continue daily physician exam to assess for signs and symptoms as patient is at increased risk for of thromboembolism. Mobilization as tolerated. Intermittent pneumatic compression devices when in bed Thigh-high or knee-high thromboembolic deterrent hose when out of bed.   - on brillanta bId+ aspirin daily. continue SCDs. Pain Control: stable, mild-to-moderate joint symptoms intermittently, reasonably well controlled by PRN meds.  Will require regular pain assessment and comprenhensive pain management. -  since her PD revision procedure, she has needed prn norco. Required 3 yesterday. Will continue as needed.      Wound Care: will continue to monitor wound status daily per staff and physician. Keep wound clean and dry - monitoring femoral cath site, appears benign. 12/21 - PD cath site benign. No drainage. bowel program - as needed. + BM.      GERD/  GI- resume PPI. At times may need additional antacids, Maalox prn.  - continue sucralfate. Hold reglan. Hypothyroid  - continue current dose, synthroid. Asymptomatic. AMS, improved. Acute episode today. Appears she may have had a seizure. - mental status close to baseline. No confusion. continue PT, OT and ST. Discussed expected rehab goals and date of d/c. She is understanding and agreeable.        Time spent was 25 minutes with over 1/2 in direct patient care/examination, consultation and coordination of care.      Signed By: Fernandez Luciano MD     December 21, 2017

## 2017-12-21 NOTE — PROGRESS NOTES
12/21/2017 PM PT : Patient declined therapy this PM due to fatigue from dialysis. Will continue therapy tomorrow.  Adriane Washington, PTA

## 2017-12-21 NOTE — PROGRESS NOTES
Problem: Falls - Risk of  Goal: *Absence of Falls  Document Harrison Fall Risk and appropriate interventions in the flowsheet.    Outcome: Progressing Towards Goal  Fall Risk Interventions:  Mobility Interventions: Communicate number of staff needed for ambulation/transfer    Mentation Interventions: Evaluate medications/consider consulting pharmacy, Adequate sleep, hydration, pain control, Increase mobility    Medication Interventions: Evaluate medications/consider consulting pharmacy, Patient to call before getting OOB    Elimination Interventions: Call light in reach    History of Falls Interventions: Door open when patient unattended

## 2017-12-21 NOTE — PROGRESS NOTES
Patient resting up in bed. Alert and oriented. Lung sounds clear. S1S2, bowel sounds active. Scheduled for hemodialysis today. Denies any pain or discomfort. No other verbalized needs at present time. Assessment completed. See doc flow sheet for further assessments.

## 2017-12-22 PROCEDURE — 97535 SELF CARE MNGMENT TRAINING: CPT

## 2017-12-22 PROCEDURE — 65310000000 HC RM PRIVATE REHAB

## 2017-12-22 PROCEDURE — 92507 TX SP LANG VOICE COMM INDIV: CPT

## 2017-12-22 PROCEDURE — 97110 THERAPEUTIC EXERCISES: CPT

## 2017-12-22 PROCEDURE — 97530 THERAPEUTIC ACTIVITIES: CPT

## 2017-12-22 PROCEDURE — 74011250637 HC RX REV CODE- 250/637: Performed by: PHYSICAL MEDICINE & REHABILITATION

## 2017-12-22 PROCEDURE — 90945 DIALYSIS ONE EVALUATION: CPT

## 2017-12-22 PROCEDURE — 74011250637 HC RX REV CODE- 250/637: Performed by: INTERNAL MEDICINE

## 2017-12-22 PROCEDURE — 97116 GAIT TRAINING THERAPY: CPT

## 2017-12-22 RX ORDER — GENTAMICIN SULFATE 1 MG/G
CREAM TOPICAL
Status: DISCONTINUED | OUTPATIENT
Start: 2017-12-22 | End: 2017-12-28 | Stop reason: HOSPADM

## 2017-12-22 RX ADMIN — ONDANSETRON 4 MG: 4 TABLET, ORALLY DISINTEGRATING ORAL at 17:13

## 2017-12-22 RX ADMIN — RANOLAZINE 1000 MG: 500 TABLET, FILM COATED, EXTENDED RELEASE ORAL at 08:12

## 2017-12-22 RX ADMIN — TORSEMIDE 100 MG: 100 TABLET ORAL at 17:07

## 2017-12-22 RX ADMIN — SUCRALFATE 1 G: 1 SUSPENSION ORAL at 20:24

## 2017-12-22 RX ADMIN — AMLODIPINE BESYLATE 5 MG: 5 TABLET ORAL at 08:11

## 2017-12-22 RX ADMIN — GENTAMICIN SULFATE: 1 CREAM TOPICAL at 18:22

## 2017-12-22 RX ADMIN — LEVOTHYROXINE SODIUM 150 MCG: 150 TABLET ORAL at 06:04

## 2017-12-22 RX ADMIN — RANOLAZINE 1000 MG: 500 TABLET, FILM COATED, EXTENDED RELEASE ORAL at 20:25

## 2017-12-22 RX ADMIN — TICAGRELOR 90 MG: 90 TABLET ORAL at 20:24

## 2017-12-22 RX ADMIN — PANTOPRAZOLE SODIUM 40 MG: 40 TABLET, DELAYED RELEASE ORAL at 06:04

## 2017-12-22 RX ADMIN — RENAGEL 800 MG: 400 TABLET ORAL at 17:07

## 2017-12-22 RX ADMIN — PANTOPRAZOLE SODIUM 40 MG: 40 TABLET, DELAYED RELEASE ORAL at 17:07

## 2017-12-22 RX ADMIN — TORSEMIDE 100 MG: 100 TABLET ORAL at 08:11

## 2017-12-22 RX ADMIN — ASPIRIN 81 MG: 81 TABLET, COATED ORAL at 08:11

## 2017-12-22 RX ADMIN — RENAGEL 800 MG: 400 TABLET ORAL at 11:58

## 2017-12-22 RX ADMIN — METOPROLOL TARTRATE 12.5 MG: 25 TABLET ORAL at 08:11

## 2017-12-22 RX ADMIN — CALCITRIOL 0.25 MCG: 0.25 CAPSULE, LIQUID FILLED ORAL at 08:12

## 2017-12-22 RX ADMIN — STANDARDIZED SENNA CONCENTRATE AND DOCUSATE SODIUM 1 TABLET: 8.6; 5 TABLET, FILM COATED ORAL at 08:11

## 2017-12-22 RX ADMIN — POLYETHYLENE GLYCOL (3350) 17 G: 17 POWDER, FOR SOLUTION ORAL at 08:10

## 2017-12-22 RX ADMIN — SUCRALFATE 1 G: 1 SUSPENSION ORAL at 06:05

## 2017-12-22 RX ADMIN — RENAGEL 800 MG: 400 TABLET ORAL at 08:12

## 2017-12-22 RX ADMIN — ROSUVASTATIN CALCIUM 20 MG: 20 TABLET, FILM COATED ORAL at 20:24

## 2017-12-22 RX ADMIN — TICAGRELOR 90 MG: 90 TABLET ORAL at 08:11

## 2017-12-22 NOTE — PROGRESS NOTES
End Of Shift Functional Summary, Nursing      TOILETING:  Does patient need assist with clothing management and/or pericare? No    TOILET TRANSFER:  Pt requires minimal assistance. Pt uses walker. BLADDER:  Pt does not have a castillo catheter that staff manages. Pt does not take medication. Pt is continent. of bladder and voids in toilet  Pt has had 0 bladder accidents during this shift.  (An accident is when the episode is not contained in a brief AND/OR the clothing/linen requires changing/cleaning up.)    BOWEL:  Pt does take medication. Pt is continent of bowel and uses toilet. Pt has had 0 bowel accidents during this shift. (An accident is when the episode is not contained in a brief AND/OR the clothing/linen requires changing/cleaning up.)    BED/CHAIR TRANSFER  Pt requires standby assistance/setup. Patient requires the assistance of 1 staff member(s). Pt uses walker    EATING  Pt requires no assistance. Pt wears dentures. TUBE FEEDINGS:  Pt does not  receive nutrition through tube feedings. Patient requires no assistance with feedings. Documentation reviewed and plan of care discussed/reviewed with   oncoming nurse and patient assistant during the shift.

## 2017-12-22 NOTE — PROGRESS NOTES
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 She has a history of GI bleeding and had anemia-induced unstable angina in the past. 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 She states the pain has since resolved with NTG paste.  No fevers or chills. No nausea, vomiting or diarrhea.  She states that she is essentially anuric and occasionally makes minimal urine.  She has a h/o CVA and TIA. No fever or chills, no problems with PD drainage or discoloration of PD fluid. No increased thirst. She wishes regular diet and supplement. She denies any other acute complaints  Her edema has improved in the past couple of days with intensified dialysis.     s- discussed plans for switch to PD with low volumes tomorrow and it put her in better spirits right away - no dyspnea, no Cp, no N/V - S/P HD earlier today - no further episodes of LOC/seizure-like activity    Past Medical History:   Diagnosis Date    Anemia of chronic renal failure 4/28/2015    Anterior myocardial infarction [facility]) 5/21/2015    CAD (coronary artery disease)     Chest pain     Chronic kidney disease, stage III (moderate) 8/15/2014    on dialysis    CKD (chronic kidney disease) stage 4, GFR 15-29 ml/min ([facility] [address] 4/28/2015    Debility 5/5/2015    Depression 12/29/2015    Edema 12/29/2015    Endocrine disease     Hypothyroidism    GERD (gastroesophageal reflux disease)     Heart murmur 12/29/2015    HLD (hyperlipidemia) 12/29/2015    Hypertension     Hypothyroidism 4/28/2015    Ischemic cardiomyopathy 12/29/2015    Nausea     S/P PTCA (percutaneous transluminal coronary angioplasty); LAD PTCA of  ISR 11/27/15 5/24/2016    STEMI (ST elevation myocardial infarction) ([facility] [address] 4/28/2015    Unspecified sleep apnea     uses cpap machine    Unstable angina ([facility] [address]       Past Surgical History:   Procedure Laterality Date    HX BACK SURGERY  1990    neck surgery cervical disc    HX BACK SURGERY      lower back    HX CATARACT REMOVAL Bilateral     HX CHOLECYSTECTOMY  19702    gall bladder     HX KNEE REPLACEMENT Right 2006    HX PTCA  4/28/2015    2.25 Xience stent to mid LAD for occluded artery, anterior MI, EF 25%. Moderate disease distal LAD and distal OM PCI CX and RCA 2004, then PCI RCA and LAD in 2009. Prior to Admission medications    Medication Sig Start Date End Date Taking? Authorizing Provider   metoprolol tartrate (LOPRESSOR) 25 mg tablet Take 0.5 Tabs by mouth daily. 11/27/17  Yes MINDY Chatman   amLODIPine (NORVASC) 5 mg tablet Take 1 Tab by mouth daily. 11/27/17  Yes MINDY Chatman   torsemide (DEMADEX) 100 mg tablet Take 1 Tab by mouth two (2) times a day. 11/27/17  Yes MINDY Chatman   pantoprazole (PROTONIX) 40 mg tablet Take 1 Tab by mouth Before breakfast and dinner. 11/27/17  Yes MINDY Chatman   magnesium oxide (MAG-OX) 400 mg tablet Take 400 mg by mouth daily. Yes Historical Provider   metoclopramide HCl (REGLAN) 5 mg tablet Take 5 mg by mouth two (2) times a day. Yes Historical Provider   ticagrelor (BRILINTA) 90 mg tablet Take 1 Tab by mouth two (2) times a day. 2/4/16  Yes MINDY Chatman   aspirin delayed-release 81 mg tablet Take 1 Tab by mouth daily. 5/5/15  Yes Gisela Hollingsworth PA-C   rosuvastatin (CRESTOR) 20 mg tablet Take 20 mg by mouth nightly. Yes Historical Provider   levothyroxine (SYNTHROID) 150 mcg tablet Take 150 mcg by mouth Daily (before breakfast). Yes Historical Provider   cyanocobalamin (VITAMIN B12) 1,000 mcg/mL injection 1 mL by IntraMUSCular route every seven (7) days.  Indications: Vitamin B12 Deficiency 12/2/17   MINDY River   folic acid (FOLVITE) 1 mg tablet Take 1 mg by mouth daily. Historical Provider   potassium chloride (K-DUR, KLOR-CON) 20 mEq tablet Take 20 mEq by mouth two (2) times a day. Historical Provider   traZODone (DESYREL) 50 mg tablet Take  by mouth nightly. Historical Provider   megestrol (MEGACE) 400 mg/10 mL (40 mg/mL) suspension Take 200 mg by mouth daily. Historical Provider   sucralfate (CARAFATE) 100 mg/mL suspension Take 10 mL by mouth Before breakfast, lunch, dinner and at bedtime. Indications: PREVENTION OF STRESS ULCER 9/23/17   Altamease Rinks, DO   nitroglycerin (NITROLINGUAL) 400 mcg/spray spray 1 Spray by SubLINGual route every five (5) minutes as needed for Chest Pain. Historical Provider   linaclotide Juve Lash) 145 mcg cap capsule Take  by mouth Daily (before breakfast). Historical Provider   PNV NO.122/IRON/FOLIC ACID (PRENATAL MULTI PO) Take  by mouth. Historical Provider   ondansetron (ZOFRAN ODT) 4 mg disintegrating tablet Take 4 mg by mouth three (3) times daily as needed. Historical Provider   sevelamer carbonate (RENVELA) 800 mg tab tab Take 800 mg by mouth three (3) times daily (with meals). Historical Provider   gentamicin (GARAMYCIN) 0.1 % topical cream APPLY TO PD catheter exit site at daily dressing change 5/12/15   Jerry Castellanos MD   darbepoetin toya in polysorbat (ARANESP, POLYSORBATE,) 40 mcg/mL injection 40 mcg by SubCUTAneous route every fourteen (14) days. Indications: ANEMIA IN CHRONIC KIDNEY DISEASE    Historical Provider   ranolazine ER (RANEXA) 500 mg SR tablet Take 500 mg by mouth two (2) times a day.     Historical Provider     Allergies   Allergen Reactions    Codeine Nausea and Vomiting      Social History   Substance Use Topics    Smoking status: Never Smoker    Smokeless tobacco: Never Used    Alcohol use No      Family History   Problem Relation Age of Onset    Heart Disease Mother     Hypertension Mother     Cancer Mother      Lung    Stroke Father     Hypertension Father     Breast Cancer Neg Hx           Review of Systems    Constitutional: no fever, weak  Eyes: fair vision,    Ears, nose, mouth, throat, and face:fair hearing,   Respiratory: no asthma,  Chronic home O2 is being used - improved dyspnea  Cardiovascular:no palpitation, no  chest pain,   Gastrointestinal:no diarrhea,  Had BM today  Genitourinary: no dysuria,   Hematologic/lymphatic: no bleeding tendency,   Neurological: no seizures   Behvioral/Psych: no psych hospitalization   Endocrine: no goiter,       Objective:       Visit Vitals    /79 (BP 1 Location: Left arm, BP Patient Position: At rest)    Pulse 80    Temp 98 °F (36.7 °C)    Resp 16    Wt 93.8 kg (206 lb 11.2 oz)    SpO2 94%    BMI 30.09 kg/m2       General:  Alert, cooperative, no distress, appears stated age. Head:  Normocephalic, without obvious abnormality, atraumatic. Eyes:  Conjunctivae/corneas clear. EOMs intact. Throat: Lips, mucosa, and tongue normal. Teeth and gums normal.   Neck: Supple, symmetrical, trachea midline, no adenopathy,  no JVD. Lungs:   Clear to auscultation bilaterally. Heart:  Regular rate and rhythm, S1, S2 normal, 2/6 systolic  murmur, no rub or gallop. Abdomen:   Soft, non-tender. No masses,  No organomegaly. PD catheter in place without exudate   Extremities: Extremities normal, atraumatic, no cyanosis. 1-2+edema. Skin: Skin color, texture, turgor normal. No rashes or lesions. Neurologic: Grossly intact. No asterixis.          Data Review:       Recent Results (from the past 24 hour(s))   CBC W/O DIFF    Collection Time: 12/21/17  6:12 AM   Result Value Ref Range    WBC 7.0 4.3 - 11.1 K/uL    RBC 2.85 (L) 4.05 - 5.25 M/uL    HGB 8.4 (L) 11.7 - 15.4 g/dL    HCT 27.8 (L) 35.8 - 46.3 %    MCV 97.5 79.6 - 97.8 FL    MCH 29.5 26.1 - 32.9 PG    MCHC 30.2 (L) 31.4 - 35.0 g/dL    RDW 18.8 (H) 11.9 - 14.6 %    PLATELET 247 014 - 101 K/uL    MPV 9.7 (L) 10.8 - 14.1 FL METABOLIC PANEL, BASIC    Collection Time: 12/21/17  6:12 AM   Result Value Ref Range    Sodium 135 (L) 136 - 145 mmol/L    Potassium 4.6 3.5 - 5.1 mmol/L    Chloride 97 (L) 98 - 107 mmol/L    CO2 28 21 - 32 mmol/L    Anion gap 10 7 - 16 mmol/L    Glucose 84 65 - 100 mg/dL    BUN 32 (H) 8 - 23 MG/DL    Creatinine 7.45 (H) 0.6 - 1.0 MG/DL    GFR est AA 7 (L) >60 ml/min/1.73m2    GFR est non-AA 6 (L) >60 ml/min/1.73m2    Calcium 9.7 8.3 - 10.4 MG/DL       CXR viewed by me - no major infiltrate or fluid excess, CM with left possible minor effusion is present        CT abdomen/pelvis  CT ABDOMEN:  Peritoneal fluid in the perihepatic space, mild paracolic gutters,  extending down into the pelvis. Peritoneal dialysis catheter noted. There is not  any evidence of retroperitoneal hematoma identified. Strandy fluid density does  extend into the extraperitoneal space in the lower abdomen and pelvis. There is  radiodensity within the renal collecting systems, possibly previously  administered IV contrast. There is peripancreatic fluid and stranding, cannot  exclude acute pancreatitis. No biliary dilatation. Spleen is not enlarged. Small  bowel normal caliber.   CT PELVIS:   Moderate to large volume pelvic fluid.   There is diffuse asymmetric enlargement of the right rectus and oblique  abdominal muscles. There are are of higher attenuation the contralateral side  and findings are consistent with an abdominal wall hematoma. IMPRESSION:    1. Evidence of right abdominal wall hematoma. 2. No CT evidence to suggest retroperitoneal hematoma. 3. Extensive fluid in the abdomen and pelvis, presumed related to peritoneal  Dialysis.     KUB - PD catheter still unchanged compared to last week - remains in the sidney-umbilical area      Active Problems:    Hypothyroidism (4/28/2015)      S/P PTCA (percutaneous transluminal coronary angioplasty) (4/13/2017)      ESRD (end stage renal disease) ([facility] [address] (11/18/2017)      Weakness of both legs (11/29/2017)      Renal failure (ARF), acute on chronic ([facility] [address] (11/29/2017)        Assessment:     1. CAD x NSTEMI, Unstable angina -  - [facility] with complex CAD and acute thrombotic lesion in pLAD and subtotal occlusion in mLAD. The RCA has severe proximal and mid RCA disease. She has additional stenting of LAD with Resolute in mid/distal and proximal vessel. These all touched the previously stented mid vessel. Recommend life-long DAPT    2, ESRD -  - on temporary HD via perm cath due to PD catheter dysfunction  - TTS schedule in rehab   - tolerated gentle 0.5 l fluid removal well today on HD   - plan to switch to PD soon    3. Fluid excess -  - resolved with fluid removal on HD    4. Anemia -  - intensive anemia therapy in [place]  - on WAYNE and IV iron and B12  - improved S/P BT    5. History of GI bleed -  - monitor clinically and serial Hb  - she had a drop in Hb to 7 range and improved with BT    6. Hypokalemia   - resolved     7. Abdominal pain and tenderness with drop in Hb , on DAPT   No evidence of retroperitoneal hematoma     8. PD catheter dysfunction -  - S/P PD catheter revision / replacement yesterday     9. Possible new onset seizure -  - w/u in progress per primary team  - could this have been a hypotensive/arrhythmia episode induced by fluid removal on dialysis?       Plan:     As above - 25

## 2017-12-22 NOTE — PROGRESS NOTES
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 She has a history of GI bleeding and had anemia-induced unstable angina in the past. 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 She states the pain has since resolved with NTG paste.  No fevers or chills. No nausea, vomiting or diarrhea.  She states that she is essentially anuric and occasionally makes minimal urine.  She has a h/o CVA and TIA. No fever or chills, no problems with PD drainage or discoloration of PD fluid. No increased thirst. She wishes regular diet and supplement. She denies any other acute complaints  Her edema has improved in the past couple of days with intensified dialysis.     s- PD with low volumes ordered and discussed with dialysis staff - no BM in two days and she took Miralax today  - no dyspnea, no CP, no N/V - S/P HD yesterday - no further episodes of LOC/seizure-like activity  - able to walk longer today with walker    Past Medical History:   Diagnosis Date    Anemia of chronic renal failure 4/28/2015    Anterior myocardial infarction [facility]) 5/21/2015    CAD (coronary artery disease)     Chest pain     Chronic kidney disease, stage III (moderate) 8/15/2014    on dialysis    CKD (chronic kidney disease) stage 4, GFR 15-29 ml/min ([address] 4/28/2015    Debility 5/5/2015    Depression 12/29/2015    Edema 12/29/2015    Endocrine disease     Hypothyroidism    GERD (gastroesophageal reflux disease)     Heart murmur 12/29/2015    HLD (hyperlipidemia) 12/29/2015    Hypertension     Hypothyroidism 4/28/2015    Ischemic cardiomyopathy 12/29/2015    Nausea     S/P PTCA (percutaneous transluminal coronary angioplasty); LAD PTCA of  ISR 11/27/15 5/24/2016    STEMI (ST elevation myocardial infarction) ([facility] [address] 4/28/2015    Unspecified sleep apnea     uses cpap machine    Unstable angina ([facility] [address]       Past Surgical History:   Procedure Laterality Date    HX BACK SURGERY  1990    neck surgery cervical disc    HX BACK SURGERY      lower back    HX CATARACT REMOVAL Bilateral     HX CHOLECYSTECTOMY  19702    gall bladder     HX KNEE REPLACEMENT Right 2006    HX PTCA  4/28/2015    2.25 Xience stent to mid LAD for occluded artery, anterior MI, EF 25%. Moderate disease distal LAD and distal OM PCI CX and RCA 2004, then PCI RCA and LAD in 2009. Prior to Admission medications    Medication Sig Start Date End Date Taking? Authorizing Provider   metoprolol tartrate (LOPRESSOR) 25 mg tablet Take 0.5 Tabs by mouth daily. 11/27/17  Yes MINDY Chatman   amLODIPine (NORVASC) 5 mg tablet Take 1 Tab by mouth daily. 11/27/17  Yes MINDY Chatman   torsemide (DEMADEX) 100 mg tablet Take 1 Tab by mouth two (2) times a day. 11/27/17  Yes MINDY Chatman   pantoprazole (PROTONIX) 40 mg tablet Take 1 Tab by mouth Before breakfast and dinner. 11/27/17  Yes MINDY Chatman   magnesium oxide (MAG-OX) 400 mg tablet Take 400 mg by mouth daily. Yes Historical Provider   metoclopramide HCl (REGLAN) 5 mg tablet Take 5 mg by mouth two (2) times a day. Yes Historical Provider   ticagrelor (BRILINTA) 90 mg tablet Take 1 Tab by mouth two (2) times a day. 2/4/16  Yes MINDY Chatman   aspirin delayed-release 81 mg tablet Take 1 Tab by mouth daily. 5/5/15  Yes Gisela Hollingsworth PA-C   rosuvastatin (CRESTOR) 20 mg tablet Take 20 mg by mouth nightly. Yes Historical Provider   levothyroxine (SYNTHROID) 150 mcg tablet Take 150 mcg by mouth Daily (before breakfast). Yes Historical Provider   cyanocobalamin (VITAMIN B12) 1,000 mcg/mL injection 1 mL by IntraMUSCular route every seven (7) days.  Indications: Vitamin B12 Deficiency 12/2/17   MINDY Luevano   folic acid (FOLVITE) 1 mg tablet Take 1 mg by mouth daily. Historical Provider   potassium chloride (K-DUR, KLOR-CON) 20 mEq tablet Take 20 mEq by mouth two (2) times a day. Historical Provider   traZODone (DESYREL) 50 mg tablet Take  by mouth nightly. Historical Provider   megestrol (MEGACE) 400 mg/10 mL (40 mg/mL) suspension Take 200 mg by mouth daily. Historical Provider   sucralfate (CARAFATE) 100 mg/mL suspension Take 10 mL by mouth Before breakfast, lunch, dinner and at bedtime. Indications: PREVENTION OF STRESS ULCER 9/23/17   Gabby Trivedi DO   nitroglycerin (NITROLINGUAL) 400 mcg/spray spray 1 Spray by SubLINGual route every five (5) minutes as needed for Chest Pain. Historical Provider   linaclotide [place] Sin) 145 mcg cap capsule Take  by mouth Daily (before breakfast). Historical Provider   PNV NO.122/IRON/FOLIC ACID (PRENATAL MULTI PO) Take  by mouth. Historical Provider   ondansetron (ZOFRAN ODT) 4 mg disintegrating tablet Take 4 mg by mouth three (3) times daily as needed. Historical Provider   sevelamer carbonate (RENVELA) 800 mg tab tab Take 800 mg by mouth three (3) times daily (with meals). Historical Provider   gentamicin (GARAMYCIN) 0.1 % topical cream APPLY TO PD catheter exit site at daily dressing change 5/12/15   Fatou Galaviz MD   darbepoetin toya in polysorbat (ARANESP, POLYSORBATE,) 40 mcg/mL injection 40 mcg by SubCUTAneous route every fourteen (14) days. Indications: ANEMIA IN CHRONIC KIDNEY DISEASE    Historical Provider   ranolazine ER (RANEXA) 500 mg SR tablet Take 500 mg by mouth two (2) times a day.     Historical Provider     Allergies   Allergen Reactions    Codeine Nausea and Vomiting      Social History   Substance Use Topics    Smoking status: Never Smoker    Smokeless tobacco: Never Used    Alcohol use No      Family History   Problem Relation Age of Onset    Heart Disease Mother    Rosa Stiles Hypertension Mother     Cancer Mother      Lung    Stroke Father     Hypertension Father     Breast Cancer Neg Hx           Review of Systems    Constitutional: no fever, weak  Eyes: fair vision,    Ears, nose, mouth, throat, and face:fair hearing,   Respiratory: no asthma,  Chronic home O2 is being used - improved dyspnea  Cardiovascular:no palpitation, no  chest pain,   Gastrointestinal:no diarrhea,  Had BM today  Genitourinary: no dysuria,   Hematologic/lymphatic: no bleeding tendency,   Neurological: no seizures   Behvioral/Psych: no psych hospitalization   Endocrine: no goiter,       Objective:       Visit Vitals    /84 (BP 1 Location: Right arm, BP Patient Position: At rest)    Pulse 83    Temp 98.7 °F (37.1 °C)    Resp 18    Wt 93.5 kg (206 lb 3.2 oz)    SpO2 97%    BMI 30.01 kg/m2       General:  Alert, cooperative, no distress, appears stated age. Head:  Normocephalic, without obvious abnormality, atraumatic. Eyes:  Conjunctivae/corneas clear. EOMs intact. Throat: Lips, mucosa, and tongue normal. Teeth and gums normal.   Neck: Supple, symmetrical, trachea midline, no adenopathy,  no JVD. Lungs:   Clear to auscultation bilaterally. Heart:  Regular rate and rhythm, S1, S2 normal, 2/6 systolic  murmur, no rub or gallop. Abdomen:   Soft, non-tender. No masses,  No organomegaly. PD catheter in place without exudate   Extremities: Extremities normal, atraumatic, no cyanosis. 1-2+edema. Skin: Skin color, texture, turgor normal. No rashes or lesions. Neurologic: Grossly intact. No asterixis. Results for Dorothy Funes (MRN [id number]) as of 12/22/2017 17:02   Ref.  Range 12/21/2017 06:12   WBC Latest Ref Range: 4.3 - 11.1 K/uL 7.0   RBC Latest Ref Range: 4.05 - 5.25 M/uL 2.85 (L)   HGB Latest Ref Range: 11.7 - 15.4 g/dL 8.4 (L)   HCT Latest Ref Range: 35.8 - 46.3 % 27.8 (L)   MCV Latest Ref Range: 79.6 - 97.8 FL 97.5   MCH Latest Ref Range: 26.1 - 32.9 PG 29.5   MCHC Latest Ref Range: 31.4 - 35.0 g/dL 30.2 (L)   RDW Latest Ref Range: 11.9 - 14.6 % 18.8 (H)   PLATELET Latest Ref Range: 150 - 450 K/uL 298   MPV Latest Ref Range: 10.8 - 14.1 FL 9.7 (L)   Sodium Latest Ref Range: 136 - 145 mmol/L 135 (L)   Potassium Latest Ref Range: 3.5 - 5.1 mmol/L 4.6   Chloride Latest Ref Range: 98 - 107 mmol/L 97 (L)   CO2 Latest Ref Range: 21 - 32 mmol/L 28   Anion gap Latest Ref Range: 7 - 16 mmol/L 10   Glucose Latest Ref Range: 65 - 100 mg/dL 84   BUN Latest Ref Range: 8 - 23 MG/DL 32 (H)   Creatinine Latest Ref Range: 0.6 - 1.0 MG/DL 7.45 (H)   Calcium Latest Ref Range: 8.3 - 10.4 MG/DL 9.7   GFR est non-AA Latest Ref Range: >60 ml/min/1.73m2 6 (L)   GFR est AA Latest Ref Range: >60 ml/min/1.73m2 7 (L)       Data Review:     Active Problems:    Hypothyroidism (4/28/2015)      S/P PTCA (percutaneous transluminal coronary angioplasty) (4/13/2017)      ESRD (end stage renal disease) ([facility] [address] (11/18/2017)      Weakness of both legs (11/29/2017)      Renal failure (ARF), acute on chronic ([facility]) (11/29/2017)        Assessment:     1. CAD x NSTEMI, Unstable angina -  - [facility] with complex CAD and acute thrombotic lesion in pLAD and subtotal occlusion in mLAD. The RCA has severe proximal and mid RCA disease. She has additional stenting of LAD with Resolute in mid/distal and proximal vessel. These all touched the previously stented mid vessel. Recommend life-long DAPT    2, ESRD -  - S/P a course of temporary HD via perm cath due to PD catheter dysfunction  - tolerated gentle 0.5 l fluid removal well yesterday on HD   -  switching to PD starting tonight    3. Fluid excess -  - resolved with fluid removal on HD    4. Anemia -  - intensive anemia therapy in [place]  - on WAYNE and IV iron and B12  - improved S/P BT  - she is below goal, but not low enough for transfusion    5. History of GI bleed -  - monitor clinically and serial Hb  - she had a drop in Hb to 7 range and improved with BT    6. Hypokalemia   - resolved     7. Abdominal pain and tenderness with drop in Hb , on DAPT   No evidence of retroperitoneal hematoma     8. PD catheter dysfunction -  - S/P PD catheter revision / replacement   - okay to use PD catheter    9.  Possible new onset seizure -  - suspect hypotensive/arrhythmia episode induced by fluid removal on dialysis in combination with anesthesia      Plan:     As above - 25

## 2017-12-22 NOTE — PROGRESS NOTES
Pt walking around room. Assisted into bed. Alert and oriented x4. Lungs sounds clear bilaterally with respirations even and unlabored. Bowel sounds active in all quadrants. PD cath in place but not being used. Left groin dialysis cath in place. +2 pulses bilaterally in upper and lower extremities. Instructed to call for assistance. Call light in reach. Voiced understanding and identified call light.

## 2017-12-22 NOTE — PROGRESS NOTES
PHYSICAL THERAPY DAILY NOTE  Time In: 1594  Time Out: 8813  Patient Seen For: PM;Gait training; Therapeutic exercise;Transfer training    Subjective: Pt. Reports being very tired this afternoon. Objective: Other (comment) (Fall Risk)    BED/MAT MOBILITY Daily Assessment    Supine to Sit : 5 (Supervision)  Sit to Supine : 5 (Supervision)       TRANSFERS Daily Assessment    Transfer Type: SPT with walker  Transfer Assistance : 5 (Supervision/setup)  Sit to Stand Assistance: Supervision  Car Transfers: Not tested       GAIT Daily Assessment    Amount of Assistance: 5 (Supervision/setup)  Distance (ft): 60 Feet (ft) (x2, 30'x1)  Assistive Device: Walker, rolling   Flexed posture, decreased fran, foot flat      BALANCE Daily Assessment    Sitting - Static: Good (unsupported)  Sitting - Dynamic: Good (unsupported)  Standing - Static: Good  Standing - Dynamic : Impaired       LOWER EXTREMITY EXERCISES Daily Assessment    Extremity: Both  Exercise Type #1: Supine lower extremity strengthening  Sets Performed: 1  Reps Performed: 15  Level of Assist: Minimal assistance     SUPINE EXERCISES Sets Reps Comments   Ankle Pumps 1 15    hooklying hip adduction 1 15 Squeezing ball   bridging 1 15    Heel Slides 1 15 Using maxi slide   Hip Abduction 1 15 Using maxislide   Short Arc Quad 1 15    hooklying hip IR/ER 1 15 W/ light manual resistance   Straight Leg Raise 1 15 AAROM     Vital Signs:   Patient Vitals for the past 8 hrs:   Temp Pulse Resp BP SpO2   12/22/17 1523 98.7 °F (37.1 °C) 83 18 164/84 97 %   12/22/17 0741 98.6 °F (37 °C) 84 16 149/76 95 %         Pain level: no c/o pain    Patient education: reviewed supine exercises    Interdisciplinary Communication: collaborated w/ OT regarding pt. Progress    Pt. Left up in w/c in room in NAD, call bell in reach. Assessment: Pt. Able to increase overall gait distance, although cont.  To require frequent seated rest breaks, which is likely typical of her ambulation patterns @ home. Overal balance is improving, but endurance cont. To be a barrier. Will cont. To address w/ PT in preparation for d/c home. Plan of Care: Continue with POC and progress as tolerated.      Erin Blake, PT  12/22/2017

## 2017-12-22 NOTE — PROGRESS NOTES
12/22/17 1153   Time Spent With Patient   Time In 1122   Time Out 1146   Patient Seen For: AM;Neuro-linguistics   Mental Status   Neurologic State Alert   Orientation Level Oriented X4   Cognition Appropriate decision making; Impaired decision making; Follows commands   Perception Appears intact   Perseveration No perseveration noted   Safety/Judgement Fall prevention   Pt seen in room. Pt alert, cooperative, and amiable. Pt reported that she feels that she has a lot less trouble searching for the word she wants to say. Pt correctly inferred situation based on sentence with 19/20 acc and min cues. Pt deduced word based on 3-clues with 15/15 acc. Pt assisted with lunch tray set-up at the end of the session. All essentials and call button left within reach. Pt would benefit from continued ST to address her cognitive-linguistic deficits.   ZEV Uribe.S.R., CCC-SLP

## 2017-12-22 NOTE — PROGRESS NOTES
Problem: Nutrition Deficit  Goal: *Optimize nutritional status  Nutrition F/U:  Assessment:  Weight 93.5 kg (12/22/17 unknown source), edema - trace BLEs. The patient continues to eat a regular diet with fair-good tolerance reported. Minimal documentation of oral intake is noted and the patient is vague when responding to questions about her intake. She continues to remark how \"wiped out\" she is returning from HD. Macronutrient Needs:  Estimated calorie needs - 1552-0132 michael/day (15-20 michael/kg/day)   Estimated protein needs - 82-95 gm pro/day (1.2-1.4 gm pro/kgIBW/day)   Intake/Comparative Standards: This patient's average intake of regular diet for the past 3 recorded days/5 meals: 65%. This potentially meets 100% of calorie and 76% of protein goals. Diet:                       Regular. Pertinent Medications:                       Miralax, Pericolace and Carafate.   Intervention:  Meals and Snacks: Regular. She has a copy of the menu and uses it for her menu selections when she is in her room when the staff comes by to read the menu. Medical Food Supplement Therapy: Commercial Beverage: She declines a supplement. Nutrition Discharge Plan: Too soon to determine. Vita Whalen.  [facility]  [phone]

## 2017-12-22 NOTE — PROGRESS NOTES
Problem: Falls - Risk of  Goal: *Absence of Falls  Document Harrison Fall Risk and appropriate interventions in the flowsheet.    Outcome: Progressing Towards Goal  Fall Risk Interventions:  Mobility Interventions: Bed/chair exit alarm, Communicate number of staff needed for ambulation/transfer, Patient to call before getting OOB, PT Consult for assist device competence, Strengthening exercises (ROM-active/passive), Utilize walker, cane, or other assitive device    Mentation Interventions: Bed/chair exit alarm, Evaluate medications/consider consulting pharmacy, Increase mobility, More frequent rounding, Toileting rounds    Medication Interventions: Bed/chair exit alarm, Evaluate medications/consider consulting pharmacy, Patient to call before getting OOB, Teach patient to arise slowly    Elimination Interventions: Call light in reach, Bed/chair exit alarm, Patient to call for help with toileting needs, Toileting schedule/hourly rounds    History of Falls Interventions: Bed/chair exit alarm, Consult care management for discharge planning, Evaluate medications/consider consulting pharmacy, Room close to nurse's station

## 2017-12-22 NOTE — PROGRESS NOTES
OT WEEKLY PROGRESS NOTE    Time In: 0931  Time Out: 1005    COMPREHENSION MODE Initial Assessment Weekly Progress Assessment 12/22/2017   Score 6 6     EXPRESSION Initial Assessment Weekly Progress Assessment 12/22/2017   Primary Mode of Expression Verbal Verbal   Score 6 6   Comments Increased time d/t dyspnea increased time     SOCIAL INTERACTION/ PRAGMATICS Initial Assessment Weekly Progress Assessment 12/22/2017   Score 6 6   Comments Increased time Patient self corrects, anxious     PROBLEM SOLVING Initial Assessment Weekly Progress Assessment 12/22/2017   Score 5 5   Comments Solves complex problems with cues, or basic problems 90% or more of the time. Solves complex problems with cues, or basic problems 90% or more of the time. MEMORY Initial Assessment Weekly Progress Assessment 12/22/2017   Score 5 5   Comments Recognizes, recalls, or executes 3 steps of 3 step request 90% of the time (cueing, reminders less than 10%, loses track of time) or cueing and coaxing under stressful/unfamiliar conditions. Recognizes, recalls, or executes 3 steps of 3 step request 90% of the time (cueing, reminders less than 10%, loses track of time) or cueing and coaxing under stressful/unfamiliar conditions. EATING Initial Assessment Weekly Progress Assessment 12/22/2017   Functional Level 6 6   Comments Dentures Dentures     GROOMING Initial Assessment Weekly Progress Assessment 12/22/2017   Functional Level 5 5   Tasks completed by patient Washed face, Washed hands, Brushed teeth Washed face; Washed hands;Brushed teeth   Comments Setup Setup     BATHING Initial Assessment Weekly Progress Assessment 12/22/2017   Functional Level 5 5   Body parts patient bathed Abdomen, Arm, left, Arm, right, Buttocks, Chest, Lower leg and foot, left, Lower leg and foot, right, Adriana area, Thigh, left, Thigh, right Thigh, right;Adriana area; Thigh, left; Lower leg and foot, right; Lower leg and foot, left; Chest;Buttocks;Arm, right;Arm, left;Abdomen   Comments S, LHS SBA in stance       UPPER BODY DRESSING/UNDRESSING Initial Assessment Weekly Progress Assessment 12/22/2017   Functional Level 5 5   Items applied/Steps completed Pullover (4 steps) Pullover (4 steps)   Comments Set-up Setup     LOWER BODY DRESSING/UNDRESSING Initial Assessment Weekly Progress Assessment 12/22/2017   Functional Level 5 5   Items applied/Steps completed Sock, left (1 step), Sock, right (1 step), Underpants (3 steps), Elastic waist pants (3 steps) Sock, right (1 step); Sock, left (1 step); Elastic waist pants (3 steps)   Comments S Setup/supervision, use of sock aid and reacher       Plan of Care: Please see Care Plan for updated LTGs. Family Training: To complete as able prior to discharge    Summary of Progress: Patient demonstrates minimal progress with ADL and functional transfers, see above for details. Note rapid response called 12/19/17 as patient with possible seizure episode, no change in functional status s/p episode however patient with some confusion in days following event and continues with decreased activity tolerance d/t dialysis treatment. Patient also continues to be limited by cognitive deficits, note decreased problem solving and recall for education, exacerbated when anxious limiting progress/carryover. Summary of Session:   S: \"I'm proud of myself. \" Agreeable to therapy. Focus of session was on ADL and functional mobility. Patient was able to ambulate ~10 feet using a RW with SBA. Pain not indicated. Collaborated with PT, Mukesh Barnett and confirmed patient is on track to reach goals as documented in the care plan. Patient tolerated session well, but activity tolerance, cognition, balance, functional mobility, strength, FMC are still below baseline and require skilled facilitation to successfully and safely complete ADL's and transfers. Patient ended session in wheelchair with PTMukesh.     Yu Amato, OTR/L

## 2017-12-22 NOTE — PROGRESS NOTES
Peritoneal dialysis initiated as ordered. Peritoneal catheter exit site cleaned with Chlorhexidine scrub and Gentamicinn creamdressing changed, site has no s/s of redness, swelling, or exudate. Patient states no complaints at this time. Report given to primary RN.

## 2017-12-22 NOTE — PROGRESS NOTES
MD Claire,   Medical Director  [address] [facility], [address] [facility]  Tel: [phone]       SFD PROGRESS NOTE    Vermell Score  Admit Date: 11/29/2017  Admit Diagnosis: DEBILITY; Renal failure (ARF), acute on chronic ([facility] [address] [place] *  Chief Complaint : Gait dysfunction secondary to below. Admit Diagnosis: Unstable angina ([facility] [address]  CAD (coronary artery disease) (11/27/2015)  S/P complex PCI and stable on ASA and Brilinta  Unstable angina ([facility] [address] (2/1/2016)  Acute/ Chronic anemia (11/18/2017)  ESRD (end stage renal disease) On PD/ CRRT  Pain  DVT risk  Acute Rehab Dx:  Debility    Weakness  Gait impairment  deconditioning  Mobility and ambulation deficits  Self Care/ADL deficits     Subjective     Patient seen and examined. Vss, afebrile. Resuming therapies, making progress w/ increased static & dynamic standing tolerance. Requires frequently requiring seated rest breaks. Ambulating with supervision. No further episodes of LOC/seizure-like activity.     Objective:     Current Facility-Administered Medications   Medication Dose Route Frequency    polyethylene glycol (MIRALAX) packet 17 g  17 g Oral DAILY    bisacodyl (DULCOLAX) suppository 10 mg  10 mg Rectal DAILY PRN    heparin (porcine) 1,000 unit/mL injection 5,000 Units  5,000 Units Hemodialysis DIALYSIS PRN    zolpidem (AMBIEN) tablet 5 mg  5 mg Oral QHS PRN    acetaminophen (TYLENOL) tablet 650 mg  650 mg Oral Q4H PRN    amLODIPine (NORVASC) tablet 5 mg  5 mg Oral DAILY    aspirin delayed-release tablet 81 mg  81 mg Oral DAILY    calcitRIOL (ROCALTROL) capsule 0.25 mcg  0.25 mcg Oral DAILY    carbamide peroxide (DEBROX) 6.5 % otic solution 5 Drop  5 Drop Right Ear BID    [START ON 12/23/2017] cyanocobalamin (VITAMIN B12) injection 1,000 mcg  1,000 mcg IntraMUSCular Q7D    epoetin toya (EPOGEN;PROCRIT) injection 20,000 Units  20,000 Units SubCUTAneous Q TUE, THU & SAT    HYDROcodone-acetaminophen (NORCO) 5-325 mg per tablet 1 Tab  1 Tab Oral Q4H PRN    hydrogen peroxide 3 %   Topical PRN    levothyroxine (SYNTHROID) tablet 150 mcg  150 mcg Oral ACB    linaclotide (LINZESS) capsule 145 mcg (Patient Supplied)  145 mcg Oral DAILY    LORazepam (ATIVAN) tablet 1 mg  1 mg Oral Q6H PRN    metoprolol tartrate (LOPRESSOR) tablet 12.5 mg  12.5 mg Oral DAILY    multivitamin w ZN (STRESSTABS W ZINC) tablet  1 Tab Oral DAILY    ondansetron (ZOFRAN ODT) tablet 4 mg  4 mg Oral TID PRN    pantoprazole (PROTONIX) tablet 40 mg  40 mg Oral ACB&D    polyethylene glycol (MIRALAX) packet 17 g  17 g Oral DAILY PRN    ranolazine ER (RANEXA) tablet 1,000 mg  1,000 mg Oral BID    rosuvastatin (CRESTOR) tablet 20 mg  20 mg Oral QHS    senna-docusate (PERICOLACE) 8.6-50 mg per tablet 1 Tab  1 Tab Oral DAILY    sevelamer (RENAGEL) tablet 800 mg  800 mg Oral TID WITH MEALS    sodium chloride (NS) flush 5-10 mL  5-10 mL IntraVENous PRN    sucralfate (CARAFATE) 100 mg/mL oral suspension 1 g  1 g Oral AC&HS    ticagrelor (BRILINTA) tablet 90 mg  90 mg Oral BID    torsemide (DEMADEX) tablet 100 mg  100 mg Oral BID     Review of Systems:   Denies chest pain, shortness of breath, cough, headache, visual problems, abdominal pain, dysurea, calf pain. Pertinent positives are as noted in the medical records and unremarkable otherwise. Visit Vitals    /76    Pulse 84    Temp 98.6 °F (37 °C)    Resp 16    Wt 206 lb 3.2 oz (93.5 kg)    SpO2 95%    BMI 30.01 kg/m2   Physical Exam:   General: Alert and oriented x 3.speech normal.   No acute cardio respiratory distress. HEENT: Normocephalic,no scleral icterus  Oral mucosa moist without cyanosis   Lungs: Clear to auscultation  bilaterally. Respiration even and unlabored   Heart: Regular rate and rhythm, S1, S2   No  murmurs, clicks, rub or gallops   Abdomen: Soft, non-tender, nondistended. Bowel sounds present. No organomegaly. LLE cath covered.     Genitourinary: defered   Neuromuscular:      NANCI, EOMI  + mild generalized weakness 4+ to 5-/5. BUE, BLE, more proximal.   Moves all extremities well. Sensation grossly intact to soft touch. Skin/extremity: No rashes, no erythema. 1+edema.   Wound covered.   [facility]                                                                             Functional Assessment:  Gross Assessment  AROM: Generally decreased, functional (12/15/17 1200)  Strength: Generally decreased, functional (12/15/17 1200)  Coordination: Generally decreased, functional (12/15/17 1200)       Balance  Sitting - Static: Good (unsupported) (12/17/17 1100)  Sitting - Dynamic: Good (unsupported) (12/17/17 1100)  Standing - Static: Good (12/17/17 1100)  Standing - Dynamic : Impaired (12/15/17 1500)           Toileting  Adaptive Equipment: Elevated seat;Grab bars (12/18/17 1010)         Harrison Fall Risk Assessment:  Page Hayes Fall Risk  Mobility: Ambulates or transfers with assist devices or assistance/unsteady gait (12/21/17 1923)  Mobility Interventions: Bed/chair exit alarm (12/21/17 1923)  Mentation: Alert, oriented x 3 (12/21/17 1923)  Mentation Interventions: Bed/chair exit alarm (12/21/17 1923)  Medication: Patient receiving anticonvulsants, sedatives(tranquilizers), psychotropics or hypnotics, hypoglycemics, narcotics, sleep aids, antihypertensives, laxatives, or diuretics (12/21/17 1923)  Medication Interventions: Bed/chair exit alarm (12/21/17 1923)  Elimination: Needs assistance with toileting (12/21/17 1923)  Elimination Interventions: Call light in reach (12/21/17 1923)  Prior Fall History: No (12/21/17 1923)  History of Falls Interventions: Bed/chair exit alarm (12/21/17 1923)  Total Score: 3 (12/21/17 1923)  Standard Fall Precautions: Yes (12/21/17 0705)  High Fall Risk: Yes (12/21/17 1923)     Speech Assessment:         Ambulation:  Gait  Base of Support: Widened (12/15/17 1200)  Speed/Michelle: Slow;Shuffled (12/15/17 1200)  Step Length: Right shortened;Left shortened (12/15/17 1200)  Stance: Right increased; Left increased (12/15/17 1200)  Gait Abnormalities: Trunk sway increased; Step to gait; Decreased step clearance (12/15/17 1200)  Distance (ft): 40 Feet (ft) (12/20/17 1614)  Assistive Device: Walker, rolling (12/20/17 1614)  Curbs/Ramps: Minimum assistance (12/07/17 1400)     Labs/Studies:  Recent Results (from the past 72 hour(s))   METABOLIC PANEL, BASIC    Collection Time: 12/19/17  1:09 PM   Result Value Ref Range    Sodium 136 136 - 145 mmol/L    Potassium 4.3 3.5 - 5.1 mmol/L    Chloride 97 (L) 98 - 107 mmol/L    CO2 32 21 - 32 mmol/L    Anion gap 7 7 - 16 mmol/L    Glucose 100 65 - 100 mg/dL    BUN 11 8 - 23 MG/DL    Creatinine 4.14 (H) 0.6 - 1.0 MG/DL    GFR est AA 13 (L) >60 ml/min/1.73m2    GFR est non-AA 11 (L) >60 ml/min/1.73m2    Calcium 8.8 8.3 - 10.4 MG/DL   MAGNESIUM    Collection Time: 12/19/17  1:09 PM   Result Value Ref Range    Magnesium 2.0 1.8 - 2.4 mg/dL   TSH 3RD GENERATION    Collection Time: 12/19/17  1:09 PM   Result Value Ref Range    TSH 0.276 (L) 0.358 - 3.740 uIU/mL   CBC W/O DIFF    Collection Time: 12/21/17  6:12 AM   Result Value Ref Range    WBC 7.0 4.3 - 11.1 K/uL    RBC 2.85 (L) 4.05 - 5.25 M/uL    HGB 8.4 (L) 11.7 - 15.4 g/dL    HCT 27.8 (L) 35.8 - 46.3 %    MCV 97.5 79.6 - 97.8 FL    MCH 29.5 26.1 - 32.9 PG    MCHC 30.2 (L) 31.4 - 35.0 g/dL    RDW 18.8 (H) 11.9 - 14.6 %    PLATELET 927 025 - 605 K/uL    MPV 9.7 (L) 10.8 - 16.4 FL   METABOLIC PANEL, BASIC    Collection Time: 12/21/17  6:12 AM   Result Value Ref Range    Sodium 135 (L) 136 - 145 mmol/L    Potassium 4.6 3.5 - 5.1 mmol/L    Chloride 97 (L) 98 - 107 mmol/L    CO2 28 21 - 32 mmol/L    Anion gap 10 7 - 16 mmol/L    Glucose 84 65 - 100 mg/dL    BUN 32 (H) 8 - 23 MG/DL    Creatinine 7.45 (H) 0.6 - 1.0 MG/DL    GFR est AA 7 (L) >60 ml/min/1.73m2    GFR est non-AA 6 (L) >60 ml/min/1.73m2    Calcium 9.7 8.3 - 10.4 MG/DL       Assessment:     Problem List as of 12/22/2017  Date Reviewed: 3/2/2017          Codes Class Noted - Resolved    Weakness of both legs ICD-10-CM: R29.898  ICD-9-CM: 729.89  11/29/2017 - Present        Renal failure (ARF), acute on chronic ([facility] ICD-10-CM: N17.9, N18.9  ICD-9-CM: 584.9, 585.9  11/29/2017 - Present        Elevated troponin ICD-10-CM: R74.8  ICD-9-CM: 790.6  11/18/2017 - Present        Chest pain ICD-10-CM: R07.9  ICD-9-CM: 786.50  11/18/2017 - Present        CKD (chronic kidney disease) ICD-10-CM: N18.9  ICD-9-CM: 585.9  11/18/2017 - Present        Chronic anemia ICD-10-CM: D64.9  ICD-9-CM: 285.9  11/18/2017 - Present        ESRD (end stage renal disease) ([facility] ICD-10-CM: N18.6  ICD-9-CM: 585.6  11/18/2017 - Present        Abdominal pain ICD-10-CM: R10.9  ICD-9-CM: 789.00  9/18/2017 - Present        H/O TIA (transient ischemic attack) and stroke ICD-10-CM: Z86.73  ICD-9-CM: V12.54  4/13/2017 - Present        S/P PTCA (percutaneous transluminal coronary angioplasty) ICD-10-CM: Z98.61  ICD-9-CM: V45.82  4/13/2017 - Present        Mixed hyperlipidemia ICD-10-CM: E78.2  ICD-9-CM: 272.2  4/13/2017 - Present        Vision changes ICD-10-CM: H53.9  ICD-9-CM: 368.9  3/26/2017 - Present        Dizziness ICD-10-CM: R42  ICD-9-CM: 780.4  3/26/2017 - Present        Refusal of blood transfusions as patient is Jehovah's witness (Chronic) ICD-10-CM: Z53.1  ICD-9-CM: V62.6  8/25/2016 - Present        GERD (gastroesophageal reflux disease) ICD-10-CM: K21.9  ICD-9-CM: 530.81  8/8/2016 - Present        Unspecified sleep apnea ICD-10-CM: G47.30  ICD-9-CM: 780.57  8/8/2016 - Present    Overview Signed 8/8/2016 11:09 AM by Donald Miles     uses cpap machine             CVA (cerebral vascular accident) Hx of TIA ICD-10-CM: I63.9  ICD-9-CM: 434.91  2/22/2016 - Present        Unstable angina (HCC) ICD-10-CM: I20.0  ICD-9-CM: 411.1  2/1/2016 - Present        ESRD on peritoneal dialysis (HCC) (Chronic) ICD-10-CM: N18.6, Z99.2  ICD-9-CM: 585.6, V45.11 2/1/2016 - Present        Ischemic cardiomyopathy ICD-10-CM: I25.5  ICD-9-CM: 414.8  12/29/2015 - Present        HLD (hyperlipidemia) ICD-10-CM: E78.5  ICD-9-CM: 272.4  12/29/2015 - Present        Depression ICD-10-CM: F32.9  ICD-9-CM: 046  12/29/2015 - Present        CAD (coronary artery disease) ICD-10-CM: I25.10  ICD-9-CM: 414.00  11/27/2015 - Present        Debility ICD-10-CM: R53.81  ICD-9-CM: 799.3  5/5/2015 - Present        Hypertension (Chronic) ICD-10-CM: I10  ICD-9-CM: 401.9  4/28/2015 - Present        Anemia of chronic renal failure (Chronic) ICD-10-CM: N18.9, D63.1  ICD-9-CM: 285.21, 585.9  4/28/2015 - Present        Hypothyroidism (Chronic) ICD-10-CM: E03.9  ICD-9-CM: 244.9  4/28/2015 - Present        RESOLVED: Postural hypotension ICD-10-CM: I95.1  ICD-9-CM: 458.0  8/9/2016 - 3/26/2017        RESOLVED: Chest pain ICD-10-CM: R07.9  ICD-9-CM: 786.50  8/8/2016 - 3/26/2017        RESOLVED: Nausea ICD-10-CM: R11.0  ICD-9-CM: 787.02  8/8/2016 - 3/26/2017        RESOLVED: S/P PTCA (percutaneous transluminal coronary angioplasty); LAD PTCA of  ISR 11/27/15 ICD-10-CM: Z98.61  ICD-9-CM: V45.82  5/24/2016 - 3/26/2017        RESOLVED: TIA (transient ischemic attack) ICD-10-CM: G45.9  ICD-9-CM: 435.9  2/10/2016 - 3/26/2017        RESOLVED: Edema ICD-10-CM: R60.9  ICD-9-CM: 782.3  12/29/2015 - 3/26/2017        RESOLVED: Anterior myocardial infarction ([facility] [address] ICD-10-CM: I21.09  ICD-9-CM: 410.10  5/21/2015 - 3/26/2017        RESOLVED: STEMI (ST elevation myocardial infarction) ([facility] [address] ICD-10-CM: I21.3  ICD-9-CM: 410.90  4/28/2015 - 2/1/2016              Plan:      CAD (coronary artery disease) (11/27/2015)/ S/P complex PCI / Unstable angina/ hypertension  - on ASA and Brilinta  - continue ranexa, statin  -12/22 no acute failure, cardiac event evident. Euvolemic.      Acute/ Chronic anemia (11/18/2017) - continue to monitor.   - continue epogen.  - Stable.  Asymptomatic.      ESRD (end stage renal disease) On PD/ CRRT  - PD resumed. - monitor fluids status. Nephrology managing. Will follow at IRU.   - 11/30 problem with PD/ catheter. S/p Lt femoral perm catheter,  working well. Has been using for HD. Has had revision of PD catheter 12/18. Had HD today. - 12/20 - continue to monitor. Expect to change to PD when appropriate per nephrology. 12/22 - per nephrology, switch  to PD starting tonight. Pneumonia prophylaxis- continue nsentive spirometer every hour while awake.      DVT risk / DVT Prophylaxis- continue daily physician exam to assess for signs and symptoms as patient is at increased risk for of thromboembolism. Mobilization as tolerated. Intermittent pneumatic compression devices when in bed Thigh-high or knee-high thromboembolic deterrent hose when out of bed.   - on brillanta bId+ aspirin daily. continue SCDs. Pain Control: stable, mild-to-moderate joint symptoms intermittently, reasonably well controlled by PRN meds. Will require regular pain assessment and comprenhensive pain management. -  since her PD revision procedure, she has needed prn norco. Required 3 yesterday. Will continue as needed.      Wound Care: will continue to monitor wound status daily per staff and physician. Keep wound clean and dry - monitoring femoral cath site, appears benign. 12/20  - PD catheter and femoral cath site without signs of infection, drainage  12/22 - stoma benign. bowel program - as needed. + BM.      GERD/  GI- resume PPI. At times may need additional antacids, Maalox prn.  - continue sucralfate. Hold reglan. Hypothyroid  - synthroid. No dose change. AMS, improved. Acute episode today. Appears she may have had a seizure. - mental status improved. No new seizure activity.        Time spent was 25 minutes with over 1/2 in direct patient care/examination, consultation and coordination of care.      Signed By: Lindsay Grigsby MD     December 22, 2017

## 2017-12-22 NOTE — PROGRESS NOTES
PHYSICAL THERAPY DAILY NOTE  Time In: 1004  Time Out: 1137  Patient Seen For: AM;Balance activities;Gait training; Therapeutic exercise;Transfer training    Subjective: \"I want to work on walking. \"         Objective: Other (comment) (Fall Risk)    TRANSFERS Daily Assessment   Performed repeated sit to/from stand from w/c & recliner chair during functional training task Transfer Type: SPT with walker  Transfer Assistance : 5 (Supervision/setup)  Sit to Stand Assistance: Supervision  Car Transfers: Not tested       GAIT Daily Assessment   Pt. Ambulated several times in short (<20') distances in the room during functional training task to simulate the type of mobility that she is required to do in her home. Amount of Assistance: 5 (Supervision/setup)  Distance (ft): 40 Feet (ft) (x1, 30'x1)  Assistive Device: Walker, rolling       BALANCE Daily Assessment   Pt. Performed multiple trials of functional reach & retrieval tasks both w/ 1 UE support & no UE support, reaching outside of POLLO w/ maximal UE excursion, requiring cues for safety as well as to utilize trunk for optimal reach. Sitting - Static: Good (unsupported)  Sitting - Dynamic: Good (unsupported)  Standing - Static: Good  Standing - Dynamic : Impaired       LOWER EXTREMITY EXERCISES Daily Assessment    Pt. Performed Motomed @ resistance level 2 x10 minutes to increase strength & endurance B LEs     Vital Signs:   Patient Vitals for the past 8 hrs:   Temp Pulse Resp BP SpO2   12/22/17 0741 98.6 °F (37 °C) 84 16 149/76 95 %         Pain level: no c/o pain    Patient education: pt. Educated on safe reaching strategies during household tasks    Interdisciplinary Communication: provided co-treatment w/ OT to provide functional IADLs training task    Pt. Left up in recliner in NAD, call bell in reach. Assessment:  Pt. Making progress w/ increased static & dynamic standing tolerance. However, pt. Does cont.  To fatigue quickly, frequently requiring seated rest breaks. This will likely challenge her upon d/c as well, so recommend friends & family provide as much assistance as possible, especially for IADLs tasks. Pt. Cont. To benefit from further PT to address strength & endurance impairments. Plan of Care: Continue with POC and progress as tolerated.      Home Argueta, PT  12/22/2017

## 2017-12-22 NOTE — PROGRESS NOTES
OT Daily Note    Time In 1031   Time Out 1117     Subjective: \"I'm tired, I need to sit. \"  Pain: Not indicated    Precautions: Other (comment) (fall risk)    Activity Tolerance   Patient seen in hospital room for co-treatment with PT, Daxa Denise. Patient completed functional mobility task to ambulate for trials from recliner in hospital room to bathroom (~20 feet trials) using RW with SBA to gather personal clothing items hanging in bathroom reaching forward and overhead using BUEs and drape over front of RW to transport back to bedroom. Patient requires seated rest breaks between ambulation trials, completes 3 trials to gather all clothing and place onto tray table in hospital room. Patient then folds all clothing items seated in recliner at tray table using BUEs (note patient declines standing trials for folding clothes d/t fatigue), then stands at RW with SBA to move folded items from bed at R side to stored in cabinets on L side. Patient requires increased time and intermittent rest breaks to complete task. Assessment: Patient tolerated well, however continues to demonstrate decreased recall of education from previous sessions. Also continues with decreased insight/attention to education regarding IADL techniques for increased safety/independence at discharge, reporting, \"I have lots of chairs in my house, I'll be fine. \"    Education: IADL/functional mobility techniques  Interdisciplinary Communication: Collaborated with Merari Santos and agreed patient is progressing well and on-track to meet goals as stated in POC. Plan: Continue to address ADL/IADL, functional mobility, activity tolerance, balance, strengthening, coordination, education, cognition.       Kleber Rader, OTR/L

## 2017-12-23 PROCEDURE — 74011250637 HC RX REV CODE- 250/637: Performed by: INTERNAL MEDICINE

## 2017-12-23 PROCEDURE — 90945 DIALYSIS ONE EVALUATION: CPT

## 2017-12-23 PROCEDURE — 74011250637 HC RX REV CODE- 250/637: Performed by: PHYSICAL MEDICINE & REHABILITATION

## 2017-12-23 PROCEDURE — 65310000000 HC RM PRIVATE REHAB

## 2017-12-23 PROCEDURE — 97530 THERAPEUTIC ACTIVITIES: CPT

## 2017-12-23 PROCEDURE — 74011250636 HC RX REV CODE- 250/636: Performed by: PHYSICAL MEDICINE & REHABILITATION

## 2017-12-23 PROCEDURE — 97110 THERAPEUTIC EXERCISES: CPT

## 2017-12-23 PROCEDURE — 97116 GAIT TRAINING THERAPY: CPT

## 2017-12-23 PROCEDURE — 97535 SELF CARE MNGMENT TRAINING: CPT

## 2017-12-23 RX ADMIN — RANOLAZINE 1000 MG: 500 TABLET, FILM COATED, EXTENDED RELEASE ORAL at 21:44

## 2017-12-23 RX ADMIN — ZOLPIDEM TARTRATE 5 MG: 5 TABLET ORAL at 22:07

## 2017-12-23 RX ADMIN — TORSEMIDE 100 MG: 100 TABLET ORAL at 17:58

## 2017-12-23 RX ADMIN — RENAGEL 800 MG: 400 TABLET ORAL at 12:25

## 2017-12-23 RX ADMIN — GENTAMICIN SULFATE: 1 CREAM TOPICAL at 19:00

## 2017-12-23 RX ADMIN — METOPROLOL TARTRATE 12.5 MG: 25 TABLET ORAL at 08:42

## 2017-12-23 RX ADMIN — RENAGEL 800 MG: 400 TABLET ORAL at 08:51

## 2017-12-23 RX ADMIN — TICAGRELOR 90 MG: 90 TABLET ORAL at 08:42

## 2017-12-23 RX ADMIN — AMLODIPINE BESYLATE 5 MG: 5 TABLET ORAL at 08:41

## 2017-12-23 RX ADMIN — ASPIRIN 81 MG: 81 TABLET, COATED ORAL at 08:42

## 2017-12-23 RX ADMIN — STANDARDIZED SENNA CONCENTRATE AND DOCUSATE SODIUM 1 TABLET: 8.6; 5 TABLET, FILM COATED ORAL at 08:41

## 2017-12-23 RX ADMIN — BISACODYL 10 MG: 10 SUPPOSITORY RECTAL at 18:07

## 2017-12-23 RX ADMIN — PANTOPRAZOLE SODIUM 40 MG: 40 TABLET, DELAYED RELEASE ORAL at 17:58

## 2017-12-23 RX ADMIN — ROSUVASTATIN CALCIUM 20 MG: 20 TABLET, FILM COATED ORAL at 21:44

## 2017-12-23 RX ADMIN — TICAGRELOR 90 MG: 90 TABLET ORAL at 21:44

## 2017-12-23 RX ADMIN — RENAGEL 800 MG: 400 TABLET ORAL at 17:58

## 2017-12-23 RX ADMIN — TORSEMIDE 100 MG: 100 TABLET ORAL at 08:42

## 2017-12-23 RX ADMIN — PANTOPRAZOLE SODIUM 40 MG: 40 TABLET, DELAYED RELEASE ORAL at 06:52

## 2017-12-23 RX ADMIN — SUCRALFATE 1 G: 1 SUSPENSION ORAL at 21:45

## 2017-12-23 RX ADMIN — ERYTHROPOIETIN 20000 UNITS: 20000 INJECTION, SOLUTION INTRAVENOUS; SUBCUTANEOUS at 17:57

## 2017-12-23 RX ADMIN — CYANOCOBALAMIN 1000 MCG: 1000 INJECTION, SOLUTION INTRAMUSCULAR at 14:34

## 2017-12-23 RX ADMIN — POLYETHYLENE GLYCOL (3350) 17 G: 17 POWDER, FOR SOLUTION ORAL at 14:55

## 2017-12-23 RX ADMIN — LEVOTHYROXINE SODIUM 150 MCG: 150 TABLET ORAL at 06:52

## 2017-12-23 RX ADMIN — RANOLAZINE 1000 MG: 500 TABLET, FILM COATED, EXTENDED RELEASE ORAL at 08:42

## 2017-12-23 RX ADMIN — CALCITRIOL 0.25 MCG: 0.25 CAPSULE, LIQUID FILLED ORAL at 08:50

## 2017-12-23 NOTE — PROGRESS NOTES
End Of Shift Functional Summary, Nursing      TOILETING:  Does patient need assist with clothing management and/or pericare? No    TOILET TRANSFER:  Pt requires standby assistance/setup. Pt uses walker. BLADDER:  Pt does not have a castillo catheter that staff manages. Pt does not take medication. Pt is continent. of bladder and voids in toilet. (An accident is when the episode is not contained in a brief AND/OR the clothing/linen requires changing/cleaning up.)    BOWEL:  Pt does not take medication. Pt is continent of bowel and uses toilet. (An accident is when the episode is not contained in a brief AND/OR the clothing/linen requires changing/cleaning up.)    BED/CHAIR TRANSFER  Pt requires standby assistance/setup. Patient requires the assistance of 1 staff member(s). Pt uses walker    BATHING  Pt requires no assistance. DRESSING  UPPER BODY:  Pt requires no assistance    LOWER BODY:  Pt requires adaptive device. EATING  Pt requires no assistance. Pt does wear dentures. TUBE FEEDINGS:  Pt does not  receive nutrition through tube feedings. GROOMING  Pt requires no assistance. Documentation reviewed and plan of care discussed/reviewed with   patient, oncoming nurse and patient assistant during the shift.

## 2017-12-23 NOTE — PROGRESS NOTES
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 She has a history of GI bleeding and had anemia-induced unstable angina in the past. 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 She states the pain has since resolved with NTG paste.  No fevers or chills. No nausea, vomiting or diarrhea.  She states that she is essentially anuric and occasionally makes minimal urine.  She has a h/o CVA and TIA. No fever or chills, no problems with PD drainage or discoloration of PD fluid. No increased thirst. She wishes regular diet and supplement. She denies any other acute complaints  Her edema has improved in the past couple of days with intensified dialysis.     s-  still with no BM in three days despite Miralax - she has blood dialysate and PD catheter dysfunction again - no dyspnea, no CP, no N/V -  Discussed plans with dialysis staff, nursing and patient - no further episodes of LOC/seizure-like activity  - able to walk longer today with walker - PD with low volumes ordered again     Past Medical History:   Diagnosis Date    Anemia of chronic renal failure 4/28/2015    Anterior myocardial infarction [facility]) 5/21/2015    CAD (coronary artery disease)     Chest pain     Chronic kidney disease, stage III (moderate) 8/15/2014    on dialysis    CKD (chronic kidney disease) stage 4, GFR 15-29 ml/min ([address] 4/28/2015    Debility 5/5/2015    Depression 12/29/2015    Edema 12/29/2015    Endocrine disease     Hypothyroidism    GERD (gastroesophageal reflux disease)     Heart murmur 12/29/2015    HLD (hyperlipidemia) 12/29/2015    Hypertension     Hypothyroidism 4/28/2015    Ischemic cardiomyopathy 12/29/2015  Nausea     S/P PTCA (percutaneous transluminal coronary angioplasty); LAD PTCA of  ISR 11/27/15 5/24/2016    STEMI (ST elevation myocardial infarction) ([facility] 4/28/2015    Unspecified sleep apnea     uses cpap machine    Unstable angina ([facility]       Past Surgical History:   Procedure Laterality Date    HX BACK SURGERY  1990    neck surgery cervical disc    HX BACK SURGERY      lower back    HX CATARACT REMOVAL Bilateral     HX CHOLECYSTECTOMY  19702    gall bladder     HX KNEE REPLACEMENT Right 2006    HX PTCA  4/28/2015    2.25 Xience stent to mid LAD for occluded artery, anterior MI, EF 25%. Moderate disease distal LAD and distal OM PCI CX and RCA 2004, then PCI RCA and LAD in 2009. Prior to Admission medications    Medication Sig Start Date End Date Taking? Authorizing Provider   metoprolol tartrate (LOPRESSOR) 25 mg tablet Take 0.5 Tabs by mouth daily. 11/27/17  Yes MINDY Chatman   amLODIPine (NORVASC) 5 mg tablet Take 1 Tab by mouth daily. 11/27/17  Yes MINDY Chatman   torsemide (DEMADEX) 100 mg tablet Take 1 Tab by mouth two (2) times a day. 11/27/17  Yes MINDY Chatman   pantoprazole (PROTONIX) 40 mg tablet Take 1 Tab by mouth Before breakfast and dinner. 11/27/17  Yes MINDY Chatman   magnesium oxide (MAG-OX) 400 mg tablet Take 400 mg by mouth daily. Yes Historical Provider   metoclopramide HCl (REGLAN) 5 mg tablet Take 5 mg by mouth two (2) times a day. Yes Historical Provider   ticagrelor (BRILINTA) 90 mg tablet Take 1 Tab by mouth two (2) times a day. 2/4/16  Yes MINDY Chatman   aspirin delayed-release 81 mg tablet Take 1 Tab by mouth daily. 5/5/15  Yes Gisela Hollingsworth PA-C   rosuvastatin (CRESTOR) 20 mg tablet Take 20 mg by mouth nightly. Yes Historical Provider   levothyroxine (SYNTHROID) 150 mcg tablet Take 150 mcg by mouth Daily (before breakfast).    Yes Historical Provider   cyanocobalamin (VITAMIN B12) 1,000 mcg/mL injection 1 mL by IntraMUSCular route every seven (7) days. Indications: Vitamin B12 Deficiency 12/2/17   MINDY Skinner   folic acid (FOLVITE) 1 mg tablet Take 1 mg by mouth daily. Historical Provider   potassium chloride (K-DUR, KLOR-CON) 20 mEq tablet Take 20 mEq by mouth two (2) times a day. Historical Provider   traZODone (DESYREL) 50 mg tablet Take  by mouth nightly. Historical Provider   megestrol (MEGACE) 400 mg/10 mL (40 mg/mL) suspension Take 200 mg by mouth daily. Historical Provider   sucralfate (CARAFATE) 100 mg/mL suspension Take 10 mL by mouth Before breakfast, lunch, dinner and at bedtime. Indications: PREVENTION OF STRESS ULCER 9/23/17   Heidy Tobin DO   nitroglycerin (NITROLINGUAL) 400 mcg/spray spray 1 Spray by SubLINGual route every five (5) minutes as needed for Chest Pain. Historical Provider   linaclotide Ramya Felix) 145 mcg cap capsule Take  by mouth Daily (before breakfast). Historical Provider   PNV NO.122/IRON/FOLIC ACID (PRENATAL MULTI PO) Take  by mouth. Historical Provider   ondansetron (ZOFRAN ODT) 4 mg disintegrating tablet Take 4 mg by mouth three (3) times daily as needed. Historical Provider   sevelamer carbonate (RENVELA) 800 mg tab tab Take 800 mg by mouth three (3) times daily (with meals). Historical Provider   gentamicin (GARAMYCIN) 0.1 % topical cream APPLY TO PD catheter exit site at daily dressing change 5/12/15   Frank Montes MD   darbepoetin toya in polysorbat (ARANESP, POLYSORBATE,) 40 mcg/mL injection 40 mcg by SubCUTAneous route every fourteen (14) days. Indications: ANEMIA IN CHRONIC KIDNEY DISEASE    Historical Provider   ranolazine ER (RANEXA) 500 mg SR tablet Take 500 mg by mouth two (2) times a day.     Historical Provider     Allergies   Allergen Reactions    Codeine Nausea and Vomiting      Social History   Substance Use Topics    Smoking status: Never Smoker    Smokeless tobacco: Never Used    Alcohol use No      Family History Problem Relation Age of Onset    Heart Disease Mother     Hypertension Mother     Cancer Mother      Lung    Stroke Father     Hypertension Father     Breast Cancer Neg Hx           Review of Systems    Constitutional: no fever, weak  Eyes: fair vision,    Ears, nose, mouth, throat, and face:fair hearing,   Respiratory: no asthma,  Chronic home O2 is being used - improved dyspnea  Cardiovascular:no palpitation, no  chest pain,   Gastrointestinal:no diarrhea,  Had BM today  Genitourinary: no dysuria,   Hematologic/lymphatic: no bleeding tendency,   Neurological: no seizures   Behvioral/Psych: no psych hospitalization   Endocrine: no goiter,       Objective:       Visit Vitals    /82 (BP 1 Location: Right arm, BP Patient Position: At rest)    Pulse 79    Temp 98.3 °F (36.8 °C)    Resp 20    Wt 93.5 kg (206 lb 3.2 oz)    SpO2 96%    BMI 30.01 kg/m2       General:  Alert, cooperative, no distress, appears stated age. Head:  Normocephalic, without obvious abnormality, atraumatic. Eyes:  Conjunctivae/corneas clear. EOMs intact. Throat: Lips, mucosa, and tongue normal. Teeth and gums normal.   Neck: Supple, symmetrical, trachea midline, no adenopathy,  no JVD. Lungs:   Clear to auscultation bilaterally. Heart:  Regular rate and rhythm, S1, S2 normal, 2/6 systolic  murmur, no rub or gallop. Abdomen:   Soft, non-tender. No masses,  No organomegaly. PD catheter in place without exudate   Extremities: Extremities normal, atraumatic, no cyanosis. 1-2+edema. Skin: Skin color, texture, turgor normal. No rashes or lesions. Neurologic: Grossly intact. No asterixis. Results for Lalita Mayorga (MRN [id number]) as of 12/22/2017 17:02   Ref.  Range 12/21/2017 06:12   WBC Latest Ref Range: 4.3 - 11.1 K/uL 7.0   RBC Latest Ref Range: 4.05 - 5.25 M/uL 2.85 (L)   HGB Latest Ref Range: 11.7 - 15.4 g/dL 8.4 (L)   HCT Latest Ref Range: 35.8 - 46.3 % 27.8 (L)   MCV Latest Ref Range: 79.6 - 97.8 FL 97.5   MCH Latest Ref Range: 26.1 - 32.9 PG 29.5   MCHC Latest Ref Range: 31.4 - 35.0 g/dL 30.2 (L)   RDW Latest Ref Range: 11.9 - 14.6 % 18.8 (H)   PLATELET Latest Ref Range: 150 - 450 K/uL 298   MPV Latest Ref Range: 10.8 - 14.1 FL 9.7 (L)   Sodium Latest Ref Range: 136 - 145 mmol/L 135 (L)   Potassium Latest Ref Range: 3.5 - 5.1 mmol/L 4.6   Chloride Latest Ref Range: 98 - 107 mmol/L 97 (L)   CO2 Latest Ref Range: 21 - 32 mmol/L 28   Anion gap Latest Ref Range: 7 - 16 mmol/L 10   Glucose Latest Ref Range: 65 - 100 mg/dL 84   BUN Latest Ref Range: 8 - 23 MG/DL 32 (H)   Creatinine Latest Ref Range: 0.6 - 1.0 MG/DL 7.45 (H)   Calcium Latest Ref Range: 8.3 - 10.4 MG/DL 9.7   GFR est non-AA Latest Ref Range: >60 ml/min/1.73m2 6 (L)   GFR est AA Latest Ref Range: >60 ml/min/1.73m2 7 (L)       Data Review:     Active Problems:    Hypothyroidism (4/28/2015)      S/P PTCA (percutaneous transluminal coronary angioplasty) (4/13/2017)      ESRD (end stage renal disease) ([facility] [address] (11/18/2017)      Weakness of both legs (11/29/2017)      Renal failure (ARF), acute on chronic (HCC) (11/29/2017)        Assessment:     1. CAD x NSTEMI, Unstable angina -  - [facility] with complex CAD and acute thrombotic lesion in pLAD and subtotal occlusion in mLAD. The RCA has severe proximal and mid RCA disease. She has additional stenting of LAD with Resolute in mid/distal and proximal vessel. These all touched the previously stented mid vessel. Recommend life-long DAPT    2, ESRD -  - S/P a course of temporary HD via perm cath due to PD catheter dysfunction  - tolerated gentle 0.5 l fluid removal well yesterday on HD   -  switching to PD again tonight    3. Fluid excess -  - resolved with fluid removal on HD    4. Anemia -  - intensive anemia therapy in [employer]  - on WAYNE and IV iron and B12  - improved S/P BT  - she is below goal, but not low enough for transfusion    5.  History of GI bleed -  - monitor clinically and serial Hb  - she had a drop in Hb to 7 range and improved with BT    6. Hypokalemia   - resolved     7. Abdominal pain and tenderness with drop in Hb , on DAPT   No evidence of retroperitoneal hematoma     8. PD catheter dysfunction -  - S/P PD catheter revision / replacement   - okay to use PD catheter    9. Possible new onset seizure -  - suspect hypotensive/arrhythmia episode induced by fluid removal on dialysis in combination with anesthesia    10. PD Catheter dysfunction  - added heparin x one dose [id number] units IP  - add Dulcolax suppository to Miralax    11.  Constipation -  - again treat with Miralxz/Dulcolax/aggtressive ambulation      Plan:     As above - 25

## 2017-12-23 NOTE — PROGRESS NOTES
PHYSICAL THERAPY DAILY NOTE  Time In: 1350  Time Out: 6953  Patient Seen For: PM;Gait training;Transfer training; Therapeutic exercise    Subjective: \"I'm just so ready to go home. I hope I can go home soon. \"         Objective: Other (comment) (Fall Risk)    BED/MAT MOBILITY Daily Assessment    Supine to Sit : 5 (Supervision)  Sit to Supine : 5 (Supervision)       TRANSFERS Daily Assessment    Transfer Type: SPT with walker  Transfer Assistance : 5 (Stand-by assistance)  Sit to Stand Assistance: Supervision       GAIT Daily Assessment    Amount of Assistance: 5 (Supervision/setup)  Distance (ft): 10 Feet (ft) (x2)  Assistive Device: Walker, rolling   Decreased fran, foot flat      BALANCE Daily Assessment    Sitting - Static: Good (unsupported)  Sitting - Dynamic: Good (unsupported)  Standing - Static: Good  Standing - Dynamic : Impaired       LOWER EXTREMITY EXERCISES Daily Assessment    Extremity: Both  Exercise Type #1: Supine lower extremity strengthening  Sets Performed: 1  Reps Performed: 15  Level of Assist: Contact guard assistance     SUPINE EXERCISES Sets Reps Comments   Ankle Pumps 1 15    hooklying hip adduction 1 15 Squeezing ball   bridging 1 15    Heel Slides 1 15 Using maxislide   Hip Abduction 1 15 Using maxislide   Short Arc Quad 1 15    hooklying hip IR/ER 1 15    Straight Leg Raise 1 15 AAROM     Vital Signs:   Patient Vitals for the past 8 hrs:   Temp Pulse Resp BP SpO2   12/23/17 0700 98.3 °F (36.8 °C) 79 20 131/82 96 %         Pain level: no c/o pain    Patient education: reviewed supine exercises    Interdisciplinary Communication: collaborated w/ OT regarding pt. Progress in preparation for ADL training in AM    Pt. Left in recliner in NAD, call bell in reach. Assessment: Pt. Tolerated exercises well, but very fatigued from AM session. Deferred further gait training, but pt. Agreeable to work on LE strengthening. Pt. Cont.  To benefit from PT services to improve endurance for household tasks as pt lives alone. Plan of Care: Continue with POC and progress as tolerated.      Geraldo Mantilla, PT  12/23/2017

## 2017-12-23 NOTE — PROGRESS NOTES
Pt PD was alarming when entering the room. Reset several xs. No change in draining about 297 x 20 min. Pt states she had stopped alarm a few times. States she is unsure how long it had alarmed. No kink in line noted. Bags moved and untangled on the floor multiple times without fixing alarms. Call to Fawad Spears, RN who had left his number and instructed this nurse to call if needed. States he is not on call but only lives 2 miles away. Call to Fawad Spears and attempted to trouble shoot without resolution. Machine stopped and turned off per Fawad Spears. Instructed to call dialysis at 1154 at 6:30 and have them come check line.

## 2017-12-23 NOTE — PROGRESS NOTES
12/23/17 1035   Time Spent With Patient   Time In 0915   Time Out 1045   Patient Seen For: AM;ADLs   Grooming   Grooming Assistance  Mod I   Comments Wheelchair at [facility]    Dressing Assistance  S   Leg Crossed Method Used No   Position Performed Seated in chair;Standing   Adaptive Equipment Used Reacher;Sock aid   Don/Doff Anti-Embolic Stockings Dep   Comments Setup, increased time to manage/problem solve feet through correct holes in brief. S: \"I had a bad night last night. \" Agreeable to therapy. Focus of session was on ADL and RUE [address]. Patient encountered in hospital room completing ADL with nursing present. OT assumed care for LB dressing and grooming. See above. Pain not indicated, therapy to tolerance. Patient seated at tabletop in therapy gym following ADL for RUE FMC/cognitive task. Patient reached to R of tabletop using RUE to gather small pegs, inserted into slanted pegboard forward on tabletop using RUE according to visual pattern with 2 cues to self-correct for color discrimination and peg placement. Patient required increased time and intermittent rest breaks to complete task, removed pegs from board using R hand and replaced in container to R of tabletop upon completion of task. Collaborated with PT, Carlos Quintero and confirmed patient is on track to reach goals as documented in the care plan. Patient tolerated session well, but cognition, balance, functional mobility, activity tolerance, strength, FMC are still below baseline and require skilled facilitation to successfully and safely complete ADL's and transfers. Patient ended session in wheelchair with PTCarlos.       Yoana Villanueva, OTR/L

## 2017-12-23 NOTE — PROGRESS NOTES
PHYSICAL THERAPY DAILY NOTE  Time In: 1089  Time Out: 8714  Patient Seen For: AM;Gait training; Therapeutic exercise;Transfer training    Subjective: Pt. Reports not sleeping well last night because PD machine was not working & alarming all night. Objective: Other (comment) (Fall Risk)    TRANSFERS Daily Assessment    Transfer Type: SPT with walker  Transfer Assistance : 5 (Supervision/setup)  Sit to Stand Assistance: Supervision       GAIT Daily Assessment   Worked on ambulation & reaching & retrieving items followed by ambulation on unlevel surface navigating around obstacles (20'x2) using RW Amount of Assistance: 5 (Supervision/setup)  Distance (ft): 120 Feet (ft), 100'x1, 30'x1  Assistive Device: Walker, rolling       BALANCE Daily Assessment    Sitting - Static: Good (unsupported)  Sitting - Dynamic: Good (unsupported)  Standing - Static: Good  Standing - Dynamic : Impaired       LOWER EXTREMITY EXERCISES Daily Assessment   Pt. Performed Motomed @ resistance level 2 x10 minutes to increase strength & endurance B LEs Extremity: Both  Exercise Type #1: Seated lower extremity strengthening  Sets Performed: 1  Reps Performed: 15  Level of Assist: Supervision     SEATED EXERCISES Sets Reps Comments   Ankle Pumps 1 15    Hip Flexion 1 15    Long Arc Quads 1 15    Hip Adduction/Ball Squeeze 1 15    Hip Abduction with red Theraband 1 15    Hamstring Curls with red Theraband 1 15    Hip Extension with red Theraband 1 15      Vital Signs:   Patient Vitals for the past 8 hrs:   Temp Pulse Resp BP SpO2   12/23/17 0700 98.3 °F (36.8 °C) 79 20 131/82 96 %         Pain level: no c/o pain    Patient education: reviewed seated exercises    Interdisciplinary Communication: RN made aware of pt's concerns about dialysis    Pt. Left up in recliner in NAD, call bell in reach. Assessment: Pt. Presenting w/ improved endurance as able to increase gait distance. Pt. Does cont. To require frequent rest breaks, however. However, improved endurance makes pt/. Better prepared to mobilize at home. Pt. Cont. To benefit from PT services to address. Plan of Care: Continue with POC and progress as tolerated.      Ofelia Jordan, PT  12/23/2017

## 2017-12-23 NOTE — DIALYSIS
2000 Liter bag of 1.5% Dextrose PD fluid infused into peritoneal cavity without problems. After 30 minute dwell, approximately 2000 ml blood tinged fluid drained out. No fibrin noted. Patient tolerated procedure well.

## 2017-12-23 NOTE — PROGRESS NOTES
Problem: Falls - Risk of  Goal: *Absence of Falls  Document Harrison Fall Risk and appropriate interventions in the flowsheet.    Outcome: Progressing Towards Goal  Fall Risk Interventions:  Mobility Interventions: Bed/chair exit alarm, Utilize walker, cane, or other assitive device    Mentation Interventions: Bed/chair exit alarm, Door open when patient unattended    Medication Interventions: Bed/chair exit alarm, Patient to call before getting OOB    Elimination Interventions: Call light in reach    History of Falls Interventions: Bed/chair exit alarm, Consult care management for discharge planning, Evaluate medications/consider consulting pharmacy, Room close to nurse's station

## 2017-12-23 NOTE — PROGRESS NOTES
End Of Shift Functional Summary, Nursing        TOILETING:  Does patient need assist with clothing management and/or pericare? No     TOILET TRANSFER:  Pt requires minimal assistance. Pt uses walker.     BLADDER:  Pt does not have a castillo catheter that staff manages. Pt does not take medication. Pt is continent of bladder and voids in toilet  Pt has had 0 bladder accidents during this shift.  (An accident is when the episode is not contained in a brief AND/OR the clothing/linen requires changing/cleaning up.)     BOWEL:  Pt does take medication. Pt is continent of bowel and uses toilet. Pt has had 0 bowel accidents during this shift. (An accident is when the episode is not contained in a brief AND/OR the clothing/linen requires changing/cleaning up.)     BED/CHAIR TRANSFER  Pt requires standby assistance/setup. Patient requires the assistance of 1 staff member(s). Pt uses walker     EATING  Pt requires no assistance. Pt wears dentures. TUBE FEEDINGS:  Pt does not  receive nutrition through tube feedings. Patient requires no assistance with feedings.     Documentation reviewed and plan of care discussed/reviewed with   oncoming nurse and patient during the shift.

## 2017-12-23 NOTE — PROGRESS NOTES
MD Claire,   Medical Director  [address] [facility], [address] [facility]  Tel: [phone]       D PROGRESS NOTE    Dipesh Mancilla  Admit Date: 11/29/2017  Admit Diagnosis: DEBILITY  Renal failure (ARF), acute on chronic (HCC)  Weakness of both legs  Chief Complaint : Gait dysfunction secondary to below. Admit Diagnosis: Unstable angina ([facility]  CAD (coronary artery disease) (11/27/2015)  S/P complex PCI and stable on ASA and Brilinta  Unstable angina ([facility] (2/1/2016)  Acute/ Chronic anemia (11/18/2017)  ESRD (end stage renal disease) On PD/ CRRT  Pain  DVT risk  Acute Rehab Dx:  Debility    Weakness  Gait impairment  deconditioning  Mobility and ambulation deficits  Self Care/ADL deficits     Subjective     Patient seen and examined. Vss, afebrile. No new acute events, seizures or hypotensive episodes. Therapy resumed and well tolerated. HD tolerated well. Discussed with son in [place] her condition, and   Optimistic prognosis. Difficulty with non-functioning PD overnight. Scheduled for HD today. Reports nausea relieved with zofran overnight. C/o fatigue and poor appetite. Denies lightheadedness, palpations or SOB. Participating in therapy.     Objective:     Current Facility-Administered Medications   Medication Dose Route Frequency    gentamicin (GARAMYCIN) 0.1 % cream   Topical DIALYSIS PRN    polyethylene glycol (MIRALAX) packet 17 g  17 g Oral DAILY    bisacodyl (DULCOLAX) suppository 10 mg  10 mg Rectal DAILY PRN    heparin (porcine) 1,000 unit/mL injection 5,000 Units  5,000 Units Hemodialysis DIALYSIS PRN    zolpidem (AMBIEN) tablet 5 mg  5 mg Oral QHS PRN    acetaminophen (TYLENOL) tablet 650 mg  650 mg Oral Q4H PRN    amLODIPine (NORVASC) tablet 5 mg  5 mg Oral DAILY    aspirin delayed-release tablet 81 mg  81 mg Oral DAILY    calcitRIOL (ROCALTROL) capsule 0.25 mcg  0.25 mcg Oral DAILY    cyanocobalamin (VITAMIN B12) injection 1,000 mcg  1,000 mcg IntraMUSCular Q7D    epoetin toya (EPOGEN;PROCRIT) injection 20,000 Units  20,000 Units SubCUTAneous Q TUE, THU & SAT    HYDROcodone-acetaminophen (NORCO) 5-325 mg per tablet 1 Tab  1 Tab Oral Q4H PRN    hydrogen peroxide 3 %   Topical PRN    levothyroxine (SYNTHROID) tablet 150 mcg  150 mcg Oral ACB    linaclotide (LINZESS) capsule 145 mcg (Patient Supplied)  145 mcg Oral DAILY    LORazepam (ATIVAN) tablet 1 mg  1 mg Oral Q6H PRN    metoprolol tartrate (LOPRESSOR) tablet 12.5 mg  12.5 mg Oral DAILY    multivitamin w ZN (STRESSTABS W ZINC) tablet  1 Tab Oral DAILY    ondansetron (ZOFRAN ODT) tablet 4 mg  4 mg Oral TID PRN    pantoprazole (PROTONIX) tablet 40 mg  40 mg Oral ACB&D    polyethylene glycol (MIRALAX) packet 17 g  17 g Oral DAILY PRN    ranolazine ER (RANEXA) tablet 1,000 mg  1,000 mg Oral BID    rosuvastatin (CRESTOR) tablet 20 mg  20 mg Oral QHS    senna-docusate (PERICOLACE) 8.6-50 mg per tablet 1 Tab  1 Tab Oral DAILY    sevelamer (RENAGEL) tablet 800 mg  800 mg Oral TID WITH MEALS    sodium chloride (NS) flush 5-10 mL  5-10 mL IntraVENous PRN    sucralfate (CARAFATE) 100 mg/mL oral suspension 1 g  1 g Oral AC&HS    ticagrelor (BRILINTA) tablet 90 mg  90 mg Oral BID    torsemide (DEMADEX) tablet 100 mg  100 mg Oral BID     Review of Systems:   Denies chest pain, shortness of breath, cough, headache, visual problems, abdominal pain, dysurea, calf pain. Pertinent positives are as noted in the medical records and unremarkable otherwise. Visit Vitals    /82 (BP 1 Location: Right arm, BP Patient Position: At rest)    Pulse 79    Temp 98.3 °F (36.8 °C)    Resp 20    Wt 93.5 kg (206 lb 3.2 oz)    SpO2 96%    BMI 30.01 kg/m2   Physical Exam:   General: Alert and oriented x 3.speech normal.   No acute cardio respiratory distress. HEENT: Normocephalic,no scleral icterus  Oral mucosa moist without cyanosis   Lungs: Clear to auscultation  bilaterally.   Respiration even and unlabored   Heart: Regular rate and rhythm, S1, S2   No  murmurs, clicks, rub or gallops   Abdomen: Soft, non-tender, nondistended. Bowel sounds present. No organomegaly. LLE cath covered. Genitourinary: defered   Neuromuscular:      NANCI, EOMI  + mild generalized weakness 4+ to 5-/5. BUE, BLE, more proximal.   Moves all extremities well. Sensation grossly intact to soft touch. Skin/extremity: No rashes, no erythema. 1+edema. Wound covered.   Rommel Pollock                                                                         Functional Assessment:  Gross Assessment  AROM: Generally decreased, functional (12/15/17 1200)  Strength: Generally decreased, functional (12/15/17 1200)  Coordination: Generally decreased, functional (12/15/17 1200)       Balance  Sitting - Static: Good (unsupported) (12/22/17 1500)  Sitting - Dynamic: Good (unsupported) (12/22/17 1500)  Standing - Static: Good (12/22/17 1500)  Standing - Dynamic : Impaired (12/22/17 1500)           Toileting  Adaptive Equipment: Elevated seat;Grab bars (12/18/17 1010)         Harrison Fall Risk Assessment:  Francesca Lao Fall Risk  Mobility: Ambulates or transfers with assist devices or assistance/unsteady gait (12/22/17 2034)  Mobility Interventions: Bed/chair exit alarm;Utilize walker, cane, or other assitive device (12/22/17 2034)  Mentation: Alert, oriented x 3 (12/22/17 2034)  Mentation Interventions: Bed/chair exit alarm; Door open when patient unattended (12/22/17 2034)  Medication: Patient receiving anticonvulsants, sedatives(tranquilizers), psychotropics or hypnotics, hypoglycemics, narcotics, sleep aids, antihypertensives, laxatives, or diuretics (12/22/17 2034)  Medication Interventions: Bed/chair exit alarm; Patient to call before getting OOB (12/22/17 2034)  Elimination: Needs assistance with toileting (12/22/17 2034)  Elimination Interventions: Call light in reach (12/22/17 2034)  Prior Fall History: No (12/22/17 2034)  History of Falls Interventions: Bed/chair exit alarm; Consult care management for discharge planning;Evaluate medications/consider consulting pharmacy; Room close to nurse's station (12/22/17 0714)  Total Score: 3 (12/22/17 2034)  Standard Fall Precautions: Yes (12/21/17 0705)  High Fall Risk: Yes (12/22/17 2034)     Speech Assessment:         Ambulation:  Gait  Base of Support: Widened (12/15/17 1200)  Speed/Michelle: Slow;Shuffled (12/15/17 1200)  Step Length: Right shortened;Left shortened (12/15/17 1200)  Stance: Right increased; Left increased (12/15/17 1200)  Gait Abnormalities: Trunk sway increased; Step to gait; Decreased step clearance (12/15/17 1200)  Distance (ft): 60 Feet (ft) (x2, 30'x1) (12/22/17 1500)  Assistive Device: Walker, rolling (12/22/17 1500)  Curbs/Ramps: Minimum assistance (12/07/17 1400)     Labs/Studies:  Recent Results (from the past 72 hour(s))   CBC W/O DIFF    Collection Time: 12/21/17  6:12 AM   Result Value Ref Range    WBC 7.0 4.3 - 11.1 K/uL    RBC 2.85 (L) 4.05 - 5.25 M/uL    HGB 8.4 (L) 11.7 - 15.4 g/dL    HCT 27.8 (L) 35.8 - 46.3 %    MCV 97.5 79.6 - 97.8 FL    MCH 29.5 26.1 - 32.9 PG    MCHC 30.2 (L) 31.4 - 35.0 g/dL    RDW 18.8 (H) 11.9 - 14.6 %    PLATELET 220 618 - 178 K/uL    MPV 9.7 (L) 10.8 - 46.6 FL   METABOLIC PANEL, BASIC    Collection Time: 12/21/17  6:12 AM   Result Value Ref Range    Sodium 135 (L) 136 - 145 mmol/L    Potassium 4.6 3.5 - 5.1 mmol/L    Chloride 97 (L) 98 - 107 mmol/L    CO2 28 21 - 32 mmol/L    Anion gap 10 7 - 16 mmol/L    Glucose 84 65 - 100 mg/dL    BUN 32 (H) 8 - 23 MG/DL    Creatinine 7.45 (H) 0.6 - 1.0 MG/DL    GFR est AA 7 (L) >60 ml/min/1.73m2    GFR est non-AA 6 (L) >60 ml/min/1.73m2    Calcium 9.7 8.3 - 10.4 MG/DL       Assessment:     Problem List as of 12/23/2017  Date Reviewed: 3/2/2017          Codes Class Noted - Resolved    Weakness of both legs ICD-10-CM: R29.898  ICD-9-CM: 729.89  11/29/2017 - Present        Renal failure (ARF), acute on chronic (HCC) ICD-10-CM: N17.9, N18.9  ICD-9-CM: 584.9, 585.9  11/29/2017 - Present        Elevated troponin ICD-10-CM: R74.8  ICD-9-CM: 790.6  11/18/2017 - Present        Chest pain ICD-10-CM: R07.9  ICD-9-CM: 786.50  11/18/2017 - Present        CKD (chronic kidney disease) ICD-10-CM: N18.9  ICD-9-CM: 585.9  11/18/2017 - Present        Chronic anemia ICD-10-CM: D64.9  ICD-9-CM: 285.9  11/18/2017 - Present        ES[facility] ICD-10-CM: N18.6  ICD-9-CM: 585.6  11/18/2017 - Present        Abdominal pain ICD-10-CM: R10.9  ICD-9-CM: 789.00  9/18/2017 - Present        H/O TIA (transient ischemic attack) and stroke ICD-10-CM: Z86.73  ICD-9-CM: V12.54  4/13/2017 - Present        S/P PTCA (percutaneous transluminal coronary angioplasty) ICD-10-CM: Z98.61  ICD-9-CM: V45.82  4/13/2017 - Present        Mixed hyperlipidemia ICD-10-CM: E78.2  ICD-9-CM: 272.2  4/13/2017 - Present        Vision changes ICD-10-CM: H53.9  ICD-9-CM: 368.9  3/26/2017 - Present        Dizziness ICD-10-CM: R42  ICD-9-CM: 780.4  3/26/2017 - Present        Refusal of blood transfusions as patient is Mu-ism (Chronic) ICD-10-CM: Z53.1  ICD-9-CM: V62.6  8/25/2016 - Present        GERD (gastroesophageal reflux disease) ICD-10-CM: K21.9  ICD-9-CM: 530.81  8/8/2016 - Present        Unspecified sleep apnea ICD-10-CM: G47.30  ICD-9-CM: 780.57  8/8/2016 - Present    Overview Signed 8/8/2016 11:09 AM by Phil Bernheim     uses cpap machine             CVA (cerebral vascular accident) Hx of TIA ICD-10-CM: I63.9  ICD-9-CM: 434.91  2/22/2016 - Present        Unstable angina ([facility] [address] ICD-10-CM: I20.0  ICD-9-CM: 411.1  2/1/2016 - Present        ESRD on peritoneal dialysis ([facility] [address] (Chronic) ICD-10-CM: N18.6, Z99.2  ICD-9-CM: 585.6, V45.11  2/1/2016 - Present        Ischemic cardiomyopathy ICD-10-CM: I25.5  ICD-9-CM: 414.8  12/29/2015 - Present        HLD (hyperlipidemia) ICD-10-CM: E78.5  ICD-9-CM: 272.4  12/29/2015 - Present        Depression ICD-10-CM: F32.9  ICD-9-CM: 197  12/29/2015 - Present        CAD (coronary artery disease) ICD-10-CM: I25.10  ICD-9-CM: 414.00  11/27/2015 - Present        Debility ICD-10-CM: R53.81  ICD-9-CM: 799.3  5/5/2015 - Present        Hypertension (Chronic) ICD-10-CM: I10  ICD-9-CM: 401.9  4/28/2015 - Present        Anemia of chronic renal failure (Chronic) ICD-10-CM: N18.9, D63.1  ICD-9-CM: 285.21, 585.9  4/28/2015 - Present        Hypothyroidism (Chronic) ICD-10-CM: E03.9  ICD-9-CM: 244.9  4/28/2015 - Present        RESOLVED: Postural hypotension ICD-10-CM: I95.1  ICD-9-CM: 458.0  8/9/2016 - 3/26/2017        RESOLVED: Chest pain ICD-10-CM: R07.9  ICD-9-CM: 786.50  8/8/2016 - 3/26/2017        RESOLVED: Nausea ICD-10-CM: R11.0  ICD-9-CM: 787.02  8/8/2016 - 3/26/2017        RESOLVED: S/P PTCA (percutaneous transluminal coronary angioplasty); LAD PTCA of  ISR 11/27/15 ICD-10-CM: Z98.61  ICD-9-CM: V45.82  5/24/2016 - 3/26/2017        RESOLVED: TIA (transient ischemic attack) ICD-10-CM: G45.9  ICD-9-CM: 435.9  2/10/2016 - 3/26/2017        RESOLVED: Edema ICD-10-CM: R60.9  ICD-9-CM: 782.3  12/29/2015 - 3/26/2017        RESOLVED: [facility] ICD-10-CM: I21.09  ICD-9-CM: 410.10  5/21/2015 - 3/26/2017        RESOLVED: STEMI (ST elevation myocardial infarction) ([facility] ICD-10-CM: I21.3  ICD-9-CM: 410.90  4/28/2015 - 2/1/2016              Plan:      CAD (coronary artery disease) (11/27/2015)/ S/P complex PCI / Unstable angina/ hypertension  - on ASA and Brilinta  - continue ranexa, statin  - 12/21 - stable exam. No acute syncopal events. Continue cardiac precautions.   - HR and BP acceptable     Acute/ Chronic anemia (11/18/2017) - continue to monitor.   - continue epogen. - 12/15-> hgb 8.9 (12/14) -> 8.4(12/21)     ESRD (end stage renal disease) On PD/ CRRT  - PD resumed. - monitor fluids status. Nephrology managing. Will follow at IRU.   - 11/30 problem with PD/ catheter. S/p Lt femoral perm catheter,  working well. Has been using for HD. Has had revision of PD catheter 12/18. Had HD today. - 12/21 -  Expect to change to PD when appropriate per nephrology. Plan to switch to PD soon per nephrology  - 12/23 - schedule for HD today    Pneumonia prophylaxis- continue nsentive spirometer every hour while awake.      DVT risk / DVT Prophylaxis- continue daily physician exam to assess for signs and symptoms as patient is at increased risk for of thromboembolism. Mobilization as tolerated. Intermittent pneumatic compression devices when in bed Thigh-high or knee-high thromboembolic deterrent hose when out of bed.   - on brillanta bId+ aspirin daily. continue SCDs. Pain Control: stable, mild-to-moderate joint symptoms intermittently, reasonably well controlled by PRN meds. Will require regular pain assessment and comprenhensive pain management. -  since her PD revision procedure, she has needed prn norco. No norco needed yesterday. Will continue as needed.      Wound Care: will continue to monitor wound status daily per staff and physician. Keep wound clean and dry - monitoring femoral cath site, appears benign. 12/21 - PD cath site benign. No drainage. bowel program - as needed. + BM.      GERD/  GI- resume PPI. At times may need additional antacids, Maalox prn.  - continue sucralfate. Hold reglan. Hypothyroid  - continue current dose, synthroid. Asymptomatic. AMS, improved. Acute episode today. Appears she may have had a seizure. - mental status close to baseline. No confusion. continue PT, OT and ST. Discussed expected rehab goals and date of d/c. She is understanding and agreeable.      Time spent was 15 minutes with over 1/2 in direct patient care/examination, consultation and coordination of care.      Signed By: Levar Ac MD     December 23, 2017

## 2017-12-24 PROCEDURE — 74011250637 HC RX REV CODE- 250/637: Performed by: INTERNAL MEDICINE

## 2017-12-24 PROCEDURE — 97530 THERAPEUTIC ACTIVITIES: CPT

## 2017-12-24 PROCEDURE — 74011250637 HC RX REV CODE- 250/637: Performed by: PHYSICAL MEDICINE & REHABILITATION

## 2017-12-24 PROCEDURE — 97535 SELF CARE MNGMENT TRAINING: CPT

## 2017-12-24 PROCEDURE — 97116 GAIT TRAINING THERAPY: CPT

## 2017-12-24 PROCEDURE — 65310000000 HC RM PRIVATE REHAB

## 2017-12-24 PROCEDURE — 90945 DIALYSIS ONE EVALUATION: CPT

## 2017-12-24 RX ORDER — AMOXICILLIN 250 MG
2 CAPSULE ORAL
Status: DISCONTINUED | OUTPATIENT
Start: 2017-12-24 | End: 2017-12-28 | Stop reason: HOSPADM

## 2017-12-24 RX ORDER — AMOXICILLIN 250 MG
2 CAPSULE ORAL DAILY
Status: DISCONTINUED | OUTPATIENT
Start: 2017-12-25 | End: 2017-12-28 | Stop reason: HOSPADM

## 2017-12-24 RX ORDER — FACIAL-BODY WIPES
10 EACH TOPICAL
Status: COMPLETED | OUTPATIENT
Start: 2017-12-24 | End: 2017-12-24

## 2017-12-24 RX ADMIN — METOPROLOL TARTRATE 12.5 MG: 25 TABLET ORAL at 08:22

## 2017-12-24 RX ADMIN — RANOLAZINE 1000 MG: 500 TABLET, FILM COATED, EXTENDED RELEASE ORAL at 20:59

## 2017-12-24 RX ADMIN — RANOLAZINE 1000 MG: 500 TABLET, FILM COATED, EXTENDED RELEASE ORAL at 08:22

## 2017-12-24 RX ADMIN — TICAGRELOR 90 MG: 90 TABLET ORAL at 08:22

## 2017-12-24 RX ADMIN — RENAGEL 800 MG: 400 TABLET ORAL at 08:22

## 2017-12-24 RX ADMIN — STANDARDIZED SENNA CONCENTRATE AND DOCUSATE SODIUM 2 TABLET: 8.6; 5 TABLET, FILM COATED ORAL at 12:03

## 2017-12-24 RX ADMIN — RENAGEL 800 MG: 400 TABLET ORAL at 11:54

## 2017-12-24 RX ADMIN — RENAGEL 800 MG: 400 TABLET ORAL at 17:43

## 2017-12-24 RX ADMIN — TORSEMIDE 100 MG: 100 TABLET ORAL at 08:22

## 2017-12-24 RX ADMIN — PANTOPRAZOLE SODIUM 40 MG: 40 TABLET, DELAYED RELEASE ORAL at 07:06

## 2017-12-24 RX ADMIN — PANTOPRAZOLE SODIUM 40 MG: 40 TABLET, DELAYED RELEASE ORAL at 17:43

## 2017-12-24 RX ADMIN — BISACODYL 10 MG: 10 SUPPOSITORY RECTAL at 14:29

## 2017-12-24 RX ADMIN — ROSUVASTATIN CALCIUM 20 MG: 20 TABLET, FILM COATED ORAL at 20:56

## 2017-12-24 RX ADMIN — POLYETHYLENE GLYCOL (3350) 17 G: 17 POWDER, FOR SOLUTION ORAL at 08:22

## 2017-12-24 RX ADMIN — AMLODIPINE BESYLATE 5 MG: 5 TABLET ORAL at 08:22

## 2017-12-24 RX ADMIN — GENTAMICIN SULFATE: 1 CREAM TOPICAL at 18:42

## 2017-12-24 RX ADMIN — TORSEMIDE 100 MG: 100 TABLET ORAL at 17:49

## 2017-12-24 RX ADMIN — ONDANSETRON 4 MG: 4 TABLET, ORALLY DISINTEGRATING ORAL at 08:25

## 2017-12-24 RX ADMIN — SUCRALFATE 1 G: 1 SUSPENSION ORAL at 11:54

## 2017-12-24 RX ADMIN — STANDARDIZED SENNA CONCENTRATE AND DOCUSATE SODIUM 1 TABLET: 8.6; 5 TABLET, FILM COATED ORAL at 08:22

## 2017-12-24 RX ADMIN — ASPIRIN 81 MG: 81 TABLET, COATED ORAL at 08:22

## 2017-12-24 RX ADMIN — LEVOTHYROXINE SODIUM 150 MCG: 150 TABLET ORAL at 07:06

## 2017-12-24 RX ADMIN — CALCITRIOL 0.25 MCG: 0.25 CAPSULE, LIQUID FILLED ORAL at 08:25

## 2017-12-24 RX ADMIN — TICAGRELOR 90 MG: 90 TABLET ORAL at 20:56

## 2017-12-24 RX ADMIN — ZINC 1 TABLET: TAB ORAL at 08:22

## 2017-12-24 NOTE — PROGRESS NOTES
Problem: Falls - Risk of  Goal: *Absence of Falls  Document Harrison Fall Risk and appropriate interventions in the flowsheet.    Outcome: Progressing Towards Goal  Fall Risk Interventions:  Mobility Interventions: Patient to call before getting OOB, Utilize walker, cane, or other assitive device    Mentation Interventions: Door open when patient unattended, Toileting rounds    Medication Interventions: Patient to call before getting OOB    Elimination Interventions: Call light in reach, Patient to call for help with toileting needs, Toileting schedule/hourly rounds    History of Falls Interventions: Bed/chair exit alarm

## 2017-12-24 NOTE — DIALYSIS
Peritoneal dialysis begun as ordered. PD catheter dressing changed per protocol. The site is clean and dry, without s/s of infection. The patient is alert and denies complaints. Dialysis nurse available as needed.

## 2017-12-24 NOTE — PROGRESS NOTES
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 She has a history of GI bleeding and had anemia-induced unstable angina in the past. 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 She states the pain has since resolved with NTG paste.  No fevers or chills. No nausea, vomiting or diarrhea.  She states that she is essentially anuric and occasionally makes minimal urine.  She has a h/o CVA and TIA. No fever or chills, no problems with PD drainage or discoloration of PD fluid. No increased thirst. She wishes regular diet and supplement. She denies any other acute complaints  Her edema has improved in the past couple of days with intensified dialysis.     s-  She had a small BM with Dulcolax and Miralax - there is considerable clearing of the bloody dialysate - no PD catheter dysfunction after adding heparin intra-peritoneally - no dyspnea, no CP, no N/V -- no further episodes of LOC/seizure-like activity  - able to walk longer today with walker - PD with low volumes ordered again     Past Medical History:   Diagnosis Date    Anemia of chronic renal failure 4/28/2015    Anterior myocardial infarction [facility]) 5/21/2015    CAD (coronary artery disease)     Chest pain     Chronic kidney disease, stage III (moderate) 8/15/2014    on dialysis    CKD (chronic kidney disease) stage 4, GFR 15-29 ml/min ([address] 4/28/2015    Debility 5/5/2015    Depression 12/29/2015    Edema 12/29/2015    Endocrine disease     Hypothyroidism    GERD (gastroesophageal reflux disease)     Heart murmur 12/29/2015    HLD (hyperlipidemia) 12/29/2015    Hypertension     Hypothyroidism 4/28/2015    Ischemic cardiomyopathy 12/29/2015    Nausea     S/P PTCA (percutaneous transluminal coronary angioplasty); LAD PTCA of  ISR 11/27/15 5/24/2016    STEMI (ST elevation myocardial infarction) ([facility] 4/28/2015    Unspecified sleep apnea     uses cpap machine    Unstable angina ([facility]       Past Surgical History:   Procedure Laterality Date    HX BACK SURGERY  1990    neck surgery cervical disc    HX BACK SURGERY      lower back    HX CATARACT REMOVAL Bilateral     HX CHOLECYSTECTOMY  19702    gall bladder     HX KNEE REPLACEMENT Right 2006    HX PTCA  4/28/2015    2.25 Xience stent to mid LAD for occluded artery, anterior MI, EF 25%. Moderate disease distal LAD and distal OM PCI CX and RCA 2004, then PCI RCA and LAD in 2009. Prior to Admission medications    Medication Sig Start Date End Date Taking? Authorizing Provider   metoprolol tartrate (LOPRESSOR) 25 mg tablet Take 0.5 Tabs by mouth daily. 11/27/17  Yes MINDY Chatman   amLODIPine (NORVASC) 5 mg tablet Take 1 Tab by mouth daily. 11/27/17  Yes MINDY Chatman   torsemide (DEMADEX) 100 mg tablet Take 1 Tab by mouth two (2) times a day. 11/27/17  Yes MINDY Chatman   pantoprazole (PROTONIX) 40 mg tablet Take 1 Tab by mouth Before breakfast and dinner. 11/27/17  Yes MINDY Chatman   magnesium oxide (MAG-OX) 400 mg tablet Take 400 mg by mouth daily. Yes Historical Provider   metoclopramide HCl (REGLAN) 5 mg tablet Take 5 mg by mouth two (2) times a day. Yes Historical Provider   ticagrelor (BRILINTA) 90 mg tablet Take 1 Tab by mouth two (2) times a day. 2/4/16  Yes MINDY Chatman   aspirin delayed-release 81 mg tablet Take 1 Tab by mouth daily. 5/5/15  Yes Gisela Hollingsworth PA-C   rosuvastatin (CRESTOR) 20 mg tablet Take 20 mg by mouth nightly. Yes Historical Provider   levothyroxine (SYNTHROID) 150 mcg tablet Take 150 mcg by mouth Daily (before breakfast).    Yes Historical Provider   cyanocobalamin (VITAMIN B12) 1,000 mcg/mL injection 1 mL by IntraMUSCular route every seven (7) days. Indications: Vitamin B12 Deficiency 12/2/17   MINDY Griffith   folic acid (FOLVITE) 1 mg tablet Take 1 mg by mouth daily. Historical Provider   potassium chloride (K-DUR, KLOR-CON) 20 mEq tablet Take 20 mEq by mouth two (2) times a day. Historical Provider   traZODone (DESYREL) 50 mg tablet Take  by mouth nightly. Historical Provider   megestrol (MEGACE) 400 mg/10 mL (40 mg/mL) suspension Take 200 mg by mouth daily. Historical Provider   sucralfate (CARAFATE) 100 mg/mL suspension Take 10 mL by mouth Before breakfast, lunch, dinner and at bedtime. Indications: PREVENTION OF STRESS ULCER 9/23/17   Gen Ortiz,    nitroglycerin (NITROLINGUAL) 400 mcg/spray spray 1 Spray by SubLINGual route every five (5) minutes as needed for Chest Pain. Historical Provider   linaclotide Dorthea Arnt) 145 mcg cap capsule Take  by mouth Daily (before breakfast). Historical Provider   PNV NO.122/IRON/FOLIC ACID (PRENATAL MULTI PO) Take  by mouth. Historical Provider   ondansetron (ZOFRAN ODT) 4 mg disintegrating tablet Take 4 mg by mouth three (3) times daily as needed. Historical Provider   sevelamer carbonate (RENVELA) 800 mg tab tab Take 800 mg by mouth three (3) times daily (with meals). Historical Provider   gentamicin (GARAMYCIN) 0.1 % topical cream APPLY TO PD catheter exit site at daily dressing change 5/12/15   Isabella Law MD   darbepoetin toya in polysorbat (ARANESP, POLYSORBATE,) 40 mcg/mL injection 40 mcg by SubCUTAneous route every fourteen (14) days. Indications: ANEMIA IN CHRONIC KIDNEY DISEASE    Historical Provider   ranolazine ER (RANEXA) 500 mg SR tablet Take 500 mg by mouth two (2) times a day.     Historical Provider     Allergies   Allergen Reactions    Codeine Nausea and Vomiting      Social History   Substance Use Topics    Smoking status: Never Smoker    Smokeless tobacco: Never Used    Alcohol use No Family History   Problem Relation Age of Onset    Heart Disease Mother     Hypertension Mother     Cancer Mother      Lung    Stroke Father     Hypertension Father     Breast Cancer Neg Hx           Review of Systems    Constitutional: no fever, weak  Eyes: fair vision,    Ears, nose, mouth, throat, and face:fair hearing,   Respiratory: no asthma,  Chronic home O2 is being used - improved dyspnea  Cardiovascular:no palpitation, no  chest pain,   Gastrointestinal:no diarrhea,  Had BM today  Genitourinary: no dysuria,   Hematologic/lymphatic: no bleeding tendency,   Neurological: no seizures   Behvioral/Psych: no psych hospitalization   Endocrine: no goiter,       Objective:       Visit Vitals    /79 (BP 1 Location: Right arm)    Pulse 77    Temp 98 °F (36.7 °C)    Resp 18    Wt 93.5 kg (206 lb 3.2 oz)    SpO2 98%    BMI 30.01 kg/m2       General:  Alert, cooperative, no distress, appears stated age. Head:  Normocephalic, without obvious abnormality, atraumatic. Eyes:  Conjunctivae/corneas clear. EOMs intact. Throat: Lips, mucosa, and tongue normal. Teeth and gums normal.   Neck: Supple, symmetrical, trachea midline, no adenopathy,  no JVD. Lungs:   Clear to auscultation bilaterally. Heart:  Regular rate and rhythm, S1, S2 normal, 2/6 systolic  murmur, no rub or gallop. Abdomen:   Soft, non-tender. No masses,  No organomegaly. PD catheter in place without exudate   Extremities: Extremities normal, atraumatic, no cyanosis. 1-2+edema. Skin: Skin color, texture, turgor normal. No rashes or lesions. Neurologic: Grossly intact. No asterixis. Results for Symone Gonzales (MRN [id number]) as of 12/22/2017 17:02   Ref.  Range 12/21/2017 06:12   WBC Latest Ref Range: 4.3 - 11.1 K/uL 7.0   RBC Latest Ref Range: 4.05 - 5.25 M/uL 2.85 (L)   HGB Latest Ref Range: 11.7 - 15.4 g/dL 8.4 (L)   HCT Latest Ref Range: 35.8 - 46.3 % 27.8 (L)   MCV Latest Ref Range: 79.6 - 97.8 FL 97.5   MCH Latest Ref Range: 26.1 - 32.9 PG 29.5   MCHC Latest Ref Range: 31.4 - 35.0 g/dL 30.2 (L)   RDW Latest Ref Range: 11.9 - 14.6 % 18.8 (H)   PLATELET Latest Ref Range: 150 - 450 K/uL 298   MPV Latest Ref Range: 10.8 - 14.1 FL 9.7 (L)   Sodium Latest Ref Range: 136 - 145 mmol/L 135 (L)   Potassium Latest Ref Range: 3.5 - 5.1 mmol/L 4.6   Chloride Latest Ref Range: 98 - 107 mmol/L 97 (L)   CO2 Latest Ref Range: 21 - 32 mmol/L 28   Anion gap Latest Ref Range: 7 - 16 mmol/L 10   Glucose Latest Ref Range: 65 - 100 mg/dL 84   BUN Latest Ref Range: 8 - 23 MG/DL 32 (H)   Creatinine Latest Ref Range: 0.6 - 1.0 MG/DL 7.45 (H)   Calcium Latest Ref Range: 8.3 - 10.4 MG/DL 9.7   GFR est non-AA Latest Ref Range: >60 ml/min/1.73m2 6 (L)   GFR est AA Latest Ref Range: >60 ml/min/1.73m2 7 (L)       Data Review:     Active Problems:    Hypothyroidism (4/28/2015)      S/P PTCA (percutaneous transluminal coronary angioplasty) (4/13/2017)      ESRD (end stage renal disease) ([facility] [address] (11/18/2017)      Weakness of both legs (11/29/2017)      Renal failure (ARF), acute on chronic (HCC) (11/29/2017)        Assessment:     1. CAD x NSTEMI, Unstable angina -  - [facility] with complex CAD and acute thrombotic lesion in pLAD and subtotal occlusion in mLAD. The RCA has severe proximal and mid RCA disease. She has additional stenting of LAD with Resolute in mid/distal and proximal vessel. These all touched the previously stented mid vessel. Recommend life-long DAPT    2, ESRD -  - S/P a course of temporary HD via perm cath due to PD catheter dysfunction  - tolerated gentle 0.5 l fluid removal well yesterday on HD   -  Switch to PD appears successful now    3. Fluid excess -  - resolved with fluid removal on HD    4. Anemia -  - intensive anemia therapy in [employer]  - on AWYNE and IV iron and B12  - improved S/P BT  - she is below goal, but not low enough for transfusion    5.  History of GI bleed -  - monitor clinically and serial Hb  - she had a drop in Hb to 7 range and improved with BT    6. Hypokalemia   - resolved     7. Abdominal pain and tenderness with drop in Hb , on DAPT   No evidence of retroperitoneal hematoma     8. PD catheter dysfunction -  - S/P PD catheter revision / replacement   - PD catheter function improved after heparin IP    9. Possible new onset seizure -  - suspect hypotensive/arrhythmia episode induced by fluid removal on dialysis in combination with anesthesia    10. PD Catheter dysfunction  - added heparin x one dose 93437 units IP  - continue Dulcolax suppository to Miralax    11.  Constipation -  - again treat with Miralxz/Dulcolax/aggtressive ambulation      Plan:     As above - 25

## 2017-12-24 NOTE — PROGRESS NOTES
MD Claire,   Medical Director  [address] [facility], [address] [facility]  Tel: [phone]       D PROGRESS NOTE    Brand Distance  Admit Date: 11/29/2017  Admit Diagnosis: DEBILITY  Renal failure (ARF), acute on chronic (HCC)  Weakness of both legs  Chief Complaint : Gait dysfunction secondary to below. Admit Diagnosis: Unstable angina ([facility] [address]  CAD (coronary artery disease) (11/27/2015)  S/P complex PCI and stable on ASA and Brilinta  Unstable angina ([facility] [address] (2/1/2016)  Acute/ Chronic anemia (11/18/2017)  ESRD (end stage renal disease) On PD/ CRRT  Pain  DVT risk  Acute Rehab Dx:  Debility    Weakness  Gait impairment  deconditioning  Mobility and ambulation deficits  Self Care/ADL deficits     Subjective     Patient seen and examined. Vss, afebrile. No new acute events, seizures or hypotensive episodes. Therapy resumed and well tolerated. HD tolerated well. Discussed with son in [place] her condition, and   Optimistic prognosis. PD catheter functioned overnight. Large BM yesterday. C/o constipation. Slept better overnight. No pain complaints. Denies lightheadedness, palpations, SOB or nausea. Participating in therapy.     Objective:     Current Facility-Administered Medications   Medication Dose Route Frequency    gentamicin (GARAMYCIN) 0.1 % cream   Topical DIALYSIS PRN    polyethylene glycol (MIRALAX) packet 17 g  17 g Oral DAILY    bisacodyl (DULCOLAX) suppository 10 mg  10 mg Rectal DAILY PRN    heparin (porcine) 1,000 unit/mL injection 5,000 Units  5,000 Units Hemodialysis DIALYSIS PRN    zolpidem (AMBIEN) tablet 5 mg  5 mg Oral QHS PRN    acetaminophen (TYLENOL) tablet 650 mg  650 mg Oral Q4H PRN    amLODIPine (NORVASC) tablet 5 mg  5 mg Oral DAILY    aspirin delayed-release tablet 81 mg  81 mg Oral DAILY    calcitRIOL (ROCALTROL) capsule 0.25 mcg  0.25 mcg Oral DAILY    cyanocobalamin (VITAMIN B12) injection 1,000 mcg  1,000 mcg IntraMUSCular Q7D  epoetin toya (EPOGEN;PROCRIT) injection 20,000 Units  20,000 Units SubCUTAneous Q TUE, THU & SAT    HYDROcodone-acetaminophen (NORCO) 5-325 mg per tablet 1 Tab  1 Tab Oral Q4H PRN    hydrogen peroxide 3 %   Topical PRN    levothyroxine (SYNTHROID) tablet 150 mcg  150 mcg Oral ACB    linaclotide (LINZESS) capsule 145 mcg (Patient Supplied)  145 mcg Oral DAILY    LORazepam (ATIVAN) tablet 1 mg  1 mg Oral Q6H PRN    metoprolol tartrate (LOPRESSOR) tablet 12.5 mg  12.5 mg Oral DAILY    multivitamin w ZN (STRESSTABS W ZINC) tablet  1 Tab Oral DAILY    ondansetron (ZOFRAN ODT) tablet 4 mg  4 mg Oral TID PRN    pantoprazole (PROTONIX) tablet 40 mg  40 mg Oral ACB&D    polyethylene glycol (MIRALAX) packet 17 g  17 g Oral DAILY PRN    ranolazine ER (RANEXA) tablet 1,000 mg  1,000 mg Oral BID    rosuvastatin (CRESTOR) tablet 20 mg  20 mg Oral QHS    senna-docusate (PERICOLACE) 8.6-50 mg per tablet 1 Tab  1 Tab Oral DAILY    sevelamer (RENAGEL) tablet 800 mg  800 mg Oral TID WITH MEALS    sodium chloride (NS) flush 5-10 mL  5-10 mL IntraVENous PRN    sucralfate (CARAFATE) 100 mg/mL oral suspension 1 g  1 g Oral AC&HS    ticagrelor (BRILINTA) tablet 90 mg  90 mg Oral BID    torsemide (DEMADEX) tablet 100 mg  100 mg Oral BID     Review of Systems:   Denies chest pain, shortness of breath, cough, headache, visual problems, abdominal pain, dysurea, calf pain. Pertinent positives are as noted in the medical records and unremarkable otherwise. Visit Vitals    /79 (BP 1 Location: Right arm)    Pulse 77    Temp 98 °F (36.7 °C)    Resp 18    Wt 93.5 kg (206 lb 3.2 oz)    SpO2 98%    BMI 30.01 kg/m2   Physical Exam:   General: Alert and oriented x 3.speech normal.   No acute cardio respiratory distress. HEENT: Normocephalic,no scleral icterus  Oral mucosa moist without cyanosis   Lungs: Clear to auscultation  bilaterally.   Respiration even and unlabored   Heart: Regular rate and rhythm, S1, S2 No  murmurs, clicks, rub or gallops   Abdomen: Soft, non-tender, nondistended. Bowel sounds present. No organomegaly. LLE cath covered. Genitourinary: defered   Neuromuscular:      NANCI, EOMI  + mild generalized weakness 4+ to 5-/5. BUE, BLE, more proximal.   Moves all extremities well. Sensation grossly intact to soft touch. Skin/extremity: No rashes, no erythema. 1+edema.   Wound covered.   Susan Aguayo                                                                         Functional Assessment:  Gross Assessment  AROM: Generally decreased, functional (12/15/17 1200)  Strength: Generally decreased, functional (12/15/17 1200)  Coordination: Generally decreased, functional (12/15/17 1200)       Balance  Sitting - Static: Good (unsupported) (12/23/17 1400)  Sitting - Dynamic: Good (unsupported) (12/23/17 1400)  Standing - Static: Good (12/23/17 1400)  Standing - Dynamic : Impaired (12/23/17 1400)           Toileting  Adaptive Equipment: Elevated seat;Grab bars (12/18/17 1010)         Harrison Fall Risk Assessment:  Izzy Zaragoza Fall Risk  Mobility: Ambulates or transfers with assist devices or assistance/unsteady gait (12/23/17 2137)  Mobility Interventions: Patient to call before getting OOB;Utilize walker, cane, or other assitive device (12/23/17 2137)  Mentation: Alert, oriented x 3 (12/23/17 2137)  Mentation Interventions: Door open when patient unattended (12/23/17 2137)  Medication: Patient receiving anticonvulsants, sedatives(tranquilizers), psychotropics or hypnotics, hypoglycemics, narcotics, sleep aids, antihypertensives, laxatives, or diuretics (12/23/17 2137)  Medication Interventions: Patient to call before getting OOB (12/23/17 2137)  Elimination: Needs assistance with toileting (12/23/17 2137)  Elimination Interventions: Patient to call for help with toileting needs (12/23/17 2137)  Prior Fall History: No (12/23/17 2137)  History of Falls Interventions: Bed/chair exit alarm (12/23/17 1001)  Total Score: 3 (12/23/17 2137)  Standard Fall Precautions: Yes (12/21/17 0705)  High Fall Risk: Yes (12/23/17 2137)     Speech Assessment:         Ambulation:  Gait  Base of Support: Widened (12/15/17 1200)  Speed/Michelle: Slow;Shuffled (12/15/17 1200)  Step Length: Right shortened;Left shortened (12/15/17 1200)  Stance: Right increased; Left increased (12/15/17 1200)  Gait Abnormalities: Trunk sway increased; Step to gait; Decreased step clearance (12/15/17 1200)  Distance (ft): 10 Feet (ft) (x2) (12/23/17 1400)  Assistive Device: Walker, rolling (12/23/17 1400)  Curbs/Ramps: Minimum assistance (12/07/17 1400)     Labs/Studies:  No results found for this or any previous visit (from the past 72 hour(s)).     Assessment:     Problem List as of 12/24/2017  Date Reviewed: 3/2/2017          Codes Class Noted - Resolved    Weakness of both legs ICD-10-CM: R29.898  ICD-9-CM: 729.89  11/29/2017 - Present        Renal failure (ARF), acute on chronic ([facility] ICD-10-CM: N17.9, N18.9  ICD-9-CM: 584.9, 585.9  11/29/2017 - Present        Elevated troponin ICD-10-CM: R74.8  ICD-9-CM: 790.6  11/18/2017 - Present        Chest pain ICD-10-CM: R07.9  ICD-9-CM: 786.50  11/18/2017 - Present        CKD (chronic kidney disease) ICD-10-CM: N18.9  ICD-9-CM: 585.9  11/18/2017 - Present        Chronic anemia ICD-10-CM: D64.9  ICD-9-CM: 285.9  11/18/2017 - Present        ESRD (end stage renal disease) ([facility] ICD-10-CM: N18.6  ICD-9-CM: 585.6  11/18/2017 - Present        Abdominal pain ICD-10-CM: R10.9  ICD-9-CM: 789.00  9/18/2017 - Present        H/O TIA (transient ischemic attack) and stroke ICD-10-CM: Z86.73  ICD-9-CM: V12.54  4/13/2017 - Present        S/P PTCA (percutaneous transluminal coronary angioplasty) ICD-10-CM: Z98.61  ICD-9-CM: V45.82  4/13/2017 - Present        Mixed hyperlipidemia ICD-10-CM: E78.2  ICD-9-CM: 272.2  4/13/2017 - Present        Vision changes ICD-10-CM: H53.9  ICD-9-CM: 368.9  3/26/2017 - Present        Dizziness ICD-10-CM: R42  ICD-9-CM: 780.4  3/26/2017 - Present        Refusal of blood transfusions as patient is Christian (Chronic) ICD-10-CM: Z53.1  ICD-9-CM: V62.6  8/25/2016 - Present        GERD (gastroesophageal reflux disease) ICD-10-CM: K21.9  ICD-9-CM: 530.81  8/8/2016 - Present        Unspecified sleep apnea ICD-10-CM: G47.30  ICD-9-CM: 780.57  8/8/2016 - Present    Overview Signed 8/8/2016 11:09 AM by Joey cOhoa     uses cpap machine             CVA (cerebral vascular accident) Hx of TIA ICD-10-CM: I63.9  ICD-9-CM: 434.91  2/22/2016 - Present        Unstable angina ([address] ICD-10-CM: I20.0  ICD-9-CM: 411.1  2/1/2016 - Present        ESRD on peritoneal dialysis [facility]) (Chronic) ICD-10-CM: N18.6, Z99.2  ICD-9-CM: 585.6, V45.11  2/1/2016 - Present        Ischemic cardiomyopathy ICD-10-CM: I25.5  ICD-9-CM: 414.8  12/29/2015 - Present        HLD (hyperlipidemia) ICD-10-CM: E78.5  ICD-9-CM: 272.4  12/29/2015 - Present        Depression ICD-10-CM: F32.9  ICD-9-CM: 039  12/29/2015 - Present        CAD (coronary artery disease) ICD-10-CM: I25.10  ICD-9-CM: 414.00  11/27/2015 - Present        Debility ICD-10-CM: R53.81  ICD-9-CM: 799.3  5/5/2015 - Present        Hypertension (Chronic) ICD-10-CM: I10  ICD-9-CM: 401.9  4/28/2015 - Present        Anemia of chronic renal failure (Chronic) ICD-10-CM: N18.9, D63.1  ICD-9-CM: 285.21, 585.9  4/28/2015 - Present        Hypothyroidism (Chronic) ICD-10-CM: E03.9  ICD-9-CM: 244.9  4/28/2015 - Present        RESOLVED: Postural hypotension ICD-10-CM: I95.1  ICD-9-CM: 458.0  8/9/2016 - 3/26/2017        RESOLVED: Chest pain ICD-10-CM: R07.9  ICD-9-CM: 786.50  8/8/2016 - 3/26/2017        RESOLVED: Nausea ICD-10-CM: R11.0  ICD-9-CM: 787.02  8/8/2016 - 3/26/2017        RESOLVED: S/P PTCA (percutaneous transluminal coronary angioplasty); LAD PTCA of  ISR 11/27/15 ICD-10-CM: Z98.61  ICD-9-CM: V45.82  5/24/2016 - 3/26/2017        RESOLVED: TIA (transient ischemic attack) ICD-10-CM: G45.9  ICD-9-CM: 435.9  2/10/2016 - 3/26/2017        RESOLVED: Edema ICD-10-CM: R60.9  ICD-9-CM: 782.3  12/29/2015 - 3/26/2017        RESOLVED: Anterior myocardial infarction [facility]) ICD-10-CM: I21.09  ICD-9-CM: 410.10  5/21/2015 - 3/26/2017        RESOLVED: STEMI (ST elevation myocardial infarction) ([facility] [address] ICD-10-CM: I21.3  ICD-9-CM: 410.90  4/28/2015 - 2/1/2016              Plan:      CAD (coronary artery disease) (11/27/2015)/ S/P complex PCI / Unstable angina/ hypertension  - on ASA and Brilinta  - continue ranexa, statin  - 12/21 - stable exam. No acute syncopal events. Continue cardiac precautions.   - HR wnml, SBP- 147 , on metoprolol, norvasc     Acute/ Chronic anemia (11/18/2017) - continue to monitor.   - continue epogen. - 12/15-> hgb 8.9 (12/14) -> 8.4(12/21)     ESRD (end stage renal disease) On PD/ CRRT  - PD resumed. - monitor fluids status. Nephrology managing. Will follow at IRU.   - 11/30 problem with PD/ catheter. S/p Lt femoral perm catheter,  working well. Has been using for HD. Has had revision of PD catheter 12/18. Had HD today. - 12/21 -  Expect to change to PD when appropriate per nephrology. Plan to switch to PD soon per nephrology  - 12/24 - PD functioning overnight, schedule for PD today    Pneumonia prophylaxis- continue nsentive spirometer every hour while awake.      DVT risk / DVT Prophylaxis- continue daily physician exam to assess for signs and symptoms as patient is at increased risk for of thromboembolism. Mobilization as tolerated. Intermittent pneumatic compression devices when in bed Thigh-high or knee-high thromboembolic deterrent hose when out of bed.   - on brillanta bId+ aspirin daily. continue SCDs. Pain Control: stable, mild-to-moderate joint symptoms intermittently, reasonably well controlled by PRN meds. Will require regular pain assessment and comprenhensive pain management.   -  since her PD revision procedure, she has needed prn norco. No norco needed yesterday. Will continue as needed.      Wound Care: will continue to monitor wound status daily per staff and physician. Keep wound clean and dry - monitoring femoral cath site, appears benign. 12/21 - PD cath site benign. No drainage. bowel program - as needed. + BM. Add prn senokot.     GERD/  GI- resume PPI. At times may need additional antacids, Maalox prn.  - continue sucralfate. Hold reglan. Hypothyroid  - continue current dose, synthroid. Asymptomatic. AMS, improved. Acute episode today. Appears she may have had a seizure. - mental status close to baseline. No confusion. continue PT, OT and ST. Discussed expected rehab goals and date of d/c. She is understanding and agreeable.      Time spent was 15 minutes with over 1/2 in direct patient care/examination, consultation and coordination of care.      Signed By: Megan Flower MD     December 24, 2017

## 2017-12-24 NOTE — PROGRESS NOTES
12/24/17 1213   Time Spent With Patient   Time In 0900   Time Out 1010   Patient Seen For: AM;ADLs   Feeding/Eating   Feeding/Eating Assistance S   Comments (s/u assistance)   Grooming   Grooming Assistance  Mod I   Comments (wheelchair level sink side)   Upper Body Bathing   Bathing Assistance, Upper S   Position Performed Seated in chair   Adaptive Equipment (wheelchair at sink)   Lower Body Bathing   Bathing Assistance, [address] Wipes(personal cleansing cloth); Long handled sponge   Position Performed Seated in chair;Standing   Adaptive Equipment Long handled sponge   Upper Body Dressing    Dressing Assistance  S   Comments (set up assistance for pull over long sleeve sweatshirt)   Lower Body Dressing    Dressing Assistance  S   Leg Crossed Method Used No   Position Performed Seated in chair;Standing   Adaptive Equipment Used Reacher;Sock aid; Walker   Don/Doff Anti-Embolic Stockings Dep   Comments (set up, increased time to thread pants, manage equipment)   S:  \"Thank you. \"  O:  Patient performed ADLs as noted above sink side, mostly sitting in wheelchair. Patient only standing to bathe sidney/buttocks and for managing underpants/pants. Patient stood from wheelchair with SBA using RW. Patient ambulated to chair with SBA using RW and sat with S.  Patient given call bell and tray. A:  Patient continues to steadily progress towards goals.   P:  Cont OT per tx plan  Sushil Tan OT

## 2017-12-24 NOTE — PROGRESS NOTES
Peritoneal dialysis initiated as ordered. Peritoneal catheter exit site cleaned with Chlorhexidine scrub and Genatamicin cream applied. Dressing changed, site has no s/s of redness, swelling, or exudate. Patient states no complaints at this time. Report given to primary RN.

## 2017-12-24 NOTE — PROGRESS NOTES
End Of Shift Functional Summary, Nursing      TOILETING:  Does patient need assist with clothing management and/or pericare? No    TOILET TRANSFER:  Pt requires minimal assistance. Pt uses walker. BLADDER:  Pt does not have a castillo catheter that staff manages. Pt does not take medication. Pt is continent. of bladder and voids in toilet  Pt requires staff to empty device Pt has had 0 bladder accidents during this shift requiring minimal assistance to clean up. (An accident is when the episode is not contained in a brief AND/OR the clothing/linen requires changing/cleaning up.)    BOWEL:  Pt does take medication. Pt is continent of bowel and uses toilet. Pt requires staff to empty device    Pt has had 0 bowel accidents during this shift requiring minimal assistance from staff to clean up. (An accident is when the episode is not contained in a brief AND/OR the clothing/linen requires changing/cleaning up.)    BED/CHAIR TRANSFER  Pt requires minimal assistance. Patient requires the assistance of 1 staff member(s). Pt uses walker    EATING  Pt requires no assistance. Documentation reviewed and plan of care discussed/reviewed with   oncoming nurse and patient assistant during the shift.

## 2017-12-24 NOTE — PROGRESS NOTES
PHYSICAL THERAPY DAILY NOTE  Time In: 2205  Time Out: 1110  Patient Seen For: AM;Balance activities;Gait training; Therapeutic exercise    Subjective: \"My son brought me some coffee and a treat this morning\"         Objective:  Vital Signs:    Patient Vitals for the past 12 hrs:   Temp Pulse Resp BP SpO2   12/24/17 0657 98 °F (36.7 °C) 77 18 147/79 98 %       Pain level:  Patient had no complaints of pain but appeared tired after her morning ADL with OT    Patient education: Safety with gait in her home environment without use of an assistive device. Other (comment) (Fall Risk)  GROSS ASSESSMENT Daily Assessment    AROM: Generally decreased, functional  Strength: Generally decreased, functional  Coordination: Generally decreased, functional  Sensation: Intact       TRANSFERS Daily Assessment   Steady and controlled with SPT Transfer Type: SPT with walker  Transfer Assistance : 5 (Stand-by assistance)  Sit to Stand Assistance: Supervision       GAIT Daily Assessment   Slow and steady with patient working on increasing her gait distance today. Amount of Assistance: 5 (Supervision/setup)  Distance (ft): 55 Feet (ft) (25' on first walk)  Assistive Device: Walker, rolling       BALANCE Daily Assessment    Sitting - Static: Good (unsupported)  Sitting - Dynamic: Good (unsupported)  Standing - Static: Good  Standing - Dynamic : Impaired         LOWER EXTREMITY EXERCISES Daily Assessment   Patient practiced forward/backward and side stepping in the parallel bars with either light hold or no hold for balance training and coordination. Extremity: Both  Exercise Type #1: Seated lower extremity strengthening (Motomed x 10 level 3)  Level of Assist: Supervision  Exercise Type #2: Standing lower extremity strengthening (Parallel bars for gait without holding/ balance)  Sets Performed: 1  Reps Performed: 5  Level of Assist: Stand-by assistance          Assessment: Patient participated and tolerated therapy well today.   She was tired from her OT ADLs but agreed to work with therapy anyway. She increased her gait distance today despite being tired. Patient ambulated with the r/walker and supervision back to her room from the parallel bar area of the gym  She was assisted into her recliner and positioned for comfort. The call light was left within reach along with her personal items. Plan of Care: Continue to progress as tolerated and indicated.      Venancio Dunlap [place]  12/24/2017

## 2017-12-24 NOTE — PROGRESS NOTES
Problem: Falls - Risk of  Goal: *Absence of Falls  Document Harrison Fall Risk and appropriate interventions in the flowsheet.    Outcome: Progressing Towards Goal  Fall Risk Interventions:  Mobility Interventions: Patient to call before getting OOB, Utilize walker, cane, or other assitive device    Mentation Interventions: Door open when patient unattended    Medication Interventions: Patient to call before getting OOB    Elimination Interventions: Patient to call for help with toileting needs    History of Falls Interventions: Bed/chair exit alarm

## 2017-12-24 NOTE — PROGRESS NOTES
End Of Shift Functional Summary, Nursing          TOILETING:  Does patient need assist with clothing management and/or pericare? No      TOILET TRANSFER:  Pt requires minimal assistance.  Pt uses walker.      BLADDER:  Pt does not have a castillo catheter that staff manages.  Pt does not take medication.  Pt is continent of bladder and voids in toilet.  Pt has had 0 bladder accidents during this shift.  (An accident is when the episode is not contained in a brief AND/OR the clothing/linen requires changing/cleaning up.)      BOWEL:  Pt does take medication.  Pt is continent of bowel and uses toilet.    Pt has had 0 bowel accidents during this shift. (An accident is when the episode is not contained in a brief AND/OR the clothing/linen requires changing/cleaning up.)      BED/CHAIR TRANSFER  Pt requires standby assistance/setup. Patient requires the assistance of 1 staff member(s).  Pt uses walker.      EATING  Pt requires no assistance.  Pt wears dentures.  TUBE FEEDINGS:  Pt does not  receive nutrition through tube feedings.  Patient requires no assistance with feedings.      Documentation reviewed and plan of care discussed/reviewed with   oncoming nurse and patient during the shift.

## 2017-12-25 LAB
ANION GAP SERPL CALC-SCNC: 11 MMOL/L (ref 7–16)
BUN SERPL-MCNC: 22 MG/DL (ref 8–23)
CALCIUM SERPL-MCNC: 7.3 MG/DL (ref 8.3–10.4)
CHLORIDE SERPL-SCNC: 99 MMOL/L (ref 98–107)
CO2 SERPL-SCNC: 26 MMOL/L (ref 21–32)
CREAT SERPL-MCNC: 6.65 MG/DL (ref 0.6–1)
ERYTHROCYTE [DISTWIDTH] IN BLOOD BY AUTOMATED COUNT: 18.7 % (ref 11.9–14.6)
GLUCOSE SERPL-MCNC: 83 MG/DL (ref 65–100)
HCT VFR BLD AUTO: 29.4 % (ref 35.8–46.3)
HGB BLD-MCNC: 8.8 G/DL (ref 11.7–15.4)
MCH RBC QN AUTO: 29 PG (ref 26.1–32.9)
MCHC RBC AUTO-ENTMCNC: 29.9 G/DL (ref 31.4–35)
MCV RBC AUTO: 97 FL (ref 79.6–97.8)
PLATELET # BLD AUTO: 296 K/UL (ref 150–450)
PMV BLD AUTO: 9.6 FL (ref 10.8–14.1)
POTASSIUM SERPL-SCNC: 5.7 MMOL/L (ref 3.5–5.1)
RBC # BLD AUTO: 3.03 M/UL (ref 4.05–5.25)
SODIUM SERPL-SCNC: 136 MMOL/L (ref 136–145)
WBC # BLD AUTO: 5.6 K/UL (ref 4.3–11.1)

## 2017-12-25 PROCEDURE — 65310000000 HC RM PRIVATE REHAB

## 2017-12-25 PROCEDURE — 85027 COMPLETE CBC AUTOMATED: CPT | Performed by: PHYSICAL MEDICINE & REHABILITATION

## 2017-12-25 PROCEDURE — 36415 COLL VENOUS BLD VENIPUNCTURE: CPT | Performed by: PHYSICAL MEDICINE & REHABILITATION

## 2017-12-25 PROCEDURE — 74011250637 HC RX REV CODE- 250/637: Performed by: PHYSICAL MEDICINE & REHABILITATION

## 2017-12-25 PROCEDURE — 99232 SBSQ HOSP IP/OBS MODERATE 35: CPT | Performed by: PHYSICAL MEDICINE & REHABILITATION

## 2017-12-25 PROCEDURE — 80048 BASIC METABOLIC PNL TOTAL CA: CPT | Performed by: PHYSICAL MEDICINE & REHABILITATION

## 2017-12-25 PROCEDURE — 74011250637 HC RX REV CODE- 250/637: Performed by: INTERNAL MEDICINE

## 2017-12-25 PROCEDURE — 90945 DIALYSIS ONE EVALUATION: CPT

## 2017-12-25 RX ORDER — SODIUM POLYSTYRENE SULFONATE 15 G/60ML
7.5 SUSPENSION ORAL; RECTAL
Status: DISCONTINUED | OUTPATIENT
Start: 2017-12-25 | End: 2017-12-25

## 2017-12-25 RX ORDER — SORBITOL SOLUTION 70 %
60 SOLUTION, ORAL MISCELLANEOUS
Status: DISCONTINUED | OUTPATIENT
Start: 2017-12-25 | End: 2017-12-28 | Stop reason: HOSPADM

## 2017-12-25 RX ADMIN — PANTOPRAZOLE SODIUM 40 MG: 40 TABLET, DELAYED RELEASE ORAL at 05:39

## 2017-12-25 RX ADMIN — LEVOTHYROXINE SODIUM 150 MCG: 150 TABLET ORAL at 05:39

## 2017-12-25 RX ADMIN — RENAGEL 800 MG: 400 TABLET ORAL at 08:00

## 2017-12-25 RX ADMIN — RANOLAZINE 1000 MG: 500 TABLET, FILM COATED, EXTENDED RELEASE ORAL at 21:31

## 2017-12-25 RX ADMIN — ASPIRIN 81 MG: 81 TABLET, COATED ORAL at 08:31

## 2017-12-25 RX ADMIN — TORSEMIDE 100 MG: 100 TABLET ORAL at 08:30

## 2017-12-25 RX ADMIN — SORBITOL SOLUTION (BULK) 60 ML: 70 SOLUTION at 18:00

## 2017-12-25 RX ADMIN — TICAGRELOR 90 MG: 90 TABLET ORAL at 08:30

## 2017-12-25 RX ADMIN — RANOLAZINE 1000 MG: 500 TABLET, FILM COATED, EXTENDED RELEASE ORAL at 08:26

## 2017-12-25 RX ADMIN — CALCITRIOL 0.25 MCG: 0.25 CAPSULE, LIQUID FILLED ORAL at 08:30

## 2017-12-25 RX ADMIN — LORAZEPAM 1 MG: 1 TABLET ORAL at 12:14

## 2017-12-25 RX ADMIN — STANDARDIZED SENNA CONCENTRATE AND DOCUSATE SODIUM 2 TABLET: 8.6; 5 TABLET, FILM COATED ORAL at 08:26

## 2017-12-25 RX ADMIN — RENAGEL 800 MG: 400 TABLET ORAL at 16:12

## 2017-12-25 RX ADMIN — PANTOPRAZOLE SODIUM 40 MG: 40 TABLET, DELAYED RELEASE ORAL at 16:12

## 2017-12-25 RX ADMIN — TICAGRELOR 90 MG: 90 TABLET ORAL at 21:30

## 2017-12-25 RX ADMIN — SUCRALFATE 1 G: 1 SUSPENSION ORAL at 08:25

## 2017-12-25 RX ADMIN — ONDANSETRON 4 MG: 4 TABLET, ORALLY DISINTEGRATING ORAL at 11:26

## 2017-12-25 RX ADMIN — METOPROLOL TARTRATE 12.5 MG: 25 TABLET ORAL at 08:27

## 2017-12-25 RX ADMIN — AMLODIPINE BESYLATE 5 MG: 5 TABLET ORAL at 08:30

## 2017-12-25 RX ADMIN — POLYETHYLENE GLYCOL (3350) 17 G: 17 POWDER, FOR SOLUTION ORAL at 08:25

## 2017-12-25 RX ADMIN — ROSUVASTATIN CALCIUM 20 MG: 20 TABLET, FILM COATED ORAL at 21:29

## 2017-12-25 RX ADMIN — TORSEMIDE 100 MG: 100 TABLET ORAL at 17:35

## 2017-12-25 NOTE — PROGRESS NOTES
End Of Shift Functional Summary, Nursing      TOILETING:  Does patient need assist with clothing management and/or pericare? No    TOILET TRANSFER:  Pt requires minimal assistance. Pt uses walker. BLADDER:  Pt doesnot have a castillo catheter. Pt does take medication. Pt is continent. of bladder and voids in toilet   BOWEL:  Pt does take medication. Pt is continent of bowel and uses toilet. BED/CHAIR TRANSFER  Pt requires minimal assistance. Patient requires the assistance of 1 staff member(s). Pt uses walker      EATING  Pt requires no assistance. Pt wears dentures. TUBE FEEDINGS:   Documentation reviewed and plan of care discussed/reviewed with   patient, oncoming nurse and patient assistant during the shift.

## 2017-12-25 NOTE — PROGRESS NOTES
Problem: Falls - Risk of  Goal: *Absence of Falls  Document Harrison Fall Risk and appropriate interventions in the flowsheet.    Outcome: Progressing Towards Goal  Fall Risk Interventions:  Mobility Interventions: Patient to call before getting OOB    Mentation Interventions: Door open when patient unattended    Medication Interventions: Patient to call before getting OOB    Elimination Interventions: Call light in reach    History of Falls Interventions: Bed/chair exit alarm

## 2017-12-25 NOTE — PROGRESS NOTES
End Of Shift Functional Summary, Nursing      TOILETING:  Does patient need assist with clothing management and/or pericare? No    TOILET TRANSFER:  Pt requires minimal assistance. Pt uses walker. BLADDER:  Pt does not have a castillo catheter that staff manages. Pt does not take medication. Pt is continent. of bladder and voids in toilet     BOWEL:  Pt does take medication. Pt is continent of bowel and uses toilet. BED/CHAIR TRANSFER  Pt requires moderate assistance. Patient requires the assistance of 1 staff member(s). Pt uses walker    BATHING    Documentation reviewed and plan of care discussed/reviewed with   patient during the shift.

## 2017-12-25 NOTE — PROGRESS NOTES
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 She has a history of GI bleeding and had anemia-induced unstable angina in the past. 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 She states the pain has since resolved with NTG paste.  No fevers or chills. No nausea, vomiting or diarrhea.  She states that she is essentially anuric and occasionally makes minimal urine.  She has a h/o CVA and TIA. No fever or chills, no problems with PD drainage or discoloration of PD fluid. No increased thirst. She wishes regular diet and supplement. She denies any other acute complaints  Her edema has improved in the past couple of days with intensified dialysis.     s-  Still constipated - PD essence been going well - but she has no appetite - no PD catheter dysfunction after adding heparin intra-peritoneally - no dyspnea, no CP, no N/V - no further episodes of LOC/seizure-like activity  - able to walk longer today with walker - PD with low volumes is tolerated well    Past Medical History:   Diagnosis Date    Anemia of chronic renal failure 4/28/2015    Anterior myocardial infarction [facility]) 5/21/2015    CAD (coronary artery disease)     Chest pain     Chronic kidney disease, stage III (moderate) 8/15/2014    on dialysis    CKD (chronic kidney disease) stage 4, GFR 15-29 ml/min ([address] 4/28/2015    Debility 5/5/2015    Depression 12/29/2015    Edema 12/29/2015    Endocrine disease     Hypothyroidism    GERD (gastroesophageal reflux disease)     Heart murmur 12/29/2015    HLD (hyperlipidemia) 12/29/2015    Hypertension     Hypothyroidism 4/28/2015    Ischemic cardiomyopathy 12/29/2015    Nausea     S/P PTCA (percutaneous transluminal coronary angioplasty); LAD PTCA of  ISR 11/27/15 5/24/2016    STEMI (ST elevation myocardial infarction) ([facility] [address] 4/28/2015    Unspecified sleep apnea     uses cpap machine    Unstable angina ([facility] [address]       Past Surgical History:   Procedure Laterality Date    HX BACK SURGERY  1990    neck surgery cervical disc    HX BACK SURGERY      lower back    HX CATARACT REMOVAL Bilateral     HX CHOLECYSTECTOMY  19702    gall bladder     HX KNEE REPLACEMENT Right 2006    HX PTCA  4/28/2015    2.25 Xience stent to mid LAD for occluded artery, anterior MI, EF 25%. Moderate disease distal LAD and distal OM PCI CX and RCA 2004, then PCI RCA and LAD in 2009. Prior to Admission medications    Medication Sig Start Date End Date Taking? Authorizing Provider   metoprolol tartrate (LOPRESSOR) 25 mg tablet Take 0.5 Tabs by mouth daily. 11/27/17  Yes MINDY Chatman   amLODIPine (NORVASC) 5 mg tablet Take 1 Tab by mouth daily. 11/27/17  Yes MINDY Chatman   torsemide (DEMADEX) 100 mg tablet Take 1 Tab by mouth two (2) times a day. 11/27/17  Yes MINDY Chatman   pantoprazole (PROTONIX) 40 mg tablet Take 1 Tab by mouth Before breakfast and dinner. 11/27/17  Yes MINDY Chatman   magnesium oxide (MAG-OX) 400 mg tablet Take 400 mg by mouth daily. Yes Historical Provider   metoclopramide HCl (REGLAN) 5 mg tablet Take 5 mg by mouth two (2) times a day. Yes Historical Provider   ticagrelor (BRILINTA) 90 mg tablet Take 1 Tab by mouth two (2) times a day. 2/4/16  Yes MINDY Chatman   aspirin delayed-release 81 mg tablet Take 1 Tab by mouth daily. 5/5/15  Yes Gisela Hollingsworth PA-C   rosuvastatin (CRESTOR) 20 mg tablet Take 20 mg by mouth nightly. Yes Historical Provider   levothyroxine (SYNTHROID) 150 mcg tablet Take 150 mcg by mouth Daily (before breakfast).    Yes Historical Provider   cyanocobalamin (VITAMIN B12) 1,000 mcg/mL injection 1 mL by IntraMUSCular route every seven (7) days. Indications: Vitamin B12 Deficiency 12/2/17   MINDY Luevano   folic acid (FOLVITE) 1 mg tablet Take 1 mg by mouth daily. Historical Provider   potassium chloride (K-DUR, KLOR-CON) 20 mEq tablet Take 20 mEq by mouth two (2) times a day. Historical Provider   traZODone (DESYREL) 50 mg tablet Take  by mouth nightly. Historical Provider   megestrol (MEGACE) 400 mg/10 mL (40 mg/mL) suspension Take 200 mg by mouth daily. Historical Provider   sucralfate (CARAFATE) 100 mg/mL suspension Take 10 mL by mouth Before breakfast, lunch, dinner and at bedtime. Indications: PREVENTION OF STRESS ULCER 9/23/17   Gabby Trivedi DO   nitroglycerin (NITROLINGUAL) 400 mcg/spray spray 1 Spray by SubLINGual route every five (5) minutes as needed for Chest Pain. Historical Provider   linaclotide [place] Sin) 145 mcg cap capsule Take  by mouth Daily (before breakfast). Historical Provider   PNV NO.122/IRON/FOLIC ACID (PRENATAL MULTI PO) Take  by mouth. Historical Provider   ondansetron (ZOFRAN ODT) 4 mg disintegrating tablet Take 4 mg by mouth three (3) times daily as needed. Historical Provider   sevelamer carbonate (RENVELA) 800 mg tab tab Take 800 mg by mouth three (3) times daily (with meals). Historical Provider   gentamicin (GARAMYCIN) 0.1 % topical cream APPLY TO PD catheter exit site at daily dressing change 5/12/15   Fatou Galaviz MD   darbepoetin toya in polysorbat (ARANESP, POLYSORBATE,) 40 mcg/mL injection 40 mcg by SubCUTAneous route every fourteen (14) days. Indications: ANEMIA IN CHRONIC KIDNEY DISEASE    Historical Provider   ranolazine ER (RANEXA) 500 mg SR tablet Take 500 mg by mouth two (2) times a day.     Historical Provider     Allergies   Allergen Reactions    Codeine Nausea and Vomiting      Social History   Substance Use Topics    Smoking status: Never Smoker    Smokeless tobacco: Never Used    Alcohol use No      Family History   Problem Relation Age of Onset    Heart Disease Mother     Hypertension Mother     Cancer Mother      Lung    Stroke Father     Hypertension Father     Breast Cancer Neg Hx           Review of Systems    Constitutional: no fever, weak  Eyes: fair vision,    Ears, nose, mouth, throat, and face:fair hearing,   Respiratory: no asthma,  Chronic home O2 is being used - improved dyspnea  Cardiovascular:no palpitation, no  chest pain,   Gastrointestinal:no diarrhea,  Had BM today  Genitourinary: no dysuria,   Hematologic/lymphatic: no bleeding tendency,   Neurological: no seizures   Behvioral/Psych: no psych hospitalization   Endocrine: no goiter,       Objective:       Visit Vitals    /62 (BP 1 Location: Right arm, BP Patient Position: Sitting)    Pulse 70    Temp 97.9 °F (36.6 °C)    Resp 19    Wt 93.5 kg (206 lb 3.2 oz)    SpO2 97%    BMI 30.01 kg/m2       General:  Alert, cooperative, no distress, appears stated age. Head:  Normocephalic, without obvious abnormality, atraumatic. Eyes:  Conjunctivae/corneas clear. EOMs intact. Throat: Lips, mucosa, and tongue normal. Teeth and gums normal.   Neck: Supple, symmetrical, trachea midline, no adenopathy,  no JVD. Lungs:   Clear to auscultation bilaterally. Heart:  Regular rate and rhythm, S1, S2 normal, 2/6 systolic  murmur, no rub or gallop. Abdomen:   Soft, non-tender. No masses,  No organomegaly. PD catheter in place without exudate   Extremities: Extremities normal, atraumatic, no cyanosis. 1-2+edema. Skin: Skin color, texture, turgor normal. No rashes or lesions. Neurologic: Grossly intact. No asterixis. Results for Doris Lazaro (MRN [id number]) as of 12/22/2017 17:02   Ref.  Range 12/21/2017 06:12   WBC Latest Ref Range: 4.3 - 11.1 K/uL 7.0   RBC Latest Ref Range: 4.05 - 5.25 M/uL 2.85 (L)   HGB Latest Ref Range: 11.7 - 15.4 g/dL 8.4 (L)   HCT Latest Ref Range: 35.8 - 46.3 % 27.8 (L)   MCV Latest Ref Range: 79.6 - 97.8 FL 97.5   MCH Latest Ref Range: 26.1 - 32.9 PG 29.5   MCHC Latest Ref Range: 31.4 - 35.0 g/dL 30.2 (L)   RDW Latest Ref Range: 11.9 - 14.6 % 18.8 (H)   PLATELET Latest Ref Range: 150 - 450 K/uL 298   MPV Latest Ref Range: 10.8 - 14.1 FL 9.7 (L)   Sodium Latest Ref Range: 136 - 145 mmol/L 135 (L)   Potassium Latest Ref Range: 3.5 - 5.1 mmol/L 4.6   Chloride Latest Ref Range: 98 - 107 mmol/L 97 (L)   CO2 Latest Ref Range: 21 - 32 mmol/L 28   Anion gap Latest Ref Range: 7 - 16 mmol/L 10   Glucose Latest Ref Range: 65 - 100 mg/dL 84   BUN Latest Ref Range: 8 - 23 MG/DL 32 (H)   Creatinine Latest Ref Range: 0.6 - 1.0 MG/DL 7.45 (H)   Calcium Latest Ref Range: 8.3 - 10.4 MG/DL 9.7   GFR est non-AA Latest Ref Range: >60 ml/min/1.73m2 6 (L)   GFR est AA Latest Ref Range: >60 ml/min/1.73m2 7 (L)       Data Review:     Active Problems:    Hypothyroidism (4/28/2015)      S/P PTCA (percutaneous transluminal coronary angioplasty) (4/13/2017)      ESRD (end stage renal disease) ([facility] [address] (11/18/2017)      Weakness of both legs (11/29/2017)      Renal failure (ARF), acute on chronic (HCC) (11/29/2017)        Assessment:     1. CAD x NSTEMI, Unstable angina -  - [facility] with complex CAD and acute thrombotic lesion in pLAD and subtotal occlusion in mLAD. The RCA has severe proximal and mid RCA disease. She has additional stenting of LAD with Resolute in mid/distal and proximal vessel. These all touched the previously stented mid vessel. Recommend life-long DAPT    2, ESRD -  - S/P a course of temporary HD via perm cath due to PD catheter dysfunction  - tolerated gentle 0.5 l fluid removal well yesterday on HD   -  Switch to PD appears successful now    3. Fluid excess -  - resolved with fluid removal on HD    4. Anemia -  - intensive anemia therapy in [employer]  - on WAYNE and IV iron and B12  - improved S/P BT  - she is below goal, but not low enough for transfusion    5.  History of GI bleed -  - monitor clinically and serial Hb  - she had a drop in Hb to 7 range and improved with BT    6. Hypokalemia   - resolved     7. Abdominal pain and tenderness with drop in Hb , on DAPT   No evidence of retroperitoneal hematoma     8. PD catheter dysfunction -  - S/P PD catheter revision / replacement   - PD catheter function improved after heparin IP    9. Possible new onset seizure -  - suspect hypotensive/arrhythmia episode induced by fluid removal on dialysis in combination with anesthesia    10. PD Catheter dysfunction  -improved after IP heparin x one dose 00581 units  - continue Dulcolax suppository to Miralax    11.  Constipation -  -  treat with Miralaz/Dulcolax/aggressive ambulation   - add Sorbitol as needed, see orders      Plan:     As above - 25

## 2017-12-25 NOTE — PROGRESS NOTES
Tommy Olmstead MD,   Medical Director  [address] [facility], [address] [facility]  Tel: [phone]       [facility] PROGRESS NOTE    Dana Martin  Admit Date: 11/29/2017  Admit Diagnosis: DEBILITY; Renal failure (ARF), acute on chronic ([address] Weakne*    Subjective     Patient seen and examined. Vss. No acute complaints. Slow and Steady gains made. No new barrier to progress noted. Tolerating PD well. A bit of pain at new PD cath sight. No n/v.  No cp. sob    Objective:     Current Facility-Administered Medications   Medication Dose Route Frequency    senna-docusate (PERICOLACE) 8.6-50 mg per tablet 2 Tab  2 Tab Oral BID PRN    senna-docusate (PERICOLACE) 8.6-50 mg per tablet 2 Tab  2 Tab Oral DAILY    gentamicin (GARAMYCIN) 0.1 % cream   Topical DIALYSIS PRN    polyethylene glycol (MIRALAX) packet 17 g  17 g Oral DAILY    bisacodyl (DULCOLAX) suppository 10 mg  10 mg Rectal DAILY PRN    heparin (porcine) 1,000 unit/mL injection 5,000 Units  5,000 Units Hemodialysis DIALYSIS PRN    zolpidem (AMBIEN) tablet 5 mg  5 mg Oral QHS PRN    acetaminophen (TYLENOL) tablet 650 mg  650 mg Oral Q4H PRN    amLODIPine (NORVASC) tablet 5 mg  5 mg Oral DAILY    aspirin delayed-release tablet 81 mg  81 mg Oral DAILY    calcitRIOL (ROCALTROL) capsule 0.25 mcg  0.25 mcg Oral DAILY    cyanocobalamin (VITAMIN B12) injection 1,000 mcg  1,000 mcg IntraMUSCular Q7D    epoetin toya (EPOGEN;PROCRIT) injection 20,000 Units  20,000 Units SubCUTAneous Q TUE, THU & SAT    HYDROcodone-acetaminophen (NORCO) 5-325 mg per tablet 1 Tab  1 Tab Oral Q4H PRN    hydrogen peroxide 3 %   Topical PRN    levothyroxine (SYNTHROID) tablet 150 mcg  150 mcg Oral ACB    linaclotide (LINZESS) capsule 145 mcg (Patient Supplied)  145 mcg Oral DAILY    LORazepam (ATIVAN) tablet 1 mg  1 mg Oral Q6H PRN    metoprolol tartrate (LOPRESSOR) tablet 12.5 mg  12.5 mg Oral DAILY    multivitamin w ZN (STRESSTABS W ZINC) tablet  1 Tab Oral DAILY    ondansetron (ZOFRAN ODT) tablet 4 mg  4 mg Oral TID PRN    pantoprazole (PROTONIX) tablet 40 mg  40 mg Oral ACB&D    polyethylene glycol (MIRALAX) packet 17 g  17 g Oral DAILY PRN    ranolazine ER (RANEXA) tablet 1,000 mg  1,000 mg Oral BID    rosuvastatin (CRESTOR) tablet 20 mg  20 mg Oral QHS    sevelamer (RENAGEL) tablet 800 mg  800 mg Oral TID WITH MEALS    sodium chloride (NS) flush 5-10 mL  5-10 mL IntraVENous PRN    sucralfate (CARAFATE) 100 mg/mL oral suspension 1 g  1 g Oral AC&HS    ticagrelor (BRILINTA) tablet 90 mg  90 mg Oral BID    torsemide (DEMADEX) tablet 100 mg  100 mg Oral BID     Review of Systems:Denies chest pain, shortness of breath, cough, headache, visual problems, abdominal pain, dysurea, calf pain. Pertinent positives are as noted in the medical records and unremarkable otherwise. Visit Vitals    /69    Pulse 99    Temp 97.6 °F (36.4 °C)    Resp 16    Wt 206 lb 3.2 oz (93.5 kg)    SpO2 97%    BMI 30.01 kg/m2        Physical Exam:   General: Alert and age appropriately oriented. No acute cardio respiratory distress. HEENT: Normocephalic,no scleral icterus  Oral mucosa moist without cyanosis   Lungs: Clear to auscultation  bilaterally. Respiration even and unlabored   Heart: Regular rate and rhythm, S1, S2   No  murmurs, clicks, rub or gallops   Abdomen: Soft, non-tender, nondistended. Bowel sounds present. No organomegaly. PD cath c/d/i ; no erythema   Genitourinary: defer. Neuromuscular:      Generalized prox> distal weakness  Non focal  Executive dysfunction with processing and thought organization  Cooperative and fcs well   Skin/extremity: No rashes, no erythema.  No calf tenderness BLE  1+ pedal edema                                                                            Functional Assessment:  Gross Assessment  AROM: Generally decreased, functional (12/24/17 1100)  Strength: Generally decreased, functional (12/24/17 1100)  Coordination: Generally decreased, functional (12/24/17 1100)  Sensation: Intact (12/24/17 1100)       Balance  Sitting - Static: Good (unsupported) (12/24/17 1100)  Sitting - Dynamic: Good (unsupported) (12/24/17 1100)  Standing - Static: Good (12/24/17 1100)  Standing - Dynamic : Impaired (12/24/17 1100)           Toileting  Adaptive Equipment: Elevated seat;Grab bars (12/18/17 1010)         Harrison Fall Risk Assessment:  Navneet Desai Fall Risk  Mobility: Ambulates or transfers with assist devices or assistance/unsteady gait (12/24/17 1926)  Mobility Interventions: Patient to call before getting OOB (12/24/17 1926)  Mentation: Alert, oriented x 3 (12/24/17 1926)  Mentation Interventions: Door open when patient unattended (12/24/17 1926)  Medication: Patient receiving anticonvulsants, sedatives(tranquilizers), psychotropics or hypnotics, hypoglycemics, narcotics, sleep aids, antihypertensives, laxatives, or diuretics (12/24/17 1926)  Medication Interventions: Patient to call before getting OOB (12/24/17 1926)  Elimination: Needs assistance with toileting (12/24/17 1926)  Elimination Interventions: Call light in reach (12/24/17 1926)  Prior Fall History: No (12/24/17 1926)  History of Falls Interventions: Bed/chair exit alarm (12/23/17 1001)  Total Score: 3 (12/24/17 1926)  Standard Fall Precautions: Yes (12/21/17 0705)  High Fall Risk: Yes (12/24/17 1926)     Speech Assessment:         Ambulation:  Gait  Base of Support: Widened (12/15/17 1200)  Speed/Michelle: Slow;Shuffled (12/15/17 1200)  Step Length: Right shortened;Left shortened (12/15/17 1200)  Stance: Right increased; Left increased (12/15/17 1200)  Gait Abnormalities: Trunk sway increased; Step to gait; Decreased step clearance (12/15/17 1200)  Distance (ft): 55 Feet (ft) (25' on first walk) (12/24/17 1100)  Assistive Device: Walker, rolling (12/24/17 1100)  Curbs/Ramps: Minimum assistance (12/07/17 1400)     Labs/Studies:  Recent Results (from the past 72 hour(s))   CBC W/O DIFF    Collection Time: 12/25/17  5:45 AM   Result Value Ref Range    WBC 5.6 4.3 - 11.1 K/uL    RBC 3.03 (L) 4.05 - 5.25 M/uL    HGB 8.8 (L) 11.7 - 15.4 g/dL    HCT 29.4 (L) 35.8 - 46.3 %    MCV 97.0 79.6 - 97.8 FL    MCH 29.0 26.1 - 32.9 PG    MCHC 29.9 (L) 31.4 - 35.0 g/dL    RDW 18.7 (H) 11.9 - 14.6 %    PLATELET 237 905 - 602 K/uL    MPV 9.6 (L) 10.8 - 87.0 FL   METABOLIC PANEL, BASIC    Collection Time: 12/25/17  5:45 AM   Result Value Ref Range    Sodium 136 136 - 145 mmol/L    Potassium 5.7 (H) 3.5 - 5.1 mmol/L    Chloride 99 98 - 107 mmol/L    CO2 26 21 - 32 mmol/L    Anion gap 11 7 - 16 mmol/L    Glucose 83 65 - 100 mg/dL    BUN 22 8 - 23 MG/DL    Creatinine 6.65 (H) 0.6 - 1.0 MG/DL    GFR est AA 8 (L) >60 ml/min/1.73m2    GFR est non-AA 6 (L) >60 ml/min/1.73m2    Calcium 7.3 (L) 8.3 - 10.4 MG/DL       Assessment:     Problem List as of 12/25/2017  Date Reviewed: 3/2/2017          Codes Class Noted - Resolved    Weakness of both legs ICD-10-CM: R29.898  ICD-9-CM: 729.89  11/29/2017 - Present        Renal failure (ARF), acute on chronic ([facility] [address] ICD-10-CM: N17.9, N18.9  ICD-9-CM: 584.9, 585.9  11/29/2017 - Present        Elevated troponin ICD-10-CM: R74.8  ICD-9-CM: 790.6  11/18/2017 - Present        Chest pain ICD-10-CM: R07.9  ICD-9-CM: 786.50  11/18/2017 - Present        CKD (chronic kidney disease) ICD-10-CM: N18.9  ICD-9-CM: 585.9  11/18/2017 - Present        Chronic anemia ICD-10-CM: D64.9  ICD-9-CM: 285.9  11/18/2017 - Present        ESRD (end stage renal disease) ([facility] ICD-10-CM: N18.6  ICD-9-CM: 585.6  11/18/2017 - Present        Abdominal pain ICD-10-CM: R10.9  ICD-9-CM: 789.00  9/18/2017 - Present        H/O TIA (transient ischemic attack) and stroke ICD-10-CM: Z86.73  ICD-9-CM: V12.54  4/13/2017 - Present        S/P PTCA (percutaneous transluminal coronary angioplasty) ICD-10-CM: Z98.61  ICD-9-CM: V45.82  4/13/2017 - Present        Mixed hyperlipidemia ICD-10-CM: E78.2  ICD-9-CM: 272.2  4/13/2017 - Present        Vision changes ICD-10-CM: H53.9  ICD-9-CM: 368.9  3/26/2017 - Present        Dizziness ICD-10-CM: R42  ICD-9-CM: 780.4  3/26/2017 - Present        Refusal of blood transfusions as patient is Yazidi (Chronic) ICD-10-CM: Z53.1  ICD-9-CM: V62.6  8/25/2016 - Present        GERD (gastroesophageal reflux disease) ICD-10-CM: K21.9  ICD-9-CM: 530.81  8/8/2016 - Present        Unspecified sleep apnea ICD-10-CM: G47.30  ICD-9-CM: 780.57  8/8/2016 - Present    Overview Signed 8/8/2016 11:09 AM by Corazon Carrion     uses cpap machine             CVA (cerebral vascular accident) Hx of TIA ICD-10-CM: I63.9  ICD-9-CM: 434.91  2/22/2016 - Present        Unstable angina ([facility] [address] ICD-10-CM: I20.0  ICD-9-CM: 411.1  2/1/2016 - Present        ESRD on peritoneal dialysis ([facility] [address] (Chronic) ICD-10-CM: N18.6, Z99.2  ICD-9-CM: 585.6, V45.11  2/1/2016 - Present        Ischemic cardiomyopathy ICD-10-CM: I25.5  ICD-9-CM: 414.8  12/29/2015 - Present        HLD (hyperlipidemia) ICD-10-CM: E78.5  ICD-9-CM: 272.4  12/29/2015 - Present        Depression ICD-10-CM: F32.9  ICD-9-CM: 089  12/29/2015 - Present        CAD (coronary artery disease) ICD-10-CM: I25.10  ICD-9-CM: 414.00  11/27/2015 - Present        Debility ICD-10-CM: R53.81  ICD-9-CM: 799.3  5/5/2015 - Present        Hypertension (Chronic) ICD-10-CM: I10  ICD-9-CM: 401.9  4/28/2015 - Present        Anemia of chronic renal failure (Chronic) ICD-10-CM: N18.9, D63.1  ICD-9-CM: 285.21, 585.9  4/28/2015 - Present        Hypothyroidism (Chronic) ICD-10-CM: E03.9  ICD-9-CM: 244.9  4/28/2015 - Present        RESOLVED: Postural hypotension ICD-10-CM: I95.1  ICD-9-CM: 458.0  8/9/2016 - 3/26/2017        RESOLVED: Chest pain ICD-10-CM: R07.9  ICD-9-CM: 786.50  8/8/2016 - 3/26/2017        RESOLVED: Nausea ICD-10-CM: R11.0  ICD-9-CM: 787.02  8/8/2016 - 3/26/2017        RESOLVED: S/P PTCA (percutaneous transluminal coronary angioplasty); LAD PTCA of  ISR 11/27/15 ICD-10-CM: Z98.61  ICD-9-CM: V45.82  5/24/2016 - 3/26/2017        RESOLVED: TIA (transient ischemic attack) ICD-10-CM: G45.9  ICD-9-CM: 435.9  2/10/2016 - 3/26/2017        RESOLVED: Edema ICD-10-CM: R60.9  ICD-9-CM: 782.3  12/29/2015 - 3/26/2017        RESOLVED: Anterior myocardial infarction [facility]) ICD-10-CM: I21.09  ICD-9-CM: 410.10  5/21/2015 - 3/26/2017        RESOLVED: STEMI (ST elevation myocardial infarction) ([facility] [address] ICD-10-CM: I21.3  ICD-9-CM: 410.90  4/28/2015 - 2/1/2016            Plan:      Debility s/p Unstable Angina/ CAD x NSTEMI  S/p C with Stents with multiple premorbid comorbidities     1. Continue interdisc rehab efforts, endurance and mobility progressing      2. ESRD; on temporary HD via perma cath ; had PD cath dysfxn; Dialysis T,TH,Sat; tolerating well  -will have PD cath revision/placement on 12/18  -12/25 tolerating PD well; K elevated at 5.7; low dose kayexalate 7. 5?? ; will recheck in a.m and if elevated, will treat    3. R ear Cerumen impaction; no sign of infection but cannot visualize tympanic membrane. Peroxide this a.m and begin debrox bid x 4d; resolved     4. CAD; cont DAPT, life long; on Brilinta ASA and Ranexa; s/p PTCA     5. Chronic hx of Anemia due to ESRD and hx of prior GI bleed; Grover Sauers witness thus no blood products. On WAYNE and iv iron. cont  b12; monitor; 8.9 on 12/14.   -12/25 hgb 8.8 stable from 8.4 improved     6. HTN; controlled with betablk, and Norvasc  12/25 bp 140/69 controlled    7. HLD on Crestor. 8. Hypothyroidism; on synthroid    9. DVT prev; on Ranexa and ASA. SCDs    10. GERD/PUD; cont Protonix bid      Time spent was 25 minutes with over 1/2 in direct patient care/examination, consultation and coordination of care.      Signed By: Jonna Sepulveda MD     December 25, 2017

## 2017-12-25 NOTE — PROGRESS NOTES
Patient resting up in bed. Alert and oriented. Lung sounds clear. S1S2, bowel sounds active. Repositioned self to side of bed for breakfast tray. Dressing changed to dialysis abdominal port. No complaint of pain or discomfort at present time. No other verbalized needs at present time. Assessment completed. See doc flow sheet for further assessments.

## 2017-12-25 NOTE — PROGRESS NOTES
Problem: Falls - Risk of  Goal: *Absence of Falls  Document Harrison Fall Risk and appropriate interventions in the flowsheet.    Outcome: Progressing Towards Goal  Fall Risk Interventions:  Mobility Interventions: Patient to call before getting OOB    Mentation Interventions: Evaluate medications/consider consulting pharmacy, Increase mobility    Medication Interventions: Patient to call before getting OOB, Evaluate medications/consider consulting pharmacy    Elimination Interventions: Call light in reach, Patient to call for help with toileting needs    History of Falls Interventions: Consult care management for discharge planning, Door open when patient unattended, Evaluate medications/consider consulting pharmacy

## 2017-12-26 LAB — POTASSIUM SERPL-SCNC: 4.2 MMOL/L (ref 3.5–5.1)

## 2017-12-26 PROCEDURE — 74011250637 HC RX REV CODE- 250/637: Performed by: INTERNAL MEDICINE

## 2017-12-26 PROCEDURE — 97110 THERAPEUTIC EXERCISES: CPT

## 2017-12-26 PROCEDURE — 97535 SELF CARE MNGMENT TRAINING: CPT

## 2017-12-26 PROCEDURE — 74011250637 HC RX REV CODE- 250/637: Performed by: PHYSICAL MEDICINE & REHABILITATION

## 2017-12-26 PROCEDURE — 97530 THERAPEUTIC ACTIVITIES: CPT

## 2017-12-26 PROCEDURE — 65310000000 HC RM PRIVATE REHAB

## 2017-12-26 PROCEDURE — 90945 DIALYSIS ONE EVALUATION: CPT

## 2017-12-26 PROCEDURE — 36415 COLL VENOUS BLD VENIPUNCTURE: CPT | Performed by: PHYSICAL MEDICINE & REHABILITATION

## 2017-12-26 PROCEDURE — 99232 SBSQ HOSP IP/OBS MODERATE 35: CPT | Performed by: PHYSICAL MEDICINE & REHABILITATION

## 2017-12-26 PROCEDURE — 74011250636 HC RX REV CODE- 250/636: Performed by: PHYSICAL MEDICINE & REHABILITATION

## 2017-12-26 PROCEDURE — 97116 GAIT TRAINING THERAPY: CPT

## 2017-12-26 PROCEDURE — 92507 TX SP LANG VOICE COMM INDIV: CPT

## 2017-12-26 PROCEDURE — 84132 ASSAY OF SERUM POTASSIUM: CPT | Performed by: PHYSICAL MEDICINE & REHABILITATION

## 2017-12-26 RX ADMIN — AMLODIPINE BESYLATE 5 MG: 5 TABLET ORAL at 08:56

## 2017-12-26 RX ADMIN — TICAGRELOR 90 MG: 90 TABLET ORAL at 08:57

## 2017-12-26 RX ADMIN — LEVOTHYROXINE SODIUM 150 MCG: 150 TABLET ORAL at 05:34

## 2017-12-26 RX ADMIN — PANTOPRAZOLE SODIUM 40 MG: 40 TABLET, DELAYED RELEASE ORAL at 05:34

## 2017-12-26 RX ADMIN — RENAGEL 800 MG: 400 TABLET ORAL at 12:00

## 2017-12-26 RX ADMIN — ZOLPIDEM TARTRATE 5 MG: 5 TABLET ORAL at 21:05

## 2017-12-26 RX ADMIN — RANOLAZINE 1000 MG: 500 TABLET, FILM COATED, EXTENDED RELEASE ORAL at 08:55

## 2017-12-26 RX ADMIN — SUCRALFATE 1 G: 1 SUSPENSION ORAL at 11:27

## 2017-12-26 RX ADMIN — SUCRALFATE 1 G: 1 SUSPENSION ORAL at 05:34

## 2017-12-26 RX ADMIN — ERYTHROPOIETIN 20000 UNITS: 20000 INJECTION, SOLUTION INTRAVENOUS; SUBCUTANEOUS at 16:34

## 2017-12-26 RX ADMIN — TICAGRELOR 90 MG: 90 TABLET ORAL at 21:09

## 2017-12-26 RX ADMIN — TORSEMIDE 100 MG: 100 TABLET ORAL at 08:58

## 2017-12-26 RX ADMIN — ROSUVASTATIN CALCIUM 20 MG: 20 TABLET, FILM COATED ORAL at 21:05

## 2017-12-26 RX ADMIN — ONDANSETRON 4 MG: 4 TABLET, ORALLY DISINTEGRATING ORAL at 17:12

## 2017-12-26 RX ADMIN — SUCRALFATE 1 G: 1 SUSPENSION ORAL at 21:05

## 2017-12-26 RX ADMIN — ONDANSETRON 4 MG: 4 TABLET, ORALLY DISINTEGRATING ORAL at 21:17

## 2017-12-26 RX ADMIN — ASPIRIN 81 MG: 81 TABLET, COATED ORAL at 08:57

## 2017-12-26 RX ADMIN — CALCITRIOL 0.25 MCG: 0.25 CAPSULE, LIQUID FILLED ORAL at 09:00

## 2017-12-26 RX ADMIN — PANTOPRAZOLE SODIUM 40 MG: 40 TABLET, DELAYED RELEASE ORAL at 16:30

## 2017-12-26 RX ADMIN — RANOLAZINE 1000 MG: 500 TABLET, FILM COATED, EXTENDED RELEASE ORAL at 21:08

## 2017-12-26 RX ADMIN — METOPROLOL TARTRATE 12.5 MG: 25 TABLET ORAL at 08:58

## 2017-12-26 RX ADMIN — RENAGEL 800 MG: 400 TABLET ORAL at 16:29

## 2017-12-26 RX ADMIN — RENAGEL 800 MG: 400 TABLET ORAL at 07:24

## 2017-12-26 NOTE — PROGRESS NOTES
OT Daily Note  Time In 1030   Time Out 1115     Subjective: \"I don't trust anybody else to do my dialysis. I've done it for two years and I know what to do. I sometimes do my medicine, and sometimes Drew Lantigua paid caregiver] helps me. \"   Pain: none reported  Education: importance of not making errors with medication/dialysis; benefits of using a medication minder   Interdisciplinary Communication: nurse aware that patient out to/back from therapy   Precautions: Other (comment) (fall risk)  Location on arrival: up in recliner    Transfers Daily Assessment   Patient transferred recliner <> WC using SPT with RW with SBA and verbal cues for hand placement for sit to stand transfer. Required cues for sit into WC, as patient sat prior to fully reaching wheelchair and sat partially onto armrest.  Would benefit from further transfer training. Carryover limited by impaired memory. Activity Tolerance Daily Assessment   Patient completed UBE x 8 minutes x 2 minutes each set x minimum resistance, going in 2 direction(s) with 4 rest break(s), working on BUE strengthening and functional activity tolerance. Limited by impaired activity tolerance, requiring multiple rest breaks during task. Cognition Daily Assessment   Patient completed simulated medication management task x 6 medications with errors in 4/6 medications. Required assist to identify and correct errors. Errors were inconsistent. Recommend assistance with medication management and probably managing peritoneal dialysis. Unable to practice peritoneal dialysis at this time, but based on the number of errors on simulated medication management task it is anticipated that patient would also have difficulty managing peritoneal dialysis. Patient was left seated up in recliner with call light/all needs in reach and in no distress. Assessment: See above. Recommend 24/7 supervision and assistance with medication management/peritoneal dialysis at discharge. Plan: Continue OT POC with focus on ADL/IADL skills, functional transfers, functional mobility, coordination, strength, static and dynamic balance, and activity tolerance to maximize safety and independence with ADLs and functional transfers.      Megan Benavidez MS, OTR/L  12/26/2017

## 2017-12-26 NOTE — PROGRESS NOTES
OT Daily Note  Time In 1300   Time Out 1344     Subjective: \"I'm fine. Just tired. \"   Pain: none reported  Education: benefits of exercise   Interdisciplinary Communication: with PT regarding schedule   Precautions: Other (comment) (fall risk)  Location on arrival: supine in bed    Transfers Daily Assessment   Patient transferred supine to sit with supervision using bedrail, and edge of bed to [facility] to L using SPT with supervision. Progressing. Activity Tolerance Daily Assessment   Patient performed reaching activity using different vertical heights and small rings with 1 UE at a time with 4 pound clothespin for 3 sets, no clothespin for remainder of 18 sets, working on shoulder stabilization for overhead reaching and  strengthening. Patient use BUE for task, requiring cues to use LUE for higher levels. Required increased time and multiple rest breaks due to fatigue. Would continue to benefit from further activity tolerance tasks. Patient was left seated up in [facility] for PT. Assessment: See above. Progressing well. Remains with decreased activity tolerance and decreased memory. Plan: Continue OT POC with focus on ADL/IADL skills, functional transfers, functional mobility, coordination, strength, static and dynamic balance, and activity tolerance to maximize safety and independence with ADLs and functional transfers.      Chapin Miller, MS, OTR/L  12/26/2017

## 2017-12-26 NOTE — PROGRESS NOTES
Team conference completed, and discharge date set for 11/28/17.  Patient to receive [address] [facility] MS Giuseppe, OTR/L  12/26/2017

## 2017-12-26 NOTE — PROGRESS NOTES
Problem: Falls - Risk of  Goal: *Absence of Falls  Document Harrison Fall Risk and appropriate interventions in the flowsheet.    Outcome: Progressing Towards Goal  Fall Risk Interventions:  Mobility Interventions: Patient to call before getting OOB    Mentation Interventions: Increase mobility, Evaluate medications/consider consulting pharmacy    Medication Interventions: Patient to call before getting OOB, Evaluate medications/consider consulting pharmacy    Elimination Interventions: Call light in reach    History of Falls Interventions: Consult care management for discharge planning, Door open when patient unattended

## 2017-12-26 NOTE — PROGRESS NOTES
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 She has a history of GI bleeding and had anemia-induced unstable angina in the past. 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 She states the pain has since resolved with NTG paste.  No fevers or chills. No nausea, vomiting or diarrhea.  She states that she is essentially anuric and occasionally makes minimal urine.  She has a h/o CVA and TIA. No fever or chills, no problems with PD drainage or discoloration of PD fluid. No increased thirst. She wishes regular diet and supplement. She denies any other acute complaints  Her edema has improved in the past couple of days with intensified dialysis.     s-  PD fluid is cleared nicely and drainage is adequate - continue current prescription  - she had a good BM - making gradual progress with PT - no PD catheter dysfunction - no dyspnea, no CP, no N/V - no further episodes of LOC/seizure-like activity  - able to walk longer today with walker and able to get uphill a little - PD with low volumes is tolerated well, advance to 1.8 l exchanges    Past Medical History:   Diagnosis Date    Anemia of chronic renal failure 4/28/2015    Anterior myocardial infarction [facility]) 5/21/2015    CAD (coronary artery disease)     Chest pain     Chronic kidney disease, stage III (moderate) 8/15/2014    on dialysis    CKD (chronic kidney disease) stage 4, GFR 15-29 ml/min ([facility] [address] 4/28/2015    Debility 5/5/2015    Depression 12/29/2015    Edema 12/29/2015    Endocrine disease     Hypothyroidism    GERD (gastroesophageal reflux disease)     Heart murmur 12/29/2015    HLD (hyperlipidemia) 12/29/2015    Hypertension     Hypothyroidism 4/28/2015    Ischemic cardiomyopathy 12/29/2015    Nausea     S/P PTCA (percutaneous transluminal coronary angioplasty); LAD PTCA of  ISR 11/27/15 5/24/2016    STEMI (ST elevation myocardial infarction) ([facility] [address] 4/28/2015    Unspecified sleep apnea     uses cpap machine    Unstable angina ([facility] [address]       Past Surgical History:   Procedure Laterality Date    HX BACK SURGERY  1990    neck surgery cervical disc    HX BACK SURGERY      lower back    HX CATARACT REMOVAL Bilateral     HX CHOLECYSTECTOMY  19702    gall bladder     HX KNEE REPLACEMENT Right 2006    HX PTCA  4/28/2015    2.25 Xience stent to mid LAD for occluded artery, anterior MI, EF 25%. Moderate disease distal LAD and distal OM PCI CX and RCA 2004, then PCI RCA and LAD in 2009. Prior to Admission medications    Medication Sig Start Date End Date Taking? Authorizing Provider   metoprolol tartrate (LOPRESSOR) 25 mg tablet Take 0.5 Tabs by mouth daily. 11/27/17  Yes MINDY Chatman   amLODIPine (NORVASC) 5 mg tablet Take 1 Tab by mouth daily. 11/27/17  Yes MINDY Chatman   torsemide (DEMADEX) 100 mg tablet Take 1 Tab by mouth two (2) times a day. 11/27/17  Yes MINDY Chatman   pantoprazole (PROTONIX) 40 mg tablet Take 1 Tab by mouth Before breakfast and dinner. 11/27/17  Yes MINDY Chatman   magnesium oxide (MAG-OX) 400 mg tablet Take 400 mg by mouth daily. Yes Historical Provider   metoclopramide HCl (REGLAN) 5 mg tablet Take 5 mg by mouth two (2) times a day. Yes Historical Provider   ticagrelor (BRILINTA) 90 mg tablet Take 1 Tab by mouth two (2) times a day. 2/4/16  Yes MINDY Chatman   aspirin delayed-release 81 mg tablet Take 1 Tab by mouth daily. 5/5/15  Yes Gisela Hollingsworth PA-C   rosuvastatin (CRESTOR) 20 mg tablet Take 20 mg by mouth nightly. Yes Historical Provider   levothyroxine (SYNTHROID) 150 mcg tablet Take 150 mcg by mouth Daily (before breakfast).    Yes Historical Provider   cyanocobalamin (VITAMIN B12) 1,000 mcg/mL injection 1 mL by IntraMUSCular route every seven (7) days. Indications: Vitamin B12 Deficiency 12/2/17   MINDY Hollis   folic acid (FOLVITE) 1 mg tablet Take 1 mg by mouth daily. Historical Provider   potassium chloride (K-DUR, KLOR-CON) 20 mEq tablet Take 20 mEq by mouth two (2) times a day. Historical Provider   traZODone (DESYREL) 50 mg tablet Take  by mouth nightly. Historical Provider   megestrol (MEGACE) 400 mg/10 mL (40 mg/mL) suspension Take 200 mg by mouth daily. Historical Provider   sucralfate (CARAFATE) 100 mg/mL suspension Take 10 mL by mouth Before breakfast, lunch, dinner and at bedtime. Indications: PREVENTION OF STRESS ULCER 9/23/17   Ruben Rota, DO   nitroglycerin (NITROLINGUAL) 400 mcg/spray spray 1 Spray by SubLINGual route every five (5) minutes as needed for Chest Pain. Historical Provider   linaclotide Cheral Richard) 145 mcg cap capsule Take  by mouth Daily (before breakfast). Historical Provider   PNV NO.122/IRON/FOLIC ACID (PRENATAL MULTI PO) Take  by mouth. Historical Provider   ondansetron (ZOFRAN ODT) 4 mg disintegrating tablet Take 4 mg by mouth three (3) times daily as needed. Historical Provider   sevelamer carbonate (RENVELA) 800 mg tab tab Take 800 mg by mouth three (3) times daily (with meals). Historical Provider   gentamicin (GARAMYCIN) 0.1 % topical cream APPLY TO PD catheter exit site at daily dressing change 5/12/15   Ania Cuevas MD   darbepoetin toya in polysorbat (ARANESP, POLYSORBATE,) 40 mcg/mL injection 40 mcg by SubCUTAneous route every fourteen (14) days. Indications: ANEMIA IN CHRONIC KIDNEY DISEASE    Historical Provider   ranolazine ER (RANEXA) 500 mg SR tablet Take 500 mg by mouth two (2) times a day.     Historical Provider     Allergies   Allergen Reactions    Codeine Nausea and Vomiting      Social History   Substance Use Topics    Smoking status: Never Smoker  Smokeless tobacco: Never Used    Alcohol use No      Family History   Problem Relation Age of Onset    Heart Disease Mother     Hypertension Mother     Cancer Mother      Lung    Stroke Father     Hypertension Father     Breast Cancer Neg Hx           Review of Systems    Constitutional: no fever, weak  Eyes: fair vision,    Ears, nose, mouth, throat, and face:fair hearing,   Respiratory: no asthma,  Chronic home O2 is being used - improved dyspnea  Cardiovascular:no palpitation, no  chest pain,   Gastrointestinal:no diarrhea,  Had BM today  Genitourinary: no dysuria,   Hematologic/lymphatic: no bleeding tendency,   Neurological: no seizures   Behvioral/Psych: no psych hospitalization   Endocrine: no goiter,       Objective:       Visit Vitals    /73    Pulse 79    Temp 98.4 °F (36.9 °C)    Resp 16    Wt 93.5 kg (206 lb 3.2 oz)    SpO2 95%    BMI 30.01 kg/m2       General:  Alert, cooperative, no distress, appears stated age. Head:  Normocephalic, without obvious abnormality, atraumatic. Eyes:  Conjunctivae/corneas clear. EOMs intact. Throat: Lips, mucosa, and tongue normal. Teeth and gums normal.   Neck: Supple, symmetrical, trachea midline, no adenopathy,  no JVD. Lungs:   Clear to auscultation bilaterally. Heart:  Regular rate and rhythm, S1, S2 normal, 2/6 systolic  murmur, no rub or gallop. Abdomen:   Soft, non-tender. No masses,  No organomegaly. PD catheter in place without exudate   Extremities: Extremities normal, atraumatic, no cyanosis. 2-3+edema. Skin: Skin color, texture, turgor normal. No rashes or lesions. Neurologic: Grossly intact. No asterixis. Results for Janis Raymundo (MRN [id number]) as of 12/26/2017 14:52   Ref.  Range 12/21/2017 06:12 12/25/2017 05:45   WBC Latest Ref Range: 4.3 - 11.1 K/uL 7.0 5.6   RBC Latest Ref Range: 4.05 - 5.25 M/uL 2.85 (L) 3.03 (L)   HGB Latest Ref Range: 11.7 - 15.4 g/dL 8.4 (L) 8.8 (L)   HCT Latest Ref Range: 35.8 - 46.3 % 27.8 (L) 29.4 (L)   MCV Latest Ref Range: 79.6 - 97.8 FL 97.5 97.0   MCH Latest Ref Range: 26.1 - 32.9 PG 29.5 29.0   MCHC Latest Ref Range: 31.4 - 35.0 g/dL 30.2 (L) 29.9 (L)   RDW Latest Ref Range: 11.9 - 14.6 % 18.8 (H) 18.7 (H)   PLATELET Latest Ref Range: 150 - 450 K/uL 298 296   MPV Latest Ref Range: 10.8 - 14.1 FL 9.7 (L) 9.6 (L)   Sodium Latest Ref Range: 136 - 145 mmol/L 135 (L) 136   Potassium Latest Ref Range: 3.5 - 5.1 mmol/L 4.6 5.7 (H)   Chloride Latest Ref Range: 98 - 107 mmol/L 97 (L) 99   CO2 Latest Ref Range: 21 - 32 mmol/L 28 26   Anion gap Latest Ref Range: 7 - 16 mmol/L 10 11   Glucose Latest Ref Range: 65 - 100 mg/dL 84 83   BUN Latest Ref Range: 8 - 23 MG/DL 32 (H) 22   Creatinine Latest Ref Range: 0.6 - 1.0 MG/DL 7.45 (H) 6.65 (H)   Calcium Latest Ref Range: 8.3 - 10.4 MG/DL 9.7 7.3 (L)   GFR est non-AA Latest Ref Range: >60 ml/min/1.73m2 6 (L) 6 (L)   GFR est AA Latest Ref Range: >60 ml/min/1.73m2 7 (L) 8 (L)       Data Review:     Active Problems:    Hypothyroidism (4/28/2015)      S/P PTCA (percutaneous transluminal coronary angioplasty) (4/13/2017)      ESRD (end stage renal disease) ([facility] [address] (11/18/2017)      Weakness of both legs (11/29/2017)      Renal failure (ARF), acute on chronic (HCC) (11/29/2017)        Assessment:     1. CAD x NSTEMI, Unstable angina -  - [facility] with complex CAD and acute thrombotic lesion in pLAD and subtotal occlusion in mLAD. The RCA has severe proximal and mid RCA disease. She has additional stenting of LAD with Resolute in mid/distal and proximal vessel. These all touched the previously stented mid vessel. Recommend life-long DAPT    2, ESRD -  - S/P a course of temporary HD via perm cath due to PD catheter dysfunction  - tolerated gentle 0.5 l fluid removal well yesterday on HD   -  Switch to PD has been tolerated well - increase to 1.8 exchanges    3.  Fluid excess -  - resolved with fluid removal on HD  - now again with some edema, but no dyspnea    4. Anemia -  - intensive anemia therapy in [employer]  - on WAYNE and IV iron and B12  - improved S/P BT  - she is below goal, but not low enough for transfusion    5. History of GI bleed -  - monitor clinically and serial Hb  - she had a drop in Hb to 7 range and improved with BT    6. Hypokalemia   - resolved     7. Abdominal pain and tenderness with drop in Hb , on DAPT   No evidence of retroperitoneal hematoma     8. PD catheter dysfunction -  - S/P PD catheter revision / replacement   - PD catheter function improved after heparin IP    9. Possible new onset seizure -  - suspect hypotensive/arrhythmia episode induced by fluid removal on dialysis in combination with anesthesia    10. PD Catheter dysfunction  - resolved after IP heparin x one dose 83745 units  - continue Dulcolax suppository to Miralax    11.  Constipation -  -  treat with Miralaz/Dulcolax/aggressive ambulation   - better with addition of Sorbitol     Plan:     As above - 25

## 2017-12-26 NOTE — PROGRESS NOTES
PHYSICAL THERAPY DAILY NOTE  Time In: 3127  Time Out: 1007  Patient Seen For: AM;Gait training; Therapeutic exercise;Transfer training    Subjective: \"I don't know when I get to go home. It depends on Dr. Jl Cota. \"         Objective: Other (comment) (Fall Risk)    BED/MAT MOBILITY Daily Assessment    Supine to Sit : 6 (Modified independent)  Sit to Supine : 6 (Modified independent)       TRANSFERS Daily Assessment    Transfer Type: SPT with walker  Transfer Assistance : 5 (Supervision/setup)  Sit to Stand Assistance: Supervision       GAIT Daily Assessment   Flexed posture, foot flat, decreased fran Amount of Assistance: 5 (Supervision/setup)  Distance (ft): 175 Feet (ft)x1, 30'x1, 20'x1  Assistive Device: Walker, rolling       BALANCE Daily Assessment    Sitting - Static: Good (unsupported)  Sitting - Dynamic: Good (unsupported)  Standing - Static: Good  Standing - Dynamic : Impaired       LOWER EXTREMITY EXERCISES Daily Assessment    Extremity: Both  Exercise Type #1: Supine lower extremity strengthening  Sets Performed: 1  Reps Performed: 15  Level of Assist: Contact guard assistance     SUPINE EXERCISES Sets Reps Comments   Ankle Pumps 1 15    hooklying hip adduction 1 15 Squeezing ball   bridging 1 15 Ball in between knees   Heel Slides 1 15 Small ball under foot   Hip Abduction 1 15 Using maxislide   Short Arc Quad 1 15    hooklying hip IR/ER 1 15 With manual resistance   Straight Leg Raise 1 15 AAROM x5     Vital Signs:   Patient Vitals for the past 8 hrs:   Temp Pulse Resp BP SpO2   12/26/17 0719 98.4 °F (36.9 °C) 79 16 138/73 95 %         Pain level: no c/o pain    Patient education: reviewed HEP & energy conservation strategies    Interdisciplinary Communication: collaborated w/ OT regarding pt's progress    Pt. Left in recliner in NAD, call bell in reach. Assessment: Pt. Able to increase gait distance significantly today! Does cont. To fatigue easily, requiring frequent rest breaks.   However, when pt. Paces herself, she is able to increase her overall mobility. Cont. To benefit from PT services to further improve her endurance & strength in preparation for d/c home alone. Plan of Care: Continue with POC and progress as tolerated.      Naye Sanders, PT  12/26/2017

## 2017-12-26 NOTE — PROGRESS NOTES
12/26/17 1210   Time Spent With Patient   Time In 1130   Time Out 1200   Patient Seen For: AM;Neuro-linguistics   Mental Status   Neurologic State Alert   Orientation Level Oriented X4   Cognition Appropriate decision making   Perception Appears intact   Perseveration No perseveration noted   Safety/Judgement Fall prevention   Pt completed word finding and problem solving tasks with 90% accuracy.     Becka Corbin MA/HARSHA/SLP

## 2017-12-26 NOTE — PROGRESS NOTES
Patient resting up in bed. Assisted up to side of bed for breakfast tray. Patient alert and oriented. Lung sounds clear. S1S2, bowel sounds active. States \"was up off and on last night going to the bathroom after taking Sorbitol. \"  Patient \"feels tired. \" No complaint of pain or discomfort at present time. Peritoneal dialysis already disconnected by night shift. No other verbalized needs at present time. Assessment completed. See doc flow sheet for further assessments.

## 2017-12-26 NOTE — PROGRESS NOTES
End Of Shift Functional Summary, Nursing      TOILETING:  Does patient need assist with clothing management and/or pericare? No    TOILET TRANSFER:  Pt requires standby assistance/setup. Pt uses walker. BLADDER:  Pt does not have a castillo catheter that staff manages. Pt does not take medication. Pt is continent. of bladder and voids in toilet  Pt requires staff to position device Pt has had 0 bladder accidents during this shift requiring standby assistance/setup to clean up. (An accident is when the episode is not contained in a brief AND/OR the clothing/linen requires changing/cleaning up.)    BOWEL:  Pt does take medication. Pt is continent of bowel and uses toilet. Pt requires staff to empty device    Pt has had 0 bowel accidents during this shift requiring standby assistance/setup from staff to clean up. (An accident is when the episode is not contained in a brief AND/OR the clothing/linen requires changing/cleaning up.)                       EATING  Pt requires no assistance. Pt wears dentures. Pt does not  receive nutrition through tube feedings. Documentation reviewed and plan of care discussed/reviewed with   patient, physician, therapists, oncoming nurse, patient assistant and family/spouse during the shift.

## 2017-12-26 NOTE — PROGRESS NOTES
12/26/17 0901   Time Spent With Patient   Time In 0745   Time Out 0829   Patient Seen For: AM;ADLs   Upper Body Bathing   Bathing Assistance, Upper S   Position Performed Seated in chair  (in WC at sink )   Lower Body Bathing   Bathing Assistance, Lower  S   Position Performed Seated in chair;Standing  (in WC at sink )   Upper Body Nurme 49 Performed Seated in chair;Standing   Adaptive Equipment Used Reacher;Sock aid   Comments encouragement to attempt LB dressing without assistance    S: \"I'm not getting in the shower. I have this central line and the surgeon told me not to get it wet. \" [patient perseverating on not getting central line wet; unable/unwilling to understand that therapist can cover completely to allow patient to shower without it getting wet. Attempted to explain multiple times but patient perseverating on it not getting wet]   O: Patient presented supine in bed. Agreeable to treatment. Patient completed sponge bath seated in WC at sink; see above for FIMs. Patient required encouragement to attempt LB self care tasks but was able to complete with encouragement. Attempted WC training but patient with noted difficulty following multi-step instructions to propel WC in room. A: Remains limited by impaired memory, perseveration, and impaired problem solving skills. Patient tolerated session well with no complaint of pain. P: Continue OT POC with focus on ADL/IADL skills, functional transfers, functional mobility, coordination, strength, static and dynamic balance, and activity tolerance to maximize safety and independence with ADLs and functional transfers. Patient was left seated up in [facility] in room with call bell/all other needs in reach. Interdisciplinary communication: Collaborated with nurse regarding ADL.      Manuel Lucia MS, OTR/L  12/26/2017

## 2017-12-26 NOTE — PROGRESS NOTES
End Of Shift Functional Summary, Nursing      TOILETING:  Does patient need assist with clothing management and/or pericare? Yes.    TOILET TRANSFER:  Pt requires standby assistance/setup. Pt uses walker. BLADDER:  Pt does not have a castillo catheter that staff manages. Pt does take medication. Pt is continent. of bladder and voids in bedside commode  Pt requires staff to empty device Pt has had 0 bladder accidents during this shift requiring standby assistance/setup to clean up. (An accident is when the episode is not contained in a brief AND/OR the clothing/linen requires changing/cleaning up.)    BOWEL:  Pt does take medication. Pt is incontinent of bowel and uses bedside commode. Pt requires staff to empty device    Pt has had 2 bowel accidents during this shift requiring moderate assistance from staff to clean up.  (An accident is when the episode is not contained in a brief AND/OR the clothing/linen requires changing/cleaning up.)

## 2017-12-26 NOTE — PROGRESS NOTES
Howard Wilson MD,   Medical Director  [address] [facility], [address] [facility]  Tel: [phone]       SFD PROGRESS NOTE    Darcie Norman  Admit Date: 11/29/2017  Admit Diagnosis: DEBILITY; Renal failure (ARF), acute on chronic ([address] Weakne*    Subjective     Up 4-6 times with loose stool. Has perseverated on bowels but had one 3 d ago. Received sorbitol per nephr. This a.m. Is not in the best of moods. No pain. Denies cp, sob,n.  \"just tired\"    Objective:     Current Facility-Administered Medications   Medication Dose Route Frequency    sorbitol 70 % solution 60 mL  60 mL Oral QID PRN    senna-docusate (PERICOLACE) 8.6-50 mg per tablet 2 Tab  2 Tab Oral BID PRN    senna-docusate (PERICOLACE) 8.6-50 mg per tablet 2 Tab  2 Tab Oral DAILY    gentamicin (GARAMYCIN) 0.1 % cream   Topical DIALYSIS PRN    polyethylene glycol (MIRALAX) packet 17 g  17 g Oral DAILY    bisacodyl (DULCOLAX) suppository 10 mg  10 mg Rectal DAILY PRN    heparin (porcine) 1,000 unit/mL injection 5,000 Units  5,000 Units Hemodialysis DIALYSIS PRN    zolpidem (AMBIEN) tablet 5 mg  5 mg Oral QHS PRN    acetaminophen (TYLENOL) tablet 650 mg  650 mg Oral Q4H PRN    amLODIPine (NORVASC) tablet 5 mg  5 mg Oral DAILY    aspirin delayed-release tablet 81 mg  81 mg Oral DAILY    calcitRIOL (ROCALTROL) capsule 0.25 mcg  0.25 mcg Oral DAILY    cyanocobalamin (VITAMIN B12) injection 1,000 mcg  1,000 mcg IntraMUSCular Q7D    epoetin toya (EPOGEN;PROCRIT) injection 20,000 Units  20,000 Units SubCUTAneous Q TUE, THU & SAT    HYDROcodone-acetaminophen (NORCO) 5-325 mg per tablet 1 Tab  1 Tab Oral Q4H PRN    hydrogen peroxide 3 %   Topical PRN    levothyroxine (SYNTHROID) tablet 150 mcg  150 mcg Oral ACB    linaclotide (LINZESS) capsule 145 mcg (Patient Supplied)  145 mcg Oral DAILY    LORazepam (ATIVAN) tablet 1 mg  1 mg Oral Q6H PRN    metoprolol tartrate (LOPRESSOR) tablet 12.5 mg 12.5 mg Oral DAILY    multivitamin w ZN (STRESSTABS W ZINC) tablet  1 Tab Oral DAILY    ondansetron (ZOFRAN ODT) tablet 4 mg  4 mg Oral TID PRN    pantoprazole (PROTONIX) tablet 40 mg  40 mg Oral ACB&D    polyethylene glycol (MIRALAX) packet 17 g  17 g Oral DAILY PRN    ranolazine ER (RANEXA) tablet 1,000 mg  1,000 mg Oral BID    rosuvastatin (CRESTOR) tablet 20 mg  20 mg Oral QHS    sevelamer (RENAGEL) tablet 800 mg  800 mg Oral TID WITH MEALS    sodium chloride (NS) flush 5-10 mL  5-10 mL IntraVENous PRN    sucralfate (CARAFATE) 100 mg/mL oral suspension 1 g  1 g Oral AC&HS    ticagrelor (BRILINTA) tablet 90 mg  90 mg Oral BID    torsemide (DEMADEX) tablet 100 mg  100 mg Oral BID     Review of Systems:Denies chest pain, shortness of breath, cough, headache, visual problems, abdominal pain, dysurea, calf pain. Pertinent positives are as noted in the medical records and unremarkable otherwise. Visit Vitals    /73    Pulse 79    Temp 98.4 °F (36.9 °C)    Resp 16    Wt 206 lb 3.2 oz (93.5 kg)    SpO2 95%    BMI 30.01 kg/m2        Physical Exam:   General: Alert and age appropriately oriented. No acute cardio respiratory distress. HEENT: Normocephalic,no scleral icterus  Oral mucosa moist without cyanosis   Lungs: Clear to auscultation  bilaterally. Respiration even and unlabored   Heart: Regular rate and rhythm, S1, S2   No  murmurs, clicks, rub or gallops   Abdomen: Soft, non-tender, nondistended. Bowel sounds present. No organomegaly. PD cath c/di   Genitourinary: Benign . Neuromuscular:      Generalized prox> distal weakness  Non focal  Executive dysfunction with processing and thought organization  Cooperative and fcs well   Skin/extremity: No rashes, no erythema.  No calf tenderness BLE  IV left inner thigh c/d/i                                                                            Functional Assessment:  Gross Assessment  AROM: Generally decreased, functional (12/24/17 1100)  Strength: Generally decreased, functional (12/24/17 1100)  Coordination: Generally decreased, functional (12/24/17 1100)  Sensation: Intact (12/24/17 1100)       Balance  Sitting - Static: Good (unsupported) (12/24/17 1100)  Sitting - Dynamic: Good (unsupported) (12/24/17 1100)  Standing - Static: Good (12/24/17 1100)  Standing - Dynamic : Impaired (12/24/17 1100)           Toileting  Adaptive Equipment: Elevated seat;Grab bars (12/18/17 1010)         Harrison Fall Risk Assessment:  Page Hayes Fall Risk  Mobility: Ambulates or transfers with assist devices or assistance/unsteady gait (12/25/17 2246)  Mobility Interventions: Patient to call before getting OOB (12/25/17 2246)  Mentation: Alert, oriented x 3 (12/25/17 2246)  Mentation Interventions: Increase mobility (12/25/17 2246)  Medication: Patient receiving anticonvulsants, sedatives(tranquilizers), psychotropics or hypnotics, hypoglycemics, narcotics, sleep aids, antihypertensives, laxatives, or diuretics (12/25/17 2246)  Medication Interventions: Patient to call before getting OOB (12/25/17 2246)  Elimination: Needs assistance with toileting (12/25/17 2246)  Elimination Interventions: Call light in reach (12/25/17 2246)  Prior Fall History: No (12/25/17 2246)  History of Falls Interventions: Consult care management for discharge planning (12/25/17 2246)  Total Score: 3 (12/25/17 2246)  Standard Fall Precautions: Yes (12/25/17 0705)  High Fall Risk: Yes (12/25/17 2246)     Speech Assessment:         Ambulation:  Gait  Base of Support: Widened (12/15/17 1200)  Speed/Michelle: Slow;Shuffled (12/15/17 1200)  Step Length: Right shortened;Left shortened (12/15/17 1200)  Stance: Right increased; Left increased (12/15/17 1200)  Gait Abnormalities: Trunk sway increased; Step to gait; Decreased step clearance (12/15/17 1200)  Distance (ft): 55 Feet (ft) (25' on first walk) (12/24/17 1100)  Assistive Device: Walker, rolling (12/24/17 1100)  Curbs/Ramps: Minimum assistance (12/07/17 1400)     Labs/Studies:  Recent Results (from the past 72 hour(s))   CBC W/O DIFF    Collection Time: 12/25/17  5:45 AM   Result Value Ref Range    WBC 5.6 4.3 - 11.1 K/uL    RBC 3.03 (L) 4.05 - 5.25 M/uL    HGB 8.8 (L) 11.7 - 15.4 g/dL    HCT 29.4 (L) 35.8 - 46.3 %    MCV 97.0 79.6 - 97.8 FL    MCH 29.0 26.1 - 32.9 PG    MCHC 29.9 (L) 31.4 - 35.0 g/dL    RDW 18.7 (H) 11.9 - 14.6 %    PLATELET 557 887 - 605 K/uL    MPV 9.6 (L) 10.8 - 06.3 FL   METABOLIC PANEL, BASIC    Collection Time: 12/25/17  5:45 AM   Result Value Ref Range    Sodium 136 136 - 145 mmol/L    Potassium 5.7 (H) 3.5 - 5.1 mmol/L    Chloride 99 98 - 107 mmol/L    CO2 26 21 - 32 mmol/L    Anion gap 11 7 - 16 mmol/L    Glucose 83 65 - 100 mg/dL    BUN 22 8 - 23 MG/DL    Creatinine 6.65 (H) 0.6 - 1.0 MG/DL    GFR est AA 8 (L) >60 ml/min/1.73m2    GFR est non-AA 6 (L) >60 ml/min/1.73m2    Calcium 7.3 (L) 8.3 - 10.4 MG/DL   POTASSIUM    Collection Time: 12/26/17  7:06 AM   Result Value Ref Range    Potassium 4.2 3.5 - 5.1 mmol/L       Assessment:     Problem List as of 12/26/2017  Date Reviewed: 3/2/2017          Codes Class Noted - Resolved    Weakness of both legs ICD-10-CM: R29.898  ICD-9-CM: 729.89  11/29/2017 - Present        Renal failure (ARF), acute on chronic ([facility] ICD-10-CM: N17.9, N18.9  ICD-9-CM: 584.9, 585.9  11/29/2017 - Present        Elevated troponin ICD-10-CM: R74.8  ICD-9-CM: 790.6  11/18/2017 - Present        Chest pain ICD-10-CM: R07.9  ICD-9-CM: 786.50  11/18/2017 - Present        CKD (chronic kidney disease) ICD-10-CM: N18.9  ICD-9-CM: 585.9  11/18/2017 - Present        Chronic anemia ICD-10-CM: D64.9  ICD-9-CM: 285.9  11/18/2017 - Present        ESRD (end stage renal disease) ([facility] ICD-10-CM: N18.6  ICD-9-CM: 585.6  11/18/2017 - Present        Abdominal pain ICD-10-CM: R10.9  ICD-9-CM: 789.00  9/18/2017 - Present        H/O TIA (transient ischemic attack) and stroke ICD-10-CM: Z86.73  ICD-9-CM: V12.54  4/13/2017 - Present S/P PTCA (percutaneous transluminal coronary angioplasty) ICD-10-CM: Z98.61  ICD-9-CM: V45.82  4/13/2017 - Present        Mixed hyperlipidemia ICD-10-CM: E78.2  ICD-9-CM: 272.2  4/13/2017 - Present        Vision changes ICD-10-CM: H53.9  ICD-9-CM: 368.9  3/26/2017 - Present        Dizziness ICD-10-CM: R42  ICD-9-CM: 780.4  3/26/2017 - Present        Refusal of blood transfusions as patient is Restorationist (Chronic) ICD-10-CM: Z53.1  ICD-9-CM: V62.6  8/25/2016 - Present        GERD (gastroesophageal reflux disease) ICD-10-CM: K21.9  ICD-9-CM: 530.81  8/8/2016 - Present        Unspecified sleep apnea ICD-10-CM: G47.30  ICD-9-CM: 780.57  8/8/2016 - Present    Overview Signed 8/8/2016 11:09 AM by Glory Galvan     uses cpap machine             CVA (cerebral vascular accident) Hx of TIA ICD-10-CM: I63.9  ICD-9-CM: 434.91  2/22/2016 - Present        Unstable angina ([address] ICD-10-CM: I20.0  ICD-9-CM: 411.1  2/1/2016 - Present        ESRD on peritoneal dialysis [facility]) (Chronic) ICD-10-CM: N18.6, Z99.2  ICD-9-CM: 585.6, V45.11  2/1/2016 - Present        Ischemic cardiomyopathy ICD-10-CM: I25.5  ICD-9-CM: 414.8  12/29/2015 - Present        HLD (hyperlipidemia) ICD-10-CM: E78.5  ICD-9-CM: 272.4  12/29/2015 - Present        Depression ICD-10-CM: F32.9  ICD-9-CM: 224  12/29/2015 - Present        CAD (coronary artery disease) ICD-10-CM: I25.10  ICD-9-CM: 414.00  11/27/2015 - Present        Debility ICD-10-CM: R53.81  ICD-9-CM: 799.3  5/5/2015 - Present        Hypertension (Chronic) ICD-10-CM: I10  ICD-9-CM: 401.9  4/28/2015 - Present        Anemia of chronic renal failure (Chronic) ICD-10-CM: N18.9, D63.1  ICD-9-CM: 285.21, 585.9  4/28/2015 - Present        Hypothyroidism (Chronic) ICD-10-CM: E03.9  ICD-9-CM: 244.9  4/28/2015 - Present        RESOLVED: Postural hypotension ICD-10-CM: I95.1  ICD-9-CM: 458.0  8/9/2016 - 3/26/2017        RESOLVED: Chest pain ICD-10-CM: R07.9  ICD-9-CM: 786.50  8/8/2016 - 3/26/2017 RESOLVED: Nausea ICD-10-CM: R11.0  ICD-9-CM: 787.02  8/8/2016 - 3/26/2017        RESOLVED: S/P PTCA (percutaneous transluminal coronary angioplasty); LAD PTCA of  ISR 11/27/15 ICD-10-CM: Z98.61  ICD-9-CM: V45.82  5/24/2016 - 3/26/2017        RESOLVED: TIA (transient ischemic attack) ICD-10-CM: G45.9  ICD-9-CM: 435.9  2/10/2016 - 3/26/2017        RESOLVED: Edema ICD-10-CM: R60.9  ICD-9-CM: 782.3  12/29/2015 - 3/26/2017        RESOLVED: Anterior myocardial infarction [facility]) ICD-10-CM: I21.09  ICD-9-CM: 410.10  5/21/2015 - 3/26/2017        RESOLVED: STEMI (ST elevation myocardial infarction) ([facility] ICD-10-CM: I21.3  ICD-9-CM: 410.90  4/28/2015 - 2/1/2016              Plan:   Michael Alvarez s/p Unstable Angina/ CAD x NSTEMI  S/p C with Stents with multiple premorbid comorbidities      1. Continue interdisc rehab efforts, endurance and mobility progressing       2. ESRD; on temporary HD via perma cath ; had PD cath dysfxn; Dialysis T,TH,Sat; tolerating well  -will have PD cath revision/placement on 12/18  -12/25 tolerating PD well; K elevated at 5.7; low dose kayexalate 7. 5?? ; will recheck in a.m and if elevated, will treat  -12/26 K improved 4.2     3. R ear Cerumen impaction; no sign of infection but cannot visualize tympanic membrane. Peroxide this a.m and begin debrox bid x 4d; resolved      4. CAD; cont DAPT, life long; on Brilinta ASA and Ranexa; s/p PTCA      5. Chronic hx of Anemia due to ESRD and hx of prior GI bleed; Lovena Sharon witness thus no blood products. On WAYNE and iv iron. cont [id number]; monitor; 8.9 on 12/14.   -12/25 hgb 8.8 stable from 8.4 improved      6. HTN; controlled with betablk, and Norvasc  12/25 bp 140/69 controlled     7. HLD on Crestor.     8. Hypothyroidism; on synthroid     9. DVT prev; on Ranexa and ASA. SCDs     10. GERD/PUD; cont Protonix bid            Time spent was 25 minutes with over 1/2 in direct patient care/examination, consultation and coordination of care.      Signed By: Susan Rivas Malinda Velázquez MD     December 26, 2017

## 2017-12-26 NOTE — PROGRESS NOTES
Pt was up numerous times to [facility] to pass liquid stools(received sorbital earlier). PD completed and disconnected using asceptic technique. Resting. Voices no complaints at this time.

## 2017-12-27 PROCEDURE — 90945 DIALYSIS ONE EVALUATION: CPT

## 2017-12-27 PROCEDURE — 99232 SBSQ HOSP IP/OBS MODERATE 35: CPT | Performed by: PHYSICAL MEDICINE & REHABILITATION

## 2017-12-27 PROCEDURE — 97110 THERAPEUTIC EXERCISES: CPT

## 2017-12-27 PROCEDURE — 92507 TX SP LANG VOICE COMM INDIV: CPT

## 2017-12-27 PROCEDURE — 65310000000 HC RM PRIVATE REHAB

## 2017-12-27 PROCEDURE — 97116 GAIT TRAINING THERAPY: CPT

## 2017-12-27 PROCEDURE — 97530 THERAPEUTIC ACTIVITIES: CPT

## 2017-12-27 PROCEDURE — 74011250637 HC RX REV CODE- 250/637: Performed by: PHYSICAL MEDICINE & REHABILITATION

## 2017-12-27 RX ADMIN — LEVOTHYROXINE SODIUM 150 MCG: 150 TABLET ORAL at 07:18

## 2017-12-27 RX ADMIN — ONDANSETRON 4 MG: 4 TABLET, ORALLY DISINTEGRATING ORAL at 08:52

## 2017-12-27 RX ADMIN — METOPROLOL TARTRATE 12.5 MG: 25 TABLET ORAL at 08:52

## 2017-12-27 RX ADMIN — TORSEMIDE 100 MG: 100 TABLET ORAL at 17:24

## 2017-12-27 RX ADMIN — ASPIRIN 81 MG: 81 TABLET, COATED ORAL at 08:51

## 2017-12-27 RX ADMIN — CALCITRIOL 0.25 MCG: 0.25 CAPSULE, LIQUID FILLED ORAL at 08:51

## 2017-12-27 RX ADMIN — ROSUVASTATIN CALCIUM 20 MG: 20 TABLET, FILM COATED ORAL at 21:28

## 2017-12-27 RX ADMIN — TICAGRELOR 90 MG: 90 TABLET ORAL at 08:52

## 2017-12-27 RX ADMIN — RANOLAZINE 1000 MG: 500 TABLET, FILM COATED, EXTENDED RELEASE ORAL at 21:28

## 2017-12-27 RX ADMIN — PANTOPRAZOLE SODIUM 40 MG: 40 TABLET, DELAYED RELEASE ORAL at 17:24

## 2017-12-27 RX ADMIN — RANOLAZINE 1000 MG: 500 TABLET, FILM COATED, EXTENDED RELEASE ORAL at 08:50

## 2017-12-27 RX ADMIN — RENAGEL 800 MG: 400 TABLET ORAL at 17:24

## 2017-12-27 RX ADMIN — HYDROCODONE BITARTRATE AND ACETAMINOPHEN 1 TABLET: 5; 325 TABLET ORAL at 10:50

## 2017-12-27 RX ADMIN — AMLODIPINE BESYLATE 5 MG: 5 TABLET ORAL at 08:51

## 2017-12-27 RX ADMIN — PANTOPRAZOLE SODIUM 40 MG: 40 TABLET, DELAYED RELEASE ORAL at 07:18

## 2017-12-27 RX ADMIN — GENTAMICIN SULFATE: 1 CREAM TOPICAL at 17:15

## 2017-12-27 RX ADMIN — TICAGRELOR 90 MG: 90 TABLET ORAL at 21:28

## 2017-12-27 RX ADMIN — RENAGEL 800 MG: 400 TABLET ORAL at 08:51

## 2017-12-27 RX ADMIN — TORSEMIDE 100 MG: 100 TABLET ORAL at 08:51

## 2017-12-27 NOTE — PROGRESS NOTES
12/27/17 1153   Time Spent With Patient   Time In 1130   Time Out 1200   Patient Seen For: AM;Verbal activities   Team Conference   Team Conference Date 12/26/17   Family/Caregiver Training   Family Training Needs to be scheduled   Mental Status   Neurologic State Alert   Orientation Level Oriented X4   Cognition Appropriate decision making   Perception Appears intact   Perseveration No perseveration noted   Safety/Judgement Fall prevention   Comprehension (Native Language)   Primary Mode of Comprehension Auditory   Score 6   Expression (Native Language)   Primary Mode of Expression Verbal   Score 6   Social Interaction/Pragmatics   Score 6   Problem Solving   Score 5   Memory   Score 5   Pt making progress toward her goals, however, would benefit from continued ST services 5 times a week to address cognitive impairments in word finding and STM. Pt has reached max rehab potential at this time and will be discharged to home tomorrow with family. Recommend continued home health SLP services. Pt completed word find tasks with increased time with 90% accuracy.     Mike Maher MA/HARSHA/SLP

## 2017-12-27 NOTE — PROGRESS NOTES
PHYSICAL THERAPY DAILY NOTE  Time In: 8344  Time Out: 1001  Patient Seen For: AM;Other (see progress notes);Gait training; Therapeutic exercise;Transfer training    Subjective: Patient had no complaints. Objective: No pain noted. Other (comment) (Fall Risk)  GROSS ASSESSMENT Daily Assessment            BED/MAT MOBILITY Daily Assessment    Supine to Sit : 6 (Modified independent)  Sit to Supine : 6 (Modified independent)       TRANSFERS Daily Assessment    Transfer Type: SPT with walker  Transfer Assistance : 5 (Supervision/setup)  Sit to Stand Assistance: Minimal assistance       GAIT Daily Assessment    Amount of Assistance: 5 (Supervision/setup)  Distance (ft): 150 Feet (ft)  Assistive Device: Walker, rolling       STEPS or STAIRS Daily Assessment    Level of Assist : 0 (Not tested)       BALANCE Daily Assessment            WHEELCHAIR MOBILITY Daily Assessment            LOWER EXTREMITY EXERCISES Daily Assessment    Extremity: Both  Exercise Type #1: Other (comment) (motomed x 10 minutes)  Sets Performed: 1  Reps Performed: 10  Level of Assist: Supervision  Exercise Type #2: Seated lower extremity strengthening  Sets Performed: 2  Reps Performed: 10  Level of Assist: Stand-by assistance          Assessment: Patient seems down about going home. She did state that her son has hired people to come in and help. Plan of Care: Continue with plan of care to reach PT goals. To recreational therapy after treatment.     Kelly Brito, PTA  12/27/2017

## 2017-12-27 NOTE — PROGRESS NOTES
OT Daily Note    Time In 0820   Time Out 0915     Subjective: \"You don't know how I feel. I don't feel like doing anything today. \"  Pain: Fatigue/soreness at R shoulder, patient declined Biofreeze however tolerated light PROM stretch and increased activity    Precautions: Other (comment) (fall risk)    Cognition/Strengthening   Patient agreeable to treatment seated EOB for RUE strengthening/cognitive task. Note patient required maximal encouragement and increased time to initiate/continue participation in therapy this morning d/t various reports of lethargy, nausea, general discomfort, and R shoulder pain. Patient completed [place] board task seated EOB at tray table, using RUE to locate, retrieve numbered discs from base board, and insert into slanted target board forward on tray table in number order L to R and top to bottom. Patient required minimal-moderate cues to recall use of RUE only, to note sequencing error x 1 (patient skipped \"10\") and number selection error x 1 (\"41\" versus \"14\") with increased time required to problem solve errors. Patient reporting R shoulder fatigue/soreness, tolerated light A/PROM stretch in flexion, abduction, internal and external rotation. R shoulder sensitivity noted, patient continued task using LUE only. Note patient only completes numbers 1-17 (out of 60 total) before time expires this session. Assessment: Note attempted to see patient for ADL this AM at 0700 however patient refused, reporting she was too tired, despite encouragement and education regarding purpose of therapy/ADL. Upon therapist's return to patient's room later this AM at [address] [facility], patient is dressed and supine in bed, reporting she had gotten herself up without assistance from staff to complete ADL. Patient reeducated on purpose of therapy and need for supervision from staff at this time for functional mobility to promote increased safety/decreased fall risk, patient verbalized understanding. Decreased recall, safety awareness, and insight noted, with patient reporting, \"I thought you told me to get up and do it myself. \"    Education: Purpose of therapy  Interdisciplinary Communication: Collaborated with Belkis [facility]AR[facility] regarding patient's status and agreed patient is progressing well and on-track to meet goals as stated in [address] [facility] [place]. Plan: Continue to address ADL/IADL, functional mobility, activity tolerance, balance, strengthening, coordination, education, cognition.       Tri Flower, OTR/L

## 2017-12-27 NOTE — PROGRESS NOTES
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 She has a history of GI bleeding and had anemia-induced unstable angina in the past. 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 She states the pain has since resolved with NTG paste.  No fevers or chills. No nausea, vomiting or diarrhea.  She states that she is essentially anuric and occasionally makes minimal urine.  She has a h/o CVA and TIA. No fever or chills, no problems with PD drainage or discoloration of PD fluid. No increased thirst. She wishes regular diet and supplement. She denies any other acute complaints  Her edema has improved in the past couple of days with intensified dialysis.     s- discussed plans for tunnelled catheter removal tomorrow or Friday - for PD clinic visit Friday or Thursday -  PD fluid is cleared nicely and drainage is adequate -  she had a good BM - making gradual progress with PT - no PD catheter dysfunction - no dyspnea, no CP, no N/V - no further episodes of LOC/seizure-like activity  - she will be able to go on cycler prescription upon dischsarge home    Past Medical History:   Diagnosis Date    Anemia of chronic renal failure 4/28/2015    Anterior myocardial infarction [facility]) 5/21/2015    CAD (coronary artery disease)     Chest pain     Chronic kidney disease, stage III (moderate) 8/15/2014    on dialysis    CKD (chronic kidney disease) stage 4, GFR 15-29 ml/min ([facility] [address] 4/28/2015    Debility 5/5/2015    Depression 12/29/2015    Edema 12/29/2015    Endocrine disease     Hypothyroidism    GERD (gastroesophageal reflux disease)     Heart murmur 12/29/2015    HLD (hyperlipidemia) 12/29/2015    Hypertension  Hypothyroidism 4/28/2015    Ischemic cardiomyopathy 12/29/2015    Nausea     S/P PTCA (percutaneous transluminal coronary angioplasty); LAD PTCA of  ISR 11/27/15 5/24/2016    STEMI (ST elevation myocardial infarction) ([facility] [address] 4/28/2015    Unspecified sleep apnea     uses cpap machine    Unstable angina ([facility] [address]       Past Surgical History:   Procedure Laterality Date    HX BACK SURGERY  1990    neck surgery cervical disc    HX BACK SURGERY      lower back    HX CATARACT REMOVAL Bilateral     HX CHOLECYSTECTOMY  19702    gall bladder     HX KNEE REPLACEMENT Right 2006    HX PTCA  4/28/2015    2.25 Xience stent to mid LAD for occluded artery, anterior MI, EF 25%. Moderate disease distal LAD and distal OM PCI CX and RCA 2004, then PCI RCA and LAD in 2009. Prior to Admission medications    Medication Sig Start Date End Date Taking? Authorizing Provider   metoprolol tartrate (LOPRESSOR) 25 mg tablet Take 0.5 Tabs by mouth daily. 11/27/17  Yes MINDY Chatman   amLODIPine (NORVASC) 5 mg tablet Take 1 Tab by mouth daily. 11/27/17  Yes MINDY Chatman   torsemide (DEMADEX) 100 mg tablet Take 1 Tab by mouth two (2) times a day. 11/27/17  Yes MINDY Chatman   pantoprazole (PROTONIX) 40 mg tablet Take 1 Tab by mouth Before breakfast and dinner. 11/27/17  Yes MINDY Chatman   magnesium oxide (MAG-OX) 400 mg tablet Take 400 mg by mouth daily. Yes Historical Provider   metoclopramide HCl (REGLAN) 5 mg tablet Take 5 mg by mouth two (2) times a day. Yes Historical Provider   ticagrelor (BRILINTA) 90 mg tablet Take 1 Tab by mouth two (2) times a day. 2/4/16  Yes MINDY Chatman   aspirin delayed-release 81 mg tablet Take 1 Tab by mouth daily. 5/5/15  Yes Gisela Hollingsworth PA-C   rosuvastatin (CRESTOR) 20 mg tablet Take 20 mg by mouth nightly. Yes Historical Provider   levothyroxine (SYNTHROID) 150 mcg tablet Take 150 mcg by mouth Daily (before breakfast).    Yes Historical Provider   cyanocobalamin (VITAMIN B12) 1,000 mcg/mL injection 1 mL by IntraMUSCular route every seven (7) days. Indications: Vitamin B12 Deficiency 12/2/17   MINDY Gregory   folic acid (FOLVITE) 1 mg tablet Take 1 mg by mouth daily. Historical Provider   potassium chloride (K-DUR, KLOR-CON) 20 mEq tablet Take 20 mEq by mouth two (2) times a day. Historical Provider   traZODone (DESYREL) 50 mg tablet Take  by mouth nightly. Historical Provider   megestrol (MEGACE) 400 mg/10 mL (40 mg/mL) suspension Take 200 mg by mouth daily. Historical Provider   sucralfate (CARAFATE) 100 mg/mL suspension Take 10 mL by mouth Before breakfast, lunch, dinner and at bedtime. Indications: PREVENTION OF STRESS ULCER 9/23/17   Jeison Mcrae,    nitroglycerin (NITROLINGUAL) 400 mcg/spray spray 1 Spray by SubLINGual route every five (5) minutes as needed for Chest Pain. Historical Provider   linaclotide [place]) 145 mcg cap capsule Take  by mouth Daily (before breakfast). Historical Provider   PNV NO.122/IRON/FOLIC ACID (PRENATAL MULTI PO) Take  by mouth. Historical Provider   ondansetron (ZOFRAN ODT) 4 mg disintegrating tablet Take 4 mg by mouth three (3) times daily as needed. Historical Provider   sevelamer carbonate (RENVELA) 800 mg tab tab Take 800 mg by mouth three (3) times daily (with meals). Historical Provider   gentamicin (GARAMYCIN) 0.1 % topical cream APPLY TO PD catheter exit site at daily dressing change 5/12/15   Elsy Cherry MD   darbepoetin toya in polysorbat (ARANESP, POLYSORBATE,) 40 mcg/mL injection 40 mcg by SubCUTAneous route every fourteen (14) days. Indications: ANEMIA IN CHRONIC KIDNEY DISEASE    Historical Provider   ranolazine ER (RANEXA) 500 mg SR tablet Take 500 mg by mouth two (2) times a day.     Historical Provider     Allergies   Allergen Reactions    Codeine Nausea and Vomiting      Social History   Substance Use Topics    Smoking status: Never Smoker  Smokeless tobacco: Never Used    Alcohol use No      Family History   Problem Relation Age of Onset    Heart Disease Mother     Hypertension Mother     Cancer Mother      Lung    Stroke Father     Hypertension Father     Breast Cancer Neg Hx           Review of Systems    Constitutional: no fever, weak  Eyes: fair vision,    Ears, nose, mouth, throat, and face:fair hearing,   Respiratory: no asthma,  Chronic home O2 is being used - improved dyspnea  Cardiovascular:no palpitation, no  chest pain,   Gastrointestinal:no diarrhea,  Had BM today  Genitourinary: no dysuria,   Hematologic/lymphatic: no bleeding tendency,   Neurological: no seizures   Behvioral/Psych: no psych hospitalization   Endocrine: no goiter,       Objective:       Visit Vitals    /76 (BP 1 Location: Right arm, BP Patient Position: At rest)    Pulse 82    Temp 98.3 °F (36.8 °C)    Resp 20    Wt 93.5 kg (206 lb 3.2 oz)    SpO2 97%    BMI 30.01 kg/m2       General:  Alert, cooperative, no distress, appears stated age. Head:  Normocephalic, without obvious abnormality, atraumatic. Eyes:  Conjunctivae/corneas clear. EOMs intact. Throat: Lips, mucosa, and tongue normal. Teeth and gums normal.   Neck: Supple, symmetrical, trachea midline, no adenopathy,  no JVD. Lungs:   Clear to auscultation bilaterally. Heart:  Regular rate and rhythm, S1, S2 normal, 2/6 systolic  murmur, no rub or gallop. Abdomen:   Soft, non-tender. No masses,  No organomegaly. PD catheter in place without exudate   Extremities: Extremities normal, atraumatic, no cyanosis. 2-3+edema, unchanged. Skin: Skin color, texture, turgor normal. No rashes or lesions. Neurologic: Grossly intact. No asterixis. Results for Poonam Lees (MRN [id number]) as of 12/26/2017 14:52   Ref.  Range 12/21/2017 06:12 12/25/2017 05:45   WBC Latest Ref Range: 4.3 - 11.1 K/uL 7.0 5.6   RBC Latest Ref Range: 4.05 - 5.25 M/uL 2.85 (L) 3.03 (L)   HGB Latest Ref Range: 11.7 - 15.4 g/dL 8.4 (L) 8.8 (L)   HCT Latest Ref Range: 35.8 - 46.3 % 27.8 (L) 29.4 (L)   MCV Latest Ref Range: 79.6 - 97.8 FL 97.5 97.0   MCH Latest Ref Range: 26.1 - 32.9 PG 29.5 29.0   MCHC Latest Ref Range: 31.4 - 35.0 g/dL 30.2 (L) 29.9 (L)   RDW Latest Ref Range: 11.9 - 14.6 % 18.8 (H) 18.7 (H)   PLATELET Latest Ref Range: 150 - 450 K/uL 298 296   MPV Latest Ref Range: 10.8 - 14.1 FL 9.7 (L) 9.6 (L)   Sodium Latest Ref Range: 136 - 145 mmol/L 135 (L) 136   Potassium Latest Ref Range: 3.5 - 5.1 mmol/L 4.6 5.7 (H)   Chloride Latest Ref Range: 98 - 107 mmol/L 97 (L) 99   CO2 Latest Ref Range: 21 - 32 mmol/L 28 26   Anion gap Latest Ref Range: 7 - 16 mmol/L 10 11   Glucose Latest Ref Range: 65 - 100 mg/dL 84 83   BUN Latest Ref Range: 8 - 23 MG/DL 32 (H) 22   Creatinine Latest Ref Range: 0.6 - 1.0 MG/DL 7.45 (H) 6.65 (H)   Calcium Latest Ref Range: 8.3 - 10.4 MG/DL 9.7 7.3 (L)   GFR est non-AA Latest Ref Range: >60 ml/min/1.73m2 6 (L) 6 (L)   GFR est AA Latest Ref Range: >60 ml/min/1.73m2 7 (L) 8 (L)       Data Review:     Active Problems:    Hypothyroidism (4/28/2015)      S/P PTCA (percutaneous transluminal coronary angioplasty) (4/13/2017)      ESRD (end stage renal disease) ([facility] [address] (11/18/2017)      Weakness of both legs (11/29/2017)      Renal failure (ARF), acute on chronic (HCC) (11/29/2017)        Assessment:     1. CAD x NSTEMI, Unstable angina -  - [facility] with complex CAD and acute thrombotic lesion in pLAD and subtotal occlusion in mLAD. The RCA has severe proximal and mid RCA disease. She has additional stenting of LAD with Resolute in mid/distal and proximal vessel. These all touched the previously stented mid vessel. Recommend life-long DAPT    2, ESRD -  - S/P a course of temporary HD via perm cath due to PD catheter dysfunction  - tolerated gentle 0.5 l fluid removal well yesterday on HD   -  Switch to PD has been tolerated well - increase to 1.8 exchanges    3.  Fluid excess -  - resolved with fluid removal on HD  - now again with some edema, but no dyspnea    4. Anemia -  - intensive anemia therapy in [employer]  - on WAYNE and IV iron and B12  - improved S/P BT  - she is below goal, but not low enough for transfusion    5. History of GI bleed -  - monitor clinically and serial Hb  - she had a drop in Hb to 7 range and improved with BT    6. Hypokalemia   - resolved     7. Abdominal pain and tenderness with drop in Hb , on DAPT   No evidence of retroperitoneal hematoma     8. PD catheter dysfunction -  - S/P PD catheter revision / replacement   - PD catheter function improved after heparin IP    9. Possible new onset seizure -  - suspect hypotensive/arrhythmia episode induced by fluid removal on dialysis in combination with anesthesia    10. PD Catheter dysfunction  - resolved after IP heparin x one dose 81809 units  - continue Dulcolax suppository to Miralax    11. Constipation -  -  treat with Miralaz/Dulcolax/aggressive ambulation   - better with addition of Sorbitol     12.  HD access -  - I'll remove tunnelled HD catheter tomorrow before discharge or the next day as outpatient in PD clinic    Plan:     As above - 25

## 2017-12-27 NOTE — PROGRESS NOTES
End Of Shift Functional Summary, Nursing      TOILETING:  Does patient need assist with clothing management and/or pericare? No    TOILET TRANSFER:  Pt requires standby assistance/setup. Pt uses grab bars. BLADDER:  Pt does not have a castillo catheter that staff manages. Pt has peritoneal dialysis but occasionally voids. Pt has had 0 bladder accidents during this shift.  (An accident is when the episode is not contained in a brief AND/OR the clothing/linen requires changing/cleaning up.)    BOWEL:  Pt does take medication. Pt is continent of bowel and uses toilet. Pt has had 0 bowel accidents during this shift. (An accident is when the episode is not contained in a brief AND/OR the clothing/linen requires changing/cleaning up.)    BED/CHAIR TRANSFER  Pt requires standby assistance/setup. Patient requires the assistance of 1 staff member(s). Pt uses walker    EATING  Pt requires no assistance. Pt wears dentures. TUBE FEEDINGS:  Pt does not  receive nutrition through tube feedings. Patient requires no assistance with feedings. Documentation reviewed and plan of care discussed/reviewed with   patient, oncoming nurse and patient assistant during the shift.

## 2017-12-27 NOTE — PROGRESS NOTES
Subjective \"I am ready to go home. \"   Activity Sorting activity   Strength/Endurance Patient was able to tolerated the session even though she c/o being tired from not sleeping well last night. She complained of her R arm hurting and preferred to to use it as much. Balance NT   Social Interaction Patient was cooperative during the session. Cognitive A&O X4; Patient needed extra time to complete the shaped sorting activity and needed some redirection. Comments Patient was handed off to Feliberto Rosas at the end of the session. Pt D/C summary completed on 12/27/17. Please see for specifics in [address] Patient expected to be d/c'd to home on 12/27/17.   Nnamdi Flores, AYANS

## 2017-12-27 NOTE — PROGRESS NOTES
PHYSICAL THERAPY DAILY NOTE  Time In: 1300  Time Out: 5750  Patient Seen For: PM;Other (see progress notes);Gait training; Therapeutic exercise;Transfer training    Subjective: Patient had no complaints. Objective: Patient seemed confused this PM and worried about going home. Other (comment) (Fall Risk)  GROSS ASSESSMENT Daily Assessment            BED/MAT MOBILITY Daily Assessment    Supine to Sit : 6 (Modified independent)  Sit to Supine : 6 (Modified independent)       TRANSFERS Daily Assessment    Transfer Type: SPT with walker  Transfer Assistance : 5 (Supervision/setup)  Sit to Stand Assistance: Minimal assistance       GAIT Daily Assessment    Amount of Assistance: 5 (Supervision/setup)  Distance (ft): 150 Feet (ft)  Assistive Device: Walker, rolling       STEPS or STAIRS Daily Assessment    Level of Assist : 0 (Not tested)       BALANCE Daily Assessment            WHEELCHAIR MOBILITY Daily Assessment            LOWER EXTREMITY EXERCISES Daily Assessment    Extremity: Both  Exercise Type #1: Seated lower extremity strengthening  Sets Performed: 2  Reps Performed: 10  Level of Assist: Supervision  Exercise Type #2: Seated lower extremity strengthening  Sets Performed: 2  Reps Performed: 10  Level of Assist: Stand-by assistance          Assessment: Patient making good progress. Plan of Care: Continue with plan of care to reach PT goals. Returned to room to recliner with call josue at reach.     Yogi Baugh, PTA  12/27/2017

## 2017-12-27 NOTE — PROGRESS NOTES
Pt sitting on side of bed staring at breakfast tray. States she feels bad and just cannot eat. Says her nausea is \"aweful\" and \"just will not go away\". Alert and oriented x4. Lungs sounds clear bilaterally with respirations even and unlabored. Bowel sounds active in all quadrants. PD cath in place. Left femoral HD cath in place but not being used. Last dressing change on HD cath was 12/19/2017. ?can it be removed?  +2 pulses bilaterally in upper and lower extremities. Instructed to call for assistance. Call light in reach. Voiced understanding and identified call light.

## 2017-12-27 NOTE — PROGRESS NOTES
Deborah Cabrera MD,   Medical Director  [address] [facility], [address] [facility]  Tel: [phone]       SFD PROGRESS NOTE    Stacie Tobin  Admit Date: 11/29/2017  Admit Diagnosis: DEBILITY; Renal failure (ARF), acute on chronic ([address] Weakne*    Subjective     C/o right shoulder being sore. Tired of being pushed by therapy. Apprehensive about going home tomorrow. Still thinks she needs speech therapy, \" I still sometimes struggle with the right words. \" Refusing to go to gym this a.m. \" I just dont feel well inside. \" no specific complaint however  Loose, freq stools resolved    Objective:     Current Facility-Administered Medications   Medication Dose Route Frequency    sorbitol 70 % solution 60 mL  60 mL Oral QID PRN    senna-docusate (PERICOLACE) 8.6-50 mg per tablet 2 Tab  2 Tab Oral BID PRN    senna-docusate (PERICOLACE) 8.6-50 mg per tablet 2 Tab  2 Tab Oral DAILY    gentamicin (GARAMYCIN) 0.1 % cream   Topical DIALYSIS PRN    polyethylene glycol (MIRALAX) packet 17 g  17 g Oral DAILY    bisacodyl (DULCOLAX) suppository 10 mg  10 mg Rectal DAILY PRN    heparin (porcine) 1,000 unit/mL injection 5,000 Units  5,000 Units Hemodialysis DIALYSIS PRN    zolpidem (AMBIEN) tablet 5 mg  5 mg Oral QHS PRN    acetaminophen (TYLENOL) tablet 650 mg  650 mg Oral Q4H PRN    amLODIPine (NORVASC) tablet 5 mg  5 mg Oral DAILY    aspirin delayed-release tablet 81 mg  81 mg Oral DAILY    calcitRIOL (ROCALTROL) capsule 0.25 mcg  0.25 mcg Oral DAILY    cyanocobalamin (VITAMIN B12) injection 1,000 mcg  1,000 mcg IntraMUSCular Q7D    epoetin toya (EPOGEN;PROCRIT) injection 20,000 Units  20,000 Units SubCUTAneous Q TUE, THU & SAT    HYDROcodone-acetaminophen (NORCO) 5-325 mg per tablet 1 Tab  1 Tab Oral Q4H PRN    hydrogen peroxide 3 %   Topical PRN    levothyroxine (SYNTHROID) tablet 150 mcg  150 mcg Oral ACB    linaclotide (LINZESS) capsule 145 mcg (Patient Supplied)  145 mcg Oral DAILY    LORazepam (ATIVAN) tablet 1 mg  1 mg Oral Q6H PRN    metoprolol tartrate (LOPRESSOR) tablet 12.5 mg  12.5 mg Oral DAILY    multivitamin w ZN (STRESSTABS W ZINC) tablet  1 Tab Oral DAILY    ondansetron (ZOFRAN ODT) tablet 4 mg  4 mg Oral TID PRN    pantoprazole (PROTONIX) tablet 40 mg  40 mg Oral ACB&D    polyethylene glycol (MIRALAX) packet 17 g  17 g Oral DAILY PRN    ranolazine ER (RANEXA) tablet 1,000 mg  1,000 mg Oral BID    rosuvastatin (CRESTOR) tablet 20 mg  20 mg Oral QHS    sevelamer (RENAGEL) tablet 800 mg  800 mg Oral TID WITH MEALS    sodium chloride (NS) flush 5-10 mL  5-10 mL IntraVENous PRN    sucralfate (CARAFATE) 100 mg/mL oral suspension 1 g  1 g Oral AC&HS    ticagrelor (BRILINTA) tablet 90 mg  90 mg Oral BID    torsemide (DEMADEX) tablet 100 mg  100 mg Oral BID     Review of Systems:Denies chest pain, shortness of breath, cough, headache, visual problems, abdominal pain, dysurea, calf pain. Pertinent positives are as noted in the medical records and unremarkable otherwise. Visit Vitals    /76 (BP 1 Location: Right arm, BP Patient Position: At rest)    Pulse 82    Temp 98.3 °F (36.8 °C)    Resp 20    Wt 206 lb 3.2 oz (93.5 kg)    SpO2 97%    BMI 30.01 kg/m2        Physical Exam:   General: Alert and age appropriately oriented. No acute cardio respiratory distress. HEENT: Normocephalic,no scleral icterus  Oral mucosa moist without cyanosis   Lungs: Clear to auscultation  bilaterally. Respiration even and unlabored   Heart: Regular rate and rhythm, S1, S2   No  murmurs, clicks, rub or gallops   Abdomen: Soft, non-tender, nondistended. Bowel sounds present. No organomegaly. PD cath c/d/i   Genitourinary: defer . Neuromuscular:      Generalized prox> distal weakness  Non focal; did not want to participate in exam today   Skin/extremity: No rashes, no erythema.  No calf tenderness BLE  IV cath left inner thigh c/d/i Functional Assessment:  Gross Assessment  AROM: Generally decreased, functional (12/24/17 1100)  Strength: Generally decreased, functional (12/24/17 1100)  Coordination: Generally decreased, functional (12/24/17 1100)  Sensation: Intact (12/24/17 1100)       Balance  Sitting - Static: Good (unsupported) (12/26/17 1500)  Sitting - Dynamic: Good (unsupported) (12/26/17 1500)  Standing - Static: Good (12/26/17 1500)  Standing - Dynamic : Impaired (12/26/17 1500)           Toileting  Adaptive Equipment: Elevated seat;Grab bars (12/18/17 1010)         Harrison Fall Risk Assessment:  Alejandra Shafer Fall Risk  Mobility: Ambulates or transfers with assist devices or assistance/unsteady gait (12/27/17 0720)  Mobility Interventions: Bed/chair exit alarm;Communicate number of staff needed for ambulation/transfer;Patient to call before getting OOB;PT Consult for mobility concerns;PT Consult for assist device competence;Strengthening exercises (ROM-active/passive); Utilize walker, cane, or other assitive device (12/27/17 0720)  Mentation: Alert, oriented x 3 (12/27/17 0720)  Mentation Interventions: Bed/chair exit alarm;Evaluate medications/consider consulting pharmacy; Increase mobility;More frequent rounding; Toileting rounds (12/27/17 0720)  Medication: Patient receiving anticonvulsants, sedatives(tranquilizers), psychotropics or hypnotics, hypoglycemics, narcotics, sleep aids, antihypertensives, laxatives, or diuretics (12/27/17 0720)  Medication Interventions: Bed/chair exit alarm;Evaluate medications/consider consulting pharmacy; Patient to call before getting OOB; Teach patient to arise slowly (12/27/17 0720)  Elimination: Needs assistance with toileting (12/27/17 0720)  Elimination Interventions: Bed/chair exit alarm;Call light in reach; Patient to call for help with toileting needs; Toileting schedule/hourly rounds (12/27/17 0720)  Prior Fall History: No (12/27/17 0720)  History of Falls Interventions: Consult care management for discharge planning;Evaluate medications/consider consulting pharmacy (12/27/17 0720)  Total Score: 3 (12/27/17 0720)  Standard Fall Precautions: Yes (12/26/17 0705)  High Fall Risk: Yes (12/27/17 0720)     Speech Assessment:         Ambulation:  Gait  Base of Support: Widened (12/15/17 1200)  Speed/Michelle: Slow;Shuffled (12/15/17 1200)  Step Length: Right shortened;Left shortened (12/15/17 1200)  Stance: Right increased; Left increased (12/15/17 1200)  Gait Abnormalities: Trunk sway increased; Step to gait; Decreased step clearance (12/15/17 1200)  Distance (ft): 175 Feet (ft) (12/26/17 1000)  Assistive Device: Walker, rolling (12/26/17 1000)  Rail Use: Both (12/26/17 1500)  Curbs/Ramps: Minimum assistance (12/07/17 1400)     Labs/Studies:  Recent Results (from the past 72 hour(s))   CBC W/O DIFF    Collection Time: 12/25/17  5:45 AM   Result Value Ref Range    WBC 5.6 4.3 - 11.1 K/uL    RBC 3.03 (L) 4.05 - 5.25 M/uL    HGB 8.8 (L) 11.7 - 15.4 g/dL    HCT 29.4 (L) 35.8 - 46.3 %    MCV 97.0 79.6 - 97.8 FL    MCH 29.0 26.1 - 32.9 PG    MCHC 29.9 (L) 31.4 - 35.0 g/dL    RDW 18.7 (H) 11.9 - 14.6 %    PLATELET 334 068 - 830 K/uL    MPV 9.6 (L) 10.8 - 76.0 FL   METABOLIC PANEL, BASIC    Collection Time: 12/25/17  5:45 AM   Result Value Ref Range    Sodium 136 136 - 145 mmol/L    Potassium 5.7 (H) 3.5 - 5.1 mmol/L    Chloride 99 98 - 107 mmol/L    CO2 26 21 - 32 mmol/L    Anion gap 11 7 - 16 mmol/L    Glucose 83 65 - 100 mg/dL    BUN 22 8 - 23 MG/DL    Creatinine 6.65 (H) 0.6 - 1.0 MG/DL    GFR est AA 8 (L) >60 ml/min/1.73m2    GFR est non-AA 6 (L) >60 ml/min/1.73m2    Calcium 7.3 (L) 8.3 - 10.4 MG/DL   POTASSIUM    Collection Time: 12/26/17  7:06 AM   Result Value Ref Range    Potassium 4.2 3.5 - 5.1 mmol/L       Assessment:     Problem List as of 12/27/2017  Date Reviewed: 3/2/2017          Codes Class Noted - Resolved    Weakness of both legs ICD-10-CM: R29.898  ICD-9-CM: 729.89  11/29/2017 - Present        Renal failure (ARF), acute on chronic (HCC) ICD-10-CM: N17.9, N18.9  ICD-9-CM: 584.9, 585.9  11/29/2017 - Present        Elevated troponin ICD-10-CM: R74.8  ICD-9-CM: 790.6  11/18/2017 - Present        Chest pain ICD-10-CM: R07.9  ICD-9-CM: 786.50  11/18/2017 - Present        CKD (chronic kidney disease) ICD-10-CM: N18.9  ICD-9-CM: 585.9  11/18/2017 - Present        Chronic anemia ICD-10-CM: D64.9  ICD-9-CM: 285.9  11/18/2017 - Present        ESRD (end stage renal disease) ([facility] ICD-10-CM: N18.6  ICD-9-CM: 585.6  11/18/2017 - Present        Abdominal pain ICD-10-CM: R10.9  ICD-9-CM: 789.00  9/18/2017 - Present        H/O TIA (transient ischemic attack) and stroke ICD-10-CM: Z86.73  ICD-9-CM: V12.54  4/13/2017 - Present        S/P PTCA (percutaneous transluminal coronary angioplasty) ICD-10-CM: Z98.61  ICD-9-CM: V45.82  4/13/2017 - Present        Mixed hyperlipidemia ICD-10-CM: E78.2  ICD-9-CM: 272.2  4/13/2017 - Present        Vision changes ICD-10-CM: H53.9  ICD-9-CM: 368.9  3/26/2017 - Present        Dizziness ICD-10-CM: R42  ICD-9-CM: 780.4  3/26/2017 - Present        Refusal of blood transfusions as patient is Jain (Chronic) ICD-10-CM: Z53.1  ICD-9-CM: V62.6  8/25/2016 - Present        GERD (gastroesophageal reflux disease) ICD-10-CM: K21.9  ICD-9-CM: 530.81  8/8/2016 - Present        Unspecified sleep apnea ICD-10-CM: G47.30  ICD-9-CM: 780.57  8/8/2016 - Present    Overview Signed 8/8/2016 11:09 AM by Ketty Bruce     uses cpap machine             CVA (cerebral vascular accident) Hx of TIA ICD-10-CM: I63.9  ICD-9-CM: 434.91  2/22/2016 - Present        Unstable angina (HCC) ICD-10-CM: I20.0  ICD-9-CM: 411.1  2/1/2016 - Present        ESRD on peritoneal dialysis ([facility] (Chronic) ICD-10-CM: N18.6, Z99.2  ICD-9-CM: 585.6, V45.11  2/1/2016 - Present        Ischemic cardiomyopathy ICD-10-CM: I25.5  ICD-9-CM: 414.8  12/29/2015 - Present        HLD (hyperlipidemia) ICD-10-CM: E78.5  ICD-9-CM: 272.4  12/29/2015 - Present        Depression ICD-10-CM: F32.9  ICD-9-CM: 218  12/29/2015 - Present        CAD (coronary artery disease) ICD-10-CM: I25.10  ICD-9-CM: 414.00  11/27/2015 - Present        Debility ICD-10-CM: R53.81  ICD-9-CM: 799.3  5/5/2015 - Present        Hypertension (Chronic) ICD-10-CM: I10  ICD-9-CM: 401.9  4/28/2015 - Present        Anemia of chronic renal failure (Chronic) ICD-10-CM: N18.9, D63.1  ICD-9-CM: 285.21, 585.9  4/28/2015 - Present        Hypothyroidism (Chronic) ICD-10-CM: E03.9  ICD-9-CM: 244.9  4/28/2015 - Present        RESOLVED: Postural hypotension ICD-10-CM: I95.1  ICD-9-CM: 458.0  8/9/2016 - 3/26/2017        RESOLVED: Chest pain ICD-10-CM: R07.9  ICD-9-CM: 786.50  8/8/2016 - 3/26/2017        RESOLVED: Nausea ICD-10-CM: R11.0  ICD-9-CM: 787.02  8/8/2016 - 3/26/2017        RESOLVED: S/P PTCA (percutaneous transluminal coronary angioplasty); LAD PTCA of  ISR 11/27/15 ICD-10-CM: Z98.61  ICD-9-CM: V45.82  5/24/2016 - 3/26/2017        RESOLVED: TIA (transient ischemic attack) ICD-10-CM: G45.9  ICD-9-CM: 435.9  2/10/2016 - 3/26/2017        RESOLVED: Edema ICD-10-CM: R60.9  ICD-9-CM: 782.3  12/29/2015 - 3/26/2017        RESOLVED: An[facility] infarction ([facility] ICD-10-CM: I21.09  ICD-9-CM: 410.10  5/21/2015 - 3/26/2017        RESOLVED: STEMI (ST elevation myocardial infarction) ([address] ICD-10-CM: I21.3  ICD-9-CM: 410.90  4/28/2015 - 2/1/2016              Plan:   Morena Rosario s/p Unstable Angina/ CAD x NSTEMI  S/p [facility] with Stents with multiple premorbid comorbidities    12/27 dc tomorrow. Pt reluctant. At supervision with ADLs and mobility. HH PT /OT/Aide/ RN; [facility] well. Will defer to Dr Radha Crook. Need to notify Dr Natasha Hurst of dc and get LLE IV cath out today. PD cath functioning well. 1. Continue interdisc rehab efforts, endurance and mobility progressing ; ready for dc 12/28      2.  ESRD; on temporary HD via perma cath ; had PD cath dysfxn; Dialysis T,TH,Sat; tolerating well  -will have PD cath revision/placement on 12/18  -12/25 tolerating PD well; K elevated at 5.7; low dose kayexalate 7. 5?? ; will recheck in a.m and if elevated, will treat  -12/26 K improved 4.2      3. R ear Cerumen impaction; no sign of infection but cannot visualize tympanic membrane. Peroxide this a.m and begin debrox bid x 4d; resolved      4. CAD; cont DAPT, life long; on Brilinta ASA and Ranexa; s/p PTCA      5. Chronic hx of Anemia due to ESRD and hx of prior GI bleed; Esaw Breeze witness thus no blood products. On WAYNE and iv iron. cont [id number]; monitor; 8.9 on 12/14.   -12/25 hgb 8.8 stable from 8.4 improved      6. HTN; controlled with betablk, and Norvasc  12/25 bp 140/69 controlled      7. HLD on Crestor.      8. Hypothyroidism; on synthroid      9. DVT prev; on Ranexa and ASA. SCDs      10. GERD/PUD; cont Protonix bid          Time spent was 25 minutes with over 1/2 in direct patient care/examination, consultation and coordination of care.      Signed By: Eyad Martinez MD     December 27, 2017

## 2017-12-27 NOTE — PROGRESS NOTES
Problem: Falls - Risk of  Goal: *Absence of Falls  Document Harrison Fall Risk and appropriate interventions in the flowsheet.    Outcome: Progressing Towards Goal  Fall Risk Interventions:  Mobility Interventions: Utilize walker, cane, or other assitive device    Mentation Interventions: Increase mobility, Evaluate medications/consider consulting pharmacy    Medication Interventions: Patient to call before getting OOB    Elimination Interventions: Call light in reach    History of Falls Interventions: Consult care management for discharge planning, Door open when patient unattended

## 2017-12-27 NOTE — DIALYSIS
PD treatment completed. Observed pt disconnected from PD cycler. Verified betadine cap intact. Patient tolerated well. UF amount: 803 ml removed. Effluent: clear, yellow, light.

## 2017-12-27 NOTE — PROGRESS NOTES
OT Daily Note    Time In 1032   Time Out 1118     Subjective: \"I can't do those now, my [R] arm hurts too much. \"  Pain: 8/10 R shoulder, activity deferred and RN provides pain medication    Precautions: Other (comment) (fall risk)     Cognition/Strengthening   Patient continued RUE strengthening/cognitive task initiated during AM session seated at tabletop in therapy gym, note patient continued to require maximal encouragement and increased time to initiate/continue participation in therapy this session. Patient continued ArZAINA PHARMAas board task, using RUE to locate, retrieve numbered discs from base board, and insert into slanted target board forward on tabletop in number order L to R and top to bottom. Patient continued to require minimal-moderate cues and increased time for problem solving with sequencing and correct number selection (note patient randomly placing discs out of number sequence). Patient reporting R shoulder fatigue/soreness, continued task using LUE only with 1.5# cuff weight donned. Note patient only completes numbers 17-30 (out of 60 total) before time expires this session. Education   Patient educated regarding dowel ashutosh BRANDENLISETTE HEP for BUE seated in wheelchair. Patient verbalized understanding for all BUE therapeutic exercises included in HEP including shoulder flexion, scaption, horizontal adduction/abduction, internal/external rotation, chest press, overhead press, elbow flexion, and elbow extension. Printed copy of HEP provided to patient, note patient declines participation in HEP this session d/t R shoulder pain. Assessment: Patient verbalized understanding for HEP. Continues with decreased problem solving for cognitive tasks however demonstrates decreased insight into deficits. Education: HEP for BUE  Interdisciplinary Communication: Collaborated with Iwona TARIQ and agreed patient is progressing well and on-track to meet goals as stated in POC. Plan:  To complete discharge ADL next OT session.     Kendal Hooks, OTR/L

## 2017-12-28 VITALS
TEMPERATURE: 98.1 F | BODY MASS INDEX: 31 KG/M2 | OXYGEN SATURATION: 97 % | HEART RATE: 77 BPM | RESPIRATION RATE: 16 BRPM | DIASTOLIC BLOOD PRESSURE: 77 MMHG | WEIGHT: 213 LBS | SYSTOLIC BLOOD PRESSURE: 159 MMHG

## 2017-12-28 LAB
ANION GAP SERPL CALC-SCNC: 10 MMOL/L (ref 7–16)
BUN SERPL-MCNC: 28 MG/DL (ref 8–23)
CALCIUM SERPL-MCNC: 9.9 MG/DL (ref 8.3–10.4)
CHLORIDE SERPL-SCNC: 100 MMOL/L (ref 98–107)
CO2 SERPL-SCNC: 25 MMOL/L (ref 21–32)
CREAT SERPL-MCNC: 7.47 MG/DL (ref 0.6–1)
ERYTHROCYTE [DISTWIDTH] IN BLOOD BY AUTOMATED COUNT: 18.7 % (ref 11.9–14.6)
GLUCOSE SERPL-MCNC: 98 MG/DL (ref 65–100)
HCT VFR BLD AUTO: 27.4 % (ref 35.8–46.3)
HGB BLD-MCNC: 8.4 G/DL (ref 11.7–15.4)
MCH RBC QN AUTO: 29.1 PG (ref 26.1–32.9)
MCHC RBC AUTO-ENTMCNC: 30.7 G/DL (ref 31.4–35)
MCV RBC AUTO: 94.8 FL (ref 79.6–97.8)
PLATELET # BLD AUTO: 342 K/UL (ref 150–450)
PMV BLD AUTO: 9.3 FL (ref 10.8–14.1)
POTASSIUM SERPL-SCNC: 3.8 MMOL/L (ref 3.5–5.1)
RBC # BLD AUTO: 2.89 M/UL (ref 4.05–5.25)
SODIUM SERPL-SCNC: 135 MMOL/L (ref 136–145)
WBC # BLD AUTO: 7.2 K/UL (ref 4.3–11.1)

## 2017-12-28 PROCEDURE — 36415 COLL VENOUS BLD VENIPUNCTURE: CPT | Performed by: PHYSICAL MEDICINE & REHABILITATION

## 2017-12-28 PROCEDURE — 97116 GAIT TRAINING THERAPY: CPT

## 2017-12-28 PROCEDURE — 80048 BASIC METABOLIC PNL TOTAL CA: CPT | Performed by: PHYSICAL MEDICINE & REHABILITATION

## 2017-12-28 PROCEDURE — 97535 SELF CARE MNGMENT TRAINING: CPT

## 2017-12-28 PROCEDURE — 02PA33Z REMOVAL OF INFUSION DEVICE FROM HEART, PERCUTANEOUS APPROACH: ICD-10-PCS | Performed by: INTERNAL MEDICINE

## 2017-12-28 PROCEDURE — 85027 COMPLETE CBC AUTOMATED: CPT | Performed by: PHYSICAL MEDICINE & REHABILITATION

## 2017-12-28 PROCEDURE — 74011000250 HC RX REV CODE- 250: Performed by: INTERNAL MEDICINE

## 2017-12-28 PROCEDURE — 74011250637 HC RX REV CODE- 250/637: Performed by: INTERNAL MEDICINE

## 2017-12-28 PROCEDURE — 74011250637 HC RX REV CODE- 250/637: Performed by: PHYSICAL MEDICINE & REHABILITATION

## 2017-12-28 PROCEDURE — 97530 THERAPEUTIC ACTIVITIES: CPT

## 2017-12-28 PROCEDURE — 97110 THERAPEUTIC EXERCISES: CPT

## 2017-12-28 RX ORDER — LORAZEPAM 1 MG/1
0.5 TABLET ORAL
Qty: 20 TAB | Refills: 0 | Status: SHIPPED | OUTPATIENT
Start: 2017-12-28

## 2017-12-28 RX ORDER — METOPROLOL TARTRATE 25 MG/1
12.5 TABLET, FILM COATED ORAL DAILY
Qty: 30 TAB | Refills: 2 | Status: SHIPPED | OUTPATIENT
Start: 2017-12-29 | End: 2018-01-02

## 2017-12-28 RX ORDER — LIDOCAINE HYDROCHLORIDE 10 MG/ML
1 INJECTION INFILTRATION; PERINEURAL ONCE
Status: COMPLETED | OUTPATIENT
Start: 2017-12-28 | End: 2017-12-28

## 2017-12-28 RX ORDER — RANOLAZINE 500 MG/1
1000 TABLET, EXTENDED RELEASE ORAL 2 TIMES DAILY
Qty: 60 TAB | Refills: 2 | Status: SHIPPED | OUTPATIENT
Start: 2017-12-28

## 2017-12-28 RX ORDER — CALCITRIOL 0.25 UG/1
0.25 CAPSULE ORAL DAILY
Qty: 30 CAP | Refills: 2 | Status: SHIPPED | OUTPATIENT
Start: 2017-12-29

## 2017-12-28 RX ORDER — AMLODIPINE BESYLATE 5 MG/1
5 TABLET ORAL DAILY
Qty: 30 TAB | Refills: 2 | Status: SHIPPED | OUTPATIENT
Start: 2017-12-29 | End: 2018-01-02

## 2017-12-28 RX ORDER — HYDROCODONE BITARTRATE AND ACETAMINOPHEN 5; 325 MG/1; MG/1
1 TABLET ORAL
Qty: 20 TAB | Refills: 0 | Status: SHIPPED | OUTPATIENT
Start: 2017-12-28

## 2017-12-28 RX ORDER — CYANOCOBALAMIN 1000 UG/ML
1000 INJECTION, SOLUTION INTRAMUSCULAR; SUBCUTANEOUS
Qty: 1 VIAL | Refills: 1 | Status: SHIPPED | OUTPATIENT
Start: 2017-12-28

## 2017-12-28 RX ADMIN — LORAZEPAM 1 MG: 1 TABLET ORAL at 11:17

## 2017-12-28 RX ADMIN — METOPROLOL TARTRATE 12.5 MG: 25 TABLET ORAL at 08:16

## 2017-12-28 RX ADMIN — TORSEMIDE 100 MG: 100 TABLET ORAL at 08:16

## 2017-12-28 RX ADMIN — RENAGEL 800 MG: 400 TABLET ORAL at 08:16

## 2017-12-28 RX ADMIN — HYDROCODONE BITARTRATE AND ACETAMINOPHEN 1 TABLET: 5; 325 TABLET ORAL at 11:19

## 2017-12-28 RX ADMIN — RANOLAZINE 1000 MG: 500 TABLET, FILM COATED, EXTENDED RELEASE ORAL at 08:16

## 2017-12-28 RX ADMIN — PANTOPRAZOLE SODIUM 40 MG: 40 TABLET, DELAYED RELEASE ORAL at 05:56

## 2017-12-28 RX ADMIN — ASPIRIN 81 MG: 81 TABLET, COATED ORAL at 08:16

## 2017-12-28 RX ADMIN — STANDARDIZED SENNA CONCENTRATE AND DOCUSATE SODIUM 2 TABLET: 8.6; 5 TABLET, FILM COATED ORAL at 08:16

## 2017-12-28 RX ADMIN — LIDOCAINE HYDROCHLORIDE 1 ML: 10 INJECTION, SOLUTION EPIDURAL; INFILTRATION; INTRACAUDAL; PERINEURAL at 11:20

## 2017-12-28 RX ADMIN — LEVOTHYROXINE SODIUM 150 MCG: 150 TABLET ORAL at 05:58

## 2017-12-28 RX ADMIN — TICAGRELOR 90 MG: 90 TABLET ORAL at 08:15

## 2017-12-28 RX ADMIN — AMLODIPINE BESYLATE 5 MG: 5 TABLET ORAL at 08:16

## 2017-12-28 RX ADMIN — CALCITRIOL 0.25 MCG: 0.25 CAPSULE, LIQUID FILLED ORAL at 08:16

## 2017-12-28 NOTE — PROGRESS NOTES
Problem: Falls - Risk of  Goal: *Absence of Falls  Document Harrison Fall Risk and appropriate interventions in the flowsheet.    Outcome: Progressing Towards Goal  Fall Risk Interventions:  Mobility Interventions: Bed/chair exit alarm, Communicate number of staff needed for ambulation/transfer, Patient to call before getting OOB, PT Consult for assist device competence, Utilize walker, cane, or other assitive device, Strengthening exercises (ROM-active/passive)    Mentation Interventions: Adequate sleep, hydration, pain control, Bed/chair exit alarm, Evaluate medications/consider consulting pharmacy, Increase mobility, Toileting rounds    Medication Interventions: Bed/chair exit alarm, Evaluate medications/consider consulting pharmacy, Patient to call before getting OOB, Teach patient to arise slowly    Elimination Interventions: Call light in reach, Bed/chair exit alarm, Patient to call for help with toileting needs, Toileting schedule/hourly rounds    History of Falls Interventions: Bed/chair exit alarm, Consult care management for discharge planning, Evaluate medications/consider consulting pharmacy

## 2017-12-28 NOTE — PROGRESS NOTES
Patient was lethargic and weak during night time med pass; temperature of room was extremely warm, but patient stated that it felt good. 10:30 pm: During rounds, patient was completely naked in bed. Stated that she was hot and that she wanted me to leave her alone. Thermostat was on 75, turned it down to 68 and checked on her every 15 mins: patient was sleeping quietly each time. During the 12 am check, patient was sitting naked in her recliner. She was alert and oriented, but lethargic. Helped patient dress and assisted her back to bed. Discussed how to use the call light and reminded her to use it if she wanted to get up. Patient slept quietly throughout the remainder of the night. Hourly rounds were performed throughout the shift. All needs met at this time. Report given to oncoming nurse.

## 2017-12-28 NOTE — DIALYSIS
PD treatment completed. Observed pt disconnected from PD cycler. Verified betadine cap intact. Patient tolerated well. UF amount: 108 ml removed. Effluent: clear, yellow, light.

## 2017-12-28 NOTE — PROGRESS NOTES
Problem: Falls - Risk of  Goal: *Absence of Falls  Document Harrison Fall Risk and appropriate interventions in the flowsheet.    Outcome: Progressing Towards Goal  Fall Risk Interventions:  Mobility Interventions: Patient to call before getting OOB    Mentation Interventions: Adequate sleep, hydration, pain control    Medication Interventions: Patient to call before getting OOB    Elimination Interventions: Call light in reach    History of Falls Interventions: Bed/chair exit alarm, Consult care management for discharge planning, Evaluate medications/consider consulting pharmacy

## 2017-12-28 NOTE — DISCHARGE INSTRUCTIONS
DISCHARGE SUMMARY from Nurse    PATIENT INSTRUCTIONS:    After general anesthesia or intravenous sedation, for 24 hours or while taking prescription Narcotics:  · Limit your activities  · Do not drive and operate hazardous machinery  · Do not make important personal or business decisions  · Do  not drink alcoholic beverages  · If you have not urinated within 8 hours after discharge, please contact your surgeon on call. Report the following to your surgeon:  · Excessive pain, swelling, redness or odor of or around the surgical area  · Temperature over 100.5  · Nausea and vomiting lasting longer than 4 hours or if unable to take medications  · Any signs of decreased circulation or nerve impairment to extremity: change in color, persistent  numbness, tingling, coldness or increase pain  · Any questions    What to do at Home:  Recommended activity: Activity as tolerated and no driving for today      *  Please give a list of your current medications to your Primary Care Provider. *  Please update this list whenever your medications are discontinued, doses are      changed, or new medications (including over-the-counter products) are added. *  Please carry medication information at all times in case of emergency situations. These are general instructions for a healthy lifestyle:    No smoking/ No tobacco products/ Avoid exposure to second hand smoke  Surgeon General's Warning:  Quitting smoking now greatly reduces serious risk to your health. Obesity, smoking, and sedentary lifestyle greatly increases your risk for illness    A healthy diet, regular physical exercise & weight monitoring are important for maintaining a healthy lifestyle    You may be retaining fluid if you have a history of heart failure or if you experience any of the following symptoms:  Weight gain of 3 pounds or more overnight or 5 pounds in a week, increased swelling in our hands or feet or shortness of breath while lying flat in bed. Please call your doctor as soon as you notice any of these symptoms; do not wait until your next office visit. Recognize signs and symptoms of STROKE:    F-face looks uneven    A-arms unable to move or move unevenly    S-speech slurred or non-existent    T-time-call 911 as soon as signs and symptoms begin-DO NOT go       Back to bed or wait to see if you get better-TIME IS BRAIN. Warning Signs of HEART ATTACK     Call 911 if you have these symptoms:   Chest discomfort. Most heart attacks involve discomfort in the center of the chest that lasts more than a few minutes, or that goes away and comes back. It can feel like uncomfortable pressure, squeezing, fullness, or pain.  Discomfort in other areas of the upper body. Symptoms can include pain or discomfort in one or both arms, the back, neck, jaw, or stomach.  Shortness of breath with or without chest discomfort.  Other signs may include breaking out in a cold sweat, nausea, or lightheadedness. Don't wait more than five minutes to call 911 - MINUTES MATTER! Fast action can save your life. Calling 911 is almost always the fastest way to get lifesaving treatment. Emergency Medical Services staff can begin treatment when they arrive -- up to an hour sooner than if someone gets to the hospital by car. The discharge information has been reviewed with the patient. The patient verbalized understanding. Discharge medications reviewed with the patient and appropriate educational materials and side effects teaching were provided.   ___________________________________________________________________________________________________________________________________

## 2017-12-28 NOTE — PROGRESS NOTES
Pt up in recliner. Denies needs or concerns at this time. Alert and oriented x4. Lungs sounds diminished in lower lobes bilaterally with respirations even and unlabored. Bowel sounds active in all quadrants. Palpable pulses bilaterally in upper and lower extremities. Instructed to call for assistance. Call light in reach. Voiced understanding and identified call light.

## 2017-12-28 NOTE — PROGRESS NOTES
Problem: Mobility Impaired (Adult and Pediatric)  Goal: *Therapy Goal (Edit Goal, Insert Text)  STG 2. Patient transfer sit<>stand and perform stand pivot transfer with RW and supervision assist in 1 week )12/7/17 - Not met secondary to slower progress from medical issues, cont. W/ current goal) 12/14/17 MET  LTG 2.  Patient transfer sit<>stand and perform stand pivot transfer with RW and mod I in 2 weeks     Outcome: Not Met  Variance: Patient Condition  Comments: Pt. Confused this AM, neeing s

## 2017-12-28 NOTE — PROGRESS NOTES
OT DISCHARGE REPORT    Time In: 2467  Time Out: 2590    COMPREHENSION MODE Initial Assessment Discharge Assessment 12/28/2017   Score 6 6     EXPRESSION Initial Assessment Discharge Assessment 12/28/2017   Primary Mode of Expression Verbal Verbal   Score 6 7   Comments Increased time d/t dyspnea WNL     SOCIAL INTERACTION/ PRAGMATICS Initial Assessment Discharge Assessment 12/28/2017   Score 6 6   Comments Increased time Patient anxious, paranoid at times     PROBLEM SOLVING Initial Assessment Discharge Assessment 12/28/2017   Score 5 4   Comments Solves complex problems with cues, or basic problems 90% or more of the time. Solves basic problems 75-89% of the time. MEMORY Initial Assessment Discharge Assessment 12/28/2017   Score 5 4   Comments Recognizes, recalls, or executes 3 steps of 3 step request 90% of the time (cueing, reminders less than 10%, loses track of time) or cueing and coaxing under stressful/unfamiliar conditions. Recognizes, recalls, or executes 75-89% of the time 2 steps of 3 step request.     EATING Initial Assessment Discharge Assessment 12/28/2017   Functional Level 6 6   Comments Dentures Patient wears dentures       TUB/SHOWER TRANSFER INDEPENDENCE Initial Assessment Discharge Assessment 12/28/2017   Comments Patient does not shower during [facility] stay d/t femoral catheter precautions Patient does not shower during [facility] stay d/t femoral catheter precautions     Plan of Care: Please see Care Plan for progress towards goals during stay.   Precautions at Discharge: Falls and Confused  Discharge Location: Private Residence  Safety/Supervision Recommendations/Functional Level: Recommending 24 hour supervision at discharge d/t residual deficits, primarily decreased recall and problem solving/confusion   Family Training: N/A    Recommended Continuing Therapy: Home Health OT  Residual Deficits:  Decreased balance, functional mobility, activity tolerance, strength, cognition  Progress over LOS: Patient demonstrates progress with the above deficits and ADL and functional mobility during [facility] stay, see above for details. Note patient with decreased recall, completing ADL without supervision from staff again today, as patient did yesterday, despite education/re-education regarding need for supervision from staff for safety and purpose of OT for discharge ADL scores. Patient transfers and ambulates with RW with modified independence, completing ADL with modified independence as evidenced by patient dressing and bathing with no assistance from staff this AM.  See AM OT note for further details. Last ADL completed 12/26/17 with patient at supervision level for all ADLs, see note for details. Patient with progressively increased confusion/paranoia and decreased recall over LOS, note likely seizure event and rapid response called on 12/19/17. Patient is discharging to home with 24 hour supervision is available from home health caregivers. Patient provided printed copy of HEP for BUE for discharge. Patient demonstrated and verbalized understanding of techniques, frequency, and intensity for exercises included in HEP however will require supervision for technique at discharge. Patient limited by decreased participation in therapeutic exercises d/t R shoulder fatigue, tightness, and pain.     Cornel Fish, OT

## 2017-12-28 NOTE — PROGRESS NOTES
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 She has a history of GI bleeding and had anemia-induced unstable angina in the past. 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 She states the pain has since resolved with NTG paste.  No fevers or chills. No nausea, vomiting or diarrhea.  She states that she is essentially anuric and occasionally makes minimal urine.  She has a h/o CVA and TIA. No fever or chills, no problems with PD drainage or discoloration of PD fluid. No increased thirst. She wishes regular diet and supplement. She denies any other acute complaints  Her edema has improved in the past couple of days with intensified dialysis.     s -  PD clinic visit Friday - for PC removal today - she is stronger and ready for discharge - good progress with PT - no PD catheter dysfunction - no dyspnea, no CP, no N/V - no further episodes of LOC/seizure-like activity  - she will be able to go on cycler prescription upon dischsarge home    Past Medical History:   Diagnosis Date    Anemia of chronic renal failure 4/28/2015    Anterior myocardial infarction [facility]) 5/21/2015    CAD (coronary artery disease)     Chest pain     Chronic kidney disease, stage III (moderate) 8/15/2014    on dialysis    CKD (chronic kidney disease) stage 4, GFR 15-29 ml/min ([facility]) 4/28/2015    Debility 5/5/2015    Depression 12/29/2015    Edema 12/29/2015    Endocrine disease     Hypothyroidism    GERD (gastroesophageal reflux disease)     Heart murmur 12/29/2015    HLD (hyperlipidemia) 12/29/2015    Hypertension     Hypothyroidism 4/28/2015    Ischemic cardiomyopathy 12/29/2015    Nausea     S/P PTCA (percutaneous transluminal coronary angioplasty); LAD PTCA of  ISR 11/27/15 5/24/2016    STEMI (ST elevation myocardial infarction) ([facility] [address] 4/28/2015    Unspecified sleep apnea     uses cpap machine    Unstable angina ([facility] [address]       Past Surgical History:   Procedure Laterality Date    HX BACK SURGERY  1990    neck surgery cervical disc    HX BACK SURGERY      lower back    HX CATARACT REMOVAL Bilateral     HX CHOLECYSTECTOMY  19702    gall bladder     HX KNEE REPLACEMENT Right 2006    HX PTCA  4/28/2015    2.25 Xience stent to mid LAD for occluded artery, anterior MI, EF 25%. Moderate disease distal LAD and distal OM PCI CX and RCA 2004, then PCI RCA and LAD in 2009. Prior to Admission medications    Medication Sig Start Date End Date Taking? Authorizing Provider   metoprolol tartrate (LOPRESSOR) 25 mg tablet Take 0.5 Tabs by mouth daily. 11/27/17  Yes MINDY Chatman   amLODIPine (NORVASC) 5 mg tablet Take 1 Tab by mouth daily. 11/27/17  Yes MINDY Chatman   torsemide (DEMADEX) 100 mg tablet Take 1 Tab by mouth two (2) times a day. 11/27/17  Yes MINDY Chatman   pantoprazole (PROTONIX) 40 mg tablet Take 1 Tab by mouth Before breakfast and dinner. 11/27/17  Yes MINDY Chatman   magnesium oxide (MAG-OX) 400 mg tablet Take 400 mg by mouth daily. Yes Historical Provider   metoclopramide HCl (REGLAN) 5 mg tablet Take 5 mg by mouth two (2) times a day. Yes Historical Provider   ticagrelor (BRILINTA) 90 mg tablet Take 1 Tab by mouth two (2) times a day. 2/4/16  Yes MINDY Chatman   aspirin delayed-release 81 mg tablet Take 1 Tab by mouth daily. 5/5/15  Yes Gisela Hollingsworth PA-C   rosuvastatin (CRESTOR) 20 mg tablet Take 20 mg by mouth nightly. Yes Historical Provider   levothyroxine (SYNTHROID) 150 mcg tablet Take 150 mcg by mouth Daily (before breakfast).    Yes Historical Provider   cyanocobalamin (VITAMIN B12) 1,000 mcg/mL injection 1 mL by IntraMUSCular route every seven (7) days. Indications: Vitamin B12 Deficiency 12/2/17   MINDY Skinner   folic acid (FOLVITE) 1 mg tablet Take 1 mg by mouth daily. Historical Provider   potassium chloride (K-DUR, KLOR-CON) 20 mEq tablet Take 20 mEq by mouth two (2) times a day. Historical Provider   traZODone (DESYREL) 50 mg tablet Take  by mouth nightly. Historical Provider   megestrol (MEGACE) 400 mg/10 mL (40 mg/mL) suspension Take 200 mg by mouth daily. Historical Provider   sucralfate (CARAFATE) 100 mg/mL suspension Take 10 mL by mouth Before breakfast, lunch, dinner and at bedtime. Indications: PREVENTION OF STRESS ULCER 9/23/17   Heidy Tobin DO   nitroglycerin (NITROLINGUAL) 400 mcg/spray spray 1 Spray by SubLINGual route every five (5) minutes as needed for Chest Pain. Historical Provider   linaclotide Ramya Felix) 145 mcg cap capsule Take  by mouth Daily (before breakfast). Historical Provider   PNV NO.122/IRON/FOLIC ACID (PRENATAL MULTI PO) Take  by mouth. Historical Provider   ondansetron (ZOFRAN ODT) 4 mg disintegrating tablet Take 4 mg by mouth three (3) times daily as needed. Historical Provider   sevelamer carbonate (RENVELA) 800 mg tab tab Take 800 mg by mouth three (3) times daily (with meals). Historical Provider   gentamicin (GARAMYCIN) 0.1 % topical cream APPLY TO PD catheter exit site at daily dressing change 5/12/15   Frank Montes MD   darbepoetin toya in polysorbat (ARANESP, POLYSORBATE,) 40 mcg/mL injection 40 mcg by SubCUTAneous route every fourteen (14) days. Indications: ANEMIA IN CHRONIC KIDNEY DISEASE    Historical Provider   ranolazine ER (RANEXA) 500 mg SR tablet Take 500 mg by mouth two (2) times a day.     Historical Provider     Allergies   Allergen Reactions    Codeine Nausea and Vomiting      Social History   Substance Use Topics    Smoking status: Never Smoker    Smokeless tobacco: Never Used    Alcohol use No      Family History   Problem Relation Age of Onset    Heart Disease Mother     Hypertension Mother     Cancer Mother      Lung    Stroke Father     Hypertension Father     Breast Cancer Neg Hx           Review of Systems    Constitutional: no fever, weak  Eyes: fair vision,    Ears, nose, mouth, throat, and face:fair hearing,   Respiratory: no asthma,  Chronic home O2 is being used - improved dyspnea  Cardiovascular:no palpitation, no  chest pain,   Gastrointestinal:no diarrhea,  Had BM today  Genitourinary: no dysuria,   Hematologic/lymphatic: no bleeding tendency,   Neurological: no seizures   Behvioral/Psych: no psych hospitalization   Endocrine: no goiter,       Objective:       Visit Vitals    /77 (BP 1 Location: Right arm)    Pulse 77    Temp 98.1 °F (36.7 °C)    Resp 16    Wt 96.6 kg (213 lb)    SpO2 97%    BMI 31 kg/m2       General:  Alert, cooperative, no distress, appears stated age. Head:  Normocephalic, without obvious abnormality, atraumatic. Eyes:  Conjunctivae/corneas clear. EOMs intact. Throat: Lips, mucosa, and tongue normal. Teeth and gums normal.   Neck: Supple, symmetrical, trachea midline, no adenopathy,  no JVD. Lungs:   Clear to auscultation bilaterally. Heart:  Regular rate and rhythm, S1, S2 normal, 2/6 systolic  murmur, no rub or gallop. Abdomen:   Soft, non-tender. No masses,  No organomegaly. PD catheter in place without exudate   Extremities: Extremities normal, atraumatic, no cyanosis. 1-2+edema   Skin: Skin color, texture, turgor normal. No rashes or lesions. Neurologic: Grossly intact. No asterixis. Results for Joe Castro (MRN [id number]) as of 12/26/2017 14:52   Ref.  Range 12/21/2017 06:12 12/25/2017 05:45   WBC Latest Ref Range: 4.3 - 11.1 K/uL 7.0 5.6   RBC Latest Ref Range: 4.05 - 5.25 M/uL 2.85 (L) 3.03 (L)   HGB Latest Ref Range: 11.7 - 15.4 g/dL 8.4 (L) 8.8 (L)   HCT Latest Ref Range: 35.8 - 46.3 % 27.8 (L) 29.4 (L)   MCV Latest Ref Range: 79.6 - 97.8 FL 97.5 97.0   MCH Latest Ref Range: 26.1 - 32.9 PG 29.5 29.0   MCHC Latest Ref Range: 31.4 - 35.0 g/dL 30.2 (L) 29.9 (L)   RDW Latest Ref Range: 11.9 - 14.6 % 18.8 (H) 18.7 (H)   PLATELET Latest Ref Range: 150 - 450 K/uL 298 296   MPV Latest Ref Range: 10.8 - 14.1 FL 9.7 (L) 9.6 (L)   Sodium Latest Ref Range: 136 - 145 mmol/L 135 (L) 136   Potassium Latest Ref Range: 3.5 - 5.1 mmol/L 4.6 5.7 (H)   Chloride Latest Ref Range: 98 - 107 mmol/L 97 (L) 99   CO2 Latest Ref Range: 21 - 32 mmol/L 28 26   Anion gap Latest Ref Range: 7 - 16 mmol/L 10 11   Glucose Latest Ref Range: 65 - 100 mg/dL 84 83   BUN Latest Ref Range: 8 - 23 MG/DL 32 (H) 22   Creatinine Latest Ref Range: 0.6 - 1.0 MG/DL 7.45 (H) 6.65 (H)   Calcium Latest Ref Range: 8.3 - 10.4 MG/DL 9.7 7.3 (L)   GFR est non-AA Latest Ref Range: >60 ml/min/1.73m2 6 (L) 6 (L)   GFR est AA Latest Ref Range: >60 ml/min/1.73m2 7 (L) 8 (L)       Data Review:     Active Problems:    Hypothyroidism (4/28/2015)      S/P PTCA (percutaneous transluminal coronary angioplasty) (4/13/2017)      ESRD (end stage renal disease) ([facility] [address] (11/18/2017)      Weakness of both legs (11/29/2017)      Renal failure (ARF), acute on chronic (HCC) (11/29/2017)        Assessment:     1. CAD x NSTEMI, Unstable angina -  - [facility] with complex CAD and acute thrombotic lesion in pLAD and subtotal occlusion in mLAD. The RCA has severe proximal and mid RCA disease. She has additional stenting of LAD with Resolute in mid/distal and proximal vessel. These all touched the previously stented mid vessel. Recommend life-long DAPT    2, ESRD -  - S/P a course of temporary HD via perm cath due to PD catheter dysfunction  - tolerated gentle 0.5 l fluid removal well yesterday on HD   -  Switch to PD has been tolerated well - increase to 1.8 exchanges    3. Fluid excess -  - resolved with fluid removal on HD  - now again with some edema, but no dyspnea    4.  Anemia -  - intensive anemia therapy in [employer]  - on WAYNE and IV iron and B12  - improved S/P BT  - she is below goal, but not low enough for transfusion    5. History of GI bleed -  - monitor clinically and serial Hb  - she had a drop in Hb to 7 range and improved with BT    6. Hypokalemia   - resolved     7. Abdominal pain and tenderness with drop in Hb , on DAPT   No evidence of retroperitoneal hematoma     8. PD catheter dysfunction -  - S/P PD catheter revision / replacement   - PD catheter function improved after heparin IP    9. Possible new onset seizure -  - suspect hypotensive/arrhythmia episode induced by fluid removal on dialysis in combination with anesthesia    10. PD Catheter dysfunction  - resolved after IP heparin x one dose 71075 units  - continue Dulcolax suppository to Miralax    11. Constipation -  -  treat with Miralaz/Dulcolax/aggressive ambulation   - better with addition of Sorbitol     12.  HD access -  - I'll remove tunnelled HD catheter tomorrow before discharge or the next day as outpatient in PD clinic    Plan:     As above - 25

## 2017-12-28 NOTE — PROCEDURES
Removal of Tunneled Catheter Note    12/28/2017    Indication: Catheter is no longer needed - patient resumed peritoneal dialysis    Procedure Summary: Tunneled catheter in left femoral vein removed  without complications. Procedure Details: After applying local aneshtesia the subcutaneous cuff was cut down and dissected with sharp and blunt instrumentation. After repeated manipulation the catheter was removed and a sterile pressure dressing was applied.     Complications: None    Estimated blood loss: less than 10 mL    Preet Bajwa M.D.

## 2017-12-28 NOTE — PROGRESS NOTES
Social work notified Odilon, pt's son that she would be d/c today. Pt's son stated he would be here between 1400 and 1430 today. Pt's dialysis cath was removed by Dr Roxanne Frye at bedside. Pressure dressing was applied by MD.  Will monitor dressing for blood loss.

## 2017-12-28 NOTE — PROGRESS NOTES
Pt discharge teaching completed with pt and son. Gave pt handouts and answered questions. Prescriptions given to family member per pt request.  Pt discharged and left floor via wheelchair with this nurse.

## 2017-12-28 NOTE — PROGRESS NOTES
PHYSICAL THERAPY DISCHARGE SUMMARY  [id number]     Precautions at discharge:  Other (comment) (falls)    Problem List:    Decreased strength B LE  [x]     Decreased strength trunk/core  [x]     Decreased AROM   []     Decreased PROM  []     Decreased balance sitting  []     Decreased balance standing  [x]     Decreased endurance  [x]     Pain  []       Functional Limitations:   Decreased independence with bed mobility  []     Decreased independence with functional transfers  [x]     Decreased independence with ambulation  [x]     Decreased independence with stair negotiation  [x]            Outcome Measures: Vital Signs:   Patient Vitals for the past 8 hrs:   Temp Pulse Resp BP SpO2   12/28/17 0705 98.1 °F (36.7 °C) 77 16 159/77 97 %       Pain level: no c/o pain    Patient education: reviewed HEP as well as stair management technique    Interdisciplinary Communication: discussed recommendations for d/c w/ SW         MMT Initial Asssessment   Right Lower Extremity Left Lower Extremity   Hip Flexion 3 2+   Knee Extension 5 5   Knee Flexion 5 5   Ankle Dorsiflexion 5 5      MMT Discharge Assessment   Right Lower Extremity Left Lower Extremity   Hip Flexion 3 2+   Knee Extension 5 5   Knee Flexion 5 5   Ankle Dorsiflexion 5 5   0/5 No palpable muscle contraction  1/5 Palpable muscle contraction, no joint movement  2-/5 Less than full range of motion in gravity eliminated position  2/5 Able to complete full range of motion in gravity eliminated position  2+/5 Able to initiate movement against gravity  3-/5 More than half but not full range of motion against gravity  3/5 Able to complete full range of motion against gravity  3+/5 Completes full range of motion against gravity with minimal resistance  4-/5 Completes full range of motion against gravity with minimal-moderate resistance  4/5 Completes full range of motion against gravity with moderate resistance  4+/5 Completes full range of motion against gravity with moderate-maximum resistance  5/5 Completes full range of motion against gravity with maximum resistance     AROM: WFL    FIM SCORES Initial Assessment Discharge Assessment   Bed/Chair/Wheelchair Transfers 3 5   Wheelchair Mobility   NT   Walking [place] 1 5   Steps/Stairs 2 2   PRIMARY MODE OF LOCOMOTION: ambulation  Please see IRC Interdisciplinary Eval: Coordination/Balance Section for details regarding FIM score description.     BED/CHAIR/WHEELCHAIR TRANSFERS Initial Assessment Discharge Assessment   Rolling Right 4 (Minimal assistance) 6 (Modified independent)   Rolling Left 0 (Not tested) 6 (Modified independent)   Supine to Sit 4 (Minimal assistance) 6 (Modified independent)   Sit to Stand Moderate assistance Supervision   Sit to Supine 4 (Minimal assistance) 6 (Modified independent)   Transfer Assist Score 3 5   Transfer Type SPT with walker SPT with walker   Comments flexed posture, heavy reliance on UEs, VCs for safety w/ hand placement     Car Transfer Not tested NT   Car Type           WHEELCHAIR MOBILITY/MANAGEMENT Initial Assessment Discharge Assessment   Able to Propel 0 feet NT   Functional Level   NT   Curbs/ramps assistance required 0 (Not tested) NT   Wheelchair set up assistance required 0 (Not tested) NT   Wheelchair management Manages left brake, Manages right brake NT   NT secondary to ambulation to be primary mode of locomotion     WALKING INDEPENDENCE Initial Assessment Discharge Assessment   Assistive device Walker, rolling, Gait belt Walker, rolling   Ambulation assistance - level surface 4 (Minimal assistance) 5 (Supervision)   Distance 5 Feet (ft) (to recliner chair) 150 Feet (ft)   Functional Level 1 5   Comments flexed posture, slow fran, increased B stance, foot flat flexed posture, foot flat, decreased fran   Ambulation assistance - unlevel surface 0 (Not tested) 5 (Supervision/setup)       STEPS/STAIRS Initial Assessment Discharge Assessment   Steps/Stairs ambulated 0 4 Rail Use Both Both   Functional Level 2 2   Comments step to gait pattern step to gait pattern   Curbs/Ramps 0 (Not tested) 4 (Contact guard assistance)       QUALITY INDICATOR ASSIST COMMENTS   Walk 10 feet supervision W/ RW   Walk 50 feet with 2 turns supervision W/ RW   Walk 150 feet supervision W/ RW   Walk 10 feet on uneven  supervision W/ RW   1 step/curb Minimum assistance CGA for balance using RW   4 steps supervision    12 steps NT NT secondary to pt. Will not have to manage 12 steps @ d/c    object supervision Using reacher, VCs for technique   Wheel 50' w/2 turns NT NT secondary to ambulation to be primary mode of locomotion   Wheel 150' NT NT secondary to ambulation to be primary mode of locomotion          PHYSICAL THERAPY PLAN OF CARE    LTGs: Pt. Met 4/5 LTGs    Pt would benefit from continued skilled physical therapy in order to improve independent functional mobility within the home with use of least restrictive device. Interventions may include range of motion (AROM, PROM B LE/trunk), motor function (B LE/trunk strengthening/coordination), activity tolerance (vitals, oxygen saturation levels), bed mobility training, balance activities, gait training (progressive ambulation program), and functional transfer training. HEP handout:  Issued 12/18/17    Pt to be discharged home with 24/7 supervision provided by family/friends HHA     Therapy Recommendations upon discharge: Dinah Wei needs at discharge:   Pt. Already has equipment @ home  Please see IRC; Interdisciplinary Eval, Care Plan, and Patient Education for further information regarding physical therapy discharge summary and plan of care.      Colletta Patches, PT  12/28/2017

## 2017-12-28 NOTE — PROGRESS NOTES
Problem: Self Care Deficits Care Plan (Adult)  Goal: *Therapy Goal (Edit Goal, Insert Text)  STG 4: Patient will be supervision with shower transfer using DME PRN within 7 days.   12/7/2017 Note bathing in shower not attempted d/t femoral catheter for dialysis in LLE, to address as permitted by MD prior to D/C    LTG 4: Patient will be modified independent with tub/shower transfer using AE/DME PRN within 14 days  12/7/2017 See Above   12/14/2017 See above   12/22/2017 See Above   12/28/2017 Discontinue Goal d/t patient not cleared to shower this stay  Outcome: Not Met  Variance: Patient/Family Other  Comments: Discontinue goal as patient not cleared for bathing in shower d/t femoral catheter during [facility] stay

## 2017-12-28 NOTE — DISCHARGE SUMMARY
Alisia Loyd MD  Medical Director  [address] [place] [address], [address] [facility]  Tel: [phone]       REHABILITATION DISCHARGE SUMMARY     Patient: Elli Swain MRN: [id number]  SSN: [id number]    YOB: 1932  Age: 80 y.o. Sex: female      Date: 12/28/2017  Admit Date: 11/29/2017  Discharge Date: 12/28/2017    Admitting Physician: Reshma Gimenez MD   Primary Care Physician: Art Escobedo MD     Admission Condition: good    Chief Complaint : Gait dysfunction secondary to below. Admit Diagnosis: Unstable angina ([address]  CAD (coronary artery disease) (11/27/2015)  S/P complex PCI and stable on ASA and Brilinta  Unstable angina (HCC) (2/1/2016)  Acute/ Chronic anemia (11/18/2017)  ESRD (end stage renal disease) On PD/ CRRT  Pain  DVT risk  Acute Rehab Dx:  Debility    Weakness  Gait impairment  deconditioning  Mobility and ambulation deficits  Self Care/ADL deficits    HPI: Jose Roberto Kuhn is a 80 y. o. female patient at [address] [place] [address] was admitted on 11/18/2017  by [address] [place] [address] below mentioned medical history ,is being seen for Physical Medicine and Rehabilitation.    Nirmal Morgan has history ofh/o CAD s/p PCI to LAD and RCA 2-2016, and ESRD on PD presented to the ER on 11/18/2017 with chest pain, admitted with diagnoss of unstable angina. She underwent a successful percutaneous coronary intervention and stentings on 11/20/2017. Post procedure course was complicated by drop in hgb, down to 7.4 on 11/21.  She had no obvious signs of bleeding but CT  showed right abdominal wall hematoma. transfusion for anemia was not allowed due to pt being a Jehov[employer] patient. Patient continued to need dialysis, however due to due to problems with drainage of PD fluid, PD was held and she was transferred to CCU for CRRT.  Patient with signis of volume overload, improving with CRRT.  She was noted to eventually be transfused PRBC due to her hgb being critically low. GI was consulted for possible GI bleed. Patient noted to have significant decline of her function below her baseline due to prolonged immobility and illness. Patient starting to stand, taking few steps, however shows significant functional deficits. She requires minimum to moderate assist for mobility and ambulation. Gene Kuhn is seen and examined today. Medical Records reviewed. She is normally independent at her baseline. Physical therapy was initiated and patient was starting to mobilize. However, she shows significant functional deficits due to prolonged immobility and hospitalization. Our service was consulted for rehab needs and we recommended inpatient hospital rehabilitation is reasonable and necessary due to ongoing acute medical issues which have not changed since initial pre-admission evaluation. and rehab needs still likely best managed in IRU setting. The patient was evaluated by [facility] admissions coordinators. I reviewed the preadmission screening and have approved for admission to the [facility]. The patient was cleared for transfer to rehab today. Patient continues to have ongoing  medical issues outlined above requiring physician medical supervision and functional deficits which would benefit from continued intensive therapies. Patient was admitted to [facility] for acute medical rehab program. Patient was continued on daily medical management for complex PCI, unstable angina, hypertension was continued on aspirin and Brillanta. Patients acute on chronic anemia was monitored daily periodically. Patient was continued on epogen. End-stage renal disease managed by nephrology. On 11/30 patient a problem with PD catheter. She required a permanent HD catheter to put in for a temporary hemodialysis. On December 18 she had a revision of the malfunctioning PD catheter. Subsequently the catheter functioned well and was started on peritoneal dialysis.  Today prior to discharge femoral hemodialysis catheter was removed per Dr. Sanam Jean. Patient tolerated the procedure well theres no signs of bleeding or hematoma. Functionally patient has progressed well. There was a setback with the revision of PD dialysis catheter. However subsequently she continue to progress well. Patient is at supervision level for ADLs transfers and mobility. Patient will be discharged home in stable condition today. Rehabiitation Course:   Home Architecture: Home Situation  Home Environment: Private residence (11/30/17 0701)  # Steps to Enter: 0 (11/30/17 0701)  Wheelchair Ramp: Yes (11/30/17 0701)  One/Two Story Residence: One story (11/30/17 0701)  Living Alone: Yes (11/30/17 0701)  Support Systems: Friends \ neighbors; Family member(s) (One son local, one son from [place] (terminal cancer)) (11/30/17 0701)  Patient Expects to be Discharged to[de-identified] Private residence (11/30/17 0701)  Current DME Used/Available at Home: Shower chair;Walker, rolling (11/30/17 0701)  Tub or Shower Type: Shower (11/30/17 0701)     Past Medical History:   Diagnosis Date    Anemia of chronic renal failure 4/28/2015    Anterior myocardial infarction ([address] 5/21/2015    CAD (coronary artery disease)     Chest pain     Chronic kidney disease, stage III (moderate) 8/15/2014    on dialysis    CKD (chronic kidney disease) stage 4, GFR 15-29 ml/min ([address] 4/28/2015    Debility 5/5/2015    Depression 12/29/2015    Edema 12/29/2015    Endocrine disease     Hypothyroidism    GERD (gastroesophageal reflux disease)     Heart murmur 12/29/2015    HLD (hyperlipidemia) 12/29/2015    Hypertension     Hypothyroidism 4/28/2015    Ischemic cardiomyopathy 12/29/2015    Nausea     S/P PTCA (percutaneous transluminal coronary angioplasty); LAD PTCA of  ISR 11/27/15 5/24/2016    STEMI (ST elevation myocardial infarction) ([address] 4/28/2015    Unspecified sleep apnea     uses cpap machine    Unstable angina [facility])       Past Surgical History:   Procedure Laterality Date    HX BACK SURGERY  1990    neck surgery cervical disc    HX BACK SURGERY      lower back    HX CATARACT REMOVAL Bilateral     HX CHOLECYSTECTOMY  85540    gall bladder     HX KNEE REPLACEMENT Right 2006    HX PTCA  4/28/2015    2.25 Xience stent to mid LAD for occluded artery, anterior MI, EF 25%. Moderate disease distal LAD and distal OM PCI CX and RCA 2004, then PCI RCA and LAD in 2009. Family History   Problem Relation Age of Onset    Heart Disease Mother     Hypertension Mother     Cancer Mother      Lung    Stroke Father     Hypertension Father     Breast Cancer Neg Hx       Social History   Substance Use Topics    Smoking status: Never Smoker    Smokeless tobacco: Never Used    Alcohol use No       Allergies   Allergen Reactions    Codeine Nausea and Vomiting       Prior to Admission medications    Medication Sig Start Date End Date Taking? Authorizing Provider   metoprolol tartrate (LOPRESSOR) 25 mg tablet Take 0.5 Tabs by mouth daily. 11/27/17  Yes MINDY Chatman   amLODIPine (NORVASC) 5 mg tablet Take 1 Tab by mouth daily. 11/27/17  Yes MINDY Chatman   torsemide (DEMADEX) 100 mg tablet Take 1 Tab by mouth two (2) times a day. 11/27/17  Yes MINDY Chatman   pantoprazole (PROTONIX) 40 mg tablet Take 1 Tab by mouth Before breakfast and dinner. 11/27/17  Yes MINDY Chatman   magnesium oxide (MAG-OX) 400 mg tablet Take 400 mg by mouth daily. Yes Historical Provider   metoclopramide HCl (REGLAN) 5 mg tablet Take 5 mg by mouth two (2) times a day. Yes Historical Provider   ticagrelor (BRILINTA) 90 mg tablet Take 1 Tab by mouth two (2) times a day. 2/4/16  Yes MINDY Chatman   aspirin delayed-release 81 mg tablet Take 1 Tab by mouth daily. 5/5/15  Yes Gisela Hollingsworth PA-C   rosuvastatin (CRESTOR) 20 mg tablet Take 20 mg by mouth nightly.    Yes Historical Provider   levothyroxine (SYNTHROID) 150 mcg tablet Take 150 mcg by mouth Daily (before breakfast). Yes Historical Provider   cyanocobalamin (VITAMIN B12) 1,000 mcg/mL injection 1 mL by IntraMUSCular route every seven (7) days. Indications: Vitamin B12 Deficiency 12/2/17   MINDY Becker   folic acid (FOLVITE) 1 mg tablet Take 1 mg by mouth daily. Historical Provider   potassium chloride (K-DUR, KLOR-CON) 20 mEq tablet Take 20 mEq by mouth two (2) times a day. Historical Provider   traZODone (DESYREL) 50 mg tablet Take  by mouth nightly. Historical Provider   megestrol (MEGACE) 400 mg/10 mL (40 mg/mL) suspension Take 200 mg by mouth daily. Historical Provider   sucralfate (CARAFATE) 100 mg/mL suspension Take 10 mL by mouth Before breakfast, lunch, dinner and at bedtime. Indications: PREVENTION OF STRESS ULCER 9/23/17   Zahraa Gonzales,    nitroglycerin (NITROLINGUAL) 400 mcg/spray spray 1 Spray by SubLINGual route every five (5) minutes as needed for Chest Pain. Historical Provider   linaclotide Jaye Benes) 145 mcg cap capsule Take  by mouth Daily (before breakfast). Historical Provider   PNV NO.122/IRON/FOLIC ACID (PRENATAL MULTI PO) Take  by mouth. Historical Provider   ondansetron (ZOFRAN ODT) 4 mg disintegrating tablet Take 4 mg by mouth three (3) times daily as needed. Historical Provider   sevelamer carbonate (RENVELA) 800 mg tab tab Take 800 mg by mouth three (3) times daily (with meals). Historical Provider   gentamicin (GARAMYCIN) 0.1 % topical cream APPLY TO PD catheter exit site at daily dressing change 5/12/15   Rebecca Davies MD   darbepoetin toya in polysorbat (ARANESP, POLYSORBATE,) 40 mcg/mL injection 40 mcg by SubCUTAneous route every fourteen (14) days. Indications: ANEMIA IN CHRONIC KIDNEY DISEASE    Historical Provider   ranolazine ER (RANEXA) 500 mg SR tablet Take 500 mg by mouth two (2) times a day.     Historical Provider       Current Medications:  Current Facility-Administered Medications   Medication Dose Route Frequency Provider Last Rate Last Dose    sorbitol 70 % solution 60 mL  60 mL Oral QID PRN Cydney Mlain MD   60 mL at 12/25/17 1800    senna-docusate (PERICOLACE) 8.6-50 mg per tablet 2 Tab  2 Tab Oral BID PRN Levar Ac MD   2 Tab at 12/24/17 1203    senna-docusate (PERICOLACE) 8.6-50 mg per tablet 2 Tab  2 Tab Oral DAILY Cydney Malin MD   2 Tab at 12/28/17 0816    gentamicin (GARAMYCIN) 0.1 % cream   Topical DIALYSIS PRN Cydney Malin MD        polyethylene glycol (MIRALAX) packet 17 g  17 g Oral DAILY Cydney Malin MD   Stopped at 12/26/17 0855    bisacodyl (DULCOLAX) suppository 10 mg  10 mg Rectal DAILY PRN Cydney Malin MD   10 mg at 12/23/17 1807    heparin (porcine) 1,000 unit/mL injection 5,000 Units  5,000 Units Hemodialysis DIALYSIS PRN Cydney Malin MD   3,000 Units at 12/21/17 0734    zolpidem (AMBIEN) tablet 5 mg  5 mg Oral QHS PRN Cydney Malin MD   5 mg at 12/26/17 2105    acetaminophen (TYLENOL) tablet 650 mg  650 mg Oral Q4H PRN Heike Oliva MD        amLODIPine (NORVASC) tablet 5 mg  5 mg Oral DAILY Heike Oliva MD   5 mg at 12/28/17 2553    aspirin delayed-release tablet 81 mg  81 mg Oral DAILY Heike Oliva MD   81 mg at 12/28/17 0816    calcitRIOL (ROCALTROL) capsule 0.25 mcg  0.25 mcg Oral DAILY Heike Oliva MD   0.25 mcg at 12/28/17 0816    cyanocobalamin (VITAMIN B12) injection 1,000 mcg  1,000 mcg IntraMUSCular Q7D Heike Oliva MD   1,000 mcg at 12/23/17 1434    epoetin toya (EPOGEN;PROCRIT) injection 20,000 Units  20,000 Units SubCUTAneous Q TUE, THU & SAT Heike Oliva MD   20,000 Units at 12/26/17 1634    HYDROcodone-acetaminophen (NORCO) 5-325 mg per tablet 1 Tab  1 Tab Oral Q4H PRN Heike Oliva MD   1 Tab at 12/27/17 1050    hydrogen peroxide 3 %   Topical PRN Heike Oliva MD        levothyroxine (SYNTHROID) tablet 150 mcg  150 mcg Oral ACB Heike Oliva MD   150 mcg at 12/28/17 0073    linaclotide Malcolm Encarnacion) capsule 145 mcg (Patient Supplied)  145 mcg Oral DAILY Tr Carbajal MD   Stopped at 12/25/17 0900    LORazepam (ATIVAN) tablet 1 mg  1 mg Oral Q6H PRN Tr Carbajal MD   1 mg at 12/25/17 1214    metoprolol tartrate (LOPRESSOR) tablet 12.5 mg  12.5 mg Oral DAILY Tr Carbajal MD   12.5 mg at 12/28/17 0816    multivitamin w ZN ([address]) tablet  1 Tab Oral DAILY Tr Carbajal MD   1 Tab at 12/24/17 0999    ondansetron (ZOFRAN ODT) tablet 4 mg  4 mg Oral TID PRN Tr Carbajal MD   4 mg at 12/27/17 0852    pantoprazole (PROTONIX) tablet 40 mg  40 mg Oral ACB&D Tr Carbajal MD   40 mg at 12/28/17 0556    polyethylene glycol (MIRALAX) packet 17 g  17 g Oral DAILY PRN Tr Carbajal MD        ranolazine ER (RANEXA) tablet 1,000 mg  1,000 mg Oral BID Tr Carbajal MD   1,000 mg at 12/28/17 0816    rosuvastatin (CRESTOR) tablet 20 mg  20 mg Oral QHS Tr Carbajal MD   20 mg at 12/27/17 2128    sevelamer (RENAGEL) tablet 800 mg  800 mg Oral TID WITH MEALS Tr Carbajal MD   800 mg at 12/28/17 0816    sodium chloride (NS) flush 5-10 mL  5-10 mL IntraVENous PRN Tr Carbajal MD        sucralfate (CARAFATE) 100 mg/mL oral suspension 1 g  1 g Oral AC&HS Tr Carbajal MD   1 g at 12/26/17 2105    ticagrelor (BRILINTA) tablet 90 mg  90 mg Oral BID Tr Carbajal MD   90 mg at 12/28/17 0815    torsemide (DEMADEX) tablet 100 mg  100 mg Oral BID Tr Carbajal MD   100 mg at 12/28/17 1049        Review of Systems: Denies chest pain, shortness of breath, cough, headache, visual problems, abdominal pain, dysurea, calf pain. Pertinent positives are as noted in the medical records and unremarkable otherwise. Vital Signs: Patient Vitals for the past 8 hrs:   BP Temp Pulse Resp SpO2 Weight   12/28/17 0705 159/77 98.1 °F (36.7 °C) 77 16 97 % -   12/28/17 0616 - - - - - 213 lb (96.6 kg)        Physical Exam:   General: Alert and oriented x 3.speech normal.   No acute cardio respiratory distress.    HEENT: Normocephalic,no scleral icterus  Oral mucosa moist without cyanosis   Lungs: Clear to auscultation  bilaterally. Respiration even and unlabored   Heart: Regular rate and rhythm, S1, S2   No  murmurs, clicks, rub or gallops   Abdomen: Soft, non-tender, nondistended. Bowel sounds present. No organomegaly. LLE cath covered. Genitourinary: defered   Neuromuscular:      NANCI, EOMI  + mild generalized weakness 4+ to 5-/5. BUE, BLE, more proximal.   Moves all extremities well. Sensation grossly intact to soft touch. Skin/extremity: No rashes, no erythema. 1+edema. Wound covered.      Lab Review:   Recent Results (from the past 72 hour(s))   POTASSIUM    Collection Time: 12/26/17  7:06 AM   Result Value Ref Range    Potassium 4.2 3.5 - 5.1 mmol/L   CBC W/O DIFF    Collection Time: 12/28/17  6:53 AM   Result Value Ref Range    WBC 7.2 4.3 - 11.1 K/uL    RBC 2.89 (L) 4.05 - 5.25 M/uL    HGB 8.4 (L) 11.7 - 15.4 g/dL    HCT 27.4 (L) 35.8 - 46.3 %    MCV 94.8 79.6 - 97.8 FL    MCH 29.1 26.1 - 32.9 PG    MCHC 30.7 (L) 31.4 - 35.0 g/dL    RDW 18.7 (H) 11.9 - 14.6 %    PLATELET 754 100 - 363 K/uL    MPV 9.3 (L) 10.8 - 59.0 FL   METABOLIC PANEL, BASIC    Collection Time: 12/28/17  6:53 AM   Result Value Ref Range    Sodium 135 (L) 136 - 145 mmol/L    Potassium 3.8 3.5 - 5.1 mmol/L    Chloride 100 98 - 107 mmol/L    CO2 25 21 - 32 mmol/L    Anion gap 10 7 - 16 mmol/L    Glucose 98 65 - 100 mg/dL    BUN 28 (H) 8 - 23 MG/DL    Creatinine 7.47 (H) 0.6 - 1.0 MG/DL    GFR est AA 7 (L) >60 ml/min/1.73m2    GFR est non-AA 6 (L) >60 ml/min/1.73m2    Calcium 9.9 8.3 - 10.4 MG/DL       PT Initial  PT Most Recent   AROM: Generally decreased, functional (12/02/17 1200) Generally decreased, functional (12/24/17 1100)         Strength: Generally decreased, functional (12/02/17 1200) Generally decreased, functional (12/24/17 1100)   Coordination: Generally decreased, functional (12/03/17 1100) Generally decreased, functional (12/24/17 1100)         Sensation: Intact (12/24/17 1100) Intact (12/24/17 1100)   Amount of Assistance: 4 (Contact guard assistance) (12/03/17 1100) 5 (Supervision/setup) (12/27/17 1533)   Distance (ft): 5 Feet (ft) (to recliner chair) (11/30/17 1200) 150 Feet (ft) (12/27/17 1533)   Assistive Device: [place] Al[place], rolling;Gait belt (11/30/17 1200) Walker, rolling (12/27/17 1533)       OT Initial OT Most Recent   Feeding/Eating Assistance: 0 (Not tested) (12/16/17 0821) 5 (Supervision/setup) (12/24/17 1213)   Grooming Assistance : (P) 5 (Supervision) (12/02/17 1130) 6 (Modified independent) (12/24/17 1213)   Bathing Assistance, Upper: 5 (Supervision) (12/04/17 1025) 5 (Supervision) (12/26/17 0901)   Bathing Assistance, Lower : 3 (Moderate assistance ) (12/04/17 1025) 5 (Supervision) (12/26/17 0901)   Toileting Assistance (FIM Score): 5 (Supervision) (12/04/17 1025) 5 (Supervision) (no clothing to manage ) (12/18/17 1010)   Dressing Assistance : 5 (Supervision) (12/04/17 1025) 5 (Supervision) (12/26/17 0901)   Dressing Assistance : 2 (Maximal assistance) (12/04/17 1025) 5 (Supervision) (12/26/17 0901)     ST Initial ST Most Recent                        Active Problems:    Hypothyroidism (4/28/2015)      S/P PTCA (percutaneous transluminal coronary angioplasty) (4/13/2017)      ESRD (end stage renal disease) ([facility] (11/18/2017)      Weakness of both legs (11/29/2017)      Renal failure (ARF), acute on chronic ([facility] (11/29/2017)        Discharge Instructions:  CAD (coronary artery disease) (11/27/2015)/ S/P complex PCI / Unstable angina/ hypertension  - on ASA and Brilinta  - continue ranexa, statin  -12/28. Stable exam. Will follow with cardiology.       Acute/ Chronic anemia (11/18/2017) - continue to monitor.   - continue epogen.  -   hgb 8.4(12/28). asymptomatic      ESRD (end stage renal disease) On PD/ CRRT  - PD resumed. - monitor fluids status. Nephrology managing. Will follow at IRU.   - 11/30 problem with PD/ catheter. S/p Lt femoral perm catheter,  working well. Has been using for HD. Has had revision of PD catheter 12/18.    - 12/28 -  Trials of PD went well.  to removed femoral HD catheter.   Pt to f/u at PD center tomorrow.      Wound Care: will continue to monitor wound status daily per staff and physician. Keep wound clean and dry - monitoring femoral cath site, appears benign. 12/28  - PD catheter and femoral cath site without signs of infection, drainage     bowel program - as needed. + BM.     GERD/  GI- resume PPI. At times may need additional antacids, Maalox prn.  - continue sucralfate. Hold reglan.      Hypothyroid  - synthroid. No dose change.         AMS, improved. - mental status  Appears baseline.        Follow up with [address] [place] [address] [address] Arash Smith in 2 weeks after discharge from inpatient rehab. Follow up with nephrology per instructions. Transfer Medications:      calcitRIOL (ROCALTROL) capsule 0.25 mcg   Route: Take 1 Cap by mouth daily. - Oral          LORazepam (ATIVAN) tablet 0.5 mg   Take 1 Tab by mouth every six (6) hours as needed for Agitation (sedation). - Oral        HYDROcodone-acetaminophen (NORCO) 5-325 mg per tablet 1 Tab  1 Tab  Ordered Dose:  5/325 mg Route: Oral Frequency: EVERY 6 HOURS  as needed for pain            senna-docusate (PERICOLACE) 8.6-50 mg per tablet 2 Tab Ordered Dose: 2 Tab Route: Oral Frequency: DAILY       Current Discharge Medication List      CONTINUE these medications which have NOT CHANGED    Details   metoprolol tartrate (LOPRESSOR) 25 mg tablet Take 0.5 Tabs by mouth daily. amLODIPine (NORVASC) 5 mg tablet Take 1 Tab by mouth daily. torsemide (DEMADEX) 100 mg tablet Take 1 Tab by mouth two (2) times a day. pantoprazole (PROTONIX) 40 mg tablet Take 1 Tab by mouth Before breakfast and dinner. magnesium oxide (MAG-OX) 400 mg tablet Take 400 mg by mouth daily.    HOLD.       metoclopramide HCl (REGLAN) 5 mg tablet Take 5 mg by mouth two (2) times a day. ticagrelor (BRILINTA) 90 mg tablet Take 1 Tab by mouth two (2) times a day. aspirin delayed-release 81 mg tablet Take 1 Tab by mouth daily. rosuvastatin (CRESTOR) 20 mg tablet Take 20 mg by mouth nightly. levothyroxine (SYNTHROID) 150 mcg tablet Take 150 mcg by mouth Daily (before breakfast). cyanocobalamin (VITAMIN B12) 1,000 mcg/mL injection 1 mL by IntraMUSCular route every seven (7) days. Indications: Vitamin B12 Deficiency         folic acid (FOLVITE) 1 mg tablet Take 1 mg by mouth daily. traZODone (DESYREL) 50 mg tablet Take  by mouth nightly. megestrol (MEGACE) 400 mg/10 mL (40 mg/mL) suspension Take 200 mg by mouth daily. sucralfate (CARAFATE) 100 mg/mL suspension Take 10 mL by mouth Before breakfast, lunch, dinner and at bedtime. Indications: PREVENTION OF STRESS ULCER  Qty: 414 mL, Refills: 0      nitroglycerin (NITROLINGUAL) 400 mcg/spray spray 1 Spray by SubLINGual route every five (5) minutes as needed for Chest Pain.      linaclotide (LINZESS) 145 mcg cap capsule Take  by mouth Daily (before breakfast). PNV NO.122/IRON/FOLIC ACID (PRENATAL MULTI PO) Take  by mouth. ondansetron (ZOFRAN ODT) 4 mg disintegrating tablet Take 4 mg by mouth three (3) times daily as needed. sevelamer carbonate (RENVELA) 800 mg tab tab Take 800 mg by mouth three (3) times daily (with meals). ranolazine ER (RANEXA) 500 mg SR tablet Take 500 mg by mouth two (2) times a day. O.K. Admit to sub acute rehab today. Discharge time: 35 minutes.     Signed By: Juan Daniel Luz MD     December 28, 2017

## 2017-12-28 NOTE — PROGRESS NOTES
Care Management Interventions  Transition of Care Consult (CM Consult): [place]: No  Reason Outside [place]: Patient already serviced by other home care/hospice agency ([address])  Discharge Durable Medical Equipment: Yes (shower chair and walker)  Physical Therapy Consult: Yes  Occupational Therapy Consult: Yes  Current Support Network: Lives Alone, Own Home, Other (neighbors and [employer] friends, 2 sons)  Confirm Follow Up Transport: Other (see comment) ([address])  Plan discussed with Pt/Family/Caregiver: Yes  Freedom of Choice Offered: Yes  Discharge Location  Discharge Placement: Home with hospice ([facility])    PT [address] THEY WILL SEE PATIENT DAILY TO ASSIST WITH PERSONAL CARE AND PERITONEAL DIALYSIS. FAMILY AWARE THAT 24/7 SUPERVISION IS RECOMMENDED.  SON, MELIA AGREES AND HAS TRIED TO ENCOURAGE MOTHER TO GO TO [facility] OR SNF BUT SHE IS NOT WILLING AT THIS TIME. HE STATES HE IS WORKING ON ARRANGING MORE ASSISTANCE IN HOME IN ADDITION TO Reji Carrillo NURSE AND AIDE. HE HAS ALSO SPOKEN TO [employer] TO ASSIST WITH MEALS. HOSPICE WILL SEND SW TO ASSESS FOR OTHER COMMUNITY SERVICES. SPOKE TO YUNI WITH [facility], THEY WILL MEET PATIENT AT HER HOME TODAY TO RESTART SERVICES. ALL PAPERWORK AND ORDERS FAXED TO [facility].

## 2017-12-28 NOTE — PROGRESS NOTES
MD Claire,   Medical Director  [address] [facility], [address] [facility]  Tel: [phone]       SFD PROGRESS NOTE    Abraham Marrufo  Admit Date: 11/29/2017  Admit Diagnosis: DEBILITY; Renal failure (ARF), acute on chronic ([facility] [facility]*  Chief Complaint : Gait dysfunction secondary to below. Admit Diagnosis: Unstable angina ([facility]  CAD (coronary artery disease) (11/27/2015)  S/P complex PCI and stable on ASA and Brilinta  Unstable angina ([facility] (2/1/2016)  Acute/ Chronic anemia (11/18/2017)  ESRD (end stage renal disease) On PD/ CRRT  Pain  DVT risk  Acute Rehab Dx:  Debility    Weakness  Gait impairment  deconditioning  Mobility and ambulation deficits  Self Care/ADL deficits     Subjective     Patient seen and examined. Vss, afebrile. Patient without acute issues. Ambulating, transferring with supervision using a RW. No falls noted. Received call from son, who wants fem cath removed at hospital, not in PD clinic. Will ask Dr. Natasha Hurst MD to see if possibly can remove prior to discharge.        Objective:     Current Facility-Administered Medications   Medication Dose Route Frequency    sorbitol 70 % solution 60 mL  60 mL Oral QID PRN    senna-docusate (PERICOLACE) 8.6-50 mg per tablet 2 Tab  2 Tab Oral BID PRN    senna-docusate (PERICOLACE) 8.6-50 mg per tablet 2 Tab  2 Tab Oral DAILY    gentamicin (GARAMYCIN) 0.1 % cream   Topical DIALYSIS PRN    polyethylene glycol (MIRALAX) packet 17 g  17 g Oral DAILY    bisacodyl (DULCOLAX) suppository 10 mg  10 mg Rectal DAILY PRN    heparin (porcine) 1,000 unit/mL injection 5,000 Units  5,000 Units Hemodialysis DIALYSIS PRN    zolpidem (AMBIEN) tablet 5 mg  5 mg Oral QHS PRN    acetaminophen (TYLENOL) tablet 650 mg  650 mg Oral Q4H PRN    amLODIPine (NORVASC) tablet 5 mg  5 mg Oral DAILY    aspirin delayed-release tablet 81 mg  81 mg Oral DAILY    calcitRIOL (ROCALTROL) capsule 0.25 mcg  0.25 mcg Oral DAILY    cyanocobalamin (VITAMIN B12) injection 1,000 mcg  1,000 mcg IntraMUSCular Q7D    epoetin toya (EPOGEN;PROCRIT) injection 20,000 Units  20,000 Units SubCUTAneous Q TUE, THU & SAT    HYDROcodone-acetaminophen (NORCO) 5-325 mg per tablet 1 Tab  1 Tab Oral Q4H PRN    hydrogen peroxide 3 %   Topical PRN    levothyroxine (SYNTHROID) tablet 150 mcg  150 mcg Oral ACB    linaclotide (LINZESS) capsule 145 mcg (Patient Supplied)  145 mcg Oral DAILY    LORazepam (ATIVAN) tablet 1 mg  1 mg Oral Q6H PRN    metoprolol tartrate (LOPRESSOR) tablet 12.5 mg  12.5 mg Oral DAILY    multivitamin w ZN (STRESSTABS W ZINC) tablet  1 Tab Oral DAILY    ondansetron (ZOFRAN ODT) tablet 4 mg  4 mg Oral TID PRN    pantoprazole (PROTONIX) tablet 40 mg  40 mg Oral ACB&D    polyethylene glycol (MIRALAX) packet 17 g  17 g Oral DAILY PRN    ranolazine ER (RANEXA) tablet 1,000 mg  1,000 mg Oral BID    rosuvastatin (CRESTOR) tablet 20 mg  20 mg Oral QHS    sevelamer (RENAGEL) tablet 800 mg  800 mg Oral TID WITH MEALS    sodium chloride (NS) flush 5-10 mL  5-10 mL IntraVENous PRN    sucralfate (CARAFATE) 100 mg/mL oral suspension 1 g  1 g Oral AC&HS    ticagrelor (BRILINTA) tablet 90 mg  90 mg Oral BID    torsemide (DEMADEX) tablet 100 mg  100 mg Oral BID     Review of Systems:   Denies chest pain, shortness of breath, cough, headache, visual problems, abdominal pain, dysurea, calf pain. Pertinent positives are as noted in the medical records and unremarkable otherwise. Visit Vitals    /77 (BP 1 Location: Right arm)    Pulse 77    Temp 98.1 °F (36.7 °C)    Resp 16    Wt 213 lb (96.6 kg)    SpO2 97%    BMI 31 kg/m2   Physical Exam:   General: Alert and oriented x 3.speech normal.   No acute cardio respiratory distress. HEENT: Normocephalic,no scleral icterus  Oral mucosa moist without cyanosis   Lungs: Clear to auscultation  bilaterally.   Respiration even and unlabored   Heart: Regular rate and rhythm, S1, S2   No  murmurs, clicks, rub or gallops   Abdomen: Soft, non-tender, nondistended. Bowel sounds present. No organomegaly. LLE cath covered. Genitourinary: defered   Neuromuscular:      NANCI, EOMI  + mild generalized weakness 4+ to 5-/5. BUE, BLE, more proximal.   Moves all extremities well. Sensation grossly intact to soft touch. Skin/extremity: No rashes, no erythema. 1+edema. Wound covered.   Lem November                                                                             Functional Assessment:  Gross Assessment  AROM: Generally decreased, functional (12/24/17 1100)  Strength: Generally decreased, functional (12/24/17 1100)  Coordination: Generally decreased, functional (12/24/17 1100)  Sensation: Intact (12/24/17 1100)       Balance  Sitting - Static: Good (unsupported) (12/26/17 1500)  Sitting - Dynamic: Good (unsupported) (12/26/17 1500)  Standing - Static: Good (12/26/17 1500)  Standing - Dynamic : Impaired (12/26/17 1500)           Toileting  Adaptive Equipment: Elevated seat;Grab bars (12/18/17 1010)         Harrison Fall Risk Assessment:  Estee Cisse Fall Risk  Mobility: Ambulates or transfers with assist devices or assistance/unsteady gait (12/28/17 0815)  Mobility Interventions: Bed/chair exit alarm;Communicate number of staff needed for ambulation/transfer;Patient to call before getting OOB;PT Consult for assist device competence;Utilize walker, cane, or other assitive device;Strengthening exercises (ROM-active/passive) (12/28/17 0815)  Mentation: Alert, oriented x 3 (12/28/17 0815)  Mentation Interventions: Adequate sleep, hydration, pain control;Bed/chair exit alarm;Evaluate medications/consider consulting pharmacy; Increase mobility; Toileting rounds (12/28/17 0815)  Medication: Patient receiving anticonvulsants, sedatives(tranquilizers), psychotropics or hypnotics, hypoglycemics, narcotics, sleep aids, antihypertensives, laxatives, or diuretics (12/28/17 0815)  Medication Interventions: Bed/chair exit alarm;Evaluate medications/consider consulting pharmacy; Patient to call before getting OOB; Teach patient to arise slowly (12/28/17 0815)  Elimination: Needs assistance with toileting (12/28/17 0815)  Elimination Interventions: Call light in reach;Bed/chair exit alarm; Patient to call for help with toileting needs; Toileting schedule/hourly rounds (12/28/17 0815)  Prior Fall History: No (12/28/17 0815)  History of Falls Interventions: Bed/chair exit alarm; Consult care management for discharge planning;Evaluate medications/consider consulting pharmacy (12/28/17 0815)  Total Score: 3 (12/28/17 0815)  Standard Fall Precautions: Yes (12/27/17 2034)  High Fall Risk: Yes (12/28/17 0815)     Speech Assessment:         Ambulation:  Gait  Base of Support: Widened (12/15/17 1200)  Speed/Michelle: Slow;Shuffled (12/15/17 1200)  Step Length: Right shortened;Left shortened (12/15/17 1200)  Stance: Right increased; Left increased (12/15/17 1200)  Gait Abnormalities: Trunk sway increased; Step to gait; Decreased step clearance (12/15/17 1200)  Distance (ft): 150 Feet (ft) (12/27/17 1533)  Assistive Device: Walker, rolling (12/27/17 1533)  Rail Use: Both (12/26/17 1500)  Curbs/Ramps: Minimum assistance (12/07/17 1400)     Labs/Studies:  Recent Results (from the past 72 hour(s))   POTASSIUM    Collection Time: 12/26/17  7:06 AM   Result Value Ref Range    Potassium 4.2 3.5 - 5.1 mmol/L   CBC W/O DIFF    Collection Time: 12/28/17  6:53 AM   Result Value Ref Range    WBC 7.2 4.3 - 11.1 K/uL    RBC 2.89 (L) 4.05 - 5.25 M/uL    HGB 8.4 (L) 11.7 - 15.4 g/dL    HCT 27.4 (L) 35.8 - 46.3 %    MCV 94.8 79.6 - 97.8 FL    MCH 29.1 26.1 - 32.9 PG    MCHC 30.7 (L) 31.4 - 35.0 g/dL    RDW 18.7 (H) 11.9 - 14.6 %    PLATELET 524 894 - 843 K/uL    MPV 9.3 (L) 10.8 - 51.2 FL   METABOLIC PANEL, BASIC    Collection Time: 12/28/17  6:53 AM   Result Value Ref Range    Sodium 135 (L) 136 - 145 mmol/L    Potassium 3.8 3.5 - 5.1 mmol/L    Chloride 100 98 - 107 mmol/L    CO2 25 21 - 32 mmol/L    Anion gap 10 7 - 16 mmol/L    Glucose 98 65 - 100 mg/dL    BUN 28 (H) 8 - 23 MG/DL    Creatinine 7.47 (H) 0.6 - 1.0 MG/DL    GFR est AA 7 (L) >60 ml/min/1.73m2    GFR est non-AA 6 (L) >60 ml/min/1.73m2    Calcium 9.9 8.3 - 10.4 MG/DL       Assessment:     Problem List as of 12/28/2017  Date Reviewed: 3/2/2017          Codes Class Noted - Resolved    Weakness of both legs ICD-10-CM: R29.898  ICD-9-CM: 729.89  11/29/2017 - Present        Renal failure (ARF), acute on chronic ([facility] ICD-10-CM: N17.9, N18.9  ICD-9-CM: 584.9, 585.9  11/29/2017 - Present        Elevated troponin ICD-10-CM: R74.8  ICD-9-CM: 790.6  11/18/2017 - Present        Chest pain ICD-10-CM: R07.9  ICD-9-CM: 786.50  11/18/2017 - Present        CKD (chronic kidney disease) ICD-10-CM: N18.9  ICD-9-CM: 585.9  11/18/2017 - Present        Chronic anemia ICD-10-CM: D64.9  ICD-9-CM: 285.9  11/18/2017 - Present        ESRD (end stage renal disease) ([facility] ICD-10-CM: N18.6  ICD-9-CM: 585.6  11/18/2017 - Present        Abdominal pain ICD-10-CM: R10.9  ICD-9-CM: 789.00  9/18/2017 - Present        H/O TIA (transient ischemic attack) and stroke ICD-10-CM: Z86.73  ICD-9-CM: V12.54  4/13/2017 - Present        S/P PTCA (percutaneous transluminal coronary angioplasty) ICD-10-CM: Z98.61  ICD-9-CM: V45.82  4/13/2017 - Present        Mixed hyperlipidemia ICD-10-CM: E78.2  ICD-9-CM: 272.2  4/13/2017 - Present        Vision changes ICD-10-CM: H53.9  ICD-9-CM: 368.9  3/26/2017 - Present        Dizziness ICD-10-CM: R42  ICD-9-CM: 780.4  3/26/2017 - Present        Refusal of blood transfusions as patient is Sabianist (Chronic) ICD-10-CM: Z53.1  ICD-9-CM: V62.6  8/25/2016 - Present        GERD (gastroesophageal reflux disease) ICD-10-CM: K21.9  ICD-9-CM: 530.81  8/8/2016 - Present        Unspecified sleep apnea ICD-10-CM: G47.30  ICD-9-CM: 780.57  8/8/2016 - Present    Overview Signed 8/8/2016 11:09 AM by Ravinder alcaraz cpap machine             CVA (cerebral vascular accident) Hx of TIA ICD-10-CM: I63.9  ICD-9-CM: 434.91  2/22/2016 - Present        Unstable angina ([address] ICD-10-CM: I20.0  ICD-9-CM: 411.1  2/1/2016 - Present        ESRD on peritoneal dialysis [facility]) (Chronic) ICD-10-CM: N18.6, Z99.2  ICD-9-CM: 585.6, V45.11  2/1/2016 - Present        Ischemic cardiomyopathy ICD-10-CM: I25.5  ICD-9-CM: 414.8  12/29/2015 - Present        HLD (hyperlipidemia) ICD-10-CM: E78.5  ICD-9-CM: 272.4  12/29/2015 - Present        Depression ICD-10-CM: F32.9  ICD-9-CM: 906  12/29/2015 - Present        CAD (coronary artery disease) ICD-10-CM: I25.10  ICD-9-CM: 414.00  11/27/2015 - Present        Debility ICD-10-CM: R53.81  ICD-9-CM: 799.3  5/5/2015 - Present        Hypertension (Chronic) ICD-10-CM: I10  ICD-9-CM: 401.9  4/28/2015 - Present        Anemia of chronic renal failure (Chronic) ICD-10-CM: N18.9, D63.1  ICD-9-CM: 285.21, 585.9  4/28/2015 - Present        Hypothyroidism (Chronic) ICD-10-CM: E03.9  ICD-9-CM: 244.9  4/28/2015 - Present        RESOLVED: Postural hypotension ICD-10-CM: I95.1  ICD-9-CM: 458.0  8/9/2016 - 3/26/2017        RESOLVED: Chest pain ICD-10-CM: R07.9  ICD-9-CM: 786.50  8/8/2016 - 3/26/2017        RESOLVED: Nausea ICD-10-CM: R11.0  ICD-9-CM: 787.02  8/8/2016 - 3/26/2017        RESOLVED: S/P PTCA (percutaneous transluminal coronary angioplasty); LAD PTCA of  ISR 11/27/15 ICD-10-CM: Z98.61  ICD-9-CM: V45.82  5/24/2016 - 3/26/2017        RESOLVED: TIA (transient ischemic attack) ICD-10-CM: G45.9  ICD-9-CM: 435.9  2/10/2016 - 3/26/2017        RESOLVED: Edema ICD-10-CM: R60.9  ICD-9-CM: 782.3  12/29/2015 - 3/26/2017        RESOLVED: Anterior myocardial infarction ([facility] ICD-10-CM: I21.09  ICD-9-CM: 410.10  5/21/2015 - 3/26/2017        RESOLVED: STEMI (ST elevation myocardial infarction) ([facility] ICD-10-CM: I21.3  ICD-9-CM: 410.90  4/28/2015 - 2/1/2016              Plan:      CAD (coronary artery disease) (11/27/2015)/ S/P complex PCI / Unstable angina/ hypertension  - on ASA and Brilinta  - continue ranexa, statin  -12/28. Stable exam. Will follow with cardiology.      Acute/ Chronic anemia (11/18/2017) - continue to monitor.   - continue epogen.  -   hgb 8.4(12/28). asymptomatic     ESRD (end stage renal disease) On PD/ CRRT  - PD resumed. - monitor fluids status. Nephrology managing. Will follow at IRU.   - 11/30 problem with PD/ catheter. S/p Lt femoral perm catheter,  working well. Has been using for HD. Has had revision of PD catheter 12/18.    - 12/28 -  Trials of PD went well.  to remove femoral HD catheter. Son asks to remove it prior to discharge. Will observe post procedure,  and discharge when safe. Pneumonia prophylaxis- continue nsentive spirometer every hour while awake.      DVT risk / DVT Prophylaxis-  obilization as tolerated. Intermittent pneumatic compression devices when in bed Thigh-high or knee-high thromboembolic deterrent hose when out of bed. No signs of DVT. - on brillanta bId+ aspirin daily. continue SCDs. Pain Control: stable, mild-to-moderate joint symptoms intermittently, reasonably well controlled by PRN meds. Will require regular pain assessment and comprenhensive pain management. -  Minimal pain today. Needed 1 norco yesterday. Wound Care: will continue to monitor wound status daily per staff and physician. Keep wound clean and dry - monitoring femoral cath site, appears benign. 12/28  - PD catheter and femoral cath site without signs of infection, drainage    bowel program - as needed. + BM.      GERD/  GI- resume PPI. At times may need additional antacids, Maalox prn.  - continue sucralfate. Hold reglan. Hypothyroid  - synthroid. No dose change. AMS, improved. - mental status  Appears baseline.        Time spent was 25 minutes with over 1/2 in direct patient care/examination, consultation and coordination of care.      Signed By: Baudilio Hills MD     December 28, 2017

## 2017-12-28 NOTE — PROGRESS NOTES
OT Daily Note    Time In 0833   Time Out 0916     Subjective: \"I already did all that. \" [note patient with decreased recall, completes ADL without supervision from staff again today, as patient did yesterday, despite education/re-education regarding need for supervision from staff for safety and purpose of OT for discharge ADL scores]  Pain: Not indicated    Precautions: Other (comment) (fall risk)    Self-Care   Patient encountered seated in recliner in hospital room having completed ADL, see Subjective above. Patient transfers sit to stand and ambulates with RW with modified independence to gather pajamas from bed as prompted by therapist, note requires seated rest break EOB after ambulating ~10 feet and with confusion requiring repeated cues/instructions to drape pajamas over front of RW to transport to dirty clothes bag in hospital room. Patient prompted and agreeable to brush teeth at sink, ambulates with RW with modified independence to sit at sink to brush teeth. Note patient's toothbrush misplaced, patient becomes agitated/paranoid regarding her toothbrush being \"taken,\" reports after minutes of discussion/therapist looking for toothbrush that she had already brushed her teeth earlier this AM.  Defer to tasks in therapy gym as patient has completed all ADLs this AM.     Strengthening   Reviewed HEP for BUE with patient seated in wheelchair in therapy gym. Patient provided dowel ashutosh and required re-education and repeated cues for technique for all exercises included in HEP. Patient tolerated 1 set x 5 reps of shoulder flexion, scaption, external/internal rotation, chest press, and elbow flexion exercises before time expires/R shoulder fatigues this session. Tolerates PROM stretch to R shoulder in flexion, abduction, internal/external rotation end of session. Assessment: Patient continues with decreased recall, confusion, and some paranoia this morning perseverating on misplaced toothbrush.   Will require supervision with HEP at discharge. Education: Purpose of therapy, HEP  Interdisciplinary Communication: Collaborated with Lobo Hanson regarding patient's status this AM and agreed patient is progressing well and on-track to meet goals as stated in POC. Plan: To complete discharge ADL as able.     Ghulam Antunez OTR/L

## 2017-12-30 ENCOUNTER — HOSPITAL ENCOUNTER (INPATIENT)
Age: 82
LOS: 3 days | Discharge: HOME | DRG: 640 | End: 2018-01-02
Attending: EMERGENCY MEDICINE | Admitting: INTERNAL MEDICINE
Payer: MEDICARE

## 2017-12-30 ENCOUNTER — APPOINTMENT (OUTPATIENT)
Dept: ULTRASOUND IMAGING | Age: 82
DRG: 640 | End: 2017-12-30
Attending: INTERNAL MEDICINE
Payer: MEDICARE

## 2017-12-30 ENCOUNTER — APPOINTMENT (OUTPATIENT)
Dept: GENERAL RADIOLOGY | Age: 82
DRG: 640 | End: 2017-12-30
Attending: EMERGENCY MEDICINE
Payer: MEDICARE

## 2017-12-30 DIAGNOSIS — N17.9 ACUTE RENAL FAILURE, UNSPECIFIED ACUTE RENAL FAILURE TYPE (HCC): Primary | ICD-10-CM

## 2017-12-30 DIAGNOSIS — E87.70 HYPERVOLEMIA, UNSPECIFIED HYPERVOLEMIA TYPE: ICD-10-CM

## 2017-12-30 LAB
ALBUMIN SERPL-MCNC: 2.1 G/DL (ref 3.2–4.6)
ALBUMIN SERPL-MCNC: 2.5 G/DL (ref 3.2–4.6)
ALBUMIN/GLOB SERPL: 0.5 {RATIO} (ref 1.2–3.5)
ALBUMIN/GLOB SERPL: 0.5 {RATIO} (ref 1.2–3.5)
ALP SERPL-CCNC: 101 U/L (ref 50–136)
ALP SERPL-CCNC: 117 U/L (ref 50–136)
ALT SERPL-CCNC: 8 U/L (ref 12–65)
ALT SERPL-CCNC: 8 U/L (ref 12–65)
ANION GAP SERPL CALC-SCNC: 11 MMOL/L (ref 7–16)
ANION GAP SERPL CALC-SCNC: 11 MMOL/L (ref 7–16)
APTT PPP: 25.7 SEC (ref 23.2–35.3)
AST SERPL-CCNC: 20 U/L (ref 15–37)
AST SERPL-CCNC: 21 U/L (ref 15–37)
ATRIAL RATE: 84 BPM
BASOPHILS # BLD: 0 K/UL (ref 0–0.2)
BASOPHILS # BLD: 0 K/UL (ref 0–0.2)
BASOPHILS NFR BLD: 0 % (ref 0–2)
BASOPHILS NFR BLD: 0 % (ref 0–2)
BILIRUB SERPL-MCNC: 0.5 MG/DL (ref 0.2–1.1)
BILIRUB SERPL-MCNC: 0.5 MG/DL (ref 0.2–1.1)
BUN SERPL-MCNC: 46 MG/DL (ref 8–23)
BUN SERPL-MCNC: 47 MG/DL (ref 8–23)
CALCIUM SERPL-MCNC: 10 MG/DL (ref 8.3–10.4)
CALCIUM SERPL-MCNC: 9.6 MG/DL (ref 8.3–10.4)
CALCULATED P AXIS, ECG09: 72 DEGREES
CALCULATED R AXIS, ECG10: -30 DEGREES
CALCULATED T AXIS, ECG11: 74 DEGREES
CHLORIDE SERPL-SCNC: 102 MMOL/L (ref 98–107)
CHLORIDE SERPL-SCNC: 104 MMOL/L (ref 98–107)
CO2 SERPL-SCNC: 23 MMOL/L (ref 21–32)
CO2 SERPL-SCNC: 24 MMOL/L (ref 21–32)
CREAT SERPL-MCNC: 10.1 MG/DL (ref 0.6–1)
CREAT SERPL-MCNC: 9.8 MG/DL (ref 0.6–1)
DIAGNOSIS, 93000: NORMAL
DIFFERENTIAL METHOD BLD: ABNORMAL
DIFFERENTIAL METHOD BLD: ABNORMAL
EOSINOPHIL # BLD: 0.1 K/UL (ref 0–0.8)
EOSINOPHIL # BLD: 0.1 K/UL (ref 0–0.8)
EOSINOPHIL NFR BLD: 1 % (ref 0.5–7.8)
EOSINOPHIL NFR BLD: 2 % (ref 0.5–7.8)
ERYTHROCYTE [DISTWIDTH] IN BLOOD BY AUTOMATED COUNT: 18.5 % (ref 11.9–14.6)
ERYTHROCYTE [DISTWIDTH] IN BLOOD BY AUTOMATED COUNT: 18.7 % (ref 11.9–14.6)
GLOBULIN SER CALC-MCNC: 4.5 G/DL (ref 2.3–3.5)
GLOBULIN SER CALC-MCNC: 4.9 G/DL (ref 2.3–3.5)
GLUCOSE SERPL-MCNC: 101 MG/DL (ref 65–100)
GLUCOSE SERPL-MCNC: 84 MG/DL (ref 65–100)
HCT VFR BLD AUTO: 27.5 % (ref 35.8–46.3)
HCT VFR BLD AUTO: 32.1 % (ref 35.8–46.3)
HGB BLD-MCNC: 8.4 G/DL (ref 11.7–15.4)
HGB BLD-MCNC: 9.9 G/DL (ref 11.7–15.4)
IMM GRANULOCYTES # BLD: 0 K/UL (ref 0–0.5)
IMM GRANULOCYTES # BLD: 0 K/UL (ref 0–0.5)
IMM GRANULOCYTES NFR BLD AUTO: 0 % (ref 0–5)
IMM GRANULOCYTES NFR BLD AUTO: 0 % (ref 0–5)
INR PPP: 1.1
LYMPHOCYTES # BLD: 0.9 K/UL (ref 0.5–4.6)
LYMPHOCYTES # BLD: 0.9 K/UL (ref 0.5–4.6)
LYMPHOCYTES NFR BLD: 12 % (ref 13–44)
LYMPHOCYTES NFR BLD: 15 % (ref 13–44)
MCH RBC QN AUTO: 29.6 PG (ref 26.1–32.9)
MCH RBC QN AUTO: 29.8 PG (ref 26.1–32.9)
MCHC RBC AUTO-ENTMCNC: 30.5 G/DL (ref 31.4–35)
MCHC RBC AUTO-ENTMCNC: 30.8 G/DL (ref 31.4–35)
MCV RBC AUTO: 96.7 FL (ref 79.6–97.8)
MCV RBC AUTO: 96.8 FL (ref 79.6–97.8)
MONOCYTES # BLD: 0.6 K/UL (ref 0.1–1.3)
MONOCYTES # BLD: 0.8 K/UL (ref 0.1–1.3)
MONOCYTES NFR BLD: 12 % (ref 4–12)
MONOCYTES NFR BLD: 8 % (ref 4–12)
NEUTS SEG # BLD: 4.6 K/UL (ref 1.7–8.2)
NEUTS SEG # BLD: 5.9 K/UL (ref 1.7–8.2)
NEUTS SEG NFR BLD: 71 % (ref 43–78)
NEUTS SEG NFR BLD: 79 % (ref 43–78)
P-R INTERVAL, ECG05: 200 MS
PLATELET # BLD AUTO: 342 K/UL (ref 150–450)
PLATELET # BLD AUTO: 377 K/UL (ref 150–450)
PMV BLD AUTO: 9.6 FL (ref 10.8–14.1)
PMV BLD AUTO: 9.6 FL (ref 10.8–14.1)
POTASSIUM SERPL-SCNC: 4.2 MMOL/L (ref 3.5–5.1)
POTASSIUM SERPL-SCNC: 4.3 MMOL/L (ref 3.5–5.1)
PROT SERPL-MCNC: 6.6 G/DL (ref 6.3–8.2)
PROT SERPL-MCNC: 7.4 G/DL (ref 6.3–8.2)
PROTHROMBIN TIME: 14.1 SEC (ref 11.5–14.5)
Q-T INTERVAL, ECG07: 378 MS
QRS DURATION, ECG06: 82 MS
QTC CALCULATION (BEZET), ECG08: 446 MS
RBC # BLD AUTO: 2.84 M/UL (ref 4.05–5.25)
RBC # BLD AUTO: 3.32 M/UL (ref 4.05–5.25)
SODIUM SERPL-SCNC: 137 MMOL/L (ref 136–145)
SODIUM SERPL-SCNC: 138 MMOL/L (ref 136–145)
VENTRICULAR RATE, ECG03: 84 BPM
WBC # BLD AUTO: 6.5 K/UL (ref 4.3–11.1)
WBC # BLD AUTO: 7.6 K/UL (ref 4.3–11.1)

## 2017-12-30 PROCEDURE — 85730 THROMBOPLASTIN TIME PARTIAL: CPT | Performed by: EMERGENCY MEDICINE

## 2017-12-30 PROCEDURE — 80053 COMPREHEN METABOLIC PANEL: CPT | Performed by: EMERGENCY MEDICINE

## 2017-12-30 PROCEDURE — 80053 COMPREHEN METABOLIC PANEL: CPT | Performed by: INTERNAL MEDICINE

## 2017-12-30 PROCEDURE — 74011250636 HC RX REV CODE- 250/636: Performed by: INTERNAL MEDICINE

## 2017-12-30 PROCEDURE — 85025 COMPLETE CBC W/AUTO DIFF WBC: CPT | Performed by: EMERGENCY MEDICINE

## 2017-12-30 PROCEDURE — 85610 PROTHROMBIN TIME: CPT | Performed by: EMERGENCY MEDICINE

## 2017-12-30 PROCEDURE — 65270000029 HC RM PRIVATE

## 2017-12-30 PROCEDURE — 36415 COLL VENOUS BLD VENIPUNCTURE: CPT | Performed by: INTERNAL MEDICINE

## 2017-12-30 PROCEDURE — 90945 DIALYSIS ONE EVALUATION: CPT

## 2017-12-30 PROCEDURE — 3E1M39Z IRRIGATION OF PERITONEAL CAVITY USING DIALYSATE, PERCUTANEOUS APPROACH: ICD-10-PCS | Performed by: INTERNAL MEDICINE

## 2017-12-30 PROCEDURE — 71010 XR CHEST PORT: CPT

## 2017-12-30 PROCEDURE — 93005 ELECTROCARDIOGRAM TRACING: CPT | Performed by: EMERGENCY MEDICINE

## 2017-12-30 PROCEDURE — 99285 EMERGENCY DEPT VISIT HI MDM: CPT | Performed by: EMERGENCY MEDICINE

## 2017-12-30 PROCEDURE — 74011250637 HC RX REV CODE- 250/637: Performed by: INTERNAL MEDICINE

## 2017-12-30 RX ORDER — POLYETHYLENE GLYCOL 3350 17 G/17G
17 POWDER, FOR SOLUTION ORAL DAILY
Status: DISCONTINUED | OUTPATIENT
Start: 2017-12-30 | End: 2018-01-02 | Stop reason: HOSPADM

## 2017-12-30 RX ORDER — RANOLAZINE 500 MG/1
1000 TABLET, EXTENDED RELEASE ORAL 2 TIMES DAILY
Status: DISCONTINUED | OUTPATIENT
Start: 2017-12-30 | End: 2018-01-02 | Stop reason: HOSPADM

## 2017-12-30 RX ORDER — METOPROLOL TARTRATE 25 MG/1
12.5 TABLET, FILM COATED ORAL DAILY
Status: DISCONTINUED | OUTPATIENT
Start: 2017-12-30 | End: 2018-01-02 | Stop reason: HOSPADM

## 2017-12-30 RX ORDER — AMLODIPINE BESYLATE 5 MG/1
5 TABLET ORAL DAILY
Status: DISCONTINUED | OUTPATIENT
Start: 2017-12-30 | End: 2018-01-02 | Stop reason: HOSPADM

## 2017-12-30 RX ORDER — SODIUM CHLORIDE 0.9 % (FLUSH) 0.9 %
5-10 SYRINGE (ML) INJECTION EVERY 8 HOURS
Status: DISCONTINUED | OUTPATIENT
Start: 2017-12-30 | End: 2018-01-02 | Stop reason: HOSPADM

## 2017-12-30 RX ORDER — LORAZEPAM 0.5 MG/1
0.5 TABLET ORAL
Status: DISCONTINUED | OUTPATIENT
Start: 2017-12-30 | End: 2018-01-02 | Stop reason: HOSPADM

## 2017-12-30 RX ORDER — SODIUM CHLORIDE 0.9 % (FLUSH) 0.9 %
5-10 SYRINGE (ML) INJECTION AS NEEDED
Status: DISCONTINUED | OUTPATIENT
Start: 2017-12-30 | End: 2018-01-02 | Stop reason: HOSPADM

## 2017-12-30 RX ORDER — GENTAMICIN SULFATE 1 MG/G
CREAM TOPICAL
Status: DISCONTINUED | OUTPATIENT
Start: 2017-12-30 | End: 2018-01-02 | Stop reason: HOSPADM

## 2017-12-30 RX ORDER — CALCITRIOL 0.25 UG/1
0.25 CAPSULE ORAL DAILY
Status: DISCONTINUED | OUTPATIENT
Start: 2017-12-30 | End: 2018-01-02 | Stop reason: HOSPADM

## 2017-12-30 RX ORDER — ZOLPIDEM TARTRATE 5 MG/1
5 TABLET ORAL
Status: DISCONTINUED | OUTPATIENT
Start: 2017-12-30 | End: 2018-01-02 | Stop reason: HOSPADM

## 2017-12-30 RX ORDER — HYDROCODONE BITARTRATE AND ACETAMINOPHEN 5; 325 MG/1; MG/1
1 TABLET ORAL
Status: DISCONTINUED | OUTPATIENT
Start: 2017-12-30 | End: 2018-01-02 | Stop reason: HOSPADM

## 2017-12-30 RX ORDER — HEPARIN SODIUM 5000 [USP'U]/ML
5000 INJECTION, SOLUTION INTRAVENOUS; SUBCUTANEOUS EVERY 12 HOURS
Status: DISCONTINUED | OUTPATIENT
Start: 2017-12-30 | End: 2018-01-02 | Stop reason: HOSPADM

## 2017-12-30 RX ORDER — CYANOCOBALAMIN 1000 UG/ML
1000 INJECTION, SOLUTION INTRAMUSCULAR; SUBCUTANEOUS
Status: DISCONTINUED | OUTPATIENT
Start: 2018-01-01 | End: 2018-01-02 | Stop reason: HOSPADM

## 2017-12-30 RX ORDER — CLOPIDOGREL BISULFATE 75 MG/1
75 TABLET ORAL DAILY
Status: DISCONTINUED | OUTPATIENT
Start: 2017-12-30 | End: 2018-01-02 | Stop reason: HOSPADM

## 2017-12-30 RX ORDER — ZOLPIDEM TARTRATE 5 MG/1
5 TABLET ORAL
Status: DISCONTINUED | OUTPATIENT
Start: 2017-12-30 | End: 2017-12-30

## 2017-12-30 RX ORDER — AMOXICILLIN 250 MG
2 CAPSULE ORAL DAILY
Status: DISCONTINUED | OUTPATIENT
Start: 2017-12-30 | End: 2018-01-02 | Stop reason: HOSPADM

## 2017-12-30 RX ADMIN — GENTAMICIN SULFATE: 1 CREAM TOPICAL at 10:12

## 2017-12-30 RX ADMIN — ERYTHROPOIETIN 20000 UNITS: 20000 INJECTION, SOLUTION INTRAVENOUS; SUBCUTANEOUS at 12:05

## 2017-12-30 RX ADMIN — HEPARIN SODIUM 5000 UNITS: 5000 INJECTION, SOLUTION INTRAVENOUS; SUBCUTANEOUS at 08:29

## 2017-12-30 RX ADMIN — CLOPIDOGREL BISULFATE 75 MG: 75 TABLET ORAL at 16:39

## 2017-12-30 RX ADMIN — METOPROLOL TARTRATE 12.5 MG: 25 TABLET ORAL at 08:29

## 2017-12-30 RX ADMIN — RANOLAZINE 1000 MG: 500 TABLET, FILM COATED, EXTENDED RELEASE ORAL at 17:00

## 2017-12-30 RX ADMIN — AMLODIPINE BESYLATE 5 MG: 5 TABLET ORAL at 08:28

## 2017-12-30 RX ADMIN — CALCITRIOL 0.25 MCG: 0.25 CAPSULE, LIQUID FILLED ORAL at 08:28

## 2017-12-30 RX ADMIN — ZINC 1 TABLET: TAB ORAL at 08:29

## 2017-12-30 RX ADMIN — POLYETHYLENE GLYCOL 3350 17 G: 17 POWDER, FOR SOLUTION ORAL at 21:18

## 2017-12-30 RX ADMIN — LORAZEPAM 0.5 MG: 0.5 TABLET ORAL at 08:29

## 2017-12-30 RX ADMIN — STANDARDIZED SENNA CONCENTRATE AND DOCUSATE SODIUM 2 TABLET: 8.6; 5 TABLET, FILM COATED ORAL at 21:18

## 2017-12-30 RX ADMIN — RANOLAZINE 1000 MG: 500 TABLET, FILM COATED, EXTENDED RELEASE ORAL at 08:28

## 2017-12-30 RX ADMIN — Medication 10 ML: at 21:18

## 2017-12-30 RX ADMIN — HEPARIN SODIUM 5000 UNITS: 5000 INJECTION, SOLUTION INTRAVENOUS; SUBCUTANEOUS at 21:18

## 2017-12-30 NOTE — PROGRESS NOTES
Patient appears stable connected to PD. Patient c/o overfilling feeling in abd. Patient would not allow this RN to access the situation. Dialysis nurse called back per patient request to access situation. Call light within reach and patient instructed to call if assistance is needed.   Report to be given to oncoming RN 7p-7a

## 2017-12-30 NOTE — H&P
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 She was seen in the peritoneal dialysis clinic and one day after discharge and had the transfer set exchanged as it was felt that he inability to do home PD was related to a catheter problem. After the catheter was assured to be functional she was still not able to get her cycler operationalaeven after trouble shooting the system through the  and she came to the ED with worsening dyspnea two days after her last dialysis. She has originally been admitted on 11/18/2017  by Toñito Boyer MD with unstable angina and required cardiac cath with [facility] and successful percutaneous coronary intervention and stentings on 11/20/2017. She has history of CAD s/p PCI to LAD and RCA 2-2016, Post procedure course was complicated by drop in hgb, down to 7.4 on 11/21.  She had no obvious signs of bleeding but CT  showed right abdominal wall hematoma. transfusion for anemia was not done after intense conservative therapy with EPO, Iron and vitamin supplementation failed. She had  problems with drainage of PD fluid, PD was held and she was transferred to CCU for CRRT and she was admitted to Rehab service, underwent laparoscopic PD catheter repositioning by Dr. Mona Amaya and after a few days was successfully returned to PD. The regained functionality with rehab and on the day of discharge her HD tunnnelled femoral hemodialysis catheter was removed.  She has not had dialysis for two days and has developed edema and dyspnea and has  No ability to obtain a new cycler or CAPD supplies at this time and is therefore admitted to inpatient status as it is anticipated to take several days to remedy this situation and she is in urgent need for dialysis due to fluid overload.         Past Medical History:   Diagnosis Date    Anemia of chronic renal failure 4/28/2015    Anterior myocardial infarction ([facility] [address] 5/21/2015    CAD (coronary artery disease)     Chest pain     Chronic kidney disease, stage III (moderate) 8/15/2014    on dialysis    CKD (chronic kidney disease) stage 4, GFR 15-29 ml/min ([facility] 4/28/2015    Debility 5/5/2015    Depression 12/29/2015    Edema 12/29/2015    Endocrine disease     Hypothyroidism    GERD (gastroesophageal reflux disease)     Heart murmur 12/29/2015    HLD (hyperlipidemia) 12/29/2015    Hypertension     Hypothyroidism 4/28/2015    Ischemic cardiomyopathy 12/29/2015    Nausea     S/P PTCA (percutaneous transluminal coronary angioplasty); LAD PTCA of  ISR 11/27/15 5/24/2016    STEMI (ST elevation myocardial infarction) ([facility] [address] 4/28/2015    Unspecified sleep apnea     uses cpap machine    Unstable angina ([facility] [address]       Past Surgical History:   Procedure Laterality Date    HX BACK SURGERY  1990    neck surgery cervical disc    HX BACK SURGERY      lower back    HX CATARACT REMOVAL Bilateral     HX CHOLECYSTECTOMY  [id number]    gall bladder     HX HEART CATHETERIZATION  11/2017    angioplasty    HX KNEE REPLACEMENT Right 2006    HX PTCA  4/28/2015    2.25 Xience stent to mid LAD for occluded artery, anterior MI, EF 25%. Moderate disease distal LAD and distal OM PCI CX and RCA 2004, then PCI RCA and LAD in 2009. Prior to Admission medications    Medication Sig Start Date End Date Taking? Authorizing Provider   amLODIPine (NORVASC) 5 mg tablet Take 1 Tab by mouth daily. 12/29/17   Stacy Salazar MD   cyanocobalamin (VITAMIN B12) 1,000 mcg/mL injection 1 mL by IntraMUSCular route every seven (7) days. Indications: Vitamin B12 Deficiency 12/28/17   Stacy Salazar MD   calcitRIOL (ROCALTROL) 0.25 mcg capsule Take 1 Cap by mouth daily.  12/29/17   Stacy Salazar MD   metoprolol tartrate (LOPRESSOR) 25 mg tablet Take 0.5 Tabs by mouth daily. Indications: Acute Coronary Syndrome 12/29/17   Chasity Clark MD   ranolazine ER (RANEXA) 500 mg SR tablet Take 2 Tabs by mouth two (2) times a day. 12/28/17   Chasity Clark MD   HYDROcodone-acetaminophen (NORCO) 5-325 mg per tablet Take 1 Tab by mouth every four (4) hours as needed. Max Daily Amount: 6 Tabs. 12/28/17   Chasity Clark MD   LORazepam (ATIVAN) 1 mg tablet Take 0.5 Tabs by mouth every six (6) hours as needed. Max Daily Amount: 2 mg. 12/28/17   Chasity Clark MD   multivits,Stress Formula-Zinc tablet Take 1 Tab by mouth daily. 12/29/17   Chasity Clark MD   metoprolol tartrate (LOPRESSOR) 25 mg tablet Take 0.5 Tabs by mouth daily. 11/27/17   MINDY Chatman   amLODIPine (NORVASC) 5 mg tablet Take 1 Tab by mouth daily. 11/27/17   MINDY Skinner   torsemide (DEMADEX) 100 mg tablet Take 1 Tab by mouth two (2) times a day. 11/27/17   MINDY Chatman   pantoprazole (PROTONIX) 40 mg tablet Take 1 Tab by mouth Before breakfast and dinner. 11/27/17   MINDY Skinner   folic acid (FOLVITE) 1 mg tablet Take 1 mg by mouth daily. Historical Provider   sucralfate (CARAFATE) 100 mg/mL suspension Take 10 mL by mouth Before breakfast, lunch, dinner and at bedtime. Indications: PREVENTION OF STRESS ULCER 9/23/17   Heidy Tobin DO   nitroglycerin (NITROLINGUAL) 400 mcg/spray spray 1 Spray by SubLINGual route every five (5) minutes as needed for Chest Pain. Historical Provider   linaclotide Ramya Felix) 145 mcg cap capsule Take  by mouth Daily (before breakfast). Historical Provider   ticagrelor (BRILINTA) 90 mg tablet Take 1 Tab by mouth two (2) times a day. 2/4/16   MINDY Skinner   sevelamer carbonate (RENVELA) 800 mg tab tab Take 800 mg by mouth three (3) times daily (with meals).     Historical Provider   gentamicin (GARAMYCIN) 0.1 % topical cream APPLY TO PD catheter exit site at daily dressing change 5/12/15   Frank Montes MD   aspirin delayed-release 81 mg tablet Take 1 Tab by mouth daily. 5/5/15   Gisela Hollingsworth PA-C   darbepoetin toya in polysorbat (ARANESP, POLYSORBATE,) 40 mcg/mL injection 40 mcg by SubCUTAneous route every fourteen (14) days. Indications: ANEMIA IN CHRONIC KIDNEY DISEASE    Historical Provider   rosuvastatin (CRESTOR) 20 mg tablet Take 20 mg by mouth nightly. Historical Provider   ranolazine ER (RANEXA) 500 mg SR tablet Take 500 mg by mouth two (2) times a day. Historical Provider   levothyroxine (SYNTHROID) 150 mcg tablet Take 150 mcg by mouth Daily (before breakfast). Historical Provider     Allergies   Allergen Reactions    Codeine Nausea and Vomiting      Social History   Substance Use Topics    Smoking status: Never Smoker    Smokeless tobacco: Never Used    Alcohol use No      Family History   Problem Relation Age of Onset    Heart Disease Mother     Hypertension Mother     Cancer Mother      Lung    Stroke Father     Hypertension Father     Breast Cancer Neg Hx           Review of Systems    Constitutional: no fever,  No chills  Eyes: fair vision,    Ears, nose, mouth, throat, and face:fair hearing,   Respiratory: no asthma,    Cardiovascular:no chest pain,    Gastrointestinal:no diarrhea,no constipation with Sennekot and Miralax   Genitourinary: no dysuria,   Hematologic/lymphatic: no bleeding tendency,   Neurological: no seizures,   Behvioral/Psych: no psych hospitalization    Endocrine: no goiter,   The remainder is negative      Objective:       Visit Vitals    /69    Pulse 79    Temp 97.4 °F (36.3 °C)    Resp (!) 37    Ht 5' 9\" (1.753 m)    Wt 96.6 kg (213 lb)    SpO2 98%    BMI 31.45 kg/m2       General:  Alert, cooperative, mild to moderate respiratory distress, appears stated age. Head:  Normocephalic, without obvious abnormality, atraumatic. Eyes:  Conjunctivae/corneas clear. PERRL, EOMs intact. Ears:  Normal external ear canals both ears.    Nose: Nares normal. Septum midline. Mucosa normal. No drainage or sinus tenderness. Throat: Lips, mucosa, and tongue normal. Teeth and gums normal.   Neck: Supple, symmetrical, trachea midline, no adenopathy, thyroid: no enlargement/tenderness/nodules, no JVD. Back:   Symmetric, no curvature. ROM normal. No CVA tenderness. Lungs:   Clear to auscultation bilaterally. Chest wall:  No tenderness or deformity. Heart:  Regular rate and rhythm, S1, S2 normal, no murmur,  rub or gallop. Abdomen:   Soft, non-tender. Bowel sounds normal. No masses,  No organomegaly. No renal bruit. PD catheter in place. Extremities: Extremities normal, atraumatic, no cyanosis - 3+edema. Skin: Skin color, texture, turgor normal. No rashes or lesions. Lymph nodes: Cervical and supraclavicular nodes normal.   Neurologic: Grossly intact. No asterixis. Data Review:     Recent Results (from the past 24 hour(s))   CBC WITH AUTOMATED DIFF    Collection Time: 12/30/17  1:34 AM   Result Value Ref Range    WBC 7.6 4.3 - 11.1 K/uL    RBC 3.32 (L) 4.05 - 5.25 M/uL    HGB 9.9 (L) 11.7 - 15.4 g/dL    HCT 32.1 (L) 35.8 - 46.3 %    MCV 96.7 79.6 - 97.8 FL    MCH 29.8 26.1 - 32.9 PG    MCHC 30.8 (L) 31.4 - 35.0 g/dL    RDW 18.7 (H) 11.9 - 14.6 %    PLATELET 819 018 - 575 K/uL    MPV 9.6 (L) 10.8 - 14.1 FL    DF AUTOMATED      NEUTROPHILS 79 (H) 43 - 78 %    LYMPHOCYTES 12 (L) 13 - 44 %    MONOCYTES 8 4.0 - 12.0 %    EOSINOPHILS 1 0.5 - 7.8 %    BASOPHILS 0 0.0 - 2.0 %    IMMATURE GRANULOCYTES 0 0.0 - 5.0 %    ABS. NEUTROPHILS 5.9 1.7 - 8.2 K/UL    ABS. LYMPHOCYTES 0.9 0.5 - 4.6 K/UL    ABS. MONOCYTES 0.6 0.1 - 1.3 K/UL    ABS. EOSINOPHILS 0.1 0.0 - 0.8 K/UL    ABS. BASOPHILS 0.0 0.0 - 0.2 K/UL    ABS. IMM.  GRANS. 0.0 0.0 - 0.5 K/UL   PROTHROMBIN TIME + INR    Collection Time: 12/30/17  1:34 AM   Result Value Ref Range    Prothrombin time 14.1 11.5 - 14.5 sec    INR 1.1     PTT    Collection Time: 12/30/17  1:34 AM   Result Value Ref Range    aPTT 25.7 23.2 - 54.6 SEC   METABOLIC PANEL, COMPREHENSIVE    Collection Time: 12/30/17  1:34 AM   Result Value Ref Range    Sodium 137 136 - 145 mmol/L    Potassium 4.2 3.5 - 5.1 mmol/L    Chloride 102 98 - 107 mmol/L    CO2 24 21 - 32 mmol/L    Anion gap 11 7 - 16 mmol/L    Glucose 101 (H) 65 - 100 mg/dL    BUN 46 (H) 8 - 23 MG/DL    Creatinine 9.80 (H) 0.6 - 1.0 MG/DL    GFR est AA 5 (L) >60 ml/min/1.73m2    GFR est non-AA 4 (L) >60 ml/min/1.73m2    Calcium 10.0 8.3 - 10.4 MG/DL    Bilirubin, total 0.5 0.2 - 1.1 MG/DL    ALT (SGPT) 8 (L) 12 - 65 U/L    AST (SGOT) 20 15 - 37 U/L    Alk. phosphatase 117 50 - 136 U/L    Protein, total 7.4 6.3 - 8.2 g/dL    Albumin 2.5 (L) 3.2 - 4.6 g/dL    Globulin 4.9 (H) 2.3 - 3.5 g/dL    A-G Ratio 0.5 (L) 1.2 - 3.5           Principal Problem:    Fluid overload (12/30/2017)    Active Problems:    Anemia of chronic renal failure (4/28/2015)      ESRD on peritoneal dialysis ([facility] [address] (2/1/2016)        Assessment:     1. Fluid overload -  - admit to inpatient for intensive peritoneal dialysis    2. ESRD -  - resume PD by cyclerr    3. Anemia -  - resume WAYNE    4. CAD -  - no CP since most recent angioplasty/stent placement    5.  Malnutrition -  - work on supplements    6. right arm edema -  - doppler for suspected DVT      Plan:     As above    Edi Cassidy M.D.

## 2017-12-30 NOTE — PROGRESS NOTES
Patient arrived to floor on 2L n/c, RR even and unlabored, patient is alert and oriented X4, dual skin assessment complete with Arlen Blankenship RN, BLE +3 edema, BUE edema +1 pitting, dry flaky skin, L thigh bandage in place from previous heart cath, patient PD catheter access to LLQ, no open wounds or sores, patient with R arm weakness, right arm pain pink tinged with Upper arm edema more prominent than left, patient states she had history of stroke, R side residual weakness, patient incontinent of b/b, admits to voiding \"sometimes\". no needs voiced @ this time, call light within reach.

## 2017-12-30 NOTE — IP AVS SNAPSHOT
[address] [facility] 
 
 
 [address] 
[phone] Patient: Tawanna Barnett MRN: [id number] About your hospitalization You were admitted on:  December 30, 2017 You last received care in the:  [facility] 8 MED SURG You were discharged on:  January 2, 2018 Why you were hospitalized Your primary diagnosis was:  Fluid Overload Your diagnoses also included:  Esrd On Peritoneal Dialysis (Hcc), Anemia Of Chronic Renal Failure Follow-up Information Follow up With Details Comments Contact Info Ryley Lincoln MD On 1/8/2018 2:15 PM [address] 
[phone] Your Scheduled Appointments Wednesday January 10, 2018  9:45 AM [facility] HOSPITAL FOLLOW-UP with Kevin Tucker MD  
[facility] ([address] [place] [address]) [address] [place] [address]  
[phone] Discharge Orders None A check cony indicates which time of day the medication should be taken. My Medications CHANGE how you take these medications Instructions Each Dose to Equal  
 Morning Noon Evening Bedtime  
 amLODIPine 5 mg tablet Commonly known as:  Erin Darby What changed:  Another medication with the same name was removed. Continue taking this medication, and follow the directions you see here. Your next dose is:  Tomorrow Morning Take 1 Tab by mouth daily. 5 mg  
    
  
   
   
   
  
 metoprolol tartrate 25 mg tablet Commonly known as:  LOPRESSOR What changed:  Another medication with the same name was removed. Continue taking this medication, and follow the directions you see here. Your next dose is:  Tomorrow Morning Take 0.5 Tabs by mouth daily. 12.5 mg  
    
  
   
   
   
  
  
CONTINUE taking these medications Instructions Each Dose to Equal  
 Morning Noon Evening Bedtime Aranesp (Polysorbate) 40 mcg/mL injection Generic drug:  darbepoetin toya in polysorbat Your next dose is:  Resume home schedule 40 mcg by SubCUTAneous route every fourteen (14) days. Indications: ANEMIA IN CHRONIC KIDNEY DISEASE  
 40 mcg  
    
   
   
   
  
 aspirin delayed-release 81 mg tablet Your next dose is:  Tomorrow Morning Take 1 Tab by mouth daily. 81 mg  
    
  
   
   
   
  
 calcitRIOL 0.25 mcg capsule Commonly known as:  ROCALTROL Your next dose is:  Tomorrow Morning Take 1 Cap by mouth daily. 0.25 mcg CRESTOR 20 mg tablet Generic drug:  rosuvastatin Your next dose is: Take tonight Take 20 mg by mouth nightly. 20 mg  
    
   
   
   
  
  
 cyanocobalamin 1,000 mcg/mL injection Commonly known as:  VITAMIN B12 Your next dose is:  Resume home schedule 1 mL by IntraMUSCular route every seven (7) days. Indications: Vitamin B12 Deficiency 1000 mcg  
    
   
   
   
  
 folic acid 1 mg tablet Commonly known as:  Google Your next dose is:  Tomorrow Morning Take 1 mg by mouth daily. 1 mg HYDROcodone-acetaminophen 5-325 mg per tablet Commonly known as:  Aura Star Take 1 Tab by mouth every four (4) hours as needed. Max Daily Amount: 6 Tabs. 1 Tab  
    
   
   
   
  
 levothyroxine 150 mcg tablet Commonly known as:  SYNTHROID Your next dose is:  Tomorrow Morning Take 150 mcg by mouth Daily (before breakfast). 150 mcg LINZESS 145 mcg Cap capsule Generic drug:  linaclotide Your next dose is:  Tomorrow Morning Take  by mouth Daily (before breakfast). LORazepam 1 mg tablet Commonly known as:  ATIVAN Take 0.5 Tabs by mouth every six (6) hours as needed. Max Daily Amount: 2 mg. 0.5 mg  
    
   
   
   
  
 multivits,Stress Formula-Zinc tablet Your next dose is:  Tomorrow Morning Take 1 Tab by mouth daily. 1 Tab  
    
  
   
   
   
  
 nitroglycerin 400 mcg/spray spray Commonly known as:  NITROLINGUAL  
   
 1 [place] by SubLINGual route every five (5) minutes as needed for Chest Pain. 1 Spray  
    
   
   
   
  
 pantoprazole 40 mg tablet Commonly known as:  PROTONIX Your next dose is: This evening Take 1 Tab by mouth Before breakfast and dinner. 40 mg  
    
  
   
   
  
   
  
 * RANEXA 500 mg SR tablet Generic drug:  ranolazine ER Your next dose is: This evening Take 500 mg by mouth two (2) times a day. 500 mg  
    
  
   
   
  
   
  
 * ranolazine  mg SR tablet Commonly known as:  RANEXA Take 2 Tabs by mouth two (2) times a day. 1000 mg RENVELA 800 mg Tab tab Generic drug:  sevelamer carbonate Your next dose is:  TODAY with meals Take 800 mg by mouth three (3) times daily (with meals). 800 mg  
    
  
   
  
   
  
   
  
 sucralfate 100 mg/mL suspension Commonly known as:  Mychal Seth Your next dose is:  TODAY before meals & bedtime Take 10 mL by mouth Before breakfast, lunch, dinner and at bedtime. Indications: PREVENTION OF STRESS ULCER  
 1 g  
    
  
   
  
   
  
   
  
  
 ticagrelor 90 mg tablet Commonly known as:  [employer] Your next dose is: This evening Take 1 Tab by mouth two (2) times a day. 90 mg  
    
  
   
   
  
   
  
 torsemide 100 mg tablet Commonly known as:  DEMADEX Your next dose is: This evening Take 1 Tab by mouth two (2) times a day. 100 mg * Notice: This list has 2 medication(s) that are the same as other medications prescribed for you. Read the directions carefully, and ask your doctor or other care provider to review them with you. ASK your doctor about these medications  Instructions Each Dose to Equal  
 Morning Noon Evening Bedtime  
 gentamicin 0.1 % topical cream  
Commonly known as:  GARAMYCIN  
   
 APPLY TO PD catheter exit site at daily dressing change My Medications TAKE these medications as instructed Instructions Each Dose to Equal  
 Morning Noon Evening Bedtime  
 amLODIPine 5 mg tablet Commonly known as:  Senora Sarika Your next dose is:  Tomorrow Morning Take 1 Tab by mouth daily. 5 mg Aranesp (Polysorbate) 40 mcg/mL injection Generic drug:  darbepoetin toya in polysorbat Your next dose is:  Resume home schedule 40 mcg by SubCUTAneous route every fourteen (14) days. Indications: ANEMIA IN CHRONIC KIDNEY DISEASE  
 40 mcg  
    
   
   
   
  
 aspirin delayed-release 81 mg tablet Your next dose is:  Tomorrow Morning Take 1 Tab by mouth daily. 81 mg  
    
  
   
   
   
  
 calcitRIOL 0.25 mcg capsule Commonly known as:  ROCALTROL Your next dose is:  Tomorrow Morning Take 1 Cap by mouth daily. 0.25 mcg CRESTOR 20 mg tablet Generic drug:  rosuvastatin Your next dose is: Take tonight Take 20 mg by mouth nightly. 20 mg  
    
   
   
   
  
  
 cyanocobalamin 1,000 mcg/mL injection Commonly known as:  VITAMIN B12 Your next dose is:  Resume home schedule 1 mL by IntraMUSCular route every seven (7) days. Indications: Vitamin B12 Deficiency 1000 mcg  
    
   
   
   
  
 folic acid 1 mg tablet Commonly known as:  Google Your next dose is:  Tomorrow Morning Take 1 mg by mouth daily. 1 mg HYDROcodone-acetaminophen 5-325 mg per tablet Commonly known as:  Jose Reddy Take 1 Tab by mouth every four (4) hours as needed. Max Daily Amount: 6 Tabs. 1 Tab  
    
   
   
   
  
 levothyroxine 150 mcg tablet Commonly known as:  SYNTHROID Your next dose is:  Tomorrow Morning Take 150 mcg by mouth Daily (before breakfast). 150 mcg LINZESS 145 mcg Cap capsule Generic drug:  linaclotide Your next dose is:  Tomorrow Morning Take  by mouth Daily (before breakfast). LORazepam 1 mg tablet Commonly known as:  ATIVAN Take 0.5 Tabs by mouth every six (6) hours as needed. Max Daily Amount: 2 mg. 0.5 mg  
    
   
   
   
  
 metoprolol tartrate 25 mg tablet Commonly known as:  LOPRESSOR Your next dose is:  Tomorrow Morning Take 0.5 Tabs by mouth daily. 12.5 mg  
    
  
   
   
   
  
 multivits,Stress Formula-Zinc tablet Your next dose is:  Tomorrow Morning Take 1 Tab by mouth daily. 1 Tab  
    
  
   
   
   
  
 nitroglycerin 400 mcg/spray spray Commonly known as:  NITROLINGUAL  
   
 1 [place] by SubLINGual route every five (5) minutes as needed for Chest Pain. 1 Spray  
    
   
   
   
  
 pantoprazole 40 mg tablet Commonly known as:  PROTONIX Your next dose is: This evening Take 1 Tab by mouth Before breakfast and dinner. 40 mg  
    
  
   
   
  
   
  
 * RANEXA 500 mg SR tablet Generic drug:  ranolazine ER Your next dose is: This evening Take 500 mg by mouth two (2) times a day. 500 mg  
    
  
   
   
  
   
  
 * ranolazine  mg SR tablet Commonly known as:  RANEXA Take 2 Tabs by mouth two (2) times a day. 1000 mg RENVELA 800 mg Tab tab Generic drug:  sevelamer carbonate Your next dose is:  TODAY with meals Take 800 mg by mouth three (3) times daily (with meals). 800 mg  
    
  
   
  
   
  
   
  
 sucralfate 100 mg/mL suspension Commonly known as:  Harper Mejiagel Your next dose is:  TODAY before meals & bedtime Take 10 mL by mouth Before breakfast, lunch, dinner and at bedtime.  Indications: PREVENTION OF STRESS ULCER  
 1 g  
    
  
   
  
   
  
   
  
  
 ticagrelor 90 mg tablet Commonly known as:  [employer] Your next dose is: This evening Take 1 Tab by mouth two (2) times a day. 90 mg  
    
  
   
   
  
   
  
 torsemide 100 mg tablet Commonly known as:  DEMADEX Your next dose is: This evening Take 1 Tab by mouth two (2) times a day. 100 mg * Notice: This list has 2 medication(s) that are the same as other medications prescribed for you. Read the directions carefully, and ask your doctor or other care provider to review them with you. ASK your physician about these medications Instructions Each Dose to Equal  
 Morning Noon Evening Bedtime  
 gentamicin 0.1 % topical cream  
Commonly known as:  GARAMYCIN  
   
 APPLY TO PD catheter exit site at daily dressing change Discharge Instructions Learning About Fluid Overload What is fluid overload? Fluid overload means that your body has too much water. The extra fluid in your body can raise your blood pressure and force your heart to work harder. It can also make it hard for you to breathe. Most of your body is made up of water. The body uses minerals like sodium and potassium to help organs such as your heart, kidneys, and liver balance how much water you need. For example, the heart pumps blood to move water around the body. And the kidneys work to get rid of the water that the body doesn't need. Health conditions like kidney disease, heart failure, and cirrhosis can cause fluid overload. Other things can cause extra fluid to build up. IV fluids, some medicines, too much salt (sodium) from food, and certain medical treatments can sometimes cause this fluid increase. What are the symptoms? Some of the most common symptoms are: 
· Gaining weight over a short period of time. · Swelling in the ankles or legs. · Shortness of breath. How is it treated? The goal of treatment is to remove the extra fluid in your body. Your treatment will depend on the cause. Your doctor may: · Give you medicines, such as diuretics (also called \"water pills\"). They help your body get rid of the extra fluid. · Restrict your fluid or salt intake. Follow-up care is a key part of your treatment and safety. Be sure to make and go to all appointments, and call your doctor if you are having problems. It's also a good idea to know your test results and keep a list of the medicines you take. Where can you learn more? Go to http://estuardo-bryant.info/. Enter O110 in the search box to learn more about \"Learning About Fluid Overload. \" Current as of: September 21, 2016 Content Version: 11.4 © 3174-5331 [employer]. Care instructions adapted under license by [employer] (which disclaims liability or warranty for this information). If you have questions about a medical condition or this instruction, always ask your healthcare professional. [place] [address] any warranty or liability for your use of this information. Peritoneal Dialysis Catheter Care: Care Instructions Your Care Instructions Dialysis does the work of your kidneys when you have kidney failure. It filters wastes and removes extra fluid. It also keeps the right balance of chemicals in your blood. Peritoneal dialysis uses the lining of your belly to filter your blood. Before you start dialysis, your doctor creates a dialysis access. This is the place where the dialysis solution can flow into and out of your body. To make the access, the doctor places a soft tube in your belly. This tube is called a catheter. When you do dialysis, the solution flows into your belly and stays there for several hours. Then you remove it through the catheter. It is important to take care of the catheter and the access area to prevent infection. Follow-up care is a key part of your treatment and safety. Be sure to make and go to all appointments, and call your doctor if you are having problems. It's also a good idea to know your test results and keep a list of the medicines you take. How can you care for yourself at home? Care of the catheter and access · After the doctor creates your access, keep the bandage dry and clean. Change a dirty or bloody bandage. · Keep your access area clean and dry. Check it every day for signs of infection. · Always clean and dry your catheter and access area right away after you get wet. · Always wash your hands before you touch the catheter. · Fasten or tape the catheter to your body to keep it from catching on your clothes. · Never use scissors or other sharp objects around your catheter. · Do not use unapproved clamps on your catheter. · Store your dialysis supplies in a cool, dry place. Activity when you have an access · Do not lift heavy objects. · Do not swim or take a bath unless your doctor tells you it is okay. When should you call for help? Call your doctor now or seek immediate medical care if: 
? · You have signs of infection, such as: 
¨ Increased pain, swelling, warmth, or redness. ¨ Red streaks leading from the access area. ¨ Pus draining from the access area. ¨ A fever. ? · You have belly pain. ? · You have nausea or vomiting. ? · The dialysis fluid looks cloudy or is a different color. ? · Fluid does not flow through the catheter. ? Watch closely for changes in your health, and be sure to contact your doctor if you have any problems. Where can you learn more? Go to http://estuardo-bryant.info/. Enter R310 in the search box to learn more about \"Peritoneal Dialysis Catheter Care: Care Instructions. \" Current as of: May 12, 2017 Content Version: 11.4 © 2850-6532 [employer].  Care instructions adapted under license by [address] (which disclaims liability or warranty for this information). If you have questions about a medical condition or this instruction, always ask your healthcare professional. [address] any warranty or liability for your use of this information. DISCHARGE SUMMARY from Nurse PATIENT INSTRUCTIONS: 
 
 
F-face looks uneven A-arms unable to move or move unevenly S-speech slurred or non-existent T-time-call 911 as soon as signs and symptoms begin-DO NOT go Back to bed or wait to see if you get better-TIME IS BRAIN. Warning Signs of HEART ATTACK Call 911 if you have these symptoms: 
? Chest discomfort. Most heart attacks involve discomfort in the center of the chest that lasts more than a few minutes, or that goes away and comes back. It can feel like uncomfortable pressure, squeezing, fullness, or pain. ? Discomfort in other areas of the upper body. Symptoms can include pain or discomfort in one or both arms, the back, neck, jaw, or stomach. ? Shortness of breath with or without chest discomfort. ? Other signs may include breaking out in a cold sweat, nausea, or lightheadedness. Don't wait more than five minutes to call [address]! Fast action can save your life. Calling 911 is almost always the fastest way to get lifesaving treatment. Emergency Medical Services staff can begin treatment when they arrive  up to an hour sooner than if someone gets to the hospital by car. The discharge information has been reviewed with the patient. The patient verbalized understanding. Discharge medications reviewed with the patient and appropriate educational materials and side effects teaching were provided.  
___________________________________________________________________________ ________________________________________________________ [employer] Announcement We are excited to announce that we are making your provider's discharge notes available to you in [employer]. You will see these notes when they are completed and signed by the physician that discharged you from your recent hospital stay. If you have any questions or concerns about any information you see in [employer], please call the Health Information Department where you were seen or reach out to your Primary Care Provider for more information about your plan of care. Introducing [address]! Bolivar Fuller introduces [employer] patient portal. Now you can access parts of your medical record, email your doctor's office, and request medication refills online. 1. In your internet browser, go to [email or web address] 2. Click on the First Time User? Click Here link in the Sign In box. You will see the New Member Sign Up page. 3. Enter your [employer] Access Code exactly as it appears below. You will not need to use this code after youve completed the sign-up process. If you do not sign up before the expiration date, you must request a new code. · [employer] Access Code: [email or web address] Expires: 2/16/2018 12:42 PM 
 
4. Enter the last four digits of your Social Security Number (xxxx) and Date of Birth (mm/dd/yyyy) as indicated and click Submit. You will be taken to the next sign-up page. 5. Create a [employer] ID. This will be your [employer] login ID and cannot be changed, so think of one that is secure and easy to remember. 6. Create a [employer] password. You can change your password at any time. 7. Enter your Password Reset Question and Answer. This can be used at a later time if you forget your password. 8. Enter your e-mail address. You will receive e-mail notification when new information is available in [address] 9. Click Sign Up. You can now view and download portions of your medical record. 10. Click the Download Summary menu link to download a portable copy of your medical information. If you have questions, please visit the Frequently Asked Questions section of the [employer] website. Remember, [employer] is NOT to be used for urgent needs. For medical emergencies, dial 911. Now available from your iPhone and Android! Providers Seen During Your Hospitalization Provider Specialty Primary office phone Mer Lazcano MD Emergency Medicine [phone] Violeta Duke MD Nephrology [phone] Your Primary Care Physician (PCP) Primary Care Physician Office Phone Office Fax [address], [place] You are allergic to the following Allergen Reactions Codeine Nausea and Vomiting Recent Documentation Height Weight Breastfeeding? BMI OB Status Smoking Status 1.753 m 100.7 kg No 32.78 kg/m2 Postmenopausal Never Smoker Emergency Contacts Name Discharge Info Relation Home Work Mobile [address] [facility] CAREGIVER [3] Son [22] [phone] Jon Kuhn DISCHARGE CAREGIVER [3] Son [22] [phone] Patient Belongings The following personal items are in your possession at time of discharge: 
  Dental Appliances: None  Visual Aid: Glasses      Home Medications: None   Jewelry: None  Clothing: Hat, Jacket/Coat, Pajamas    Other Valuables: None Please provide this summary of care documentation to your next provider. Signatures-by signing, you are acknowledging that this After Visit Summary has been reviewed with you and you have received a copy. Patient Signature:  ____________________________________________________________ Date:  ____________________________________________________________  
  
Yoni Joyce Provider Signature:  ____________________________________________________________ Date:  ____________________________________________________________

## 2017-12-30 NOTE — ED NOTES
TRANSFER - OUT REPORT:    Verbal report given to Chadd(name) on Gene Norman  being transferred to 825(unit) for routine progression of care       Report consisted of patients Situation, Background, Assessment and   Recommendations(SBAR). Information from the following report(s) ED Summary was reviewed with the receiving nurse. Lines:       Opportunity for questions and clarification was provided.       Patient transported with:   O2 @ 2 liters

## 2017-12-30 NOTE — ED PROVIDER NOTES
HPI Comments: Patient was admitted at the end of November for an STEMI requiring 2 stents. She was then transferred to rehabilitation for continued weakness. Her PD catheter then became dislodged and needed repositioning with surgery December 18. She had been doing well and was discharged 2 days ago. On the day she was discharged they tried to perform peritoneal dialysis at home and could not get the machine to work. They tried again Thursday night and were unsuccessful. Followed up with nephrology on Friday to change the tip of the catheter. This fixed the problem and they're able to get it to work in the office. Once home  Tonight they were unable to get the machine working again and so came here. Did speak with Dr. Jl Cota prior to coming in. Patient is a 80 y.o. female presenting with shortness of breath. The history is provided by the patient. No  was used. Shortness of Breath   This is a new problem. The problem occurs continuously. The current episode started more than 2 days ago. The problem has been gradually worsening. Associated symptoms include leg swelling. Pertinent negatives include no fever, no headaches, no rhinorrhea, no sore throat, no neck pain, no cough, no sputum production, no wheezing, no orthopnea, no chest pain, no vomiting, no abdominal pain, no rash and no leg pain. She has tried nothing for the symptoms. She has had prior hospitalizations. Associated medical issues include CAD.         Past Medical History:   Diagnosis Date    Anemia of chronic renal failure 4/28/2015    Anterior myocardial infarction [facility]) 5/21/2015    CAD (coronary artery disease)     Chest pain     Chronic kidney disease, stage III (moderate) 8/15/2014    on dialysis    CKD (chronic kidney disease) stage 4, GFR 15-29 ml/min ([facility]) 4/28/2015    Debility 5/5/2015    Depression 12/29/2015    Edema 12/29/2015    Endocrine disease     Hypothyroidism    GERD (gastroesophageal reflux disease)     Heart murmur 12/29/2015    HLD (hyperlipidemia) 12/29/2015    Hypertension     Hypothyroidism 4/28/2015    Ischemic cardiomyopathy 12/29/2015    Nausea     S/P PTCA (percutaneous transluminal coronary angioplasty); LAD PTCA of  ISR 11/27/15 5/24/2016    STEMI (ST elevation myocardial infarction) ([facility] [address] 4/28/2015    Unspecified sleep apnea     uses cpap machine    Unstable angina ([facility] [address]        Past Surgical History:   Procedure Laterality Date    HX BACK SURGERY  1990    neck surgery cervical disc    HX BACK SURGERY      lower back    HX CATARACT REMOVAL Bilateral     HX CHOLECYSTECTOMY  19702    gall bladder     HX KNEE REPLACEMENT Right 2006    HX PTCA  4/28/2015    2.25 Xience stent to mid LAD for occluded artery, anterior MI, EF 25%. Moderate disease distal LAD and distal OM PCI CX and RCA 2004, then PCI RCA and LAD in 2009. Family History:   Problem Relation Age of Onset    Heart Disease Mother     Hypertension Mother     Cancer Mother      Lung    Stroke Father     Hypertension Father     Breast Cancer Neg Hx        Social History     Social History    Marital status:      Spouse name: N/A    Number of children: N/A    Years of education: N/A     Occupational History    Not on file. Social History Main Topics    Smoking status: Never Smoker    Smokeless tobacco: Never Used    Alcohol use No    Drug use: Not on file    Sexual activity: Not on file     Other Topics Concern    Not on file     Social History Narrative         ALLERGIES: Codeine    Review of Systems   Constitutional: Positive for fatigue. Negative for chills and fever. HENT: Negative for rhinorrhea and sore throat. Eyes: Negative for pain and redness. Respiratory: Positive for shortness of breath. Negative for cough, sputum production, chest tightness and wheezing. Cardiovascular: Positive for leg swelling. Negative for chest pain and orthopnea.    Gastrointestinal: Negative for abdominal pain, diarrhea, nausea and vomiting. Genitourinary: Negative for dysuria and hematuria. Musculoskeletal: Negative for back pain, gait problem, neck pain and neck stiffness. Skin: Negative for color change and rash. Neurological: Positive for weakness. Negative for numbness and headaches. Vitals:    12/30/17 0116   BP: (!) 186/125   Pulse: 89   Resp: 20   Temp: 97.4 °F (36.3 °C)   SpO2: 97%   Weight: 96.6 kg (213 lb)   Height: 5' 9\" (1.753 m)            Physical Exam   Constitutional: She is oriented to person, place, and time. She appears well-developed and well-nourished. HENT:   Head: Normocephalic and atraumatic. Neck: Normal range of motion. Neck supple. Cardiovascular: Normal rate and regular rhythm. No murmur heard. Pulmonary/Chest: Effort normal and breath sounds normal. No respiratory distress. She has no wheezes. Abdominal: Soft. Bowel sounds are normal. There is no tenderness. Musculoskeletal: Normal range of motion. She exhibits edema. Neurological: She is alert and oriented to person, place, and time. Skin: Skin is warm and dry. Nursing note and vitals reviewed. MDM  Number of Diagnoses or Management Options  Diagnosis management comments: PD catheter dysfunction. Flowing with dialysis nurse. Possibly machine malfunction at home. Discussed pt with Dr. Young Saenz and will admit for PD up on the floor. Amount and/or Complexity of Data Reviewed  Clinical lab tests: ordered and reviewed  Tests in the radiology section of CPT®: ordered and reviewed    Patient Progress  Patient progress: stable    ED Course       Procedures      EKG: normal sinus rhythm, nonspecific ST and T waves changes. Rate 84. CXR no acute. Mild congestion.      Results Include:    Recent Results (from the past 24 hour(s))   CBC WITH AUTOMATED DIFF    Collection Time: 12/30/17  1:34 AM   Result Value Ref Range    WBC 7.6 4.3 - 11.1 K/uL    RBC 3.32 (L) 4.05 - 5.25 M/uL    HGB 9.9 (L) 11.7 - 15.4 g/dL    HCT 32.1 (L) 35.8 - 46.3 %    MCV 96.7 79.6 - 97.8 FL    MCH 29.8 26.1 - 32.9 PG    MCHC 30.8 (L) 31.4 - 35.0 g/dL    RDW 18.7 (H) 11.9 - 14.6 %    PLATELET 104 618 - 753 K/uL    MPV 9.6 (L) 10.8 - 14.1 FL    DF AUTOMATED      NEUTROPHILS 79 (H) 43 - 78 %    LYMPHOCYTES 12 (L) 13 - 44 %    MONOCYTES 8 4.0 - 12.0 %    EOSINOPHILS 1 0.5 - 7.8 %    BASOPHILS 0 0.0 - 2.0 %    IMMATURE GRANULOCYTES 0 0.0 - 5.0 %    ABS. NEUTROPHILS 5.9 1.7 - 8.2 K/UL    ABS. LYMPHOCYTES 0.9 0.5 - 4.6 K/UL    ABS. MONOCYTES 0.6 0.1 - 1.3 K/UL    ABS. EOSINOPHILS 0.1 0.0 - 0.8 K/UL    ABS. BASOPHILS 0.0 0.0 - 0.2 K/UL    ABS. IMM. GRANS. 0.0 0.0 - 0.5 K/UL   PROTHROMBIN TIME + INR    Collection Time: 12/30/17  1:34 AM   Result Value Ref Range    Prothrombin time 14.1 11.5 - 14.5 sec    INR 1.1     PTT    Collection Time: 12/30/17  1:34 AM   Result Value Ref Range    aPTT 25.7 23.2 - 56.8 SEC   METABOLIC PANEL, COMPREHENSIVE    Collection Time: 12/30/17  1:34 AM   Result Value Ref Range    Sodium 137 136 - 145 mmol/L    Potassium 4.2 3.5 - 5.1 mmol/L    Chloride 102 98 - 107 mmol/L    CO2 24 21 - 32 mmol/L    Anion gap 11 7 - 16 mmol/L    Glucose 101 (H) 65 - 100 mg/dL    BUN 46 (H) 8 - 23 MG/DL    Creatinine 9.80 (H) 0.6 - 1.0 MG/DL    GFR est AA 5 (L) >60 ml/min/1.73m2    GFR est non-AA 4 (L) >60 ml/min/1.73m2    Calcium 10.0 8.3 - 10.4 MG/DL    Bilirubin, total 0.5 0.2 - 1.1 MG/DL    ALT (SGPT) 8 (L) 12 - 65 U/L    AST (SGOT) 20 15 - 37 U/L    Alk.  phosphatase 117 50 - 136 U/L    Protein, total 7.4 6.3 - 8.2 g/dL    Albumin 2.5 (L) 3.2 - 4.6 g/dL    Globulin 4.9 (H) 2.3 - 3.5 g/dL    A-G Ratio 0.5 (L) 1.2 - 3.5

## 2017-12-30 NOTE — PROGRESS NOTES
TRANSFER - IN REPORT:    Verbal report received from Shalom(name) on Gene Hernandez  being received from ER(unit) for routine progression of care      Report consisted of patients Situation, Background, Assessment and   Recommendations(SBAR). Information from the following report(s) SBAR was reviewed with the receiving nurse. Opportunity for questions and clarification was provided. Assessment completed upon patients arrival to unit and care assumed.

## 2017-12-30 NOTE — DIALYSIS
Peritoneal dialysis initiated as ordered, \"back-to-back\" treatment per Dr. Sheela Solomon. Peritoneal catheter care completed this am. Patient states no complaints at this time. Report given to primary RN.

## 2017-12-30 NOTE — ED NOTES
Pt to floor without IV. MD aware.  Marilynn Leahy RN made aware to notify AM PICC team if unable to obtain IV access

## 2017-12-30 NOTE — PROGRESS NOTES
Patient voices no concerns at this time. Patient is resting with family at bedside. Call light within reach and patient instructed to call if assistance is needed. Will continue to monitor.

## 2017-12-30 NOTE — DIALYSIS
PD catheter accessed and flushed with 2000 ml 1.5% PD fluid without difficulty. Fluid is clear with minimal fibrin noted. Pt denies any pain during procedure.

## 2017-12-30 NOTE — PROGRESS NOTES
Peritoneal Dialysis Rounding Note    Subjective:     Norbert Almodovar is a 80 y.o.  who presents with Fluid overload. The patient is dialyzing utilizing the following method:cycler PD  Artificial Kidney     hours     blood flow rate     dialysate rate     ultrafiltration     Heparin is not used during the dialysis treatment. Complaints dyspnea.      Current Facility-Administered Medications   Medication Dose Route Frequency    amLODIPine (NORVASC) tablet 5 mg  5 mg Oral DAILY    calcitRIOL (ROCALTROL) capsule 0.25 mcg  0.25 mcg Oral DAILY    cyanocobalamin (VITAMIN B12) injection 1,000 mcg  1,000 mcg IntraMUSCular Q7D    HYDROcodone-acetaminophen (NORCO) 5-325 mg per tablet 1 Tab  1 Tab Oral Q6H PRN    LORazepam (ATIVAN) tablet 0.5 mg  0.5 mg Oral Q6H PRN    metoprolol tartrate (LOPRESSOR) tablet 12.5 mg  12.5 mg Oral DAILY    multivitamin w ZN (STRESSTABS W ZINC) tablet  1 Tab Oral DAILY    ranolazine ER (RANEXA) tablet 1,000 mg  1,000 mg Oral BID    sodium chloride (NS) flush 5-10 mL  5-10 mL IntraVENous Q8H    sodium chloride (NS) flush 5-10 mL  5-10 mL IntraVENous PRN    zolpidem (AMBIEN) tablet 5 mg  5 mg Oral QHS PRN    heparin (porcine) injection 5,000 Units  5,000 Units SubCUTAneous Q12H    epoetin toya (EPOGEN;PROCRIT) injection 20,000 Units  20,000 Units SubCUTAneous Q TUE, THU & SAT    gentamicin (GARAMYCIN) 0.1 % cream   Topical DIALYSIS PRN               Recent Results (from the past 24 hour(s))   CBC WITH AUTOMATED DIFF    Collection Time: 12/30/17  1:34 AM   Result Value Ref Range    WBC 7.6 4.3 - 11.1 K/uL    RBC 3.32 (L) 4.05 - 5.25 M/uL    HGB 9.9 (L) 11.7 - 15.4 g/dL    HCT 32.1 (L) 35.8 - 46.3 %    MCV 96.7 79.6 - 97.8 FL    MCH 29.8 26.1 - 32.9 PG    MCHC 30.8 (L) 31.4 - 35.0 g/dL    RDW 18.7 (H) 11.9 - 14.6 %    PLATELET 060 267 - 771 K/uL    MPV 9.6 (L) 10.8 - 14.1 FL    DF AUTOMATED      NEUTROPHILS 79 (H) 43 - 78 %    LYMPHOCYTES 12 (L) 13 - 44 %    MONOCYTES 8 4.0 - 12.0 %    EOSINOPHILS 1 0.5 - 7.8 %    BASOPHILS 0 0.0 - 2.0 %    IMMATURE GRANULOCYTES 0 0.0 - 5.0 %    ABS. NEUTROPHILS 5.9 1.7 - 8.2 K/UL    ABS. LYMPHOCYTES 0.9 0.5 - 4.6 K/UL    ABS. MONOCYTES 0.6 0.1 - 1.3 K/UL    ABS. EOSINOPHILS 0.1 0.0 - 0.8 K/UL    ABS. BASOPHILS 0.0 0.0 - 0.2 K/UL    ABS. IMM. GRANS. 0.0 0.0 - 0.5 K/UL   PROTHROMBIN TIME + INR    Collection Time: 12/30/17  1:34 AM   Result Value Ref Range    Prothrombin time 14.1 11.5 - 14.5 sec    INR 1.1     PTT    Collection Time: 12/30/17  1:34 AM   Result Value Ref Range    aPTT 25.7 23.2 - 95.7 SEC   METABOLIC PANEL, COMPREHENSIVE    Collection Time: 12/30/17  1:34 AM   Result Value Ref Range    Sodium 137 136 - 145 mmol/L    Potassium 4.2 3.5 - 5.1 mmol/L    Chloride 102 98 - 107 mmol/L    CO2 24 21 - 32 mmol/L    Anion gap 11 7 - 16 mmol/L    Glucose 101 (H) 65 - 100 mg/dL    BUN 46 (H) 8 - 23 MG/DL    Creatinine 9.80 (H) 0.6 - 1.0 MG/DL    GFR est AA 5 (L) >60 ml/min/1.73m2    GFR est non-AA 4 (L) >60 ml/min/1.73m2    Calcium 10.0 8.3 - 10.4 MG/DL    Bilirubin, total 0.5 0.2 - 1.1 MG/DL    ALT (SGPT) 8 (L) 12 - 65 U/L    AST (SGOT) 20 15 - 37 U/L    Alk. phosphatase 117 50 - 136 U/L    Protein, total 7.4 6.3 - 8.2 g/dL    Albumin 2.5 (L) 3.2 - 4.6 g/dL    Globulin 4.9 (H) 2.3 - 3.5 g/dL    A-G Ratio 0.5 (L) 1.2 - 3.5     EKG, 12 LEAD, INITIAL    Collection Time: 12/30/17  1:43 AM   Result Value Ref Range    Ventricular Rate 84 BPM    Atrial Rate 84 BPM    P-R Interval 200 ms    QRS Duration 82 ms    Q-T Interval 378 ms    QTC Calculation (Bezet) 446 ms    Calculated P Axis 72 degrees    Calculated R Axis -30 degrees    Calculated T Axis 74 degrees    Diagnosis       !! AGE AND GENDER SPECIFIC ECG ANALYSIS !!   Normal sinus rhythm  Left axis deviation  Minimal voltage criteria for LVH, may be normal variant  Inferior infarct , age undetermined  Possible Anterolateral infarct (cited on or before 27-NOV-2017)  Abnormal ECG  When compared with ECG of 27-NOV-2017 06:31,  Questionable change in QRS duration  Inferior infarct is now Present  Questionable change in initial forces of Lateral leads     METABOLIC PANEL, COMPREHENSIVE    Collection Time: 12/30/17  7:25 AM   Result Value Ref Range    Sodium 138 136 - 145 mmol/L    Potassium 4.3 3.5 - 5.1 mmol/L    Chloride 104 98 - 107 mmol/L    CO2 23 21 - 32 mmol/L    Anion gap 11 7 - 16 mmol/L    Glucose 84 65 - 100 mg/dL    BUN 47 (H) 8 - 23 MG/DL    Creatinine 10.10 (H) 0.6 - 1.0 MG/DL    GFR est AA 5 (L) >60 ml/min/1.73m2    GFR est non-AA 4 (L) >60 ml/min/1.73m2    Calcium 9.6 8.3 - 10.4 MG/DL    Bilirubin, total 0.5 0.2 - 1.1 MG/DL    ALT (SGPT) 8 (L) 12 - 65 U/L    AST (SGOT) 21 15 - 37 U/L    Alk. phosphatase 101 50 - 136 U/L    Protein, total 6.6 6.3 - 8.2 g/dL    Albumin 2.1 (L) 3.2 - 4.6 g/dL    Globulin 4.5 (H) 2.3 - 3.5 g/dL    A-G Ratio 0.5 (L) 1.2 - 3.5     CBC WITH AUTOMATED DIFF    Collection Time: 12/30/17  7:25 AM   Result Value Ref Range    WBC 6.5 4.3 - 11.1 K/uL    RBC 2.84 (L) 4.05 - 5.25 M/uL    HGB 8.4 (L) 11.7 - 15.4 g/dL    HCT 27.5 (L) 35.8 - 46.3 %    MCV 96.8 79.6 - 97.8 FL    MCH 29.6 26.1 - 32.9 PG    MCHC 30.5 (L) 31.4 - 35.0 g/dL    RDW 18.5 (H) 11.9 - 14.6 %    PLATELET 850 824 - 652 K/uL    MPV 9.6 (L) 10.8 - 14.1 FL    DF AUTOMATED      NEUTROPHILS 71 43 - 78 %    LYMPHOCYTES 15 13 - 44 %    MONOCYTES 12 4.0 - 12.0 %    EOSINOPHILS 2 0.5 - 7.8 %    BASOPHILS 0 0.0 - 2.0 %    IMMATURE GRANULOCYTES 0 0.0 - 5.0 %    ABS. NEUTROPHILS 4.6 1.7 - 8.2 K/UL    ABS. LYMPHOCYTES 0.9 0.5 - 4.6 K/UL    ABS. MONOCYTES 0.8 0.1 - 1.3 K/UL    ABS. EOSINOPHILS 0.1 0.0 - 0.8 K/UL    ABS. BASOPHILS 0.0 0.0 - 0.2 K/UL    ABS. IMM. GRANS. 0.0 0.0 - 0.5 K/UL         Review of Systems  Pertinent items are noted in HPI. .    Objective:     Blood pressure 158/75, pulse 80, temperature 97.8 °F (36.6 °C), resp.  rate 17, height 5' 9\" (1.753 m), weight 96.6 kg (213 lb), SpO2 99 %, not currently breastfeeding. Temp (24hrs), Av.7 °F (36.5 °C), Min:97.4 °F (36.3 °C), Max:98 °F (36.7 °C)      Physical Exam:   Neck: no adenopathy and no JVD, Lungs: rhonchi in bases, Cardiac: regular rate and rhythm, Abdomen: soft, non-tender. Bowel sounds normal. No masses,  no organomegaly and Extremities: edema 3+ edema and marked rigth arm edema      Assessment:     End Stage Renal Disease:  Patient is tolerating dialysis treatment well. .  Additionally the patient has experienced normal dialysis treatment during dialysis. Dry weight   decreased.     Anemia:    Recent Labs      17   0725   HCT  27.5*   HGB  8.4*       Renal Metabolic Bone Disease:    Recent Labs      17   0725   CA  9.6   ALB  2.1*                        Treatment:  PO4 binders, Cinacaleat, Vitamin D  Hypertension: controlled    Access: adequate monitoring/no changes    Principal Problem:    Fluid overload (2017)    Active Problems:    Anemia of chronic renal failure (2015)      ESRD on peritoneal dialysis ([facility] [address] (2016)           Plan:     Continue scheduled dialysis

## 2017-12-30 NOTE — ED TRIAGE NOTES
Pt arrived to ED with family with c/o increasing SOB. Pt was admitted for and MI and discharged home this Wednesday. Pt is currently a peritoneal dialysis pt. Per family pt has been weak since being discharged. Pt a&o with no signs of distress noted.

## 2017-12-31 ENCOUNTER — APPOINTMENT (OUTPATIENT)
Dept: ULTRASOUND IMAGING | Age: 82
DRG: 640 | End: 2017-12-31
Attending: INTERNAL MEDICINE
Payer: MEDICARE

## 2017-12-31 PROCEDURE — 65270000029 HC RM PRIVATE

## 2017-12-31 PROCEDURE — A4725 DIALYS SOL FLD VOL > 4999CC: HCPCS | Performed by: INTERNAL MEDICINE

## 2017-12-31 PROCEDURE — 74011000250 HC RX REV CODE- 250: Performed by: INTERNAL MEDICINE

## 2017-12-31 PROCEDURE — 74011250636 HC RX REV CODE- 250/636: Performed by: INTERNAL MEDICINE

## 2017-12-31 PROCEDURE — 90945 DIALYSIS ONE EVALUATION: CPT

## 2017-12-31 PROCEDURE — 74011250637 HC RX REV CODE- 250/637: Performed by: INTERNAL MEDICINE

## 2017-12-31 RX ORDER — POVIDONE-IODINE 10 %
SOLUTION, NON-ORAL TOPICAL
Status: COMPLETED | OUTPATIENT
Start: 2017-12-31 | End: 2017-12-31

## 2017-12-31 RX ADMIN — Medication 5 ML: at 17:50

## 2017-12-31 RX ADMIN — VANCOMYCIN HYDROCHLORIDE: 1 INJECTION, POWDER, LYOPHILIZED, FOR SOLUTION INTRAVENOUS at 19:18

## 2017-12-31 RX ADMIN — ZINC 1 TABLET: TAB ORAL at 10:00

## 2017-12-31 RX ADMIN — METOPROLOL TARTRATE 12.5 MG: 25 TABLET ORAL at 09:59

## 2017-12-31 RX ADMIN — AMLODIPINE BESYLATE 5 MG: 5 TABLET ORAL at 10:00

## 2017-12-31 RX ADMIN — Medication 10 ML: at 22:31

## 2017-12-31 RX ADMIN — HEPARIN SODIUM 5000 UNITS: 5000 INJECTION, SOLUTION INTRAVENOUS; SUBCUTANEOUS at 09:59

## 2017-12-31 RX ADMIN — RANOLAZINE 1000 MG: 500 TABLET, FILM COATED, EXTENDED RELEASE ORAL at 09:59

## 2017-12-31 RX ADMIN — CALCITRIOL 0.25 MCG: 0.25 CAPSULE, LIQUID FILLED ORAL at 10:00

## 2017-12-31 RX ADMIN — RANOLAZINE 1000 MG: 500 TABLET, FILM COATED, EXTENDED RELEASE ORAL at 17:51

## 2017-12-31 RX ADMIN — POVIDONE-IODINE: 10 SOLUTION TOPICAL at 16:10

## 2017-12-31 RX ADMIN — Medication 5 ML: at 05:41

## 2017-12-31 RX ADMIN — HEPARIN SODIUM 5000 UNITS: 5000 INJECTION, SOLUTION INTRAVENOUS; SUBCUTANEOUS at 22:30

## 2017-12-31 RX ADMIN — CLOPIDOGREL BISULFATE 75 MG: 75 TABLET ORAL at 10:00

## 2017-12-31 NOTE — PROGRESS NOTES
Dr Cait Souza returns call  Notified about pd catheter  Betadine dressing placed to end of catheter per his order

## 2017-12-31 NOTE — DIALYSIS
Received call that patient \"broke her catheter\". Upon arrival spoke with primary RN and was better able to understand situation. Observed PD catheter and noted that the adaptor portion had completely come loose from the internal catheter portion. Hemostat and dressing on end of tubing. Call placed to Dr. Natasha Hurst, orders received to call outpatient PD RN for supplies and assistance in replacing catheter adaptor.

## 2017-12-31 NOTE — PROGRESS NOTES
Peritoneal Dialysis Rounding Note    Subjective:     Greg Law is a 80 y.o.  who presents with Fluid overload. The patient is dialyzing utilizing the following method:cycler PD  Artificial Kidney     hours     blood flow rate     dialysate rate     ultrafiltration     Heparin is not used during the dialysis treatment. Complaints dyspnea. Current Facility-Administered Medications   Medication Dose Route Frequency    lip protectant (BLISTEX) ointment   Topical PRN    amLODIPine (NORVASC) tablet 5 mg  5 mg Oral DAILY    calcitRIOL (ROCALTROL) capsule 0.25 mcg  0.25 mcg Oral DAILY    [START ON 1/1/2018] cyanocobalamin (VITAMIN B12) injection 1,000 mcg  1,000 mcg IntraMUSCular Q7D    HYDROcodone-acetaminophen (NORCO) 5-325 mg per tablet 1 Tab  1 Tab Oral Q6H PRN    LORazepam (ATIVAN) tablet 0.5 mg  0.5 mg Oral Q6H PRN    metoprolol tartrate (LOPRESSOR) tablet 12.5 mg  12.5 mg Oral DAILY    multivitamin w ZN (STRESSTABS W ZINC) tablet  1 Tab Oral DAILY    ranolazine ER (RANEXA) tablet 1,000 mg  1,000 mg Oral BID    sodium chloride (NS) flush 5-10 mL  5-10 mL IntraVENous Q8H    sodium chloride (NS) flush 5-10 mL  5-10 mL IntraVENous PRN    heparin (porcine) injection 5,000 Units  5,000 Units SubCUTAneous Q12H    epoetin toya (EPOGEN;PROCRIT) injection 20,000 Units  20,000 Units SubCUTAneous Q TUE, THU & SAT    gentamicin (GARAMYCIN) 0.1 % cream   Topical DIALYSIS PRN    clopidogrel (PLAVIX) tablet 75 mg  75 mg Oral DAILY    zolpidem (AMBIEN) tablet 5 mg  5 mg Oral QHS PRN    senna-docusate (PERICOLACE) 8.6-50 mg per tablet 2 Tab  2 Tab Oral DAILY    polyethylene glycol (MIRALAX) packet 17 g  17 g Oral DAILY          12/29 1901 - 12/31 0700  In: 480 [P.O.:480]  Out: 746     No results found for this or any previous visit (from the past 24 hour(s)). Review of Systems  Pertinent items are noted in HPI. .    Objective:     Blood pressure 152/75, pulse 85, temperature 98.5 °F (36.9 °C), resp. rate 18, height 5' 9\" (1.753 m), weight 100.6 kg (221 lb 11.2 oz), SpO2 95 %, not currently breastfeeding. Temp (24hrs), Av.9 °F (36.6 °C), Min:97.6 °F (36.4 °C), Max:98.5 °F (36.9 °C)      Physical Exam:   Neck: no adenopathy and no JVD, Lungs: rhonchi in bases, Cardiac: regular rate and rhythm, Abdomen: soft, non-tender. Bowel sounds normal. No masses,  no organomegaly and Extremities: edema 3+ edema and marked rigth arm edema - edema improving compared to yesterday      Assessment:     End Stage Renal Disease:  Patient is tolerating dialysis treatment well. .  Additionally the patient has experienced normal dialysis treatment during dialysis. Dry weight   decreased.     Anemia:    Recent Labs      17   0725   HCT  27.5*   HGB  8.4*       Renal Metabolic Bone Disease:    Recent Labs      17   0725   CA  9.6   ALB  2.1*                        Treatment:  PO4 binders, Cinacaleat, Vitamin D  Hypertension: controlled    Access: adequate monitoring/no changes    Principal Problem:    Fluid overload (2017)    Active Problems:    Anemia of chronic renal failure (2015)      ESRD on peritoneal dialysis ([facility] [address] (2016)           Plan:     Continue scheduled dialysis

## 2017-12-31 NOTE — DIALYSIS
Patient disconnected from PD cycler and catheter cleaned and connected to new PD cycler for continued \"back-to-back\" PD treatment per MD. Total UF from evening run 576cc. Effluent clear, yellow, light, scant fibrin noted. Family at bedside, no complaints at this time.

## 2017-12-31 NOTE — PROGRESS NOTES
Pt resting in bed, physical assessment completed, PD dialysis infusing patent and draining, R  arm weakness noted, IV intact and patent, safety measures in place, call  light within reach.

## 2017-12-31 NOTE — DIALYSIS
Called by unit secretary that patient is stating she is SOB and her stomach is \"all blown up\". Initiated STAT drain, and contacted Dr. Unique Ochoa. MD gave orders to shorten dwell time. Dwell time shortened and STAT drain completed, net UF 260cc from STAT drain. Fill #2 completed and patient states I \"feel much better\". Report given to primary RN.

## 2017-12-31 NOTE — PROGRESS NOTES
Son trying to stop someone in preciado  Says his mother has to go to bathroom quick  Up entering room patient stands up from chair  Elmore pop sound  On way to bathroom found patient had snapped catheter from adaptor  Pd fluid coming from end of catheter  I held pd catheter til patient sat on toilet  Pd catheter did not touch toilet  Clamped catheter with hemostat closer to patient   Alcohol pads and sterile guaze dressing applied to open end of catheter  On call PD nurse notified and says she will come in  Patient assisted to bed

## 2017-12-31 NOTE — DIALYSIS
On call outpatient Peritoneal Dialysis RN Anusha Garcia brought in adaptor supplies, per Dr. Dinh Harp. Sterile technique used to cleanse and trim exposed tubing, and to replace new adaptor pieces and attach blanchard adaptor and then [employer] extension set. Report given to primary RN and Dr. Dinh Harp notified.  Orders received for antibiotics to be given intraperitoneal.

## 2018-01-01 ENCOUNTER — APPOINTMENT (OUTPATIENT)
Dept: ULTRASOUND IMAGING | Age: 83
DRG: 640 | End: 2018-01-01
Attending: INTERNAL MEDICINE
Payer: MEDICARE

## 2018-01-01 PROCEDURE — 90945 DIALYSIS ONE EVALUATION: CPT

## 2018-01-01 PROCEDURE — 93971 EXTREMITY STUDY: CPT

## 2018-01-01 PROCEDURE — 74011250637 HC RX REV CODE- 250/637: Performed by: INTERNAL MEDICINE

## 2018-01-01 PROCEDURE — 65270000029 HC RM PRIVATE

## 2018-01-01 PROCEDURE — 74011250636 HC RX REV CODE- 250/636: Performed by: INTERNAL MEDICINE

## 2018-01-01 RX ADMIN — CLOPIDOGREL BISULFATE 75 MG: 75 TABLET ORAL at 10:18

## 2018-01-01 RX ADMIN — AMLODIPINE BESYLATE 5 MG: 5 TABLET ORAL at 10:19

## 2018-01-01 RX ADMIN — HEPARIN SODIUM 5000 UNITS: 5000 INJECTION, SOLUTION INTRAVENOUS; SUBCUTANEOUS at 21:59

## 2018-01-01 RX ADMIN — GENTAMICIN SULFATE: 1 CREAM TOPICAL at 21:31

## 2018-01-01 RX ADMIN — HEPARIN SODIUM 5000 UNITS: 5000 INJECTION, SOLUTION INTRAVENOUS; SUBCUTANEOUS at 10:16

## 2018-01-01 RX ADMIN — STANDARDIZED SENNA CONCENTRATE AND DOCUSATE SODIUM 2 TABLET: 8.6; 5 TABLET, FILM COATED ORAL at 10:19

## 2018-01-01 RX ADMIN — ZINC 1 TABLET: TAB ORAL at 10:18

## 2018-01-01 RX ADMIN — METOPROLOL TARTRATE 12.5 MG: 25 TABLET ORAL at 10:19

## 2018-01-01 RX ADMIN — CALCITRIOL 0.25 MCG: 0.25 CAPSULE, LIQUID FILLED ORAL at 10:19

## 2018-01-01 RX ADMIN — RANOLAZINE 1000 MG: 500 TABLET, FILM COATED, EXTENDED RELEASE ORAL at 16:25

## 2018-01-01 RX ADMIN — POLYETHYLENE GLYCOL 3350 17 G: 17 POWDER, FOR SOLUTION ORAL at 10:16

## 2018-01-01 RX ADMIN — RANOLAZINE 1000 MG: 500 TABLET, FILM COATED, EXTENDED RELEASE ORAL at 10:18

## 2018-01-01 NOTE — PROGRESS NOTES
Called top room by patient. Right hand and arm edematous. Elevated on pillow. Family member at bedside.

## 2018-01-01 NOTE — PROGRESS NOTES
Patient alert   Denies pain  cyclor in progress without difficulty  Pd cath intact  Instructed to call if wants out of bed   Family at bedside

## 2018-01-01 NOTE — DIALYSIS
Manual flush and drain setup, patient initial drain from previous tx, no net fluid. Antibiotic dosed peritoneal dialysate flushed into patient via manual flush per MD order. Orders for fluid to dwell all night and be drained in am when Dialysis staff arrive. Betadine cap intact, dressing changed per protocol. Report given to primary RN Radha Pearson.

## 2018-01-01 NOTE — PROGRESS NOTES
Resting quietly in bed without complaints.   Dialysis nurse here to fill and dwell PD cath with fluid and antibiotics for the night

## 2018-01-01 NOTE — PROGRESS NOTES
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 She was seen in the peritoneal dialysis clinic and one day after discharge and had the transfer set exchanged as it was felt that he inability to do home PD was related to a catheter problem. After the catheter was assured to be functional she was still not able to get her cycler operationalaeven after trouble shooting the system through the  and she came to the ED with worsening dyspnea two days after her last dialysis. She has originally been admitted on 11/18/2017  by Diana Jay MD with unstable angina and required cardiac cath with [facility] and successful percutaneous coronary intervention and stentings on 11/20/2017. She has history of CAD s/p PCI to LAD and RCA 2-2016, Post procedure course was complicated by drop in hgb, down to 7.4 on 11/21.  She had no obvious signs of bleeding but CT  showed right abdominal wall hematoma. transfusion for anemia was not done after intense conservative therapy with EPO, Iron and vitamin supplementation failed. She had  problems with drainage of PD fluid, PD was held and she was transferred to CCU for CRRT and she was admitted to Rehab service, underwent laparoscopic PD catheter repositioning by Dr. Nabila Mccracken and after a few days was successfully returned to PD. The regained functionality with rehab and on the day of discharge her HD tunnnelled femoral hemodialysis catheter was removed.  She has not had dialysis for two days and has developed edema and dyspnea and has  No ability to obtain a new cycler or CAPD supplies at this time and is therefore admitted to inpatient status as it is anticipated to take several days to remedy this situation and she is in urgent need for dialysis due to fluid overload. 1/1/18 - she had required IP antibiotics yesterday as she had an accidental disconnect at the level of the titanium connector when her tubing got caught in the chair - c/o dyspnea as she absorbed some fluid with the antibiotics - discussed with dialysis - UF today and resume standard PD tonight -         Past Medical History:   Diagnosis Date    Anemia of chronic renal failure 4/28/2015    Anterior myocardial infarction ([address] 5/21/2015    CAD (coronary artery disease)     Chest pain     Chronic kidney disease, stage III (moderate) 8/15/2014    on dialysis    CKD (chronic kidney disease) stage 4, GFR 15-29 ml/min ([address] 4/28/2015    Debility 5/5/2015    Depression 12/29/2015    Edema 12/29/2015    Endocrine disease     Hypothyroidism    GERD (gastroesophageal reflux disease)     Heart murmur 12/29/2015    HLD (hyperlipidemia) 12/29/2015    Hypertension     Hypothyroidism 4/28/2015    Ischemic cardiomyopathy 12/29/2015    Nausea     S/P PTCA (percutaneous transluminal coronary angioplasty); LAD PTCA of  ISR 11/27/15 5/24/2016    STEMI (ST elevation myocardial infarction) ([address] 4/28/2015    Unspecified sleep apnea     uses cpap machine    Unstable angina ([address]       Past Surgical History:   Procedure Laterality Date    HX BACK SURGERY  1990    neck surgery cervical disc    HX BACK SURGERY      lower back    HX CATARACT REMOVAL Bilateral     HX CHOLECYSTECTOMY  [id number]    gall bladder     HX HEART CATHETERIZATION  11/2017    angioplasty    HX KNEE REPLACEMENT Right 2006    HX PTCA  4/28/2015    2.25 Xience stent to mid LAD for occluded artery, anterior MI, EF 25%. Moderate disease distal LAD and distal OM PCI CX and RCA 2004, then PCI RCA and LAD in 2009. Prior to Admission medications    Medication Sig Start Date End Date Taking? Authorizing Provider   amLODIPine (NORVASC) 5 mg tablet Take 1 Tab by mouth daily.  12/29/17   Tr Carbajal MD   cyanocobalamin (VITAMIN B12) 1,000 mcg/mL injection 1 mL by IntraMUSCular route every seven (7) days. Indications: Vitamin B12 Deficiency 12/28/17   Ursula Olson MD   calcitRIOL (ROCALTROL) 0.25 mcg capsule Take 1 Cap by mouth daily. 12/29/17   Ursula Olson MD   metoprolol tartrate (LOPRESSOR) 25 mg tablet Take 0.5 Tabs by mouth daily. Indications: Acute Coronary Syndrome 12/29/17   Ursula Olson MD   ranolazine ER (RANEXA) 500 mg SR tablet Take 2 Tabs by mouth two (2) times a day. 12/28/17   Ursula Olson MD   HYDROcodone-acetaminophen (NORCO) 5-325 mg per tablet Take 1 Tab by mouth every four (4) hours as needed. Max Daily Amount: 6 Tabs. 12/28/17   Ursula Olson MD   LORazepam (ATIVAN) 1 mg tablet Take 0.5 Tabs by mouth every six (6) hours as needed. Max Daily Amount: 2 mg. 12/28/17   Ursula Olson MD   multivits,Stress Formula-Zinc tablet Take 1 Tab by mouth daily. 12/29/17   Ursula Olson MD   metoprolol tartrate (LOPRESSOR) 25 mg tablet Take 0.5 Tabs by mouth daily. 11/27/17   MINDY Chatman   amLODIPine (NORVASC) 5 mg tablet Take 1 Tab by mouth daily. 11/27/17   MINDY Laureano   torsemide (DEMADEX) 100 mg tablet Take 1 Tab by mouth two (2) times a day. 11/27/17   MINDY Chatman   pantoprazole (PROTONIX) 40 mg tablet Take 1 Tab by mouth Before breakfast and dinner. 11/27/17   MINDY Laureano   folic acid (FOLVITE) 1 mg tablet Take 1 mg by mouth daily. Historical Provider   sucralfate (CARAFATE) 100 mg/mL suspension Take 10 mL by mouth Before breakfast, lunch, dinner and at bedtime. Indications: PREVENTION OF STRESS ULCER 9/23/17   José Miguel [place], DO   nitroglycerin (NITROLINGUAL) 400 mcg/spray spray 1 Spray by SubLINGual route every five (5) minutes as needed for Chest Pain. Historical Provider   linaclotide Sondra Laird) 145 mcg cap capsule Take  by mouth Daily (before breakfast). Historical Provider   ticagrelor (BRILINTA) 90 mg tablet Take 1 Tab by mouth two (2) times a day.  2/4/16   Glendy Echeverria MINDY Panda   sevelamer carbonate (RENVELA) 800 mg tab tab Take 800 mg by mouth three (3) times daily (with meals). Historical Provider   gentamicin (GARAMYCIN) 0.1 % topical cream APPLY TO PD catheter exit site at daily dressing change 5/12/15   Frank Montes MD   aspirin delayed-release 81 mg tablet Take 1 Tab by mouth daily. 5/5/15   Gisela Hollingsworth PA-C   darbepoetin toya in polysorbat (ARANESP, POLYSORBATE,) 40 mcg/mL injection 40 mcg by SubCUTAneous route every fourteen (14) days. Indications: ANEMIA IN CHRONIC KIDNEY DISEASE    Historical Provider   rosuvastatin (CRESTOR) 20 mg tablet Take 20 mg by mouth nightly. Historical Provider   ranolazine ER (RANEXA) 500 mg SR tablet Take 500 mg by mouth two (2) times a day. Historical Provider   levothyroxine (SYNTHROID) 150 mcg tablet Take 150 mcg by mouth Daily (before breakfast).     Historical Provider     Allergies   Allergen Reactions    Codeine Nausea and Vomiting      Social History   Substance Use Topics    Smoking status: Never Smoker    Smokeless tobacco: Never Used    Alcohol use No      Family History   Problem Relation Age of Onset    Heart Disease Mother     Hypertension Mother     Cancer Mother      Lung    Stroke Father     Hypertension Father     Breast Cancer Neg Hx           Review of Systems    Constitutional: no fever,  No chills  Eyes: fair vision,    Ears, nose, mouth, throat, and face:fair hearing,   Respiratory: no asthma,    Cardiovascular:no chest pain,    Gastrointestinal:no diarrhea,no constipation with Sennekot and Miralax   Genitourinary: no dysuria,   Hematologic/lymphatic: no bleeding tendency,   Neurological: no seizures,   Behvioral/Psych: no psych hospitalization    Endocrine: no goiter,   The remainder is negative      Objective:       Visit Vitals    /77    Pulse 79    Temp 97.7 °F (36.5 °C)    Resp 18    Ht 5' 9\" (1.753 m)    Wt 100.6 kg (221 lb 11.2 oz)    SpO2 98%    Breastfeeding No    BMI 32.74 kg/m2       General:  Alert, cooperative, mild to moderate respiratory distress, appears stated age. Head:  Normocephalic, without obvious abnormality, atraumatic. Eyes:  Conjunctivae/corneas clear. EOMs intact. Ears:  Normal external ear canals both ears. Nose: Nares normal. Septum midline. Mucosa normal. No drainage or sinus tenderness. Throat: Lips, mucosa, and tongue normal. Teeth and gums normal.   Neck: Supple, symmetrical, trachea midline, no adenopathy, thyroid: no enlargement/tenderness/nodules, no JVD. Back:   Symmetric, no curvature. ROM normal. No CVA tenderness. Lungs:   Crackles in bases to auscultation bilaterally. Chest wall:  No tenderness or deformity. Heart:  Regular rate and rhythm, S1, S2 normal, no murmur,  rub or gallop. Abdomen:   Soft, non-tender. Bowel sounds normal. No masses,  No organomegaly. No renal bruit. PD catheter in place. Extremities: Extremities normal, atraumatic, no cyanosis - 3+edema. Skin: Skin color, texture, turgor normal. No rashes or lesions. Lymph nodes: Cervical and supraclavicular nodes normal.   Neurologic: Grossly intact. No asterixis. Data Review:     No results found for this or any previous visit (from the past 24 hour(s)). Results for Susan Quintero (MRN [id number]) as of 1/1/2018 08:14   Ref.  Range 12/30/2017 07:25   WBC Latest Ref Range: 4.3 - 11.1 K/uL 6.5   RBC Latest Ref Range: 4.05 - 5.25 M/uL 2.84 (L)   HGB Latest Ref Range: 11.7 - 15.4 g/dL 8.4 (L)   HCT Latest Ref Range: 35.8 - 46.3 % 27.5 (L)   MCV Latest Ref Range: 79.6 - 97.8 FL 96.8   MCH Latest Ref Range: 26.1 - 32.9 PG 29.6   MCHC Latest Ref Range: 31.4 - 35.0 g/dL 30.5 (L)   RDW Latest Ref Range: 11.9 - 14.6 % 18.5 (H)   PLATELET Latest Ref Range: 150 - 450 K/uL 342   MPV Latest Ref Range: 10.8 - 14.1 FL 9.6 (L)   NEUTROPHILS Latest Ref Range: 43 - 78 % 71   LYMPHOCYTES Latest Ref Range: 13 - 44 % 15   MONOCYTES Latest Ref Range: 4.0 - 12.0 % 12 EOSINOPHILS Latest Ref Range: 0.5 - 7.8 % 2   BASOPHILS Latest Ref Range: 0.0 - 2.0 % 0   IMMATURE GRANULOCYTES Latest Ref Range: 0.0 - 5.0 % 0   DF Latest Units:   AUTOMATED   ABS. NEUTROPHILS Latest Ref Range: 1.7 - 8.2 K/UL 4.6   ABS. IMM. GRANS. Latest Ref Range: 0.0 - 0.5 K/UL 0.0   ABS. LYMPHOCYTES Latest Ref Range: 0.5 - 4.6 K/UL 0.9   ABS. MONOCYTES Latest Ref Range: 0.1 - 1.3 K/UL 0.8   ABS. EOSINOPHILS Latest Ref Range: 0.0 - 0.8 K/UL 0.1   ABS. BASOPHILS Latest Ref Range: 0.0 - 0.2 K/UL 0.0   Sodium Latest Ref Range: 136 - 145 mmol/L 138   Potassium Latest Ref Range: 3.5 - 5.1 mmol/L 4.3   Chloride Latest Ref Range: 98 - 107 mmol/L 104   CO2 Latest Ref Range: 21 - 32 mmol/L 23   Anion gap Latest Ref Range: 7 - 16 mmol/L 11   Glucose Latest Ref Range: 65 - 100 mg/dL 84   BUN Latest Ref Range: 8 - 23 MG/DL 47 (H)   Creatinine Latest Ref Range: 0.6 - 1.0 MG/DL 10.10 (H)   Calcium Latest Ref Range: 8.3 - 10.4 MG/DL 9.6   GFR est non-AA Latest Ref Range: >60 ml/min/1.73m2 4 (L)   GFR est AA Latest Ref Range: >60 ml/min/1.73m2 5 (L)   Bilirubin, total Latest Ref Range: 0.2 - 1.1 MG/DL 0.5   Protein, total Latest Ref Range: 6.3 - 8.2 g/dL 6.6   Albumin Latest Ref Range: 3.2 - 4.6 g/dL 2.1 (L)   Globulin Latest Ref Range: 2.3 - 3.5 g/dL 4.5 (H)   A-G Ratio Latest Ref Range: 1.2 - 3.5   0.5 (L)   ALT (SGPT) Latest Ref Range: 12 - 65 U/L 8 (L)   AST Latest Ref Range: 15 - 37 U/L 21   Alk. phosphatase Latest Ref Range: 50 - 136 U/L 101     Principal Problem:    Fluid overload (12/30/2017)    Active Problems:    Anemia of chronic renal failure (4/28/2015)      ESRD on peritoneal dialysis ([facility] (2/1/2016)        Assessment:     1. Fluid overload -  - ultrafilter with intensive peritoneal dialysis    2. ESRD -  - resume PD by cycler    3. Anemia -  - on WAYNE    4. CAD -  - no CP since most recent angioplasty/stent placement    5.  Malnutrition -  - work on supplements    6. right arm edema -  - doppler for suspected DVT    7. PD catheter complication -  - she had IP Vanco and Tobra during the night for infection prophylaxis      Plan:     As above    Celso Mcduffie M.D.

## 2018-01-01 NOTE — DIALYSIS
Peritoneal dialysis initiated as ordered. Site has no s/s of redness, swelling, or exudate. Patient states no complaints at this time. Report given to primary RN.

## 2018-01-01 NOTE — PROGRESS NOTES
Quiet shift  Resting in bed  cyclor in progress without difficulty  Pd cath intact  Denies pain  Right arm remains swollen  Bedside ultrasound done today

## 2018-01-02 VITALS
WEIGHT: 222 LBS | OXYGEN SATURATION: 98 % | BODY MASS INDEX: 32.88 KG/M2 | SYSTOLIC BLOOD PRESSURE: 166 MMHG | TEMPERATURE: 98 F | RESPIRATION RATE: 18 BRPM | DIASTOLIC BLOOD PRESSURE: 91 MMHG | HEART RATE: 79 BPM | HEIGHT: 69 IN

## 2018-01-02 PROCEDURE — 74011250637 HC RX REV CODE- 250/637: Performed by: INTERNAL MEDICINE

## 2018-01-02 PROCEDURE — 74011250636 HC RX REV CODE- 250/636: Performed by: INTERNAL MEDICINE

## 2018-01-02 RX ADMIN — CALCITRIOL 0.25 MCG: 0.25 CAPSULE, LIQUID FILLED ORAL at 09:50

## 2018-01-02 RX ADMIN — RANOLAZINE 1000 MG: 500 TABLET, FILM COATED, EXTENDED RELEASE ORAL at 09:51

## 2018-01-02 RX ADMIN — CLOPIDOGREL BISULFATE 75 MG: 75 TABLET ORAL at 09:51

## 2018-01-02 RX ADMIN — STANDARDIZED SENNA CONCENTRATE AND DOCUSATE SODIUM 2 TABLET: 8.6; 5 TABLET, FILM COATED ORAL at 09:50

## 2018-01-02 RX ADMIN — AMLODIPINE BESYLATE 5 MG: 5 TABLET ORAL at 09:50

## 2018-01-02 RX ADMIN — METOPROLOL TARTRATE 12.5 MG: 25 TABLET ORAL at 09:50

## 2018-01-02 RX ADMIN — ERYTHROPOIETIN 20000 UNITS: 20000 INJECTION, SOLUTION INTRAVENOUS; SUBCUTANEOUS at 10:06

## 2018-01-02 RX ADMIN — POLYETHYLENE GLYCOL 3350 17 G: 17 POWDER, FOR SOLUTION ORAL at 09:52

## 2018-01-02 RX ADMIN — HEPARIN SODIUM 5000 UNITS: 5000 INJECTION, SOLUTION INTRAVENOUS; SUBCUTANEOUS at 09:49

## 2018-01-02 RX ADMIN — ZINC 1 TABLET: TAB ORAL at 09:50

## 2018-01-02 NOTE — PROGRESS NOTES
Bedside report received from Yahaira Quinonez, [address] [place] [address]. Assessment completed. Pt is lying in bed at this time. Pt A&O x 4. Respirations even and unlabored. Cyclor in progress. No s/sx of acute pain or distress. Bed in low, locked position. Call light within reach. Pt instructed to call for assistance. Will monitor.

## 2018-01-02 NOTE — PROGRESS NOTES
Problem: Falls - Risk of  Goal: *Absence of Falls  Document Harrison Fall Risk and appropriate interventions in the flowsheet.    Outcome: Progressing Towards Goal  Fall Risk Interventions:  Mobility Interventions: Bed/chair exit alarm         Medication Interventions: Bed/chair exit alarm    Elimination Interventions: Bed/chair exit alarm

## 2018-01-02 NOTE — PROGRESS NOTES
Care Management Interventions  PCP Verified by CM: Yes  Transition of Care Consult (CM Consult): [place]: No  Reason Outside [place]: Patient already serviced by other home care/hospice agency  Physical Therapy Consult: No  Occupational Therapy Consult: No  Current Support Network: Lives Alone  Confirm Follow Up Transport: Family  Plan discussed with Pt/Family/Caregiver: Yes  Freedom of Choice Offered: Yes  Discharge Location  Discharge Placement: Home with hospice  MISSY dc screening. Pt is upset and tearful when SW comes into the room because of the ladybugs that are on the walls and the pt next door that hollers. MISSY is addressing this issue but at this time there are no other empty rooms on the floor. MISSY made charge nurse aware of the situation. Pt lives alone. Uses a walker to ambulate. Home 02 nocturnal. Son drives pt to appts. [facility] will be caring for pt in home at [place]. SW following.

## 2018-01-02 NOTE — PROGRESS NOTES
Uneventful night. Pt lying in bed resting at this time. Respirations even and unlabored. No s/sx of pain or distress. Report to be given to oncoming nurse.

## 2018-01-02 NOTE — DISCHARGE INSTRUCTIONS
Learning About Fluid Overload  What is fluid overload? Fluid overload means that your body has too much water. The extra fluid in your body can raise your blood pressure and force your heart to work harder. It can also make it hard for you to breathe. Most of your body is made up of water. The body uses minerals like sodium and potassium to help organs such as your heart, kidneys, and liver balance how much water you need. For example, the heart pumps blood to move water around the body. And the kidneys work to get rid of the water that the body doesn't need. Health conditions like kidney disease, heart failure, and cirrhosis can cause fluid overload. Other things can cause extra fluid to build up. IV fluids, some medicines, too much salt (sodium) from food, and certain medical treatments can sometimes cause this fluid increase. What are the symptoms? Some of the most common symptoms are:  · Gaining weight over a short period of time. · Swelling in the ankles or legs. · Shortness of breath. How is it treated? The goal of treatment is to remove the extra fluid in your body. Your treatment will depend on the cause. Your doctor may:  · Give you medicines, such as diuretics (also called \"water pills\"). They help your body get rid of the extra fluid. · Restrict your fluid or salt intake. Follow-up care is a key part of your treatment and safety. Be sure to make and go to all appointments, and call your doctor if you are having problems. It's also a good idea to know your test results and keep a list of the medicines you take. Where can you learn more? Go to http://estuardo-bryant.info/. Enter O110 in the search box to learn more about \"Learning About Fluid Overload. \"  Current as of: September 21, 2016  Content Version: 11.4  © 9120-2056 [employer].  Care instructions adapted under license by [employer] (which disclaims liability or warranty for this information). If you have questions about a medical condition or this instruction, always ask your healthcare professional. [address] any warranty or liability for your use of this information. Peritoneal Dialysis Catheter Care: Care Instructions  Your Care Instructions    Dialysis does the work of your kidneys when you have kidney failure. It filters wastes and removes extra fluid. It also keeps the right balance of chemicals in your blood. Peritoneal dialysis uses the lining of your belly to filter your blood. Before you start dialysis, your doctor creates a dialysis access. This is the place where the dialysis solution can flow into and out of your body. To make the access, the doctor places a soft tube in your belly. This tube is called a catheter. When you do dialysis, the solution flows into your belly and stays there for several hours. Then you remove it through the catheter. It is important to take care of the catheter and the access area to prevent infection. Follow-up care is a key part of your treatment and safety. Be sure to make and go to all appointments, and call your doctor if you are having problems. It's also a good idea to know your test results and keep a list of the medicines you take. How can you care for yourself at home? Care of the catheter and access  · After the doctor creates your access, keep the bandage dry and clean. Change a dirty or bloody bandage. · Keep your access area clean and dry. Check it every day for signs of infection. · Always clean and dry your catheter and access area right away after you get wet. · Always wash your hands before you touch the catheter. · Fasten or tape the catheter to your body to keep it from catching on your clothes. · Never use scissors or other sharp objects around your catheter. · Do not use unapproved clamps on your catheter. · Store your dialysis supplies in a cool, dry place.   Activity when you have an access  · Do not lift heavy objects. · Do not swim or take a bath unless your doctor tells you it is okay. When should you call for help? Call your doctor now or seek immediate medical care if:  ? · You have signs of infection, such as:  ¨ Increased pain, swelling, warmth, or redness. ¨ Red streaks leading from the access area. ¨ Pus draining from the access area. ¨ A fever. ? · You have belly pain. ? · You have nausea or vomiting. ? · The dialysis fluid looks cloudy or is a different color. ? · Fluid does not flow through the catheter. ? Watch closely for changes in your health, and be sure to contact your doctor if you have any problems. Where can you learn more? Go to http://estuardo-bryant.info/. Enter R310 in the search box to learn more about \"Peritoneal Dialysis Catheter Care: Care Instructions. \"  Current as of: May 12, 2017  Content Version: 11.4  © 4917-9278 [employer]. Care instructions adapted under license by [employer] (which disclaims liability or warranty for this information). If you have questions about a medical condition or this instruction, always ask your healthcare professional. [place] [address] any warranty or liability for your use of this information. DISCHARGE SUMMARY from Nurse    PATIENT INSTRUCTIONS:    After general anesthesia or intravenous sedation, for 24 hours or while taking prescription Narcotics:  · Limit your activities  · Do not drive and operate hazardous machinery  · Do not make important personal or business decisions  · Do  not drink alcoholic beverages  · If you have not urinated within 8 hours after discharge, please contact your surgeon on call.     Report the following to your surgeon:  · Excessive pain, swelling, redness or odor of or around the surgical area  · Temperature over 100.5  · Nausea and vomiting lasting longer than 4 hours or if unable to take medications  · Any signs of decreased circulation or nerve impairment to extremity: change in color, persistent  numbness, tingling, coldness or increase pain  · Any questions    What to do at Home:  Recommended activity: Activity as tolerated, resume home diet as tolerated. *  Please give a list of your current medications to your Primary Care Provider. *  Please update this list whenever your medications are discontinued, doses are      changed, or new medications (including over-the-counter products) are added. *  Please carry medication information at all times in case of emergency situations. These are general instructions for a healthy lifestyle:    No smoking/ No tobacco products/ Avoid exposure to second hand smoke  Surgeon General's Warning:  Quitting smoking now greatly reduces serious risk to your health. Obesity, smoking, and sedentary lifestyle greatly increases your risk for illness    A healthy diet, regular physical exercise & weight monitoring are important for maintaining a healthy lifestyle    You may be retaining fluid if you have a history of heart failure or if you experience any of the following symptoms:  Weight gain of 3 pounds or more overnight or 5 pounds in a week, increased swelling in our hands or feet or shortness of breath while lying flat in bed. Please call your doctor as soon as you notice any of these symptoms; do not wait until your next office visit. Recognize signs and symptoms of STROKE:    F-face looks uneven    A-arms unable to move or move unevenly    S-speech slurred or non-existent    T-time-call 911 as soon as signs and symptoms begin-DO NOT go       Back to bed or wait to see if you get better-TIME IS BRAIN. Warning Signs of HEART ATTACK     Call 911 if you have these symptoms:   Chest discomfort. Most heart attacks involve discomfort in the center of the chest that lasts more than a few minutes, or that goes away and comes back.  It can feel like uncomfortable pressure, squeezing, fullness, or pain.  Discomfort in other areas of the upper body. Symptoms can include pain or discomfort in one or both arms, the back, neck, jaw, or stomach.  Shortness of breath with or without chest discomfort.  Other signs may include breaking out in a cold sweat, nausea, or lightheadedness. Don't wait more than five minutes to call 911 - MINUTES MATTER! Fast action can save your life. Calling 911 is almost always the fastest way to get lifesaving treatment. Emergency Medical Services staff can begin treatment when they arrive -- up to an hour sooner than if someone gets to the hospital by car. The discharge information has been reviewed with the patient. The patient verbalized understanding. Discharge medications reviewed with the patient and appropriate educational materials and side effects teaching were provided.   ___________________________________________________________________________________________________________________________________

## 2018-01-02 NOTE — PROGRESS NOTES
Bedside report received from night nurse Bethany Carranza. Assessment done as noted  Respiration even and unlabored 20/min; denies pain or nausea at present. Remains on continuous PD cycler. No family at bedside. Encouraged to call with needs.

## 2018-01-02 NOTE — DISCHARGE SUMMARY
Physician Discharge Summary     Patient ID:  Piper Benitez  [id number]  80 y.o.  10/26/1932    Admit date: 12/30/2017    Discharge date and time: 1/2/2018    Admission Diagnoses: Fluid overload    Discharge Diagnoses:  Principal Diagnosis Fluid overload                                            Principal Problem:    Fluid overload (12/30/2017)    Active Problems:    Anemia of chronic renal failure (4/28/2015)      ESRD on peritoneal dialysis 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 She was seen in the peritoneal dialysis clinic and one day after discharge and had the transfer set exchanged as it was felt that he inability to do home PD was related to a catheter problem. After the catheter was assured to be functional she was still not able to get her cycler operationalaeven after trouble shooting the system through the  and she came to the ED with worsening dyspnea two days after her last dialysis. She has originally been admitted on 11/18/2017  by Neeraj Corrigan MD with unstable angina and required cardiac cath with [facility] and successful percutaneous coronary intervention and stentings on 11/20/2017. She has history of CAD s/p PCI to LAD and RCA 2-2016, Post procedure course was complicated by drop in hgb, down to 7.4 on 11/21.  She had no obvious signs of bleeding but CT  showed right abdominal wall hematoma. transfusion for anemia was not done after intense conservative therapy with EPO, Iron and vitamin supplementation failed. She had  problems with drainage of PD fluid, PD was held and she was transferred to CCU for CRRT and she was admitted to Rehab service, underwent laparoscopic PD catheter repositioning by Dr. Ida Garnica and after a few days was successfully returned to PD. The regained functionality with rehab and on the day of discharge her HD tunnnelled femoral hemodialysis catheter was removed. She has not had dialysis for two days and has developed edema and dyspnea and has  No ability to obtain a new cycler or CAPD supplies at this time and is therefore admitted to inpatient status as it is anticipated to take several days to remedy this situation and she is in urgent need for dialysis due to fluid overload. PD is working welll now . Okay to  Be discharged w. Will be f/w in PD clinic today     Disposition: home    Patient Instructions:   Current Discharge Medication List      CONTINUE these medications which have NOT CHANGED    Details   cyanocobalamin (VITAMIN B12) 1,000 mcg/mL injection 1 mL by IntraMUSCular route every seven (7) days. Indications: Vitamin B12 Deficiency  Qty: 1 Vial, Refills: 1      calcitRIOL (ROCALTROL) 0.25 mcg capsule Take 1 Cap by mouth daily. Qty: 30 Cap, Refills: 2    Associated Diagnoses: ESRD (end stage renal disease) ([facility]      ! ! ranolazine ER (RANEXA) 500 mg SR tablet Take 2 Tabs by mouth two (2) times a day. Qty: 60 Tab, Refills: 2    Associated Diagnoses: ESRD (end stage renal disease) (HCC)      HYDROcodone-acetaminophen (NORCO) 5-325 mg per tablet Take 1 Tab by mouth every four (4) hours as needed. Max Daily Amount: 6 Tabs. Qty: 20 Tab, Refills: 0    Associated Diagnoses: ESRD (end stage renal disease) (HCC)      LORazepam (ATIVAN) 1 mg tablet Take 0.5 Tabs by mouth every six (6) hours as needed. Max Daily Amount: 2 mg. Qty: 20 Tab, Refills: 0    Associated Diagnoses: Essential hypertension      multivits,Stress Formula-Zinc tablet Take 1 Tab by mouth daily. Qty: 30 Tab, Refills: 2    Associated Diagnoses: Essential hypertension      metoprolol tartrate (LOPRESSOR) 25 mg tablet Take 0.5 Tabs by mouth daily. Qty: 30 Tab, Refills: 3      amLODIPine (NORVASC) 5 mg tablet Take 1 Tab by mouth daily.   Qty: 30 Tab, Refills: 6      torsemide (DEMADEX) 100 mg tablet Take 1 Tab by mouth two (2) times a day. Qty: 60 Tab, Refills: 3      pantoprazole (PROTONIX) 40 mg tablet Take 1 Tab by mouth Before breakfast and dinner. Qty: 30 Tab, Refills: 0      folic acid (FOLVITE) 1 mg tablet Take 1 mg by mouth daily. sucralfate (CARAFATE) 100 mg/mL suspension Take 10 mL by mouth Before breakfast, lunch, dinner and at bedtime. Indications: PREVENTION OF STRESS ULCER  Qty: 414 mL, Refills: 0      nitroglycerin (NITROLINGUAL) 400 mcg/spray spray 1 Spray by SubLINGual route every five (5) minutes as needed for Chest Pain.      linaclotide (LINZESS) 145 mcg cap capsule Take  by mouth Daily (before breakfast). ticagrelor (BRILINTA) 90 mg tablet Take 1 Tab by mouth two (2) times a day. Qty: 60 Tab, Refills: 11      sevelamer carbonate (RENVELA) 800 mg tab tab Take 800 mg by mouth three (3) times daily (with meals). gentamicin (GARAMYCIN) 0.1 % topical cream APPLY TO PD catheter exit site at daily dressing change  Qty: 15 g, Refills: 0      aspirin delayed-release 81 mg tablet Take 1 Tab by mouth daily. darbepoetin toya in polysorbat (ARANESP, POLYSORBATE,) 40 mcg/mL injection 40 mcg by SubCUTAneous route every fourteen (14) days. Indications: ANEMIA IN CHRONIC KIDNEY DISEASE      rosuvastatin (CRESTOR) 20 mg tablet Take 20 mg by mouth nightly. !! ranolazine ER (RANEXA) 500 mg SR tablet Take 500 mg by mouth two (2) times a day. levothyroxine (SYNTHROID) 150 mcg tablet Take 150 mcg by mouth Daily (before breakfast). !! - Potential duplicate medications found. Please discuss with provider.         Activity: Activity as tolerated  Diet: Renal Diet    Follow-up with 2- 3 days   Follow-up tests/labs none    Signed:  Ki Camacho MD  1/2/2018  11:09 AM

## 2018-01-02 NOTE — DIALYSIS
Disconnected PD cycler from patient. Patient tolerated well. UF amount:  96ml removed. Effluent: clear, yellow, light. Peritoneal dialysis initiated as ordered. Peritoneal catheter exit site cleaned with Chlorhexidine scrub and Gentamicin cream applied to PD cath exit site. Dressing changed, site has no s/s of redness, swelling, or exudate. Patient states no complaints at this time. Report given to primary RN.

## 2018-01-03 ENCOUNTER — PATIENT OUTREACH (OUTPATIENT)
Dept: CASE MANAGEMENT | Age: 83
End: 2018-01-03

## 2018-02-07 NOTE — DISCHARGE INSTRUCTIONS
DISCHARGE SUMMARY from Nurse    The following personal items are in your possession at time of discharge:       Visual Aid: None              Other Valuables: At bedside             PATIENT INSTRUCTIONS:    After general anesthesia or intravenous sedation, for 24 hours or while taking prescription Narcotics:  · Limit your activities  · Do not drive and operate hazardous machinery  · Do not make important personal or business decisions  · Do  not drink alcoholic beverages  · If you have not urinated within 8 hours after discharge, please contact your surgeon on call. Report the following to your surgeon:  · Excessive pain, swelling, redness or odor of or around the surgical area  · Temperature over 100.5  · Nausea and vomiting lasting longer than 4 hours or if unable to take medications  · Any signs of decreased circulation or nerve impairment to extremity: change in color, persistent  numbness, tingling, coldness or increase pain  · Any questions        What to do at Home:  Recommended activity: Activity as tolerated,     *  Please give a list of your current medications to your Primary Care Provider. *  Please update this list whenever your medications are discontinued, doses are      changed, or new medications (including over-the-counter products) are added. *  Please carry medication information at all times in case of emergency situations. These are general instructions for a healthy lifestyle:    No smoking/ No tobacco products/ Avoid exposure to second hand smoke    Surgeon General's Warning:  Quitting smoking now greatly reduces serious risk to your health.     Obesity, smoking, and sedentary lifestyle greatly increases your risk for illness    A healthy diet, regular physical exercise & weight monitoring are important for maintaining a healthy lifestyle    You may be retaining fluid if you have a history of heart failure or if you experience any of the following symptoms:  Weight gain of Health Maintenance Summary     Topic Due On Due Status Completed On    Colorectal Cancer Screening - Colonoscopy Jan 1, 2024 Not Due Jan 1, 2014    Immunization - Pneumococcal Jan 1, 2017 Overdue Jan 1, 2016    Diabetes Eye Exam Edison 10, 2019 Not Due Edison 10, 2018    Glycohemoglobin A1C  (Diabetes Sugar)  Jun 29, 2018 Not Due Dec 29, 2017    GFR  (Kidney Function Test)  Jan 4, 2019 Not Due Jan 4, 2018    Diabetes Foot Exam  Jan 29, 2019 Not Due Jan 29, 2018    Medicare Wellness Visit Oct 25, 2018 Not Due Oct 25, 2017    IMMUNIZATION - DTaP/Tdap/Td Jan 1, 2027 Not Due Jan 1, 2017    Immunization-Influenza  Completed Sep 22, 2017    Depression Screening Oct 27, 2018 Not Due Oct 27, 2017    Hepatitis C Screening Mar 17, 1999 Overdue           Patient is due for topics as listed above, he wishes to decline at this time .           3 pounds or more overnight or 5 pounds in a week, increased swelling in our hands or feet or shortness of breath while lying flat in bed. Please call your doctor as soon as you notice any of these symptoms; do not wait until your next office visit. Recognize signs and symptoms of STROKE:    F-face looks uneven    A-arms unable to move or move unevenly    S-speech slurred or non-existent    T-time-call 911 as soon as signs and symptoms begin-DO NOT go       Back to bed or wait to see if you get better-TIME IS BRAIN. Warning Signs of HEART ATTACK     Call 911 if you have these symptoms:   Chest discomfort. Most heart attacks involve discomfort in the center of the chest that lasts more than a few minutes, or that goes away and comes back. It can feel like uncomfortable pressure, squeezing, fullness, or pain.  Discomfort in other areas of the upper body. Symptoms can include pain or discomfort in one or both arms, the back, neck, jaw, or stomach.  Shortness of breath with or without chest discomfort.  Other signs may include breaking out in a cold sweat, nausea, or lightheadedness. Don't wait more than five minutes to call 911 - MINUTES MATTER! Fast action can save your life. Calling 911 is almost always the fastest way to get lifesaving treatment. Emergency Medical Services staff can begin treatment when they arrive -- up to an hour sooner than if someone gets to the hospital by car. The discharge information has been reviewed with the . The patient verbalized understanding. Discharge medications reviewed with the patient and appropriate educational materials and side effects teaching were provided. Patient is to call nephrology to be seen in follow up for a week after discharge. Also, patient needs to call cardiology for a follow-up appt for 1-2 weeks after discharge.

## 2019-12-30 NOTE — PROGRESS NOTES
PHYSICAL THERAPY DAILY NOTE  Time In: 2296  Time Out: 1435  Patient Seen For: PM;Gait training; Therapeutic exercise;Transfer training    Subjective: Pt. Extremely fatigued after HD today. States \"they took 4L of fluid off of me! \"         Objective: Other (comment) (Fall Risk)    TRANSFERS Daily Assessment    Transfer Type: SPT with walker  Transfer Assistance : 5 (Supervision/setup)  Sit to Stand Assistance: Supervision       GAIT Daily Assessment    Amount of Assistance: 4 (Contact guard assistance)  Distance (ft): 10 Feet (ft)  Assistive Device: Walker, rollator   Flexed posture, decreased step length, decreased fran, increased B stance time. BALANCE Daily Assessment    Sitting - Static: Good (unsupported)  Sitting - Dynamic: Good (unsupported)  Standing - Static: Fair  Standing - Dynamic : Impaired       LOWER EXTREMITY EXERCISES Daily Assessment   Pt. Performed Motomed @ resistance level 2 x10 minutes to increase strength & endurance B LEs Extremity: Both  Exercise Type #1: Seated lower extremity strengthening  Sets Performed: 1  Reps Performed: 15  Level of Assist: Supervision     SEATED EXERCISES Sets Reps Comments   Ankle Pumps 1 15    Hip Flexion 1 15    Long Arc Quads 1 15    Hip Adduction/Ball Squeeze 1 15    Hip Abduction with red Theraband 1 15    Hamstring Curls with red Theraband 1 15      Vital Signs:   Patient Vitals for the past 4 hrs:   Pulse BP   12/12/17 1158 73 154/51   12/12/17 1129 77 144/87   12/12/17 1059 76 133/77         Pain level: no c/o pain    Patient education: discussed d/c recommendations & importance to have supervision @ home during transition period after leaving the hospital    Interdisciplinary Communication: discussed d/c planning & recommendations with pt. & SW     Pt. Left up in recliner in NAD, call bell in reach. Assessment: Pt. Fatigued after HD this PM.  However, pt. Remained motivated to participate in PT regardless.   Pt. Unable to progress gait distance, but tolerated increased exercises this visit. Pt. Cont. To function below her baseline & benefits from cont. PT services to address. Plan of Care: Continue with POC and progress as tolerated.      Francine Alston, PT  12/12/2017 declines

## 2020-02-03 NOTE — PROGRESS NOTES
PHYSICAL THERAPY DAILY NOTE  Time In: 1724  Time Out: 5470  Patient Seen For: PM;Gait training; Therapeutic exercise;Transfer training    Subjective: \"I sure would like to be able to go home. \"         Objective: Other (comment) (Fall Risk)    BED/MAT MOBILITY Daily Assessment    Supine to Sit : 6 (Modified independent)  Sit to Supine : 6 (Modified independent)       TRANSFERS Daily Assessment    Transfer Type: SPT with walker  Transfer Assistance : 5 (Supervision/setup)  Sit to Stand Assistance: Minimal assistance (from low chair, supervision from w/c)       GAIT Daily Assessment   Pt. Ascended/descended 15' ramp w/ RW CGA for safety descending        STEPS or STAIRS Daily Assessment   Step to gait pattern Steps/Stairs Ambulated (#): 4  Level of Assist : 4 (Contact guard assistance)  Rail Use: Both       BALANCE Daily Assessment    Sitting - Static: Good (unsupported)  Sitting - Dynamic: Good (unsupported)  Standing - Static: Good  Standing - Dynamic : Impaired       LOWER EXTREMITY EXERCISES Daily Assessment    Pt. Performed Motomed @ resistance level 2 x10 minutes to increase strength & endurance B LEs     Vital Signs: /73  Pulse 79  Temp 98.4 °F (36.9 °C)  Resp 16  Wt 93.5 kg (206 lb 3.2 oz)  SpO2 95%  BMI 30.01 kg/m2    Pain level: no c/o pain    Patient education: pt. instructed in ramp & stair technique    Interdisciplinary Communication: renal MD made aware of pt's progress     Pt. Left up in recliner in in NAD, call bell in reach           Assessment: Excellent progress today, as able to manage steps & ramp in the same session. Pt. Cont. To show improvements & is on track for d/c this week if medically ready. Pt. Will require cont. HHPT to maximize functional independence @ home. Plan of Care: Continue with POC and progress as tolerated.      Usha Fan, PT  12/26/2017 Bladder non-tender and non-distended. Urine clear yellow.

## 2023-03-27 NOTE — PROGRESS NOTES
Hourly rounds completed this shift. All needs met. Report given to oncoming nurse. Terbinafine Pregnancy And Lactation Text: This medication is Pregnancy Category B and is considered safe during pregnancy. It is also excreted in breast milk and breast feeding isn't recommended.

## (undated) DEVICE — UNIVERSAL FIXATION CANNULA: Brand: VERSAONE

## (undated) DEVICE — SUTURE VCRL SZ 3-0 L27IN ABSRB UD L26MM SH 1/2 CIR J416H

## (undated) DEVICE — [HIGH FLOW INSUFFLATOR,  DO NOT USE IF PACKAGE IS DAMAGED,  KEEP DRY,  KEEP AWAY FROM SUNLIGHT,  PROTECT FROM HEAT AND RADIOACTIVE SOURCES.]: Brand: PNEUMOSURE

## (undated) DEVICE — SUTURE SZ 0 27IN 5/8 CIR UR-6  TAPER PT VIOLET ABSRB VICRYL J603H

## (undated) DEVICE — INTENDED FOR TISSUE SEPARATION, AND OTHER PROCEDURES THAT REQUIRE A SHARP SURGICAL BLADE TO PUNCTURE OR CUT.: Brand: BARD-PARKER SAFETY BLADES SIZE 15, STERILE

## (undated) DEVICE — (D)PREP SKN CHLRAPRP APPL 26ML -- CONVERT TO ITEM 371833

## (undated) DEVICE — SOL ANTI-FOG 6ML MEDC -- MEDICHOICE - CONVERT TO 358427

## (undated) DEVICE — MEDI-VAC NON-CONDUCTIVE SUCTION TUBING: Brand: CARDINAL HEALTH

## (undated) DEVICE — NEEDLE HYPO 25GA L1.5IN BLU POLYPR HUB S STL REG BVL STR

## (undated) DEVICE — LAP CHOLE: Brand: MEDLINE INDUSTRIES, INC.

## (undated) DEVICE — GOWN,PREVENTION PLUS,2XL,ST,22/CS: Brand: MEDLINE

## (undated) DEVICE — Device

## (undated) DEVICE — SYR LR LCK 1ML GRAD NSAF 30ML --

## (undated) DEVICE — DERMABOND SKIN ADH 0.7ML -- DERMABOND ADVANCED 12/BX

## (undated) DEVICE — KENDALL SCD EXPRESS SLEEVES, KNEE LENGTH, MEDIUM: Brand: KENDALL SCD

## (undated) DEVICE — STERILE LATEX POWDER-FREE SURGICAL GLOVESWITH NITRILE COATING: Brand: PROTEXIS

## (undated) DEVICE — MEDI-VAC YANKAUER SUCTION HANDLE W/BULBOUS TIP: Brand: CARDINAL HEALTH

## (undated) DEVICE — CYSTO/BLADDER IRRIGATION SET, REGULATING CLAMP

## (undated) DEVICE — SUTURE VCRL SZ 4-0 L27IN ABSRB UD L19MM PS-2 3/8 CIR PRIM J426H

## (undated) DEVICE — LARGE BORE STOPCOCK WITH ROTATING MALE LUER LOCK

## (undated) DEVICE — TROCAR RMFG Z 3RD SLEEVE 5X100 -- LAWSON OEM ITEM 262365 PK/6

## (undated) DEVICE — BLADELESS OPTICAL TROCAR WITH FIXATION CANNULA: Brand: VERSAPORT

## (undated) DEVICE — APPLICATOR FBR TIP L6IN COT TIP WOOD SHFT SWAB 2000 PER CA

## (undated) DEVICE — APPLICATOR COT TIP 6IN WOOD --

## (undated) DEVICE — SUTURE VCRL SZ 2-0 L36IN ABSRB UD L36MM CT-1 1/2 CIR J945H

## (undated) DEVICE — BLUNT TROCAR WITH THREADED ANCHOR: Brand: VERSAONE

## (undated) DEVICE — 2000CC GUARDIAN II: Brand: GUARDIAN

## (undated) DEVICE — REM POLYHESIVE ADULT PATIENT RETURN ELECTRODE: Brand: VALLEYLAB

## (undated) DEVICE — CARDINAL HEALTH FLEXIBLE LIGHT HANDLE COVER: Brand: CARDINAL HEALTH

## (undated) DEVICE — SUTURE PERMAHAND SZ 2-0 L12X18IN NONABSORBABLE BLK SILK A185H